# Patient Record
Sex: MALE | Race: WHITE | Employment: OTHER | URBAN - METROPOLITAN AREA
[De-identification: names, ages, dates, MRNs, and addresses within clinical notes are randomized per-mention and may not be internally consistent; named-entity substitution may affect disease eponyms.]

---

## 2017-08-31 ENCOUNTER — HOSPITAL ENCOUNTER (EMERGENCY)
Age: 62
Discharge: HOME | End: 2017-09-01
Attending: EMERGENCY MEDICINE
Payer: COMMERCIAL

## 2017-08-31 ENCOUNTER — APPOINTMENT (OUTPATIENT)
Dept: CT IMAGING | Age: 62
End: 2017-08-31
Payer: COMMERCIAL

## 2017-08-31 VITALS
DIASTOLIC BLOOD PRESSURE: 88 MMHG | SYSTOLIC BLOOD PRESSURE: 169 MMHG | OXYGEN SATURATION: 97 % | BODY MASS INDEX: 40.43 KG/M2 | HEIGHT: 74 IN | WEIGHT: 315 LBS | RESPIRATION RATE: 16 BRPM | TEMPERATURE: 97.9 F | HEART RATE: 80 BPM

## 2017-08-31 DIAGNOSIS — N20.1 URETERIC STONE: Primary | ICD-10-CM

## 2017-08-31 LAB
ABSOLUTE EOS #: 0.3 K/UL (ref 0–0.4)
ABSOLUTE LYMPH #: 2.5 K/UL (ref 1–4.8)
ABSOLUTE MONO #: 1.3 K/UL (ref 0.2–0.8)
ANION GAP SERPL CALCULATED.3IONS-SCNC: 17 MMOL/L (ref 9–17)
BASOPHILS # BLD: 1 %
BASOPHILS ABSOLUTE: 0.1 K/UL (ref 0–0.2)
BUN BLDV-MCNC: 15 MG/DL (ref 8–23)
BUN/CREAT BLD: 12 (ref 9–20)
CALCIUM SERPL-MCNC: 9.5 MG/DL (ref 8.6–10.4)
CHLORIDE BLD-SCNC: 102 MMOL/L (ref 98–107)
CO2: 20 MMOL/L (ref 20–31)
CREAT SERPL-MCNC: 1.22 MG/DL (ref 0.7–1.2)
DIFFERENTIAL TYPE: ABNORMAL
EOSINOPHILS RELATIVE PERCENT: 2 %
GFR AFRICAN AMERICAN: >60 ML/MIN
GFR NON-AFRICAN AMERICAN: >60 ML/MIN
GFR SERPL CREATININE-BSD FRML MDRD: ABNORMAL ML/MIN/{1.73_M2}
GFR SERPL CREATININE-BSD FRML MDRD: ABNORMAL ML/MIN/{1.73_M2}
GLUCOSE BLD-MCNC: 98 MG/DL (ref 70–99)
HCT VFR BLD CALC: 47 % (ref 41–53)
HEMOGLOBIN: 15.9 G/DL (ref 13.5–17.5)
LYMPHOCYTES # BLD: 13 %
MCH RBC QN AUTO: 29.1 PG (ref 26–34)
MCHC RBC AUTO-ENTMCNC: 33.8 G/DL (ref 31–37)
MCV RBC AUTO: 86.1 FL (ref 80–100)
MONOCYTES # BLD: 7 %
PDW BLD-RTO: 13.6 % (ref 11.5–14.5)
PLATELET # BLD: 286 K/UL (ref 130–400)
PLATELET ESTIMATE: ABNORMAL
PMV BLD AUTO: ABNORMAL FL (ref 6–12)
POTASSIUM SERPL-SCNC: 4.4 MMOL/L (ref 3.7–5.3)
RBC # BLD: 5.46 M/UL (ref 4.5–5.9)
RBC # BLD: ABNORMAL 10*6/UL
SEG NEUTROPHILS: 77 %
SEGMENTED NEUTROPHILS ABSOLUTE COUNT: 14.5 K/UL (ref 1.8–7.7)
SODIUM BLD-SCNC: 139 MMOL/L (ref 135–144)
WBC # BLD: 18.7 K/UL (ref 3.5–11)
WBC # BLD: ABNORMAL 10*3/UL

## 2017-08-31 PROCEDURE — 96375 TX/PRO/DX INJ NEW DRUG ADDON: CPT

## 2017-08-31 PROCEDURE — 99284 EMERGENCY DEPT VISIT MOD MDM: CPT

## 2017-08-31 PROCEDURE — 85025 COMPLETE CBC W/AUTO DIFF WBC: CPT

## 2017-08-31 PROCEDURE — 74176 CT ABD & PELVIS W/O CONTRAST: CPT

## 2017-08-31 PROCEDURE — 6360000002 HC RX W HCPCS: Performed by: EMERGENCY MEDICINE

## 2017-08-31 PROCEDURE — 81003 URINALYSIS AUTO W/O SCOPE: CPT

## 2017-08-31 PROCEDURE — 2580000003 HC RX 258: Performed by: EMERGENCY MEDICINE

## 2017-08-31 PROCEDURE — 80048 BASIC METABOLIC PNL TOTAL CA: CPT

## 2017-08-31 RX ORDER — ACETAMINOPHEN AND CODEINE PHOSPHATE 300; 30 MG/1; MG/1
1 TABLET ORAL EVERY 4 HOURS PRN
COMMUNITY
End: 2020-11-02

## 2017-08-31 RX ORDER — MELOXICAM 10 MG/1
CAPSULE ORAL
COMMUNITY
End: 2020-11-02

## 2017-08-31 RX ORDER — TAMSULOSIN HYDROCHLORIDE 0.4 MG/1
0.4 CAPSULE ORAL DAILY
Qty: 5 CAPSULE | Refills: 0 | Status: SHIPPED | OUTPATIENT
Start: 2017-08-31 | End: 2020-11-02

## 2017-08-31 RX ORDER — TOPIRAMATE 100 MG/1
TABLET, FILM COATED ORAL 2 TIMES DAILY
COMMUNITY
End: 2020-11-02

## 2017-08-31 RX ORDER — OXYCODONE HYDROCHLORIDE AND ACETAMINOPHEN 5; 325 MG/1; MG/1
1 TABLET ORAL EVERY 4 HOURS PRN
Qty: 30 TABLET | Refills: 0 | Status: SHIPPED | OUTPATIENT
Start: 2017-08-31 | End: 2020-11-02

## 2017-08-31 RX ORDER — SODIUM CHLORIDE 9 MG/ML
INJECTION, SOLUTION INTRAVENOUS CONTINUOUS
Status: DISCONTINUED | OUTPATIENT
Start: 2017-08-31 | End: 2017-09-01 | Stop reason: HOSPADM

## 2017-08-31 RX ORDER — CEPHALEXIN 500 MG/1
500 CAPSULE ORAL 3 TIMES DAILY
Qty: 21 CAPSULE | Refills: 0 | Status: SHIPPED | OUTPATIENT
Start: 2017-08-31 | End: 2020-11-02

## 2017-08-31 RX ORDER — PROMETHAZINE HYDROCHLORIDE 25 MG/ML
12.5 INJECTION, SOLUTION INTRAMUSCULAR; INTRAVENOUS ONCE
Status: COMPLETED | OUTPATIENT
Start: 2017-08-31 | End: 2017-08-31

## 2017-08-31 RX ORDER — ONDANSETRON 4 MG/1
4 TABLET, FILM COATED ORAL EVERY 8 HOURS PRN
Qty: 20 TABLET | Refills: 0 | Status: SHIPPED | OUTPATIENT
Start: 2017-08-31 | End: 2020-11-02

## 2017-08-31 RX ADMIN — SODIUM CHLORIDE: 9 INJECTION, SOLUTION INTRAVENOUS at 22:55

## 2017-08-31 RX ADMIN — HYDROMORPHONE HYDROCHLORIDE 1 MG: 1 INJECTION, SOLUTION INTRAMUSCULAR; INTRAVENOUS; SUBCUTANEOUS at 22:52

## 2017-08-31 RX ADMIN — PROMETHAZINE HYDROCHLORIDE 12.5 MG: 25 INJECTION INTRAMUSCULAR; INTRAVENOUS at 22:52

## 2017-08-31 ASSESSMENT — ENCOUNTER SYMPTOMS
EYES NEGATIVE: 1
NAUSEA: 1
BACK PAIN: 1
RESPIRATORY NEGATIVE: 1
ALLERGIC/IMMUNOLOGIC NEGATIVE: 1

## 2017-08-31 ASSESSMENT — PAIN DESCRIPTION - ORIENTATION: ORIENTATION: LEFT

## 2017-08-31 ASSESSMENT — PAIN DESCRIPTION - DESCRIPTORS: DESCRIPTORS: ACHING

## 2017-08-31 ASSESSMENT — PAIN SCALES - GENERAL
PAINLEVEL_OUTOF10: 7
PAINLEVEL_OUTOF10: 7

## 2017-08-31 ASSESSMENT — PAIN DESCRIPTION - LOCATION: LOCATION: FLANK

## 2017-08-31 ASSESSMENT — PAIN DESCRIPTION - PAIN TYPE: TYPE: ACUTE PAIN

## 2017-09-01 PROCEDURE — 2580000003 HC RX 258: Performed by: EMERGENCY MEDICINE

## 2017-09-01 PROCEDURE — 96376 TX/PRO/DX INJ SAME DRUG ADON: CPT

## 2017-09-01 PROCEDURE — 96365 THER/PROPH/DIAG IV INF INIT: CPT

## 2017-09-01 PROCEDURE — 6360000002 HC RX W HCPCS: Performed by: EMERGENCY MEDICINE

## 2017-09-01 RX ADMIN — HYDROMORPHONE HYDROCHLORIDE 1 MG: 1 INJECTION, SOLUTION INTRAMUSCULAR; INTRAVENOUS; SUBCUTANEOUS at 00:12

## 2017-09-01 RX ADMIN — CEFTRIAXONE 1 G: 1 INJECTION, POWDER, FOR SOLUTION INTRAMUSCULAR; INTRAVENOUS at 00:12

## 2018-02-13 ENCOUNTER — HOSPITAL ENCOUNTER (OUTPATIENT)
Dept: MRI IMAGING | Facility: CLINIC | Age: 63
Discharge: HOME | End: 2018-02-15
Payer: COMMERCIAL

## 2018-02-13 DIAGNOSIS — M48.062 LUMBAR STENOSIS WITH NEUROGENIC CLAUDICATION: ICD-10-CM

## 2018-02-13 PROCEDURE — 72148 MRI LUMBAR SPINE W/O DYE: CPT

## 2019-02-08 ENCOUNTER — HOSPITAL ENCOUNTER (OUTPATIENT)
Facility: CLINIC | Age: 64
Discharge: HOME | End: 2019-02-08
Payer: COMMERCIAL

## 2019-02-08 LAB
ABSOLUTE EOS #: 0.3 K/UL (ref 0–0.44)
ABSOLUTE IMMATURE GRANULOCYTE: 0.04 K/UL (ref 0–0.3)
ABSOLUTE LYMPH #: 2.25 K/UL (ref 1.1–3.7)
ABSOLUTE MONO #: 0.69 K/UL (ref 0.1–1.2)
ALBUMIN SERPL-MCNC: 4.3 G/DL (ref 3.5–5.2)
ALBUMIN/GLOBULIN RATIO: 1.3 (ref 1–2.5)
ALP BLD-CCNC: 60 U/L (ref 40–129)
ALT SERPL-CCNC: 14 U/L (ref 5–41)
ANION GAP SERPL CALCULATED.3IONS-SCNC: 12 MMOL/L (ref 9–17)
AST SERPL-CCNC: 13 U/L
BASOPHILS # BLD: 1 % (ref 0–2)
BASOPHILS ABSOLUTE: 0.08 K/UL (ref 0–0.2)
BILIRUB SERPL-MCNC: 0.47 MG/DL (ref 0.3–1.2)
BUN BLDV-MCNC: 12 MG/DL (ref 8–23)
BUN/CREAT BLD: ABNORMAL (ref 9–20)
CALCIUM SERPL-MCNC: 9.2 MG/DL (ref 8.6–10.4)
CHLORIDE BLD-SCNC: 106 MMOL/L (ref 98–107)
CHOLESTEROL, FASTING: 186 MG/DL
CHOLESTEROL/HDL RATIO: 3.9
CO2: 23 MMOL/L (ref 20–31)
CREAT SERPL-MCNC: 0.91 MG/DL (ref 0.7–1.2)
DIFFERENTIAL TYPE: ABNORMAL
EOSINOPHILS RELATIVE PERCENT: 3 % (ref 1–4)
GFR AFRICAN AMERICAN: >60 ML/MIN
GFR NON-AFRICAN AMERICAN: >60 ML/MIN
GFR SERPL CREATININE-BSD FRML MDRD: ABNORMAL ML/MIN/{1.73_M2}
GFR SERPL CREATININE-BSD FRML MDRD: ABNORMAL ML/MIN/{1.73_M2}
GLUCOSE FASTING: 104 MG/DL (ref 70–99)
HCT VFR BLD CALC: 47.5 % (ref 40.7–50.3)
HDLC SERPL-MCNC: 48 MG/DL
HEMOGLOBIN: 16.1 G/DL (ref 13–17)
IMMATURE GRANULOCYTES: 0 %
LDL CHOLESTEROL: 115 MG/DL (ref 0–130)
LYMPHOCYTES # BLD: 20 % (ref 24–43)
MCH RBC QN AUTO: 30 PG (ref 25.2–33.5)
MCHC RBC AUTO-ENTMCNC: 33.9 G/DL (ref 28.4–34.8)
MCV RBC AUTO: 88.5 FL (ref 82.6–102.9)
MONOCYTES # BLD: 6 % (ref 3–12)
NRBC AUTOMATED: 0 PER 100 WBC
PDW BLD-RTO: 13.1 % (ref 11.8–14.4)
PLATELET # BLD: 257 K/UL (ref 138–453)
PLATELET ESTIMATE: ABNORMAL
PMV BLD AUTO: 10.5 FL (ref 8.1–13.5)
POTASSIUM SERPL-SCNC: 4.3 MMOL/L (ref 3.7–5.3)
PROSTATE SPECIFIC ANTIGEN: 2.54 UG/L
RBC # BLD: 5.37 M/UL (ref 4.21–5.77)
RBC # BLD: ABNORMAL 10*6/UL
SEG NEUTROPHILS: 70 % (ref 36–65)
SEGMENTED NEUTROPHILS ABSOLUTE COUNT: 7.67 K/UL (ref 1.5–8.1)
SODIUM BLD-SCNC: 141 MMOL/L (ref 135–144)
TOTAL PROTEIN: 7.6 G/DL (ref 6.4–8.3)
TRIGLYCERIDE, FASTING: 114 MG/DL
VITAMIN D 25-HYDROXY: 39.1 NG/ML (ref 30–100)
VLDLC SERPL CALC-MCNC: NORMAL MG/DL (ref 1–30)
WBC # BLD: 11 K/UL (ref 3.5–11.3)
WBC # BLD: ABNORMAL 10*3/UL

## 2019-02-08 PROCEDURE — 85025 COMPLETE CBC W/AUTO DIFF WBC: CPT

## 2019-02-08 PROCEDURE — 80053 COMPREHEN METABOLIC PANEL: CPT

## 2019-02-08 PROCEDURE — G0103 PSA SCREENING: HCPCS

## 2019-02-08 PROCEDURE — 82306 VITAMIN D 25 HYDROXY: CPT

## 2019-02-08 PROCEDURE — 80061 LIPID PANEL: CPT

## 2019-02-08 PROCEDURE — 36415 COLL VENOUS BLD VENIPUNCTURE: CPT

## 2019-02-26 ENCOUNTER — APPOINTMENT (OUTPATIENT)
Dept: GENERAL RADIOLOGY | Age: 64
End: 2019-02-26
Payer: COMMERCIAL

## 2019-02-26 ENCOUNTER — HOSPITAL ENCOUNTER (EMERGENCY)
Age: 64
Discharge: HOME | End: 2019-02-26
Attending: EMERGENCY MEDICINE
Payer: COMMERCIAL

## 2019-02-26 ENCOUNTER — APPOINTMENT (OUTPATIENT)
Dept: CT IMAGING | Age: 64
End: 2019-02-26
Payer: COMMERCIAL

## 2019-02-26 VITALS
HEART RATE: 85 BPM | RESPIRATION RATE: 17 BRPM | WEIGHT: 304 LBS | BODY MASS INDEX: 39.01 KG/M2 | SYSTOLIC BLOOD PRESSURE: 138 MMHG | OXYGEN SATURATION: 98 % | DIASTOLIC BLOOD PRESSURE: 98 MMHG | TEMPERATURE: 98.6 F | HEIGHT: 74 IN

## 2019-02-26 DIAGNOSIS — R03.0 ELEVATED BLOOD PRESSURE READING: Primary | ICD-10-CM
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
## 2019-02-26 DIAGNOSIS — S16.1XXA ACUTE STRAIN OF NECK MUSCLE, INITIAL ENCOUNTER: ICD-10-CM

## 2019-02-26 DIAGNOSIS — S39.012A STRAIN OF LUMBAR REGION, INITIAL ENCOUNTER: ICD-10-CM

## 2019-02-26 PROCEDURE — 72100 X-RAY EXAM L-S SPINE 2/3 VWS: CPT

## 2019-02-26 PROCEDURE — 99284 EMERGENCY DEPT VISIT MOD MDM: CPT

## 2019-02-26 PROCEDURE — 72125 CT NECK SPINE W/O DYE: CPT

## 2019-02-26 PROCEDURE — 73562 X-RAY EXAM OF KNEE 3: CPT

## 2019-02-26 PROCEDURE — 6370000000 HC RX 637 (ALT 250 FOR IP): Performed by: PHYSICIAN ASSISTANT

## 2019-02-26 RX ORDER — METHOCARBAMOL 750 MG/1
750 TABLET, FILM COATED ORAL 4 TIMES DAILY
Qty: 40 TABLET | Refills: 0 | Status: SHIPPED | OUTPATIENT
Start: 2019-02-26 | End: 2019-02-26

## 2019-02-26 RX ORDER — METHOCARBAMOL 750 MG/1
750 TABLET, FILM COATED ORAL 4 TIMES DAILY
Qty: 40 TABLET | Refills: 0 | Status: SHIPPED | OUTPATIENT
Start: 2019-02-26 | End: 2019-03-08

## 2019-02-26 RX ORDER — METHOCARBAMOL 750 MG/1
750 TABLET, FILM COATED ORAL ONCE
Status: COMPLETED | OUTPATIENT
Start: 2019-02-26 | End: 2019-02-26

## 2019-02-26 RX ADMIN — METHOCARBAMOL 750 MG: 750 TABLET ORAL at 19:43

## 2019-02-26 ASSESSMENT — PAIN SCALES - GENERAL: PAINLEVEL_OUTOF10: 7

## 2019-02-26 ASSESSMENT — PAIN DESCRIPTION - PAIN TYPE: TYPE: ACUTE PAIN

## 2019-02-26 ASSESSMENT — PAIN DESCRIPTION - ORIENTATION: ORIENTATION: LOWER

## 2019-02-26 ASSESSMENT — PAIN DESCRIPTION - LOCATION: LOCATION: BACK;KNEE

## 2020-11-02 ENCOUNTER — HOSPITAL ENCOUNTER (OUTPATIENT)
Age: 65
Setting detail: SPECIMEN
Discharge: HOME | End: 2020-11-02
Payer: COMMERCIAL

## 2020-11-02 ENCOUNTER — HOSPITAL ENCOUNTER (OUTPATIENT)
Dept: GENERAL RADIOLOGY | Facility: CLINIC | Age: 65
Discharge: HOME | End: 2020-11-04
Payer: COMMERCIAL

## 2020-11-02 ENCOUNTER — HOSPITAL ENCOUNTER (OUTPATIENT)
Facility: CLINIC | Age: 65
Discharge: HOME | End: 2020-11-04
Payer: COMMERCIAL

## 2020-11-02 PROBLEM — N13.8 HYPERTROPHY OF PROSTATE WITH URINARY OBSTRUCTION: Status: ACTIVE | Noted: 2020-11-02

## 2020-11-02 PROBLEM — N40.1 HYPERTROPHY OF PROSTATE WITH URINARY OBSTRUCTION: Status: ACTIVE | Noted: 2020-11-02

## 2020-11-02 PROBLEM — R31.0 GROSS HEMATURIA: Status: ACTIVE | Noted: 2020-11-02

## 2020-11-02 PROBLEM — N20.0 KIDNEY STONES: Status: ACTIVE | Noted: 2020-11-02

## 2020-11-02 PROBLEM — R35.1 NOCTURIA: Status: ACTIVE | Noted: 2020-11-02

## 2020-11-02 PROCEDURE — 74018 RADEX ABDOMEN 1 VIEW: CPT

## 2020-11-06 LAB
STONE COMPOSITION: NORMAL
STONE DESCRIPTION: NORMAL
STONE MASS: 13 MG
STONE NUMBER: 5
STONE SIZE: NORMAL MM

## 2022-02-10 ENCOUNTER — TELEPHONE (OUTPATIENT)
Dept: ONCOLOGY | Age: 67
End: 2022-02-10

## 2022-02-10 ENCOUNTER — INITIAL CONSULT (OUTPATIENT)
Dept: ONCOLOGY | Age: 67
End: 2022-02-10
Payer: MEDICARE

## 2022-02-10 VITALS
SYSTOLIC BLOOD PRESSURE: 131 MMHG | HEART RATE: 103 BPM | WEIGHT: 315 LBS | BODY MASS INDEX: 40.43 KG/M2 | DIASTOLIC BLOOD PRESSURE: 92 MMHG | HEIGHT: 74 IN | TEMPERATURE: 97.3 F

## 2022-02-10 DIAGNOSIS — R33.9 URINARY RETENTION: ICD-10-CM

## 2022-02-10 DIAGNOSIS — C67.2 MALIGNANT NEOPLASM OF LATERAL WALL OF URINARY BLADDER (HCC): Primary | ICD-10-CM

## 2022-02-10 DIAGNOSIS — R31.0 GROSS HEMATURIA: ICD-10-CM

## 2022-02-10 PROBLEM — C67.9 MALIGNANT NEOPLASM OF URINARY BLADDER (HCC): Status: ACTIVE | Noted: 2022-02-10

## 2022-02-10 PROCEDURE — G8417 CALC BMI ABV UP PARAM F/U: HCPCS | Performed by: INTERNAL MEDICINE

## 2022-02-10 PROCEDURE — G8427 DOCREV CUR MEDS BY ELIG CLIN: HCPCS | Performed by: INTERNAL MEDICINE

## 2022-02-10 PROCEDURE — 99205 OFFICE O/P NEW HI 60 MIN: CPT | Performed by: INTERNAL MEDICINE

## 2022-02-10 PROCEDURE — G8484 FLU IMMUNIZE NO ADMIN: HCPCS | Performed by: INTERNAL MEDICINE

## 2022-02-10 RX ORDER — HEPARIN SODIUM (PORCINE) LOCK FLUSH IV SOLN 100 UNIT/ML 100 UNIT/ML
500 SOLUTION INTRAVENOUS PRN
Status: CANCELLED | OUTPATIENT
Start: 2022-03-10

## 2022-02-10 RX ORDER — OMEPRAZOLE 20 MG/1
20 CAPSULE, DELAYED RELEASE ORAL DAILY
Qty: 30 CAPSULE | Refills: 3 | Status: SHIPPED | OUTPATIENT
Start: 2022-02-10 | End: 2022-07-08 | Stop reason: SDUPTHER

## 2022-02-10 RX ORDER — HYDROCODONE BITARTRATE AND ACETAMINOPHEN 5; 325 MG/1; MG/1
1 TABLET ORAL EVERY 8 HOURS PRN
Qty: 60 TABLET | Refills: 0 | Status: SHIPPED | OUTPATIENT
Start: 2022-02-10 | End: 2022-03-04 | Stop reason: SDUPTHER

## 2022-02-10 RX ORDER — DIPHENHYDRAMINE HYDROCHLORIDE 50 MG/ML
50 INJECTION INTRAMUSCULAR; INTRAVENOUS
Status: CANCELLED | OUTPATIENT
Start: 2022-03-03

## 2022-02-10 RX ORDER — ONDANSETRON 2 MG/ML
8 INJECTION INTRAMUSCULAR; INTRAVENOUS
Status: CANCELLED | OUTPATIENT
Start: 2022-03-10

## 2022-02-10 RX ORDER — SODIUM CHLORIDE 9 MG/ML
5-40 INJECTION INTRAVENOUS PRN
Status: CANCELLED | OUTPATIENT
Start: 2022-03-03

## 2022-02-10 RX ORDER — EPINEPHRINE 1 MG/ML
0.3 INJECTION, SOLUTION, CONCENTRATE INTRAVENOUS PRN
Status: CANCELLED | OUTPATIENT
Start: 2022-03-03

## 2022-02-10 RX ORDER — SODIUM CHLORIDE 9 MG/ML
20 INJECTION, SOLUTION INTRAVENOUS ONCE
Status: CANCELLED | OUTPATIENT
Start: 2022-03-10 | End: 2022-03-01

## 2022-02-10 RX ORDER — SODIUM CHLORIDE 9 MG/ML
INJECTION, SOLUTION INTRAVENOUS CONTINUOUS
Status: CANCELLED | OUTPATIENT
Start: 2022-03-10

## 2022-02-10 RX ORDER — SODIUM CHLORIDE 9 MG/ML
25 INJECTION, SOLUTION INTRAVENOUS PRN
Status: CANCELLED | OUTPATIENT
Start: 2022-03-10

## 2022-02-10 RX ORDER — ONDANSETRON 8 MG/1
8 TABLET, ORALLY DISINTEGRATING ORAL EVERY 8 HOURS PRN
Qty: 30 TABLET | Refills: 3 | Status: SHIPPED | OUTPATIENT
Start: 2022-02-10 | End: 2022-06-10

## 2022-02-10 RX ORDER — ACETAMINOPHEN 325 MG/1
650 TABLET ORAL
Status: CANCELLED | OUTPATIENT
Start: 2022-03-03

## 2022-02-10 RX ORDER — ALBUTEROL SULFATE 90 UG/1
4 AEROSOL, METERED RESPIRATORY (INHALATION) PRN
Status: CANCELLED | OUTPATIENT
Start: 2022-03-03

## 2022-02-10 RX ORDER — EPINEPHRINE 1 MG/ML
0.3 INJECTION, SOLUTION, CONCENTRATE INTRAVENOUS PRN
Status: CANCELLED | OUTPATIENT
Start: 2022-03-10

## 2022-02-10 RX ORDER — SODIUM CHLORIDE 9 MG/ML
INJECTION, SOLUTION INTRAVENOUS CONTINUOUS
Status: CANCELLED | OUTPATIENT
Start: 2022-03-03

## 2022-02-10 RX ORDER — ONDANSETRON 2 MG/ML
8 INJECTION INTRAMUSCULAR; INTRAVENOUS
Status: CANCELLED | OUTPATIENT
Start: 2022-03-03

## 2022-02-10 RX ORDER — PALONOSETRON 0.05 MG/ML
0.25 INJECTION, SOLUTION INTRAVENOUS ONCE
Status: CANCELLED | OUTPATIENT
Start: 2022-03-03 | End: 2022-02-22

## 2022-02-10 RX ORDER — SODIUM CHLORIDE 0.9 % (FLUSH) 0.9 %
5-40 SYRINGE (ML) INJECTION PRN
Status: CANCELLED | OUTPATIENT
Start: 2022-03-10

## 2022-02-10 RX ORDER — ACETAMINOPHEN 325 MG/1
650 TABLET ORAL
Status: CANCELLED | OUTPATIENT
Start: 2022-03-10

## 2022-02-10 RX ORDER — SODIUM CHLORIDE 9 MG/ML
20 INJECTION, SOLUTION INTRAVENOUS ONCE
Status: CANCELLED | OUTPATIENT
Start: 2022-03-03 | End: 2022-02-22

## 2022-02-10 RX ORDER — HEPARIN SODIUM (PORCINE) LOCK FLUSH IV SOLN 100 UNIT/ML 100 UNIT/ML
500 SOLUTION INTRAVENOUS PRN
Status: CANCELLED | OUTPATIENT
Start: 2022-03-03

## 2022-02-10 RX ORDER — MEPERIDINE HYDROCHLORIDE 50 MG/ML
12.5 INJECTION INTRAMUSCULAR; INTRAVENOUS; SUBCUTANEOUS PRN
Status: CANCELLED | OUTPATIENT
Start: 2022-03-03

## 2022-02-10 RX ORDER — SODIUM CHLORIDE 9 MG/ML
5-40 INJECTION INTRAVENOUS PRN
Status: CANCELLED | OUTPATIENT
Start: 2022-03-10

## 2022-02-10 RX ORDER — MEPERIDINE HYDROCHLORIDE 50 MG/ML
12.5 INJECTION INTRAMUSCULAR; INTRAVENOUS; SUBCUTANEOUS PRN
Status: CANCELLED | OUTPATIENT
Start: 2022-03-10

## 2022-02-10 RX ORDER — DIPHENHYDRAMINE HYDROCHLORIDE 50 MG/ML
50 INJECTION INTRAMUSCULAR; INTRAVENOUS
Status: CANCELLED | OUTPATIENT
Start: 2022-03-10

## 2022-02-10 RX ORDER — LIDOCAINE AND PRILOCAINE 25; 25 MG/G; MG/G
CREAM TOPICAL
Qty: 30 G | Refills: 2 | Status: SHIPPED | OUTPATIENT
Start: 2022-02-10 | End: 2022-08-04

## 2022-02-10 RX ORDER — SODIUM CHLORIDE 9 MG/ML
25 INJECTION, SOLUTION INTRAVENOUS PRN
Status: CANCELLED | OUTPATIENT
Start: 2022-03-03

## 2022-02-10 RX ORDER — SODIUM CHLORIDE 0.9 % (FLUSH) 0.9 %
5-40 SYRINGE (ML) INJECTION PRN
Status: CANCELLED | OUTPATIENT
Start: 2022-03-03

## 2022-02-10 RX ORDER — ALBUTEROL SULFATE 90 UG/1
4 AEROSOL, METERED RESPIRATORY (INHALATION) PRN
Status: CANCELLED | OUTPATIENT
Start: 2022-03-10

## 2022-02-10 NOTE — TELEPHONE ENCOUNTER
Need mediport  Need PET scan  See orders for chemo  rv in 2 weeks or so to start chemo, prefer Tuesday    IR coordinator notified of referral, will contact patient to schedule    PET scheduled 2/11/22 @ 9:30am    AVS given to  to follow precert    RV to be scheduled with C1    PT was given AVS and an appt schedule    Electronically signed by Lester Hernandez on 2/10/2022 at 3:16 PM

## 2022-02-10 NOTE — LETTER
[facility] CANCER [address]  Phone: [phone]  Fax: [phone]           Mary Jo Patel MD      February 10, 2022     Patient: Praful Osborn   MR Number: [id number]   YOB: 1955   Date of Visit: 2/10/2022       Dear Dr. Abraham Palmer:    Thank you for referring Eric Lord to me for evaluation/treatment. Below are the relevant portions of my assessment and plan of care. If you have questions, please do not hesitate to call me. I look forward to following Kwame Nails along with you.     Sincerely,        Mary Jo Patel MD    CC providers:  Xiomy Duran MD  [place] [address], [address] [facility] [address]  [address] [place]

## 2022-02-10 NOTE — PROGRESS NOTES
DIAGNOSIS:   1. High grade urothelial carcinoma, 12/2021    CURRENT THERAPY:  Plan for neoadjuvant chemo followed by adjuvant cystectomy    BRIEF CASE HISTORY:   Ralph Jade is a very pleasant 74 y.o. male who is referred to us for bladder cancer. Patient presented with hematuria 02/2021 and was seen by urology, it was felt to be renal calculi and he was started on Flomax but continued to pass clots. He was later in [facility] and had urinary blockage, passed very large clot and then able to urinate and presented again to ER with continued hematuria, CT showed severe right hydronephrosis with tissue thickening/mass seen in right side of bladder, he underwent nephrostomy placement, stent was put in place and cystoscopy done showing fairly large papillary tumor occupying right side posterior wall and even the dome of the bladder. Pathology showed high grade urothelial carcinoma invading muscularis propria. Over the next month he needed 2 repeat cystoscopies and extensive fulguration of the tumor, again pathology showed high grade urothelial carcinoma. CT of the chest abdomen pelvis did not show metastatic disease. He has intermittent bladder pain and continues to have hematuria in Muñoz catheter. His appetite is poor. His father had bladder cancer, he did not have smoking history. The patient has smoking history but quit 30 years ago. He had COVID infection in 09/2021, he lost significant weight and continues to recover. He has arthralgias in the knees and uses cane. PAST MEDICAL HISTORY: has a past medical history of Arthritis, BPH with obstruction/lower urinary tract symptoms, Caffeine use, Depression, GERD (gastroesophageal reflux disease), High cholesterol, Kidney calculi, and Sleep apnea. PAST SURGICAL HISTORY: has a past surgical history that includes Knee arthroscopy and Cardiac catheterization.      CURRENT MEDICATIONS:  has a current medication list which includes the following prescription(s): gemtesa, meloxicam, clobetasol, finasteride, tamsulosin, simvastatin, bupropion, and hydrocodone-acetaminophen. ALLERGIES:  has No Known Allergies. FAMILY HISTORY: Father had bladder cancer     SOCIAL HISTORY:  reports that he quit smoking about 30 years ago. He has never used smokeless tobacco. He reports that he does not drink alcohol and does not use drugs. REVIEW OF SYSTEMS:   General: No fever or night sweats. Weight is stable. ENT: No double or blurred vision, no tinnitus or hearing problem, no dysphagia or sore throat   Respiratory: No chest pain, no shortness of breath, no cough or hemoptysis. Cardiovascular: Denies chest pain, PND or orthopnea. No L E swelling or palpitations. Gastrointestinal: No nausea or vomiting, abdominal pain, diarrhea or constipation. Genitourinary: Denies dysuria, frequency, urgency or incontinence. +bladder pain, Muñoz in place, hematuria   Neurological: Denies headaches, decreased LOC, no sensory or motor focal deficits. Musculoskeletal: No arthralgia no back pain or joint swelling. Skin: There are no rashes or bleeding. Psychiatric: No anxiety, no depression. Endocrine: No diabetes or thyroid disease. Hematologic: No bleeding, no adenopathy. PHYSICAL EXAM: Shows a well appearing 74y.o.-year-old male who is not in pain or distress. Vital Signs: Blood pressure (!) 131/92, pulse 103, temperature 97.3 °F (36.3 °C), temperature source Temporal, height 6' 2\" (1.88 m), weight (!) 320 lb (145.2 kg). HEENT: Normocephalic and atraumatic. Pupils are equal, round, reactive to light and accommodation. Extraocular muscles are intact. Neck: Showed no JVD, no carotid bruit . Lungs: Clear to auscultation bilaterally. Heart: Regular without any murmur. Abdomen: Soft, nontender. No hepatosplenomegaly. Extremities: Lower extremities show no edema, clubbing, or cyanosis. Breasts: Examination not done today.  Neuro exam: intact cranial nerves bilaterally no motor or sensory deficit, gait is normal. Lymphatic: no adenopathy appreciated in the supraclavicular, axillary, cervical or inguinal area  +Muñoz in place with hematuria     REVIEW OF LABORATORY DATA:   Lab Results   Component Value Date    WBC 11.0 02/08/2019    HGB 16.1 02/08/2019    HCT 47.5 02/08/2019    MCV 88.5 02/08/2019     02/08/2019       Chemistry        Component Value Date/Time     02/08/2019 1451    K 4.3 02/08/2019 1451     02/08/2019 1451    CO2 23 02/08/2019 1451    BUN 12 01/27/2022 0000    CREATININE 1.15 01/27/2022 0000        Component Value Date/Time    CALCIUM 9.2 02/08/2019 1451    ALKPHOS 60 02/08/2019 1451    AST 13 02/08/2019 1451    ALT 14 02/08/2019 1451    BILITOT 0.47 02/08/2019 1451        PATHOLOGY:   01/26/2022:  Final Diagnosis     Bladder tumor, transurethral resection:  Positive for malignancy, papillary urothelial carcinoma, high-grade, invasive into the lamina propria. Sampling of muscularis propria is identified, with areas of necrosis, inflammation, and calcification, but no definite viable tumor involvement. REVIEW OF RADIOLOGICAL RESULTS:     IMPRESSION:   1. High grade urothelial carcinoma   2. Plan for neoadjuvant chemo to be followed by cystectomy     PLAN:   1. We discussed in detail his presentation, history and diagnosis. 2. I explained his CT did not show metastatic disease but we will proceed with PET for final review and am putting in orders. 3. I discussed recommendation for chemo prior to cystectomy with curative intent based on the guidelines, goals, dosing schedule and potential side effects were reviewed. 4. We had detailed discussion regarding cystectomy and expectations, he can discuss further with urologist.   5. He had very good understanding of our discussion and is agreeable to treatment. 6. I am putting in orders for treatment and port insertion. 7. I confirmed to continue with current medications unchanged.    8. I am writing for Emla cream, Zofran, and Prilosec, I will refill his Norco.     9. Return to commence with treatment in about 2 weeks. Scribe Attestation   This note was created by Nuris Rose acting as scribe for the physician signing this note  Electronically Signed  Marci Yoder, 2/10/2022  Scribe, [employer]. Attending Attestation   Note was reviewed and edited.   I am in agreement with the note as entered    Aleena Grayson MD  Hematologist/Medical [id number] [facility] hematology oncology physicians

## 2022-02-10 NOTE — PATIENT INSTRUCTIONS
Need mediport  Need PET scan   See orders for chemo   rv in 2 weeks or so to start chemo , prefer Tuesday

## 2022-02-11 ENCOUNTER — HOSPITAL ENCOUNTER (OUTPATIENT)
Dept: NUCLEAR MEDICINE | Age: 67
Discharge: HOME | End: 2022-02-13
Payer: MEDICARE

## 2022-02-11 ENCOUNTER — TELEPHONE (OUTPATIENT)
Dept: INTERVENTIONAL RADIOLOGY/VASCULAR | Age: 67
End: 2022-02-11

## 2022-02-11 DIAGNOSIS — C67.2 MALIGNANT NEOPLASM OF LATERAL WALL OF URINARY BLADDER (HCC): ICD-10-CM

## 2022-02-11 LAB — GLUCOSE BLD-MCNC: 100 MG/DL (ref 75–110)

## 2022-02-11 PROCEDURE — A9552 F18 FDG: HCPCS | Performed by: INTERNAL MEDICINE

## 2022-02-11 PROCEDURE — 2580000003 HC RX 258: Performed by: INTERNAL MEDICINE

## 2022-02-11 PROCEDURE — 3430000000 HC RX DIAGNOSTIC RADIOPHARMACEUTICAL: Performed by: INTERNAL MEDICINE

## 2022-02-11 PROCEDURE — 78815 PET IMAGE W/CT SKULL-THIGH: CPT

## 2022-02-11 PROCEDURE — 82947 ASSAY GLUCOSE BLOOD QUANT: CPT

## 2022-02-11 RX ORDER — FLUDEOXYGLUCOSE F 18 200 MCI/ML
10 INJECTION, SOLUTION INTRAVENOUS
Status: COMPLETED | OUTPATIENT
Start: 2022-02-11 | End: 2022-02-11

## 2022-02-11 RX ORDER — SODIUM CHLORIDE 0.9 % (FLUSH) 0.9 %
10 SYRINGE (ML) INJECTION PRN
Status: DISCONTINUED | OUTPATIENT
Start: 2022-02-11 | End: 2022-02-14 | Stop reason: HOSPADM

## 2022-02-11 RX ADMIN — FLUDEOXYGLUCOSE F 18 12.53 MILLICURIE: 200 INJECTION, SOLUTION INTRAVENOUS at 09:28

## 2022-02-11 RX ADMIN — SODIUM CHLORIDE, PRESERVATIVE FREE 10 ML: 5 INJECTION INTRAVENOUS at 09:28

## 2022-02-14 ENCOUNTER — TELEPHONE (OUTPATIENT)
Dept: INFUSION THERAPY | Age: 67
End: 2022-02-14

## 2022-02-14 DIAGNOSIS — C67.2 MALIGNANT NEOPLASM OF LATERAL WALL OF URINARY BLADDER (HCC): Primary | ICD-10-CM

## 2022-02-14 NOTE — TELEPHONE ENCOUNTER
Chemo orders received, ht 74\", wt 320lbs, and bsa 2.75 verified.    Dose of chemotherapy verified;  Gemzar 1250mg/m2= 3437mg  Cisplatin 70mg/m2= 192mg

## 2022-02-14 NOTE — TELEPHONE ENCOUNTER
Chemotherapy orders received:    Ht=74 inches  Th=312 lbs  BSA=2.75  Chemotherapy doses verified:  Gemzar 1250 mg/m2 = 3437 mg  Cisplatin 70 mg/m2 = 192 mg  Jose Guadalupe Burns RN

## 2022-02-17 ENCOUNTER — TELEPHONE (OUTPATIENT)
Dept: INFUSION THERAPY | Age: 67
End: 2022-02-17

## 2022-02-22 ENCOUNTER — HOSPITAL ENCOUNTER (OUTPATIENT)
Dept: INFUSION THERAPY | Age: 67
Discharge: HOME | End: 2022-02-22
Payer: MEDICARE

## 2022-02-22 ENCOUNTER — TELEPHONE (OUTPATIENT)
Dept: ONCOLOGY | Age: 67
End: 2022-02-22

## 2022-02-22 ENCOUNTER — HOSPITAL ENCOUNTER (OUTPATIENT)
Dept: INFUSION THERAPY | Age: 67
End: 2022-02-22

## 2022-02-22 ENCOUNTER — OFFICE VISIT (OUTPATIENT)
Dept: ONCOLOGY | Age: 67
End: 2022-02-22
Payer: MEDICARE

## 2022-02-22 VITALS
DIASTOLIC BLOOD PRESSURE: 73 MMHG | HEART RATE: 123 BPM | WEIGHT: 272.6 LBS | SYSTOLIC BLOOD PRESSURE: 120 MMHG | RESPIRATION RATE: 24 BRPM | BODY MASS INDEX: 35 KG/M2 | TEMPERATURE: 98.1 F

## 2022-02-22 DIAGNOSIS — C67.2 MALIGNANT NEOPLASM OF LATERAL WALL OF URINARY BLADDER (HCC): ICD-10-CM

## 2022-02-22 DIAGNOSIS — C67.0 MALIGNANT NEOPLASM OF TRIGONE OF URINARY BLADDER (HCC): Primary | ICD-10-CM

## 2022-02-22 DIAGNOSIS — C67.0 MALIGNANT NEOPLASM OF TRIGONE OF URINARY BLADDER (HCC): ICD-10-CM

## 2022-02-22 PROCEDURE — 1036F TOBACCO NON-USER: CPT | Performed by: INTERNAL MEDICINE

## 2022-02-22 PROCEDURE — G8484 FLU IMMUNIZE NO ADMIN: HCPCS | Performed by: INTERNAL MEDICINE

## 2022-02-22 PROCEDURE — 99211 OFF/OP EST MAY X REQ PHY/QHP: CPT | Performed by: INTERNAL MEDICINE

## 2022-02-22 PROCEDURE — 4040F PNEUMOC VAC/ADMIN/RCVD: CPT | Performed by: INTERNAL MEDICINE

## 2022-02-22 PROCEDURE — 99214 OFFICE O/P EST MOD 30 MIN: CPT | Performed by: INTERNAL MEDICINE

## 2022-02-22 PROCEDURE — 1123F ACP DISCUSS/DSCN MKR DOCD: CPT | Performed by: INTERNAL MEDICINE

## 2022-02-22 PROCEDURE — 3017F COLORECTAL CA SCREEN DOC REV: CPT | Performed by: INTERNAL MEDICINE

## 2022-02-22 PROCEDURE — G8427 DOCREV CUR MEDS BY ELIG CLIN: HCPCS | Performed by: INTERNAL MEDICINE

## 2022-02-22 PROCEDURE — 99213 OFFICE O/P EST LOW 20 MIN: CPT

## 2022-02-22 PROCEDURE — G8417 CALC BMI ABV UP PARAM F/U: HCPCS | Performed by: INTERNAL MEDICINE

## 2022-02-22 NOTE — PROGRESS NOTES
DIAGNOSIS:   1. High grade urothelial carcinoma, 12/2021, T3 N1 M0     CURRENT THERAPY:  Plan for neoadjuvant chemo followed by adjuvant cystectomy    BRIEF CASE HISTORY:   Saman Newman is a very pleasant 74 y.o. male who is referred to us for bladder cancer. Patient presented with hematuria 02/2021 and was seen by urology, it was felt to be renal calculi and he was started on Flomax but continued to pass clots. He was later in [facility] and had urinary blockage, passed very large clot and then able to urinate and presented again to ER with continued hematuria, CT showed severe right hydronephrosis with tissue thickening/mass seen in right side of bladder, he underwent nephrostomy placement, stent was put in place and cystoscopy done showing fairly large papillary tumor occupying right side posterior wall and even the dome of the bladder. Pathology showed high grade urothelial carcinoma invading muscularis propria. Over the next month he needed 2 repeat cystoscopies and extensive fulguration of the tumor, again pathology showed high grade urothelial carcinoma. CT of the chest abdomen pelvis did not show metastatic disease. He has intermittent bladder pain and continues to have hematuria in Muñoz catheter. His appetite is poor. His father had bladder cancer, he did not have smoking history. The patient has smoking history but quit 30 years ago. He had COVID infection in 09/2021, he lost significant weight and continues to recover. He has arthralgias in the knees and uses cane. Staging PET scan was done and showed the bladder tumor and right pelvic sidewall lymph node metastases 4 of clinical staging of T3 N1 M0. We discussed neoadjuvant chemotherapy to be followed by surgery if he has a good response. INTERIM HISTORY: The patient presents for further discussion regarding treatment and to review PET. He had cancelled treatment and chemo education, we reviewed via phone goals of therapy.  He has pain at penis tip with nerve compression from tumor. PAST MEDICAL HISTORY: has a past medical history of Arthritis, BPH with obstruction/lower urinary tract symptoms, Caffeine use, Depression, GERD (gastroesophageal reflux disease), High cholesterol, Kidney calculi, and Sleep apnea. PAST SURGICAL HISTORY: has a past surgical history that includes Knee arthroscopy and Cardiac catheterization. CURRENT MEDICATIONS:  has a current medication list which includes the following prescription(s): gemtesa, lidocaine-prilocaine, ondansetron, omeprazole, hydrocodone-acetaminophen, meloxicam, clobetasol, finasteride, tamsulosin, simvastatin, and bupropion. ALLERGIES:  has No Known Allergies. FAMILY HISTORY: Father had bladder cancer     SOCIAL HISTORY:  reports that he quit smoking about 30 years ago. He has never used smokeless tobacco. He reports that he does not drink alcohol and does not use drugs. REVIEW OF SYSTEMS:   General: No fever or night sweats. Weight is stable. ENT: No double or blurred vision, no tinnitus or hearing problem, no dysphagia or sore throat   Respiratory: No chest pain, no shortness of breath, no cough or hemoptysis. Cardiovascular: Denies chest pain, PND or orthopnea. No L E swelling or palpitations. Gastrointestinal: No nausea or vomiting, abdominal pain, diarrhea or constipation. Genitourinary: Denies dysuria, frequency, urgency or incontinence. +penil pain   Neurological: Denies headaches, decreased LOC, no sensory or motor focal deficits. Musculoskeletal: No arthralgia no back pain or joint swelling. Skin: There are no rashes or bleeding. Psychiatric: No anxiety, no depression. Endocrine: No diabetes or thyroid disease. Hematologic: No bleeding, no adenopathy. PHYSICAL EXAM: Shows a well appearing 74y.o.-year-old male who is not in pain or distress. Vital Signs: Blood pressure 120/73, pulse 123, temperature 98.1 °F (36.7 °C), temperature source Oral, resp.  rate 24, weight 272 lb 9.6 oz (123.7 kg). HEENT: Normocephalic and atraumatic. Pupils are equal, round, reactive to light and accommodation. Extraocular muscles are intact. Neck: Showed no JVD, no carotid bruit . Lungs: Clear to auscultation bilaterally. Heart: Regular without any murmur. Abdomen: Soft, nontender. No hepatosplenomegaly. Extremities: Lower extremities show no edema, clubbing, or cyanosis. Breasts: Examination not done today. Neuro exam: intact cranial nerves bilaterally no motor or sensory deficit, gait is normal. Lymphatic: no adenopathy appreciated in the supraclavicular, axillary, cervical or inguinal area  +Muñoz in place with hematuria     REVIEW OF LABORATORY DATA:   Lab Results   Component Value Date    WBC 11.0 02/08/2019    HGB 16.1 02/08/2019    HCT 47.5 02/08/2019    MCV 88.5 02/08/2019     02/08/2019       Chemistry        Component Value Date/Time     02/08/2019 1451    K 4.3 02/08/2019 1451     02/08/2019 1451    CO2 23 02/08/2019 1451    BUN 12 01/27/2022 0000    CREATININE 1.15 01/27/2022 0000        Component Value Date/Time    CALCIUM 9.2 02/08/2019 1451    ALKPHOS 60 02/08/2019 1451    AST 13 02/08/2019 1451    ALT 14 02/08/2019 1451    BILITOT 0.47 02/08/2019 1451        PATHOLOGY:     REVIEW OF RADIOLOGICAL RESULTS:   02/11/2022 PET IMPRESSION:   Significantly FDG avid right pelvic sidewall lymph node consistent with   metastasis from known bladder malignancy.  Bladder assessment is limited by   physiologic uptake.  Severe right hydronephrosis with stent in place. Multiple incidental/chronic findings as detailed above including non FDG avid   focal ground-glass opacity medial left apex which may be infectious or   inflammatory in nature. IMPRESSION:   1. High grade urothelial carcinoma   2. Plan for neoadjuvant chemo to be followed by cystectomy     PLAN:   1.  We reviewed in detail his PET which shows uptake in right pelvic lymph node consistent with known metastases. Clinical stage T3 N1 M0  2. I confirmed the affected lymph nodes will be removed during cystectomy. 3. We discussed treatment plan and reviewed again goals, we will plan for CT prior to surgery to assess response. 4. He has agreed and will proceed with port insertion. 5. I explained his penile pain is due to nerve compression from tumor. 6. Return to commence with treatment. Scribe Attestation   This note was created by Domo Lopez acting as scribe for the physician signing this note  Electronically Signed  Marci Langley, 2/22/2022  Scribe, [employer]. Attending Attestation   Note was reviewed and edited.   I am in agreement with the note as entered    Sussy Grayson MD  Hematologist/Medical [id number] [facility] hematology oncology physicians

## 2022-02-22 NOTE — PROGRESS NOTES
Sivan Renner and his wife here for chemotherapy teaching. Spent 60 minutes with them. Teaching sheets from [facility] and verbal information given on chemotherapy agents, action, administration and side effects. Chemotherapy medications discussed: Cisplatin/Gemzar    Chemotherapy consent form signed by patient. Provided new patient, Chemotherapy and You and Eating Hints booklets. Discussed implanted port and reinforced instructions for port placement at [facility] this Friday. Pt has prescription for EMLA cream and was given instructions on use.     Reviewed anti-emetic medication, pt has prescription for Zofran and was given instructions on use.     Questions answered and support given.     [facility] rehab referral assessment form, distress tool form and PG-SGA form completed by patient.     Needs that were identified during teaching visit: Nutrition concerns and wt loss-Email sent to dietician. Several concerns on functional assessment screen-scanned in media-[facility] onc rehab referral placed per protocol.     Discussed community resources such as [facility], [facility], [employer], [employer], etc.     Pt will return on 3/3/22 for C.1D.1 Cisplatin/Gemzar.

## 2022-02-22 NOTE — TELEPHONE ENCOUNTER
please have the patient schedule Mediport and reschedule his chemotherapy  Plan to start chemotherapy at least 5 to 7 days after Mediport insertion.   Likely in 2 weeks    Port placement scheduled 2/25/22, instructions given to pt on avs    Chemo teach scheduled today after md visit    C1 scheduled 3/3/22 @ 8am    RV scheduled during tx 3/10/22    PT was given AVS and an appt schedule    Electronically signed by Vivi Barry on 2/22/2022 at 2:22 PM

## 2022-02-22 NOTE — PATIENT INSTRUCTIONS
please have the patient schedule Mediport and reschedule his chemotherapy  Plan to start chemotherapy at least 5 to 7 days after Mediport insertion.   Likely in 2 weeks

## 2022-02-23 ENCOUNTER — TELEPHONE (OUTPATIENT)
Dept: ONCOLOGY | Age: 67
End: 2022-02-23

## 2022-02-23 RX ORDER — SODIUM CHLORIDE 9 MG/ML
INJECTION, SOLUTION INTRAVENOUS CONTINUOUS
Status: CANCELLED | OUTPATIENT
Start: 2022-02-23

## 2022-02-23 NOTE — TELEPHONE ENCOUNTER
[facility] Nutrition Note    PG-SGA screening tool reviewed with score of 12. Pt reports unintentional weight loss, no appetite, nausea, stomach pain, dry mouth, early satiety, fatigue, consuming less than normal amounts of food, and not feeling up to most things but in bed/chair less than half the day. Full nutrition assessment for scores > 4. Will follow up for full nutrition assessment.     KENNY Frederick, RD, LD  Registered Dietitian   [facility]  [phone]

## 2022-02-25 ENCOUNTER — HOSPITAL ENCOUNTER (OUTPATIENT)
Dept: INTERVENTIONAL RADIOLOGY/VASCULAR | Age: 67
Discharge: HOME | End: 2022-02-27
Payer: MEDICARE

## 2022-02-25 VITALS
BODY MASS INDEX: 34.78 KG/M2 | SYSTOLIC BLOOD PRESSURE: 142 MMHG | WEIGHT: 271 LBS | TEMPERATURE: 98 F | HEART RATE: 79 BPM | RESPIRATION RATE: 15 BRPM | OXYGEN SATURATION: 95 % | HEIGHT: 74 IN | DIASTOLIC BLOOD PRESSURE: 83 MMHG

## 2022-02-25 DIAGNOSIS — C67.2 MALIGNANT NEOPLASM OF LATERAL WALL OF BLADDER (HCC): ICD-10-CM

## 2022-02-25 LAB
INR BLD: 1.1
PLATELET # BLD: 326 K/UL (ref 138–453)
PROTHROMBIN TIME: 13.6 SEC (ref 11.5–14.2)

## 2022-02-25 PROCEDURE — 2500000003 HC RX 250 WO HCPCS: Performed by: RADIOLOGY

## 2022-02-25 PROCEDURE — 2580000003 HC RX 258: Performed by: RADIOLOGY

## 2022-02-25 PROCEDURE — 7100000010 HC PHASE II RECOVERY - FIRST 15 MIN

## 2022-02-25 PROCEDURE — 85610 PROTHROMBIN TIME: CPT

## 2022-02-25 PROCEDURE — 7100000011 HC PHASE II RECOVERY - ADDTL 15 MIN

## 2022-02-25 PROCEDURE — C1788 PORT, INDWELLING, IMP: HCPCS

## 2022-02-25 PROCEDURE — 6360000002 HC RX W HCPCS: Performed by: RADIOLOGY

## 2022-02-25 PROCEDURE — 77001 FLUOROGUIDE FOR VEIN DEVICE: CPT

## 2022-02-25 PROCEDURE — 85049 AUTOMATED PLATELET COUNT: CPT

## 2022-02-25 PROCEDURE — 99153 MOD SED SAME PHYS/QHP EA: CPT

## 2022-02-25 PROCEDURE — 99152 MOD SED SAME PHYS/QHP 5/>YRS: CPT

## 2022-02-25 PROCEDURE — 76937 US GUIDE VASCULAR ACCESS: CPT

## 2022-02-25 PROCEDURE — 36561 INSERT TUNNELED CV CATH: CPT

## 2022-02-25 RX ORDER — LIDOCAINE HYDROCHLORIDE 10 MG/ML
INJECTION, SOLUTION INFILTRATION; PERINEURAL
Status: COMPLETED | OUTPATIENT
Start: 2022-02-25 | End: 2022-02-25

## 2022-02-25 RX ORDER — HEPARIN SODIUM (PORCINE) LOCK FLUSH IV SOLN 100 UNIT/ML 100 UNIT/ML
SOLUTION INTRAVENOUS
Status: COMPLETED | OUTPATIENT
Start: 2022-02-25 | End: 2022-02-25

## 2022-02-25 RX ORDER — SODIUM CHLORIDE 9 MG/ML
INJECTION, SOLUTION INTRAVENOUS CONTINUOUS
Status: DISCONTINUED | OUTPATIENT
Start: 2022-02-25 | End: 2022-02-28 | Stop reason: HOSPADM

## 2022-02-25 RX ORDER — ACETAMINOPHEN 325 MG/1
650 TABLET ORAL EVERY 4 HOURS PRN
Status: DISCONTINUED | OUTPATIENT
Start: 2022-02-25 | End: 2022-02-28 | Stop reason: HOSPADM

## 2022-02-25 RX ORDER — FENTANYL CITRATE 50 UG/ML
INJECTION, SOLUTION INTRAMUSCULAR; INTRAVENOUS
Status: COMPLETED | OUTPATIENT
Start: 2022-02-25 | End: 2022-02-25

## 2022-02-25 RX ORDER — MIDAZOLAM HYDROCHLORIDE 1 MG/ML
INJECTION INTRAMUSCULAR; INTRAVENOUS
Status: COMPLETED | OUTPATIENT
Start: 2022-02-25 | End: 2022-02-25

## 2022-02-25 RX ADMIN — SODIUM CHLORIDE: 9 INJECTION, SOLUTION INTRAVENOUS at 12:28

## 2022-02-25 RX ADMIN — MIDAZOLAM 1 MG: 1 INJECTION INTRAMUSCULAR; INTRAVENOUS at 13:56

## 2022-02-25 RX ADMIN — CEFAZOLIN SODIUM 1000 MG: 1 INJECTION, POWDER, FOR SOLUTION INTRAMUSCULAR; INTRAVENOUS at 13:39

## 2022-02-25 RX ADMIN — Medication 100 MCG: at 13:56

## 2022-02-25 RX ADMIN — LIDOCAINE HYDROCHLORIDE 5 ML: 10 INJECTION, SOLUTION INFILTRATION; PERINEURAL at 13:56

## 2022-02-25 RX ADMIN — Medication 500 UNITS: at 14:12

## 2022-02-25 ASSESSMENT — PAIN - FUNCTIONAL ASSESSMENT: PAIN_FUNCTIONAL_ASSESSMENT: 0-10

## 2022-02-25 ASSESSMENT — PAIN SCALES - GENERAL
PAINLEVEL_OUTOF10: 0

## 2022-02-25 ASSESSMENT — PAIN DESCRIPTION - DESCRIPTORS: DESCRIPTORS: STABBING

## 2022-02-25 NOTE — PRE SEDATION
Sedation Pre-Procedure Note    Patient Name: Amira Thorne   YOB: 1955  Room/Bed: Room/bed info not found  Medical Record Number: [id number]  Date: 2/25/2022   Time: 1:23 PM       Indication:  Bladder cancer    Consent: I have discussed with the patient and/or the patient representative the indication, alternatives, and the possible risks and/or complications of the planned procedure and the anesthesia methods. The patient and/or patient representative appear to understand and agree to proceed. Vital Signs:   Vitals:    02/25/22 1153   BP: (!) 138/95   Pulse: 99   Resp: 18   Temp: 97.1 °F (36.2 °C)   SpO2: 96%       Past Medical History:   has a past medical history of Arthritis, BPH with obstruction/lower urinary tract symptoms, Caffeine use, Cancer ([address] Depression, GERD (gastroesophageal reflux disease), High cholesterol, Kidney calculi, and Sleep apnea. Past Surgical History:   has a past surgical history that includes Knee arthroscopy; Cardiac catheterization; and Colonoscopy. Medications:   Scheduled Meds:    ceFAZolin  1,000 mg IntraVENous On Call to OR     Continuous Infusions:    sodium chloride 20 mL/hr at 02/25/22 1228     PRN Meds:   Home Meds:   Prior to Admission medications    Medication Sig Start Date End Date Taking? Authorizing Provider   HYDROcodone-acetaminophen (NORCO) 5-325 MG per tablet Take 1 tablet by mouth every 8 hours as needed for Pain for up to 30 days. 2/10/22 3/12/22 Yes Jacek Jiménez MD   Vibegron (GEMTESA) 75 MG TABS Take 75 mg by mouth daily 2/10/22   Moisés Beltran MD   lidocaine-prilocaine (EMLA) 2.5-2.5 % cream Apply topically Daily as needed.  2/10/22   Sarah Grayson MD   ondansetron (ZOFRAN ODT) 8 MG TBDP disintegrating tablet Take 1 tablet by mouth every 8 hours as needed for Nausea or Vomiting My use sublingually if nauseated 2/10/22   Jacek Jiménez MD   omeprazole (PRILOSEC) 20 MG delayed release capsule Take 1 capsule by mouth daily 2/10/22   Sarah Grayson MD   clobetasol (TEMOVATE) 0.05 % ointment Apply topically 2 times daily Apply topically 2 times daily. Historical Provider, MD   finasteride (PROSCAR) 5 MG tablet Take 5 mg by mouth daily    Historical Provider, MD   tamsulosin (FLOMAX) 0.4 MG capsule Take 0.4 mg by mouth daily    Historical Provider, MD   simvastatin (ZOCOR) 40 MG tablet Take 40 mg by mouth nightly    Historical Provider, MD   buPROPion (WELLBUTRIN XL) 300 MG extended release tablet Take 300 mg by mouth every morning    Historical Provider, MD     Coumadin Use Last 7 Days:  no  Antiplatelet drug therapy use last 7 days: no  Other anticoagulant use last 7 days: no  Additional Medication Information:  See [facility]      Pre-Sedation Documentation and Exam:   I have reviewed the patient's history and review of systems.     Mallampati Airway Assessment:  Mallampati Class II - (soft palate, fauces & uvula are visible)    Prior History of Anesthesia Complications:   none    ASA Classification:  Class 2 - A normal healthy patient with mild systemic disease    Sedation/ Anesthesia Plan:   intravenous sedation    Medications Planned:   midazolam (Versed) intravenously and fentanyl intravenously    Patient is an appropriate candidate for plan of sedation: yes    Electronically signed by Georges Wright MD on 2/25/2022 at 1:23 PM

## 2022-02-25 NOTE — BRIEF OP NOTE
Brief Postoperative Note    Marvin Garcia  YOB: 1955  [id number]    Pre-operative Diagnosis: Bladder cancer    Post-operative Diagnosis: Same    Procedure:  Port placement    Anesthesia: Moderate Sedation    Surgeons/Assistants: Donnie    Estimated Blood Loss: less than 50     Complications: None    Specimens: Was Not Obtained    Electronically signed by Madonna Guzman MD on 2/25/2022 at 2:27 PM

## 2022-02-25 NOTE — H&P
Interval H&P Note    Pt Name: Fernando Cooper  MRN: [id number]  YOB: 1955  Date of evaluation: 2/25/2022      [x] I have reviewed in [facility] the oncology progress note by Dr. Charles Hill dated 2/22/2022 attached below which meets the criteria for an Interval History and Physical note. [x] I have examined  Fernando Cooper  There are no changes to the patient who is scheduled for a IR port placement by Dr. Lavinia Jade for malignant neoplasm of lateral wall of bladder. Patient states he had denis catheter removed yesterday 2/24/2022. Patient states he was able to void this morning. The patient denies new health changes, fever, chills, wheezing, cough, increased SOB, chest pain, open sores or wounds. Denies hx diabetes. Denies taking any blood thinning medications in the last 10 days. Vital signs: BP (!) 138/95   Pulse 99   Temp 97.1 °F (36.2 °C) (Temporal)   Resp 18   Ht 6' 2\" (1.88 m)   Wt 271 lb (122.9 kg)   SpO2 96%   BMI 34.79 kg/m²     Allergies:  Meloxicam    Medications:    Prior to Admission medications    Medication Sig Start Date End Date Taking? Authorizing Provider   HYDROcodone-acetaminophen (NORCO) 5-325 MG per tablet Take 1 tablet by mouth every 8 hours as needed for Pain for up to 30 days. 2/10/22 3/12/22 Yes Roni Wolfe MD   Vibegron (GEMTESA) 75 MG TABS Take 75 mg by mouth daily 2/10/22   Norma Kelsey MD   lidocaine-prilocaine (EMLA) 2.5-2.5 % cream Apply topically Daily as needed. 2/10/22   Sarah Grayson MD   ondansetron (ZOFRAN ODT) 8 MG TBDP disintegrating tablet Take 1 tablet by mouth every 8 hours as needed for Nausea or Vomiting My use sublingually if nauseated 2/10/22   Roni Wolfe MD   omeprazole (PRILOSEC) 20 MG delayed release capsule Take 1 capsule by mouth daily 2/10/22   Sarah Grayson MD   clobetasol (TEMOVATE) 0.05 % ointment Apply topically 2 times daily Apply topically 2 times daily.     Historical Provider, MD   finasteride (PROSCAR) 5 MG tablet Take 5 mg by mouth daily    Historical Provider, MD   tamsulosin (FLOMAX) 0.4 MG capsule Take 0.4 mg by mouth daily    Historical Provider, MD   simvastatin (ZOCOR) 40 MG tablet Take 40 mg by mouth nightly    Historical Provider, MD   buPROPion (WELLBUTRIN XL) 300 MG extended release tablet Take 300 mg by mouth every morning    Historical Provider, MD         This is a 74 y.o. male who is pleasant, cooperative, alert and oriented x3, in no acute distress. Heart: Heart sounds are normal.  HR 99 regular rate and rhythm without murmur, gallop or rub. Lungs: Normal respiratory effort with equal expansion, good air exchange, unlabored and clear to auscultation without wheezes or rales bilaterally   Abdomen: soft, rounded, nontender, nondistended with bowel sounds active. Labs:  Recent Labs     01/27/22  0000   BUN 12   CREATININE 1.15       No results for input(s): COVID19 in the last 720 hours. LINWOOD Patton CNP  Electronically signed 2/25/2022 at 12:24 Jesenia Olvera MD   Physician   Specialty:  Hematology and Oncology   Progress Notes       Signed   Encounter Date:  2/22/2022         Related encounter: Office Visit from 2/22/2022 in [address]           Signed              Show:Clear all  [x]Manual[x]Template[x]Copied    Added by:  [x]Marci Lizama MD      []Teddy for details            DIAGNOSIS:   1. High grade urothelial carcinoma, 12/2021, T3 N1 M0      CURRENT THERAPY:  Plan for neoadjuvant chemo followed by adjuvant cystectomy     BRIEF CASE HISTORY:   Saman Newman is a very pleasant 74 y.o. male who is referred to us for bladder cancer. Patient presented with hematuria 02/2021 and was seen by urology, it was felt to be renal calculi and he was started on Flomax but continued to pass clots.  He was later in [facility] and had urinary blockage, passed very large clot and then able to urinate and presented again to ER with hydrocodone-acetaminophen, meloxicam, clobetasol, finasteride, tamsulosin, simvastatin, and bupropion. ALLERGIES:  has No Known Allergies. FAMILY HISTORY: Father had bladder cancer      SOCIAL HISTORY:  reports that he quit smoking about 30 years ago. He has never used smokeless tobacco. He reports that he does not drink alcohol and does not use drugs. REVIEW OF SYSTEMS:   General: No fever or night sweats. Weight is stable. ENT: No double or blurred vision, no tinnitus or hearing problem, no dysphagia or sore throat   Respiratory: No chest pain, no shortness of breath, no cough or hemoptysis. Cardiovascular: Denies chest pain, PND or orthopnea. No L E swelling or palpitations. Gastrointestinal: No nausea or vomiting, abdominal pain, diarrhea or constipation. Genitourinary: Denies dysuria, frequency, urgency or incontinence. +penil pain   Neurological: Denies headaches, decreased LOC, no sensory or motor focal deficits. Musculoskeletal: No arthralgia no back pain or joint swelling. Skin: There are no rashes or bleeding. Psychiatric: No anxiety, no depression. Endocrine: No diabetes or thyroid disease. Hematologic: No bleeding, no adenopathy. PHYSICAL EXAM: Shows a well appearing 74y.o.-year-old male who is not in pain or distress. Vital Signs: Blood pressure 120/73, pulse 123, temperature 98.1 °F (36.7 °C), temperature source Oral, resp. rate 24, weight 272 lb 9.6 oz (123.7 kg). HEENT: Normocephalic and atraumatic. Pupils are equal, round, reactive to light and accommodation. Extraocular muscles are intact. Neck: Showed no JVD, no carotid bruit . Lungs: Clear to auscultation bilaterally. Heart: Regular without any murmur. Abdomen: Soft, nontender. No hepatosplenomegaly. Extremities: Lower extremities show no edema, clubbing, or cyanosis. Breasts: Examination not done today.  Neuro exam: intact cranial nerves bilaterally no motor or sensory deficit, gait is normal. Lymphatic: no adenopathy appreciated in the supraclavicular, axillary, cervical or inguinal area  +Muñoz in place with hematuria      REVIEW OF LABORATORY DATA:         Lab Results   Component Value Date     WBC 11.0 02/08/2019     HGB 16.1 02/08/2019     HCT 47.5 02/08/2019     MCV 88.5 02/08/2019      02/08/2019        Chemistry               Component Value Date/Time      02/08/2019 1451     K 4.3 02/08/2019 1451      02/08/2019 1451     CO2 23 02/08/2019 1451     BUN 12 01/27/2022 0000     CREATININE 1.15 01/27/2022 0000               Component Value Date/Time     CALCIUM 9.2 02/08/2019 1451     ALKPHOS 60 02/08/2019 1451     AST 13 02/08/2019 1451     ALT 14 02/08/2019 1451     BILITOT 0.47 02/08/2019 1451          PATHOLOGY:      REVIEW OF RADIOLOGICAL RESULTS:   02/11/2022 PET IMPRESSION:   Significantly FDG avid right pelvic sidewall lymph node consistent with   metastasis from known bladder malignancy. Bladder assessment is limited by   physiologic uptake. Severe right hydronephrosis with stent in place. Multiple incidental/chronic findings as detailed above including non FDG avid   focal ground-glass opacity medial left apex which may be infectious or   inflammatory in nature. IMPRESSION:   1. High grade urothelial carcinoma   2. Plan for neoadjuvant chemo to be followed by cystectomy      PLAN:   1. We reviewed in detail his PET which shows uptake in right pelvic lymph node consistent with known metastases. Clinical stage T3 N1 M0  2. I confirmed the affected lymph nodes will be removed during cystectomy. 3. We discussed treatment plan and reviewed again goals, we will plan for CT prior to surgery to assess response. 4. He has agreed and will proceed with port insertion. 5. I explained his penile pain is due to nerve compression from tumor. 6. Return to commence with treatment.          Scribe Attestation   This note was created by Merlin Snipe acting as scribe for the physician signing this note  Electronically Signed  Nii Cisse, 2/22/2022  Scribe, [employer]. Attending Attestation   Note was reviewed and edited.   I am in agreement with the note as entered     Fabian Grayson MD  Hematologist/Medical [id number] [facility] [address] hematology oncology physicians                     Revision History

## 2022-03-03 ENCOUNTER — HOSPITAL ENCOUNTER (OUTPATIENT)
Dept: INFUSION THERAPY | Age: 67
Discharge: HOME | End: 2022-03-03
Payer: MEDICARE

## 2022-03-03 VITALS
TEMPERATURE: 99.5 F | SYSTOLIC BLOOD PRESSURE: 136 MMHG | HEART RATE: 106 BPM | RESPIRATION RATE: 16 BRPM | WEIGHT: 277.8 LBS | DIASTOLIC BLOOD PRESSURE: 69 MMHG | BODY MASS INDEX: 35.67 KG/M2

## 2022-03-03 DIAGNOSIS — R33.9 URINARY RETENTION: ICD-10-CM

## 2022-03-03 DIAGNOSIS — R31.0 GROSS HEMATURIA: ICD-10-CM

## 2022-03-03 DIAGNOSIS — C67.2 MALIGNANT NEOPLASM OF LATERAL WALL OF URINARY BLADDER (HCC): ICD-10-CM

## 2022-03-03 DIAGNOSIS — C67.2 MALIGNANT NEOPLASM OF LATERAL WALL OF URINARY BLADDER (HCC): Primary | ICD-10-CM

## 2022-03-03 LAB
ABSOLUTE EOS #: 0.3 K/UL (ref 0–0.4)
ABSOLUTE LYMPH #: 1.3 K/UL (ref 1–4.8)
ABSOLUTE MONO #: 1.1 K/UL (ref 0.1–1.2)
ALBUMIN SERPL-MCNC: 3.5 G/DL (ref 3.5–5.2)
ALBUMIN/GLOBULIN RATIO: 1 (ref 1–2.5)
ALP BLD-CCNC: 87 U/L (ref 40–129)
ALT SERPL-CCNC: 9 U/L (ref 5–41)
ANION GAP SERPL CALCULATED.3IONS-SCNC: 13 MMOL/L (ref 9–17)
AST SERPL-CCNC: 8 U/L
BASOPHILS # BLD: 1 % (ref 0–2)
BASOPHILS ABSOLUTE: 0.1 K/UL (ref 0–0.2)
BILIRUB SERPL-MCNC: 0.47 MG/DL (ref 0.3–1.2)
BUN BLDV-MCNC: 13 MG/DL (ref 8–23)
CALCIUM SERPL-MCNC: 9.2 MG/DL (ref 8.6–10.4)
CHLORIDE BLD-SCNC: 99 MMOL/L (ref 98–107)
CO2: 24 MMOL/L (ref 20–31)
CREAT SERPL-MCNC: 1.31 MG/DL (ref 0.7–1.2)
EOSINOPHILS RELATIVE PERCENT: 2 % (ref 1–4)
GFR AFRICAN AMERICAN: >60 ML/MIN
GFR NON-AFRICAN AMERICAN: 55 ML/MIN
GFR SERPL CREATININE-BSD FRML MDRD: ABNORMAL ML/MIN/{1.73_M2}
GLUCOSE BLD-MCNC: 136 MG/DL (ref 70–99)
HCT VFR BLD CALC: 33 % (ref 41–53)
HEMOGLOBIN: 10.8 G/DL (ref 13.5–17.5)
LYMPHOCYTES # BLD: 10 % (ref 24–44)
MAGNESIUM: 1.9 MG/DL (ref 1.6–2.6)
MCH RBC QN AUTO: 26 PG (ref 26–34)
MCHC RBC AUTO-ENTMCNC: 32.9 G/DL (ref 31–37)
MCV RBC AUTO: 79.2 FL (ref 80–100)
MONOCYTES # BLD: 8 % (ref 2–11)
PDW BLD-RTO: 16.8 % (ref 12.5–15.4)
PLATELET # BLD: 285 K/UL (ref 140–450)
PMV BLD AUTO: 7.9 FL (ref 6–12)
POTASSIUM SERPL-SCNC: 4 MMOL/L (ref 3.7–5.3)
RBC # BLD: 4.16 M/UL (ref 4.5–5.9)
SEG NEUTROPHILS: 79 % (ref 36–66)
SEGMENTED NEUTROPHILS ABSOLUTE COUNT: 10.4 K/UL (ref 1.8–7.7)
SODIUM BLD-SCNC: 136 MMOL/L (ref 135–144)
TOTAL PROTEIN: 7 G/DL (ref 6.4–8.3)
WBC # BLD: 13.3 K/UL (ref 3.5–11)

## 2022-03-03 PROCEDURE — 96366 THER/PROPH/DIAG IV INF ADDON: CPT

## 2022-03-03 PROCEDURE — 36591 DRAW BLOOD OFF VENOUS DEVICE: CPT

## 2022-03-03 PROCEDURE — 96361 HYDRATE IV INFUSION ADD-ON: CPT

## 2022-03-03 PROCEDURE — 96413 CHEMO IV INFUSION 1 HR: CPT

## 2022-03-03 PROCEDURE — 2580000003 HC RX 258: Performed by: INTERNAL MEDICINE

## 2022-03-03 PROCEDURE — 6360000002 HC RX W HCPCS: Performed by: INTERNAL MEDICINE

## 2022-03-03 PROCEDURE — 96367 TX/PROPH/DG ADDL SEQ IV INF: CPT

## 2022-03-03 PROCEDURE — 85025 COMPLETE CBC W/AUTO DIFF WBC: CPT

## 2022-03-03 PROCEDURE — 96368 THER/DIAG CONCURRENT INF: CPT

## 2022-03-03 PROCEDURE — 96365 THER/PROPH/DIAG IV INF INIT: CPT

## 2022-03-03 PROCEDURE — 80053 COMPREHEN METABOLIC PANEL: CPT

## 2022-03-03 PROCEDURE — 83735 ASSAY OF MAGNESIUM: CPT

## 2022-03-03 PROCEDURE — 96417 CHEMO IV INFUS EACH ADDL SEQ: CPT

## 2022-03-03 PROCEDURE — 96375 TX/PRO/DX INJ NEW DRUG ADDON: CPT

## 2022-03-03 RX ORDER — PALONOSETRON 0.05 MG/ML
0.25 INJECTION, SOLUTION INTRAVENOUS ONCE
Status: COMPLETED | OUTPATIENT
Start: 2022-03-03 | End: 2022-03-03

## 2022-03-03 RX ORDER — HEPARIN SODIUM (PORCINE) LOCK FLUSH IV SOLN 100 UNIT/ML 100 UNIT/ML
500 SOLUTION INTRAVENOUS PRN
Status: DISCONTINUED | OUTPATIENT
Start: 2022-03-03 | End: 2022-03-04 | Stop reason: HOSPADM

## 2022-03-03 RX ORDER — MAGNESIUM SULFATE 1 G/100ML
1000 INJECTION INTRAVENOUS ONCE
Status: COMPLETED | OUTPATIENT
Start: 2022-03-03 | End: 2022-03-03

## 2022-03-03 RX ORDER — SODIUM CHLORIDE AND POTASSIUM CHLORIDE .9; .15 G/100ML; G/100ML
SOLUTION INTRAVENOUS ONCE
Status: COMPLETED | OUTPATIENT
Start: 2022-03-03 | End: 2022-03-03

## 2022-03-03 RX ORDER — SODIUM CHLORIDE 0.9 % (FLUSH) 0.9 %
5-40 SYRINGE (ML) INJECTION PRN
Status: DISCONTINUED | OUTPATIENT
Start: 2022-03-03 | End: 2022-03-04 | Stop reason: HOSPADM

## 2022-03-03 RX ORDER — SODIUM CHLORIDE 9 MG/ML
20 INJECTION, SOLUTION INTRAVENOUS ONCE
Status: COMPLETED | OUTPATIENT
Start: 2022-03-03 | End: 2022-03-03

## 2022-03-03 RX ORDER — DEXAMETHASONE SODIUM PHOSPHATE 10 MG/ML
10 INJECTION INTRAMUSCULAR; INTRAVENOUS ONCE
Status: COMPLETED | OUTPATIENT
Start: 2022-03-03 | End: 2022-03-03

## 2022-03-03 RX ADMIN — SODIUM CHLORIDE, PRESERVATIVE FREE 10 ML: 5 INJECTION INTRAVENOUS at 08:11

## 2022-03-03 RX ADMIN — MAGNESIUM SULFATE HEPTAHYDRATE 1000 MG: 1 INJECTION, SOLUTION INTRAVENOUS at 08:34

## 2022-03-03 RX ADMIN — DEXAMETHASONE SODIUM PHOSPHATE 10 MG: 10 INJECTION INTRAMUSCULAR; INTRAVENOUS at 09:48

## 2022-03-03 RX ADMIN — SODIUM CHLORIDE 100 ML/HR: 9 INJECTION, SOLUTION INTRAVENOUS at 08:31

## 2022-03-03 RX ADMIN — PALONOSETRON 0.25 MG: 0.05 INJECTION, SOLUTION INTRAVENOUS at 09:48

## 2022-03-03 RX ADMIN — GEMCITABINE 3400 MG: 38 INJECTION, SOLUTION INTRAVENOUS at 10:39

## 2022-03-03 RX ADMIN — CISPLATIN 193 MG: 100 INJECTION, SOLUTION INTRAVENOUS at 11:26

## 2022-03-03 RX ADMIN — SODIUM CHLORIDE, PRESERVATIVE FREE 10 ML: 5 INJECTION INTRAVENOUS at 08:12

## 2022-03-03 RX ADMIN — FOSAPREPITANT 150 MG: 150 INJECTION, POWDER, LYOPHILIZED, FOR SOLUTION INTRAVENOUS at 09:55

## 2022-03-03 RX ADMIN — SODIUM CHLORIDE, PRESERVATIVE FREE 10 ML: 5 INJECTION INTRAVENOUS at 13:41

## 2022-03-03 RX ADMIN — Medication 500 UNITS: at 13:42

## 2022-03-03 RX ADMIN — POTASSIUM CHLORIDE AND SODIUM CHLORIDE: 900; 150 INJECTION, SOLUTION INTRAVENOUS at 08:30

## 2022-03-03 RX ADMIN — MAGNESIUM SULFATE HEPTAHYDRATE 1000 MG: 1 INJECTION, SOLUTION INTRAVENOUS at 12:38

## 2022-03-03 NOTE — PROGRESS NOTES
Patient here for C1D1 cisplatin, gemzar. Ok to treat per Dr. Hughes(creat 1.31). Chemo consent signed. Tolerated w/o incident and left in stable condition. Returns 3/10 C1D8.

## 2022-03-04 ENCOUNTER — TELEPHONE (OUTPATIENT)
Dept: ONCOLOGY | Age: 67
End: 2022-03-04

## 2022-03-04 RX ORDER — HYDROCODONE BITARTRATE AND ACETAMINOPHEN 5; 325 MG/1; MG/1
1 TABLET ORAL EVERY 8 HOURS PRN
Qty: 60 TABLET | Refills: 0 | Status: SHIPPED | OUTPATIENT
Start: 2022-03-04 | End: 2022-04-11 | Stop reason: SDUPTHER

## 2022-03-04 NOTE — TELEPHONE ENCOUNTER
[facility]  Initial Nutrition Assessment    Aleta De La Torre is a 74 y.o.  male     Reason for Evaluation: pgsga score 12    NUTRITION RECOMMENDATIONS / Robyn Miller / EVALUATION  1. Encouraged small, frequent meals. 2. Will monitor need for high calorie/high protein diet ed, oral nutrition supplements, wts, labs, po intakes, s/s management, plan of care    Subjective/Current Data:  Called pt on listed phone number. Pt reports he had Covid in Sept, and that is when his weight loss started. Pt states he has recently been maintaining and appetite has been improving. Pt states he is eating several times per day. Pt states he doesn't feel that he needs oral nutrition supplements at this time. Pt reports abdominal pain r/t bladder mass and is not correlated with pre- or post-prandial discomfort. Documented wt history reveals 43lb (13.5%) wt loss x 5 months, which is considered significant.     Past Medical History:  Past Medical History:   Diagnosis Date    Arthritis     BPH with obstruction/lower urinary tract symptoms     Caffeine use     3 cans soda/pop    Cancer (HCC)     bladder cancer    Depression     GERD (gastroesophageal reflux disease)     High cholesterol     Kidney calculi     Sleep apnea      Past Surgical History:   Procedure Laterality Date    CARDIAC CATHETERIZATION      COLONOSCOPY      IR PORT PLACEMENT EQUAL OR GREATER THAN 5 YEARS  2/25/2022    IR PORT PLACEMENT EQUAL OR GREATER THAN 5 YEARS 2/25/2022 [facility] PROCEDURES    KNEE ARTHROSCOPY      left        Anthropometric Measures:  Current Weight: 126kg  Ideal Body Weight: 86 kg  Ideal Body Weight %: 150%  BMI 35.6    Nutritional Requirements:  Estimated Energy Needs:  Weight Used: 126kg   Method Used: [address]  Estimated kcal Needs: 0853-5423 kcal per day  Protein Needs:  Weight used: 126 kg  1.2-1.4 g/kg = 144-175 g per day    Nutrition-focused Physical Findings   GI symptoms: negative  Skin:  Intact    Nutrition Diagnosis and Goal  Problem:  Unintentional wt loss  Etiology/related to: catabolic illness  Symptoms/Signs/as evidenced by: wt loss 43lb x 5 months, bladder ca undergoing concurrent tx, likely PO intakes meeting <75% estimated needs. Goal: Adequate intake to aide in wt maintenaince, signs and symptoms management  Progress towards goal: unchanged  Education Needs: none at present time     Malnutrition Assessment  Per AND/ASPEN guidelines, patient meets the criteria for moderate malnutrition in the context of chronic illness based on:   · 13 % weight loss in 5 months  · PO intakes predicted to meet <75% estimated needs >1 month  Per AND/ASPEN guidelines, will monitor for additional RD, RN and MD documentation re weight status, nutritional intake, fluid accumulation, possible loss of body fat or muscle mass, or possible reduced  strength.     KENNY Hughes, RD, LD  Registered Dietitian   [facility]  [phone]

## 2022-03-06 RX ORDER — CIPROFLOXACIN 500 MG/1
500 TABLET, FILM COATED ORAL 2 TIMES DAILY
Qty: 14 TABLET | Refills: 0 | Status: SHIPPED | OUTPATIENT
Start: 2022-03-06 | End: 2022-03-13

## 2022-03-10 ENCOUNTER — TELEPHONE (OUTPATIENT)
Dept: ONCOLOGY | Age: 67
End: 2022-03-10

## 2022-03-10 ENCOUNTER — OFFICE VISIT (OUTPATIENT)
Dept: ONCOLOGY | Age: 67
End: 2022-03-10
Payer: MEDICARE

## 2022-03-10 ENCOUNTER — HOSPITAL ENCOUNTER (OUTPATIENT)
Dept: INFUSION THERAPY | Age: 67
Discharge: HOME | End: 2022-03-10
Payer: MEDICARE

## 2022-03-10 VITALS
RESPIRATION RATE: 16 BRPM | WEIGHT: 268.8 LBS | BODY MASS INDEX: 34.51 KG/M2 | TEMPERATURE: 97.5 F | HEART RATE: 106 BPM | SYSTOLIC BLOOD PRESSURE: 129 MMHG | DIASTOLIC BLOOD PRESSURE: 88 MMHG

## 2022-03-10 DIAGNOSIS — C67.2 MALIGNANT NEOPLASM OF LATERAL WALL OF URINARY BLADDER (HCC): Primary | ICD-10-CM

## 2022-03-10 DIAGNOSIS — C67.0 MALIGNANT NEOPLASM OF TRIGONE OF URINARY BLADDER (HCC): Primary | ICD-10-CM

## 2022-03-10 LAB
ABSOLUTE EOS #: 0.1 K/UL (ref 0–0.4)
ABSOLUTE LYMPH #: 1.8 K/UL (ref 1–4.8)
ABSOLUTE MONO #: 0.1 K/UL (ref 0.1–1.2)
ALBUMIN SERPL-MCNC: 3.5 G/DL (ref 3.5–5.2)
ALBUMIN/GLOBULIN RATIO: 1 (ref 1–2.5)
ALP BLD-CCNC: 85 U/L (ref 40–129)
ALT SERPL-CCNC: 46 U/L (ref 5–41)
ANION GAP SERPL CALCULATED.3IONS-SCNC: 12 MMOL/L (ref 9–17)
AST SERPL-CCNC: 27 U/L
BASOPHILS # BLD: 1 % (ref 0–2)
BASOPHILS ABSOLUTE: 0 K/UL (ref 0–0.2)
BILIRUB SERPL-MCNC: 0.2 MG/DL (ref 0.3–1.2)
BUN BLDV-MCNC: 24 MG/DL (ref 8–23)
CALCIUM SERPL-MCNC: 9.3 MG/DL (ref 8.6–10.4)
CHLORIDE BLD-SCNC: 97 MMOL/L (ref 98–107)
CO2: 26 MMOL/L (ref 20–31)
CREAT SERPL-MCNC: 1.58 MG/DL (ref 0.7–1.2)
EOSINOPHILS RELATIVE PERCENT: 2 % (ref 1–4)
GFR AFRICAN AMERICAN: 54 ML/MIN
GFR NON-AFRICAN AMERICAN: 44 ML/MIN
GFR SERPL CREATININE-BSD FRML MDRD: ABNORMAL ML/MIN/{1.73_M2}
GLUCOSE BLD-MCNC: 127 MG/DL (ref 70–99)
HCT VFR BLD CALC: 35.2 % (ref 41–53)
HEMOGLOBIN: 11.8 G/DL (ref 13.5–17.5)
LYMPHOCYTES # BLD: 34 % (ref 24–44)
MCH RBC QN AUTO: 25.8 PG (ref 26–34)
MCHC RBC AUTO-ENTMCNC: 33.5 G/DL (ref 31–37)
MCV RBC AUTO: 77 FL (ref 80–100)
MONOCYTES # BLD: 3 % (ref 2–11)
PDW BLD-RTO: 16.6 % (ref 12.5–15.4)
PLATELET # BLD: 243 K/UL (ref 140–450)
PMV BLD AUTO: 7.1 FL (ref 6–12)
POTASSIUM SERPL-SCNC: 3.7 MMOL/L (ref 3.7–5.3)
RBC # BLD: 4.58 M/UL (ref 4.5–5.9)
SEG NEUTROPHILS: 60 % (ref 36–66)
SEGMENTED NEUTROPHILS ABSOLUTE COUNT: 3.2 K/UL (ref 1.8–7.7)
SODIUM BLD-SCNC: 135 MMOL/L (ref 135–144)
TOTAL PROTEIN: 7.1 G/DL (ref 6.4–8.3)
WBC # BLD: 5.3 K/UL (ref 3.5–11)

## 2022-03-10 PROCEDURE — 2580000003 HC RX 258: Performed by: INTERNAL MEDICINE

## 2022-03-10 PROCEDURE — 1036F TOBACCO NON-USER: CPT | Performed by: INTERNAL MEDICINE

## 2022-03-10 PROCEDURE — 1123F ACP DISCUSS/DSCN MKR DOCD: CPT | Performed by: INTERNAL MEDICINE

## 2022-03-10 PROCEDURE — 96413 CHEMO IV INFUSION 1 HR: CPT

## 2022-03-10 PROCEDURE — 99214 OFFICE O/P EST MOD 30 MIN: CPT | Performed by: INTERNAL MEDICINE

## 2022-03-10 PROCEDURE — 96361 HYDRATE IV INFUSION ADD-ON: CPT

## 2022-03-10 PROCEDURE — 6370000000 HC RX 637 (ALT 250 FOR IP): Performed by: INTERNAL MEDICINE

## 2022-03-10 PROCEDURE — 36591 DRAW BLOOD OFF VENOUS DEVICE: CPT

## 2022-03-10 PROCEDURE — 4040F PNEUMOC VAC/ADMIN/RCVD: CPT | Performed by: INTERNAL MEDICINE

## 2022-03-10 PROCEDURE — G8484 FLU IMMUNIZE NO ADMIN: HCPCS | Performed by: INTERNAL MEDICINE

## 2022-03-10 PROCEDURE — G8417 CALC BMI ABV UP PARAM F/U: HCPCS | Performed by: INTERNAL MEDICINE

## 2022-03-10 PROCEDURE — 6360000002 HC RX W HCPCS: Performed by: INTERNAL MEDICINE

## 2022-03-10 PROCEDURE — 3017F COLORECTAL CA SCREEN DOC REV: CPT | Performed by: INTERNAL MEDICINE

## 2022-03-10 PROCEDURE — 85025 COMPLETE CBC W/AUTO DIFF WBC: CPT

## 2022-03-10 PROCEDURE — 96375 TX/PRO/DX INJ NEW DRUG ADDON: CPT

## 2022-03-10 PROCEDURE — 80053 COMPREHEN METABOLIC PANEL: CPT

## 2022-03-10 PROCEDURE — G8427 DOCREV CUR MEDS BY ELIG CLIN: HCPCS | Performed by: INTERNAL MEDICINE

## 2022-03-10 RX ORDER — HEPARIN SODIUM (PORCINE) LOCK FLUSH IV SOLN 100 UNIT/ML 100 UNIT/ML
500 SOLUTION INTRAVENOUS PRN
Status: DISCONTINUED | OUTPATIENT
Start: 2022-03-10 | End: 2022-03-11 | Stop reason: HOSPADM

## 2022-03-10 RX ORDER — 0.9 % SODIUM CHLORIDE 0.9 %
1000 INTRAVENOUS SOLUTION INTRAVENOUS ONCE
Status: COMPLETED | OUTPATIENT
Start: 2022-03-10 | End: 2022-03-10

## 2022-03-10 RX ORDER — SODIUM CHLORIDE 0.9 % (FLUSH) 0.9 %
5-40 SYRINGE (ML) INJECTION PRN
Status: DISCONTINUED | OUTPATIENT
Start: 2022-03-10 | End: 2022-03-11 | Stop reason: HOSPADM

## 2022-03-10 RX ORDER — POTASSIUM CHLORIDE 20 MEQ/1
20 TABLET, EXTENDED RELEASE ORAL ONCE
Status: COMPLETED | OUTPATIENT
Start: 2022-03-10 | End: 2022-03-10

## 2022-03-10 RX ORDER — DEXAMETHASONE SODIUM PHOSPHATE 4 MG/ML
8 INJECTION, SOLUTION INTRA-ARTICULAR; INTRALESIONAL; INTRAMUSCULAR; INTRAVENOUS; SOFT TISSUE ONCE
Status: COMPLETED | OUTPATIENT
Start: 2022-03-10 | End: 2022-03-10

## 2022-03-10 RX ADMIN — SODIUM CHLORIDE, PRESERVATIVE FREE 10 ML: 5 INJECTION INTRAVENOUS at 12:00

## 2022-03-10 RX ADMIN — DEXAMETHASONE SODIUM PHOSPHATE 8 MG: 4 INJECTION, SOLUTION INTRAMUSCULAR; INTRAVENOUS at 09:28

## 2022-03-10 RX ADMIN — SODIUM CHLORIDE 1200 ML: 9 INJECTION, SOLUTION INTRAVENOUS at 09:28

## 2022-03-10 RX ADMIN — POTASSIUM CHLORIDE 20 MEQ: 1500 TABLET, EXTENDED RELEASE ORAL at 09:28

## 2022-03-10 RX ADMIN — SODIUM CHLORIDE, PRESERVATIVE FREE 10 ML: 5 INJECTION INTRAVENOUS at 08:04

## 2022-03-10 RX ADMIN — HEPARIN 500 UNITS: 100 SYRINGE at 12:00

## 2022-03-10 RX ADMIN — GEMCITABINE 3200 MG: 38 INJECTION, SOLUTION INTRAVENOUS at 10:26

## 2022-03-10 RX ADMIN — SODIUM CHLORIDE, PRESERVATIVE FREE 10 ML: 5 INJECTION INTRAVENOUS at 08:03

## 2022-03-10 NOTE — TELEPHONE ENCOUNTER
Proceed with chemo per orders, rv in 2 weeks with chemo and labs    Tx today as scheduled    RV scheduled 3/24/22 @ 8:15am w/ tx to follow    PT was given AVS and an appt schedule    Electronically signed by Gabriel Vaughan on 3/10/2022 at 10:23 AM

## 2022-03-10 NOTE — TELEPHONE ENCOUNTER
[facility] Nutrition Follow Up       Amy Sy is a 74 y.o.  male     NUTRITION RECOMMENDATIONS / Ara Vivar / EVALUATION  1. Encourage high calorie, high protein foods and beverages  2. Submitted to send 1 complementary cases of Casey Newsome 1.5 and and 2 cases of [facility] SABINA [employer] 1.0  3. Encouraged small, frequent meals. 4. Recommend consider daily multivitamin to aid in meeting estimated needs. 5. Will continue to monitor po intakes, s/s management, wt status, plan of care. Subjective/Current Data:  Spoke with pt and significant other in infusion during treatment. Pt originally spoke with humor regarding his care and when RD inquired re: nutrition status and previous conversations last week, pt stated \"i'm a liar. \" RD encouraged pt to be honest about symptoms management. Pt reports poor appetite, though tries to eat several times per day. Pt reports smells of food have bothered him. RD provided pt and SO tips to manage smell sensitivity. Pt states he has been using fruit smoothies to improve caloric intake. RD provided several nutrition recommendations to patient to encourage improved caloric intake given recent, continued wt loss. Pt previously not receptive to utilizing oral nutrition supplements, now agreeing to samples of Casey Newsome to be sent to his house.   Nutritional Requirements:  Estimated Energy Needs:  Weight Used: previous wt 126kg -  (current wt 121.9kg)  Method Used: [address] [place]  Estimated kcal Needs: 6041-3184 kcal per day  Protein Needs:  Weight used: 126kg  1.2-1.4 g/kg = 144-175 g per day    Nutrition-focused Physical Findings  GI Symptoms: poor appetite and nausea              Skin:= no issues reported  Intake vs. Estimated Needs: less than needs    Nutrition Diagnosis and Goal  Problem:  Unintentional wt loss  Etiology/related to: catabolic illness  Symptoms/Signs/as evidenced by: wt loss 50lb x 5 months, bladder ca undergoing concurrent tx, likely PO intakes meeting <75% estimated needs. Goal: Adequate intake to aide in wt maintenaince, signs and symptoms management, and overall wellbeing.     Progress towards goal: gradually worsening    Ta South, VICTOR M, LD  Registered Dietitian   [facility]  [phone]

## 2022-03-10 NOTE — ONCOLOGY
Patient arrived for C1D8 Gemzar. Pt had visit with Dr. Dereje Parks, labs reviewed and discussed with Dr. Dereje Parks, will proceed with treatment today. Weight change discussed with pharmacy and dosing change made. 1L fluid bolus given per Dr. Dereje Parks. Treatment completed without issue. Pt stable at discharge. Scheduled to return 3/24/22 for MD visit and C2D1.

## 2022-03-14 ENCOUNTER — HOSPITAL ENCOUNTER (OUTPATIENT)
Dept: PHYSICAL THERAPY | Facility: CLINIC | Age: 67
Setting detail: THERAPIES SERIES
Discharge: HOME | End: 2022-03-14

## 2022-03-14 NOTE — FLOWSHEET NOTE
[] [address]  [address]    [phone]  F: [phone]   [] [address]  [facility]   [address]  P: [phone]  F: [phone]  [x] [address] &  Therapy  [address]  P: [phone]  F: [phone] [] [address]  P: [phone]  F: [phone]  [] [address]  [facility]   [address]   [place]: [phone]  F: [phone]   [] [address]  [place]: [phone]   F: [phone]     Physical Therapy Cancel/No Show note    Date: 3/14/2022  Patient: Idania Nieto  : 1955  MRN: [id number]  Physician: Leana Kennedy MD                   Insurance: MEDICARE, [place] [employer]  Medical Diagnosis: Malignant neoplasm of trigone of urinary bladder       Rehab Codes: C67.0,  C67.2   Cancels/No Shows to date:     For today's appointment patient:    []  Cancelled    [] Rescheduled appointment    [x] No-show     Reason given by patient:    []  Patient ill    []  Conflicting appointment    [] No transportation      [] Conflict with work    [] No reason given    [] Weather related    [] [id number]    [] Other:      Comments:        [] Next appointment was confirmed    Electronically signed by: Lucas Winston PT

## 2022-03-16 ENCOUNTER — HOSPITAL ENCOUNTER (OUTPATIENT)
Facility: CLINIC | Age: 67
Discharge: HOME | End: 2022-03-18
Payer: MEDICARE

## 2022-03-16 ENCOUNTER — HOSPITAL ENCOUNTER (OUTPATIENT)
Facility: CLINIC | Age: 67
Discharge: HOME | End: 2022-03-16
Payer: MEDICARE

## 2022-03-16 ENCOUNTER — HOSPITAL ENCOUNTER (OUTPATIENT)
Dept: GENERAL RADIOLOGY | Facility: CLINIC | Age: 67
Discharge: HOME | End: 2022-03-18
Payer: MEDICARE

## 2022-03-16 DIAGNOSIS — R06.02 SOB (SHORTNESS OF BREATH): ICD-10-CM

## 2022-03-16 LAB
ABSOLUTE EOS #: 0 K/UL (ref 0–0.4)
ABSOLUTE IMMATURE GRANULOCYTE: 0 K/UL (ref 0–0.3)
ABSOLUTE LYMPH #: 1.62 K/UL (ref 1–4.8)
ABSOLUTE MONO #: 0.04 K/UL (ref 0.1–0.8)
ALBUMIN SERPL-MCNC: 3.8 G/DL (ref 3.5–5.2)
ALBUMIN/GLOBULIN RATIO: 1.1 (ref 1–2.5)
ALP BLD-CCNC: 99 U/L (ref 40–129)
ALT SERPL-CCNC: 17 U/L (ref 5–41)
ANION GAP SERPL CALCULATED.3IONS-SCNC: 15 MMOL/L (ref 9–17)
AST SERPL-CCNC: 12 U/L
BASOPHILS # BLD: 0 % (ref 0–2)
BASOPHILS ABSOLUTE: 0 K/UL (ref 0–0.2)
BILIRUB SERPL-MCNC: 0.24 MG/DL (ref 0.3–1.2)
BUN BLDV-MCNC: 14 MG/DL (ref 8–23)
CALCIUM SERPL-MCNC: 9.3 MG/DL (ref 8.6–10.4)
CHLORIDE BLD-SCNC: 102 MMOL/L (ref 98–107)
CO2: 22 MMOL/L (ref 20–31)
CREAT SERPL-MCNC: 1.34 MG/DL (ref 0.7–1.2)
D-DIMER QUANTITATIVE: 11.25 MG/L FEU
EOSINOPHILS RELATIVE PERCENT: 0 % (ref 1–4)
GFR AFRICAN AMERICAN: >60 ML/MIN
GFR NON-AFRICAN AMERICAN: 54 ML/MIN
GFR SERPL CREATININE-BSD FRML MDRD: ABNORMAL ML/MIN/{1.73_M2}
GLUCOSE BLD-MCNC: 96 MG/DL (ref 70–99)
HCT VFR BLD CALC: 34.2 % (ref 40.7–50.3)
HEMOGLOBIN: 11.1 G/DL (ref 13–17)
IMMATURE GRANULOCYTES: 0 %
LYMPHOCYTES # BLD: 81 % (ref 24–44)
MCH RBC QN AUTO: 25.5 PG (ref 25.2–33.5)
MCHC RBC AUTO-ENTMCNC: 32.5 G/DL (ref 28.4–34.8)
MCV RBC AUTO: 78.4 FL (ref 82.6–102.9)
MONOCYTES # BLD: 2 % (ref 1–7)
MORPHOLOGY: ABNORMAL
MORPHOLOGY: ABNORMAL
NRBC AUTOMATED: 0 PER 100 WBC
PDW BLD-RTO: 14.7 % (ref 11.8–14.4)
PLATELET # BLD: 105 K/UL (ref 138–453)
PMV BLD AUTO: 9.9 FL (ref 8.1–13.5)
POTASSIUM SERPL-SCNC: 4.5 MMOL/L (ref 3.7–5.3)
PRO-BNP: 453 PG/ML
RBC # BLD: 4.36 M/UL (ref 4.21–5.77)
SEG NEUTROPHILS: 17 % (ref 36–66)
SEGMENTED NEUTROPHILS ABSOLUTE COUNT: 0.34 K/UL (ref 1.8–7.7)
SODIUM BLD-SCNC: 139 MMOL/L (ref 135–144)
THYROXINE, FREE: 1.15 NG/DL (ref 0.93–1.7)
TOTAL PROTEIN: 7.2 G/DL (ref 6.4–8.3)
TSH SERPL DL<=0.05 MIU/L-ACNC: 3.69 MIU/L (ref 0.3–5)
VITAMIN B-12: 1316 PG/ML (ref 232–1245)
WBC # BLD: 2 K/UL (ref 3.5–11.3)

## 2022-03-16 PROCEDURE — 82607 VITAMIN B-12: CPT

## 2022-03-16 PROCEDURE — 85379 FIBRIN DEGRADATION QUANT: CPT

## 2022-03-16 PROCEDURE — 71046 X-RAY EXAM CHEST 2 VIEWS: CPT

## 2022-03-16 PROCEDURE — 83880 ASSAY OF NATRIURETIC PEPTIDE: CPT

## 2022-03-16 PROCEDURE — 36415 COLL VENOUS BLD VENIPUNCTURE: CPT

## 2022-03-16 PROCEDURE — 80053 COMPREHEN METABOLIC PANEL: CPT

## 2022-03-16 PROCEDURE — 85025 COMPLETE CBC W/AUTO DIFF WBC: CPT

## 2022-03-16 PROCEDURE — 84443 ASSAY THYROID STIM HORMONE: CPT

## 2022-03-16 PROCEDURE — 84439 ASSAY OF FREE THYROXINE: CPT

## 2022-03-21 ENCOUNTER — TELEPHONE (OUTPATIENT)
Dept: INFUSION THERAPY | Age: 67
End: 2022-03-21

## 2022-03-21 NOTE — TELEPHONE ENCOUNTER
Pt calls in stating recently d/c'd from hospital with PE now on blood thinners. Wants to know if he should keep his appt this Thursday. Instructed pt to keep appt for 3/23/22 at 815 with Dr Cecy Huerta with possible tx to follow.

## 2022-03-24 ENCOUNTER — TELEPHONE (OUTPATIENT)
Dept: INFUSION THERAPY | Age: 67
End: 2022-03-24

## 2022-03-24 ENCOUNTER — OFFICE VISIT (OUTPATIENT)
Dept: ONCOLOGY | Age: 67
End: 2022-03-24
Payer: MEDICARE

## 2022-03-24 ENCOUNTER — TELEPHONE (OUTPATIENT)
Dept: ONCOLOGY | Age: 67
End: 2022-03-24

## 2022-03-24 ENCOUNTER — HOSPITAL ENCOUNTER (OUTPATIENT)
Dept: INFUSION THERAPY | Age: 67
Discharge: HOME | End: 2022-03-24
Payer: MEDICARE

## 2022-03-24 VITALS
WEIGHT: 268.3 LBS | SYSTOLIC BLOOD PRESSURE: 126 MMHG | DIASTOLIC BLOOD PRESSURE: 71 MMHG | BODY MASS INDEX: 34.45 KG/M2 | TEMPERATURE: 96.9 F | HEART RATE: 98 BPM

## 2022-03-24 DIAGNOSIS — C67.2 MALIGNANT NEOPLASM OF LATERAL WALL OF URINARY BLADDER (HCC): Primary | ICD-10-CM

## 2022-03-24 DIAGNOSIS — C67.0 MALIGNANT NEOPLASM OF TRIGONE OF URINARY BLADDER (HCC): Primary | ICD-10-CM

## 2022-03-24 LAB
ABSOLUTE EOS #: 0.07 K/UL (ref 0–0.4)
ABSOLUTE LYMPH #: 2.14 K/UL (ref 1–4.8)
ABSOLUTE MONO #: 0.34 K/UL (ref 0.1–0.8)
ALBUMIN SERPL-MCNC: 3.6 G/DL (ref 3.5–5.2)
ALBUMIN/GLOBULIN RATIO: 1.1 (ref 1–2.5)
ALP BLD-CCNC: 98 U/L (ref 40–129)
ALT SERPL-CCNC: 15 U/L (ref 5–41)
ANION GAP SERPL CALCULATED.3IONS-SCNC: 12 MMOL/L (ref 9–17)
AST SERPL-CCNC: 11 U/L
BASOPHILS # BLD: 1 % (ref 0–2)
BASOPHILS ABSOLUTE: 0.07 K/UL (ref 0–0.2)
BILIRUB SERPL-MCNC: 0.16 MG/DL (ref 0.3–1.2)
BUN BLDV-MCNC: 15 MG/DL (ref 8–23)
CALCIUM SERPL-MCNC: 9 MG/DL (ref 8.6–10.4)
CHLORIDE BLD-SCNC: 98 MMOL/L (ref 98–107)
CO2: 24 MMOL/L (ref 20–31)
CREAT SERPL-MCNC: 1.62 MG/DL (ref 0.7–1.2)
EOSINOPHILS RELATIVE PERCENT: 1 % (ref 1–4)
GFR AFRICAN AMERICAN: 52 ML/MIN
GFR NON-AFRICAN AMERICAN: 43 ML/MIN
GFR SERPL CREATININE-BSD FRML MDRD: ABNORMAL ML/MIN/{1.73_M2}
GLUCOSE BLD-MCNC: 137 MG/DL (ref 70–99)
HCT VFR BLD CALC: 32.5 % (ref 41–53)
HEMOGLOBIN: 10.8 G/DL (ref 13.5–17.5)
LYMPHOCYTES # BLD: 32 % (ref 24–44)
MAGNESIUM: 1.9 MG/DL (ref 1.6–2.6)
MCH RBC QN AUTO: 26.1 PG (ref 26–34)
MCHC RBC AUTO-ENTMCNC: 33.4 G/DL (ref 31–37)
MCV RBC AUTO: 78.2 FL (ref 80–100)
METAMYELOCYTES ABSOLUTE COUNT: 0.13 K/UL
METAMYELOCYTES: 2 %
MONOCYTES # BLD: 5 % (ref 1–7)
MORPHOLOGY: NORMAL
PDW BLD-RTO: 17.3 % (ref 12.5–15.4)
PLATELET # BLD: 476 K/UL (ref 140–450)
PMV BLD AUTO: 6.7 FL (ref 6–12)
POTASSIUM SERPL-SCNC: 4.5 MMOL/L (ref 3.7–5.3)
RBC # BLD: 4.15 M/UL (ref 4.5–5.9)
SEG NEUTROPHILS: 59 % (ref 36–66)
SEGMENTED NEUTROPHILS ABSOLUTE COUNT: 3.95 K/UL (ref 1.8–7.7)
SODIUM BLD-SCNC: 134 MMOL/L (ref 135–144)
TOTAL PROTEIN: 7 G/DL (ref 6.4–8.3)
WBC # BLD: 6.7 K/UL (ref 3.5–11)

## 2022-03-24 PROCEDURE — 99215 OFFICE O/P EST HI 40 MIN: CPT | Performed by: INTERNAL MEDICINE

## 2022-03-24 PROCEDURE — 96417 CHEMO IV INFUS EACH ADDL SEQ: CPT

## 2022-03-24 PROCEDURE — 96368 THER/DIAG CONCURRENT INF: CPT

## 2022-03-24 PROCEDURE — 80053 COMPREHEN METABOLIC PANEL: CPT

## 2022-03-24 PROCEDURE — 6360000002 HC RX W HCPCS: Performed by: INTERNAL MEDICINE

## 2022-03-24 PROCEDURE — 36415 COLL VENOUS BLD VENIPUNCTURE: CPT

## 2022-03-24 PROCEDURE — 96361 HYDRATE IV INFUSION ADD-ON: CPT

## 2022-03-24 PROCEDURE — 2580000003 HC RX 258: Performed by: INTERNAL MEDICINE

## 2022-03-24 PROCEDURE — 96375 TX/PRO/DX INJ NEW DRUG ADDON: CPT

## 2022-03-24 PROCEDURE — 1036F TOBACCO NON-USER: CPT | Performed by: INTERNAL MEDICINE

## 2022-03-24 PROCEDURE — 3017F COLORECTAL CA SCREEN DOC REV: CPT | Performed by: INTERNAL MEDICINE

## 2022-03-24 PROCEDURE — 96413 CHEMO IV INFUSION 1 HR: CPT

## 2022-03-24 PROCEDURE — 36591 DRAW BLOOD OFF VENOUS DEVICE: CPT

## 2022-03-24 PROCEDURE — 85025 COMPLETE CBC W/AUTO DIFF WBC: CPT

## 2022-03-24 PROCEDURE — 4040F PNEUMOC VAC/ADMIN/RCVD: CPT | Performed by: INTERNAL MEDICINE

## 2022-03-24 PROCEDURE — G8417 CALC BMI ABV UP PARAM F/U: HCPCS | Performed by: INTERNAL MEDICINE

## 2022-03-24 PROCEDURE — G8484 FLU IMMUNIZE NO ADMIN: HCPCS | Performed by: INTERNAL MEDICINE

## 2022-03-24 PROCEDURE — 96366 THER/PROPH/DIAG IV INF ADDON: CPT

## 2022-03-24 PROCEDURE — 1111F DSCHRG MED/CURRENT MED MERGE: CPT | Performed by: INTERNAL MEDICINE

## 2022-03-24 PROCEDURE — 1123F ACP DISCUSS/DSCN MKR DOCD: CPT | Performed by: INTERNAL MEDICINE

## 2022-03-24 PROCEDURE — 96367 TX/PROPH/DG ADDL SEQ IV INF: CPT

## 2022-03-24 PROCEDURE — 96365 THER/PROPH/DIAG IV INF INIT: CPT

## 2022-03-24 PROCEDURE — 83735 ASSAY OF MAGNESIUM: CPT

## 2022-03-24 PROCEDURE — G8427 DOCREV CUR MEDS BY ELIG CLIN: HCPCS | Performed by: INTERNAL MEDICINE

## 2022-03-24 RX ORDER — ONDANSETRON 2 MG/ML
8 INJECTION INTRAMUSCULAR; INTRAVENOUS
Status: CANCELLED | OUTPATIENT
Start: 2022-03-24

## 2022-03-24 RX ORDER — ONDANSETRON 2 MG/ML
8 INJECTION INTRAMUSCULAR; INTRAVENOUS
Status: CANCELLED | OUTPATIENT
Start: 2022-03-31

## 2022-03-24 RX ORDER — DEXAMETHASONE SODIUM PHOSPHATE 10 MG/ML
10 INJECTION INTRAMUSCULAR; INTRAVENOUS ONCE
Status: COMPLETED | OUTPATIENT
Start: 2022-03-24 | End: 2022-03-24

## 2022-03-24 RX ORDER — SODIUM CHLORIDE 9 MG/ML
20 INJECTION, SOLUTION INTRAVENOUS ONCE
Status: CANCELLED | OUTPATIENT
Start: 2022-03-31 | End: 2022-03-31

## 2022-03-24 RX ORDER — EPINEPHRINE 1 MG/ML
0.3 INJECTION, SOLUTION, CONCENTRATE INTRAVENOUS PRN
Status: CANCELLED | OUTPATIENT
Start: 2022-03-31

## 2022-03-24 RX ORDER — SODIUM CHLORIDE 9 MG/ML
5-40 INJECTION INTRAVENOUS PRN
Status: CANCELLED | OUTPATIENT
Start: 2022-03-24

## 2022-03-24 RX ORDER — SODIUM CHLORIDE 9 MG/ML
25 INJECTION, SOLUTION INTRAVENOUS PRN
Status: CANCELLED | OUTPATIENT
Start: 2022-03-31

## 2022-03-24 RX ORDER — HEPARIN SODIUM (PORCINE) LOCK FLUSH IV SOLN 100 UNIT/ML 100 UNIT/ML
500 SOLUTION INTRAVENOUS PRN
Status: CANCELLED | OUTPATIENT
Start: 2022-03-31

## 2022-03-24 RX ORDER — MAGNESIUM SULFATE 1 G/100ML
1000 INJECTION INTRAVENOUS ONCE
Status: COMPLETED | OUTPATIENT
Start: 2022-03-24 | End: 2022-03-24

## 2022-03-24 RX ORDER — SODIUM CHLORIDE AND POTASSIUM CHLORIDE .9; .15 G/100ML; G/100ML
SOLUTION INTRAVENOUS CONTINUOUS
Status: DISCONTINUED | OUTPATIENT
Start: 2022-03-24 | End: 2022-03-25 | Stop reason: HOSPADM

## 2022-03-24 RX ORDER — SODIUM CHLORIDE 9 MG/ML
25 INJECTION, SOLUTION INTRAVENOUS PRN
Status: CANCELLED | OUTPATIENT
Start: 2022-03-24

## 2022-03-24 RX ORDER — DIPHENHYDRAMINE HYDROCHLORIDE 50 MG/ML
50 INJECTION INTRAMUSCULAR; INTRAVENOUS
Status: CANCELLED | OUTPATIENT
Start: 2022-03-31

## 2022-03-24 RX ORDER — SULFAMETHOXAZOLE AND TRIMETHOPRIM 800; 160 MG/1; MG/1
1 TABLET ORAL 2 TIMES DAILY
Qty: 14 TABLET | Refills: 0 | Status: ON HOLD | OUTPATIENT
Start: 2022-03-24 | End: 2022-03-31

## 2022-03-24 RX ORDER — EPINEPHRINE 1 MG/ML
0.3 INJECTION, SOLUTION, CONCENTRATE INTRAVENOUS PRN
Status: CANCELLED | OUTPATIENT
Start: 2022-03-24

## 2022-03-24 RX ORDER — SODIUM CHLORIDE 9 MG/ML
INJECTION, SOLUTION INTRAVENOUS CONTINUOUS
Status: CANCELLED | OUTPATIENT
Start: 2022-03-31

## 2022-03-24 RX ORDER — PALONOSETRON 0.05 MG/ML
0.25 INJECTION, SOLUTION INTRAVENOUS ONCE
Status: CANCELLED | OUTPATIENT
Start: 2022-03-24 | End: 2022-03-24

## 2022-03-24 RX ORDER — SODIUM CHLORIDE 0.9 % (FLUSH) 0.9 %
5-40 SYRINGE (ML) INJECTION PRN
Status: CANCELLED | OUTPATIENT
Start: 2022-03-24

## 2022-03-24 RX ORDER — ACETAMINOPHEN 325 MG/1
650 TABLET ORAL
Status: CANCELLED | OUTPATIENT
Start: 2022-03-24

## 2022-03-24 RX ORDER — ALBUTEROL SULFATE 90 UG/1
4 AEROSOL, METERED RESPIRATORY (INHALATION) PRN
Status: CANCELLED | OUTPATIENT
Start: 2022-03-24

## 2022-03-24 RX ORDER — MEPERIDINE HYDROCHLORIDE 50 MG/ML
12.5 INJECTION INTRAMUSCULAR; INTRAVENOUS; SUBCUTANEOUS PRN
Status: CANCELLED | OUTPATIENT
Start: 2022-03-31

## 2022-03-24 RX ORDER — SODIUM CHLORIDE 9 MG/ML
20 INJECTION, SOLUTION INTRAVENOUS ONCE
Status: COMPLETED | OUTPATIENT
Start: 2022-03-24 | End: 2022-03-24

## 2022-03-24 RX ORDER — SODIUM CHLORIDE AND POTASSIUM CHLORIDE .9; .15 G/100ML; G/100ML
SOLUTION INTRAVENOUS CONTINUOUS
Status: CANCELLED
Start: 2022-03-24

## 2022-03-24 RX ORDER — SODIUM CHLORIDE 9 MG/ML
20 INJECTION, SOLUTION INTRAVENOUS ONCE
Status: CANCELLED | OUTPATIENT
Start: 2022-03-24 | End: 2022-03-24

## 2022-03-24 RX ORDER — DIPHENHYDRAMINE HYDROCHLORIDE 50 MG/ML
50 INJECTION INTRAMUSCULAR; INTRAVENOUS
Status: CANCELLED | OUTPATIENT
Start: 2022-03-24

## 2022-03-24 RX ORDER — SODIUM CHLORIDE 9 MG/ML
5-40 INJECTION INTRAVENOUS PRN
Status: CANCELLED | OUTPATIENT
Start: 2022-03-31

## 2022-03-24 RX ORDER — HEPARIN SODIUM (PORCINE) LOCK FLUSH IV SOLN 100 UNIT/ML 100 UNIT/ML
500 SOLUTION INTRAVENOUS PRN
Status: DISCONTINUED | OUTPATIENT
Start: 2022-03-24 | End: 2022-03-25 | Stop reason: HOSPADM

## 2022-03-24 RX ORDER — PALONOSETRON 0.05 MG/ML
0.25 INJECTION, SOLUTION INTRAVENOUS ONCE
Status: COMPLETED | OUTPATIENT
Start: 2022-03-24 | End: 2022-03-24

## 2022-03-24 RX ORDER — ALBUTEROL SULFATE 90 UG/1
4 AEROSOL, METERED RESPIRATORY (INHALATION) PRN
Status: CANCELLED | OUTPATIENT
Start: 2022-03-31

## 2022-03-24 RX ORDER — SODIUM CHLORIDE 0.9 % (FLUSH) 0.9 %
5-40 SYRINGE (ML) INJECTION PRN
Status: CANCELLED | OUTPATIENT
Start: 2022-03-31

## 2022-03-24 RX ORDER — MEPERIDINE HYDROCHLORIDE 50 MG/ML
12.5 INJECTION INTRAMUSCULAR; INTRAVENOUS; SUBCUTANEOUS PRN
Status: CANCELLED | OUTPATIENT
Start: 2022-03-24

## 2022-03-24 RX ORDER — ACETAMINOPHEN 325 MG/1
650 TABLET ORAL
Status: CANCELLED | OUTPATIENT
Start: 2022-03-31

## 2022-03-24 RX ORDER — HEPARIN SODIUM (PORCINE) LOCK FLUSH IV SOLN 100 UNIT/ML 100 UNIT/ML
500 SOLUTION INTRAVENOUS PRN
Status: CANCELLED | OUTPATIENT
Start: 2022-03-24

## 2022-03-24 RX ORDER — SODIUM CHLORIDE 0.9 % (FLUSH) 0.9 %
5-40 SYRINGE (ML) INJECTION PRN
Status: DISCONTINUED | OUTPATIENT
Start: 2022-03-24 | End: 2022-03-25 | Stop reason: HOSPADM

## 2022-03-24 RX ORDER — SODIUM CHLORIDE 9 MG/ML
INJECTION, SOLUTION INTRAVENOUS CONTINUOUS
Status: CANCELLED | OUTPATIENT
Start: 2022-03-24

## 2022-03-24 RX ADMIN — POTASSIUM CHLORIDE AND SODIUM CHLORIDE: 900; 150 INJECTION, SOLUTION INTRAVENOUS at 09:17

## 2022-03-24 RX ADMIN — SODIUM CHLORIDE, PRESERVATIVE FREE 10 ML: 5 INJECTION INTRAVENOUS at 08:10

## 2022-03-24 RX ADMIN — FOSAPREPITANT DIMEGLUMINE 150 MG: 150 INJECTION, POWDER, LYOPHILIZED, FOR SOLUTION INTRAVENOUS at 10:37

## 2022-03-24 RX ADMIN — SODIUM CHLORIDE, PRESERVATIVE FREE 10 ML: 5 INJECTION INTRAVENOUS at 08:11

## 2022-03-24 RX ADMIN — SODIUM CHLORIDE 20 ML/HR: 9 INJECTION, SOLUTION INTRAVENOUS at 09:16

## 2022-03-24 RX ADMIN — MAGNESIUM SULFATE HEPTAHYDRATE 1000 MG: 1 INJECTION, SOLUTION INTRAVENOUS at 09:23

## 2022-03-24 RX ADMIN — GEMCITABINE 3200 MG: 38 INJECTION, SOLUTION INTRAVENOUS at 11:17

## 2022-03-24 RX ADMIN — MAGNESIUM SULFATE HEPTAHYDRATE 1000 MG: 1 INJECTION, SOLUTION INTRAVENOUS at 13:04

## 2022-03-24 RX ADMIN — PALONOSETRON HYDROCHLORIDE 0.25 MG: 0.25 INJECTION, SOLUTION INTRAVENOUS at 10:26

## 2022-03-24 RX ADMIN — SODIUM CHLORIDE, PRESERVATIVE FREE 10 ML: 5 INJECTION INTRAVENOUS at 14:10

## 2022-03-24 RX ADMIN — Medication 500 UNITS: at 14:10

## 2022-03-24 RX ADMIN — CISPLATIN 176 MG: 100 INJECTION, SOLUTION INTRAVENOUS at 11:58

## 2022-03-24 RX ADMIN — DEXAMETHASONE SODIUM PHOSPHATE 10 MG: 10 INJECTION INTRAMUSCULAR; INTRAVENOUS at 10:29

## 2022-03-24 NOTE — TELEPHONE ENCOUNTER
AVS from 3/24/22     Please proceed with chemo per orders.    rv next week with chemo and labs   Please help pt with application for eliquis     Proceed with tx today  rv scheduled for 3/31/22 @ 9:45am w/tx to follow  [facility] is taking care of the eliquis application    Pt was given AVS and appt schedule

## 2022-03-24 NOTE — PATIENT INSTRUCTIONS
Please proceed with chemo per orders. rv next week with chemo and labs   Please help pt with application for eliquis .

## 2022-03-24 NOTE — TELEPHONE ENCOUNTER
Dr. Felicia Núñez states pt has PE and is on eliquis, but the copay is over $500. He asked if pt could have assistance. Writer reached out to [employer] and left detailed VM notifying her of above. Also gave pt Alia's contact info and advised to call her if he did not hear from her.

## 2022-03-24 NOTE — PROGRESS NOTES
DIAGNOSIS:   1. High grade urothelial carcinoma, 12/2021, T3 N1 M0   2. Bilateral PE, 03/2022    CURRENT THERAPY:  Neoadjuvant chemo followed by adjuvant cystectomy  Eliquis     BRIEF CASE HISTORY:   Manny Ocasio is a very pleasant 74 y.o. male who is referred to us for bladder cancer. Patient presented with hematuria 02/2021 and was seen by urology, it was felt to be renal calculi and he was started on Flomax but continued to pass clots. He was later in [facility] and had urinary blockage, passed very large clot and then able to urinate and presented again to ER with continued hematuria, CT showed severe right hydronephrosis with tissue thickening/mass seen in right side of bladder, he underwent nephrostomy placement, stent was put in place and cystoscopy done showing fairly large papillary tumor occupying right side posterior wall and even the dome of the bladder. Pathology showed high grade urothelial carcinoma invading muscularis propria. Over the next month he needed 2 repeat cystoscopies and extensive fulguration of the tumor, again pathology showed high grade urothelial carcinoma. CT of the chest abdomen pelvis did not show metastatic disease. He has intermittent bladder pain and continues to have hematuria in Muñoz catheter. His appetite is poor. His father had bladder cancer, he did not have smoking history. The patient has smoking history but quit 30 years ago. He had COVID infection in 09/2021, he lost significant weight and continues to recover. He has arthralgias in the knees and uses cane. Staging PET scan was done and showed the bladder tumor and right pelvic sidewall lymph node metastases 4 of clinical staging of T3 N1 M0. We discussed neoadjuvant chemotherapy to be followed by surgery if he has a good response. INTERIM HISTORY: The patient presents for follow up with toxicity check for day #1, cycle #2.  He was in house last week with worsening shortness of breath, he was seen by PCP and imaging done showed PE. CT done upon admission confirmed bilateral PE and he was started on Eliquis. He has not noticed much improvement in breathing and notes his oxygen goes down to 88 with exertion and recovers quickly. He has been struggling with depression and anxiety he is on Wellbutrin currently. He feels he tolerated treatment well, he taste and smell have been affected, no negative affects otherwise. He completed Bactrim yesterday for UTI. PAST MEDICAL HISTORY: has a past medical history of Arthritis, BPH with obstruction/lower urinary tract symptoms, Caffeine use, Cancer ([facility] [address] Depression, GERD (gastroesophageal reflux disease), High cholesterol, Kidney calculi, and Sleep apnea. PAST SURGICAL HISTORY: has a past surgical history that includes Knee arthroscopy; Cardiac catheterization; Colonoscopy; and IR PORT PLACEMENT > 5 YEARS (2/25/2022). CURRENT MEDICATIONS:  has a current medication list which includes the following prescription(s): sulfamethoxazole-trimethoprim, phenazopyridine, hydrocodone-acetaminophen, gemtesa, lidocaine-prilocaine, ondansetron, omeprazole, clobetasol, finasteride, tamsulosin, simvastatin, and bupropion. ALLERGIES:  is allergic to meloxicam.    FAMILY HISTORY: Father had bladder cancer     SOCIAL HISTORY:  reports that he quit smoking about 30 years ago. He has never used smokeless tobacco. He reports that he does not drink alcohol and does not use drugs. REVIEW OF SYSTEMS:   General: No fever or night sweats. Weight is stable. +fatigue and generalized weakness   ENT: No double or blurred vision, no tinnitus or hearing problem, no dysphagia or sore throat +smell affected   Respiratory: No chest pain, no shortness of breath, no cough or hemoptysis. Cardiovascular: Denies chest pain, PND or orthopnea. No L E swelling or palpitations.   Gastrointestinal: No nausea or vomiting, diarrhea or constipation, abd pain   Genitourinary: Denies dysuria, frequency, urgency or incontinence, or hematuria   Neurological: Denies headaches, decreased LOC, no sensory or motor focal deficits. Musculoskeletal: No arthralgia no back pain or joint swelling. Skin: There are no rashes or bleeding. Psychiatric: + anxiety and depression. Endocrine: No diabetes or thyroid disease. Hematologic: No bleeding, no adenopathy. PHYSICAL EXAM: Shows a well appearing 74y.o.-year-old male who is not in pain or distress. Vital Signs: Blood pressure 126/71, pulse 98, temperature 96.9 °F (36.1 °C), temperature source Temporal, weight 268 lb 4.8 oz (121.7 kg). HEENT: Normocephalic and atraumatic. Pupils are equal, round, reactive to light and accommodation. Extraocular muscles are intact. Neck: Showed no JVD, no carotid bruit . Lungs: Clear to auscultation bilaterally. Heart: Regular without any murmur. Abdomen: Soft, nontender. No hepatosplenomegaly. Extremities: Lower extremities show no edema, clubbing, or cyanosis. Breasts: Examination not done today. Neuro exam: intact cranial nerves bilaterally no motor or sensory deficit, gait is normal. Lymphatic: no adenopathy appreciated in the supraclavicular, axillary, cervical or inguinal area      REVIEW OF LABORATORY DATA:   Lab Results   Component Value Date    WBC 6.7 03/24/2022    HGB 10.8 (L) 03/24/2022    HCT 32.5 (L) 03/24/2022    MCV 78.2 (L) 03/24/2022     (H) 03/24/2022       Chemistry        Component Value Date/Time     (L) 03/24/2022 0810    K 4.5 03/24/2022 0810    CL 98 03/24/2022 0810    CO2 24 03/24/2022 0810    BUN 15 03/24/2022 0810    CREATININE 1.62 (H) 03/24/2022 0810        Component Value Date/Time    CALCIUM 9.0 03/24/2022 0810    ALKPHOS 98 03/24/2022 0810    AST 11 03/24/2022 0810    ALT 15 03/24/2022 0810    BILITOT 0.16 (L) 03/24/2022 0810        PATHOLOGY:     REVIEW OF RADIOLOGICAL RESULTS:         IMPRESSION:   1. High grade urothelial carcinoma   2. Neoadjuvant chemo to be followed by cystectomy   3.  Bilateral PE, Eliquis     PLAN:   1. We discussed recent hospitalization with bilateral PE and recommendation for 6 months of Eliquis, I explained his thromboembolism is due to his disease, we will refer him to financial aid for help with filling his Eliquis going forward. 2. I explained expectations for recovery as clots dissolve with treatment, we will consider oxygen as needed. 3. I am writing for antibiotics to start if needed for recurrent UTI. 4. His lab work was reviewed, counts have recovered well and electrolytes are stable. 5. I completed toxicity check. 6. I am ordering anti-depressant to be taken with Wellbutrin for the duration of treatment. 7. Return in 1 week. Scribe Attestation   This note was created by Gary Vega acting as scribe for the physician signing this note  Electronically Signed  Marci Lion, 3/24/2022  Scribe, [employer]. Attending Attestation   Note was reviewed and edited.   I am in agreement with the note as entered    Lissy Grayson MD  Hematologist/Medical [address] [place] [address] hematology oncology physicians

## 2022-03-24 NOTE — TELEPHONE ENCOUNTER
NUTRITION NOTE    Called pt on listed phone number. Noted pt currently at [facility] for chemotherapy and had MD visit this am. No answer on pt's nor SO's phone. Left detailed message on pt's phone with return number. Chart reviewed, weight stable. Reports of anxiety/depression noted in chart with medical management involved.     KENNY Lucas, RD, LD  Registered Dietitian   [facility]  [phone]

## 2022-03-24 NOTE — PROGRESS NOTES
Pt here for C2D1. Labs drawn from port and reviewed per Dr Emma Hewitt. Had Dr anderson prior to treatment. Denies any problems. Will proceed with treatment.   Tolerated very well  Will return 3/31 for Dr smith and Todd Cardenas

## 2022-03-31 ENCOUNTER — HOSPITAL ENCOUNTER (INPATIENT)
Age: 67
LOS: 1 days | Discharge: HOME | DRG: 175 | End: 2022-04-01
Attending: EMERGENCY MEDICINE | Admitting: STUDENT IN AN ORGANIZED HEALTH CARE EDUCATION/TRAINING PROGRAM
Payer: MEDICARE

## 2022-03-31 ENCOUNTER — OFFICE VISIT (OUTPATIENT)
Dept: ONCOLOGY | Age: 67
DRG: 175 | End: 2022-03-31
Payer: MEDICARE

## 2022-03-31 ENCOUNTER — APPOINTMENT (OUTPATIENT)
Dept: GENERAL RADIOLOGY | Age: 67
DRG: 175 | End: 2022-03-31
Payer: MEDICARE

## 2022-03-31 ENCOUNTER — APPOINTMENT (OUTPATIENT)
Dept: CT IMAGING | Age: 67
DRG: 175 | End: 2022-03-31
Payer: MEDICARE

## 2022-03-31 ENCOUNTER — TELEPHONE (OUTPATIENT)
Dept: ONCOLOGY | Age: 67
End: 2022-03-31

## 2022-03-31 ENCOUNTER — HOSPITAL ENCOUNTER (OUTPATIENT)
Dept: INFUSION THERAPY | Age: 67
Discharge: HOME | DRG: 175 | End: 2022-03-31
Payer: MEDICARE

## 2022-03-31 VITALS
BODY MASS INDEX: 33.74 KG/M2 | HEART RATE: 112 BPM | SYSTOLIC BLOOD PRESSURE: 113 MMHG | OXYGEN SATURATION: 98 % | DIASTOLIC BLOOD PRESSURE: 80 MMHG | WEIGHT: 262.8 LBS | TEMPERATURE: 97.6 F

## 2022-03-31 DIAGNOSIS — N30.01 ACUTE CYSTITIS WITH HEMATURIA: ICD-10-CM

## 2022-03-31 DIAGNOSIS — R31.0 GROSS HEMATURIA: Primary | ICD-10-CM

## 2022-03-31 DIAGNOSIS — I26.99 PULMONARY EMBOLISM WITHOUT ACUTE COR PULMONALE, UNSPECIFIED CHRONICITY, UNSPECIFIED PULMONARY EMBOLISM TYPE (HCC): Primary | ICD-10-CM

## 2022-03-31 DIAGNOSIS — I26.94 MULTIPLE SUBSEGMENTAL PULMONARY EMBOLI WITHOUT ACUTE COR PULMONALE (HCC): ICD-10-CM

## 2022-03-31 DIAGNOSIS — C67.2 MALIGNANT NEOPLASM OF LATERAL WALL OF URINARY BLADDER (HCC): ICD-10-CM

## 2022-03-31 DIAGNOSIS — N20.0 KIDNEY STONES: ICD-10-CM

## 2022-03-31 DIAGNOSIS — R09.02 HYPOXIA: ICD-10-CM

## 2022-03-31 DIAGNOSIS — C67.2 MALIGNANT NEOPLASM OF LATERAL WALL OF URINARY BLADDER (HCC): Primary | ICD-10-CM

## 2022-03-31 DIAGNOSIS — F41.9 ANXIETY: ICD-10-CM

## 2022-03-31 PROBLEM — E78.49 OTHER HYPERLIPIDEMIA: Status: ACTIVE | Noted: 2022-03-31

## 2022-03-31 PROBLEM — J96.01 ACUTE RESPIRATORY FAILURE WITH HYPOXIA (HCC): Status: ACTIVE | Noted: 2022-03-31

## 2022-03-31 PROBLEM — E44.1 MILD PROTEIN-CALORIE MALNUTRITION (HCC): Status: ACTIVE | Noted: 2022-03-31

## 2022-03-31 PROBLEM — D50.9 MICROCYTIC ANEMIA: Status: ACTIVE | Noted: 2022-03-31

## 2022-03-31 LAB
-: ABNORMAL
ABSOLUTE EOS #: 0 K/UL (ref 0–0.4)
ABSOLUTE EOS #: 0 K/UL (ref 0–0.4)
ABSOLUTE LYMPH #: 1.5 K/UL (ref 1–4.8)
ABSOLUTE LYMPH #: 1.8 K/UL (ref 1–4.8)
ABSOLUTE MONO #: 0.3 K/UL (ref 0.1–1.2)
ABSOLUTE MONO #: 0.3 K/UL (ref 0.1–1.2)
ALBUMIN SERPL-MCNC: 3.1 G/DL (ref 3.5–5.2)
ALBUMIN/GLOBULIN RATIO: 0.9 (ref 1–2.5)
ALP BLD-CCNC: 81 U/L (ref 40–129)
ALT SERPL-CCNC: 23 U/L (ref 5–41)
ANION GAP SERPL CALCULATED.3IONS-SCNC: 11 MMOL/L (ref 9–17)
ANION GAP SERPL CALCULATED.3IONS-SCNC: 12 MMOL/L (ref 9–17)
AST SERPL-CCNC: 17 U/L
BACTERIA: ABNORMAL
BASOPHILS # BLD: 0 % (ref 0–2)
BASOPHILS # BLD: 0 % (ref 0–2)
BASOPHILS ABSOLUTE: 0 K/UL (ref 0–0.2)
BASOPHILS ABSOLUTE: 0 K/UL (ref 0–0.2)
BILIRUB SERPL-MCNC: 0.16 MG/DL (ref 0.3–1.2)
BILIRUBIN URINE: NEGATIVE
BUN BLDV-MCNC: 14 MG/DL (ref 8–23)
BUN BLDV-MCNC: 15 MG/DL (ref 8–23)
CALCIUM SERPL-MCNC: 8.1 MG/DL (ref 8.6–10.4)
CALCIUM SERPL-MCNC: 8.9 MG/DL (ref 8.6–10.4)
CHLORIDE BLD-SCNC: 101 MMOL/L (ref 98–107)
CHLORIDE BLD-SCNC: 97 MMOL/L (ref 98–107)
CO2: 22 MMOL/L (ref 20–31)
CO2: 23 MMOL/L (ref 20–31)
COLOR: YELLOW
CREAT SERPL-MCNC: 1.41 MG/DL (ref 0.7–1.2)
CREAT SERPL-MCNC: 1.49 MG/DL (ref 0.7–1.2)
EOSINOPHILS RELATIVE PERCENT: 0 % (ref 1–4)
EOSINOPHILS RELATIVE PERCENT: 1 % (ref 1–4)
EPITHELIAL CELLS UA: ABNORMAL /HPF (ref 0–5)
FERRITIN: 346 NG/ML (ref 30–400)
GFR AFRICAN AMERICAN: 57 ML/MIN
GFR AFRICAN AMERICAN: >60 ML/MIN
GFR NON-AFRICAN AMERICAN: 47 ML/MIN
GFR NON-AFRICAN AMERICAN: 50 ML/MIN
GFR SERPL CREATININE-BSD FRML MDRD: ABNORMAL ML/MIN/{1.73_M2}
GFR SERPL CREATININE-BSD FRML MDRD: ABNORMAL ML/MIN/{1.73_M2}
GLUCOSE BLD-MCNC: 127 MG/DL (ref 70–99)
GLUCOSE BLD-MCNC: 140 MG/DL (ref 70–99)
GLUCOSE URINE: NEGATIVE
HCT VFR BLD CALC: 31.5 % (ref 41–53)
HCT VFR BLD CALC: 32.2 % (ref 41–53)
HEMOGLOBIN: 10.4 G/DL (ref 13.5–17.5)
HEMOGLOBIN: 10.7 G/DL (ref 13.5–17.5)
IRON SATURATION: 21 % (ref 20–55)
IRON: 38 UG/DL (ref 59–158)
KETONES, URINE: NEGATIVE
LEUKOCYTE ESTERASE, URINE: ABNORMAL
LYMPHOCYTES # BLD: 26 % (ref 24–44)
LYMPHOCYTES # BLD: 31 % (ref 24–44)
MAGNESIUM: 1.6 MG/DL (ref 1.6–2.6)
MCH RBC QN AUTO: 25.7 PG (ref 26–34)
MCH RBC QN AUTO: 25.7 PG (ref 26–34)
MCHC RBC AUTO-ENTMCNC: 33 G/DL (ref 31–37)
MCHC RBC AUTO-ENTMCNC: 33.3 G/DL (ref 31–37)
MCV RBC AUTO: 77.3 FL (ref 80–100)
MCV RBC AUTO: 77.8 FL (ref 80–100)
MONOCYTES # BLD: 5 % (ref 2–11)
MONOCYTES # BLD: 6 % (ref 2–11)
NITRITE, URINE: NEGATIVE
PDW BLD-RTO: 17.7 % (ref 12.5–15.4)
PDW BLD-RTO: 18.3 % (ref 12.5–15.4)
PH UA: 6 (ref 5–8)
PLATELET # BLD: 348 K/UL (ref 140–450)
PLATELET # BLD: 357 K/UL (ref 140–450)
PMV BLD AUTO: 6.8 FL (ref 6–12)
PMV BLD AUTO: 6.8 FL (ref 6–12)
POTASSIUM SERPL-SCNC: 3.8 MMOL/L (ref 3.7–5.3)
POTASSIUM SERPL-SCNC: 4.2 MMOL/L (ref 3.7–5.3)
PRO-BNP: 213 PG/ML
PROTEIN UA: ABNORMAL
RBC # BLD: 4.05 M/UL (ref 4.5–5.9)
RBC # BLD: 4.16 M/UL (ref 4.5–5.9)
RBC UA: ABNORMAL /HPF (ref 0–2)
SARS-COV-2, RAPID: NOT DETECTED
SEG NEUTROPHILS: 63 % (ref 36–66)
SEG NEUTROPHILS: 68 % (ref 36–66)
SEGMENTED NEUTROPHILS ABSOLUTE COUNT: 3.6 K/UL (ref 1.8–7.7)
SEGMENTED NEUTROPHILS ABSOLUTE COUNT: 3.8 K/UL (ref 1.8–7.7)
SODIUM BLD-SCNC: 132 MMOL/L (ref 135–144)
SODIUM BLD-SCNC: 134 MMOL/L (ref 135–144)
SPECIFIC GRAVITY UA: 1.02 (ref 1–1.03)
SPECIMEN DESCRIPTION: NORMAL
TOTAL IRON BINDING CAPACITY: 180 UG/DL (ref 250–450)
TOTAL PROTEIN: 6.4 G/DL (ref 6.4–8.3)
TROPONIN, HIGH SENSITIVITY: 16 NG/L (ref 0–22)
TURBIDITY: ABNORMAL
UNSATURATED IRON BINDING CAPACITY: 142 UG/DL (ref 112–347)
URINE HGB: ABNORMAL
UROBILINOGEN, URINE: NORMAL
WBC # BLD: 5.6 K/UL (ref 3.5–11)
WBC # BLD: 5.7 K/UL (ref 3.5–11)
WBC UA: ABNORMAL /HPF (ref 0–5)
YEAST: ABNORMAL

## 2022-03-31 PROCEDURE — G8484 FLU IMMUNIZE NO ADMIN: HCPCS | Performed by: INTERNAL MEDICINE

## 2022-03-31 PROCEDURE — 6360000002 HC RX W HCPCS: Performed by: STUDENT IN AN ORGANIZED HEALTH CARE EDUCATION/TRAINING PROGRAM

## 2022-03-31 PROCEDURE — 6360000002 HC RX W HCPCS: Performed by: EMERGENCY MEDICINE

## 2022-03-31 PROCEDURE — 1123F ACP DISCUSS/DSCN MKR DOCD: CPT | Performed by: INTERNAL MEDICINE

## 2022-03-31 PROCEDURE — 99223 1ST HOSP IP/OBS HIGH 75: CPT | Performed by: STUDENT IN AN ORGANIZED HEALTH CARE EDUCATION/TRAINING PROGRAM

## 2022-03-31 PROCEDURE — 83735 ASSAY OF MAGNESIUM: CPT

## 2022-03-31 PROCEDURE — 83880 ASSAY OF NATRIURETIC PEPTIDE: CPT

## 2022-03-31 PROCEDURE — 3017F COLORECTAL CA SCREEN DOC REV: CPT | Performed by: INTERNAL MEDICINE

## 2022-03-31 PROCEDURE — 71045 X-RAY EXAM CHEST 1 VIEW: CPT

## 2022-03-31 PROCEDURE — 80048 BASIC METABOLIC PNL TOTAL CA: CPT

## 2022-03-31 PROCEDURE — 99211 OFF/OP EST MAY X REQ PHY/QHP: CPT | Performed by: INTERNAL MEDICINE

## 2022-03-31 PROCEDURE — 82728 ASSAY OF FERRITIN: CPT

## 2022-03-31 PROCEDURE — 6370000000 HC RX 637 (ALT 250 FOR IP): Performed by: STUDENT IN AN ORGANIZED HEALTH CARE EDUCATION/TRAINING PROGRAM

## 2022-03-31 PROCEDURE — 71260 CT THORAX DX C+: CPT

## 2022-03-31 PROCEDURE — 93005 ELECTROCARDIOGRAM TRACING: CPT | Performed by: EMERGENCY MEDICINE

## 2022-03-31 PROCEDURE — 2580000003 HC RX 258: Performed by: EMERGENCY MEDICINE

## 2022-03-31 PROCEDURE — 87635 SARS-COV-2 COVID-19 AMP PRB: CPT

## 2022-03-31 PROCEDURE — 36415 COLL VENOUS BLD VENIPUNCTURE: CPT

## 2022-03-31 PROCEDURE — 6360000004 HC RX CONTRAST MEDICATION: Performed by: EMERGENCY MEDICINE

## 2022-03-31 PROCEDURE — 2580000003 HC RX 258: Performed by: STUDENT IN AN ORGANIZED HEALTH CARE EDUCATION/TRAINING PROGRAM

## 2022-03-31 PROCEDURE — 94761 N-INVAS EAR/PLS OXIMETRY MLT: CPT

## 2022-03-31 PROCEDURE — 1036F TOBACCO NON-USER: CPT | Performed by: INTERNAL MEDICINE

## 2022-03-31 PROCEDURE — 81001 URINALYSIS AUTO W/SCOPE: CPT

## 2022-03-31 PROCEDURE — 1210000000 HC MED SURG R&B

## 2022-03-31 PROCEDURE — 80053 COMPREHEN METABOLIC PANEL: CPT

## 2022-03-31 PROCEDURE — 83540 ASSAY OF IRON: CPT

## 2022-03-31 PROCEDURE — 36591 DRAW BLOOD OFF VENOUS DEVICE: CPT

## 2022-03-31 PROCEDURE — 2700000000 HC OXYGEN THERAPY PER DAY

## 2022-03-31 PROCEDURE — G8417 CALC BMI ABV UP PARAM F/U: HCPCS | Performed by: INTERNAL MEDICINE

## 2022-03-31 PROCEDURE — 99214 OFFICE O/P EST MOD 30 MIN: CPT | Performed by: INTERNAL MEDICINE

## 2022-03-31 PROCEDURE — 83550 IRON BINDING TEST: CPT

## 2022-03-31 PROCEDURE — 87086 URINE CULTURE/COLONY COUNT: CPT

## 2022-03-31 PROCEDURE — 85025 COMPLETE CBC W/AUTO DIFF WBC: CPT

## 2022-03-31 PROCEDURE — 4040F PNEUMOC VAC/ADMIN/RCVD: CPT | Performed by: INTERNAL MEDICINE

## 2022-03-31 PROCEDURE — 99285 EMERGENCY DEPT VISIT HI MDM: CPT

## 2022-03-31 PROCEDURE — 2580000003 HC RX 258: Performed by: INTERNAL MEDICINE

## 2022-03-31 PROCEDURE — G8427 DOCREV CUR MEDS BY ELIG CLIN: HCPCS | Performed by: INTERNAL MEDICINE

## 2022-03-31 PROCEDURE — 84484 ASSAY OF TROPONIN QUANT: CPT

## 2022-03-31 RX ORDER — LORAZEPAM 1 MG/1
1 TABLET ORAL EVERY 6 HOURS PRN
Status: DISCONTINUED | OUTPATIENT
Start: 2022-03-31 | End: 2022-04-01 | Stop reason: HOSPADM

## 2022-03-31 RX ORDER — DEXAMETHASONE 0.5 MG/1
1 TABLET ORAL
Status: DISCONTINUED | OUTPATIENT
Start: 2022-04-01 | End: 2022-03-31

## 2022-03-31 RX ORDER — SODIUM CHLORIDE 0.9 % (FLUSH) 0.9 %
5-40 SYRINGE (ML) INJECTION PRN
Status: CANCELLED | OUTPATIENT
Start: 2022-04-14

## 2022-03-31 RX ORDER — 0.9 % SODIUM CHLORIDE 0.9 %
80 INTRAVENOUS SOLUTION INTRAVENOUS ONCE
Status: DISCONTINUED | OUTPATIENT
Start: 2022-03-31 | End: 2022-04-01 | Stop reason: HOSPADM

## 2022-03-31 RX ORDER — DEXAMETHASONE 1 MG
1 TABLET ORAL
Qty: 30 TABLET | Refills: 0 | Status: SHIPPED | OUTPATIENT
Start: 2022-03-31 | End: 2022-05-03

## 2022-03-31 RX ORDER — DIPHENHYDRAMINE HYDROCHLORIDE 50 MG/ML
50 INJECTION INTRAMUSCULAR; INTRAVENOUS
Status: CANCELLED | OUTPATIENT
Start: 2022-04-21

## 2022-03-31 RX ORDER — ATORVASTATIN CALCIUM 10 MG/1
20 TABLET, FILM COATED ORAL DAILY
Status: DISCONTINUED | OUTPATIENT
Start: 2022-04-01 | End: 2022-04-01 | Stop reason: HOSPADM

## 2022-03-31 RX ORDER — SODIUM CHLORIDE 0.9 % (FLUSH) 0.9 %
5-40 SYRINGE (ML) INJECTION PRN
Status: DISCONTINUED | OUTPATIENT
Start: 2022-03-31 | End: 2022-04-01 | Stop reason: HOSPADM

## 2022-03-31 RX ORDER — LORAZEPAM 1 MG/1
1 TABLET ORAL EVERY 6 HOURS PRN
Status: DISCONTINUED | OUTPATIENT
Start: 2022-03-31 | End: 2022-03-31

## 2022-03-31 RX ORDER — ALBUTEROL SULFATE 90 UG/1
4 AEROSOL, METERED RESPIRATORY (INHALATION) PRN
Status: CANCELLED | OUTPATIENT
Start: 2022-05-05

## 2022-03-31 RX ORDER — SODIUM CHLORIDE 9 MG/ML
20 INJECTION, SOLUTION INTRAVENOUS ONCE
Status: CANCELLED | OUTPATIENT
Start: 2022-05-12 | End: 2022-05-12

## 2022-03-31 RX ORDER — DIPHENHYDRAMINE HYDROCHLORIDE 50 MG/ML
50 INJECTION INTRAMUSCULAR; INTRAVENOUS
Status: CANCELLED | OUTPATIENT
Start: 2022-05-05

## 2022-03-31 RX ORDER — SODIUM CHLORIDE 9 MG/ML
INJECTION, SOLUTION INTRAVENOUS PRN
Status: DISCONTINUED | OUTPATIENT
Start: 2022-03-31 | End: 2022-04-01 | Stop reason: HOSPADM

## 2022-03-31 RX ORDER — OXYCODONE HYDROCHLORIDE AND ACETAMINOPHEN 5; 325 MG/1; MG/1
2 TABLET ORAL EVERY 4 HOURS PRN
Status: DISCONTINUED | OUTPATIENT
Start: 2022-03-31 | End: 2022-04-01 | Stop reason: HOSPADM

## 2022-03-31 RX ORDER — SODIUM CHLORIDE 9 MG/ML
20 INJECTION, SOLUTION INTRAVENOUS ONCE
Status: CANCELLED | OUTPATIENT
Start: 2022-05-05 | End: 2022-05-05

## 2022-03-31 RX ORDER — ACETAMINOPHEN 650 MG/1
650 SUPPOSITORY RECTAL EVERY 6 HOURS PRN
Status: DISCONTINUED | OUTPATIENT
Start: 2022-03-31 | End: 2022-04-01 | Stop reason: HOSPADM

## 2022-03-31 RX ORDER — SODIUM CHLORIDE 9 MG/ML
5-40 INJECTION INTRAVENOUS PRN
Status: CANCELLED | OUTPATIENT
Start: 2022-05-12

## 2022-03-31 RX ORDER — EPINEPHRINE 1 MG/ML
0.3 INJECTION, SOLUTION, CONCENTRATE INTRAVENOUS PRN
Status: CANCELLED | OUTPATIENT
Start: 2022-04-14

## 2022-03-31 RX ORDER — SODIUM CHLORIDE 9 MG/ML
5-40 INJECTION INTRAVENOUS PRN
Status: CANCELLED | OUTPATIENT
Start: 2022-04-21

## 2022-03-31 RX ORDER — SODIUM CHLORIDE 0.9 % (FLUSH) 0.9 %
10 SYRINGE (ML) INJECTION PRN
Status: DISCONTINUED | OUTPATIENT
Start: 2022-03-31 | End: 2022-04-01 | Stop reason: HOSPADM

## 2022-03-31 RX ORDER — DIPHENHYDRAMINE HYDROCHLORIDE 50 MG/ML
50 INJECTION INTRAMUSCULAR; INTRAVENOUS
Status: CANCELLED | OUTPATIENT
Start: 2022-05-12

## 2022-03-31 RX ORDER — SODIUM CHLORIDE 9 MG/ML
INJECTION, SOLUTION INTRAVENOUS CONTINUOUS
Status: CANCELLED | OUTPATIENT
Start: 2022-04-21

## 2022-03-31 RX ORDER — ACETAMINOPHEN 325 MG/1
650 TABLET ORAL
Status: CANCELLED | OUTPATIENT
Start: 2022-04-21

## 2022-03-31 RX ORDER — PANTOPRAZOLE SODIUM 40 MG/1
40 TABLET, DELAYED RELEASE ORAL
Status: DISCONTINUED | OUTPATIENT
Start: 2022-04-01 | End: 2022-04-01 | Stop reason: HOSPADM

## 2022-03-31 RX ORDER — SODIUM CHLORIDE 9 MG/ML
25 INJECTION, SOLUTION INTRAVENOUS PRN
Status: CANCELLED | OUTPATIENT
Start: 2022-04-14

## 2022-03-31 RX ORDER — SODIUM CHLORIDE 9 MG/ML
25 INJECTION, SOLUTION INTRAVENOUS PRN
Status: CANCELLED | OUTPATIENT
Start: 2022-05-12

## 2022-03-31 RX ORDER — DEXAMETHASONE SODIUM PHOSPHATE 4 MG/ML
8 INJECTION, SOLUTION INTRA-ARTICULAR; INTRALESIONAL; INTRAMUSCULAR; INTRAVENOUS; SOFT TISSUE ONCE
Status: DISCONTINUED | OUTPATIENT
Start: 2022-03-31 | End: 2022-04-01 | Stop reason: HOSPADM

## 2022-03-31 RX ORDER — POLYETHYLENE GLYCOL 3350 17 G/17G
17 POWDER, FOR SOLUTION ORAL DAILY PRN
Status: DISCONTINUED | OUTPATIENT
Start: 2022-03-31 | End: 2022-04-01 | Stop reason: HOSPADM

## 2022-03-31 RX ORDER — SODIUM CHLORIDE 9 MG/ML
20 INJECTION, SOLUTION INTRAVENOUS ONCE
Status: DISCONTINUED | OUTPATIENT
Start: 2022-03-31 | End: 2022-04-01 | Stop reason: HOSPADM

## 2022-03-31 RX ORDER — ONDANSETRON 2 MG/ML
8 INJECTION INTRAMUSCULAR; INTRAVENOUS
Status: CANCELLED | OUTPATIENT
Start: 2022-05-05

## 2022-03-31 RX ORDER — PALONOSETRON 0.05 MG/ML
0.25 INJECTION, SOLUTION INTRAVENOUS ONCE
Status: CANCELLED | OUTPATIENT
Start: 2022-05-05 | End: 2022-05-05

## 2022-03-31 RX ORDER — SODIUM CHLORIDE 0.9 % (FLUSH) 0.9 %
5-40 SYRINGE (ML) INJECTION PRN
Status: CANCELLED | OUTPATIENT
Start: 2022-04-21

## 2022-03-31 RX ORDER — SODIUM CHLORIDE AND POTASSIUM CHLORIDE .9; .15 G/100ML; G/100ML
SOLUTION INTRAVENOUS CONTINUOUS
Status: CANCELLED
Start: 2022-05-05

## 2022-03-31 RX ORDER — HEPARIN SODIUM (PORCINE) LOCK FLUSH IV SOLN 100 UNIT/ML 100 UNIT/ML
500 SOLUTION INTRAVENOUS PRN
Status: CANCELLED | OUTPATIENT
Start: 2022-05-12

## 2022-03-31 RX ORDER — SODIUM CHLORIDE AND POTASSIUM CHLORIDE .9; .15 G/100ML; G/100ML
SOLUTION INTRAVENOUS CONTINUOUS
Status: CANCELLED
Start: 2022-04-14

## 2022-03-31 RX ORDER — LORAZEPAM 1 MG/1
1 TABLET ORAL EVERY 6 HOURS PRN
Qty: 120 TABLET | Refills: 0 | Status: SHIPPED | OUTPATIENT
Start: 2022-03-31 | End: 2022-05-26 | Stop reason: SDUPTHER

## 2022-03-31 RX ORDER — HEPARIN SODIUM (PORCINE) LOCK FLUSH IV SOLN 100 UNIT/ML 100 UNIT/ML
500 SOLUTION INTRAVENOUS PRN
Status: CANCELLED | OUTPATIENT
Start: 2022-05-05

## 2022-03-31 RX ORDER — HEPARIN SODIUM (PORCINE) LOCK FLUSH IV SOLN 100 UNIT/ML 100 UNIT/ML
500 SOLUTION INTRAVENOUS PRN
Status: CANCELLED | OUTPATIENT
Start: 2022-04-14

## 2022-03-31 RX ORDER — BUPROPION HYDROCHLORIDE 150 MG/1
300 TABLET ORAL EVERY MORNING
Status: DISCONTINUED | OUTPATIENT
Start: 2022-04-01 | End: 2022-04-01 | Stop reason: HOSPADM

## 2022-03-31 RX ORDER — MEPERIDINE HYDROCHLORIDE 50 MG/ML
12.5 INJECTION INTRAMUSCULAR; INTRAVENOUS; SUBCUTANEOUS PRN
Status: CANCELLED | OUTPATIENT
Start: 2022-04-21

## 2022-03-31 RX ORDER — ONDANSETRON 4 MG/1
4 TABLET, ORALLY DISINTEGRATING ORAL EVERY 8 HOURS PRN
Status: DISCONTINUED | OUTPATIENT
Start: 2022-03-31 | End: 2022-04-01 | Stop reason: HOSPADM

## 2022-03-31 RX ORDER — SODIUM CHLORIDE 9 MG/ML
INJECTION, SOLUTION INTRAVENOUS CONTINUOUS
Status: CANCELLED | OUTPATIENT
Start: 2022-05-05

## 2022-03-31 RX ORDER — ONDANSETRON 2 MG/ML
8 INJECTION INTRAMUSCULAR; INTRAVENOUS
Status: CANCELLED | OUTPATIENT
Start: 2022-04-14

## 2022-03-31 RX ORDER — HEPARIN SODIUM (PORCINE) LOCK FLUSH IV SOLN 100 UNIT/ML 100 UNIT/ML
500 SOLUTION INTRAVENOUS PRN
Status: CANCELLED | OUTPATIENT
Start: 2022-04-21

## 2022-03-31 RX ORDER — ALBUTEROL SULFATE 90 UG/1
4 AEROSOL, METERED RESPIRATORY (INHALATION) PRN
Status: CANCELLED | OUTPATIENT
Start: 2022-04-21

## 2022-03-31 RX ORDER — SODIUM CHLORIDE 9 MG/ML
25 INJECTION, SOLUTION INTRAVENOUS PRN
Status: CANCELLED | OUTPATIENT
Start: 2022-04-21

## 2022-03-31 RX ORDER — SODIUM CHLORIDE 9 MG/ML
20 INJECTION, SOLUTION INTRAVENOUS ONCE
Status: CANCELLED | OUTPATIENT
Start: 2022-04-14 | End: 2022-04-14

## 2022-03-31 RX ORDER — MEPERIDINE HYDROCHLORIDE 50 MG/ML
12.5 INJECTION INTRAMUSCULAR; INTRAVENOUS; SUBCUTANEOUS PRN
Status: CANCELLED | OUTPATIENT
Start: 2022-04-14

## 2022-03-31 RX ORDER — ONDANSETRON 2 MG/ML
8 INJECTION INTRAMUSCULAR; INTRAVENOUS
Status: CANCELLED | OUTPATIENT
Start: 2022-04-21

## 2022-03-31 RX ORDER — ACETAMINOPHEN 325 MG/1
650 TABLET ORAL
Status: CANCELLED | OUTPATIENT
Start: 2022-04-14

## 2022-03-31 RX ORDER — TAMSULOSIN HYDROCHLORIDE 0.4 MG/1
0.4 CAPSULE ORAL DAILY
Status: DISCONTINUED | OUTPATIENT
Start: 2022-04-01 | End: 2022-04-01 | Stop reason: HOSPADM

## 2022-03-31 RX ORDER — MEPERIDINE HYDROCHLORIDE 50 MG/ML
12.5 INJECTION INTRAMUSCULAR; INTRAVENOUS; SUBCUTANEOUS PRN
Status: CANCELLED | OUTPATIENT
Start: 2022-05-12

## 2022-03-31 RX ORDER — PHENAZOPYRIDINE HYDROCHLORIDE 100 MG/1
200 TABLET, FILM COATED ORAL 3 TIMES DAILY PRN
Status: DISCONTINUED | OUTPATIENT
Start: 2022-03-31 | End: 2022-04-01 | Stop reason: HOSPADM

## 2022-03-31 RX ORDER — DEXAMETHASONE 0.5 MG/1
1 TABLET ORAL
Status: DISCONTINUED | OUTPATIENT
Start: 2022-04-01 | End: 2022-04-01

## 2022-03-31 RX ORDER — SODIUM CHLORIDE 0.9 % (FLUSH) 0.9 %
5-40 SYRINGE (ML) INJECTION PRN
Status: CANCELLED | OUTPATIENT
Start: 2022-05-12

## 2022-03-31 RX ORDER — MEPERIDINE HYDROCHLORIDE 50 MG/ML
12.5 INJECTION INTRAMUSCULAR; INTRAVENOUS; SUBCUTANEOUS PRN
Status: CANCELLED | OUTPATIENT
Start: 2022-05-05

## 2022-03-31 RX ORDER — SODIUM CHLORIDE 9 MG/ML
INJECTION, SOLUTION INTRAVENOUS CONTINUOUS
Status: CANCELLED | OUTPATIENT
Start: 2022-05-12

## 2022-03-31 RX ORDER — SODIUM CHLORIDE 9 MG/ML
INJECTION, SOLUTION INTRAVENOUS CONTINUOUS
Status: DISCONTINUED | OUTPATIENT
Start: 2022-03-31 | End: 2022-04-01

## 2022-03-31 RX ORDER — ONDANSETRON 2 MG/ML
4 INJECTION INTRAMUSCULAR; INTRAVENOUS EVERY 6 HOURS PRN
Status: DISCONTINUED | OUTPATIENT
Start: 2022-03-31 | End: 2022-04-01 | Stop reason: HOSPADM

## 2022-03-31 RX ORDER — ACETAMINOPHEN 325 MG/1
650 TABLET ORAL
Status: CANCELLED | OUTPATIENT
Start: 2022-05-12

## 2022-03-31 RX ORDER — SODIUM CHLORIDE 9 MG/ML
25 INJECTION, SOLUTION INTRAVENOUS PRN
Status: CANCELLED | OUTPATIENT
Start: 2022-05-05

## 2022-03-31 RX ORDER — SODIUM CHLORIDE 9 MG/ML
20 INJECTION, SOLUTION INTRAVENOUS ONCE
Status: CANCELLED | OUTPATIENT
Start: 2022-04-21 | End: 2022-04-21

## 2022-03-31 RX ORDER — SODIUM CHLORIDE 9 MG/ML
INJECTION, SOLUTION INTRAVENOUS CONTINUOUS
Status: CANCELLED | OUTPATIENT
Start: 2022-04-14

## 2022-03-31 RX ORDER — ALBUTEROL SULFATE 90 UG/1
4 AEROSOL, METERED RESPIRATORY (INHALATION) PRN
Status: CANCELLED | OUTPATIENT
Start: 2022-04-14

## 2022-03-31 RX ORDER — PALONOSETRON 0.05 MG/ML
0.25 INJECTION, SOLUTION INTRAVENOUS ONCE
Status: CANCELLED | OUTPATIENT
Start: 2022-04-14 | End: 2022-04-14

## 2022-03-31 RX ORDER — OXYCODONE HYDROCHLORIDE AND ACETAMINOPHEN 5; 325 MG/1; MG/1
1 TABLET ORAL EVERY 4 HOURS PRN
Status: DISCONTINUED | OUTPATIENT
Start: 2022-03-31 | End: 2022-04-01 | Stop reason: HOSPADM

## 2022-03-31 RX ORDER — SODIUM CHLORIDE 0.9 % (FLUSH) 0.9 %
10 SYRINGE (ML) INJECTION EVERY 12 HOURS SCHEDULED
Status: DISCONTINUED | OUTPATIENT
Start: 2022-03-31 | End: 2022-04-01 | Stop reason: HOSPADM

## 2022-03-31 RX ORDER — ALBUTEROL SULFATE 90 UG/1
4 AEROSOL, METERED RESPIRATORY (INHALATION) PRN
Status: CANCELLED | OUTPATIENT
Start: 2022-05-12

## 2022-03-31 RX ORDER — ACETAMINOPHEN 325 MG/1
650 TABLET ORAL EVERY 6 HOURS PRN
Status: DISCONTINUED | OUTPATIENT
Start: 2022-03-31 | End: 2022-04-01 | Stop reason: HOSPADM

## 2022-03-31 RX ORDER — ACETAMINOPHEN 325 MG/1
650 TABLET ORAL
Status: CANCELLED | OUTPATIENT
Start: 2022-05-05

## 2022-03-31 RX ORDER — ONDANSETRON 2 MG/ML
8 INJECTION INTRAMUSCULAR; INTRAVENOUS
Status: CANCELLED | OUTPATIENT
Start: 2022-05-12

## 2022-03-31 RX ORDER — HYDROCODONE BITARTRATE AND ACETAMINOPHEN 5; 325 MG/1; MG/1
1 TABLET ORAL EVERY 8 HOURS PRN
Status: DISCONTINUED | OUTPATIENT
Start: 2022-03-31 | End: 2022-03-31

## 2022-03-31 RX ORDER — DIPHENHYDRAMINE HYDROCHLORIDE 50 MG/ML
50 INJECTION INTRAMUSCULAR; INTRAVENOUS
Status: CANCELLED | OUTPATIENT
Start: 2022-04-14

## 2022-03-31 RX ORDER — SODIUM CHLORIDE 9 MG/ML
5-40 INJECTION INTRAVENOUS PRN
Status: CANCELLED | OUTPATIENT
Start: 2022-05-05

## 2022-03-31 RX ORDER — EPINEPHRINE 1 MG/ML
0.3 INJECTION, SOLUTION, CONCENTRATE INTRAVENOUS PRN
Status: CANCELLED | OUTPATIENT
Start: 2022-04-21

## 2022-03-31 RX ORDER — SODIUM CHLORIDE 9 MG/ML
5-40 INJECTION INTRAVENOUS PRN
Status: CANCELLED | OUTPATIENT
Start: 2022-04-14

## 2022-03-31 RX ORDER — FINASTERIDE 5 MG/1
5 TABLET, FILM COATED ORAL DAILY
Status: DISCONTINUED | OUTPATIENT
Start: 2022-04-01 | End: 2022-04-01 | Stop reason: HOSPADM

## 2022-03-31 RX ORDER — SODIUM CHLORIDE 0.9 % (FLUSH) 0.9 %
5-40 SYRINGE (ML) INJECTION PRN
Status: CANCELLED | OUTPATIENT
Start: 2022-05-05

## 2022-03-31 RX ORDER — EPINEPHRINE 1 MG/ML
0.3 INJECTION, SOLUTION, CONCENTRATE INTRAVENOUS PRN
Status: CANCELLED | OUTPATIENT
Start: 2022-05-12

## 2022-03-31 RX ORDER — HEPARIN SODIUM (PORCINE) LOCK FLUSH IV SOLN 100 UNIT/ML 100 UNIT/ML
500 SOLUTION INTRAVENOUS PRN
Status: DISCONTINUED | OUTPATIENT
Start: 2022-03-31 | End: 2022-04-01 | Stop reason: HOSPADM

## 2022-03-31 RX ORDER — EPINEPHRINE 1 MG/ML
0.3 INJECTION, SOLUTION, CONCENTRATE INTRAVENOUS PRN
Status: CANCELLED | OUTPATIENT
Start: 2022-05-05

## 2022-03-31 RX ADMIN — SODIUM CHLORIDE, PRESERVATIVE FREE 10 ML: 5 INJECTION INTRAVENOUS at 12:48

## 2022-03-31 RX ADMIN — SODIUM CHLORIDE: 9 INJECTION, SOLUTION INTRAVENOUS at 16:11

## 2022-03-31 RX ADMIN — SODIUM CHLORIDE, PRESERVATIVE FREE 10 ML: 5 INJECTION INTRAVENOUS at 20:51

## 2022-03-31 RX ADMIN — ONDANSETRON 4 MG: 2 INJECTION INTRAMUSCULAR; INTRAVENOUS at 16:36

## 2022-03-31 RX ADMIN — APIXABAN 5 MG: 5 TABLET, FILM COATED ORAL at 20:49

## 2022-03-31 RX ADMIN — CEFTRIAXONE SODIUM 1000 MG: 1 INJECTION, POWDER, FOR SOLUTION INTRAMUSCULAR; INTRAVENOUS at 15:24

## 2022-03-31 RX ADMIN — SODIUM CHLORIDE, PRESERVATIVE FREE 10 ML: 5 INJECTION INTRAVENOUS at 10:05

## 2022-03-31 RX ADMIN — SODIUM CHLORIDE: 9 INJECTION, SOLUTION INTRAVENOUS at 19:32

## 2022-03-31 RX ADMIN — LORAZEPAM 1 MG: 1 TABLET ORAL at 16:36

## 2022-03-31 RX ADMIN — OXYCODONE HYDROCHLORIDE AND ACETAMINOPHEN 2 TABLET: 5; 325 TABLET ORAL at 16:36

## 2022-03-31 RX ADMIN — IOPAMIDOL 75 ML: 755 INJECTION, SOLUTION INTRAVENOUS at 12:47

## 2022-03-31 RX ADMIN — Medication 80 ML: at 12:47

## 2022-03-31 ASSESSMENT — ENCOUNTER SYMPTOMS
CHEST TIGHTNESS: 0
BACK PAIN: 0
SHORTNESS OF BREATH: 1
EYE DISCHARGE: 0
CONSTIPATION: 0
SORE THROAT: 0
ABDOMINAL PAIN: 1
VOMITING: 0
PHOTOPHOBIA: 0
COLOR CHANGE: 0
NAUSEA: 1
ABDOMINAL PAIN: 0
EYE ITCHING: 0
COUGH: 1
DIARRHEA: 0

## 2022-03-31 ASSESSMENT — PAIN DESCRIPTION - PAIN TYPE
TYPE: CHRONIC PAIN
TYPE: CHRONIC PAIN

## 2022-03-31 ASSESSMENT — PAIN DESCRIPTION - PROGRESSION: CLINICAL_PROGRESSION: NOT CHANGED

## 2022-03-31 ASSESSMENT — PAIN SCALES - GENERAL
PAINLEVEL_OUTOF10: 0
PAINLEVEL_OUTOF10: 0
PAINLEVEL_OUTOF10: 4
PAINLEVEL_OUTOF10: 0
PAINLEVEL_OUTOF10: 7

## 2022-03-31 ASSESSMENT — PAIN DESCRIPTION - LOCATION: LOCATION: GENERALIZED

## 2022-03-31 ASSESSMENT — PAIN DESCRIPTION - ONSET: ONSET: ON-GOING

## 2022-03-31 ASSESSMENT — PAIN - FUNCTIONAL ASSESSMENT
PAIN_FUNCTIONAL_ASSESSMENT: PREVENTS OR INTERFERES SOME ACTIVE ACTIVITIES AND ADLS
PAIN_FUNCTIONAL_ASSESSMENT: ACTIVITIES ARE NOT PREVENTED

## 2022-03-31 ASSESSMENT — PAIN DESCRIPTION - DESCRIPTORS: DESCRIPTORS: ACHING

## 2022-03-31 ASSESSMENT — PAIN DESCRIPTION - FREQUENCY: FREQUENCY: CONTINUOUS

## 2022-03-31 NOTE — PROGRESS NOTES
DIAGNOSIS:   1. High grade urothelial carcinoma, 12/2021, T3 N1 M0   2. Bilateral PE, 03/2022    CURRENT THERAPY:  Neoadjuvant chemo followed by adjuvant cystectomy  Eliquis     BRIEF CASE HISTORY:   Maureen Buckley is a very pleasant 74 y.o. male who is referred to us for bladder cancer. Patient presented with hematuria 02/2021 and was seen by urology, it was felt to be renal calculi and he was started on Flomax but continued to pass clots. He was later in [facility] and had urinary blockage, passed very large clot and then able to urinate and presented again to ER with continued hematuria, CT showed severe right hydronephrosis with tissue thickening/mass seen in right side of bladder, he underwent nephrostomy placement, stent was put in place and cystoscopy done showing fairly large papillary tumor occupying right side posterior wall and even the dome of the bladder. Pathology showed high grade urothelial carcinoma invading muscularis propria. Over the next month he needed 2 repeat cystoscopies and extensive fulguration of the tumor, again pathology showed high grade urothelial carcinoma. CT of the chest abdomen pelvis did not show metastatic disease. He has intermittent bladder pain and continues to have hematuria in Muñoz catheter. His appetite is poor. His father had bladder cancer, he did not have smoking history. The patient has smoking history but quit 30 years ago. He had COVID infection in 09/2021, he lost significant weight and continues to recover. He has arthralgias in the knees and uses cane. Staging PET scan was done and showed the bladder tumor and right pelvic sidewall lymph node metastases 4 of clinical staging of T3 N1 M0. We discussed neoadjuvant chemotherapy to be followed by surgery if he has a good response. INTERIM HISTORY: The patient presents for follow up with toxicity check for day #8, cycle #2.  His breathing has worsened over the past 5 days making movement and activity very difficult including caring daily needs. He is monitoring his oxygen levels at home, they stay above 92. His appetite is poor with dysgeusia and nausea, not fully controlled with medication, he is not maintaining nutrition or hydration. He notes numbness and tingling in the lower abdomen and pelvic area. His anxiety feels poorly controlled. He denies cough, dysphagia, hemoptysis. He continues with anticoagulation as per orders. He has had dysuria and cloudy urine, started on antibiotics. PAST MEDICAL HISTORY: has a past medical history of Arthritis, BPH with obstruction/lower urinary tract symptoms, Caffeine use, Cancer ([address] Depression, GERD (gastroesophageal reflux disease), High cholesterol, Kidney calculi, and Sleep apnea. PAST SURGICAL HISTORY: has a past surgical history that includes Knee arthroscopy; Cardiac catheterization; Colonoscopy; and IR PORT PLACEMENT > 5 YEARS (2/25/2022). CURRENT MEDICATIONS:  has a current medication list which includes the following prescription(s): sulfamethoxazole-trimethoprim, phenazopyridine, hydrocodone-acetaminophen, lidocaine-prilocaine, ondansetron, omeprazole, finasteride, tamsulosin, simvastatin, bupropion, gemtesa, and clobetasol, and the following Facility-Administered Medications: sodium chloride flush and heparin flush. ALLERGIES:  is allergic to meloxicam.    FAMILY HISTORY: Father had bladder cancer     SOCIAL HISTORY:  reports that he quit smoking about 30 years ago. He has never used smokeless tobacco. He reports that he does not drink alcohol and does not use drugs. REVIEW OF SYSTEMS:   General: No fever or night sweats. Weight is stable. +fatigue and generalized weakness -worse +dysgeusia   ENT: No double or blurred vision, no tinnitus or hearing problem, no dysphagia or sore throat +smell affected   Respiratory: No chest pain, no cough or hemoptysis. +worsening shortness of breath  Cardiovascular: Denies chest pain, PND or orthopnea.  No L E swelling or palpitations. Gastrointestinal: No vomiting, diarrhea or constipation, abd pain  +nausea   Genitourinary: Denies frequency, urgency or incontinence, or hematuria +dysuria and cloudy urine   Neurological: Denies headaches, decreased LOC, no sensory or motor focal deficits. Musculoskeletal: No arthralgia no back pain or joint swelling. Skin: There are no rashes or bleeding. Psychiatric: + anxiety and depression. Endocrine: No diabetes or thyroid disease. Hematologic: No bleeding, no adenopathy. PHYSICAL EXAM: Shows a well appearing 74y.o.-year-old male who is not in pain or distress. Vital Signs: Blood pressure 113/80, pulse 112, temperature 97.6 °F (36.4 °C), temperature source Temporal, weight 262 lb 12.8 oz (119.2 kg), SpO2 98 %. HEENT: Normocephalic and atraumatic. Pupils are equal, round, reactive to light and accommodation. Extraocular muscles are intact. Neck: Showed no JVD, no carotid bruit . Lungs: Clear to auscultation bilaterally. Heart: Regular without any murmur. Abdomen: Soft, nontender. No hepatosplenomegaly. Extremities: Lower extremities show no edema, clubbing, or cyanosis. Breasts: Examination not done today.  Neuro exam: intact cranial nerves bilaterally no motor or sensory deficit, gait is normal. Lymphatic: no adenopathy appreciated in the supraclavicular, axillary, cervical or inguinal area      REVIEW OF LABORATORY DATA:   Lab Results   Component Value Date    WBC 5.7 03/31/2022    HGB 10.4 (L) 03/31/2022    HCT 31.5 (L) 03/31/2022    MCV 77.8 (L) 03/31/2022     03/31/2022       Chemistry        Component Value Date/Time     (L) 03/31/2022 1001    K 3.8 03/31/2022 1001     03/31/2022 1001    CO2 22 03/31/2022 1001    BUN 14 03/31/2022 1001    CREATININE 1.41 (H) 03/31/2022 1001        Component Value Date/Time    CALCIUM 8.1 (L) 03/31/2022 1001    ALKPHOS 81 03/31/2022 1001    AST 17 03/31/2022 1001    ALT 23 03/31/2022 1001    BILITOT 0.16 (L) 03/31/2022 1001        PATHOLOGY:     REVIEW OF RADIOLOGICAL RESULTS:         IMPRESSION:   1. High grade urothelial carcinoma   2. Neoadjuvant chemo to be followed by cystectomy   3. Bilateral PE, Eliquis     PLAN:   1. His lab work was reviewed, counts are stable, creatinine has improved, and electrolytes are adequate. 2. I completed toxicity check. 3. For his anxiety I am writing for Ativan. 4. We discussed at length his worsening shortness of breath and weakness, his oxygen levels have been fine, exam is negative for evidence of pleural effusion. 5. He has been taking the Eliquis as directed and I reviewed CT from recent PE, I am writing for CT for further review. Concerned that his pulmonary embolism might have worsened. 6. I am also writing for 1 mg Dexamethasone to be taken daily. 7. He is taking antibiotics for symptoms of UTI, continue monitoring. 8. He feels he can proceed with treatment today and will plan to treat as per orders, we reviewed his dosing schedule with plan for 4 cycles. 9. Return in 2 weeks. Scribe Attestation   This note was created by Romelia Johnson acting as scribe for the physician signing this note  Electronically Signed  Romelia Johnson, 3/31/2022  Scribe, [employer]. Attending Attestation   Note was reviewed and edited.   I am in agreement with the note as entered    Quinten Skiff Al-Nsour,MD  Hematologist/Medical [address] [place] [address] hematology oncology physicians

## 2022-03-31 NOTE — TELEPHONE ENCOUNTER
Proceed with chemotherapy per orders, return in 2 weeks with chemotherapy and labs. Please see if the CT scan can be done sometime in the next few days.   If it could be done today, let me know to adjust the order    Tx held, pt was sent to the ER     Per md, CT will be done while pt is in the ER    PT was given AVS and an appt schedule    Electronically signed by Miguelina Koenig on 3/31/2022 at 1:22 PM

## 2022-03-31 NOTE — H&P
[facility]  Office: [address], DO, Vanessa Carlos, DO, Cr Manifold, DO, Cristobal Kiser Blood, DO, Kimani Adkins MD, Car Will MD, Marianna Molina MD, Micheal Cleveland MD, Christina Diaz MD, Bianca Salmeron MD, Renetta Dukes MD, Edin Taylor, DO, Amanda Torres, DO, Jessica Eaton MD,  Rachel Greenwood, DO, Norris Holter, MD, Elizabeth Ponce MD, Yuly Long MD, Kizzy Allen DO, Alex Diego MD, Risa Dominguez MD, Reena Holt, CNP, [facility] Juanjo, CNP, Silke Epstein, CNP, Gerry Mota, CNS, Vinayak Arellano, CNP, Gilbert Owens, CNP, Jessica Go, CNP, Lexi Jean-Baptiste, CNP, Hollie Hahn, CNP, Samm Howell PA-C, Luis F Pedroza, DNP, Ellen Man, DNP, Mally Partida, CNP, Felicity Flores, CNP, Cathy Treadwell, CNP         [facility]    HISTORY AND PHYSICAL EXAMINATION            Date:   3/31/2022  Patient name:  Aida Casas  Date of admission:  3/31/2022 11:14 AM  MRN:   [id number]  Account:  [de-identified]  YOB: 1955  PCP:    Eduardo Gabriel MD  Room:   [address] [place] [address]  Code Status:    Full Code    Chief Complaint:     Chief Complaint   Patient presents with    Shortness of Breath       History Obtained From:     patient    History of Present Illness:     Aida Casas is a 74 y.o. Non- / non  male who presents with Shortness of Breath   and is admitted to the hospital for the management of Acute respiratory failure with hypoxia ([facility] [address] 61-year-old male with known medical history of high-grade urothelial bladder cancer diagnosed in December 2021, on neoadjuvant chemo, recently diagnosed with bilateral pulmonary embolism around 12 days ago presented to the hospital with shortness of breath. Patient was at his oncology appointment when he was noted to be short of breath. Patient had desaturation episode to low 80s on exertion.   Patient states that since his diagnosis of pulmonary embolism he has been feeling more short of breath on exertion. He has a pulse ox at home and he even dipped down to 70% on exertion, which gradually came up to 97% at rest.  Patient was not tested for home oxygen on exertion while he was discharged last time after diagnosis of pulmonary embolism. Patient also reports urinary symptoms of urgency, hesitancy, chills, low-grade fever, he was prescribed Bactrim by Dr. Kareen Colin 2 days ago. Patient also describes abdominal tenderness in the lower abdomen quadrant and suprapubic region. Patient had 2-3 episodes of cough but no phlegm. No chest pain. No diarrhea. No leg swelling. In the ER patient was noted to be afebrile with temperature 97.7, slightly tachycardic with heart rate of 92, blood pressure 145/88, saturating well on room air at rest.  Creatinine of 1.49, sodium 132, proBNP 213, hemoglobin of 10.7, MCV 77.3. Patient had urinalysis showing cloudy urine, too many bacteria,  RBC, large leukocyte esterase. Past Medical History:     Past Medical History:   Diagnosis Date    Arthritis     BPH with obstruction/lower urinary tract symptoms     Caffeine use     3 cans soda/pop    Cancer (HCC)     bladder cancer    Depression     GERD (gastroesophageal reflux disease)     High cholesterol     Kidney calculi     Sleep apnea         Past Surgical History:     Past Surgical History:   Procedure Laterality Date    CARDIAC CATHETERIZATION      COLONOSCOPY      IR PORT PLACEMENT EQUAL OR GREATER THAN 5 YEARS  2/25/2022    IR PORT PLACEMENT EQUAL OR GREATER THAN 5 YEARS 2/25/2022 [facility]    KNEE ARTHROSCOPY      left        Medications Prior to Admission:     Prior to Admission medications    Medication Sig Start Date End Date Taking?  Authorizing Provider   apixaban (ELIQUIS) 5 MG TABS tablet Take 1 tablet by mouth 2 times daily 3/31/22   Sarah Grayson MD   LORazepam (ATIVAN) 1 MG tablet Take 1 tablet by mouth every 6 hours as needed for Anxiety (Take 1 pill at betime as needed) for up to 120 doses. 3/31/22 4/30/22  Sarah Grayson MD   dexamethasone (DECADRON) 1 MG tablet Take 1 tablet by mouth daily (with breakfast) 3/31/22 4/30/22  Brian Grayson MD   sulfamethoxazole-trimethoprim (BACTRIM DS) 800-160 MG per tablet Take 1 tablet by mouth 2 times daily 3/24/22   Brian Grayson MD   phenazopyridine (PYRIDIUM) 200 MG tablet Take 1 tablet by mouth 3 times daily as needed for Pain 3/17/22   Fatmata Rachel MD   HYDROcodone-acetaminophen (NORCO) 5-325 MG per tablet Take 1 tablet by mouth every 8 hours as needed for Pain for up to 30 days. 3/4/22 4/3/22  Sarah Grayson MD   Vibegron (GEMTESA) 75 MG TABS Take 75 mg by mouth daily  Patient not taking: Reported on 3/10/2022 2/10/22   Fatmata Rachel MD   lidocaine-prilocaine (EMLA) 2.5-2.5 % cream Apply topically Daily as needed. 2/10/22   Sarah Grayson MD   ondansetron (ZOFRAN ODT) 8 MG TBDP disintegrating tablet Take 1 tablet by mouth every 8 hours as needed for Nausea or Vomiting My use sublingually if nauseated 2/10/22   Tangela Szymanski MD   omeprazole (PRILOSEC) 20 MG delayed release capsule Take 1 capsule by mouth daily 2/10/22   Sarah Grayson MD   clobetasol (TEMOVATE) 0.05 % ointment Apply topically 2 times daily Apply topically 2 times daily. Patient not taking: Reported on 3/10/2022    Historical Provider, MD   finasteride (PROSCAR) 5 MG tablet Take 5 mg by mouth daily    Historical Provider, MD   tamsulosin (FLOMAX) 0.4 MG capsule Take 0.4 mg by mouth daily    Historical Provider, MD   simvastatin (ZOCOR) 40 MG tablet Take 40 mg by mouth nightly    Historical Provider, MD   buPROPion (WELLBUTRIN XL) 300 MG extended release tablet Take 300 mg by mouth every morning    Historical Provider, MD        Allergies:     Patient has no known allergies. Social History:     Tobacco:    reports that he quit smoking about 30 years ago.  He has never used smokeless tobacco.  Alcohol:      reports no history of alcohol use. Drug Use:  reports no history of drug use. Family History:     Family History   Problem Relation Age of Onset    Cancer Father         bladder cancer    Urolithiasis Father        Review of Systems:     Positive and Negative as described in HPI. Review of Systems   Constitutional: Positive for activity change, appetite change, chills, fatigue and fever. Negative for diaphoresis. HENT: Negative for congestion and dental problem. Eyes: Negative for discharge and itching. Respiratory: Positive for cough and shortness of breath. Occassional cough, no phlegm   Cardiovascular: Negative for chest pain, palpitations and leg swelling. Gastrointestinal: Positive for abdominal pain. Negative for constipation, diarrhea and vomiting. Endocrine: Negative for heat intolerance. Genitourinary: Positive for dysuria, frequency, hematuria and urgency. Negative for decreased urine volume and difficulty urinating. Musculoskeletal: Negative for arthralgias and back pain. Skin: Positive for pallor. Negative for color change. Neurological: Negative for dizziness, facial asymmetry and headaches. Hematological: Negative for adenopathy. Psychiatric/Behavioral: Negative for agitation and behavioral problems. Physical Exam:   BP (!) 141/80   Pulse 92   Temp 98.1 °F (36.7 °C) (Oral)   Resp 19   Ht 6' 2\" (1.88 m)   Wt 262 lb (118.8 kg)   SpO2 100%   BMI 33.64 kg/m²   Temp (24hrs), Av.8 °F (36.6 °C), Min:97.6 °F (36.4 °C), Max:98.1 °F (36.7 °C)    No results for input(s): POCGLU in the last 72 hours. No intake or output data in the 24 hours ending 22 1605    Physical Exam  Constitutional:       General: He is not in acute distress. Appearance: He is obese. He is ill-appearing. He is not diaphoretic. HENT:      Head: Normocephalic and atraumatic.       Nose: Nose normal.      Mouth/Throat:      Mouth: Mucous membranes are moist.   Eyes:      Conjunctiva/sclera: Conjunctivae normal.      Pupils: Pupils are equal, round, and reactive to light. Cardiovascular:      Rate and Rhythm: Regular rhythm. Tachycardia present. Pulses: Normal pulses. Heart sounds: Normal heart sounds. Pulmonary:      Effort: Pulmonary effort is normal.      Breath sounds: Normal breath sounds. No wheezing, rhonchi or rales. Abdominal:      General: Abdomen is flat. Bowel sounds are normal.      Tenderness: There is abdominal tenderness. Comments: Tenderness suprapubic area   Musculoskeletal:         General: Normal range of motion. Right lower leg: No edema. Left lower leg: No edema. Skin:     General: Skin is warm. Coloration: Skin is pale. Neurological:      General: No focal deficit present. Mental Status: He is alert and oriented to person, place, and time.    Psychiatric:         Mood and Affect: Mood normal.     chemoport present    Investigations:      Laboratory Testing:  Recent Results (from the past 24 hour(s))   CBC Auto Differential    Collection Time: 03/31/22  9:54 AM   Result Value Ref Range    WBC 5.7 3.5 - 11.0 k/uL    RBC 4.05 (L) 4.5 - 5.9 m/uL    Hemoglobin 10.4 (L) 13.5 - 17.5 g/dL    Hematocrit 31.5 (L) 41 - 53 %    MCV 77.8 (L) 80 - 100 fL    MCH 25.7 (L) 26 - 34 pg    MCHC 33.0 31 - 37 g/dL    RDW 17.7 (H) 12.5 - 15.4 %    Platelets 243 085 - 215 k/uL    MPV 6.8 6.0 - 12.0 fL    Seg Neutrophils 63 36 - 66 %    Lymphocytes 31 24 - 44 %    Monocytes 5 2 - 11 %    Eosinophils % 1 1 - 4 %    Basophils 0 0 - 2 %    Segs Absolute 3.60 1.8 - 7.7 k/uL    Absolute Lymph # 1.80 1.0 - 4.8 k/uL    Absolute Mono # 0.30 0.1 - 1.2 k/uL    Absolute Eos # 0.00 0.0 - 0.4 k/uL    Basophils Absolute 0.00 0.0 - 0.2 k/uL   Magnesium    Collection Time: 03/31/22 10:01 AM   Result Value Ref Range    Magnesium 1.6 1.6 - 2.6 mg/dL   Comprehensive Metabolic Panel    Collection Time: 03/31/22 10:01 AM   Result Value Ref Range    Glucose 127 (H) 70 - 99 mg/dL    BUN 14 8 - 23 mg/dL    CREATININE 1.41 (H) 0.70 - 1.20 mg/dL    Calcium 8.1 (L) 8.6 - 10.4 mg/dL    Sodium 134 (L) 135 - 144 mmol/L    Potassium 3.8 3.7 - 5.3 mmol/L    Chloride 101 98 - 107 mmol/L    CO2 22 20 - 31 mmol/L    Anion Gap 11 9 - 17 mmol/L    Alkaline Phosphatase 81 40 - 129 U/L    ALT 23 5 - 41 U/L    AST 17 <40 U/L    Total Bilirubin 0.16 (L) 0.3 - 1.2 mg/dL    Total Protein 6.4 6.4 - 8.3 g/dL    Albumin 3.1 (L) 3.5 - 5.2 g/dL    Albumin/Globulin Ratio 0.9 (L) 1.0 - 2.5    GFR Non- 50 (L) >60 mL/min    GFR African American >60 >60 mL/min    GFR Comment         Basic Metabolic Panel    Collection Time: 03/31/22 12:04 PM   Result Value Ref Range    Glucose 140 (H) 70 - 99 mg/dL    BUN 15 8 - 23 mg/dL    CREATININE 1.49 (H) 0.70 - 1.20 mg/dL    Calcium 8.9 8.6 - 10.4 mg/dL    Sodium 132 (L) 135 - 144 mmol/L    Potassium 4.2 3.7 - 5.3 mmol/L    Chloride 97 (L) 98 - 107 mmol/L    CO2 23 20 - 31 mmol/L    Anion Gap 12 9 - 17 mmol/L    GFR Non-African American 47 (L) >60 mL/min    GFR  57 (L) >60 mL/min    GFR Comment         Brain Natriuretic Peptide    Collection Time: 03/31/22 12:04 PM   Result Value Ref Range    Pro- <300 pg/mL   CBC with Auto Differential    Collection Time: 03/31/22 12:04 PM   Result Value Ref Range    WBC 5.6 3.5 - 11.0 k/uL    RBC 4.16 (L) 4.5 - 5.9 m/uL    Hemoglobin 10.7 (L) 13.5 - 17.5 g/dL    Hematocrit 32.2 (L) 41 - 53 %    MCV 77.3 (L) 80 - 100 fL    MCH 25.7 (L) 26 - 34 pg    MCHC 33.3 31 - 37 g/dL    RDW 18.3 (H) 12.5 - 15.4 %    Platelets 663 870 - 780 k/uL    MPV 6.8 6.0 - 12.0 fL    Seg Neutrophils 68 (H) 36 - 66 %    Lymphocytes 26 24 - 44 %    Monocytes 6 2 - 11 %    Eosinophils % 0 (L) 1 - 4 %    Basophils 0 0 - 2 %    Segs Absolute 3.80 1.8 - 7.7 k/uL    Absolute Lymph # 1.50 1.0 - 4.8 k/uL    Absolute Mono # 0.30 0.1 - 1.2 k/uL    Absolute Eos # 0.00 0.0 - 0.4 k/uL    Basophils Absolute 0.00 0.0 - 0.2 k/uL   Troponin    Collection Time: 03/31/22 12:04 PM   Result Value Ref Range    Troponin, High Sensitivity 16 0 - 22 ng/L   Urinalysis with Microscopic    Collection Time: 03/31/22  1:01 PM   Result Value Ref Range    Color, UA Yellow Yellow    Turbidity UA Cloudy (A) Clear    Glucose, Ur NEGATIVE NEGATIVE    Bilirubin Urine NEGATIVE NEGATIVE    Ketones, Urine NEGATIVE NEGATIVE    Specific Gravity, UA 1.025 1.005 - 1.030    Urine Hgb LARGE (A) NEGATIVE    pH, UA 6.0 5.0 - 8.0    Protein, UA 2+ (A) NEGATIVE    Urobilinogen, Urine Normal Normal    Nitrite, Urine NEGATIVE NEGATIVE    Leukocyte Esterase, Urine LARGE (A) NEGATIVE    -          WBC, UA TOO NUMEROUS TO COUNT 0 - 5 /HPF    RBC, UA 50  0 - 2 /HPF    Epithelial Cells UA 0 TO 2 0 - 5 /HPF    Bacteria, UA MODERATE (A) None    Yeast, UA FEW (A) None   COVID-19, Rapid    Collection Time: 03/31/22  2:08 PM    Specimen: Nasopharyngeal Swab   Result Value Ref Range    Specimen Description . NASOPHARYNGEAL SWAB     SARS-CoV-2, Rapid Not Detected Not Detected       Imaging/Diagnostics:    XR CHEST PORTABLE    Result Date: 3/31/2022  No radiographic evidence of an acute cardiopulmonary process. CT CHEST PULMONARY EMBOLISM W CONTRAST    Result Date: 3/31/2022  Small embolus noted in the right main pulmonary artery and extending into the right upper lobe artery. Small clot also noted in the inter lobar artery. No evidence for right heart strain. Small ground-glass opacity in the left apex. I recommend follow-up in 3 months to determine stability or resolution.  Case discussed with Dr. Amy Slaughter at 1:24 p.m., 03/31/2022       Assessment :      Hospital Problems           Last Modified POA    * (Principal) Acute respiratory failure with hypoxia ([address] 3/31/2022 Yes    Gross hematuria 3/31/2022 Yes    Hypertrophy of prostate with urinary obstruction 3/31/2022 Yes    Malignant neoplasm of urinary bladder ([address] 3/31/2022 Yes    Urinary retention 3/31/2022 Yes    Acute cystitis with hematuria 3/31/2022 Yes    Other hyperlipidemia 3/31/2022 Yes    Microcytic anemia 3/31/2022 Yes    Mild protein-calorie malnutrition ([address] 3/31/2022 Yes          Plan:     Patient status inpatient in the [facility] surg    Acute respiratory failure with hypoxia with recent diagnosis of pulmonary embolism-patient has been on Eliquis only for last 2 weeks, CT PE scan positive for small pulmonary embolism, previous CT PE not present in the system for review but report indicated bilateral pulmonary embolism. Discussed with Dr. Theodore Watts, will continue Eliquis for now as it can take up to 4 weeks for clot dissolution. Patient will need home oxygen evaluation on exertion on discharge. UTI with hematuria-send urine for culture, start Rocephin IV, gentle IV hydration, change antibiotic depending on culture and sensitivity, monitor for signs and symptoms of urinary retention. Follows up with urology outpatient. Obtain ultrasound renal to check for hydronephrosis. High-grade urothelial carcinoma-follows with Dr.Al- Barros, on neoadjuvant chemo, to be followed by cystectomy, oncology consulted in hospital. Decadron continued. Microcytic anemia-check iron studies for iron deficiency anemia    Urinary retention- watch for retention, continue home flomax, finasteride    Hyperlipidemia- on statin    Protein calorie malnutrition- mild, encourage supplements    Consultations:   IP CONSULT TO CASE MANAGEMENT  IP CONSULT TO ONCOLOGY     Patient is admitted as inpatient status because of co-morbidities listed above, severity of signs and symptoms as outlined, requirement for current medical therapies and most importantly because of direct risk to patient if care not provided in a hospital setting. Expected length of stay > 48 hours.     Taj Zepeda MD  3/31/2022  4:05 PM    Copy sent to Dr. Stacey Bolivar MD

## 2022-03-31 NOTE — PATIENT INSTRUCTIONS
Proceed with chemotherapy per orders, return in 2 weeks with chemotherapy and labs. Please see if the CT scan can be done sometime in the next few days.   If it could be done today, let me know to adjust the order

## 2022-03-31 NOTE — ED PROVIDER NOTES
[facility] Surg ICU  [address]  Phone: [phone]        Pt Name: Ulysses Ziegler  MRN: [id number]  [place] 1955  Date of evaluation: 3/31/22      CHIEF COMPLAINT     Chief Complaint   Patient presents with    Shortness of Breath         HISTORY OF PRESENT ILLNESS  (Location/Symptom, Timing/Onset, Context/Setting, Quality, Duration, Modifying Factors, Severity.)    Ulysses Ziegler is a 74 y.o. male who presents with shortness of breath. The patient has a known history of bladder cancer, and is currently undergoing chemotherapy. He states he was recently evaluated at [facility], and found to have a pulmonary embolism. The patient was admitted for 3 days and was started on Eliquis. The patient states the shortness of breath improved for a couple of days and then got worse again. He states he got hypoxic with exertion at Oncology clinic today, with his saturation dropping to 88%. He is not normally on home oxygen. He has no known history of heart disease. He has a remote history of smoking, quit 25 years ago. He has had a mild, non-productive cough. He has not had a fever. He has had intermittent chest discomfort, which is retrosternal and stabbing. He reports it lasts for seconds and resolves. No association with exertion. No hemoptysis. He has no history of CHF in the past.        REVIEW OF SYSTEMS    (2-9 systems for level 4, 10 or more for level 5)     Review of Systems   Constitutional: Negative for chills and fever. HENT: Negative for congestion and sore throat. Eyes: Negative for photophobia and visual disturbance. Respiratory: Positive for shortness of breath. Negative for chest tightness. Cardiovascular: Positive for chest pain and palpitations. Negative for leg swelling. Gastrointestinal: Positive for nausea. Negative for abdominal pain, constipation, diarrhea and vomiting. Genitourinary: Positive for dysuria.  Negative for frequency and urgency. Musculoskeletal: Negative for back pain and neck pain. Skin: Negative for rash and wound. Neurological: Negative for dizziness, light-headedness and headaches. Hematological: Negative for adenopathy. Bruises/bleeds easily. PAST MEDICAL HISTORY    has a past medical history of Arthritis, BPH with obstruction/lower urinary tract symptoms, Caffeine use, Cancer ([facility] [address] Depression, GERD (gastroesophageal reflux disease), High cholesterol, Kidney calculi, and Sleep apnea. SURGICAL HISTORY      has a past surgical history that includes Knee arthroscopy; Cardiac catheterization; Colonoscopy; and IR PORT PLACEMENT > 5 YEARS (2/25/2022). [address]       Current Discharge Medication List      CONTINUE these medications which have NOT CHANGED    Details   LORazepam (ATIVAN) 1 MG tablet Take 1 tablet by mouth every 6 hours as needed for Anxiety (Take 1 pill at betime as needed) for up to 120 doses. Qty: 120 tablet, Refills: 0    Associated Diagnoses: Malignant neoplasm of lateral wall of urinary bladder ([facility] [address] Gross hematuria; Anxiety      dexamethasone (DECADRON) 1 MG tablet Take 1 tablet by mouth daily (with breakfast)  Qty: 30 tablet, Refills: 0      phenazopyridine (PYRIDIUM) 200 MG tablet Take 1 tablet by mouth 3 times daily as needed for Pain  Qty: 15 tablet, Refills: 0      HYDROcodone-acetaminophen (NORCO) 5-325 MG per tablet Take 1 tablet by mouth every 8 hours as needed for Pain for up to 30 days. Qty: 60 tablet, Refills: 0    Comments: Reduce doses taken as pain becomes manageable  Associated Diagnoses: Malignant neoplasm of lateral wall of urinary bladder ([facility] [address] Gross hematuria; Urinary retention      lidocaine-prilocaine (EMLA) 2.5-2.5 % cream Apply topically Daily as needed.   Qty: 30 g, Refills: 2      ondansetron (ZOFRAN ODT) 8 MG TBDP disintegrating tablet Take 1 tablet by mouth every 8 hours as needed for Nausea or Vomiting My use sublingually if nauseated  Qty: 30 tablet, Refills: 3      omeprazole (PRILOSEC) 20 MG delayed release capsule Take 1 capsule by mouth daily  Qty: 30 capsule, Refills: 3      finasteride (PROSCAR) 5 MG tablet Take 5 mg by mouth daily      tamsulosin (FLOMAX) 0.4 MG capsule Take 0.4 mg by mouth daily      simvastatin (ZOCOR) 40 MG tablet Take 40 mg by mouth nightly      buPROPion (WELLBUTRIN XL) 300 MG extended release tablet Take 300 mg by mouth every morning             ALLERGIES     has No Known Allergies. FAMILY HISTORY     He indicated that his mother is . He indicated that his father is . family history includes Cancer in his father; Urolithiasis in his father. SOCIAL HISTORY      reports that he quit smoking about 30 years ago. He has never used smokeless tobacco. He reports that he does not drink alcohol and does not use drugs. PHYSICAL EXAM    (up to 7 for level 4, 8 or more for level 5)   INITIAL VITALS:  height is 6' 2\" (1.88 m) and weight is 116.5 kg (256 lb 12.8 oz). His oral temperature is 97.7 °F (36.5 °C). His blood pressure is 119/66 and his pulse is 82. His respiration is 18 and oxygen saturation is 99%. Physical Exam  Vitals and nursing note reviewed. Constitutional:       Appearance: He is well-developed. He is ill-appearing. HENT:      Head: Normocephalic and atraumatic. Cardiovascular:      Rate and Rhythm: Normal rate and regular rhythm. Heart sounds: No murmur heard. No gallop. Pulmonary:      Effort: Pulmonary effort is normal. Tachypnea present. Breath sounds: No decreased breath sounds, wheezing, rhonchi or rales. Abdominal:      General: Bowel sounds are normal.      Palpations: Abdomen is soft. Tenderness: There is no abdominal tenderness. There is no guarding or rebound. Musculoskeletal:         General: Normal range of motion. Cervical back: Normal range of motion and neck supple. Right lower leg: No tenderness. Edema present. Left lower leg: No tenderness. Edema present. Skin:     General: Skin is warm and dry. Capillary Refill: Capillary refill takes less than 2 seconds. Neurological:      General: No focal deficit present. Mental Status: He is alert and oriented to person, place, and time. Psychiatric:         Mood and Affect: Mood normal.         Behavior: Behavior normal.         DIFFERENTIAL DIAGNOSIS/ MDM:     82-year-old male with a known history of bladder cancer, currently on chemotherapy, with recent diagnosis of pulmonary embolism presents with shortness of breath and hypoxia. Plan for admission. DIAGNOSTIC RESULTS     EKG: All EKG's are interpreted by the Emergency Department Physician who either signs or Co-signs this chart in the absence of a cardiologist.    EKG Interpretation    Interpreted by emergency department physician    Rhythm: normal sinus   Rate: normal  Axis: left  Ectopy: none  Conduction: nonspecific interventricular conduction block  ST Segments: nonspecific changes  T Waves: non specific changes  Q Waves: none    Clinical Impression: non-specific EKG    Misa Coley MD      RADIOLOGY:        Interpretation per the Radiologist below, if available at the time of this note:    XR CHEST (2 VW)    Result Date: 3/17/2022  EXAMINATION: TWO X-RAY VIEWS OF THE CHEST 3/16/2022 5:15 pm COMPARISON: 02/11/2022 HISTORY: ORDERING SYSTEM PROVIDED HISTORY:  SOB (shortness of breath) TECHNOLOGIST PROVIDED HISTORY: Reason for Exam:  Dyspnea x4 months. Hx of COVID 6 months ago, currently having chemo for bladder CA. No trauma. FINDINGS: Cardiac silhouette and mediastinal contours are normal.  Right chest MediPort is noted. The lungs are clear. No focal lung infiltrate. No pleural effusion or pneumothorax. Degenerative changes are noted along the spine. 1. No acute cardiopulmonary disease.      XR CHEST PORTABLE    Result Date: 3/31/2022  EXAMINATION: ONE XRAY VIEW OF THE CHEST 3/31/2022 12:04 pm COMPARISON: 03/16/2022 HISTORY: ORDERING SYSTEM PROVIDED HISTORY: shortness of breath TECHNOLOGIST PROVIDED HISTORY: shortness of breath Reason for Exam: shortness of breath, known PE Relevant Medical/Surgical History: sent by cancer center. FINDINGS: Similar-appearing overlying right IJ approach chest port, tip overlying the cavoatrial region. Cardiomediastinal silhouette appears within normal limits. No focal consolidation or overt pulmonary edema. Costophrenic angles are sharp. No evidence of pneumothorax. No acute osseous abnormalities. No radiographic evidence of an acute cardiopulmonary process. CT CHEST PULMONARY EMBOLISM W CONTRAST    Result Date: 3/31/2022  EXAMINATION: CTA OF THE CHEST 3/31/2022 11:41 am TECHNIQUE: CTA of the chest was performed after the administration of intravenous contrast.  Multiplanar reformatted images are provided for review. MIP images are provided for review. Dose modulation, iterative reconstruction, and/or weight based adjustment of the mA/kV was utilized to reduce the radiation dose to as low as reasonably achievable. COMPARISON: None. HISTORY: ORDERING SYSTEM PROVIDED HISTORY: known PE, shortness of breath, concern for progression of clot TECHNOLOGIST PROVIDED HISTORY: known PE, shortness of breath, concern for progression of clot Decision Support Exception - unselect if not a suspected or confirmed emergency medical condition->Emergency Medical Condition (MA) Reason for Exam: Shortness of breath, known PE FINDINGS: Pulmonary Arteries: Pulmonary arteries are adequately opacified for evaluation. There is a small embolus noted in the the right main pulmonary artery, extending into the right upper lobe artery. Small clot also noted in the the inter lobar artery. There is no evidence of right heart strain. Mediastinum: No evidence of mediastinal lymphadenopathy. The heart and pericardium demonstrate no acute abnormality.   There is no acute abnormality of the thoracic aorta. Lungs/pleura: There is a small ground-glass opacity noted in the the left apex. .  No focal consolidation or pulmonary edema. No evidence of pleural effusion or pneumothorax. Upper Abdomen: Limited images of the upper abdomen are unremarkable. Soft Tissues/Bones: No acute bone or soft tissue abnormality. Small embolus noted in the right main pulmonary artery and extending into the right upper lobe artery. Small clot also noted in the inter lobar artery. No evidence for right heart strain. Small ground-glass opacity in the left apex. I recommend follow-up in 3 months to determine stability or resolution. Case discussed with Dr. Hill Puga at 1:24 p.m., 03/31/2022       LABS:  Results for orders placed or performed during the hospital encounter of 03/31/22   COVID-19, Rapid    Specimen: Nasopharyngeal Swab   Result Value Ref Range    Specimen Description . NASOPHARYNGEAL SWAB     SARS-CoV-2, Rapid Not Detected Not Detected   Basic Metabolic Panel   Result Value Ref Range    Glucose 140 (H) 70 - 99 mg/dL    BUN 15 8 - 23 mg/dL    CREATININE 1.49 (H) 0.70 - 1.20 mg/dL    Calcium 8.9 8.6 - 10.4 mg/dL    Sodium 132 (L) 135 - 144 mmol/L    Potassium 4.2 3.7 - 5.3 mmol/L    Chloride 97 (L) 98 - 107 mmol/L    CO2 23 20 - 31 mmol/L    Anion Gap 12 9 - 17 mmol/L    GFR Non-African American 47 (L) >60 mL/min    GFR  57 (L) >60 mL/min    GFR Comment         Brain Natriuretic Peptide   Result Value Ref Range    Pro- <300 pg/mL   CBC with Auto Differential   Result Value Ref Range    WBC 5.6 3.5 - 11.0 k/uL    RBC 4.16 (L) 4.5 - 5.9 m/uL    Hemoglobin 10.7 (L) 13.5 - 17.5 g/dL    Hematocrit 32.2 (L) 41 - 53 %    MCV 77.3 (L) 80 - 100 fL    MCH 25.7 (L) 26 - 34 pg    MCHC 33.3 31 - 37 g/dL    RDW 18.3 (H) 12.5 - 15.4 %    Platelets 245 284 - 052 k/uL    MPV 6.8 6.0 - 12.0 fL    Seg Neutrophils 68 (H) 36 - 66 %    Lymphocytes 26 24 - 44 %    Monocytes 6 2 - 11 %    Eosinophils % 0 (L) 1 - 4 % Basophils 0 0 - 2 %    Segs Absolute 3.80 1.8 - 7.7 k/uL    Absolute Lymph # 1.50 1.0 - 4.8 k/uL    Absolute Mono # 0.30 0.1 - 1.2 k/uL    Absolute Eos # 0.00 0.0 - 0.4 k/uL    Basophils Absolute 0.00 0.0 - 0.2 k/uL   Urinalysis with Microscopic   Result Value Ref Range    Color, UA Yellow Yellow    Turbidity UA Cloudy (A) Clear    Glucose, Ur NEGATIVE NEGATIVE    Bilirubin Urine NEGATIVE NEGATIVE    Ketones, Urine NEGATIVE NEGATIVE    Specific Gravity, UA 1.025 1.005 - 1.030    Urine Hgb LARGE (A) NEGATIVE    pH, UA 6.0 5.0 - 8.0    Protein, UA 2+ (A) NEGATIVE    Urobilinogen, Urine Normal Normal    Nitrite, Urine NEGATIVE NEGATIVE    Leukocyte Esterase, Urine LARGE (A) NEGATIVE    -          WBC, UA TOO NUMEROUS TO COUNT 0 - 5 /HPF    RBC, UA 50  0 - 2 /HPF    Epithelial Cells UA 0 TO 2 0 - 5 /HPF    Bacteria, UA MODERATE (A) None    Yeast, UA FEW (A) None   Troponin   Result Value Ref Range    Troponin, High Sensitivity 16 0 - 22 ng/L   Iron and TIBC   Result Value Ref Range    Iron 38 (L) 59 - 158 ug/dL    TIBC 180 (L) 250 - 450 ug/dL    Iron Saturation 21 20 - 55 %    UIBC 142 112 - 347 ug/dL   Ferritin   Result Value Ref Range    Ferritin 346 30 - 400 ng/mL   CBC with Auto Differential   Result Value Ref Range    WBC 7.0 3.5 - 11.0 k/uL    RBC 3.83 (L) 4.5 - 5.9 m/uL    Hemoglobin 9.8 (L) 13.5 - 17.5 g/dL    Hematocrit 29.5 (L) 41 - 53 %    MCV 76.8 (L) 80 - 100 fL    MCH 25.7 (L) 26 - 34 pg    MCHC 33.4 31 - 37 g/dL    RDW 17.9 (H) 12.5 - 15.4 %    Platelets 072 905 - 065 k/uL    MPV 7.1 6.0 - 12.0 fL    Seg Neutrophils 59 36 - 66 %    Lymphocytes 32 24 - 44 %    Monocytes 8 2 - 11 %    Eosinophils % 0 (L) 1 - 4 %    Basophils 1 0 - 2 %    Segs Absolute 4.20 1.8 - 7.7 k/uL    Absolute Lymph # 2.20 1.0 - 4.8 k/uL    Absolute Mono # 0.50 0.1 - 1.2 k/uL    Absolute Eos # 0.00 0.0 - 0.4 k/uL    Basophils Absolute 0.00 0.0 - 0.2 k/uL   Basic Metabolic Panel w/ Reflex to MG   Result Value Ref Range Glucose 91 70 - 99 mg/dL    BUN 15 8 - 23 mg/dL    CREATININE 1.41 (H) 0.70 - 1.20 mg/dL    Calcium 8.7 8.6 - 10.4 mg/dL    Sodium 135 135 - 144 mmol/L    Potassium 4.1 3.7 - 5.3 mmol/L    Chloride 99 98 - 107 mmol/L    CO2 25 20 - 31 mmol/L    Anion Gap 11 9 - 17 mmol/L    GFR Non-African American 50 (L) >60 mL/min    GFR African American >60 >60 mL/min    GFR Comment         EKG 12 Lead   Result Value Ref Range    Ventricular Rate 95 BPM    Atrial Rate 95 BPM    P-R Interval 186 ms    QRS Duration 120 ms    Q-T Interval 348 ms    QTc Calculation (Bazett) 437 ms    P Axis 17 degrees    R Axis -34 degrees    T Axis 65 degrees       UA with UTI, Cr at baseline    EMERGENCY DEPARTMENT COURSE:   Vitals:    Vitals:    03/31/22 1600 03/31/22 1725 03/31/22 1945 04/01/22 0000   BP:   132/85 119/66   Pulse:   93 82   Resp:   18 18   Temp:   97.8 °F (36.6 °C) 97.7 °F (36.5 °C)   TempSrc:   Oral Oral   SpO2:  98%  99%   Weight: 116.5 kg (256 lb 12.8 oz)      Height: 6' 2\" (1.88 m)        -------------------------  BP: 119/66, Temp: 97.7 °F (36.5 °C), Pulse: 82, Resp: 18      RE-EVALUATION:  Patient updated on test results and plan of care. Agreeable to admission. CONSULTS:  I discussed the patient with Dr Keri Angelucci. She agrees to admit the patient. PROCEDURES:  None    FINAL IMPRESSION      1. Pulmonary embolism without acute cor pulmonale, unspecified chronicity, unspecified pulmonary embolism type ([address]    2. Hypoxia    3. Acute cystitis with hematuria          DISPOSITION/PLAN   DISPOSITION Admitted 03/31/2022 02:57:04 PM      CONDITION ON DISPOSITION:   fair    PATIENT REFERRED TO:  No follow-up provider specified.     DISCHARGE MEDICATIONS:  Current Discharge Medication List      START taking these medications    Details   apixaban (ELIQUIS) 5 MG TABS tablet Take 1 tablet by mouth 2 times daily  Qty: 60 tablet, Refills: 5             (Please note that portions of this note were completed with a voicerecognition

## 2022-04-01 ENCOUNTER — APPOINTMENT (OUTPATIENT)
Dept: ULTRASOUND IMAGING | Age: 67
DRG: 175 | End: 2022-04-01
Payer: MEDICARE

## 2022-04-01 VITALS
TEMPERATURE: 97.6 F | SYSTOLIC BLOOD PRESSURE: 120 MMHG | HEIGHT: 74 IN | WEIGHT: 256.8 LBS | RESPIRATION RATE: 18 BRPM | DIASTOLIC BLOOD PRESSURE: 74 MMHG | OXYGEN SATURATION: 98 % | HEART RATE: 90 BPM | BODY MASS INDEX: 32.96 KG/M2

## 2022-04-01 PROBLEM — I50.21 ACUTE SYSTOLIC HEART FAILURE (HCC): Status: ACTIVE | Noted: 2022-04-01

## 2022-04-01 LAB
ABSOLUTE EOS #: 0 K/UL (ref 0–0.4)
ABSOLUTE LYMPH #: 2.2 K/UL (ref 1–4.8)
ABSOLUTE MONO #: 0.5 K/UL (ref 0.1–1.2)
ANION GAP SERPL CALCULATED.3IONS-SCNC: 11 MMOL/L (ref 9–17)
BASOPHILS # BLD: 1 % (ref 0–2)
BASOPHILS ABSOLUTE: 0 K/UL (ref 0–0.2)
BUN BLDV-MCNC: 15 MG/DL (ref 8–23)
CALCIUM SERPL-MCNC: 8.7 MG/DL (ref 8.6–10.4)
CHLORIDE BLD-SCNC: 99 MMOL/L (ref 98–107)
CO2: 25 MMOL/L (ref 20–31)
CREAT SERPL-MCNC: 1.41 MG/DL (ref 0.7–1.2)
CULTURE: NORMAL
EOSINOPHILS RELATIVE PERCENT: 0 % (ref 1–4)
GFR AFRICAN AMERICAN: >60 ML/MIN
GFR NON-AFRICAN AMERICAN: 50 ML/MIN
GFR SERPL CREATININE-BSD FRML MDRD: ABNORMAL ML/MIN/{1.73_M2}
GLUCOSE BLD-MCNC: 91 MG/DL (ref 70–99)
HCT VFR BLD CALC: 29.5 % (ref 41–53)
HEMOGLOBIN: 9.8 G/DL (ref 13.5–17.5)
LV EF: 43 %
LVEF MODALITY: NORMAL
LYMPHOCYTES # BLD: 32 % (ref 24–44)
MCH RBC QN AUTO: 25.7 PG (ref 26–34)
MCHC RBC AUTO-ENTMCNC: 33.4 G/DL (ref 31–37)
MCV RBC AUTO: 76.8 FL (ref 80–100)
MONOCYTES # BLD: 8 % (ref 2–11)
PDW BLD-RTO: 17.9 % (ref 12.5–15.4)
PLATELET # BLD: 283 K/UL (ref 140–450)
PMV BLD AUTO: 7.1 FL (ref 6–12)
POTASSIUM SERPL-SCNC: 4.1 MMOL/L (ref 3.7–5.3)
RBC # BLD: 3.83 M/UL (ref 4.5–5.9)
SEG NEUTROPHILS: 59 % (ref 36–66)
SEGMENTED NEUTROPHILS ABSOLUTE COUNT: 4.2 K/UL (ref 1.8–7.7)
SODIUM BLD-SCNC: 135 MMOL/L (ref 135–144)
SPECIMEN DESCRIPTION: NORMAL
WBC # BLD: 7 K/UL (ref 3.5–11)

## 2022-04-01 PROCEDURE — 99223 1ST HOSP IP/OBS HIGH 75: CPT | Performed by: INTERNAL MEDICINE

## 2022-04-01 PROCEDURE — 97166 OT EVAL MOD COMPLEX 45 MIN: CPT

## 2022-04-01 PROCEDURE — 80048 BASIC METABOLIC PNL TOTAL CA: CPT

## 2022-04-01 PROCEDURE — 2580000003 HC RX 258: Performed by: STUDENT IN AN ORGANIZED HEALTH CARE EDUCATION/TRAINING PROGRAM

## 2022-04-01 PROCEDURE — 94664 DEMO&/EVAL PT USE INHALER: CPT

## 2022-04-01 PROCEDURE — 94761 N-INVAS EAR/PLS OXIMETRY MLT: CPT

## 2022-04-01 PROCEDURE — 97535 SELF CARE MNGMENT TRAINING: CPT

## 2022-04-01 PROCEDURE — 94640 AIRWAY INHALATION TREATMENT: CPT

## 2022-04-01 PROCEDURE — 93306 TTE W/DOPPLER COMPLETE: CPT

## 2022-04-01 PROCEDURE — 97162 PT EVAL MOD COMPLEX 30 MIN: CPT

## 2022-04-01 PROCEDURE — 76770 US EXAM ABDO BACK WALL COMP: CPT

## 2022-04-01 PROCEDURE — 99239 HOSP IP/OBS DSCHRG MGMT >30: CPT | Performed by: STUDENT IN AN ORGANIZED HEALTH CARE EDUCATION/TRAINING PROGRAM

## 2022-04-01 PROCEDURE — 6360000002 HC RX W HCPCS: Performed by: STUDENT IN AN ORGANIZED HEALTH CARE EDUCATION/TRAINING PROGRAM

## 2022-04-01 PROCEDURE — 97116 GAIT TRAINING THERAPY: CPT

## 2022-04-01 PROCEDURE — 85025 COMPLETE CBC W/AUTO DIFF WBC: CPT

## 2022-04-01 PROCEDURE — 6370000000 HC RX 637 (ALT 250 FOR IP): Performed by: STUDENT IN AN ORGANIZED HEALTH CARE EDUCATION/TRAINING PROGRAM

## 2022-04-01 PROCEDURE — 2700000000 HC OXYGEN THERAPY PER DAY

## 2022-04-01 RX ORDER — FUROSEMIDE 20 MG/1
20 TABLET ORAL DAILY
Qty: 60 TABLET | Refills: 3 | Status: SHIPPED | OUTPATIENT
Start: 2022-04-02 | End: 2022-05-26 | Stop reason: ALTCHOICE

## 2022-04-01 RX ORDER — FUROSEMIDE 20 MG/1
20 TABLET ORAL DAILY
Status: DISCONTINUED | OUTPATIENT
Start: 2022-04-01 | End: 2022-04-01 | Stop reason: HOSPADM

## 2022-04-01 RX ORDER — CEPHALEXIN 500 MG/1
500 CAPSULE ORAL 2 TIMES DAILY
Qty: 14 CAPSULE | Refills: 0 | Status: SHIPPED | OUTPATIENT
Start: 2022-04-01 | End: 2022-04-01 | Stop reason: SDUPTHER

## 2022-04-01 RX ORDER — ALBUTEROL SULFATE 90 UG/1
2 AEROSOL, METERED RESPIRATORY (INHALATION) EVERY 6 HOURS PRN
Status: DISCONTINUED | OUTPATIENT
Start: 2022-04-01 | End: 2022-04-01 | Stop reason: HOSPADM

## 2022-04-01 RX ORDER — FUROSEMIDE 20 MG/1
20 TABLET ORAL DAILY
Qty: 60 TABLET | Refills: 3 | Status: SHIPPED | OUTPATIENT
Start: 2022-04-02 | End: 2022-04-01

## 2022-04-01 RX ORDER — ALBUTEROL SULFATE 90 UG/1
2 AEROSOL, METERED RESPIRATORY (INHALATION) EVERY 6 HOURS PRN
Qty: 18 G | Refills: 3 | Status: SHIPPED | OUTPATIENT
Start: 2022-04-01 | End: 2022-04-01

## 2022-04-01 RX ORDER — CEPHALEXIN 500 MG/1
500 CAPSULE ORAL 2 TIMES DAILY
Qty: 14 CAPSULE | Refills: 0 | Status: SHIPPED | OUTPATIENT
Start: 2022-04-01 | End: 2022-04-08

## 2022-04-01 RX ORDER — DEXAMETHASONE 1 MG
1 TABLET ORAL
Status: DISCONTINUED | OUTPATIENT
Start: 2022-04-01 | End: 2022-04-01 | Stop reason: HOSPADM

## 2022-04-01 RX ORDER — HEPARIN SODIUM (PORCINE) LOCK FLUSH IV SOLN 100 UNIT/ML 100 UNIT/ML
500 SOLUTION INTRAVENOUS PRN
Status: DISCONTINUED | OUTPATIENT
Start: 2022-04-01 | End: 2022-04-01 | Stop reason: HOSPADM

## 2022-04-01 RX ORDER — ALBUTEROL SULFATE 90 UG/1
2 AEROSOL, METERED RESPIRATORY (INHALATION) EVERY 6 HOURS PRN
Qty: 18 G | Refills: 3 | Status: SHIPPED | OUTPATIENT
Start: 2022-04-01 | End: 2022-09-01

## 2022-04-01 RX ADMIN — HEPARIN 500 UNITS: 100 SYRINGE at 17:47

## 2022-04-01 RX ADMIN — APIXABAN 5 MG: 5 TABLET, FILM COATED ORAL at 08:21

## 2022-04-01 RX ADMIN — TAMSULOSIN HYDROCHLORIDE 0.4 MG: 0.4 CAPSULE ORAL at 08:22

## 2022-04-01 RX ADMIN — PANTOPRAZOLE SODIUM 40 MG: 40 TABLET, DELAYED RELEASE ORAL at 06:09

## 2022-04-01 RX ADMIN — DEXAMETHASONE 1 MG: 1 TABLET ORAL at 12:18

## 2022-04-01 RX ADMIN — ALBUTEROL SULFATE 2 PUFF: 90 AEROSOL, METERED RESPIRATORY (INHALATION) at 16:12

## 2022-04-01 RX ADMIN — CEFTRIAXONE SODIUM 1000 MG: 1 INJECTION, POWDER, FOR SOLUTION INTRAMUSCULAR; INTRAVENOUS at 15:45

## 2022-04-01 RX ADMIN — FUROSEMIDE 20 MG: 20 TABLET ORAL at 15:46

## 2022-04-01 RX ADMIN — BUPROPION HYDROCHLORIDE 300 MG: 150 TABLET, FILM COATED, EXTENDED RELEASE ORAL at 08:22

## 2022-04-01 RX ADMIN — SODIUM CHLORIDE, PRESERVATIVE FREE 10 ML: 5 INJECTION INTRAVENOUS at 08:22

## 2022-04-01 RX ADMIN — FINASTERIDE 5 MG: 5 TABLET, FILM COATED ORAL at 08:21

## 2022-04-01 RX ADMIN — ATORVASTATIN CALCIUM 20 MG: 10 TABLET, FILM COATED ORAL at 08:21

## 2022-04-01 RX ADMIN — LORAZEPAM 1 MG: 1 TABLET ORAL at 17:47

## 2022-04-01 ASSESSMENT — PAIN SCALES - GENERAL
PAINLEVEL_OUTOF10: 0

## 2022-04-01 NOTE — CARE COORDINATION
Case Management Initial Discharge Plan  Artemio Henao,         Met with:patient to discuss discharge plans. Information verified: address, contacts, phone number, , insurance Yes  Insurance Provider: Vivi Prieto    Emergency Contact/Next of Kin name & number: Derrell Castillo (Other) [phone]   Who are involved in patient's support system? friends    PCP: Eyad Griffin MD  Date of last visit: past year       Discharge Planning    Living Arrangements:  Spouse/Significant Other     Home has 3 stories  6 stairs to climb to get into front door, 1 flight stairs to climb to reach second floor  Location of bedroom/bathroom in home 1    Patient able to perform ADL's:Independent    Current Services (outpatient & in home) none  DME equipment: cane  DME provider:     Is patient receiving oral anticoagulation therapy? Eliquis    If indicated:   Physician managing anticoagulation treatment: oncology  Where does patient obtain lab work for ATC treatment? Does patient have any issues/concerns obtaining medications? No  If yes, what are patient's concerns? Is there a preferred Pharmacy after hours or on weekends? No  Potential Assistance Needed:  N/A    Patient agreeable to home care: No  [place] of choice provided:  no    Prior SNF/Rehab Placement and Facility: no  Agreeable to SNF/Rehab: No  [place] of choice provided: no     Evaluation: no    Expected Discharge date:  22    Patient expects to be discharged to: If home: is the family and/or caregiver wiling & able to provide support at home? yes  Who will be providing this support? s/o    Follow Up Appointment: Best Day/ Time: Monday PM    Transportation provider: self  Transportation arrangements needed for discharge: No    Readmission Risk              Risk of Unplanned Readmission:  16           Does patient have a readmission risk score greater than 14?: No  If yes, follow-up appointment must be made within 7 days of discharge. Goals of Care: better breathing      Educated patient on transitional options, provided freedom of choice and are agreeable with plan      Discharge Plan: home with s/o - follow for home O2          Electronically signed by Bassam Mcdonald RN on 4/1/22 at 10:54 AM EDT

## 2022-04-01 NOTE — PLAN OF CARE
Problem: Falls - Risk of:  Goal: Will remain free from falls  Description: Will remain free from falls  3/31/2022 2135 by Krysta Flaherty RN  Outcome: Ongoing     Problem: Falls - Risk of:  Goal: Absence of physical injury  Description: Absence of physical injury  3/31/2022 2135 by Krysta Flaherty RN  Outcome: Ongoing     Problem: Skin Integrity:  Goal: Will show no infection signs and symptoms  Description: Will show no infection signs and symptoms  3/31/2022 2135 by Krysta Flaherty RN  Outcome: Ongoing     Problem: Skin Integrity:  Goal: Absence of new skin breakdown  Description: Absence of new skin breakdown  3/31/2022 2135 by Krysta Flaherty RN  Outcome: Ongoing     Problem: Infection:  Goal: Will remain free from infection  Description: Will remain free from infection  3/31/2022 2135 by Krysta Flaherty RN  Outcome: Ongoing     Problem: Safety:  Goal: Free from intentional harm  Description: Free from intentional harm  3/31/2022 2135 by Krysta Flaherty RN  Outcome: Ongoing     Problem: Daily Care:  Goal: Daily care needs are met  Description: Daily care needs are met  3/31/2022 2135 by Krysta Flaherty RN  Outcome: Ongoing     Problem: Pain:  Goal: Patient's pain/discomfort is manageable  Description: Patient's pain/discomfort is manageable  3/31/2022 2135 by Krysta Flaherty RN  Outcome: Ongoing     Problem: Skin Integrity:  Goal: Skin integrity will stabilize  Description: Skin integrity will stabilize  3/31/2022 2135 by Krysta Flaherty RN  Outcome: Ongoing     Problem: Discharge Planning:  Goal: Patients continuum of care needs are met  Description: Patients continuum of care needs are met  3/31/2022 2135 by Krysta Flaherty RN  Outcome: Ongoing

## 2022-04-01 NOTE — CONSULTS
Today's Date: 4/1/2022  Patient Name: Keith King  Date of admission: 3/31/2022 11:14 AM  Patient's age: 74 y. o., 1955  Admission Dx: Acute respiratory failure with hypoxia ([address] [J96.01]  Pulmonary embolism without acute cor pulmonale, unspecified chronicity, unspecified pulmonary embolism type ([address] [I26.99]    Reason for Consult: management recommendations  Requesting Physician: Martha Leo MD    CHIEF COMPLAINT: Shortness of breath. Fatigue. History Obtained From:  patient, electronic medical record    HISTORY OF PRESENT ILLNESS:      The patient is a 74 y.o.  male who is admitted to the hospital for chief complaints of shortness of breath and hypoxia. Patient is well-known to our practice. Patient was diagnosed with urothelial bladder cancer back in December 2021. Patient is currently on neoadjuvant chemotherapy. Patient was seen in office for cycle 2-day 1 on 3/24. Patient was also recently hospitalized at [facility] with bilateral pulmonary embolism. Patient was discharged home on Eliquis. Patient went to see his primary oncologist yesterday was noted to be short of breath especially with exertion. Patient was also noted to be hypoxic. Therefore he was admitted to the hospital.  Patient had a CTA done which showed small embolism in the right main pulmonary artery extending into the right upper lobe artery. No images were available for comparison. However comparing the report from [facility] from 3/17 it seems the clot burden has decreased. CTA chest showed bilateral pulmonary emboli right worse than left on the CT from 3/17. Patient is currently cycle 2-day 8. Day 8 of treatment has been canceled. Patient's hemoglobin is 9.8. Platelet count is 932. WBC count of 7.0. Creatinine is 1.4.       Past Medical History:   has a past medical history of Arthritis, BPH with obstruction/lower urinary tract symptoms, Caffeine use, Cancer ([address] Depression, GERD (gastroesophageal reflux disease), High cholesterol, Kidney calculi, and Sleep apnea. Past Surgical History:   has a past surgical history that includes Knee arthroscopy; Cardiac catheterization; Colonoscopy; and IR PORT PLACEMENT > 5 YEARS (2/25/2022). Medications:    Prior to Admission medications    Medication Sig Start Date End Date Taking? Authorizing Provider   apixaban (ELIQUIS) 5 MG TABS tablet Take 1 tablet by mouth 2 times daily 3/31/22   Sarah Grayson MD   LORazepam (ATIVAN) 1 MG tablet Take 1 tablet by mouth every 6 hours as needed for Anxiety (Take 1 pill at betime as needed) for up to 120 doses. 3/31/22 4/30/22  Sarah Grayson MD   dexamethasone (DECADRON) 1 MG tablet Take 1 tablet by mouth daily (with breakfast) 3/31/22 4/30/22  Gregor Grayson MD   phenazopyridine (PYRIDIUM) 200 MG tablet Take 1 tablet by mouth 3 times daily as needed for Pain 3/17/22   Cesar Luke MD   HYDROcodone-acetaminophen (NORCO) 5-325 MG per tablet Take 1 tablet by mouth every 8 hours as needed for Pain for up to 30 days. 3/4/22 4/3/22  Sarah Grayson MD   lidocaine-prilocaine (EMLA) 2.5-2.5 % cream Apply topically Daily as needed.  2/10/22   Sarah Grayson MD   ondansetron (ZOFRAN ODT) 8 MG TBDP disintegrating tablet Take 1 tablet by mouth every 8 hours as needed for Nausea or Vomiting My use sublingually if nauseated 2/10/22   Rhonda William MD   omeprazole (PRILOSEC) 20 MG delayed release capsule Take 1 capsule by mouth daily 2/10/22   Rhonda William MD   finasteride (PROSCAR) 5 MG tablet Take 5 mg by mouth daily    Historical Provider, MD   tamsulosin (FLOMAX) 0.4 MG capsule Take 0.4 mg by mouth daily    Historical Provider, MD   simvastatin (ZOCOR) 40 MG tablet Take 40 mg by mouth nightly    Historical Provider, MD   buPROPion (WELLBUTRIN XL) 300 MG extended release tablet Take 300 mg by mouth every morning    Historical Provider, MD     Current Facility-Administered Medications   Medication Dose Route Frequency Provider Last Rate Last Admin    0.9 % sodium chloride bolus  80 mL IntraVENous Once Venkatesh Cho MD   Stopped at 03/31/22 1248    sodium chloride flush 0.9 % injection 10 mL  10 mL IntraVENous PRN Venkatesh Cho MD   10 mL at 03/31/22 1248    apixaban (ELIQUIS) tablet 5 mg  5 mg Oral BID John Plummer MD   5 mg at 04/01/22 3303    buPROPion (WELLBUTRIN XL) extended release tablet 300 mg  300 mg Oral QAM John Plummer MD   300 mg at 04/01/22 4014    finasteride (PROSCAR) tablet 5 mg  5 mg Oral Daily John Plummer MD   5 mg at 04/01/22 0821    pantoprazole (PROTONIX) tablet 40 mg  40 mg Oral QA AC John Plummer MD   40 mg at 04/01/22 9313    phenazopyridine (PYRIDIUM) tablet 200 mg  200 mg Oral TID PRN John Plummer MD        atorvastatin (LIPITOR) tablet 20 mg  20 mg Oral Daily John Plummer MD   20 mg at 04/01/22 0821    tamsulosin (FLOMAX) capsule 0.4 mg  0.4 mg Oral Daily John Plummer MD   0.4 mg at 04/01/22 0015    sodium chloride flush 0.9 % injection 10 mL  10 mL IntraVENous 2 times per day John Plummer MD   10 mL at 04/01/22 5011    sodium chloride flush 0.9 % injection 10 mL  10 mL IntraVENous PRN John Plummer MD        0.9 % sodium chloride infusion   IntraVENous PRN John Plummer MD        ondansetron (ZOFRAN-ODT) disintegrating tablet 4 mg  4 mg Oral Q8H PRN John Plummer MD        Or    ondansetron [facility]) injection 4 mg  4 mg IntraVENous Q6H PRN John Plummer MD   4 mg at 03/31/22 1636    polyethylene glycol (GLYCOLAX) packet 17 g  17 g Oral Daily PRN John Plummer MD        acetaminophen (TYLENOL) tablet 650 mg  650 mg Oral Q6H PRN John Plummer MD        Or   [employer] acetaminophen (TYLENOL) suppository 650 mg  650 mg Rectal Q6H PRN John Plummer MD        cefTRIAXone (ROCEPHIN) 1000 mg IVPB in 50 mL D5W minibag  1,000 mg IntraVENous Q24H John Plummer MD        LORazepam (ATIVAN) tablet 1 mg  1 mg Oral Q6H PRN Mckayla Duong MD   1 mg at 03/31/22 1636    oxyCODONE-acetaminophen (PERCOCET) 5-325 MG per tablet 1 tablet  1 tablet Oral Q4H PRN Mckayla Duong MD        Or    oxyCODONE-acetaminophen (PERCOCET) 5-325 MG per tablet 2 tablet  2 tablet Oral Q4H PRN Mckayla Duong MD   2 tablet at 03/31/22 1636    dexamethasone (DECADRON) tablet 1 mg  1 mg Oral Daily with breakfast Mckayla Duong MD           Allergies:  Patient has no known allergies. Social History:   reports that he quit smoking about 30 years ago. He has never used smokeless tobacco. He reports that he does not drink alcohol and does not use drugs. Family History: family history includes Cancer in his father; Urolithiasis in his father. REVIEW OF SYSTEMS:      Constitutional: No fever or chills. No night sweats, no weight loss   Eyes: No eye discharge, double vision, or eye pain   HEENT: negative for sore mouth, sore throat, hoarseness and voice change   Respiratory: negative for cough , sputum, dyspnea, wheezing, hemoptysis, chest pain. Positive shortness of breath with exertion  Cardiovascular: negative for chest pain, dyspnea, palpitations, orthopnea, PND   Gastrointestinal: negative for nausea, vomiting, diarrhea, constipation, abdominal pain, Dysphagia, hematemesis and hematochezia   Genitourinary: negative for frequency, dysuria, nocturia, urinary incontinence, and hematuria   Integument: negative for rash, skin lesions, bruises.    Hematologic/Lymphatic: negative for easy bruising, bleeding, lymphadenopathy, or petechiae   Endocrine: negative for heat or cold intolerance,weight changes, change in bowel habits and hair loss   Musculoskeletal: negative for myalgias, arthralgias, pain, joint swelling,and bone pain   Neurological: negative for headaches, dizziness, seizures, weakness, numbness    PHYSICAL EXAM:        BP (!) 144/73   Pulse 84   Temp 97.8 °F (36.6 °C) (Oral)   Resp 19   Ht 6' 2\" (1.88 m)   Wt 256 lb 12.8 oz (116.5 kg)   SpO2 98% BMI 32.97 kg/m²    Temp (24hrs), Av.8 °F (36.6 °C), Min:97.6 °F (36.4 °C), Max:98.1 °F (36.7 °C)      General appearance -not in acute distress. Beverly Sharper Positive fatigue. Mental status - alert and cooperative   Eyes - pupils equal and reactive, extraocular eye movements intact   Ears - bilateral TM's and external ear canals normal   Mouth - mucous membranes moist, pharynx normal without lesions   Neck - supple, no significant adenopathy   Lymphatics - no palpable lymphadenopathy, no hepatosplenomegaly   Chest -decreased breathing sounds bilaterally  Heart - normal rate, regular rhythm, normal S1, S2, no murmurs  Abdomen - soft, nontender, nondistended, no masses or organomegaly   Neurological - alert, oriented, normal speech, no focal findings or movement disorder noted   Musculoskeletal - no joint tenderness, deformity or swelling   Extremities - peripheral pulses normal, no pedal edema, no clubbing or cyanosis   Skin - normal coloration and turgor, no rashes, no suspicious skin lesions noted ,      DATA:      Labs:     Results for orders placed or performed during the hospital encounter of 22   COVID-19, Rapid    Specimen: Nasopharyngeal Swab   Result Value Ref Range    Specimen Description . NASOPHARYNGEAL SWAB     SARS-CoV-2, Rapid Not Detected Not Detected   Basic Metabolic Panel   Result Value Ref Range    Glucose 140 (H) 70 - 99 mg/dL    BUN 15 8 - 23 mg/dL    CREATININE 1.49 (H) 0.70 - 1.20 mg/dL    Calcium 8.9 8.6 - 10.4 mg/dL    Sodium 132 (L) 135 - 144 mmol/L    Potassium 4.2 3.7 - 5.3 mmol/L    Chloride 97 (L) 98 - 107 mmol/L    CO2 23 20 - 31 mmol/L    Anion Gap 12 9 - 17 mmol/L    GFR Non-African American 47 (L) >60 mL/min    GFR  57 (L) >60 mL/min    GFR Comment         Brain Natriuretic Peptide   Result Value Ref Range    Pro- <300 pg/mL   CBC with Auto Differential   Result Value Ref Range    WBC 5.6 3.5 - 11.0 k/uL    RBC 4.16 (L) 4.5 - 5.9 m/uL    Hemoglobin 10.7 (L) 13.5 - 17.5 g/dL    Hematocrit 32.2 (L) 41 - 53 %    MCV 77.3 (L) 80 - 100 fL    MCH 25.7 (L) 26 - 34 pg    MCHC 33.3 31 - 37 g/dL    RDW 18.3 (H) 12.5 - 15.4 %    Platelets 741 746 - 416 k/uL    MPV 6.8 6.0 - 12.0 fL    Seg Neutrophils 68 (H) 36 - 66 %    Lymphocytes 26 24 - 44 %    Monocytes 6 2 - 11 %    Eosinophils % 0 (L) 1 - 4 %    Basophils 0 0 - 2 %    Segs Absolute 3.80 1.8 - 7.7 k/uL    Absolute Lymph # 1.50 1.0 - 4.8 k/uL    Absolute Mono # 0.30 0.1 - 1.2 k/uL    Absolute Eos # 0.00 0.0 - 0.4 k/uL    Basophils Absolute 0.00 0.0 - 0.2 k/uL   Urinalysis with Microscopic   Result Value Ref Range    Color, UA Yellow Yellow    Turbidity UA Cloudy (A) Clear    Glucose, Ur NEGATIVE NEGATIVE    Bilirubin Urine NEGATIVE NEGATIVE    Ketones, Urine NEGATIVE NEGATIVE    Specific Gravity, UA 1.025 1.005 - 1.030    Urine Hgb LARGE (A) NEGATIVE    pH, UA 6.0 5.0 - 8.0    Protein, UA 2+ (A) NEGATIVE    Urobilinogen, Urine Normal Normal    Nitrite, Urine NEGATIVE NEGATIVE    Leukocyte Esterase, Urine LARGE (A) NEGATIVE    -          WBC, UA TOO NUMEROUS TO COUNT 0 - 5 /HPF    RBC, UA 50  0 - 2 /HPF    Epithelial Cells UA 0 TO 2 0 - 5 /HPF    Bacteria, UA MODERATE (A) None    Yeast, UA FEW (A) None   Troponin   Result Value Ref Range    Troponin, High Sensitivity 16 0 - 22 ng/L   Iron and TIBC   Result Value Ref Range    Iron 38 (L) 59 - 158 ug/dL    TIBC 180 (L) 250 - 450 ug/dL    Iron Saturation 21 20 - 55 %    UIBC 142 112 - 347 ug/dL   Ferritin   Result Value Ref Range    Ferritin 346 30 - 400 ng/mL   CBC with Auto Differential   Result Value Ref Range    WBC 7.0 3.5 - 11.0 k/uL    RBC 3.83 (L) 4.5 - 5.9 m/uL    Hemoglobin 9.8 (L) 13.5 - 17.5 g/dL    Hematocrit 29.5 (L) 41 - 53 %    MCV 76.8 (L) 80 - 100 fL    MCH 25.7 (L) 26 - 34 pg    MCHC 33.4 31 - 37 g/dL    RDW 17.9 (H) 12.5 - 15.4 %    Platelets 184 947 - 716 k/uL    MPV 7.1 6.0 - 12.0 fL    Seg Neutrophils 59 36 - 66 %    Lymphocytes 32 24 - 44 % Monocytes 8 2 - 11 %    Eosinophils % 0 (L) 1 - 4 %    Basophils 1 0 - 2 %    Segs Absolute 4.20 1.8 - 7.7 k/uL    Absolute Lymph # 2.20 1.0 - 4.8 k/uL    Absolute Mono # 0.50 0.1 - 1.2 k/uL    Absolute Eos # 0.00 0.0 - 0.4 k/uL    Basophils Absolute 0.00 0.0 - 0.2 k/uL   Basic Metabolic Panel w/ Reflex to MG   Result Value Ref Range    Glucose 91 70 - 99 mg/dL    BUN 15 8 - 23 mg/dL    CREATININE 1.41 (H) 0.70 - 1.20 mg/dL    Calcium 8.7 8.6 - 10.4 mg/dL    Sodium 135 135 - 144 mmol/L    Potassium 4.1 3.7 - 5.3 mmol/L    Chloride 99 98 - 107 mmol/L    CO2 25 20 - 31 mmol/L    Anion Gap 11 9 - 17 mmol/L    GFR Non-African American 50 (L) >60 mL/min    GFR African American >60 >60 mL/min    GFR Comment         EKG 12 Lead   Result Value Ref Range    Ventricular Rate 95 BPM    Atrial Rate 95 BPM    P-R Interval 186 ms    QRS Duration 120 ms    Q-T Interval 348 ms    QTc Calculation (Bazett) 437 ms    P Axis 17 degrees    R Axis -34 degrees    T Axis 65 degrees         IMAGING DATA:    XR CHEST (2 VW)    Result Date: 3/17/2022  EXAMINATION: TWO X-RAY VIEWS OF THE CHEST 3/16/2022 5:15 pm COMPARISON: 02/11/2022 HISTORY: ORDERING SYSTEM PROVIDED HISTORY:  SOB (shortness of breath) TECHNOLOGIST PROVIDED HISTORY: Reason for Exam:  Dyspnea x4 months. Hx of COVID 6 months ago, currently having chemo for bladder CA. No trauma. FINDINGS: Cardiac silhouette and mediastinal contours are normal.  Right chest MediPort is noted. The lungs are clear. No focal lung infiltrate. No pleural effusion or pneumothorax. Degenerative changes are noted along the spine. 1. No acute cardiopulmonary disease.      XR CHEST PORTABLE    Result Date: 3/31/2022  EXAMINATION: ONE XRAY VIEW OF THE CHEST 3/31/2022 12:04 pm COMPARISON: 03/16/2022 HISTORY: ORDERING SYSTEM PROVIDED HISTORY: shortness of breath TECHNOLOGIST PROVIDED HISTORY: shortness of breath Reason for Exam: shortness of breath, known PE Relevant Medical/Surgical History: sent by [facility]. FINDINGS: Similar-appearing overlying right IJ approach chest port, tip overlying the cavoatrial region. Cardiomediastinal silhouette appears within normal limits. No focal consolidation or overt pulmonary edema. Costophrenic angles are sharp. No evidence of pneumothorax. No acute osseous abnormalities. No radiographic evidence of an acute cardiopulmonary process. CT CHEST PULMONARY EMBOLISM W CONTRAST    Result Date: 3/31/2022  EXAMINATION: CTA OF THE CHEST 3/31/2022 11:41 am TECHNIQUE: CTA of the chest was performed after the administration of intravenous contrast.  Multiplanar reformatted images are provided for review. MIP images are provided for review. Dose modulation, iterative reconstruction, and/or weight based adjustment of the mA/kV was utilized to reduce the radiation dose to as low as reasonably achievable. COMPARISON: None. HISTORY: ORDERING SYSTEM PROVIDED HISTORY: known PE, shortness of breath, concern for progression of clot TECHNOLOGIST PROVIDED HISTORY: known PE, shortness of breath, concern for progression of clot Decision Support Exception - unselect if not a suspected or confirmed emergency medical condition->Emergency Medical Condition (MA) Reason for Exam: Shortness of breath, known PE FINDINGS: Pulmonary Arteries: Pulmonary arteries are adequately opacified for evaluation. There is a small embolus noted in the the right main pulmonary artery, extending into the right upper lobe artery. Small clot also noted in the the inter lobar artery. There is no evidence of right heart strain. Mediastinum: No evidence of mediastinal lymphadenopathy. The heart and pericardium demonstrate no acute abnormality. There is no acute abnormality of the thoracic aorta. Lungs/pleura: There is a small ground-glass opacity noted in the the left apex. .  No focal consolidation or pulmonary edema. No evidence of pleural effusion or pneumothorax.  Upper Abdomen: Limited images of the upper abdomen are unremarkable. Soft Tissues/Bones: No acute bone or soft tissue abnormality. Small embolus noted in the right main pulmonary artery and extending into the right upper lobe artery. Small clot also noted in the inter lobar artery. No evidence for right heart strain. Small ground-glass opacity in the left apex. I recommend follow-up in 3 months to determine stability or resolution. Case discussed with Dr. Saúl Beltre at 1:24 p.m., 03/31/2022         IMPRESSION:   Primary Problem  Acute respiratory failure with hypoxia [facility])    Active Hospital Problems    Diagnosis Date Noted    Acute respiratory failure with hypoxia ([facility] [address] [address] [J96.01] 03/31/2022    Acute cystitis with hematuria [N30.01] 03/31/2022    Other hyperlipidemia [E78.49] 03/31/2022    Microcytic anemia [D50.9] 03/31/2022    Mild protein-calorie malnutrition ([address] [E44.1] 03/31/2022    Malignant neoplasm of urinary bladder ([facility] [address] [C67.9] 02/10/2022    Urinary retention [R33.9] 02/10/2022    Gross hematuria [R31.0] 11/02/2020    Hypertrophy of prostate with urinary obstruction [N40.1, N13.8] 11/02/2020       Acute bilateral pulmonary embolism-3/17/2022  Dyspnea and hypoxic respiratory failure secondary above  Muscle invasive bladder cancer on neoadjuvant chemotherapy    RECOMMENDATIONS:  1. I personally reviewed results of lab work-up imaging studies and other relevant clinical data. 2. Discussed with Dr. Hollie Davis from internal medicine  3. Reviewed patient's current CT scan compared with the CT scan report from [facility]. It seems clot burden has decreased. It may take as long as 4 weeks for the clot to completely resolve. Continue Eliquis. I do not believe patient has failed Eliquis. 4. Patient continues to complain of shortness of breath with exertion complains of palpitations. Earlier he was not hypoxic with ambulation the patient tells me. I will order an echocardiogram.  I do not believe patient has had an echogram recently.   5. Per records day 8 was canceled for chemotherapy. 6. Patient to be seen by primary oncologist before initiating cycle 3.  7. Continue symptomatic supportive care        Discussed with patient and Nurse. Thank you for asking us to see this patient. Jade Dodge MD          This note is created with the assistance of a speech recognition program.  While intending to generate a document that actually reflects the content of the visit, the document can still have some errors including those of syntax and sound a like substitutions which may escape proof reading. It such instances, actual meaning can be extrapolated by contextual diversion.

## 2022-04-01 NOTE — PROGRESS NOTES
Comprehensive Nutrition Assessment    Type and Reason for Visit:  Initial,Positive Nutrition Screen    Nutrition Recommendations/Plan: Continue current diet. Provide Ensure Enlive milkshake TID to encourage PO intake. Nutrition Assessment:  Pt admitted with hypoxia and PE. Pt hx of bladder ca. Positive nutrition screen recieved for recent weight loss and poor appetite. Pt stated his appetite has been poor since he got COVID in September 2021, pt is also currently receiving tx for bladder ca. Pt stated that he usually eats 1-2 meals per day and some snacks (uses smoothies at home to encourage intake). Pt finds many foods he previously enjoyed make him nauseous now (red meat, bread). Pt stated he has lost 88# since September that that his UBW is 330#. Pt's chart indicates 5% weight loss in the last month. Encourage frequent small meals and nutritional supplementation. Malnutrition Assessment:  Malnutrition Status: Moderate malnutrition    Context:  Acute Illness     Findings of the 6 clinical characteristics of malnutrition:  Energy Intake:  7 - 50% or less of estimated energy requirements for 5 or more days  Weight Loss:  1 - 5% over 1 month     Body Fat Loss:  1 - Mild body fat loss     Muscle Mass Loss:  1 - Mild muscle mass loss    Fluid Accumulation:  No significant fluid accumulation     Strength:  Not Performed    Estimated Daily Nutrient Needs:  Energy (kcal):  5303-1691 kca/day; Weight Used for Energy Requirements:  Current     Protein (g):  139-163 g/day; Weight Used for Protein Requirements:  Current (1.2-1.4 g/kg)        Fluid (ml/day):  0161-5716 ml/day; Method Used for Fluid Requirements:  1 ml/kcal      Nutrition Related Findings:  No edema noted. All labs/meds reviewed. Wounds:  None       Current Nutrition Therapies:    ADULT DIET; Regular  ADULT ORAL NUTRITION SUPPLEMENT; Breakfast, Lunch, Dinner;  Low Calorie/High Protein Oral Supplement    Anthropometric Measures:  · Height: 6' 2\" (188 cm)  · Current Body Weight: 256 lb 13.4 oz (116.5 kg)   · Ideal Body Weight: 190 lbs; % Ideal Body Weight     · BMI: 33  · Adjusted Body Weight:  ; No Adjustment   · BMI Categories: Obese Class 1 (BMI 30.0-34. 9)       Nutrition Diagnosis:   · Inadequate energy intake related to catabolic illness as evidenced by poor intake prior to admission,nausea,weight loss greater than or equal to 5% in 1 month,intake 0-25%      Nutrition Interventions:   Food and/or Nutrient Delivery:  Continue Current Diet,Continue Oral Nutrition Supplement  Nutrition Education/Counseling:  No recommendation at this time   Coordination of Nutrition Care:  Continue to monitor while inpatient,Interdisciplinary Rounds    Goals:  Consume at leat 50% of meals, % of supplement       Nutrition Monitoring and Evaluation:   Behavioral-Environmental Outcomes:  None Identified   Food/Nutrient Intake Outcomes:  Diet Advancement/Tolerance,Food and Nutrient Intake,Supplement Intake  Physical Signs/Symptoms Outcomes:  Biochemical Data,Nutrition Focused Physical Findings,Weight,Nausea or Vomiting     Discharge Planning:     Too soon to determine     Electronically signed by Sofia Jean RD on 4/1/22 at 11:34 AM EDT    LUIS MANUEL OchoaD, RDN, LD  Registered [address] [place] [address] 146  [phone]

## 2022-04-01 NOTE — DISCHARGE SUMMARY
[facility]  Office: [address], DO, Jose Sammi, DO, Cheryl De La O, DO, Evan Issaond Blood, DO, Amanda Coles MD, Jax May MD, Shira Muse MD, Vishal Loredo MD, Hussein Duke MD, Radha Hoffman MD, Augusta Rahman MD, Jeni Lorenz, DO, Magan Alcantara, DO, Ramez Muse MD,  Mauri Garcia, DO, Jose F Chapman MD, Luna Son MD, Mendy Florence MD, Fernando Cortez DO, Mally Moody MD, Dhara Henriquez MD, Conor Wesley, [facility], [facility], [facility], Lugene Nose, CNP, Liz Arriaza, CNS, Detaran Jennifer, CNP, Alpha Grain, CNP, Chirag Fake, CNP, Tommy Ing, CNP, Tato Copeland, CNP, Yi Chilel PA-C, Grace Romo, DNP, Andra Vallejo, [facility], [place], Ti Keeley, CNP, Simone [place], CNP, [place], [facility]         [address] [facility]    Discharge Summary     Patient ID: Yamil Burgos  :  1955   MRN: [id number]     ACCOUNT:  [de-identified]   Patient's PCP: Dickson Roca MD  Admit Date: 3/31/2022   Discharge Date: 2022     Length of Stay: 1  Code Status:  Full Code  Admitting Physician: Luna Son MD  Discharge Physician: Luna Son MD     Active Discharge Diagnoses:     Hospital Problem Lists:  Principal Problem:    Acute respiratory failure with hypoxia ([address]  Active Problems:    Gross hematuria    Hypertrophy of prostate with urinary obstruction    Malignant neoplasm of urinary bladder ([address]    Urinary retention    Acute cystitis with hematuria    Other hyperlipidemia    Microcytic anemia    Mild protein-calorie malnutrition ([address]    Pulmonary embolism without acute cor pulmonale (HCC)    Hypoxia    Dyspnea    Acute systolic heart failure ([address]  Resolved Problems:    * No resolved hospital problems.  *      Admission Condition:  poor     Discharged Condition: fair    Hospital Stay:     Hospital Course:  Yamil Burgos is a 74 y.o. male who was admitted for the management of  Acute respiratory failure with hypoxia [facility]) , presented to ER with Shortness of Breath    60-year-old male with known medical history of high-grade urothelial bladder cancer diagnosed in December 2021, on neoadjuvant chemo, recently diagnosed with bilateral pulmonary embolism around 12 days ago presented to the hospital with shortness of breath.  Patient was at his oncology appointment when he was noted to be short of breath.  Patient had desaturation episode to low 80s on exertion.  Patient states that since his diagnosis of pulmonary embolism he has been feeling more short of breath on exertion.  He has a pulse ox at home and he even dipped down to 70% on exertion, which gradually came up to 97% at rest.  Patient was not tested for home oxygen on exertion while he was discharged last time after diagnosis of pulmonary embolism.  Patient also reports urinary symptoms of urgency, hesitancy, chills, low-grade fever, he was prescribed Bactrim by Dr. Alexi Mccray days ago.  Patient also describes abdominal tenderness in the lower abdomen quadrant and suprapubic region. Patient had 2-3 episodes of cough but no phlegm    Patient was admitted for UTI and acute respiratory failure. Management as follows-    UTI with hematuria-patient responded well with Rocephin IV, switch to Keflex 500 mg twice a day on discharge. Ultrasound renal showed mild right-sided hydronephrosis with known bladder mass. Urology follow-up was given. Plan is for cystectomy outpatient. Acute systolic heart failure-echo showed EF of 40 to 45%, discussed with oncology, plan for outpatient cardiology work-up, patient started on Lasix 20 mg daily. Patient has known urinary retention and now starting Lasix, will repeat BMP in 3 to 4 days to check on kidney function. Acute respiratory failure with pulmonary embolism-continue Eliquis, CT PE scan done shows slight improvement in thrombus load. Patient does not qualify for oxygen for home.   He is very anxious, will give albuterol inhaler for home. Chronic urinary retention-continue home Flomax and finasteride. Significant therapeutic interventions:     Echo  Summary  Technically difficult study  Normal LV size , mild to moderately increased wall thickness. Difficult to assess wall motion abnormalities  Moderately reduced LV systolic function with LVEF 40-45 %. Normal RV size and function. LA and RA appears normal in size. No obvious significant structural valvular abnormality noted. No significant valvular stenosis or regurgitation noted. Normal aortic root dimension. No significant pericardial effusion noted.   IVC not well visualized      Significant Diagnostic Studies:   Labs / Micro:  CBC:   Lab Results   Component Value Date    WBC 7.0 04/01/2022    RBC 3.83 04/01/2022    HGB 9.8 04/01/2022    HCT 29.5 04/01/2022    MCV 76.8 04/01/2022    MCH 25.7 04/01/2022    MCHC 33.4 04/01/2022    RDW 17.9 04/01/2022     04/01/2022     BMP:    Lab Results   Component Value Date    GLUCOSE 91 04/01/2022     04/01/2022    K 4.1 04/01/2022    CL 99 04/01/2022    CO2 25 04/01/2022    ANIONGAP 11 04/01/2022    BUN 15 04/01/2022    CREATININE 1.41 04/01/2022    BUNCRER NOT REPORTED 02/08/2019    CALCIUM 8.7 04/01/2022    LABGLOM 50 04/01/2022    GFRAA >60 04/01/2022    GFR      04/01/2022     HFP:    Lab Results   Component Value Date    PROT 6.4 03/31/2022     CMP:    Lab Results   Component Value Date    GLUCOSE 91 04/01/2022     04/01/2022    K 4.1 04/01/2022    CL 99 04/01/2022    CO2 25 04/01/2022    BUN 15 04/01/2022    CREATININE 1.41 04/01/2022    ANIONGAP 11 04/01/2022    ALKPHOS 81 03/31/2022    ALT 23 03/31/2022    AST 17 03/31/2022    BILITOT 0.16 03/31/2022    LABALBU 3.1 03/31/2022    ALBUMIN 0.9 03/31/2022    LABGLOM 50 04/01/2022    GFRAA >60 04/01/2022    GFR      04/01/2022    PROT 6.4 03/31/2022    CALCIUM 8.7 04/01/2022     PT/INR:    Lab Results   Component Value Date    PROTIME 13.6 02/25/2022    INR 1.1 02/25/2022        Radiology:  US RENAL COMPLETE    Result Date: 4/1/2022  1. Right-sided bladder mass. 50% postvoid residual urine. Bladder trabeculation. 2.  Mild right hydronephrosis. Papillary nephroliths right kidney. 3.  Left parapelvic cyst.     XR CHEST PORTABLE    Result Date: 3/31/2022  No radiographic evidence of an acute cardiopulmonary process. CT CHEST PULMONARY EMBOLISM W CONTRAST    Result Date: 3/31/2022  Small embolus noted in the right main pulmonary artery and extending into the right upper lobe artery. Small clot also noted in the inter lobar artery. No evidence for right heart strain. Small ground-glass opacity in the left apex. I recommend follow-up in 3 months to determine stability or resolution. Case discussed with Dr. Rachel Gonzalez at 1:24 p.m., 03/31/2022       Consultations:    Consults:     Final Specialist Recommendations/Findings:   IP CONSULT TO CASE MANAGEMENT  IP CONSULT TO ONCOLOGY      The patient was seen and examined on day of discharge and this discharge summary is in conjunction with any daily progress note from day of discharge.     Discharge plan:     Disposition: Home    Physician Follow Up:     Eyad Griffin MD  [address] [facility] [place]  [phone]    Schedule an appointment as soon as possible for a visit in 3 days      Stephenie Hashimoto, MD  [address] [facility] [address] [facility]  [phone]    Schedule an appointment as soon as possible for a visit in 3 days      Ryanne White MD  [address] [address]  [phone]    Schedule an appointment as soon as possible for a visit in 3 days  bladder cancer, mild rt hydronephrosis    Zaki Batres MD  [address] [facility] [address] [facility] [address] [phone]    Schedule an appointment as soon as possible for a visit in 3 days         Requiring Further Evaluation/Follow Up POST HOSPITALIZATION/Incidental Findings: bmp repeat  Cardio follow up    Diet: regular diet    Activity: As tolerated    Instructions to Patient:  Take Keflex 500 mg twice a day for 7 days  Follow-up with urine culture and sensitivity report  Ultrasound showed mild right hydronephrosis and known right-sided bladder mass with 50% post void residual urine. Patient will need follow-up with urology and oncology. Will recommend repeat ultrasound to follow-up on the right hydronephrosis outpatient. Repeat bmp in 3 days    Started on Lasix 20 mg daily  New systolic heart failure with EF of 40 to 45%, discussed with Dr. Daniel Whittington for cardiology evaluation outpatient. Referral for cardiology given. Rescue inhaler albuterol given. continue eliquis for PE    Discharge Medications:      Medication List      START taking these medications    albuterol sulfate  (90 Base) MCG/ACT inhaler  Inhale 2 puffs into the lungs every 6 hours as needed for Wheezing     apixaban 5 MG Tabs tablet  Commonly known as: Eliquis  Take 1 tablet by mouth 2 times daily     cephALEXin 500 MG capsule  Commonly known as: KEFLEX  Take 1 capsule by mouth 2 times daily for 7 days     furosemide 20 MG tablet  Commonly known as: LASIX  Take 1 tablet by mouth daily  Start taking on: April 2, 2022        CONTINUE taking these medications    buPROPion 300 MG extended release tablet  Commonly known as: WELLBUTRIN XL     dexamethasone 1 MG tablet  Commonly known as: DECADRON  Take 1 tablet by mouth daily (with breakfast)     finasteride 5 MG tablet  Commonly known as: PROSCAR     HYDROcodone-acetaminophen 5-325 MG per tablet  Commonly known as: Norco  Take 1 tablet by mouth every 8 hours as needed for Pain for up to 30 days. lidocaine-prilocaine 2.5-2.5 % cream  Commonly known as: EMLA  Apply topically Daily as needed. LORazepam 1 MG tablet  Commonly known as: Ativan  Take 1 tablet by mouth every 6 hours as needed for Anxiety (Take 1 pill at betime as needed) for up to 120 doses.      omeprazole 20 MG delayed release capsule  Commonly known as: PriLOSEC  Take 1 capsule by mouth daily     ondansetron 8 MG Tbdp disintegrating tablet  Commonly known as: Zofran ODT  Take 1 tablet by mouth every 8 hours as needed for Nausea or Vomiting My use sublingually if nauseated     phenazopyridine 200 MG tablet  Commonly known as: PYRIDIUM  Take 1 tablet by mouth 3 times daily as needed for Pain     simvastatin 40 MG tablet  Commonly known as: ZOCOR     tamsulosin 0.4 MG capsule  Commonly known as: FLOMAX           Where to Get Your Medications      These medications were sent to [address]    Phone: [phone]   albuterol sulfate  (90 Base) MCG/ACT inhaler  cephALEXin 500 MG capsule  furosemide 20 MG tablet     You can get these medications from any pharmacy    Bring a paper prescription for each of these medications  apixaban 5 MG Tabs tablet         Discharge Procedure Orders   Basic Metabolic Panel   Standing Status: Future Standing Exp. Date: 04/01/23   Order Comments: Follow up with pcp       Time Spent on discharge is  33 mins in patient examination, evaluation, counseling as well as medication reconciliation, prescriptions for required medications, discharge plan and follow up. Electronically signed by   Annel Garza MD  4/1/2022  4:05 PM      Thank you Dr. Misa Long MD for the opportunity to be involved in this patient's care.

## 2022-04-01 NOTE — PLAN OF CARE
2 puffs albuterol mdi given per order. Breath sounds diminished. Home Oxygen Evaluation completed. Resting SpO2 on room air = 97% SpO2 on room air with exercise = 92% Will continue to monitor respiratory status.

## 2022-04-01 NOTE — PROGRESS NOTES
Occupational Therapy   Occupational Therapy Initial Assessment  Date: 2022   Patient Name: Alvarado Rivas  MRN: [id number]     : 1955    Date of Service: 2022    Chief Complaint   Patient presents with    Shortness of Breath     Discharge Recommendations:  No further occupational therapy recommended at discharge. Assessment   Performance deficits / Impairments: Decreased functional mobility ; Decreased ADL status; Decreased safe awareness;Decreased high-level IADLs;Decreased endurance  Prognosis: Good  Decision Making: Medium Complexity  OT Education: OT Role;Plan of Care (Activity Promotion, Safety Awareness/Fall Prevention, Energy Conservation Techniques/Pursed Lip Breathing Techniques/Activity Pacing; fair return)  REQUIRES OT FOLLOW UP: Yes  Activity Tolerance  Activity Tolerance: Patient limited by fatigue (and labored breathing)  Safety Devices  Safety Devices in place: Yes  Type of devices: Call light within reach;Nurse notified; Left in bed  Restraints  Initially in place: No           Patient Diagnosis(es): The primary encounter diagnosis was Pulmonary embolism without acute cor pulmonale, unspecified chronicity, unspecified pulmonary embolism type ([address] Diagnoses of Hypoxia and Acute cystitis with hematuria were also pertinent to this visit. has a past medical history of Arthritis, BPH with obstruction/lower urinary tract symptoms, Caffeine use, Cancer ([address] Depression, GERD (gastroesophageal reflux disease), High cholesterol, Kidney calculi, and Sleep apnea. has a past surgical history that includes Knee arthroscopy; Cardiac catheterization; Colonoscopy; and IR PORT PLACEMENT > 5 YEARS (2022). Restrictions  Restrictions/Precautions  Restrictions/Precautions: Fall Risk,Up as Tolerated  Required Braces or Orthoses?: No  Position Activity Restriction  Other position/activity restrictions:  Up with assistance    Subjective   General  Patient assessed for rehabilitation Balance  Sitting Balance: Stand by assistance  Standing Balance: Contact guard assistance  Standing Balance  Time: ~2 minutes;~5 minutes  Activity: functional mobility to/from bathroom and toileting tasks; functional mobility within hospital room/hallway  Comments: Increased effort to perform with complaint of fatigue/dizziness/labored breathing following minimal exertion requiring 2x standing rest breaks and 1x prolonged seated rest break throughout. Cueing required throughout to incorporate rest breaks and pursed lip breathing techniques with fair return. SPO2 maintained above 92%, HR increased to 125bpms during standing/mobility tasks on room air    Functional Mobility  Functional - Mobility Device: Cane (quad cane)  Activity: To/from bathroom (and within hospital room/hallway)  Assist Level: Contact guard assistance  Functional Mobility Comments: SBA-CGA with use of quad cane throughout; pt steady with no LOB    ADL  Feeding: Independent  Grooming: Stand by assistance  UE Bathing: Stand by assistance  LE Bathing: Contact guard assistance  UE Dressing: Stand by assistance (seated EOB to do/don hospital gown)  LE Dressing: Contact guard assistance  Toileting: Contact guard assistance (for LB clothing mngt and standing balane while standing at toilet without UE support)     Tone RUE  RUE Tone: Normotonic  Tone LUE  LUE Tone: Normotonic  Coordination  Movements Are Fluid And Coordinated: Yes        Bed mobility  Supine to Sit: Minimal assistance (HOB raised ~15*, no use of bed rail however pt with use of writer's hand for assist with trunk progression)  Sit to Supine: Contact guard assistance  Scooting: Contact guard assistance  Comment: Increased effort to perform. Pt with report of dizziness upon reaching EOB and required cueing to incorporate breathing techniques and to keep head up/eyes open, dizziness resolved following ~45 seconds.      Transfers  Sit to stand: Stand by assistance  Stand to sit: Stand by assistance  Transfer Comments: Min VCs for safety awareness as pt with quick/impulsive movements        Cognition  Overall Cognitive Status: Exceptions  Safety Judgement: Decreased awareness of need for assistance  Problem Solving: Assistance required to generate solutions;Assistance required to identify errors made;Assistance required to correct errors made  Insights: Decreased awareness of deficits           Sensation  Overall Sensation Status: WFL (Pt denies any numbness/tingling)        LUE AROM (degrees)  LUE AROM : WFL  Left Hand AROM (degrees)  Left Hand AROM: WFL  RUE AROM (degrees)  RUE AROM : WFL  Right Hand AROM (degrees)  Right Hand AROM: WFL  LUE Strength  Gross LUE Strength: WFL (grossly 4+/5)  L Hand General: 4+/5  RUE Strength  Gross RUE Strength: WFL (grossly 4+/5)  R Hand General: 4+/5         Plan   Plan  Times per week: 5-6x/wk  Times per day: Daily  Current Treatment Recommendations: Balance Training,Functional Mobility Training,Endurance Training,Safety Education & Training,Equipment Evaluation, Education, & procurement,Patient/Caregiver Education & Training,Self-Care / ADL,Home Management Training    AM-PAC Score   -[facility] Inpatient Daily Activity Raw Score: 19 (04/01/22 1048)  AM-PAC Inpatient ADL T-Scale Score : 40.22 (04/01/22 1048)  ADL Inpatient CMS 0-100% Score: 42.8 (04/01/22 1048)  ADL Inpatient CMS G-Code Modifier : CK (04/01/22 1048)    Goals  Short term goals  Time Frame for Short term goals: 14 visits  Short term goal 1: Pt will perform ADL tasks independently  Short term goal 2: Pt will perform functional transfers/functional mobility with mod IND using least restrictive AD  Short term goal 3: Pt will independently demo good safety awareness during engagment in all ADLs and functional transfers/functional mobility  Short term goal 4: Pt will demo 10+ minutes standing tolerance with use of least restrictive AD for increased participation in ADL/IADL tasks       Therapy Time Individual Concurrent Group Co-treatment   Time In 0935         Time Out 1006         Minutes 31         Timed Code Treatment Minutes: [address], OTR/L

## 2022-04-01 NOTE — PROGRESS NOTES
Physical Therapy    Facility/Department: [facility] SURG ICU  Initial Assessment    NAME: Simone Collins  : 1955  MRN: [id number]    Date of Service: 2022  Chief Complaint   Patient presents with    Shortness of Breath       Discharge Recommendations:  Patient would benefit from continued therapy after discharge   PT Equipment Recommendations  Equipment Needed: Yes  Mobility Devices: Therisa Vargas: Rollator (4 Wheeled)  Other: Pt would likely benefit from a rollator secondary to endurance deficits    Assessment   Body structures, Functions, Activity limitations: Decreased functional mobility ; Decreased balance;Decreased endurance  Assessment: Pt with mild mobility impairments - most limited by his increased work of breathing and overall decreased endurance. Pt able to ambulate a household distance with his quad cane and pushing IV (at his request)- and would likely benefit from use of a RW vs rollator secondary to endurance deficits. Pt may benefit from OP PT for endurance and balance training pending his overall improvement in mobility at discharge. Prognosis: Good  Decision Making: Medium Complexity  PT Education: Goals;PT Role;Plan of Care;Transfer Training;Functional Mobility Training;Gait Training;Energy Conservation  REQUIRES PT FOLLOW UP: Yes  Activity Tolerance  Activity Tolerance: Patient limited by endurance; Patient limited by fatigue       Patient Diagnosis(es): The primary encounter diagnosis was Pulmonary embolism without acute cor pulmonale, unspecified chronicity, unspecified pulmonary embolism type ([address] Diagnoses of Hypoxia and Acute cystitis with hematuria were also pertinent to this visit. has a past medical history of Arthritis, BPH with obstruction/lower urinary tract symptoms, Caffeine use, Cancer ([address] Depression, GERD (gastroesophageal reflux disease), High cholesterol, Kidney calculi, and Sleep apnea.    has a past surgical history that includes Knee arthroscopy; Cardiac catheterization; Colonoscopy; and IR PORT PLACEMENT > 5 YEARS (2/25/2022). Restrictions  Restrictions/Precautions  Restrictions/Precautions: Fall Risk,Up as Tolerated  Required Braces or Orthoses?: No  Position Activity Restriction  Other position/activity restrictions: Up with assistance  Vision/Hearing  Vision: Impaired  Vision Exceptions: Wears glasses at all times  Hearing: Within functional limits     Subjective  General  Patient assessed for rehabilitation services?: Yes  Response To Previous Treatment: Not applicable  Family / Caregiver Present: No  Follows Commands: Within Functional Limits  Subjective  Subjective: Pt supine in bed and agreeable to therapy. Pt denies any pain at rest. Pt notes he is worried about his breathing secondary to he gets very SOB at home.   Pain Screening  Patient Currently in Pain: Denies  Vital Signs  Patient Currently in Pain: Denies       Orientation  Orientation  Overall Orientation Status: Within Functional Limits  Social/Functional History  Social/Functional History  Lives With: Significant other  Type of Home: House  Home Layout: Multi-level (tri-level; kitchen on main level, 7 steps up/down with unilateral hand rail to other levels, bedroom/bathroom upstairs)  Home Access: Stairs to enter with rails  Entrance Stairs - Number of Steps: 2 steps  Entrance Stairs - Rails: Right  Bathroom Shower/Tub: Tub/Shower unit  Bathroom Toilet: Standard (pt reports raised toilet being installed soon)  Bathroom Equipment: Grab bars in shower  Bathroom Accessibility: Accessible  Home Equipment: Quad cane,Cane (pulse ox)  Receives Help From: Family (Pt reports supportive significant other that is retired and able to assist PRN)  ADL Assistance: [address] [place] [address] Avenue: Independent  Homemaking Responsibilities: Yes (limited- significant other performs [de-identified])  Ambulation Assistance: Independent (with use of cane)  Transfer Assistance: Independent  Active : Yes  Mode of Transportation: Car,Truck,SUV  Occupation: Self employed  Type of occupation: Previously worked at [employer], currently owns two car lots  Leisure & Hobbies: Enjoys [employer]  Additional Comments: Pt reports recent decline in functional status  Cognition   Cognition  Overall Cognitive Status: Exceptions  Safety Judgement: Decreased awareness of need for assistance  Problem Solving: Assistance required to generate solutions;Assistance required to identify errors made;Assistance required to correct errors made  Insights: Decreased awareness of deficits    Objective          AROM RLE (degrees)  RLE AROM: WFL  AROM LLE (degrees)  LLE AROM : WFL  AROM RUE (degrees)  RUE AROM : WFL  AROM LUE (degrees)  LUE AROM : WFL  Strength RLE  Strength RLE: WFL  Comment: grossly 4+/5  Strength LLE  Strength LLE: WFL  Comment: grossly 4+/5  Strength RUE  Strength RUE: WFL  Comment: Co evaluation with OT-see OT evaluation for full UE assessment  Strength LUE  Strength LUE: WFL  Comment: Co evaluation with OT-see OT evaluation for full UE assessment     Sensation  Overall Sensation Status: WFL (Pt denies any numbness/tingling)  Bed mobility  Supine to Sit: Minimal assistance (HOB raised ~15*, no use of bed rail however pt with use of writer's hand for assist with trunk progression)  Sit to Supine: Contact guard assistance  Scooting: Contact guard assistance  Comment: Increased effort to perform  Transfers  Sit to Stand: Contact guard assistance  Stand to sit: Contact guard assistance  Ambulation  Ambulation?: Yes  More Ambulation?: No  Ambulation 1  Surface: level tile  Device: Agradis (also pushing IV pole at his request)  Assistance: Stand by assistance  Quality of Gait: decreased endurance with cues for breathing techniques with poor carryover  Gait Deviations: Slow Mignon;Decreased step length  Distance: 300ft with three standing rest breaks  Comments: Oxygen saturation remained 92% or above with this bout of gait. Heart rate up to 120s-130s with mobility.   Stairs/Curb  Stairs?: No     Balance  Posture: Fair  Sitting - Static: Good;-  Sitting - Dynamic: Good;-  Standing - Static: Fair;+  Standing - Dynamic: Fair  Comments: standing balance assessed with quad cane        Plan   Plan  Times per week: 5-6x  Times per day: Daily  Current Treatment Recommendations: Strengthening,Balance Training,Functional Mobility Training,Transfer Training,Endurance Training,Gait Training,Stair training,Patient/Caregiver Education & Training,Home Exercise Program,Safety Education & Training  Safety Devices  Type of devices: Call light within reach,Nurse notified,Left in bed  Restraints  Initially in place: No      AM-PAC Score  AM-PAC Inpatient Mobility Raw Score : 18 (04/01/22 1105)  AM-PAC Inpatient T-Scale Score : 43.63 (04/01/22 1105)  Mobility Inpatient CMS 0-100% Score: 46.58 (04/01/22 1105)  Mobility Inpatient CMS G-Code Modifier : CK (04/01/22 1105)          Goals  Short term goals  Time Frame for Short term goals: 14 visits  Short term goal 1: Pt to ambulate 300ft modified independently with rollator vs quad cane  Short term goal 2: Pt sit <> stand transfer independently  Short term goal 3: Pt to demonstrate independent bed mobility  Short term goal 4: Pt to tolerate 30 minutes of therapy for endurance  Short term goal 5: Pt to ascend/descend 7 steps with one rail modified independently       Therapy Time   Individual Concurrent Group Co-treatment   Time In 0935         Time Out 1006         Minutes 31         Timed Code Treatment Minutes: R Lorin 21, PT

## 2022-04-01 NOTE — PLAN OF CARE
Problem: Falls - Risk of:  Goal: Will remain free from falls  Description: Will remain free from falls  4/1/2022 0833 by Quynh Delatorre RN  Outcome: Ongoing  Goal: Absence of physical injury  Description: Absence of physical injury  4/1/2022 0833 by Quynh Delatorre RN  Outcome: Ongoing    Problem: Skin Integrity:  Goal: Will show no infection signs and symptoms  Description: Will show no infection signs and symptoms  4/1/2022 0833 by Quynh Delatorre RN  Outcome: Ongoing  Goal: Absence of new skin breakdown  Description: Absence of new skin breakdown  4/1/2022 0833 by Quynh Delatorre RN  Outcome: Ongoing    Problem: Safety:  Goal: Free from accidental physical injury  Description: Free from accidental physical injury  4/1/2022 0833 by Quynh Delatorre RN  Outcome: Ongoing  Goal: Free from intentional harm  Description: Free from intentional harm  4/1/2022 0833 by Quynh Delatorre RN  Outcome: Ongoing

## 2022-04-01 NOTE — PLAN OF CARE
Nutrition Problem #1: Inadequate energy intake  Intervention: Food and/or Nutrient Delivery: Continue Current Diet,Continue Oral Nutrition Supplement  Nutritional Goals: Consume at leat 50% of meals, % of supplement

## 2022-04-01 NOTE — PROGRESS NOTES
[facility]  Office: [address], DO, Mu Joseph, DO, Ashley Cruzdelfina, DO, Brooklyn Bustos Blood, DO, Gary Dawson MD, Thomas Jackson MD, Bora Gandhi MD, Siva De Anda MD, Nelly Burden MD, Trinity Davila MD, Paul Shankar MD, Jonny Saucedo, DO, Darwin Corbett, DO, Jadene Boast, MD,  Jack Solis, DO, Buck Malik MD, Liseth Hart MD, Mary Mauricio MD, Alec Martin DO, Jean Diane MD, Dyan Montoya MD, Linnea Rowe [facility], [facility], CNP, Shahla Gill CNP, Srini Rivero, CNS, Nafisa Starr, CNP, Nora Medina, CNP, Maggy Ayala, CNP, Aline Hernandez, CNP, Hilary Shelton, CNP, Zuleyma Zazueta PA-C, Rachna Vargas DNP, Lashay Hardy DNP, Latoya Oneil CNP, Carie Park, CNP, Flor Mendez [address]    Progress Note    4/1/2022    8:57 AM    Name:   Omaira Evangelista  MRN:     [id number]     Acct:      [de-identified]   Room:   [address] [place] [address] Day:  1  Admit Date:  3/31/2022 11:14 AM    PCP:   Leopoldo Mealing, MD  Code Status:  Full Code    Subjective:     C/C:   Chief Complaint   Patient presents with    Shortness of Breath     Interval History Status: improved. Patient feels better today, afebrile overnight  States that his appetite is improving and he is feeling more energetic  Shortness of breath has also improved  Vitals are stable  Urine  Is clearing up    Brief History:     60-year-old male with known medical history of high-grade urothelial bladder cancer diagnosed in December 2021, on neoadjuvant chemo, recently diagnosed with bilateral pulmonary embolism around 12 days ago presented to the hospital with shortness of breath. Patient was at his oncology appointment when he was noted to be short of breath. Patient had desaturation episode to low 80s on exertion. Patient states that since his diagnosis of pulmonary embolism he has been feeling more short of breath on exertion.   He has a pulse ox at home and he even dipped down to 70% on exertion, which gradually came up to 97% at rest.  Patient was not tested for home oxygen on exertion while he was discharged last time after diagnosis of pulmonary embolism. Patient also reports urinary symptoms of urgency, hesitancy, chills, low-grade fever, he was prescribed Bactrim by Dr. Krishan Ceron 2 days ago. Patient also describes abdominal tenderness in the lower abdomen quadrant and suprapubic region. Patient had 2-3 episodes of cough but no phlegm    Review of Systems:     Constitutional:  negative for chills, fevers, sweats  Respiratory:  negative for cough, dyspnea on exertion, shortness of breath, wheezing  Cardiovascular:  negative for chest pain, chest pressure/discomfort, lower extremity edema, palpitations  Gastrointestinal:  negative for abdominal pain, constipation, diarrhea, nausea, vomiting  Neurological:  negative for dizziness, headache    Medications:      Allergies:  No Known Allergies    Current Meds:   Scheduled Meds:    sodium chloride  80 mL IntraVENous Once    apixaban  5 mg Oral BID    buPROPion  300 mg Oral QAM    finasteride  5 mg Oral Daily    pantoprazole  40 mg Oral QAM AC    atorvastatin  20 mg Oral Daily    tamsulosin  0.4 mg Oral Daily    sodium chloride flush  10 mL IntraVENous 2 times per day    cefTRIAXone (ROCEPHIN) IV  1,000 mg IntraVENous Q24H    dexamethasone  1 mg Oral Daily with breakfast     Continuous Infusions:    sodium chloride      sodium chloride 75 mL/hr at 03/31/22 1932     PRN Meds: sodium chloride flush, phenazopyridine, sodium chloride flush, sodium chloride, ondansetron **OR** ondansetron, polyethylene glycol, acetaminophen **OR** acetaminophen, LORazepam, oxyCODONE-acetaminophen **OR** oxyCODONE-acetaminophen    Data:     Past Medical History:   has a past medical history of Arthritis, BPH with obstruction/lower urinary tract symptoms, Caffeine use, Cancer ([facility] Depression, GERD (gastroesophageal reflux disease), High cholesterol, Kidney calculi, and Sleep apnea. Social History:   reports that he quit smoking about 30 years ago. He has never used smokeless tobacco. He reports that he does not drink alcohol and does not use drugs. Family History:   Family History   Problem Relation Age of Onset    Cancer Father         bladder cancer    Urolithiasis Father        Vitals:  BP (!) 144/73   Pulse 84   Temp 97.8 °F (36.6 °C) (Oral)   Resp 19   Ht 6' 2\" (1.88 m)   Wt 256 lb 12.8 oz (116.5 kg)   SpO2 97%   BMI 32.97 kg/m²   Temp (24hrs), Av.8 °F (36.6 °C), Min:97.6 °F (36.4 °C), Max:98.1 °F (36.7 °C)    No results for input(s): POCGLU in the last 72 hours. I/O (24Hr):     Intake/Output Summary (Last 24 hours) at 2022 0857  Last data filed at 2022 0022  Gross per 24 hour   Intake 310 ml   Output 800 ml   Net -490 ml       Labs:  Hematology:  Recent Labs     22  0954 22  1204 22  0600   WBC 5.7 5.6 7.0   RBC 4.05* 4.16* 3.83*   HGB 10.4* 10.7* 9.8*   HCT 31.5* 32.2* 29.5*   MCV 77.8* 77.3* 76.8*   MCH 25.7* 25.7* 25.7*   MCHC 33.0 33.3 33.4   RDW 17.7* 18.3* 17.9*    357 283   MPV 6.8 6.8 7.1     Chemistry:  Recent Labs     22  1001 22  1204 22  0600   * 132* 135   K 3.8 4.2 4.1    97* 99   CO2 22 23 25   GLUCOSE 127* 140* 91   BUN 14 15 15   CREATININE 1.41* 1.49* 1.41*   MG 1.6  --   --    ANIONGAP 11 12 11   LABGLOM 50* 47* 50*   GFRAA >60 57* >60   CALCIUM 8.1* 8.9 8.7   PROBNP  --  213  --    TROPHS  --  16  --      Recent Labs     22  1001   PROT 6.4   LABALBU 3.1*   AST 17   ALT 23   ALKPHOS 81   BILITOT 0.16*     ABG:No results found for: POCPH, PHART, PH, POCPCO2, ZUL4VWC, PCO2, POCPO2, PO2ART, PO2, POCHCO3, DHQ4IUM, HCO3, NBEA, PBEA, BEART, BE, THGBART, THB, MHA5ELB, FJBK3XEF, O2VMDPRS, O2SAT, FIO2  No results found for: SPECIAL  No results found for: CULTURE    Radiology:  XR CHEST PORTABLE    Result Date: 3/31/2022  No radiographic evidence of an acute cardiopulmonary process. CT CHEST PULMONARY EMBOLISM W CONTRAST    Result Date: 3/31/2022  Small embolus noted in the right main pulmonary artery and extending into the right upper lobe artery. Small clot also noted in the inter lobar artery. No evidence for right heart strain. Small ground-glass opacity in the left apex. I recommend follow-up in 3 months to determine stability or resolution. Case discussed with Dr. Suzie Dominguez at 1:24 p.m., 03/31/2022       Physical Examination:     General appearance:  alert, cooperative and no distress, obese  Mental Status:  oriented to person, place and time and normal affect  Lungs:  clear to auscultation bilaterally, normal effort  Heart:  regular rate and rhythm, no murmur  Abdomen:  soft, nontender, nondistended, normal bowel sounds, no masses, hepatomegaly, splenomegaly  Extremities:  no edema, redness, tenderness in the calves  Skin:  no gross lesions, rashes, induration    Assessment:     Hospital Problems           Last Modified POA    * (Principal) Acute respiratory failure with hypoxia ([address] 3/31/2022 Yes    Gross hematuria 3/31/2022 Yes    Hypertrophy of prostate with urinary obstruction 3/31/2022 Yes    Malignant neoplasm of urinary bladder ([address] 3/31/2022 Yes    Urinary retention 3/31/2022 Yes    Acute cystitis with hematuria 3/31/2022 Yes    Other hyperlipidemia 3/31/2022 Yes    Microcytic anemia 3/31/2022 Yes    Mild protein-calorie malnutrition ([address] 3/31/2022 Yes          Plan:     Acute respiratory failure with hypoxia with recent diagnosis of pulmonary embolism- Discussed with Dr. Harvey An, will continue Eliquis for now as it can take up to 4 weeks for clot dissolution.   Patient will need home oxygen evaluation on exertion on discharge.     UTI with hematuria-pending urine for culture, continue Rocephin IV, stop iv fluids, encourage oral hydration, change antibiotic depending on culture and sensitivity, monitor for signs and symptoms of urinary retention. Follows up with urology outpatient.  Pending ultrasound renal to check for hydronephrosis.     High-grade urothelial carcinoma-follows with Dr.Al- Barros, on neoadjuvant chemo, to be followed by cystectomy, oncology consulted in hospital. Decadron continued.     Microcytic anemia 2/2 chronic disease, continue to monitor     Urinary retention- watch for retention, continue home flomax, finasteride     Hyperlipidemia- on statin     Protein calorie malnutrition- mild, encourage supplements    Clarissa Leonard MD  4/1/2022  8:57 AM

## 2022-04-03 LAB
EKG ATRIAL RATE: 95 BPM
EKG P AXIS: 17 DEGREES
EKG P-R INTERVAL: 186 MS
EKG Q-T INTERVAL: 348 MS
EKG QRS DURATION: 120 MS
EKG QTC CALCULATION (BAZETT): 437 MS
EKG R AXIS: -34 DEGREES
EKG T AXIS: 65 DEGREES
EKG VENTRICULAR RATE: 95 BPM

## 2022-04-04 ENCOUNTER — TELEPHONE (OUTPATIENT)
Dept: INFUSION THERAPY | Age: 67
End: 2022-04-04

## 2022-04-04 ENCOUNTER — CARE COORDINATION (OUTPATIENT)
Dept: CASE MANAGEMENT | Age: 67
End: 2022-04-04

## 2022-04-05 ENCOUNTER — CARE COORDINATION (OUTPATIENT)
Dept: CASE MANAGEMENT | Age: 67
End: 2022-04-05

## 2022-04-05 NOTE — CARE COORDINATION
Jose R 45 Transitions Initial Follow Up Call    Call within 2 business days of discharge: Yes    Patient: Keith King Patient : 1955   MRN: [id number]  Reason for Admission: sob  Discharge Date: 22 RARS: Readmission Risk Score: 17.2 ( )      Last Discharge [facility]       Complaint Diagnosis Description Type Department Provider    3/31/22 Shortness of Breath Pulmonary embolism without acute cor pulmonale, unspecified chronicity, unspecified pulmonary embolism type ([address] . .. ED to Hosp-Admission (Discharged) (ADMITTED) [facility] MS Martha Leo MD; Edwardo Langley MD           Spoke with: Vijay 23: Caleb SPEAR    Non-face-to-face services provided:  Scheduled appointment with Specialist-has f/u on 22 with oncology  Obtained and reviewed discharge summary and/or continuity of care documents  Communication with specialists who will assume or re-assume care of the patient's system-specific problems-writer contacted oncology, patient needs home O2  Assessment and support for treatment adherence and medication management-reviewed discharge instructions and medications, 1111F order completed, patient still having alot of sob, has pulse ox at home, 02 sats drop down to the 80's with exertion, doesnt have home 02, stated he needs it,explained role of CTN, provided contact information, will follow up with patient doctor in regards to home 02//Ju  Transitions of Care Initial Call    Was this an external facility discharge?  No Discharge Facility: [facility]    Challenges to be reviewed by the provider   Additional needs identified to be addressed with provider: Yes  DME-patient stated his 02 sats drop down into the 80's with exertion, went to ED for c/o sob, said he was told there was nothing wrong with his breathing and he didnt need 02 at home, please address, thank you//JU             Method of communication with provider : staff message, chart routing      Advance Care Planning:   Does patient have an Advance Directive: reviewed and current, reviewed and needs to be updated, not on file; education provided, not on file, patient declined education, decision maker updated and referral to internal ACP facilitator. Was this a readmission? No  Patient stated reason for admission:   Patients top risk factors for readmission: functional physical ability  lack of knowledge about disease  medication management  support system    Care Transition Nurse (CTN) contacted the patient by telephone to perform post hospital discharge assessment. Verified name and  with patient as identifiers. Provided introduction to self, and explanation of the CTN role. CTN reviewed discharge instructions, medical action plan and red flags with patient who verbalized understanding. Patient given an opportunity to ask questions and does not have any further questions or concerns at this time. Were discharge instructions available to patient? Yes. Reviewed appropriate site of care based on symptoms and resources available to patient including: PCP  Specialist  Urgent care clinics  When to call 911. The patient agrees to contact the PCP office for questions related to their healthcare. Medication reconciliation was performed with patient, who verbalizes understanding of administration of home medications. Advised obtaining a 90-day supply of all daily and as-needed medications. Covid Risk Education     Educated patient about risk for severe COVID-19 due to risk factors according to CDC guidelines. CTN reviewed discharge instructions, medical action plan and red flag symptoms with the patient who verbalized understanding. Discussed COVID vaccination status: Yes. Education provided on COVID-19 vaccination as appropriate. Discussed exposure protocols and quarantine with CDC Guidelines.  Patient was given an opportunity to verbalize any questions and concerns and agrees to contact CTN or health care provider for questions related to their healthcare. Reviewed and educated patient on any new and changed medications related to discharge diagnosis. Was patient discharged with a pulse oximeter? Patient has own  Discussed and confirmed pulse oximeter discharge instructions and when to notify provider or seek emergency care. CTN provided contact information. No further follow-up call indicated based on severity of symptoms and risk factors.   Plan for next call: [facility]      Care Transitions 24 Hour Call    Schedule Follow Up Appointment with PCP: Completed  Do you have any ongoing symptoms?: Yes  Patient-reported symptoms: Shortness of Breath  Interventions for patient-reported symptoms: Notified PCP/Physician  Do you have a copy of your discharge instructions?: Yes  Do you have all of your prescriptions and are they filled?: No  Have you been contacted by a [facility] Pharmacist?: No  Have you scheduled your follow up appointment?: Yes  How are you going to get to your appointment?: Car - family or friend to transport  Were you discharged with any Home Care or Post Acute Services: No  Do you feel like you have everything you need to keep you well at home?: No  Care Transitions Interventions         Follow Up  Future Appointments   Date Time Provider Caleb Rodriguez   4/14/2022  8:45 AM Lucas Willie Grayson MD [address] [place] [address]   4/14/2022  9:00 AM STV PB MED ONC CHAIR 08 STVZ PB Rashi Alfaro   4/21/2022 11:00 AM STV PB MED ONC CHAIR 09 STVZ PB Rashi Alfaro   5/5/2022  9:00 AM STV PB MED ONC CHAIR 04 STVZ RAINER Alfaro   5/12/2022 11:00 AM ST[facility] PB MED ONC CHAIR 06 STVZ RAINER Bill RN

## 2022-04-11 DIAGNOSIS — R31.0 GROSS HEMATURIA: ICD-10-CM

## 2022-04-11 DIAGNOSIS — R33.9 URINARY RETENTION: ICD-10-CM

## 2022-04-11 DIAGNOSIS — C67.2 MALIGNANT NEOPLASM OF LATERAL WALL OF URINARY BLADDER (HCC): ICD-10-CM

## 2022-04-11 RX ORDER — HYDROCODONE BITARTRATE AND ACETAMINOPHEN 5; 325 MG/1; MG/1
1 TABLET ORAL EVERY 8 HOURS PRN
Qty: 60 TABLET | Refills: 0 | Status: SHIPPED | OUTPATIENT
Start: 2022-04-11 | End: 2022-05-11 | Stop reason: SDUPTHER

## 2022-04-14 ENCOUNTER — TELEPHONE (OUTPATIENT)
Dept: ONCOLOGY | Age: 67
End: 2022-04-14

## 2022-04-14 ENCOUNTER — OFFICE VISIT (OUTPATIENT)
Dept: ONCOLOGY | Age: 67
End: 2022-04-14
Payer: MEDICARE

## 2022-04-14 ENCOUNTER — HOSPITAL ENCOUNTER (OUTPATIENT)
Dept: INFUSION THERAPY | Age: 67
Discharge: HOME | End: 2022-04-14
Payer: MEDICARE

## 2022-04-14 VITALS
DIASTOLIC BLOOD PRESSURE: 68 MMHG | HEART RATE: 115 BPM | OXYGEN SATURATION: 98 % | BODY MASS INDEX: 33.55 KG/M2 | WEIGHT: 261.3 LBS | SYSTOLIC BLOOD PRESSURE: 109 MMHG | TEMPERATURE: 97.6 F

## 2022-04-14 DIAGNOSIS — C67.2 MALIGNANT NEOPLASM OF LATERAL WALL OF URINARY BLADDER (HCC): Primary | ICD-10-CM

## 2022-04-14 DIAGNOSIS — R09.02 HYPOXIA: ICD-10-CM

## 2022-04-14 DIAGNOSIS — C67.2 MALIGNANT NEOPLASM OF LATERAL WALL OF URINARY BLADDER (HCC): ICD-10-CM

## 2022-04-14 DIAGNOSIS — I50.21 ACUTE SYSTOLIC HEART FAILURE (HCC): ICD-10-CM

## 2022-04-14 DIAGNOSIS — I26.94 MULTIPLE SUBSEGMENTAL PULMONARY EMBOLI WITHOUT ACUTE COR PULMONALE (HCC): ICD-10-CM

## 2022-04-14 DIAGNOSIS — I26.94 MULTIPLE SUBSEGMENTAL PULMONARY EMBOLI WITHOUT ACUTE COR PULMONALE (HCC): Primary | ICD-10-CM

## 2022-04-14 DIAGNOSIS — J96.01 ACUTE RESPIRATORY FAILURE WITH HYPOXIA (HCC): ICD-10-CM

## 2022-04-14 LAB
-: ABNORMAL
ABSOLUTE EOS #: 0.13 K/UL (ref 0–0.4)
ABSOLUTE LYMPH #: 5.05 K/UL (ref 1–4.8)
ABSOLUTE MONO #: 1.2 K/UL (ref 0.1–0.8)
ALBUMIN SERPL-MCNC: 3.3 G/DL (ref 3.5–5.2)
ALBUMIN/GLOBULIN RATIO: 0.8 (ref 1–2.5)
ALP BLD-CCNC: 99 U/L (ref 40–129)
ALT SERPL-CCNC: 55 U/L (ref 5–41)
ANION GAP SERPL CALCULATED.3IONS-SCNC: 13 MMOL/L (ref 9–17)
AST SERPL-CCNC: 19 U/L
BACTERIA: ABNORMAL
BASOPHILS # BLD: 0 % (ref 0–2)
BASOPHILS ABSOLUTE: 0 K/UL (ref 0–0.2)
BILIRUB SERPL-MCNC: 0.24 MG/DL (ref 0.3–1.2)
BILIRUBIN URINE: ABNORMAL
BUN BLDV-MCNC: 14 MG/DL (ref 8–23)
CALCIUM SERPL-MCNC: 9.6 MG/DL (ref 8.6–10.4)
CHLORIDE BLD-SCNC: 96 MMOL/L (ref 98–107)
CO2: 25 MMOL/L (ref 20–31)
COLOR: ABNORMAL
COMMENT UA: ABNORMAL
CREAT SERPL-MCNC: 1.4 MG/DL (ref 0.7–1.2)
EOSINOPHILS RELATIVE PERCENT: 1 % (ref 1–4)
EPITHELIAL CELLS UA: ABNORMAL /HPF (ref 0–5)
GFR AFRICAN AMERICAN: >60 ML/MIN
GFR NON-AFRICAN AMERICAN: 51 ML/MIN
GFR SERPL CREATININE-BSD FRML MDRD: ABNORMAL ML/MIN/{1.73_M2}
GLUCOSE BLD-MCNC: 170 MG/DL (ref 70–99)
GLUCOSE URINE: NEGATIVE
HCT VFR BLD CALC: 35.9 % (ref 41–53)
HEMOGLOBIN: 11.4 G/DL (ref 13.5–17.5)
KETONES, URINE: NEGATIVE
LEUKOCYTE ESTERASE, URINE: ABNORMAL
LYMPHOCYTES # BLD: 38 % (ref 24–44)
MAGNESIUM: 1.9 MG/DL (ref 1.6–2.6)
MCH RBC QN AUTO: 24.9 PG (ref 26–34)
MCHC RBC AUTO-ENTMCNC: 31.6 G/DL (ref 31–37)
MCV RBC AUTO: 78.9 FL (ref 80–100)
METAMYELOCYTES ABSOLUTE COUNT: 0.27 K/UL
METAMYELOCYTES: 2 %
MONOCYTES # BLD: 9 % (ref 1–7)
MORPHOLOGY: ABNORMAL
MUCUS: ABNORMAL
NITRITE, URINE: POSITIVE
OTHER OBSERVATIONS UA: ABNORMAL
PDW BLD-RTO: 21.4 % (ref 12.5–15.4)
PH UA: 6 (ref 5–8)
PLATELET # BLD: 413 K/UL (ref 140–450)
PMV BLD AUTO: 7.4 FL (ref 6–12)
POTASSIUM SERPL-SCNC: 3.9 MMOL/L (ref 3.7–5.3)
PROTEIN UA: ABNORMAL
RBC # BLD: 4.55 M/UL (ref 4.5–5.9)
RBC UA: ABNORMAL /HPF (ref 0–2)
SEG NEUTROPHILS: 50 % (ref 36–66)
SEGMENTED NEUTROPHILS ABSOLUTE COUNT: 6.65 K/UL (ref 1.8–7.7)
SODIUM BLD-SCNC: 134 MMOL/L (ref 135–144)
SPECIFIC GRAVITY UA: 1.02 (ref 1–1.03)
TOTAL PROTEIN: 7.5 G/DL (ref 6.4–8.3)
TURBIDITY: ABNORMAL
URINE HGB: ABNORMAL
UROBILINOGEN, URINE: NORMAL
WBC # BLD: 13.3 K/UL (ref 3.5–11)
WBC UA: ABNORMAL /HPF (ref 0–5)

## 2022-04-14 PROCEDURE — 99214 OFFICE O/P EST MOD 30 MIN: CPT | Performed by: INTERNAL MEDICINE

## 2022-04-14 PROCEDURE — 83735 ASSAY OF MAGNESIUM: CPT

## 2022-04-14 PROCEDURE — 87186 SC STD MICRODIL/AGAR DIL: CPT

## 2022-04-14 PROCEDURE — 99211 OFF/OP EST MAY X REQ PHY/QHP: CPT | Performed by: INTERNAL MEDICINE

## 2022-04-14 PROCEDURE — 4040F PNEUMOC VAC/ADMIN/RCVD: CPT | Performed by: INTERNAL MEDICINE

## 2022-04-14 PROCEDURE — 96360 HYDRATION IV INFUSION INIT: CPT

## 2022-04-14 PROCEDURE — 1111F DSCHRG MED/CURRENT MED MERGE: CPT | Performed by: INTERNAL MEDICINE

## 2022-04-14 PROCEDURE — 6360000002 HC RX W HCPCS: Performed by: INTERNAL MEDICINE

## 2022-04-14 PROCEDURE — 96413 CHEMO IV INFUSION 1 HR: CPT

## 2022-04-14 PROCEDURE — 96368 THER/DIAG CONCURRENT INF: CPT

## 2022-04-14 PROCEDURE — 96361 HYDRATE IV INFUSION ADD-ON: CPT

## 2022-04-14 PROCEDURE — 1123F ACP DISCUSS/DSCN MKR DOCD: CPT | Performed by: INTERNAL MEDICINE

## 2022-04-14 PROCEDURE — 87086 URINE CULTURE/COLONY COUNT: CPT

## 2022-04-14 PROCEDURE — 96367 TX/PROPH/DG ADDL SEQ IV INF: CPT

## 2022-04-14 PROCEDURE — 80053 COMPREHEN METABOLIC PANEL: CPT

## 2022-04-14 PROCEDURE — 1036F TOBACCO NON-USER: CPT | Performed by: INTERNAL MEDICINE

## 2022-04-14 PROCEDURE — G8427 DOCREV CUR MEDS BY ELIG CLIN: HCPCS | Performed by: INTERNAL MEDICINE

## 2022-04-14 PROCEDURE — 96375 TX/PRO/DX INJ NEW DRUG ADDON: CPT

## 2022-04-14 PROCEDURE — 85025 COMPLETE CBC W/AUTO DIFF WBC: CPT

## 2022-04-14 PROCEDURE — 81001 URINALYSIS AUTO W/SCOPE: CPT

## 2022-04-14 PROCEDURE — 87077 CULTURE AEROBIC IDENTIFY: CPT

## 2022-04-14 PROCEDURE — 3017F COLORECTAL CA SCREEN DOC REV: CPT | Performed by: INTERNAL MEDICINE

## 2022-04-14 PROCEDURE — 2580000003 HC RX 258: Performed by: INTERNAL MEDICINE

## 2022-04-14 PROCEDURE — 96417 CHEMO IV INFUS EACH ADDL SEQ: CPT

## 2022-04-14 PROCEDURE — G8417 CALC BMI ABV UP PARAM F/U: HCPCS | Performed by: INTERNAL MEDICINE

## 2022-04-14 PROCEDURE — 36591 DRAW BLOOD OFF VENOUS DEVICE: CPT

## 2022-04-14 PROCEDURE — 87088 URINE BACTERIA CULTURE: CPT

## 2022-04-14 RX ORDER — SODIUM CHLORIDE 9 MG/ML
20 INJECTION, SOLUTION INTRAVENOUS ONCE
Status: COMPLETED | OUTPATIENT
Start: 2022-04-14 | End: 2022-04-14

## 2022-04-14 RX ORDER — PALONOSETRON 0.05 MG/ML
0.25 INJECTION, SOLUTION INTRAVENOUS ONCE
Status: COMPLETED | OUTPATIENT
Start: 2022-04-14 | End: 2022-04-14

## 2022-04-14 RX ORDER — CIPROFLOXACIN 500 MG/1
500 TABLET, FILM COATED ORAL 2 TIMES DAILY
Qty: 10 TABLET | Refills: 0 | Status: SHIPPED | OUTPATIENT
Start: 2022-04-14 | End: 2022-04-19

## 2022-04-14 RX ORDER — MAGNESIUM SULFATE 1 G/100ML
1000 INJECTION INTRAVENOUS ONCE
Status: COMPLETED | OUTPATIENT
Start: 2022-04-14 | End: 2022-04-14

## 2022-04-14 RX ORDER — DEXAMETHASONE SODIUM PHOSPHATE 10 MG/ML
10 INJECTION INTRAMUSCULAR; INTRAVENOUS ONCE
Status: COMPLETED | OUTPATIENT
Start: 2022-04-14 | End: 2022-04-14

## 2022-04-14 RX ORDER — HEPARIN SODIUM (PORCINE) LOCK FLUSH IV SOLN 100 UNIT/ML 100 UNIT/ML
500 SOLUTION INTRAVENOUS PRN
Status: DISCONTINUED | OUTPATIENT
Start: 2022-04-14 | End: 2022-04-15 | Stop reason: HOSPADM

## 2022-04-14 RX ORDER — SODIUM CHLORIDE AND POTASSIUM CHLORIDE .9; .15 G/100ML; G/100ML
SOLUTION INTRAVENOUS CONTINUOUS
Status: DISCONTINUED | OUTPATIENT
Start: 2022-04-14 | End: 2022-04-15 | Stop reason: HOSPADM

## 2022-04-14 RX ORDER — SODIUM CHLORIDE 0.9 % (FLUSH) 0.9 %
5-40 SYRINGE (ML) INJECTION PRN
Status: DISCONTINUED | OUTPATIENT
Start: 2022-04-14 | End: 2022-04-15 | Stop reason: HOSPADM

## 2022-04-14 RX ADMIN — SODIUM CHLORIDE, PRESERVATIVE FREE 10 ML: 5 INJECTION INTRAVENOUS at 10:04

## 2022-04-14 RX ADMIN — POTASSIUM CHLORIDE AND SODIUM CHLORIDE: 900; 150 INJECTION, SOLUTION INTRAVENOUS at 10:08

## 2022-04-14 RX ADMIN — PALONOSETRON HYDROCHLORIDE 0.25 MG: 0.25 INJECTION, SOLUTION INTRAVENOUS at 11:15

## 2022-04-14 RX ADMIN — CISPLATIN 176 MG: 100 INJECTION, SOLUTION INTRAVENOUS at 13:23

## 2022-04-14 RX ADMIN — FOSAPREPITANT 150 MG: 150 INJECTION, POWDER, LYOPHILIZED, FOR SOLUTION INTRAVENOUS at 11:49

## 2022-04-14 RX ADMIN — SODIUM CHLORIDE 20 ML/HR: 9 INJECTION, SOLUTION INTRAVENOUS at 10:06

## 2022-04-14 RX ADMIN — GEMCITABINE 2520 MG: 38 INJECTION, SOLUTION INTRAVENOUS at 12:25

## 2022-04-14 RX ADMIN — DEXAMETHASONE SODIUM PHOSPHATE 10 MG: 10 INJECTION INTRAMUSCULAR; INTRAVENOUS at 11:15

## 2022-04-14 RX ADMIN — MAGNESIUM SULFATE HEPTAHYDRATE 1000 MG: 1 INJECTION, SOLUTION INTRAVENOUS at 10:07

## 2022-04-14 RX ADMIN — SODIUM CHLORIDE, PRESERVATIVE FREE 10 ML: 5 INJECTION INTRAVENOUS at 16:20

## 2022-04-14 RX ADMIN — Medication 500 UNITS: at 16:20

## 2022-04-14 RX ADMIN — MAGNESIUM SULFATE HEPTAHYDRATE 1000 MG: 1 INJECTION, SOLUTION INTRAVENOUS at 14:46

## 2022-04-14 NOTE — TELEPHONE ENCOUNTER
Sanford Mckeon from [employer] called informing office pt should receive oxygen today. They will call pt to set up delivery.

## 2022-04-14 NOTE — TELEPHONE ENCOUNTER
Orders for home O2, demographics, insurance info, last progress note, and vitals flow sheet with testing criteria faxed to [address] [place] [address] at [phone] with confirmation.

## 2022-04-14 NOTE — PATIENT INSTRUCTIONS
Proceed with chemo per orders.  rv in one week with chemo and labs  Please see orders for home oxygen   Urine analysis today

## 2022-04-14 NOTE — TELEPHONE ENCOUNTER
Proceed with chemo per orders, rv in one week with chemo and labs  Please see orders for home oxygen  Urine analysis today    Tx today as scheduled    PT will be seen for RV during tx 4/21/22    Oxygen order given to triage to process    Urine done during treatment, inf notified    PT was given AVS and an appt schedule    Electronically signed by Gladis Mooney on 4/14/2022 at 9:44 AM

## 2022-04-14 NOTE — PROGRESS NOTES
DIAGNOSIS:   1. High grade urothelial carcinoma, 12/2021, T3 N1 M0   2. Bilateral PE, 03/2022    CURRENT THERAPY:  Neoadjuvant chemo followed by radical cystectomy  Eliquis     BRIEF CASE HISTORY:   Babak Stevens is a very pleasant 74 y.o. male who is referred to us for bladder cancer. Patient presented with hematuria 02/2021 and was seen by urology, it was felt to be renal calculi and he was started on Flomax but continued to pass clots. He was later in [place] and had urinary blockage, passed very large clot and then able to urinate and presented again to ER with continued hematuria, CT showed severe right hydronephrosis with tissue thickening/mass seen in right side of bladder, he underwent nephrostomy placement, stent was put in place and cystoscopy done showing fairly large papillary tumor occupying right side posterior wall and even the dome of the bladder. Pathology showed high grade urothelial carcinoma invading muscularis propria. Over the next month he needed 2 repeat cystoscopies and extensive fulguration of the tumor, again pathology showed high grade urothelial carcinoma. CT of the chest abdomen pelvis did not show metastatic disease. He has intermittent bladder pain and continues to have hematuria in Muñoz catheter. His appetite is poor. His father had bladder cancer, he did not have smoking history. The patient has smoking history but quit 30 years ago. He had COVID infection in 09/2021, he lost significant weight and continues to recover. He has arthralgias in the knees and uses cane. Staging PET scan was done and showed the bladder tumor and right pelvic sidewall lymph node metastases 4 of clinical staging of T3 N1 M0. We discussed neoadjuvant chemotherapy to be followed by surgery if he has a good response. INTERIM HISTORY: The patient presents for follow up with toxicity check, and to start cycle 3 of therapy.  We held treatment last week with his worsened shortness of breath, he presented in ER, evaluation was done and he was sent home without oxygen. His shortness of breath has not changed and he had episode while out and was seen by EMT. His continues to have tachycardia, he has cardiology consult scheduled. CT done did show resolution of the PE. His urine remains cloudy with minima hematuria. PAST MEDICAL HISTORY: has a past medical history of Arthritis, BPH with obstruction/lower urinary tract symptoms, Caffeine use, Cancer ([facility] [address] Depression, GERD (gastroesophageal reflux disease), High cholesterol, Kidney calculi, and Sleep apnea. PAST SURGICAL HISTORY: has a past surgical history that includes Knee arthroscopy; Cardiac catheterization; Colonoscopy; and IR PORT PLACEMENT > 5 YEARS (2/25/2022). CURRENT MEDICATIONS:  has a current medication list which includes the following prescription(s): hydrocodone-acetaminophen, furosemide, apixaban, lorazepam, dexamethasone, phenazopyridine, lidocaine-prilocaine, ondansetron, omeprazole, finasteride, tamsulosin, simvastatin, bupropion, and albuterol sulfate hfa. ALLERGIES:  has No Known Allergies. FAMILY HISTORY: Father had bladder cancer     SOCIAL HISTORY:  reports that he quit smoking about 30 years ago. He has never used smokeless tobacco. He reports that he does not drink alcohol and does not use drugs. REVIEW OF SYSTEMS:   General: No fever or night sweats. Weight is stable. +fatigue and generalized weakness -worse +dysgeusia   ENT: No double or blurred vision, no tinnitus or hearing problem, no dysphagia or sore throat +smell affected   Respiratory: No chest pain, no cough or hemoptysis. +worsening shortness of breath   Cardiovascular: Denies chest pain, PND or orthopnea.  No L E swelling or palpitations +tachycardia   Gastrointestinal: No vomiting, diarrhea or constipation, abd pain  +nausea   Genitourinary: Denies frequency, urgency or incontinence +dysuria and cloudy urine, mild hematuria   Neurological: Denies headaches, decreased LOC, no sensory or motor focal deficits. Musculoskeletal: No arthralgia no back pain or joint swelling. Skin: There are no rashes or bleeding. Psychiatric: + anxiety and depression. Endocrine: No diabetes or thyroid disease. Hematologic: No bleeding, no adenopathy. PHYSICAL EXAM: Shows a well appearing 74y.o.-year-old male who is not in pain or distress. Vital Signs: Blood pressure 109/68, pulse 115, temperature 97.6 °F (36.4 °C), temperature source Temporal, weight 261 lb 4.8 oz (118.5 kg), SpO2 98 %. HEENT: Normocephalic and atraumatic. Pupils are equal, round, reactive to light and accommodation. Extraocular muscles are intact. Neck: Showed no JVD, no carotid bruit . Lungs: Clear to auscultation bilaterally. Heart: Regular without any murmur. Abdomen: Soft, nontender. No hepatosplenomegaly. Extremities: Lower extremities show no edema, clubbing, or cyanosis. Breasts: Examination not done today. Neuro exam: intact cranial nerves bilaterally no motor or sensory deficit, gait is normal. Lymphatic: no adenopathy appreciated in the supraclavicular, axillary, cervical or inguinal area      REVIEW OF LABORATORY DATA:   Lab Results   Component Value Date    WBC 13.3 (H) 04/14/2022    HGB 11.4 (L) 04/14/2022    HCT 35.9 (L) 04/14/2022    MCV 78.9 (L) 04/14/2022     04/14/2022       Chemistry        Component Value Date/Time     04/01/2022 0600    K 4.1 04/01/2022 0600    CL 99 04/01/2022 0600    CO2 25 04/01/2022 0600    BUN 15 04/01/2022 0600    CREATININE 1.41 (H) 04/01/2022 0600        Component Value Date/Time    CALCIUM 8.7 04/01/2022 0600    ALKPHOS 81 03/31/2022 1001    AST 17 03/31/2022 1001    ALT 23 03/31/2022 1001    BILITOT 0.16 (L) 03/31/2022 1001        PATHOLOGY:     REVIEW OF RADIOLOGICAL RESULTS:         IMPRESSION:   1. High grade urothelial carcinoma   2. Neoadjuvant chemo to be followed by cystectomy   3. Bilateral PE, Eliquis   4.  Hypoxemia with minimal activity with severe tachycardia    PLAN:   1. We evaluated his oxygen today with exertion, it continues to drop below 85% and I am writing for oxygen. 2. His lab work was reviewed, counts are adequate, electrolytes in range. 3. We reviewed his current medications, I think respiratory failure as a result of his pulmonary embolism and some cardiac dysfunction. He has extreme tachycardia with minimal activity as well as hypoxemia. We will write for home oxygen and will need a cardiology evaluation soon as possible. The patient already has a referral and trying to get in as soon as possible. 4. We discussed his persistent urinary symptoms, I explained goal of resolution with treatment, we will also plan for testing to rule out UTI. 5. We will resume therapy today as per orders and treatment plan was reviewed. 6. Return next week for day 8    Scribe Attestation   This note was created by Harshil Gonzáles acting as scribe for the physician signing this note  Electronically Signed  Harshil Gonzáles, 4/14/2022  Scribe, [employer]. Attending Attestation   Note was reviewed and edited.   I am in agreement with the note as entered    Kialash Grayson MD  Hematologist/Medical [id number] [facility] hematology oncology physicians

## 2022-04-14 NOTE — PROGRESS NOTES
Pt here for C.3D. 1. gemzar and cisplatin   Arrives ambulatory. Denies any new complaints. Labs drawn from port at MD visit, results reviewed. Gemzar dose reduced per Dr. Dorothea Dawson. Urine specimen collect and results reviewed with Dr. Dorothea Dawson. Oral cipro ordered per Dr. Dorothea Dawson. Pt was seen by Dr. Dorothea Dawson, order rec'd to proceed with tx. Tx complete without incident. Pt d/c'd in stable condition. Returns 4/21/2022 for C3D8 and office visit with Dr. Dorothea Dawson.

## 2022-04-16 LAB
CULTURE: ABNORMAL
CULTURE: ABNORMAL
SPECIMEN DESCRIPTION: ABNORMAL

## 2022-04-20 NOTE — TELEPHONE ENCOUNTER
[facility] states they do not have refills on pt's eliquis. RX on 3/31 had 5 refills, but [facility] did not receive it. New RX sent.

## 2022-04-21 ENCOUNTER — OFFICE VISIT (OUTPATIENT)
Dept: ONCOLOGY | Age: 67
End: 2022-04-21
Payer: MEDICARE

## 2022-04-21 ENCOUNTER — HOSPITAL ENCOUNTER (OUTPATIENT)
Dept: INFUSION THERAPY | Age: 67
Discharge: HOME | End: 2022-04-21
Payer: MEDICARE

## 2022-04-21 VITALS
DIASTOLIC BLOOD PRESSURE: 89 MMHG | HEART RATE: 103 BPM | WEIGHT: 257 LBS | BODY MASS INDEX: 32.98 KG/M2 | RESPIRATION RATE: 20 BRPM | SYSTOLIC BLOOD PRESSURE: 113 MMHG | HEIGHT: 74 IN | TEMPERATURE: 98 F

## 2022-04-21 VITALS — WEIGHT: 257.6 LBS | BODY MASS INDEX: 33.07 KG/M2

## 2022-04-21 DIAGNOSIS — C67.2 MALIGNANT NEOPLASM OF LATERAL WALL OF URINARY BLADDER (HCC): Primary | ICD-10-CM

## 2022-04-21 DIAGNOSIS — C67.9 MALIGNANT NEOPLASM OF URINARY BLADDER, UNSPECIFIED SITE (HCC): Primary | ICD-10-CM

## 2022-04-21 LAB
ABSOLUTE EOS #: 0.08 K/UL (ref 0–0.4)
ABSOLUTE LYMPH #: 2.44 K/UL (ref 1–4.8)
ABSOLUTE MONO #: 0.5 K/UL (ref 0.1–1.2)
ALBUMIN SERPL-MCNC: 3.6 G/DL (ref 3.5–5.2)
ALBUMIN/GLOBULIN RATIO: 1 (ref 1–2.5)
ALP BLD-CCNC: 96 U/L (ref 40–129)
ALT SERPL-CCNC: 23 U/L (ref 5–41)
ANION GAP SERPL CALCULATED.3IONS-SCNC: 12 MMOL/L (ref 9–17)
AST SERPL-CCNC: 12 U/L
BASOPHILS # BLD: 1 % (ref 0–2)
BASOPHILS ABSOLUTE: 0.08 K/UL (ref 0–0.2)
BILIRUB SERPL-MCNC: 0.24 MG/DL (ref 0.3–1.2)
BUN BLDV-MCNC: 23 MG/DL (ref 8–23)
CALCIUM SERPL-MCNC: 9.4 MG/DL (ref 8.6–10.4)
CHLORIDE BLD-SCNC: 96 MMOL/L (ref 98–107)
CO2: 27 MMOL/L (ref 20–31)
CREAT SERPL-MCNC: 1.32 MG/DL (ref 0.7–1.2)
EOSINOPHILS RELATIVE PERCENT: 1 % (ref 1–4)
GFR AFRICAN AMERICAN: >60 ML/MIN
GFR NON-AFRICAN AMERICAN: 54 ML/MIN
GFR SERPL CREATININE-BSD FRML MDRD: ABNORMAL ML/MIN/{1.73_M2}
GLUCOSE BLD-MCNC: 161 MG/DL (ref 70–99)
HCT VFR BLD CALC: 35.1 % (ref 41–53)
HEMOGLOBIN: 11.6 G/DL (ref 13.5–17.5)
LYMPHOCYTES # BLD: 29 % (ref 24–44)
MCH RBC QN AUTO: 25.5 PG (ref 26–34)
MCHC RBC AUTO-ENTMCNC: 33 G/DL (ref 31–37)
MCV RBC AUTO: 77.5 FL (ref 80–100)
MONOCYTES # BLD: 6 % (ref 2–11)
MORPHOLOGY: ABNORMAL
PDW BLD-RTO: 21.4 % (ref 12.5–15.4)
PLATELET # BLD: 222 K/UL (ref 140–450)
PMV BLD AUTO: 6.8 FL (ref 6–12)
POTASSIUM SERPL-SCNC: 3.8 MMOL/L (ref 3.7–5.3)
RBC # BLD: 4.52 M/UL (ref 4.5–5.9)
SEG NEUTROPHILS: 63 % (ref 36–66)
SEGMENTED NEUTROPHILS ABSOLUTE COUNT: 5.3 K/UL (ref 1.8–7.7)
SODIUM BLD-SCNC: 135 MMOL/L (ref 135–144)
TOTAL PROTEIN: 7.2 G/DL (ref 6.4–8.3)
WBC # BLD: 8.4 K/UL (ref 3.5–11)

## 2022-04-21 PROCEDURE — 4040F PNEUMOC VAC/ADMIN/RCVD: CPT | Performed by: INTERNAL MEDICINE

## 2022-04-21 PROCEDURE — 96413 CHEMO IV INFUSION 1 HR: CPT

## 2022-04-21 PROCEDURE — 1123F ACP DISCUSS/DSCN MKR DOCD: CPT | Performed by: INTERNAL MEDICINE

## 2022-04-21 PROCEDURE — 6360000002 HC RX W HCPCS: Performed by: INTERNAL MEDICINE

## 2022-04-21 PROCEDURE — 96375 TX/PRO/DX INJ NEW DRUG ADDON: CPT

## 2022-04-21 PROCEDURE — G8427 DOCREV CUR MEDS BY ELIG CLIN: HCPCS | Performed by: INTERNAL MEDICINE

## 2022-04-21 PROCEDURE — G8417 CALC BMI ABV UP PARAM F/U: HCPCS | Performed by: INTERNAL MEDICINE

## 2022-04-21 PROCEDURE — 36591 DRAW BLOOD OFF VENOUS DEVICE: CPT

## 2022-04-21 PROCEDURE — 85025 COMPLETE CBC W/AUTO DIFF WBC: CPT

## 2022-04-21 PROCEDURE — 2580000003 HC RX 258: Performed by: INTERNAL MEDICINE

## 2022-04-21 PROCEDURE — 1036F TOBACCO NON-USER: CPT | Performed by: INTERNAL MEDICINE

## 2022-04-21 PROCEDURE — 1111F DSCHRG MED/CURRENT MED MERGE: CPT | Performed by: INTERNAL MEDICINE

## 2022-04-21 PROCEDURE — 99214 OFFICE O/P EST MOD 30 MIN: CPT | Performed by: INTERNAL MEDICINE

## 2022-04-21 PROCEDURE — 3017F COLORECTAL CA SCREEN DOC REV: CPT | Performed by: INTERNAL MEDICINE

## 2022-04-21 PROCEDURE — 80053 COMPREHEN METABOLIC PANEL: CPT

## 2022-04-21 RX ORDER — SODIUM CHLORIDE 9 MG/ML
20 INJECTION, SOLUTION INTRAVENOUS ONCE
Status: COMPLETED | OUTPATIENT
Start: 2022-04-21 | End: 2022-04-21

## 2022-04-21 RX ORDER — DEXAMETHASONE SODIUM PHOSPHATE 4 MG/ML
8 INJECTION, SOLUTION INTRA-ARTICULAR; INTRALESIONAL; INTRAMUSCULAR; INTRAVENOUS; SOFT TISSUE ONCE
Status: COMPLETED | OUTPATIENT
Start: 2022-04-21 | End: 2022-04-21

## 2022-04-21 RX ORDER — HEPARIN SODIUM (PORCINE) LOCK FLUSH IV SOLN 100 UNIT/ML 100 UNIT/ML
500 SOLUTION INTRAVENOUS PRN
Status: DISCONTINUED | OUTPATIENT
Start: 2022-04-21 | End: 2022-04-22 | Stop reason: HOSPADM

## 2022-04-21 RX ORDER — SODIUM CHLORIDE 0.9 % (FLUSH) 0.9 %
5-40 SYRINGE (ML) INJECTION PRN
Status: DISCONTINUED | OUTPATIENT
Start: 2022-04-21 | End: 2022-04-22 | Stop reason: HOSPADM

## 2022-04-21 RX ADMIN — SODIUM CHLORIDE, PRESERVATIVE FREE 10 ML: 5 INJECTION INTRAVENOUS at 14:01

## 2022-04-21 RX ADMIN — DEXAMETHASONE SODIUM PHOSPHATE 8 MG: 4 INJECTION, SOLUTION INTRAMUSCULAR; INTRAVENOUS at 12:55

## 2022-04-21 RX ADMIN — GEMCITABINE 2520 MG: 38 INJECTION, SOLUTION INTRAVENOUS at 13:27

## 2022-04-21 RX ADMIN — SODIUM CHLORIDE 20 ML/HR: 9 INJECTION, SOLUTION INTRAVENOUS at 12:55

## 2022-04-21 RX ADMIN — HEPARIN 500 UNITS: 100 SYRINGE at 14:01

## 2022-04-21 NOTE — PROGRESS NOTES
DIAGNOSIS:   1. High grade urothelial carcinoma, 12/2021, T3 N1 M0   2. Bilateral PE, 03/2022    CURRENT THERAPY:  Neoadjuvant chemo followed by radical cystectomy  Eliquis     BRIEF CASE HISTORY:   Anthony Serrato is a very pleasant 74 y.o. male who is referred to us for bladder cancer. Patient presented with hematuria 02/2021 and was seen by urology, it was felt to be renal calculi and he was started on Flomax but continued to pass clots. [facility] and had [facility], passed very large clot and then able to urinate and presented again to ER with continued hematuria, CT showed severe right hydronephrosis with tissue thickening/mass seen in right side of bladder, he underwent nephrostomy placement, stent was put in place and cystoscopy done showing fairly large papillary tumor occupying right side posterior wall and even the dome of the bladder. Pathology showed high grade urothelial carcinoma invading muscularis propria. Over the next month he needed 2 repeat cystoscopies and extensive fulguration of the tumor, again pathology showed high grade urothelial carcinoma. CT of the chest abdomen pelvis did not show metastatic disease. He has intermittent bladder pain and continues to have hematuria in Muñoz catheter. His appetite is poor. His father had bladder cancer, he did not have smoking history. The patient has smoking history but quit 30 years ago. He had COVID infection in 09/2021, he lost significant weight and continues to recover. He has arthralgias in the knees and uses cane. Staging PET scan was done and showed the bladder tumor and right pelvic sidewall lymph node metastases 4 of clinical staging of T3 N1 M0. We discussed neoadjuvant chemotherapy to be followed by surgery if he has a good response. INTERIM HISTORY: The patient presents for follow up with toxicity check, and day 8, cycle 3 of therapy.  He continues to have fatigue that starts 48 hours after therapy - increased, some nausea, no vomiting. He is using oxygen at home. PAST MEDICAL HISTORY: has a past medical history of Arthritis, BPH with obstruction/lower urinary tract symptoms, Caffeine use, Cancer ([address] Depression, GERD (gastroesophageal reflux disease), High cholesterol, Kidney calculi, and Sleep apnea. PAST SURGICAL HISTORY: has a past surgical history that includes Knee arthroscopy; Cardiac catheterization; Colonoscopy; and IR PORT PLACEMENT > 5 YEARS (2/25/2022). CURRENT MEDICATIONS:  has a current medication list which includes the following prescription(s): apixaban, hydrocodone-acetaminophen, furosemide, lorazepam, dexamethasone, lidocaine-prilocaine, ondansetron, omeprazole, finasteride, tamsulosin, simvastatin, bupropion, albuterol sulfate hfa, and phenazopyridine, and the following Facility-Administered Medications: sodium chloride flush and heparin flush. ALLERGIES:  has No Known Allergies. FAMILY HISTORY: Father had bladder cancer     SOCIAL HISTORY:  reports that he quit smoking about 30 years ago. He has never used smokeless tobacco. He reports that he does not drink alcohol and does not use drugs. REVIEW OF SYSTEMS:   General: No fever or night sweats. Weight is stable. +fatigue and generalized weakness -worse +dysgeusia   ENT: No double or blurred vision, no tinnitus or hearing problem, no dysphagia or sore throat +smell affected   Respiratory: No chest pain, no cough or hemoptysis. +worsening shortness of breath   Cardiovascular: Denies chest pain, PND or orthopnea. No L E swelling or palpitations +tachycardia   Gastrointestinal: No vomiting, diarrhea or constipation, abd pain  +nausea   Genitourinary: Denies frequency, urgency or incontinence +dysuria and cloudy urine, mild hematuria   Neurological: Denies headaches, decreased LOC, no sensory or motor focal deficits. Musculoskeletal: No arthralgia no back pain or joint swelling. Skin: There are no rashes or bleeding.   Psychiatric: + anxiety and depression. Endocrine: No diabetes or thyroid disease. Hematologic: No bleeding, no adenopathy. PHYSICAL EXAM: Shows a well appearing 74y.o.-year-old male who is not in pain or distress. Vital Signs: Blood pressure 113/89, pulse 103, temperature 98 °F (36.7 °C), temperature source Oral, resp. rate 20, height 6' 2\" (1.88 m), weight 257 lb (116.6 kg). HEENT: Normocephalic and atraumatic. Pupils are equal, round, reactive to light and accommodation. Extraocular muscles are intact. Neck: Showed no JVD, no carotid bruit . Lungs: Clear to auscultation bilaterally. Heart: Regular without any murmur. Abdomen: Soft, nontender. No hepatosplenomegaly. Extremities: Lower extremities show no edema, clubbing, or cyanosis. Breasts: Examination not done today. Neuro exam: intact cranial nerves bilaterally no motor or sensory deficit, gait is normal. Lymphatic: no adenopathy appreciated in the supraclavicular, axillary, cervical or inguinal area      REVIEW OF LABORATORY DATA:   Lab Results   Component Value Date    WBC 8.4 04/21/2022    HGB 11.6 (L) 04/21/2022    HCT 35.1 (L) 04/21/2022    MCV 77.5 (L) 04/21/2022     04/21/2022       Chemistry        Component Value Date/Time     04/21/2022 1125    K 3.8 04/21/2022 1125    CL 96 (L) 04/21/2022 1125    CO2 27 04/21/2022 1125    BUN 23 04/21/2022 1125    CREATININE 1.32 (H) 04/21/2022 1125        Component Value Date/Time    CALCIUM 9.4 04/21/2022 1125    ALKPHOS 96 04/21/2022 1125    AST 12 04/21/2022 1125    ALT 23 04/21/2022 1125    BILITOT 0.24 (L) 04/21/2022 1125        PATHOLOGY:     REVIEW OF RADIOLOGICAL RESULTS:         IMPRESSION:   1. High grade urothelial carcinoma   2. Neoadjuvant chemo to be followed by cystectomy   3. Bilateral PE, Eliquis   4. Hypoxemia with minimal activity with severe tachycardia    PLAN:   1. His lab work was reviewed, stable and adequate. 2. I completed toxicity check.    3. We discussed his increased fatigue, he is using oxygen. 4. We will treat today as per orders and discussed plan for imaging prior to next visit. 5. eliquis was denied by his insurance, since KFT stabilized, will see if he can tolerate xarelto . 6. Return in 2 weeks. Scribe Attestation   This note was created by Luther Moreno acting as scribe for the physician signing this note  Electronically Signed  Marci Harkins, 4/21/2022  Scribe, [employer]. Attending Attestation   Note was reviewed and edited.   I am in agreement with the note as entered    Allegra Grayson MD  Hematologist/Medical [id number] [facility] hematology oncology physicians

## 2022-04-21 NOTE — PATIENT INSTRUCTIONS
Proceed with chemotherapy per order, needs CT scan before next chemo.   Return in 2 weeks with chemotherapy and labs

## 2022-04-21 NOTE — PROGRESS NOTES
Pt here for C.3D.8 Gemzar. Arrives via w/c. C/o SOB, nausea at time, loss of appetite. Labs drawn from port, results reviewed. Pt was seen by Dr. Haven Rdz, order rec'd to proceed with tx. Tx complete without incident. Pt d/c'd in stable condition. Returns 4/27/22 for port access for CT scan, then 5/5/22 for f/u with Dr Rafael Herrera and [id number] tx. Mable Mujica

## 2022-04-21 NOTE — PLAN OF CARE
Problem: Chronic Conditions and Co-morbidities  Goal: Patient's chronic conditions and co-morbidity symptoms are monitored and maintained or improved  Outcome: Completed

## 2022-04-22 ENCOUNTER — TELEPHONE (OUTPATIENT)
Dept: ONCOLOGY | Age: 67
End: 2022-04-22

## 2022-04-22 NOTE — TELEPHONE ENCOUNTER
NUTRITION NOTE    Called pt on listed phone number r/t nutrition follow up. Pt short-answered on the phone; states no concerns or questions. \"Doctor says I'm doing fine. \" Noted mild weight loss. Will continue to follow.     KENNY Solo, RD, LD  Registered Dietitian   [facility]  [phone]

## 2022-04-26 RX ORDER — AMOXICILLIN 500 MG/1
500 CAPSULE ORAL 3 TIMES DAILY
Qty: 21 CAPSULE | Refills: 0 | Status: SHIPPED | OUTPATIENT
Start: 2022-04-26 | End: 2022-05-03

## 2022-04-26 NOTE — TELEPHONE ENCOUNTER
Pt called in stating he feels he still has a UTI. He states his urine is cloudy, there is burning, and frequency. He denies any blood in urine. Discussed with Dr. Francoise Myles and he reviewed urine culture done on 4/14. He ordered amoxicillin 500mg TID for 7 days. RX sent and pt notified.

## 2022-04-27 ENCOUNTER — HOSPITAL ENCOUNTER (OUTPATIENT)
Dept: CT IMAGING | Age: 67
Discharge: HOME | End: 2022-04-29
Payer: MEDICARE

## 2022-04-27 DIAGNOSIS — C67.9 MALIGNANT NEOPLASM OF URINARY BLADDER, UNSPECIFIED SITE (HCC): ICD-10-CM

## 2022-04-27 PROCEDURE — 74176 CT ABD & PELVIS W/O CONTRAST: CPT

## 2022-04-27 PROCEDURE — 6360000004 HC RX CONTRAST MEDICATION: Performed by: INTERNAL MEDICINE

## 2022-04-27 RX ADMIN — IOHEXOL 50 ML: 240 INJECTION, SOLUTION INTRATHECAL; INTRAVASCULAR; INTRAVENOUS; ORAL at 10:08

## 2022-05-03 ENCOUNTER — TELEPHONE (OUTPATIENT)
Dept: INFUSION THERAPY | Age: 67
End: 2022-05-03

## 2022-05-03 RX ORDER — DEXAMETHASONE 1 MG
TABLET ORAL
Qty: 30 TABLET | Refills: 0 | Status: SHIPPED | OUTPATIENT
Start: 2022-05-03 | End: 2022-06-06

## 2022-05-03 NOTE — TELEPHONE ENCOUNTER
Pt called requesting results of CT scan. Reviewed with Dr. Richa José; he states ok to inform pt that mass is smaller. Attempted to call pt but had to leave VM.

## 2022-05-03 NOTE — TELEPHONE ENCOUNTER
Pt called back and results given. Pt verbalized understanding and states he will talk to Dr. Jennifer Gonzalez more about his results at his f/u on 5/5.

## 2022-05-04 DIAGNOSIS — C67.9 MALIGNANT NEOPLASM OF URINARY BLADDER, UNSPECIFIED SITE (HCC): Primary | ICD-10-CM

## 2022-05-05 ENCOUNTER — HOSPITAL ENCOUNTER (OUTPATIENT)
Dept: INFUSION THERAPY | Age: 67
Discharge: HOME | End: 2022-05-05
Payer: MEDICARE

## 2022-05-05 ENCOUNTER — OFFICE VISIT (OUTPATIENT)
Dept: ONCOLOGY | Age: 67
End: 2022-05-05
Payer: MEDICARE

## 2022-05-05 ENCOUNTER — TELEPHONE (OUTPATIENT)
Dept: ONCOLOGY | Age: 67
End: 2022-05-05

## 2022-05-05 VITALS
HEART RATE: 101 BPM | RESPIRATION RATE: 20 BRPM | DIASTOLIC BLOOD PRESSURE: 85 MMHG | SYSTOLIC BLOOD PRESSURE: 124 MMHG | TEMPERATURE: 96.2 F | BODY MASS INDEX: 32.87 KG/M2 | WEIGHT: 256 LBS

## 2022-05-05 DIAGNOSIS — C67.2 MALIGNANT NEOPLASM OF LATERAL WALL OF URINARY BLADDER (HCC): ICD-10-CM

## 2022-05-05 DIAGNOSIS — C67.9 MALIGNANT NEOPLASM OF URINARY BLADDER, UNSPECIFIED SITE (HCC): Primary | ICD-10-CM

## 2022-05-05 DIAGNOSIS — C67.9 MALIGNANT NEOPLASM OF URINARY BLADDER, UNSPECIFIED SITE (HCC): ICD-10-CM

## 2022-05-05 LAB
-: ABNORMAL
ABSOLUTE EOS #: 0 K/UL (ref 0–0.4)
ABSOLUTE LYMPH #: 1.95 K/UL (ref 1–4.8)
ABSOLUTE MONO #: 1.32 K/UL (ref 0.1–0.8)
ALBUMIN SERPL-MCNC: 3.3 G/DL (ref 3.5–5.2)
ALBUMIN/GLOBULIN RATIO: 0.9 (ref 1–2.5)
ALP BLD-CCNC: 94 U/L (ref 40–129)
ALT SERPL-CCNC: 30 U/L (ref 5–41)
ANION GAP SERPL CALCULATED.3IONS-SCNC: 11 MMOL/L (ref 9–17)
AST SERPL-CCNC: 19 U/L
BACTERIA: ABNORMAL
BASOPHILS # BLD: 0 % (ref 0–2)
BASOPHILS ABSOLUTE: 0 K/UL (ref 0–0.2)
BILIRUB SERPL-MCNC: 0.22 MG/DL (ref 0.3–1.2)
BILIRUBIN URINE: NEGATIVE
BUN BLDV-MCNC: 17 MG/DL (ref 8–23)
CALCIUM SERPL-MCNC: 9.2 MG/DL (ref 8.6–10.4)
CHLORIDE BLD-SCNC: 98 MMOL/L (ref 98–107)
CO2: 27 MMOL/L (ref 20–31)
COLOR: YELLOW
CREAT SERPL-MCNC: 1.24 MG/DL (ref 0.7–1.2)
EOSINOPHILS RELATIVE PERCENT: 0 % (ref 1–4)
EPITHELIAL CELLS UA: ABNORMAL /HPF (ref 0–5)
GFR AFRICAN AMERICAN: >60 ML/MIN
GFR NON-AFRICAN AMERICAN: 59 ML/MIN
GFR SERPL CREATININE-BSD FRML MDRD: ABNORMAL ML/MIN/{1.73_M2}
GLUCOSE BLD-MCNC: 111 MG/DL (ref 70–99)
GLUCOSE URINE: NEGATIVE
HCT VFR BLD CALC: 30.7 % (ref 41–53)
HEMOGLOBIN: 10.2 G/DL (ref 13.5–17.5)
KETONES, URINE: NEGATIVE
LEUKOCYTE ESTERASE, URINE: ABNORMAL
LYMPHOCYTES # BLD: 31 % (ref 24–44)
MAGNESIUM: 1.9 MG/DL (ref 1.6–2.6)
MCH RBC QN AUTO: 26.2 PG (ref 26–34)
MCHC RBC AUTO-ENTMCNC: 33.2 G/DL (ref 31–37)
MCV RBC AUTO: 78.7 FL (ref 80–100)
MONOCYTES # BLD: 21 % (ref 1–7)
MORPHOLOGY: ABNORMAL
MYELOCYTES ABSOLUTE COUNT: 0.13 K/UL
MYELOCYTES: 2 %
NITRITE, URINE: POSITIVE
OTHER OBSERVATIONS UA: ABNORMAL
PDW BLD-RTO: 25.2 % (ref 12.5–15.4)
PH UA: 6 (ref 5–8)
PLATELET # BLD: 345 K/UL (ref 140–450)
PMV BLD AUTO: 6.6 FL (ref 6–12)
POTASSIUM SERPL-SCNC: 3.6 MMOL/L (ref 3.7–5.3)
PROTEIN UA: ABNORMAL
RBC # BLD: 3.9 M/UL (ref 4.5–5.9)
RBC UA: ABNORMAL /HPF (ref 0–2)
SEG NEUTROPHILS: 46 % (ref 36–66)
SEGMENTED NEUTROPHILS ABSOLUTE COUNT: 2.9 K/UL (ref 1.8–7.7)
SODIUM BLD-SCNC: 136 MMOL/L (ref 135–144)
SPECIFIC GRAVITY UA: 1.02 (ref 1–1.03)
TOTAL PROTEIN: 7 G/DL (ref 6.4–8.3)
TURBIDITY: ABNORMAL
URINE HGB: ABNORMAL
UROBILINOGEN, URINE: NORMAL
WBC # BLD: 6.3 K/UL (ref 3.5–11)
WBC UA: ABNORMAL /HPF (ref 0–5)

## 2022-05-05 PROCEDURE — 96360 HYDRATION IV INFUSION INIT: CPT

## 2022-05-05 PROCEDURE — G8428 CUR MEDS NOT DOCUMENT: HCPCS | Performed by: INTERNAL MEDICINE

## 2022-05-05 PROCEDURE — 85025 COMPLETE CBC W/AUTO DIFF WBC: CPT

## 2022-05-05 PROCEDURE — 87086 URINE CULTURE/COLONY COUNT: CPT

## 2022-05-05 PROCEDURE — 2580000003 HC RX 258: Performed by: INTERNAL MEDICINE

## 2022-05-05 PROCEDURE — 83735 ASSAY OF MAGNESIUM: CPT

## 2022-05-05 PROCEDURE — G8417 CALC BMI ABV UP PARAM F/U: HCPCS | Performed by: INTERNAL MEDICINE

## 2022-05-05 PROCEDURE — 4040F PNEUMOC VAC/ADMIN/RCVD: CPT | Performed by: INTERNAL MEDICINE

## 2022-05-05 PROCEDURE — 96413 CHEMO IV INFUSION 1 HR: CPT

## 2022-05-05 PROCEDURE — 1123F ACP DISCUSS/DSCN MKR DOCD: CPT | Performed by: INTERNAL MEDICINE

## 2022-05-05 PROCEDURE — 87186 SC STD MICRODIL/AGAR DIL: CPT

## 2022-05-05 PROCEDURE — 96361 HYDRATE IV INFUSION ADD-ON: CPT

## 2022-05-05 PROCEDURE — 36591 DRAW BLOOD OFF VENOUS DEVICE: CPT

## 2022-05-05 PROCEDURE — 1036F TOBACCO NON-USER: CPT | Performed by: INTERNAL MEDICINE

## 2022-05-05 PROCEDURE — 96368 THER/DIAG CONCURRENT INF: CPT

## 2022-05-05 PROCEDURE — 99211 OFF/OP EST MAY X REQ PHY/QHP: CPT | Performed by: INTERNAL MEDICINE

## 2022-05-05 PROCEDURE — 87077 CULTURE AEROBIC IDENTIFY: CPT

## 2022-05-05 PROCEDURE — 96367 TX/PROPH/DG ADDL SEQ IV INF: CPT

## 2022-05-05 PROCEDURE — 96417 CHEMO IV INFUS EACH ADDL SEQ: CPT

## 2022-05-05 PROCEDURE — 96375 TX/PRO/DX INJ NEW DRUG ADDON: CPT

## 2022-05-05 PROCEDURE — 3017F COLORECTAL CA SCREEN DOC REV: CPT | Performed by: INTERNAL MEDICINE

## 2022-05-05 PROCEDURE — 81001 URINALYSIS AUTO W/SCOPE: CPT

## 2022-05-05 PROCEDURE — 99214 OFFICE O/P EST MOD 30 MIN: CPT | Performed by: INTERNAL MEDICINE

## 2022-05-05 PROCEDURE — 80053 COMPREHEN METABOLIC PANEL: CPT

## 2022-05-05 PROCEDURE — 6360000002 HC RX W HCPCS: Performed by: INTERNAL MEDICINE

## 2022-05-05 RX ORDER — POLYETHYLENE GLYCOL 3350 17 G/17G
17 POWDER, FOR SOLUTION ORAL DAILY PRN
COMMUNITY
End: 2022-08-04

## 2022-05-05 RX ORDER — MAGNESIUM SULFATE 1 G/100ML
1000 INJECTION INTRAVENOUS ONCE
Status: COMPLETED | OUTPATIENT
Start: 2022-05-05 | End: 2022-05-05

## 2022-05-05 RX ORDER — SODIUM CHLORIDE AND POTASSIUM CHLORIDE .9; .15 G/100ML; G/100ML
SOLUTION INTRAVENOUS CONTINUOUS
Status: DISCONTINUED | OUTPATIENT
Start: 2022-05-05 | End: 2022-05-06 | Stop reason: HOSPADM

## 2022-05-05 RX ORDER — HEPARIN SODIUM (PORCINE) LOCK FLUSH IV SOLN 100 UNIT/ML 100 UNIT/ML
500 SOLUTION INTRAVENOUS PRN
Status: DISCONTINUED | OUTPATIENT
Start: 2022-05-05 | End: 2022-05-06 | Stop reason: HOSPADM

## 2022-05-05 RX ORDER — SODIUM CHLORIDE 9 MG/ML
20 INJECTION, SOLUTION INTRAVENOUS ONCE
Status: COMPLETED | OUTPATIENT
Start: 2022-05-05 | End: 2022-05-05

## 2022-05-05 RX ORDER — DEXAMETHASONE SODIUM PHOSPHATE 10 MG/ML
10 INJECTION INTRAMUSCULAR; INTRAVENOUS ONCE
Status: COMPLETED | OUTPATIENT
Start: 2022-05-05 | End: 2022-05-05

## 2022-05-05 RX ORDER — SODIUM CHLORIDE 0.9 % (FLUSH) 0.9 %
5-40 SYRINGE (ML) INJECTION PRN
Status: DISCONTINUED | OUTPATIENT
Start: 2022-05-05 | End: 2022-05-06 | Stop reason: HOSPADM

## 2022-05-05 RX ORDER — PALONOSETRON 0.05 MG/ML
0.25 INJECTION, SOLUTION INTRAVENOUS ONCE
Status: COMPLETED | OUTPATIENT
Start: 2022-05-05 | End: 2022-05-05

## 2022-05-05 RX ADMIN — MAGNESIUM SULFATE HEPTAHYDRATE 1000 MG: 1 INJECTION, SOLUTION INTRAVENOUS at 14:32

## 2022-05-05 RX ADMIN — SODIUM CHLORIDE 20 ML/HR: 9 INJECTION, SOLUTION INTRAVENOUS at 10:39

## 2022-05-05 RX ADMIN — CISPLATIN 176 MG: 100 INJECTION, SOLUTION INTRAVENOUS at 13:19

## 2022-05-05 RX ADMIN — HEPARIN 500 UNITS: 100 SYRINGE at 15:49

## 2022-05-05 RX ADMIN — SODIUM CHLORIDE, PRESERVATIVE FREE 10 ML: 5 INJECTION INTRAVENOUS at 15:49

## 2022-05-05 RX ADMIN — SODIUM CHLORIDE, PRESERVATIVE FREE 10 ML: 5 INJECTION INTRAVENOUS at 10:01

## 2022-05-05 RX ADMIN — GEMCITABINE 2520 MG: 38 INJECTION, SOLUTION INTRAVENOUS at 12:37

## 2022-05-05 RX ADMIN — SODIUM CHLORIDE, PRESERVATIVE FREE 10 ML: 5 INJECTION INTRAVENOUS at 10:00

## 2022-05-05 RX ADMIN — FOSAPREPITANT 150 MG: 150 INJECTION, POWDER, LYOPHILIZED, FOR SOLUTION INTRAVENOUS at 11:59

## 2022-05-05 RX ADMIN — MAGNESIUM SULFATE HEPTAHYDRATE 1000 MG: 1 INJECTION, SOLUTION INTRAVENOUS at 10:41

## 2022-05-05 RX ADMIN — DEXAMETHASONE SODIUM PHOSPHATE 10 MG: 10 INJECTION INTRAMUSCULAR; INTRAVENOUS at 11:54

## 2022-05-05 RX ADMIN — PALONOSETRON 0.25 MG: 0.25 INJECTION, SOLUTION INTRAVENOUS at 11:54

## 2022-05-05 RX ADMIN — POTASSIUM CHLORIDE AND SODIUM CHLORIDE: 900; 150 INJECTION, SOLUTION INTRAVENOUS at 10:37

## 2022-05-05 NOTE — TELEPHONE ENCOUNTER
Proceed with cycle 4 of chemo per orders.  rv in 3 weeks with cbc, cmp    Tx today as scheduled    RV scheduled 5/26/22 @ 11:45am    PT was given AVS and an appt schedule    Electronically signed by Titus Garcia on 5/5/2022 at 2:19 PM

## 2022-05-05 NOTE — PROGRESS NOTES
Seen by Dr. Ulysses Morgan today. Ok to proceed w/ treatment . Patient arrived for C4D1 cisplatin/gemzar. Tolerated w/o incident and left in stable condition.  Returns 5/12 for C4D8

## 2022-05-05 NOTE — PROGRESS NOTES
DIAGNOSIS:   1. High grade urothelial carcinoma, 12/2021, T3 N1 M0   2. Bilateral PE, 03/2022    CURRENT THERAPY:  Neoadjuvant chemo followed by radical cystectomy  Eliquis     BRIEF CASE HISTORY:   Leila Dewitt is a very pleasant 74 y.o. male who is referred to us for bladder cancer. Patient presented with hematuria 02/2021 and was seen by urology, it was felt to be renal calculi and he was started on Flomax but continued to pass clots. He was later in [facility] and had urinary blockage, passed very large clot and then able to urinate and presented again to ER with continued hematuria, CT showed severe right hydronephrosis with tissue thickening/mass seen in right side of bladder, he underwent nephrostomy placement, stent was put in place and cystoscopy done showing fairly large papillary tumor occupying right side posterior wall and even the dome of the bladder. Pathology showed high grade urothelial carcinoma invading muscularis propria. Over the next month he needed 2 repeat cystoscopies and extensive fulguration of the tumor, again pathology showed high grade urothelial carcinoma. CT of the chest abdomen pelvis did not show metastatic disease. He has intermittent bladder pain and continues to have hematuria in Muñoz catheter. His appetite is poor. His father had bladder cancer, he did not have smoking history. The patient has smoking history but quit 30 years ago. He had COVID infection in 09/2021, he lost significant weight and continues to recover. He has arthralgias in the knees and uses cane. Staging PET scan was done and showed the bladder tumor and right pelvic sidewall lymph node metastases 4 of clinical staging of T3 N1 M0. We discussed neoadjuvant chemotherapy to be followed by surgery if he has a good response. INTERIM HISTORY: The patient presents for follow up with toxicity check, and day 1, cycle 4 of therapy and to review interim imaging.  He had cardiology consult yesterday, he has been referred for heart imaging and pulmonology, he was started on some medication. His exertional shortness of breath is unchanged. He had single episode of epistaxis. He has recurrent UTI with cloudy urine and dysuria. PAST MEDICAL HISTORY: has a past medical history of Arthritis, BPH with obstruction/lower urinary tract symptoms, Caffeine use, Cancer ([facility] [address] Depression, GERD (gastroesophageal reflux disease), High cholesterol, Kidney calculi, and Sleep apnea. PAST SURGICAL HISTORY: has a past surgical history that includes Knee arthroscopy; Cardiac catheterization; Colonoscopy; and IR PORT PLACEMENT > 5 YEARS (2/25/2022). CURRENT MEDICATIONS:  has a current medication list which includes the following prescription(s): polyethylene glycol, dexamethasone, hydrocodone-acetaminophen, furosemide, albuterol sulfate hfa, phenazopyridine, lidocaine-prilocaine, ondansetron, omeprazole, finasteride, tamsulosin, simvastatin, bupropion, rivaroxaban, and apixaban. ALLERGIES:  has No Known Allergies. FAMILY HISTORY: Father had bladder cancer     SOCIAL HISTORY:  reports that he quit smoking about 30 years ago. He has never used smokeless tobacco. He reports that he does not drink alcohol and does not use drugs. REVIEW OF SYSTEMS:   General: No fever or night sweats. Weight is stable. +fatigue and generalized weakness -worse +dysgeusia   ENT: No double or blurred vision, no tinnitus or hearing problem, no dysphagia or sore throat +smell affected +single episode of epistaxis   Respiratory: No chest pain, no cough or hemoptysis. +worsening shortness of breath   Cardiovascular: Denies chest pain, PND or orthopnea.  No L E swelling or palpitations +tachycardia   Gastrointestinal: No vomiting, diarrhea or constipation, abd pain  +nausea   Genitourinary: Denies frequency, hematuria, urgency or incontinence +dysuria and cloudy urine  Neurological: Denies headaches, decreased LOC, no sensory or motor focal deficits. Musculoskeletal: No arthralgia no back pain or joint swelling. Skin: There are no rashes or bleeding. Psychiatric: + anxiety and depression. Endocrine: No diabetes or thyroid disease. Hematologic: No bleeding, no adenopathy. PHYSICAL EXAM: Shows a well appearing 74y.o.-year-old male who is not in pain or distress. Vital Signs: Blood pressure 124/85, pulse 101, temperature 96.2 °F (35.7 °C), temperature source Temporal, resp. rate 20, weight 256 lb (116.1 kg). HEENT: Normocephalic and atraumatic. Pupils are equal, round, reactive to light and accommodation. Extraocular muscles are intact. Neck: Showed no JVD, no carotid bruit . Lungs: Clear to auscultation bilaterally. Heart: Regular without any murmur. Abdomen: Soft, nontender. No hepatosplenomegaly. Extremities: Lower extremities show no edema, clubbing, or cyanosis. Breasts: Examination not done today. Neuro exam: intact cranial nerves bilaterally no motor or sensory deficit, gait is normal. Lymphatic: no adenopathy appreciated in the supraclavicular, axillary, cervical or inguinal area      REVIEW OF LABORATORY DATA:   Lab Results   Component Value Date    WBC 6.3 05/05/2022    HGB 10.2 (L) 05/05/2022    HCT 30.7 (L) 05/05/2022    MCV 78.7 (L) 05/05/2022     05/05/2022       Chemistry        Component Value Date/Time     05/05/2022 0847    K 3.6 (L) 05/05/2022 0847    CL 98 05/05/2022 0847    CO2 27 05/05/2022 0847    BUN 17 05/05/2022 0847    CREATININE 1.24 (H) 05/05/2022 0847        Component Value Date/Time    CALCIUM 9.2 05/05/2022 0847    ALKPHOS 94 05/05/2022 0847    AST 19 05/05/2022 0847    ALT 30 05/05/2022 0847    BILITOT 0.22 (L) 05/05/2022 0847        PATHOLOGY:     REVIEW OF RADIOLOGICAL RESULTS:   CT ABDOMEN PELVIS WO CONTRAST: 4/27/2022  Decreased size of right sided pelvic lymph node which was hypermetabolic on prior PET. Right-sided hydronephrosis and hydroureter, similar in degree compared to recent PET. Right-sided ureteral stent is seen in place. Nonobstructing renal calculi are seen on the right and left Nodular densities and calcifications are seen in the bladder, of uncertain significance. Recommend correlation with direct visualization. IMPRESSION:   1. High grade urothelial carcinoma   2. Neoadjuvant chemo to be followed by cystectomy   3. Bilateral PE, Eliquis   4. Hypoxemia with minimal activity with severe tachycardia    PLAN:   1. We discussed cardiology consult and I reviewed physician notes and scripts, he has 2nd opinion scheduled. 2. We reviewed his interim imaging which shows very good response to therapy, clot burden is improving. 3. His lab work was reviewed, his renal function continues to improve, counts are stable. 4. I completed toxicity check. 5. We will treat today as per orders with plan to complete 4 doses followed by surgery. 6. I am writing for urine culture to be done today, plan to antibiotics as needed. 7. Return in 3 weeks. Scribe Attestation   This note was created by Calin Patino acting as scribe for the physician signing this note  Electronically Signed  Calin Patino, 5/5/2022  [employer], [employer]. Attending Attestation   Note was reviewed and edited.   I am in agreement with the note as entered    Nikki Grayson MD  Hematologist/Medical [id number] [address] [facility] hematology oncology physicians

## 2022-05-07 LAB
CULTURE: ABNORMAL
CULTURE: ABNORMAL
SPECIMEN DESCRIPTION: ABNORMAL

## 2022-05-08 RX ORDER — CIPROFLOXACIN 500 MG/1
500 TABLET, FILM COATED ORAL 2 TIMES DAILY
Qty: 20 TABLET | Refills: 0 | Status: SHIPPED | OUTPATIENT
Start: 2022-05-08 | End: 2022-05-18 | Stop reason: SDUPTHER

## 2022-05-11 DIAGNOSIS — R31.0 GROSS HEMATURIA: ICD-10-CM

## 2022-05-11 DIAGNOSIS — R33.9 URINARY RETENTION: ICD-10-CM

## 2022-05-11 DIAGNOSIS — C67.2 MALIGNANT NEOPLASM OF LATERAL WALL OF URINARY BLADDER (HCC): ICD-10-CM

## 2022-05-11 RX ORDER — HYDROCODONE BITARTRATE AND ACETAMINOPHEN 5; 325 MG/1; MG/1
1 TABLET ORAL EVERY 8 HOURS PRN
Qty: 60 TABLET | Refills: 0 | Status: SHIPPED | OUTPATIENT
Start: 2022-05-11 | End: 2022-06-06 | Stop reason: SDUPTHER

## 2022-05-12 ENCOUNTER — HOSPITAL ENCOUNTER (OUTPATIENT)
Dept: INFUSION THERAPY | Age: 67
Discharge: HOME | End: 2022-05-12
Payer: MEDICARE

## 2022-05-12 VITALS
SYSTOLIC BLOOD PRESSURE: 132 MMHG | WEIGHT: 248 LBS | TEMPERATURE: 97.5 F | HEART RATE: 79 BPM | OXYGEN SATURATION: 100 % | RESPIRATION RATE: 18 BRPM | BODY MASS INDEX: 31.84 KG/M2 | DIASTOLIC BLOOD PRESSURE: 82 MMHG

## 2022-05-12 DIAGNOSIS — C67.9 MALIGNANT NEOPLASM OF URINARY BLADDER, UNSPECIFIED SITE (HCC): Primary | ICD-10-CM

## 2022-05-12 DIAGNOSIS — C67.2 MALIGNANT NEOPLASM OF LATERAL WALL OF URINARY BLADDER (HCC): ICD-10-CM

## 2022-05-12 LAB
ABSOLUTE EOS #: 0 K/UL (ref 0–0.4)
ABSOLUTE LYMPH #: 1.87 K/UL (ref 1–4.8)
ABSOLUTE MONO #: 1.29 K/UL (ref 0.1–0.8)
ALBUMIN SERPL-MCNC: 3.4 G/DL (ref 3.5–5.2)
ALBUMIN/GLOBULIN RATIO: 0.9 (ref 1–2.5)
ALP BLD-CCNC: 100 U/L (ref 40–129)
ALT SERPL-CCNC: 28 U/L (ref 5–41)
ANION GAP SERPL CALCULATED.3IONS-SCNC: 14 MMOL/L (ref 9–17)
AST SERPL-CCNC: 20 U/L
BASOPHILS # BLD: 0 % (ref 0–2)
BASOPHILS ABSOLUTE: 0 K/UL (ref 0–0.2)
BILIRUB SERPL-MCNC: 0.29 MG/DL (ref 0.3–1.2)
BUN BLDV-MCNC: 38 MG/DL (ref 8–23)
CALCIUM SERPL-MCNC: 9.2 MG/DL (ref 8.6–10.4)
CHLORIDE BLD-SCNC: 94 MMOL/L (ref 98–107)
CO2: 27 MMOL/L (ref 20–31)
CREAT SERPL-MCNC: 2.29 MG/DL (ref 0.7–1.2)
EOSINOPHILS RELATIVE PERCENT: 0 % (ref 1–4)
GFR AFRICAN AMERICAN: 35 ML/MIN
GFR NON-AFRICAN AMERICAN: 29 ML/MIN
GFR SERPL CREATININE-BSD FRML MDRD: ABNORMAL ML/MIN/{1.73_M2}
GLUCOSE BLD-MCNC: 145 MG/DL (ref 70–99)
HCT VFR BLD CALC: 31.3 % (ref 41–53)
HEMOGLOBIN: 10.5 G/DL (ref 13.5–17.5)
LYMPHOCYTES # BLD: 16 % (ref 24–44)
MCH RBC QN AUTO: 25.9 PG (ref 26–34)
MCHC RBC AUTO-ENTMCNC: 33.4 G/DL (ref 31–37)
MCV RBC AUTO: 77.5 FL (ref 80–100)
METAMYELOCYTES ABSOLUTE COUNT: 0.35 K/UL
METAMYELOCYTES: 3 %
MONOCYTES # BLD: 11 % (ref 1–7)
MORPHOLOGY: ABNORMAL
MORPHOLOGY: ABNORMAL
PDW BLD-RTO: 24.9 % (ref 12.5–15.4)
PLATELET # BLD: 330 K/UL (ref 140–450)
PMV BLD AUTO: 6.3 FL (ref 6–12)
POTASSIUM SERPL-SCNC: 3.5 MMOL/L (ref 3.7–5.3)
RBC # BLD: 4.04 M/UL (ref 4.5–5.9)
SEG NEUTROPHILS: 70 % (ref 36–66)
SEGMENTED NEUTROPHILS ABSOLUTE COUNT: 8.19 K/UL (ref 1.8–7.7)
SODIUM BLD-SCNC: 135 MMOL/L (ref 135–144)
TOTAL PROTEIN: 7.2 G/DL (ref 6.4–8.3)
WBC # BLD: 11.7 K/UL (ref 3.5–11)

## 2022-05-12 PROCEDURE — 36591 DRAW BLOOD OFF VENOUS DEVICE: CPT

## 2022-05-12 PROCEDURE — 80053 COMPREHEN METABOLIC PANEL: CPT

## 2022-05-12 PROCEDURE — 96417 CHEMO IV INFUS EACH ADDL SEQ: CPT

## 2022-05-12 PROCEDURE — 96361 HYDRATE IV INFUSION ADD-ON: CPT

## 2022-05-12 PROCEDURE — 85025 COMPLETE CBC W/AUTO DIFF WBC: CPT

## 2022-05-12 PROCEDURE — 96413 CHEMO IV INFUSION 1 HR: CPT

## 2022-05-12 PROCEDURE — 6360000002 HC RX W HCPCS: Performed by: INTERNAL MEDICINE

## 2022-05-12 PROCEDURE — 2580000003 HC RX 258: Performed by: INTERNAL MEDICINE

## 2022-05-12 PROCEDURE — 96375 TX/PRO/DX INJ NEW DRUG ADDON: CPT

## 2022-05-12 RX ORDER — SODIUM CHLORIDE 0.9 % (FLUSH) 0.9 %
5-40 SYRINGE (ML) INJECTION PRN
Status: DISCONTINUED | OUTPATIENT
Start: 2022-05-12 | End: 2022-05-13 | Stop reason: HOSPADM

## 2022-05-12 RX ORDER — HEPARIN SODIUM (PORCINE) LOCK FLUSH IV SOLN 100 UNIT/ML 100 UNIT/ML
500 SOLUTION INTRAVENOUS PRN
Status: DISCONTINUED | OUTPATIENT
Start: 2022-05-12 | End: 2022-05-13 | Stop reason: HOSPADM

## 2022-05-12 RX ORDER — DEXAMETHASONE SODIUM PHOSPHATE 4 MG/ML
8 INJECTION, SOLUTION INTRA-ARTICULAR; INTRALESIONAL; INTRAMUSCULAR; INTRAVENOUS; SOFT TISSUE ONCE
Status: COMPLETED | OUTPATIENT
Start: 2022-05-12 | End: 2022-05-12

## 2022-05-12 RX ORDER — 0.9 % SODIUM CHLORIDE 0.9 %
1000 INTRAVENOUS SOLUTION INTRAVENOUS ONCE
Status: COMPLETED | OUTPATIENT
Start: 2022-05-12 | End: 2022-05-12

## 2022-05-12 RX ADMIN — DEXAMETHASONE SODIUM PHOSPHATE 8 MG: 4 INJECTION, SOLUTION INTRAMUSCULAR; INTRAVENOUS at 12:36

## 2022-05-12 RX ADMIN — SODIUM CHLORIDE, PRESERVATIVE FREE 10 ML: 5 INJECTION INTRAVENOUS at 14:50

## 2022-05-12 RX ADMIN — GEMCITABINE 2520 MG: 38 INJECTION, SOLUTION INTRAVENOUS at 13:13

## 2022-05-12 RX ADMIN — SODIUM CHLORIDE 1200 ML: 9 INJECTION, SOLUTION INTRAVENOUS at 12:09

## 2022-05-12 RX ADMIN — Medication 500 UNITS: at 14:50

## 2022-05-12 NOTE — ONCOLOGY
Patient arrived for C4D8 gemzar. Labs reviewed and discussed with Dr. Liu Dumont, will proceed with treatment today and give 1 L hydration. Dr. Liu Dumont would like pt to return in one week for labs and hydration. Treatment completed without issue. Pt stable at discharge. Scheduled to return 5/19/22 for labs and hydration.

## 2022-05-16 NOTE — TELEPHONE ENCOUNTER
Pt phoned in stating he has mouth sores and hurts when he swallows. Will send in [address] for pt to try. He is coming in on 5/19 for hydration.

## 2022-05-16 NOTE — TELEPHONE ENCOUNTER
Pt phoned in stating he has sores in his mouth and hard to swallow. Will send in [address]. Pt comes in 5/19 for hydration.

## 2022-05-18 RX ORDER — SODIUM CHLORIDE 9 MG/ML
5-250 INJECTION, SOLUTION INTRAVENOUS PRN
OUTPATIENT
Start: 2022-05-19

## 2022-05-18 RX ORDER — 0.9 % SODIUM CHLORIDE 0.9 %
1000 INTRAVENOUS SOLUTION INTRAVENOUS ONCE
OUTPATIENT
Start: 2022-05-19 | End: 2022-05-19

## 2022-05-18 RX ORDER — HEPARIN SODIUM (PORCINE) LOCK FLUSH IV SOLN 100 UNIT/ML 100 UNIT/ML
500 SOLUTION INTRAVENOUS PRN
OUTPATIENT
Start: 2022-05-19

## 2022-05-18 RX ORDER — SODIUM CHLORIDE 0.9 % (FLUSH) 0.9 %
5-40 SYRINGE (ML) INJECTION PRN
OUTPATIENT
Start: 2022-05-19

## 2022-05-18 RX ORDER — CIPROFLOXACIN 500 MG/1
500 TABLET, FILM COATED ORAL 2 TIMES DAILY
Qty: 20 TABLET | Refills: 0 | Status: SHIPPED | OUTPATIENT
Start: 2022-05-18 | End: 2022-05-28

## 2022-05-19 ENCOUNTER — HOSPITAL ENCOUNTER (OUTPATIENT)
Dept: INFUSION THERAPY | Age: 67
End: 2022-05-19

## 2022-05-25 ENCOUNTER — TELEPHONE (OUTPATIENT)
Dept: ONCOLOGY | Age: 67
End: 2022-05-25

## 2022-05-25 NOTE — TELEPHONE ENCOUNTER
[facility] Nutrition Follow Up       Zuly Barahona is a 74 y.o.  male     NUTRITION RECOMMENDATIONS / Martha Tomlinson / EVALUATION  1. Encourage high calorie, high protein foods and beverages  2. Encouraged small, frequent meals. 3. Recommend consider daily multivitamin to aid in meeting estimated needs. 4. Will continue to monitor po intakes, s/s management, wt status, plan of care. Subjective/Current Data:  Called pt on listed phone number r/t nutrition follow up. Pt reports is experiencing taste changes but is tolerating sweet flavors of foods. Pt states he his having a difficult time - states he is on O2 at home and developed a heart problem with chemotherapy so is seeing a cardiologist. Pt states he gets short of breath very easily, so ADLs have been taking longer to complete. Pt states mouth sores resolved, swallowing improved with magic mouthwash. Wt loss 8lb x 2 months. Encouraged pt to continue eating small, frequent, high melanie/high protein meals and snacks. Pt receptive. Nutritional Requirements:  Estimated Energy Needs:  Weight Used: 112.5 kg  Method Used: [employer]  Estimated kcal Needs: 6438-7625 kcal per day  Protein Needs:  Weight used: 112.5kg  1.2-1.4 g/kg = 135-158 g per day    Nutrition-focused Physical Findings  GI Symptoms: poor appetite and odynophagea              Skin: no issues reported  Intake vs. Estimated Needs: likely less than needs    Nutrition Diagnosis and Goal  Problem:  Unintentional wt loss  Etiology/related to: catabolic illness  Symptoms/Signs/as evidenced by: wt loss 60lb x 7 months, bladder ca undergoing concurrent tx, likely PO intakes meeting <75% estimated needs.      Goal: Adequate intake to aide in wt maintenaince, signs and symptoms management, and overall wellbeing.     Progress towards goal: gradually worsening     KENNY Maxwell, RD, LD  Registered Dietitian      [facility]  [phone]

## 2022-05-26 ENCOUNTER — HOSPITAL ENCOUNTER (OUTPATIENT)
Dept: INFUSION THERAPY | Age: 67
Discharge: HOME | End: 2022-05-26
Payer: MEDICARE

## 2022-05-26 ENCOUNTER — TELEPHONE (OUTPATIENT)
Dept: ONCOLOGY | Age: 67
End: 2022-05-26

## 2022-05-26 ENCOUNTER — OFFICE VISIT (OUTPATIENT)
Dept: ONCOLOGY | Age: 67
End: 2022-05-26
Payer: MEDICARE

## 2022-05-26 VITALS
SYSTOLIC BLOOD PRESSURE: 112 MMHG | HEART RATE: 109 BPM | DIASTOLIC BLOOD PRESSURE: 87 MMHG | BODY MASS INDEX: 31.57 KG/M2 | OXYGEN SATURATION: 100 % | WEIGHT: 245.9 LBS | TEMPERATURE: 97.9 F

## 2022-05-26 DIAGNOSIS — F41.9 ANXIETY: ICD-10-CM

## 2022-05-26 DIAGNOSIS — R31.0 GROSS HEMATURIA: ICD-10-CM

## 2022-05-26 DIAGNOSIS — C67.2 MALIGNANT NEOPLASM OF LATERAL WALL OF URINARY BLADDER (HCC): ICD-10-CM

## 2022-05-26 DIAGNOSIS — I26.94 MULTIPLE SUBSEGMENTAL PULMONARY EMBOLI WITHOUT ACUTE COR PULMONALE (HCC): Primary | ICD-10-CM

## 2022-05-26 DIAGNOSIS — C67.9 MALIGNANT NEOPLASM OF URINARY BLADDER, UNSPECIFIED SITE (HCC): Primary | ICD-10-CM

## 2022-05-26 PROBLEM — I42.0 DILATED CARDIOMYOPATHY (HCC): Status: ACTIVE | Noted: 2022-05-26

## 2022-05-26 PROBLEM — N17.0 ACUTE RENAL FAILURE WITH TUBULAR NECROSIS (HCC): Status: ACTIVE | Noted: 2022-05-26

## 2022-05-26 PROBLEM — N18.30 CKD (CHRONIC KIDNEY DISEASE), STAGE III (HCC): Status: ACTIVE | Noted: 2022-05-26

## 2022-05-26 LAB
ABSOLUTE EOS #: 0.11 K/UL (ref 0–0.4)
ABSOLUTE LYMPH #: 3.05 K/UL (ref 1–4.8)
ABSOLUTE MONO #: 1.36 K/UL (ref 0.1–1.2)
ALBUMIN SERPL-MCNC: 3.9 G/DL (ref 3.5–5.2)
ALBUMIN/GLOBULIN RATIO: 1.3 (ref 1–2.5)
ALP BLD-CCNC: 85 U/L (ref 40–129)
ALT SERPL-CCNC: 12 U/L (ref 5–41)
ANION GAP SERPL CALCULATED.3IONS-SCNC: 13 MMOL/L (ref 9–17)
AST SERPL-CCNC: 11 U/L
BASOPHILS # BLD: 1 % (ref 0–2)
BASOPHILS ABSOLUTE: 0.11 K/UL (ref 0–0.2)
BILIRUB SERPL-MCNC: 0.28 MG/DL (ref 0.3–1.2)
BUN BLDV-MCNC: 18 MG/DL (ref 8–23)
CALCIUM SERPL-MCNC: 8.9 MG/DL (ref 8.6–10.4)
CHLORIDE BLD-SCNC: 94 MMOL/L (ref 98–107)
CO2: 27 MMOL/L (ref 20–31)
CREAT SERPL-MCNC: 1.97 MG/DL (ref 0.7–1.2)
EOSINOPHILS RELATIVE PERCENT: 1 % (ref 1–4)
GFR AFRICAN AMERICAN: 42 ML/MIN
GFR NON-AFRICAN AMERICAN: 34 ML/MIN
GFR SERPL CREATININE-BSD FRML MDRD: ABNORMAL ML/MIN/{1.73_M2}
GLUCOSE BLD-MCNC: 137 MG/DL (ref 70–99)
HCT VFR BLD CALC: 31.1 % (ref 41–53)
HEMOGLOBIN: 10.4 G/DL (ref 13.5–17.5)
LYMPHOCYTES # BLD: 27 % (ref 24–44)
MCH RBC QN AUTO: 27.2 PG (ref 26–34)
MCHC RBC AUTO-ENTMCNC: 33.6 G/DL (ref 31–37)
MCV RBC AUTO: 80.8 FL (ref 80–100)
MONOCYTES # BLD: 12 % (ref 2–11)
MORPHOLOGY: ABNORMAL
PDW BLD-RTO: 28.5 % (ref 12.5–15.4)
PLATELET # BLD: 225 K/UL (ref 140–450)
PMV BLD AUTO: 6.9 FL (ref 6–12)
POTASSIUM SERPL-SCNC: 3.2 MMOL/L (ref 3.7–5.3)
RBC # BLD: 3.85 M/UL (ref 4.5–5.9)
SEG NEUTROPHILS: 59 % (ref 36–66)
SEGMENTED NEUTROPHILS ABSOLUTE COUNT: 6.67 K/UL (ref 1.8–7.7)
SODIUM BLD-SCNC: 134 MMOL/L (ref 135–144)
TOTAL PROTEIN: 6.8 G/DL (ref 6.4–8.3)
WBC # BLD: 11.3 K/UL (ref 3.5–11)

## 2022-05-26 PROCEDURE — 36591 DRAW BLOOD OFF VENOUS DEVICE: CPT

## 2022-05-26 PROCEDURE — 6360000002 HC RX W HCPCS: Performed by: INTERNAL MEDICINE

## 2022-05-26 PROCEDURE — 99211 OFF/OP EST MAY X REQ PHY/QHP: CPT | Performed by: INTERNAL MEDICINE

## 2022-05-26 PROCEDURE — 80053 COMPREHEN METABOLIC PANEL: CPT

## 2022-05-26 PROCEDURE — 99211 OFF/OP EST MAY X REQ PHY/QHP: CPT

## 2022-05-26 PROCEDURE — 99215 OFFICE O/P EST HI 40 MIN: CPT | Performed by: INTERNAL MEDICINE

## 2022-05-26 PROCEDURE — 1123F ACP DISCUSS/DSCN MKR DOCD: CPT | Performed by: INTERNAL MEDICINE

## 2022-05-26 PROCEDURE — 85025 COMPLETE CBC W/AUTO DIFF WBC: CPT

## 2022-05-26 PROCEDURE — 1036F TOBACCO NON-USER: CPT | Performed by: INTERNAL MEDICINE

## 2022-05-26 PROCEDURE — 2580000003 HC RX 258: Performed by: INTERNAL MEDICINE

## 2022-05-26 PROCEDURE — 3017F COLORECTAL CA SCREEN DOC REV: CPT | Performed by: INTERNAL MEDICINE

## 2022-05-26 PROCEDURE — G8427 DOCREV CUR MEDS BY ELIG CLIN: HCPCS | Performed by: INTERNAL MEDICINE

## 2022-05-26 PROCEDURE — G8417 CALC BMI ABV UP PARAM F/U: HCPCS | Performed by: INTERNAL MEDICINE

## 2022-05-26 RX ORDER — SODIUM CHLORIDE 9 MG/ML
25 INJECTION, SOLUTION INTRAVENOUS PRN
Status: CANCELLED | OUTPATIENT
Start: 2022-05-26

## 2022-05-26 RX ORDER — HEPARIN SODIUM (PORCINE) LOCK FLUSH IV SOLN 100 UNIT/ML 100 UNIT/ML
500 SOLUTION INTRAVENOUS PRN
Status: DISCONTINUED | OUTPATIENT
Start: 2022-05-26 | End: 2022-05-27 | Stop reason: HOSPADM

## 2022-05-26 RX ORDER — SODIUM CHLORIDE 0.9 % (FLUSH) 0.9 %
5-40 SYRINGE (ML) INJECTION PRN
Status: CANCELLED | OUTPATIENT
Start: 2022-05-26

## 2022-05-26 RX ORDER — HEPARIN SODIUM (PORCINE) LOCK FLUSH IV SOLN 100 UNIT/ML 100 UNIT/ML
500 SOLUTION INTRAVENOUS PRN
Status: CANCELLED | OUTPATIENT
Start: 2022-05-26

## 2022-05-26 RX ORDER — CARVEDILOL 3.12 MG/1
TABLET ORAL
COMMUNITY
Start: 2022-05-18

## 2022-05-26 RX ORDER — LORAZEPAM 1 MG/1
1 TABLET ORAL EVERY 6 HOURS PRN
Qty: 120 TABLET | Refills: 0 | Status: SHIPPED | OUTPATIENT
Start: 2022-05-26 | End: 2022-06-25

## 2022-05-26 RX ORDER — SODIUM CHLORIDE 0.9 % (FLUSH) 0.9 %
5-40 SYRINGE (ML) INJECTION PRN
Status: DISCONTINUED | OUTPATIENT
Start: 2022-05-26 | End: 2022-05-27 | Stop reason: HOSPADM

## 2022-05-26 RX ADMIN — Medication 500 UNITS: at 11:56

## 2022-05-26 RX ADMIN — SODIUM CHLORIDE, PRESERVATIVE FREE 20 ML: 5 INJECTION INTRAVENOUS at 11:55

## 2022-05-26 NOTE — TELEPHONE ENCOUNTER
Refer to pulm sercvice  rv in early July with Alma Wood  office to contact pt to schedule consult    RV scheduled 7/26/22 @ 3pm    Pt was given AVS and appointment schedule    Electronically signed by Sydney Berman on 5/26/2022 at 2:23 PM

## 2022-05-26 NOTE — PROGRESS NOTES
DIAGNOSIS:   1. High grade urothelial carcinoma, 12/2021, T3 N1 M0   2. Bilateral PE, 03/2022    CURRENT THERAPY:  Neoadjuvant chemo followed by radical cystectomy  Eliquis     BRIEF CASE HISTORY:   Melania Baker is a very pleasant 74 y.o. male who is referred to us for bladder cancer. Patient presented with hematuria 02/2021 and was seen by urology, it was felt to be renal calculi and he was started on Flomax but continued to pass clots. He was later in [place] and had urinary blockage, passed very large clot and then able to urinate and presented again to ER with continued hematuria, CT showed severe right hydronephrosis with tissue thickening/mass seen in right side of bladder, he underwent nephrostomy placement, stent was put in place and cystoscopy done showing fairly large papillary tumor occupying right side posterior wall and even the dome of the bladder. Pathology showed high grade urothelial carcinoma invading muscularis propria. Over the next month he needed 2 repeat cystoscopies and extensive fulguration of the tumor, again pathology showed high grade urothelial carcinoma. CT of the chest abdomen pelvis did not show metastatic disease. He has intermittent bladder pain and continues to have hematuria in Muñoz catheter. His appetite is poor. His father had bladder cancer, he did not have smoking history. The patient has smoking history but quit 30 years ago. He had COVID infection in 09/2021, he lost significant weight and continues to recover. He has arthralgias in the knees and uses cane. Staging PET scan was done and showed the bladder tumor and right pelvic sidewall lymph node metastases 4 of clinical staging of T3 N1 M0. We discussed neoadjuvant chemotherapy to be followed by surgery if he has a good response. INTERIM HISTORY: The patient presents for follow up with bladder cancer. He remains on oxygen and his shortness of breath is unchanged.  He had cardiology follow up and was started on medication and heart cath has been scheduled with plan to hold anti-coagulation 3 days prior. His weakness, nausea, poor stamina and dry heaves are unchanged. His appetite remains poor and he has smell sensitivity resulting in weight loss. He has persistent chill in lower extremities. His anxiety is poorly controlled with medication. He continues to have UTI symptoms after completion of antibiotics. PAST MEDICAL HISTORY: has a past medical history of Arthritis, BPH with obstruction/lower urinary tract symptoms, Caffeine use, Cancer ([facility] [address] Depression, GERD (gastroesophageal reflux disease), High cholesterol, Kidney calculi, and Sleep apnea. PAST SURGICAL HISTORY: has a past surgical history that includes Knee arthroscopy; Cardiac catheterization; Colonoscopy; and IR PORT PLACEMENT > 5 YEARS (2/25/2022). CURRENT MEDICATIONS:  has a current medication list which includes the following prescription(s): carvedilol, ciprofloxacin, magic mouthwash, hydrocodone-acetaminophen, polyethylene glycol, dexamethasone, rivaroxaban, furosemide, albuterol sulfate hfa, phenazopyridine, lidocaine-prilocaine, ondansetron, omeprazole, finasteride, tamsulosin, simvastatin, bupropion, and apixaban, and the following Facility-Administered Medications: sodium chloride flush and heparin flush. ALLERGIES:  has No Known Allergies. FAMILY HISTORY: Father had bladder cancer     SOCIAL HISTORY:  reports that he quit smoking about 30 years ago. He has never used smokeless tobacco. He reports that he does not drink alcohol and does not use drugs. REVIEW OF SYSTEMS:   General: No fever or night sweats. Weight is stable. +fatigue and generalized weakness -worse +dysgeusia   ENT: No double or blurred vision, no tinnitus or hearing problem, no dysphagia or sore throat +smell affected, dysgeusia   Respiratory: No chest pain, no cough or hemoptysis.  +worsening shortness of breath   Cardiovascular: Denies chest pain, PND or orthopnea. No L E swelling or palpitations +tachycardia +chill in leg with poor circulation   Gastrointestinal: No vomiting, diarrhea or constipation, abd pain  +nausea and dry heaves   Genitourinary: Denies frequency, hematuria, urgency or incontinence +dysuria and cloudy urine  Neurological: Denies headaches, decreased LOC, no sensory or motor focal deficits. Musculoskeletal: No arthralgia no back pain or joint swelling. Skin: There are no rashes or bleeding. Psychiatric: + anxiety and depression. Endocrine: No diabetes or thyroid disease. Hematologic: No bleeding, no adenopathy. PHYSICAL EXAM: Shows a well appearing 74y.o.-year-old male who is not in pain or distress. Vital Signs: Blood pressure 112/87, pulse (!) 109, temperature 97.9 °F (36.6 °C), temperature source Temporal, weight 245 lb 14.4 oz (111.5 kg), SpO2 100 %. HEENT: Normocephalic and atraumatic. Pupils are equal, round, reactive to light and accommodation. Extraocular muscles are intact. Neck: Showed no JVD, no carotid bruit . Lungs: Clear to auscultation bilaterally. Heart: Regular without any murmur. Abdomen: Soft, nontender. No hepatosplenomegaly. Extremities: Lower extremities show no edema, clubbing, or cyanosis. Breasts: Examination not done today.  Neuro exam: intact cranial nerves bilaterally no motor or sensory deficit, gait is normal. Lymphatic: no adenopathy appreciated in the supraclavicular, axillary, cervical or inguinal area      REVIEW OF LABORATORY DATA:   Lab Results   Component Value Date    WBC 11.3 (H) 05/26/2022    HGB 10.4 (L) 05/26/2022    HCT 31.1 (L) 05/26/2022    MCV 80.8 05/26/2022     05/26/2022       Chemistry        Component Value Date/Time     (L) 05/26/2022 1149    K 3.2 (L) 05/26/2022 1149    CL 94 (L) 05/26/2022 1149    CO2 27 05/26/2022 1149    BUN 18 05/26/2022 1149    CREATININE 1.97 (H) 05/26/2022 1149        Component Value Date/Time    CALCIUM 8.9 05/26/2022 1149    ALKPHOS 85 05/26/2022 1149    AST 11 05/26/2022 1149    ALT 12 05/26/2022 1149    BILITOT 0.28 (L) 05/26/2022 1149        PATHOLOGY:     REVIEW OF RADIOLOGICAL RESULTS:         IMPRESSION:   1. High grade urothelial carcinoma   2. Neoadjuvant chemo to be followed by cystectomy   3. Bilateral PE, Eliquis   4. Hypoxemia with minimal activity with severe tachycardia    PLAN:   1. We reviewed imaging which shows complete response. 2. His lab work was reviewed, creatinine is improving, counts are recovering. 3. We discussed his ongoing symptoms, I explained his smell sensitivity and dysgeusia will resolve off therapy, we will consider appetite stimulant if not improved, he will phone us in a few weeks with update. 4. We had discussion regarding surgery for cholecystectomy and upcoming cardiac cath. 5. I am referring him to pulmonology ahead of surgery. 6. For his anxiety I recommend he take every 6 hours and monitor, I am refilling. 7. Return in 6 weeks. Scribe Attestation   This note was created by Carlita Mao acting as scribe for the physician signing this note  Electronically Signed  Marci Guthrie, 5/26/2022  Scribe, [employer]". Attending Attestation   Note was reviewed and edited.   I am in agreement with the note as entered    Josephine Grayson MD  Hematologist/Medical [id number] [facility] hematology oncology physicians

## 2022-05-31 ENCOUNTER — OFFICE VISIT (OUTPATIENT)
Dept: PULMONOLOGY | Age: 67
End: 2022-05-31
Payer: MEDICARE

## 2022-05-31 ENCOUNTER — HOSPITAL ENCOUNTER (OUTPATIENT)
Dept: INFUSION THERAPY | Age: 67
Discharge: HOME | End: 2022-05-31
Payer: MEDICARE

## 2022-05-31 VITALS
WEIGHT: 247 LBS | TEMPERATURE: 97.5 F | OXYGEN SATURATION: 100 % | RESPIRATION RATE: 16 BRPM | HEART RATE: 90 BPM | SYSTOLIC BLOOD PRESSURE: 113 MMHG | HEIGHT: 74 IN | BODY MASS INDEX: 31.7 KG/M2 | DIASTOLIC BLOOD PRESSURE: 79 MMHG

## 2022-05-31 DIAGNOSIS — R06.09 DYSPNEA ON EXERTION: Primary | ICD-10-CM

## 2022-05-31 DIAGNOSIS — N18.32 STAGE 3B CHRONIC KIDNEY DISEASE (HCC): ICD-10-CM

## 2022-05-31 DIAGNOSIS — N17.0 ACUTE RENAL FAILURE WITH TUBULAR NECROSIS (HCC): Primary | ICD-10-CM

## 2022-05-31 DIAGNOSIS — J96.11 CHRONIC RESPIRATORY FAILURE WITH HYPOXIA (HCC): ICD-10-CM

## 2022-05-31 DIAGNOSIS — I50.21 ACUTE SYSTOLIC HEART FAILURE (HCC): ICD-10-CM

## 2022-05-31 DIAGNOSIS — I42.0 DILATED CARDIOMYOPATHY (HCC): ICD-10-CM

## 2022-05-31 DIAGNOSIS — Z86.711 HX OF PULMONARY EMBOLUS: ICD-10-CM

## 2022-05-31 DIAGNOSIS — J84.9 ILD (INTERSTITIAL LUNG DISEASE) (HCC): ICD-10-CM

## 2022-05-31 DIAGNOSIS — G47.33 OSA (OBSTRUCTIVE SLEEP APNEA): ICD-10-CM

## 2022-05-31 DIAGNOSIS — C67.9 MALIGNANT NEOPLASM OF URINARY BLADDER, UNSPECIFIED SITE (HCC): ICD-10-CM

## 2022-05-31 DIAGNOSIS — D63.8 ANEMIA DUE TO CHRONIC ILLNESS: ICD-10-CM

## 2022-05-31 LAB
ALBUMIN SERPL-MCNC: 3.4 G/DL (ref 3.5–5.2)
ANION GAP SERPL CALCULATED.3IONS-SCNC: 11 MMOL/L (ref 9–17)
BUN BLDV-MCNC: 20 MG/DL (ref 8–23)
CALCIUM SERPL-MCNC: 8.9 MG/DL (ref 8.6–10.4)
CHLORIDE BLD-SCNC: 95 MMOL/L (ref 98–107)
CO2: 27 MMOL/L (ref 20–31)
COMPLEMENT C3: 119 MG/DL (ref 90–180)
COMPLEMENT C4: 25 MG/DL (ref 10–40)
CREAT SERPL-MCNC: 1.62 MG/DL (ref 0.7–1.2)
CREATININE URINE: 148 MG/DL (ref 39–259)
GFR AFRICAN AMERICAN: 52 ML/MIN
GFR NON-AFRICAN AMERICAN: 43 ML/MIN
GFR SERPL CREATININE-BSD FRML MDRD: ABNORMAL ML/MIN/{1.73_M2}
GLUCOSE BLD-MCNC: 171 MG/DL (ref 70–99)
HCT VFR BLD CALC: 30.8 % (ref 41–53)
HEMOGLOBIN: 10.3 G/DL (ref 13.5–17.5)
MCH RBC QN AUTO: 28 PG (ref 26–34)
MCHC RBC AUTO-ENTMCNC: 33.6 G/DL (ref 31–37)
MCV RBC AUTO: 83.4 FL (ref 80–100)
PDW BLD-RTO: 29.7 % (ref 12.5–15.4)
PHOSPHORUS: 3.2 MG/DL (ref 2.5–4.5)
PLATELET # BLD: 320 K/UL (ref 140–450)
PMV BLD AUTO: 7.2 FL (ref 6–12)
POTASSIUM SERPL-SCNC: 3.7 MMOL/L (ref 3.7–5.3)
PTH INTACT: 106.4 PG/ML (ref 15–65)
RBC # BLD: 3.69 M/UL (ref 4.5–5.9)
SODIUM BLD-SCNC: 133 MMOL/L (ref 135–144)
TOTAL PROTEIN, URINE: 125 MG/DL
URINE TOTAL PROTEIN CREATININE RATIO: 0.84 (ref 0–0.2)
WBC # BLD: 10.9 K/UL (ref 3.5–11)

## 2022-05-31 PROCEDURE — 3017F COLORECTAL CA SCREEN DOC REV: CPT | Performed by: INTERNAL MEDICINE

## 2022-05-31 PROCEDURE — G8427 DOCREV CUR MEDS BY ELIG CLIN: HCPCS | Performed by: INTERNAL MEDICINE

## 2022-05-31 PROCEDURE — 86225 DNA ANTIBODY NATIVE: CPT

## 2022-05-31 PROCEDURE — 83970 ASSAY OF PARATHORMONE: CPT

## 2022-05-31 PROCEDURE — 1036F TOBACCO NON-USER: CPT | Performed by: INTERNAL MEDICINE

## 2022-05-31 PROCEDURE — 82570 ASSAY OF URINE CREATININE: CPT

## 2022-05-31 PROCEDURE — 1123F ACP DISCUSS/DSCN MKR DOCD: CPT | Performed by: INTERNAL MEDICINE

## 2022-05-31 PROCEDURE — 6360000002 HC RX W HCPCS: Performed by: INTERNAL MEDICINE

## 2022-05-31 PROCEDURE — 86038 ANTINUCLEAR ANTIBODIES: CPT

## 2022-05-31 PROCEDURE — 84100 ASSAY OF PHOSPHORUS: CPT

## 2022-05-31 PROCEDURE — 99204 OFFICE O/P NEW MOD 45 MIN: CPT | Performed by: INTERNAL MEDICINE

## 2022-05-31 PROCEDURE — 82040 ASSAY OF SERUM ALBUMIN: CPT

## 2022-05-31 PROCEDURE — 85027 COMPLETE CBC AUTOMATED: CPT

## 2022-05-31 PROCEDURE — 2580000003 HC RX 258: Performed by: INTERNAL MEDICINE

## 2022-05-31 PROCEDURE — G8417 CALC BMI ABV UP PARAM F/U: HCPCS | Performed by: INTERNAL MEDICINE

## 2022-05-31 PROCEDURE — 80048 BASIC METABOLIC PNL TOTAL CA: CPT

## 2022-05-31 PROCEDURE — 84156 ASSAY OF PROTEIN URINE: CPT

## 2022-05-31 PROCEDURE — 86160 COMPLEMENT ANTIGEN: CPT

## 2022-05-31 PROCEDURE — 36591 DRAW BLOOD OFF VENOUS DEVICE: CPT

## 2022-05-31 RX ORDER — HEPARIN SODIUM (PORCINE) LOCK FLUSH IV SOLN 100 UNIT/ML 100 UNIT/ML
500 SOLUTION INTRAVENOUS PRN
Status: DISCONTINUED | OUTPATIENT
Start: 2022-05-31 | End: 2022-06-01 | Stop reason: HOSPADM

## 2022-05-31 RX ORDER — HEPARIN SODIUM (PORCINE) LOCK FLUSH IV SOLN 100 UNIT/ML 100 UNIT/ML
500 SOLUTION INTRAVENOUS PRN
Status: CANCELLED | OUTPATIENT
Start: 2022-05-31

## 2022-05-31 RX ORDER — SODIUM CHLORIDE 9 MG/ML
25 INJECTION, SOLUTION INTRAVENOUS PRN
Status: CANCELLED | OUTPATIENT
Start: 2022-05-31

## 2022-05-31 RX ORDER — SODIUM CHLORIDE 0.9 % (FLUSH) 0.9 %
5-40 SYRINGE (ML) INJECTION PRN
Status: DISCONTINUED | OUTPATIENT
Start: 2022-05-31 | End: 2022-06-01 | Stop reason: HOSPADM

## 2022-05-31 RX ORDER — SODIUM CHLORIDE 0.9 % (FLUSH) 0.9 %
5-40 SYRINGE (ML) INJECTION PRN
Status: CANCELLED | OUTPATIENT
Start: 2022-05-31

## 2022-05-31 RX ADMIN — Medication 500 UNITS: at 10:39

## 2022-05-31 RX ADMIN — SODIUM CHLORIDE, PRESERVATIVE FREE 10 ML: 5 INJECTION INTRAVENOUS at 10:39

## 2022-05-31 NOTE — PROGRESS NOTES
REASON FOR THE CONSULTATION:   shortness of breath   HISTORY OF PRESENT ILLNESS:    Simone Collins is a 74y.o. year old male who has been on chemo and has been sob with few steps , his oxygen level does not fluctuate , he is on 2 l nc since 1 months . This was started by his oncologist . At rest hisoxygen level is 98 % but with exertion it is his heart rate which gies up and he has to sit to catch his breath . He has ocasional cough no hemoptysis . No wheeze   He quit smoking 25 years ago and smoked for 5 years - 1 ppd . He had blood clots in his lungs 3 months ago and he is on blood thinners . No hx of MI   But he is scheduled for heart cath on 6/16   Had psg 10 years ago and was given cpap but could not tolerate it . Had covid infection 9/21       Work - [employer] - no foundry   Has dog   No birds   No drugs   No e cig  No tb hx     DIAGNOSIS:   1. High grade urothelial carcinoma, 12/2021, T3 N1 M0   2. Bilateral PE, 03/2022     CURRENT THERAPY:  Neoadjuvant chemo followed by radical cystectomy  Eliquis      Immunization   Immunization History   Administered Date(s) Administered    COVID-19, Pfizer Purple top, DILUTE for use, 12+ yrs, 30mcg/0.3mL dose 11/10/2021          PAST MEDICAL HISTORY:       Diagnosis Date    Acute renal failure with tubular necrosis ([address] 5/26/2022    Likely ischemic ATN/prerenal acidemia from intractable nausea vomiting as result of chemotherapy, baseline 1.3-1.4 peaked up to 2.3 in May 2022    Arthritis     BPH with obstruction/lower urinary tract symptoms     Caffeine use     3 cans soda/pop    Cancer ([address]     bladder cancer    CKD (chronic kidney disease), stage III ([address] 5/26/2022    From 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   Baseline 1.3-1.4    Depression     Dilated cardiomyopathy ([facility] [address] 2022    Ejection fraction 40 to 45%, does have symptoms of dyspnea and decreased effort tolerance    GERD (gastroesophageal reflux disease)     High cholesterol     Kidney calculi     Sleep apnea          Family History:       Problem Relation Age of Onset    Cancer Father         bladder cancer    Urolithiasis Father        SURGICAL HISTORY:   Past Surgical History:   Procedure Laterality Date    CARDIAC CATHETERIZATION      COLONOSCOPY      IR PORT PLACEMENT EQUAL OR GREATER THAN 5 YEARS  2022    IR PORT PLACEMENT EQUAL OR GREATER THAN 5 YEARS 2022 [facility] PROCEDURES    KNEE ARTHROSCOPY      left           SOCIAL AND OCCUPATIONAL HEALTH:    Social History     Socioeconomic History    Marital status:      Spouse name: None    Number of children: None    Years of education: None    Highest education level: None   Occupational History    None   Tobacco Use    Smoking status: Former Smoker     Packs/day: 0.25     Years: 9.00     Pack years: 2.25     Start date: 1983     Quit date: 2/10/1992     Years since quittin.3    Smokeless tobacco: Never Used   Vaping Use    Vaping Use: Never used   Substance and Sexual Activity    Alcohol use: No    Drug use: No    Sexual activity: Yes     Partners: Female   Other Topics Concern    None   Social History Narrative    None     Social Determinants of Health     Financial Resource Strain:     Difficulty of Paying Living Expenses: Not on file   Food Insecurity:     Worried About Running Out of Food in the Last Year: Not on file    Darling of Food in the Last Year: Not on file   Transportation Needs:     Lack of Transportation (Medical): Not on file    Lack of Transportation (Non-Medical):  Not on file   Physical Activity:     Days of Exercise per Week: Not on file    Minutes of Exercise per Session: Not on file   Stress:     Feeling of Stress : Not on file   Social Connections:     Frequency of Communication with Friends and Family: Not on file    Frequency of Social Gatherings with Friends and Family: Not on file    Attends Jewish Services: Not on file    Active Member of Clubs or Organizations: Not on file    Attends Club or Organization Meetings: Not on file    Marital Status: Not on file   Intimate Partner Violence:     Fear of Current or Ex-Partner: Not on file    Emotionally Abused: Not on file    Physically Abused: Not on file    Sexually Abused: Not on file   Housing Stability:     Unable to Pay for Housing in the Last Year: Not on file    Number of [place] in the Last Year: Not on file    Unstable Housing in the Last Year: Not on file        TOBACCO:   reports that he quit smoking about 30 years ago. He started smoking about 39 years ago. He has a 2.25 pack-year smoking history. He has never used smokeless tobacco.  ETOH:   reports no history of alcohol use. ALLERGIES:      No Known Allergies      Home Meds:   Prior to Admission medications    Medication Sig Start Date End Date Taking? Authorizing Provider   carvedilol (COREG) 3.125 MG tablet TAKE 1 TABLET BY MOUTH TWICE DAILY 5/18/22   Historical Provider, MD   LORazepam (ATIVAN) 1 MG tablet Take 1 tablet by mouth every 6 hours as needed for Anxiety (Take 1 pill at betime as needed) for up to 120 doses. 5/26/22 6/25/22  Sarah Grayson MD   HYDROcodone-acetaminophen (NORCO) 5-325 MG per tablet Take 1 tablet by mouth every 8 hours as needed for Pain for up to 30 days.  5/11/22 6/10/22  Sarah Grayson MD   polyethylene glycol (GLYCOLAX) 17 g packet Take 17 g by mouth daily as needed for Constipation    Historical Provider, MD   dexamethasone (DECADRON) 1 MG tablet Take 1 tablet by mouth once daily with breakfast 5/3/22   Kurt Grayson MD   apixaban (ELIQUIS) 5 MG TABS tablet Take 1 tablet by mouth 2 times daily 4/20/22   Kurt Grayson MD   albuterol sulfate  (90 Base) MCG/ACT inhaler Inhale 2 seizures, memory problems, speech problems, visual disturbance and coordination problems  BEHAVIOR/PSYCH:  negative for poor appetite, increased appetite, decreased sleep, increased sleep, decreased energy level, increased energy level and poor concentration  Skin no rash no dermatitis  Vitals:  /79 (Site: Left Upper Arm)   Pulse 90   Temp 97.5 °F (36.4 °C)   Resp 16   Ht 6' 2\" (1.88 m)   Wt 247 lb (112 kg)   SpO2 100% Comment: oxygen set 2 lpm pulse  BMI 31.71 kg/m²     PHYSICAL EXAM:  General Appearance:    Alert, cooperative, no distress, appears stated age   Head:    Normocephalic, without obvious abnormality, atraumatic      Eyes:    PERRL, conjunctiva/corneas clear, EOM's intact, palllor  present   Ears:    Normal  external ear canals, both ears   Nose:   Nares normal, septum midline, mucosa normal, no drainage        or sinus tenderness   Throat:   Lips, mucosa, and tongue normal; teeth and gums normal   Neck:   Supple, symmetrical, trachea midline, no adenopathy;     thyroid:  no enlargement/tenderness/nodules; no carotid    bruit , noJVD   Back:     Symmetric, no curvature, ROM normal, no CVA tenderness   Lungs:     Ventilating all lobes without any rales, rhonchi, wheezes or dullness. No bronchial breath sounds. Chest expansion equal bilaterally    Chest Wall:    No tenderness or deformity      Heart:    Regular rate and rhythm, S1 and S2 normal, no murmur, rub        or gallop no rvh                           Abdomen:                                                 Pulses:                              Skin:                  Lymph nodes:                    Neurologic:                  Soft, non-tender, bowel sounds active all four quadrants,     no masses, no organomegaly         2+ and symmetric all extremities     Skin color, texture, turgor normal, no rashes or lesions       Cervical, supraclavicular not enlarged or matted or tender      CNII-XII intact, normal strength 5/5 .   Sensation grossly normal  and reflexes normal 2+  throughout     Clubbing No  Lower ext edema Yes  Upper ext edema No          Rhythm: Within normal limits HR: 93 bpm BP: 144/73 mmHg     CONCLUSIONS     Summary  Technically difficult study  Normal LV size , mild to moderately increased wall thickness. Difficult to assess wall motion abnormalities  Moderately reduced LV systolic function with LVEF 40-45 %. Normal RV size and function. LA and RA appears normal in size. No obvious significant structural valvular abnormality noted. No significant valvular stenosis or regurgitation noted. Normal aortic root dimension. No significant pericardial effusion noted. IVC not well visualized     Signature  ----------------------------------------------------------------------------   Electronically signed by Gregg Rondon(Interpreting physician) on   04/01/2022 12:50 PM      IMPRESSION:     Diagnosis Orders   1. Dyspnea on exertion  CT CHEST HIGH RESOLUTION    Full PFT Study With Bronchodilator   2. Anemia due to chronic illness     3. ILD (interstitial lung disease) ([facility] [address] ? CT CHEST HIGH RESOLUTION    Full PFT Study With Bronchodilator   4. Hx of pulmonary embolus     5. Acute systolic heart failure (HCC)     6. Stage 3b chronic kidney disease ([address]     7. Chronic respiratory failure with hypoxia (HCC)          :                PLAN:      On his CT of the chest there is bibasilar and subpleural reticular changes ,these were also present on CT abdomen and pelvis lung cuts From 2017. For better evaluation will obtain HRCT chest and also obtain pulmonary function test .    Continue supplemental oxygen at night and with exertion    Patient has history of GHAZALA but he does not want to use CPAP therapy. His dyspnea is multifactorial due to systolic dysfunction, anemia(hemoglobin in February before he started chemotherapy was 16 g and now it is10.3 g.),  Obesity, physical deconditioning.   Evaluate his PFT for degree of obstructive or restrictive lung disease. Agree with proceeding with cardiac cath. I will see him back in clinic after HRCT chest and PFT. Discussed with patient and his wife in detail and reviewed imaging with them . Requested Prescriptions      No prescriptions requested or ordered in this encounter       There are no discontinued medications. Farheen Stearns received counseling on the following healthy behaviors: nutrition, exercise and medication adherence    Patient given educational materials : see patient instruction       Discussed use, benefit, and side effects of prescribed medications. Barriers to medication compliance addressed. All patient questions answered. Pt voiced understanding. I hope this updates you on my evaluation and clinical thinking. Thank you for allowing me to participate in his care. Sincerely,      Electronically signed by Pam Kaur MD on 5/31/2022 at 5:21 PM       Please note that this chart was generated using voice recognition Dragon dictation software. Although every effort was made to ensure the accuracy of this automated transcription, some errors in transcription may have occurred.

## 2022-06-01 LAB
ANTI DNA DOUBLE STRANDED: 3.3 IU/ML
ANTI-NUCLEAR ANTIBODY (ANA): NEGATIVE
ENA ANTIBODIES SCREEN: 0.3 U/ML

## 2022-06-06 ENCOUNTER — HOSPITAL ENCOUNTER (OUTPATIENT)
Dept: CT IMAGING | Age: 67
Discharge: HOME | End: 2022-06-08
Payer: MEDICARE

## 2022-06-06 ENCOUNTER — OFFICE VISIT (OUTPATIENT)
Dept: PULMONOLOGY | Age: 67
End: 2022-06-06
Payer: MEDICARE

## 2022-06-06 VITALS — BODY MASS INDEX: 31.44 KG/M2 | HEART RATE: 119 BPM | OXYGEN SATURATION: 97 % | WEIGHT: 245 LBS | HEIGHT: 74 IN

## 2022-06-06 DIAGNOSIS — C67.2 MALIGNANT NEOPLASM OF LATERAL WALL OF URINARY BLADDER (HCC): ICD-10-CM

## 2022-06-06 DIAGNOSIS — R31.0 GROSS HEMATURIA: ICD-10-CM

## 2022-06-06 DIAGNOSIS — R33.9 URINARY RETENTION: ICD-10-CM

## 2022-06-06 DIAGNOSIS — R06.09 DYSPNEA ON EXERTION: ICD-10-CM

## 2022-06-06 DIAGNOSIS — J84.9 ILD (INTERSTITIAL LUNG DISEASE) (HCC): ICD-10-CM

## 2022-06-06 PROCEDURE — 94726 PLETHYSMOGRAPHY LUNG VOLUMES: CPT | Performed by: INTERNAL MEDICINE

## 2022-06-06 PROCEDURE — 71250 CT THORAX DX C-: CPT

## 2022-06-06 RX ORDER — DEXAMETHASONE 1 MG
TABLET ORAL
Qty: 30 TABLET | Refills: 0 | Status: SHIPPED | OUTPATIENT
Start: 2022-06-06 | End: 2022-07-07 | Stop reason: SDUPTHER

## 2022-06-06 RX ORDER — HYDROCODONE BITARTRATE AND ACETAMINOPHEN 5; 325 MG/1; MG/1
1 TABLET ORAL EVERY 8 HOURS PRN
Qty: 60 TABLET | Refills: 0 | Status: SHIPPED | OUTPATIENT
Start: 2022-06-06 | End: 2022-07-08 | Stop reason: SDUPTHER

## 2022-06-06 NOTE — TELEPHONE ENCOUNTER
Pt phoned in requesting a refill on his pain medication. He is due 6/10/22. Will send to Dr Booker Caal.

## 2022-06-08 PROCEDURE — 94729 DIFFUSING CAPACITY: CPT | Performed by: INTERNAL MEDICINE

## 2022-06-08 PROCEDURE — 94010 BREATHING CAPACITY TEST: CPT | Performed by: INTERNAL MEDICINE

## 2022-06-08 NOTE — PROGRESS NOTES
Pulmonary function test  6/6/2022    Spirometry shows normal flow volume loop  FEV1 86% predicted, FVC 88% predicted and FEV1 FVC ratio 78      Patient was unable to perform body box    Diffusion capacity uncorrected 61% predicted corrected for alveolar volume 65% predicted      Final impression  Normal spirometry  Moderate decrease in diffusion capacity which could be due to underlying parenchymal disease or pulmonary vascular disease or anemia.       Clinical correlation advised    Electronically signed by Kassi Ruiz MD on 6/8/2022 at 1:05 PM

## 2022-06-09 ENCOUNTER — TELEPHONE (OUTPATIENT)
Dept: ONCOLOGY | Age: 67
End: 2022-06-09

## 2022-06-10 RX ORDER — ONDANSETRON 8 MG/1
TABLET, ORALLY DISINTEGRATING ORAL
Qty: 30 TABLET | Refills: 0 | Status: SHIPPED | OUTPATIENT
Start: 2022-06-10 | End: 2022-08-04

## 2022-06-14 ENCOUNTER — OFFICE VISIT (OUTPATIENT)
Dept: PULMONOLOGY | Age: 67
End: 2022-06-14
Payer: MEDICARE

## 2022-06-14 VITALS
BODY MASS INDEX: 31.83 KG/M2 | RESPIRATION RATE: 16 BRPM | WEIGHT: 248 LBS | HEIGHT: 74 IN | DIASTOLIC BLOOD PRESSURE: 80 MMHG | HEART RATE: 92 BPM | TEMPERATURE: 97.2 F | OXYGEN SATURATION: 99 % | SYSTOLIC BLOOD PRESSURE: 116 MMHG

## 2022-06-14 DIAGNOSIS — R06.09 DYSPNEA ON EXERTION: Primary | ICD-10-CM

## 2022-06-14 DIAGNOSIS — G47.33 OSA (OBSTRUCTIVE SLEEP APNEA): ICD-10-CM

## 2022-06-14 DIAGNOSIS — Z86.711 HX OF PULMONARY EMBOLUS: ICD-10-CM

## 2022-06-14 DIAGNOSIS — R53.81 PHYSICAL DECONDITIONING: ICD-10-CM

## 2022-06-14 DIAGNOSIS — J96.11 CHRONIC RESPIRATORY FAILURE WITH HYPOXIA (HCC): ICD-10-CM

## 2022-06-14 DIAGNOSIS — C67.2 MALIGNANT NEOPLASM OF LATERAL WALL OF URINARY BLADDER (HCC): ICD-10-CM

## 2022-06-14 DIAGNOSIS — D63.8 ANEMIA DUE TO CHRONIC ILLNESS: ICD-10-CM

## 2022-06-14 DIAGNOSIS — I50.21 ACUTE SYSTOLIC HEART FAILURE (HCC): ICD-10-CM

## 2022-06-14 PROCEDURE — 1123F ACP DISCUSS/DSCN MKR DOCD: CPT | Performed by: INTERNAL MEDICINE

## 2022-06-14 PROCEDURE — 99214 OFFICE O/P EST MOD 30 MIN: CPT | Performed by: INTERNAL MEDICINE

## 2022-06-14 PROCEDURE — G8417 CALC BMI ABV UP PARAM F/U: HCPCS | Performed by: INTERNAL MEDICINE

## 2022-06-14 PROCEDURE — G8427 DOCREV CUR MEDS BY ELIG CLIN: HCPCS | Performed by: INTERNAL MEDICINE

## 2022-06-14 PROCEDURE — 3017F COLORECTAL CA SCREEN DOC REV: CPT | Performed by: INTERNAL MEDICINE

## 2022-06-14 PROCEDURE — 1036F TOBACCO NON-USER: CPT | Performed by: INTERNAL MEDICINE

## 2022-06-14 ASSESSMENT — ENCOUNTER SYMPTOMS
WHEEZING: 0
COUGH: 0
GASTROINTESTINAL NEGATIVE: 1
SHORTNESS OF BREATH: 1

## 2022-06-14 NOTE — PROGRESS NOTES
REASON FOR THE CONSULTATION:   shortness of breath   HISTORY OF PRESENT ILLNESS:    Keith King is a 74y.o. year old male   Pulmonary function test, HRCT chest reviewed with patient and his wife. Patient is mostly using wheelchair and motorized scooter to get around because he gets short of breath but his saturations never decreased below 95%. He denies cough or hemoptysis or wheezing. Feels weak. Last visit  who has been on chemo and has been sob with few steps , his oxygen level does not fluctuate , he is on 2 l nc since 1 months . This was started by his oncologist . At rest hisoxygen level is 98 % but with exertion it is his heart rate which gies up and he has to sit to catch his breath . He has ocasional cough no hemoptysis . No wheeze   He quit smoking 25 years ago and smoked for 5 years - 1 ppd . He had blood clots in his lungs 3 months ago and he is on blood thinners . No hx of MI   But he is scheduled for heart cath on 6/16   Had psg 10 years ago and was given cpap but could not tolerate it . Had covid infection 9/21 and was bedridden for 21 days      Work - [employer]   Has dog   No birds   No drugs   No e cig  No tb hx     DIAGNOSIS:   1. High grade urothelial carcinoma, 12/2021, T3 N1 M0   2.  Bilateral PE, 03/2022     CURRENT THERAPY:  Neoadjuvant chemo followed by radical cystectomy  Eliquis      Immunization   Immunization History   Administered Date(s) Administered    COVID-19, Pfizer Purple top, DILUTE for use, 12+ yrs, 30mcg/0.3mL dose 11/10/2021          PAST MEDICAL HISTORY:       Diagnosis Date    Acute renal failure with tubular necrosis ([address] 5/26/2022    Likely ischemic ATN/prerenal acidemia from intractable nausea vomiting as result of chemotherapy, baseline 1.3-1.4 peaked up to 2.3 in May 2022    Arthritis     BPH with obstruction/lower urinary tract symptoms     Caffeine use     3 cans soda/pop    Cancer ([address]     bladder cancer    CKD (chronic kidney disease), stage III ([facility] 2022    From 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   Baseline 1.3-1.4    Depression     Dilated cardiomyopathy ([facility] 2022    Ejection fraction 40 to 45%, does have symptoms of dyspnea and decreased effort tolerance    GERD (gastroesophageal reflux disease)     High cholesterol     Kidney calculi     Sleep apnea          Family History:       Problem Relation Age of Onset    Cancer Father         bladder cancer    Urolithiasis Father        SURGICAL HISTORY:   Past Surgical History:   Procedure Laterality Date    CARDIAC CATHETERIZATION      COLONOSCOPY      IR PORT PLACEMENT EQUAL OR GREATER THAN 5 YEARS  2022    IR PORT PLACEMENT EQUAL OR GREATER THAN 5 YEARS 2022 [facility]    KNEE ARTHROSCOPY      left           SOCIAL AND OCCUPATIONAL HEALTH:    Social History     Socioeconomic History    Marital status:      Spouse name: None    Number of children: None    Years of education: None    Highest education level: None   Occupational History    None   Tobacco Use    Smoking status: Former Smoker     Packs/day: 0.25     Years: 9.00     Pack years: 2.25     Start date: 1983     Quit date: 2/10/1992     Years since quittin.3    Smokeless tobacco: Never Used   Vaping Use    Vaping Use: Never used   Substance and Sexual Activity    Alcohol use: No    Drug use: No    Sexual activity: Yes     Partners: Female   Other Topics Concern    None   Social History Narrative    None     Social Determinants of Health     Financial Resource Strain:     Difficulty of Paying Living Expenses: Not on file   Food Insecurity:     Worried About Running Out of Food in the Last Year: Not on file    Darling of Food in the Last Year: Not on file Transportation Needs:     Lack of Transportation (Medical): Not on file    Lack of Transportation (Non-Medical): Not on file   Physical Activity:     Days of Exercise per Week: Not on file    Minutes of Exercise per Session: Not on file   Stress:     Feeling of Stress : Not on file   Social Connections:     Frequency of Communication with Friends and Family: Not on file    Frequency of Social Gatherings with Friends and Family: Not on file    Attends [employer] Services: Not on file    Active Member of [address] [place] [address] or Organizations: Not on file    Attends Club or Organization Meetings: Not on file    Marital Status: Not on file   Intimate Partner Violence:     Fear of Current or Ex-Partner: Not on file    Emotionally Abused: Not on file    Physically Abused: Not on file    Sexually Abused: Not on file   Housing Stability:     Unable to Pay for Housing in the Last Year: Not on file    Number of [place] in the Last Year: Not on file    Unstable Housing in the Last Year: Not on file        TOBACCO:   reports that he quit smoking about 30 years ago. He started smoking about 39 years ago. He has a 2.25 pack-year smoking history. He has never used smokeless tobacco.  ETOH:   reports no history of alcohol use. ALLERGIES:      No Known Allergies      Home Meds:   Prior to Admission medications    Medication Sig Start Date End Date Taking? Authorizing Provider   ondansetron (ZOFRAN-ODT) 8 MG TBDP disintegrating tablet DISSOLVE 1 TABLET IN MOUTH EVERY 8 HOURS AS NEEDED FOR NAUSEA FOR VOMITING 6/10/22  Yes Avni Hampton MD   dexamethasone (DECADRON) 1 MG tablet Take 1 tablet by mouth once daily with breakfast 6/6/22  Yes Avni Hampton MD   HYDROcodone-acetaminophen (NORCO) 5-325 MG per tablet Take 1 tablet by mouth every 8 hours as needed for Pain for up to 30 days.  6/6/22 7/6/22 Yes Sarah Grayson MD   carvedilol (COREG) 3.125 MG tablet TAKE 1 TABLET BY MOUTH TWICE DAILY 5/18/22  Yes Historical Provider, MD   LORazepam (ATIVAN) 1 MG tablet Take 1 tablet by mouth every 6 hours as needed for Anxiety (Take 1 pill at betime as needed) for up to 120 doses. 5/26/22 6/25/22 Yes Elroy Garcia MD   polyethylene glycol (GLYCOLAX) 17 g packet Take 17 g by mouth daily as needed for Constipation   Yes Historical Provider, MD   apixaban (ELIQUIS) 5 MG TABS tablet Take 1 tablet by mouth 2 times daily 4/20/22  Yes Elroy Garcia MD   albuterol sulfate  (90 Base) MCG/ACT inhaler Inhale 2 puffs into the lungs every 6 hours as needed for Wheezing 4/1/22  Yes Belgica Guaman MD   phenazopyridine (PYRIDIUM) 200 MG tablet Take 1 tablet by mouth 3 times daily as needed for Pain 3/17/22  Yes Angie Lopez MD   lidocaine-prilocaine (EMLA) 2.5-2.5 % cream Apply topically Daily as needed. 2/10/22  Yes Elroy Garcia MD   omeprazole (PRILOSEC) 20 MG delayed release capsule Take 1 capsule by mouth daily 2/10/22  Yes Elroy Garcia MD   finasteride (PROSCAR) 5 MG tablet Take 5 mg by mouth daily   Yes Historical Provider, MD   tamsulosin (FLOMAX) 0.4 MG capsule Take 0.4 mg by mouth daily   Yes Historical Provider, MD   simvastatin (ZOCOR) 40 MG tablet Take 40 mg by mouth nightly   Yes Historical Provider, MD   buPROPion (WELLBUTRIN XL) 300 MG extended release tablet Take 300 mg by mouth every morning   Yes Historical Provider, MD              Review of Systems   Constitutional: Positive for fatigue. Negative for fever. HENT: Negative. Respiratory: Positive for shortness of breath. Negative for cough and wheezing. Cardiovascular: Negative. Gastrointestinal: Negative. Neurological: Negative.          Vitals:  /80 (Site: Left Upper Arm, Position: Sitting)   Pulse 92   Temp 97.2 °F (36.2 °C) (Temporal)   Resp 16   Ht 6' 2\" (1.88 m)   Wt 248 lb (112.5 kg)   SpO2 99%   BMI 31.84 kg/m²     PHYSICAL EXAM:    Head and neck atraumatic, normocephalic  Conjunctiva pale  Lymph nodes-no cervical, supraclavicular lymphadenopathy    Neck-no JVP elevation    Lungs - Ventilating all lobes without any rales, rhonchi, wheezes or dullness. No bronchial breath sounds. Chest expansion equal bilaterally    CVS- S1, S2 regular. No S3 no S4, no murmurs    Abdomen-nontender, nondistended. Bowel sounds are present. No organomegaly    Lower extremity-no edema    Upper extremity-no edema    Neurological-grossly normal cranial nerves. No overt motor deficit    Rhythm: Within normal limits HR: 93 bpm BP: 144/73 mmHg     CONCLUSIONS     Summary  Technically difficult study  Normal LV size , mild to moderately increased wall thickness. Difficult to assess wall motion abnormalities  Moderately reduced LV systolic function with LVEF 40-45 %. Normal RV size and function. LA and RA appears normal in size. No obvious significant structural valvular abnormality noted. No significant valvular stenosis or regurgitation noted. Normal aortic root dimension. No significant pericardial effusion noted. IVC not well visualized     Signature  ----------------------------------------------------------------------------   Electronically signed by Gregg Rondon(Interpreting physician) on   04/01/2022 12:50 PM                  IMPRESSION:     Diagnosis Orders   1. Dyspnea on exertion     2. Hx of pulmonary embolus     3. Anemia due to chronic illness     4. Acute systolic heart failure ([address]     5. GHAZALA (obstructive sleep apnea)     6. Chronic respiratory failure with hypoxia (HCC)     7. Physical deconditioning     8. Malignant neoplasm of lateral wall of urinary bladder (HCC)          :                PLAN:      HRCT chest shows that bilateral basal / subpleural groundglass changes are improved and  bibasilar and subpleural reticular changes which were also present on CT abdomen and pelvis lung cuts From 2017 are stable . Left upper lobe groundglass lesion is stable. Will repeat CT chest in 6 months. Pulmonary function test does not show obstructive lung disease spirometry is normal .  Diffusion capacity is mild to moderately  decreased likely due to his anemia. There is no significant pulmonary disease to explain the degree of dyspnea. In my opinion this is due to his physical deconditioning. He has been bedridden for prolonged periods of time due to COVID-19 and then due to his chemotherapy for bladder cancer. Recommend aggressive physical therapy once cardiac disease is ruled out. Agree with proceeding with cardiac catheterization to evaluate for underlying coronary artery disease . For now continue supplemental oxygen at night and as needed with exertion. He is reluctant to use CPAP therapy for his GHAZALA. From pulmonary standpoint he will be low risk for surgery for his bladder cancer. Reviewed imaging and PFT with patient and his wife in detail. Follow-up in 3 months    Requested Prescriptions      No prescriptions requested or ordered in this encounter       There are no discontinued medications. Farheen Stearns received counseling on the following healthy behaviors: nutrition, exercise and medication adherence    Patient given educational materials : see patient instruction       Discussed use, benefit, and side effects of prescribed medications. Barriers to medication compliance addressed. All patient questions answered. Pt voiced understanding. I hope this updates you on my evaluation and clinical thinking. Thank you for allowing me to participate in his care. Sincerely,      Electronically signed by Pam aKur MD on 6/14/2022 at 10:35 AM       Please note that this chart was generated using voice recognition Dragon dictation software. Although every effort was made to ensure the accuracy of this automated transcription, some errors in transcription may have occurred.

## 2022-06-14 NOTE — PATIENT INSTRUCTIONS
Please call the office once surgery is scheduled in urology and Dr. Domitila Meneses will provide the surgical clearance letter.  Yanira Short at [phone]  Printed AVS for patient  margot

## 2022-06-16 ENCOUNTER — HOSPITAL ENCOUNTER (OUTPATIENT)
Dept: CARDIAC CATH/INVASIVE PROCEDURES | Age: 67
Discharge: HOME | End: 2022-06-16
Payer: MEDICARE

## 2022-06-16 VITALS
SYSTOLIC BLOOD PRESSURE: 107 MMHG | TEMPERATURE: 98.1 F | HEART RATE: 76 BPM | DIASTOLIC BLOOD PRESSURE: 61 MMHG | RESPIRATION RATE: 19 BRPM | OXYGEN SATURATION: 96 %

## 2022-06-16 LAB
ANION GAP SERPL CALCULATED.3IONS-SCNC: 11 MMOL/L (ref 9–17)
BUN BLDV-MCNC: 17 MG/DL (ref 8–23)
CALCIUM SERPL-MCNC: 9 MG/DL (ref 8.6–10.4)
CHLORIDE BLD-SCNC: 103 MMOL/L (ref 98–107)
CO2: 25 MMOL/L (ref 20–31)
CREAT SERPL-MCNC: 1.63 MG/DL (ref 0.7–1.2)
GFR AFRICAN AMERICAN: 52 ML/MIN
GFR NON-AFRICAN AMERICAN: 31 ML/MIN
GFR NON-AFRICAN AMERICAN: 43 ML/MIN
GFR SERPL CREATININE-BSD FRML MDRD: 38 ML/MIN
GFR SERPL CREATININE-BSD FRML MDRD: ABNORMAL ML/MIN/{1.73_M2}
GFR SERPL CREATININE-BSD FRML MDRD: ABNORMAL ML/MIN/{1.73_M2}
GLUCOSE BLD-MCNC: 69 MG/DL (ref 74–100)
GLUCOSE BLD-MCNC: 79 MG/DL (ref 70–99)
PLATELET # BLD: 218 K/UL (ref 138–453)
POC BUN: 17 MG/DL (ref 8–26)
POC CHLORIDE: 103 MMOL/L (ref 98–107)
POC CREATININE: 2.15 MG/DL (ref 0.51–1.19)
POC HEMATOCRIT: 30 % (ref 41–53)
POC HEMOGLOBIN: 10.1 G/DL (ref 13.5–17.5)
POC POTASSIUM: 3.7 MMOL/L (ref 3.5–4.5)
POC SODIUM: 143 MMOL/L (ref 138–146)
POTASSIUM SERPL-SCNC: 3.8 MMOL/L (ref 3.7–5.3)
SODIUM BLD-SCNC: 139 MMOL/L (ref 135–144)

## 2022-06-16 PROCEDURE — 85014 HEMATOCRIT: CPT

## 2022-06-16 PROCEDURE — 85049 AUTOMATED PLATELET COUNT: CPT

## 2022-06-16 PROCEDURE — 93458 L HRT ARTERY/VENTRICLE ANGIO: CPT

## 2022-06-16 PROCEDURE — 84520 ASSAY OF UREA NITROGEN: CPT

## 2022-06-16 PROCEDURE — 84295 ASSAY OF SERUM SODIUM: CPT

## 2022-06-16 PROCEDURE — 82947 ASSAY GLUCOSE BLOOD QUANT: CPT

## 2022-06-16 PROCEDURE — 7100000000 HC PACU RECOVERY - FIRST 15 MIN

## 2022-06-16 PROCEDURE — 82565 ASSAY OF CREATININE: CPT

## 2022-06-16 PROCEDURE — 6360000004 HC RX CONTRAST MEDICATION

## 2022-06-16 PROCEDURE — 82435 ASSAY OF BLOOD CHLORIDE: CPT

## 2022-06-16 PROCEDURE — C1894 INTRO/SHEATH, NON-LASER: HCPCS

## 2022-06-16 PROCEDURE — 6360000002 HC RX W HCPCS

## 2022-06-16 PROCEDURE — 84132 ASSAY OF SERUM POTASSIUM: CPT

## 2022-06-16 PROCEDURE — 76937 US GUIDE VASCULAR ACCESS: CPT

## 2022-06-16 PROCEDURE — 2709999900 HC NON-CHARGEABLE SUPPLY

## 2022-06-16 PROCEDURE — 99152 MOD SED SAME PHYS/QHP 5/>YRS: CPT

## 2022-06-16 PROCEDURE — 80048 BASIC METABOLIC PNL TOTAL CA: CPT

## 2022-06-16 PROCEDURE — 7100000001 HC PACU RECOVERY - ADDTL 15 MIN

## 2022-06-16 RX ORDER — SODIUM CHLORIDE 0.9 % (FLUSH) 0.9 %
5-40 SYRINGE (ML) INJECTION PRN
Status: DISCONTINUED | OUTPATIENT
Start: 2022-06-16 | End: 2022-06-17 | Stop reason: HOSPADM

## 2022-06-16 RX ORDER — SODIUM CHLORIDE 0.9 % (FLUSH) 0.9 %
5-40 SYRINGE (ML) INJECTION EVERY 12 HOURS SCHEDULED
Status: DISCONTINUED | OUTPATIENT
Start: 2022-06-16 | End: 2022-06-17 | Stop reason: HOSPADM

## 2022-06-16 RX ORDER — SODIUM CHLORIDE 9 MG/ML
INJECTION, SOLUTION INTRAVENOUS PRN
Status: DISCONTINUED | OUTPATIENT
Start: 2022-06-16 | End: 2022-06-17 | Stop reason: HOSPADM

## 2022-06-16 RX ORDER — RIVAROXABAN 10 MG/1
20 TABLET, FILM COATED ORAL
COMMUNITY
End: 2022-09-01

## 2022-06-16 RX ORDER — ACETAMINOPHEN 325 MG/1
650 TABLET ORAL EVERY 4 HOURS PRN
Status: DISCONTINUED | OUTPATIENT
Start: 2022-06-16 | End: 2022-06-17 | Stop reason: HOSPADM

## 2022-06-16 RX ADMIN — SODIUM CHLORIDE: 9 INJECTION, SOLUTION INTRAVENOUS at 12:15

## 2022-06-16 NOTE — OP NOTE
[facility]    CARDIAC CATHETERIZATION    Date:   6/16/2022  Patient name:  Shaq Bee  Date of admission:  6/16/2022  7:14 AM  MRN:   [id number]  YOB: 1955    Operators:  Primary:   Ruth Vivar MD (Attending Physician)      Procedure performed:    [x] Left Heart Catheterization. [] Graft Angiography. [x] Left Ventriculography. [] Right Heart Catheterization. [] Coronary Angiography. [] Aortic Valve Studies. [] PCI:      [] Other:       Pre Procedure Conscious Sedation Data:  ASA Class:    [] I [] II [x] III [] IV    Mallampati Class:  [x] I [] II [] III [] IV      Indication:  - New onset of cardiomyopathy   - Risk factors for CAD     Procedure:  Access:  [] Femoral  [x] Radial  artery       [x] Right  [] Left    Procedure: After informed consent was obtained with explanation of the risks and benefits, patient was brought to the cath lab. The access area was prepped and draped in sterile fashion. 1% lidocaine was used for local block. The artery was cannulated with 6  Fr sheath with brisk arterial blood return. The side port was frequently flushed and aspirated with normal saline. Cardiac Arteries and Lesion Findings       LMCA: Normal 0% stenosis. LAD: Diffuse irregularities 30-40%. LCx: Diffuse irregularities 30-40%. RCA: Diffuse irregularities 30-40%. Coronary Tree        Dominance: Right        Procedure Summary        Minimal non-obstructive CAD. LV was not done. Recommendations        Medical therapy as needed. Risk factor modification. Estimated Blood Loss: 5 mL    ____________________________________________________________________  Electronically signed on 6/16/2022 at 1:13 PM by:    Monique Wells MD  Fellow, [address]      Physician Statement  I have discussed the case of Shaq Bee including pertinent history and exam findings with the resident.  I have seen and examined the patient and the key elements of the encounter have been performed by me. I agree with the assessment, plan and orders as documented by the resident With changes made to the note. Procedure performed by me.     Electronically signed by Berenice Bustillo MD on 6/16/2022 at 4:09 PM.    [facility]      [phone]

## 2022-06-16 NOTE — H&P
[facility]  Procedure History and Physical Update          Patient Name: Walt Albert  MRN:    [id number]  YOB: 1955  Date of evaluation:  6/16/2022    Procedure:    Cardiac cath +/- PCI    Indication for procedure:  New onset of cardiomyopathy       Please refer to the office note completed by Dr. Nisha Colin  on [phone] in the medical record and note that:    [x] I have examined the patient and reviewed the H&P/Consult and there are no changes to be made to the assessment or plan. [] I have examined the patient and reviewed the H&P/Consult and have noted the following changes:    Past Medical History:   Diagnosis Date    Acute renal failure with tubular necrosis ([address] 5/26/2022    Likely ischemic ATN/prerenal acidemia from intractable nausea vomiting as result of chemotherapy, baseline 1.3-1.4 peaked up to 2.3 in May 2022    Arthritis     BPH with obstruction/lower urinary tract symptoms     Caffeine use     3 cans soda/pop    Cancer (HCC)     bladder cancer    CKD (chronic kidney disease), stage III ([address] 5/26/2022    From 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   Baseline 1.3-1.4    Depression     Dilated cardiomyopathy ([address] 5/26/2022    Ejection fraction 40 to 45%, does have symptoms of dyspnea and decreased effort tolerance    GERD (gastroesophageal reflux disease)     High cholesterol     Kidney calculi     Sleep apnea        Past Surgical History:   Procedure Laterality Date    CARDIAC CATHETERIZATION      CARDIAC CATHETERIZATION  06/16/2022    COLONOSCOPY      IR PORT PLACEMENT EQUAL OR GREATER THAN 5 YEARS  2/25/2022    IR PORT PLACEMENT EQUAL OR GREATER THAN 5 YEARS 2/25/2022 [facility]    KNEE ARTHROSCOPY      left       Family History   Problem Relation Age of Onset Cancer Father         bladder cancer    Urolithiasis Father        No Known Allergies    Prior to Admission medications    Medication Sig Start Date End Date Taking? Authorizing Provider   rivaroxaban (XARELTO) 10 MG TABS tablet Take 10 mg by mouth   Yes Historical Provider, MD   ondansetron (ZOFRAN-ODT) 8 MG TBDP disintegrating tablet DISSOLVE 1 TABLET IN MOUTH EVERY 8 HOURS AS NEEDED FOR NAUSEA FOR VOMITING 6/10/22   Sarah Grayson MD   dexamethasone (DECADRON) 1 MG tablet Take 1 tablet by mouth once daily with breakfast 6/6/22   Zi Milton MD   HYDROcodone-acetaminophen (NORCO) 5-325 MG per tablet Take 1 tablet by mouth every 8 hours as needed for Pain for up to 30 days. 6/6/22 7/6/22  Sarah Grayson MD   carvedilol (COREG) 3.125 MG tablet TAKE 1 TABLET BY MOUTH TWICE DAILY 5/18/22   Historical Provider, MD   LORazepam (ATIVAN) 1 MG tablet Take 1 tablet by mouth every 6 hours as needed for Anxiety (Take 1 pill at betime as needed) for up to 120 doses. 5/26/22 6/25/22  Sarah Grayson MD   polyethylene glycol (GLYCOLAX) 17 g packet Take 17 g by mouth daily as needed for Constipation    Historical Provider, MD   apixaban (ELIQUIS) 5 MG TABS tablet Take 1 tablet by mouth 2 times daily  Patient not taking: Reported on 6/16/2022 4/20/22   Zi Milton MD   albuterol sulfate  (90 Base) MCG/ACT inhaler Inhale 2 puffs into the lungs every 6 hours as needed for Wheezing 4/1/22   Irving Taylor MD   phenazopyridine (PYRIDIUM) 200 MG tablet Take 1 tablet by mouth 3 times daily as needed for Pain 3/17/22   Fili Damon MD   lidocaine-prilocaine (EMLA) 2.5-2.5 % cream Apply topically Daily as needed.  2/10/22   Zi Milton MD   omeprazole (PRILOSEC) 20 MG delayed release capsule Take 1 capsule by mouth daily 2/10/22   Zi Milton MD   finasteride (PROSCAR) 5 MG tablet Take 5 mg by mouth daily    Historical Provider, MD   tamsulosin (FLOMAX) 0.4 MG capsule Take 0.4 mg by mouth daily    Historical Provider, MD   simvastatin (ZOCOR) 40 MG tablet Take 40 mg by mouth nightly    Historical Provider, MD   buPROPion (WELLBUTRIN XL) 300 MG extended release tablet Take 300 mg by mouth every morning    Historical Provider, MD         Vitals:    06/16/22 0815   Temp: 98.1 °F (36.7 °C)       Constitutional and General Appearance:   alert, cooperative, no distress and appears stated age  HEENT:  PERRL, EOMI  Respiratory:  Normal excursion and expansion without use of accessory muscles  Resp Auscultation:  Good respiratory effort. No for increased work of breathing. On auscultation: clear to auscultation bilaterally  Cardiovascular:  Regular rate and rhythm. S1/S2  No murmurs. The apical impulse is not displaced  Abdomen:  Soft  Bowel sounds present  Non-tender to palpation  Extremities:  No cyanosis or clubbing  Lower extremity edema: No.  Skin:  Warm and dry  Neurological:  Alert and oriented. Moves all extremities well      Plan:  Proceed with planned procedure. Further orders to follow. Acute on chronic systolic heart failure with ejection fraction of 40-45% currently on Lasix at low-dose will add low-dose beta-blocker with Coreg 3.125 b.i.d.. His creatinine is too high to start Ace or Arb. We need to plan ischemia workup with cardiac catheterization but in view of elevated creatinine and recent PE will obtain Nephrology clearance and wait for a full 3 months at least of anticoagulation before considering cardiac catheterization. 2. Hyperlipidemia continue with simvastatin  3. Follow-up in 2 months    Pre Procedure Conscious Sedation Data:     ASA Class:                  [] I [] II [x] III [] IV     Mallampati Class:       [] I [] II [x] III [] IV    Risks, benefits, and alternatives of cardiac catheterization were discussed, in detail, with patient.  Risks include, but not limited to, bleeding, requiring blood transfusion, vascular complication requiring surgery, renal failure with need of dialysis, CVA, MI, death and anesthesia complications including intubation were discussed. Patient verbalized understanding and agreed to proceed with the procedure understanding the above risks and alternatives to the procedure. Electronically signed on 06/16/22 at 8:54 AM by:    Tavon Bee MD, MD   Fellow, [address]      Attending Physician Statement  I have discussed the case of Ryan Pablo including pertinent history and exam findings with the resident. I have seen and examined the patient and the key elements of the encounter have been performed by me. I agree with the assessment, plan and orders as documented by the resident With changes made to the note.      Electronically signed by Ike Galindo MD on 6/16/2022 at 4:09 PM.    [facility]      [phone]

## 2022-06-24 RX ORDER — RIVAROXABAN 20 MG/1
TABLET, FILM COATED ORAL
Qty: 30 TABLET | Refills: 0 | Status: SHIPPED | OUTPATIENT
Start: 2022-06-24 | End: 2022-07-25 | Stop reason: SDUPTHER

## 2022-07-07 DIAGNOSIS — R31.0 GROSS HEMATURIA: ICD-10-CM

## 2022-07-07 DIAGNOSIS — R33.9 URINARY RETENTION: ICD-10-CM

## 2022-07-07 DIAGNOSIS — C67.2 MALIGNANT NEOPLASM OF LATERAL WALL OF URINARY BLADDER (HCC): ICD-10-CM

## 2022-07-08 ENCOUNTER — TELEPHONE (OUTPATIENT)
Dept: ONCOLOGY | Age: 67
End: 2022-07-08

## 2022-07-08 RX ORDER — OMEPRAZOLE 20 MG/1
20 CAPSULE, DELAYED RELEASE ORAL DAILY
Qty: 30 CAPSULE | Refills: 3 | Status: SHIPPED | OUTPATIENT
Start: 2022-07-08

## 2022-07-08 RX ORDER — HYDROCODONE BITARTRATE AND ACETAMINOPHEN 5; 325 MG/1; MG/1
1 TABLET ORAL EVERY 8 HOURS PRN
Qty: 60 TABLET | Refills: 0 | Status: SHIPPED | OUTPATIENT
Start: 2022-07-08 | End: 2022-08-08 | Stop reason: SDUPTHER

## 2022-07-08 RX ORDER — DEXAMETHASONE 1 MG
1 TABLET ORAL
Qty: 30 TABLET | Refills: 0 | Status: SHIPPED | OUTPATIENT
Start: 2022-07-08 | End: 2022-08-04

## 2022-07-14 ENCOUNTER — TELEPHONE (OUTPATIENT)
Dept: ONCOLOGY | Age: 67
End: 2022-07-14

## 2022-07-14 ENCOUNTER — OFFICE VISIT (OUTPATIENT)
Dept: ONCOLOGY | Age: 67
End: 2022-07-14
Payer: MEDICARE

## 2022-07-14 ENCOUNTER — HOSPITAL ENCOUNTER (OUTPATIENT)
Dept: INFUSION THERAPY | Age: 67
Discharge: HOME | End: 2022-07-14
Payer: MEDICARE

## 2022-07-14 VITALS
DIASTOLIC BLOOD PRESSURE: 66 MMHG | BODY MASS INDEX: 32.71 KG/M2 | HEART RATE: 96 BPM | TEMPERATURE: 96.9 F | SYSTOLIC BLOOD PRESSURE: 154 MMHG | WEIGHT: 254.8 LBS

## 2022-07-14 DIAGNOSIS — N18.32 STAGE 3B CHRONIC KIDNEY DISEASE (HCC): ICD-10-CM

## 2022-07-14 DIAGNOSIS — C67.9 MALIGNANT NEOPLASM OF URINARY BLADDER, UNSPECIFIED SITE (HCC): Primary | ICD-10-CM

## 2022-07-14 DIAGNOSIS — N17.0 ACUTE RENAL FAILURE WITH TUBULAR NECROSIS (HCC): ICD-10-CM

## 2022-07-14 DIAGNOSIS — C67.2 MALIGNANT NEOPLASM OF LATERAL WALL OF URINARY BLADDER (HCC): Primary | ICD-10-CM

## 2022-07-14 LAB
ABSOLUTE EOS #: 0.1 K/UL (ref 0–0.4)
ABSOLUTE LYMPH #: 3.1 K/UL (ref 1–4.8)
ABSOLUTE MONO #: 0.7 K/UL (ref 0.1–1.2)
ALBUMIN SERPL-MCNC: 3.7 G/DL (ref 3.5–5.2)
ALBUMIN SERPL-MCNC: 3.7 G/DL (ref 3.5–5.2)
ALBUMIN/GLOBULIN RATIO: 1.3 (ref 1–2.5)
ALP BLD-CCNC: 83 U/L (ref 40–129)
ALT SERPL-CCNC: 22 U/L (ref 5–41)
ANION GAP SERPL CALCULATED.3IONS-SCNC: 12 MMOL/L (ref 9–17)
ANION GAP SERPL CALCULATED.3IONS-SCNC: 12 MMOL/L (ref 9–17)
AST SERPL-CCNC: 20 U/L
BASOPHILS # BLD: 1 % (ref 0–2)
BASOPHILS ABSOLUTE: 0.1 K/UL (ref 0–0.2)
BILIRUB SERPL-MCNC: 0.19 MG/DL (ref 0.3–1.2)
BUN BLDV-MCNC: 16 MG/DL (ref 8–23)
BUN BLDV-MCNC: 16 MG/DL (ref 8–23)
CALCIUM SERPL-MCNC: 8.9 MG/DL (ref 8.6–10.4)
CALCIUM SERPL-MCNC: 8.9 MG/DL (ref 8.6–10.4)
CHLORIDE BLD-SCNC: 106 MMOL/L (ref 98–107)
CHLORIDE BLD-SCNC: 106 MMOL/L (ref 98–107)
CO2: 26 MMOL/L (ref 20–31)
CO2: 26 MMOL/L (ref 20–31)
CREAT SERPL-MCNC: 1.3 MG/DL (ref 0.7–1.2)
CREAT SERPL-MCNC: 1.3 MG/DL (ref 0.7–1.2)
CREATININE URINE: 124.7 MG/DL (ref 39–259)
EOSINOPHILS RELATIVE PERCENT: 1 % (ref 1–4)
GFR AFRICAN AMERICAN: >60 ML/MIN
GFR AFRICAN AMERICAN: >60 ML/MIN
GFR NON-AFRICAN AMERICAN: 55 ML/MIN
GFR NON-AFRICAN AMERICAN: 55 ML/MIN
GFR SERPL CREATININE-BSD FRML MDRD: ABNORMAL ML/MIN/{1.73_M2}
GFR SERPL CREATININE-BSD FRML MDRD: ABNORMAL ML/MIN/{1.73_M2}
GLUCOSE BLD-MCNC: 119 MG/DL (ref 70–99)
GLUCOSE BLD-MCNC: 119 MG/DL (ref 70–99)
HCT VFR BLD CALC: 33 % (ref 41–53)
HCT VFR BLD CALC: 33 % (ref 41–53)
HEMOGLOBIN: 11.1 G/DL (ref 13.5–17.5)
HEMOGLOBIN: 11.1 G/DL (ref 13.5–17.5)
LYMPHOCYTES # BLD: 29 % (ref 24–44)
MCH RBC QN AUTO: 30.7 PG (ref 26–34)
MCH RBC QN AUTO: 30.7 PG (ref 26–34)
MCHC RBC AUTO-ENTMCNC: 33.5 G/DL (ref 31–37)
MCHC RBC AUTO-ENTMCNC: 33.5 G/DL (ref 31–37)
MCV RBC AUTO: 91.5 FL (ref 80–100)
MCV RBC AUTO: 91.5 FL (ref 80–100)
MONOCYTES # BLD: 7 % (ref 2–11)
PDW BLD-RTO: 16.6 % (ref 12.5–15.4)
PDW BLD-RTO: 16.6 % (ref 12.5–15.4)
PHOSPHORUS: 2.8 MG/DL (ref 2.5–4.5)
PLATELET # BLD: 194 K/UL (ref 140–450)
PLATELET # BLD: 194 K/UL (ref 140–450)
PMV BLD AUTO: 6.6 FL (ref 6–12)
PMV BLD AUTO: 6.6 FL (ref 6–12)
POTASSIUM SERPL-SCNC: 3.8 MMOL/L (ref 3.7–5.3)
POTASSIUM SERPL-SCNC: 3.8 MMOL/L (ref 3.7–5.3)
PTH INTACT: 72.91 PG/ML (ref 15–65)
RBC # BLD: 3.6 M/UL (ref 4.5–5.9)
RBC # BLD: 3.6 M/UL (ref 4.5–5.9)
SEG NEUTROPHILS: 62 % (ref 36–66)
SEGMENTED NEUTROPHILS ABSOLUTE COUNT: 6.7 K/UL (ref 1.8–7.7)
SODIUM BLD-SCNC: 144 MMOL/L (ref 135–144)
SODIUM BLD-SCNC: 144 MMOL/L (ref 135–144)
TOTAL PROTEIN, URINE: 80 MG/DL
TOTAL PROTEIN: 6.5 G/DL (ref 6.4–8.3)
URINE TOTAL PROTEIN CREATININE RATIO: 0.64 (ref 0–0.2)
WBC # BLD: 10.7 K/UL (ref 3.5–11)
WBC # BLD: 10.7 K/UL (ref 3.5–11)

## 2022-07-14 PROCEDURE — 1036F TOBACCO NON-USER: CPT | Performed by: INTERNAL MEDICINE

## 2022-07-14 PROCEDURE — G8428 CUR MEDS NOT DOCUMENT: HCPCS | Performed by: INTERNAL MEDICINE

## 2022-07-14 PROCEDURE — 2580000003 HC RX 258: Performed by: INTERNAL MEDICINE

## 2022-07-14 PROCEDURE — 85027 COMPLETE CBC AUTOMATED: CPT

## 2022-07-14 PROCEDURE — G8417 CALC BMI ABV UP PARAM F/U: HCPCS | Performed by: INTERNAL MEDICINE

## 2022-07-14 PROCEDURE — 80048 BASIC METABOLIC PNL TOTAL CA: CPT

## 2022-07-14 PROCEDURE — 36591 DRAW BLOOD OFF VENOUS DEVICE: CPT

## 2022-07-14 PROCEDURE — 85025 COMPLETE CBC W/AUTO DIFF WBC: CPT

## 2022-07-14 PROCEDURE — 99214 OFFICE O/P EST MOD 30 MIN: CPT | Performed by: INTERNAL MEDICINE

## 2022-07-14 PROCEDURE — 6360000002 HC RX W HCPCS: Performed by: INTERNAL MEDICINE

## 2022-07-14 PROCEDURE — 3017F COLORECTAL CA SCREEN DOC REV: CPT | Performed by: INTERNAL MEDICINE

## 2022-07-14 PROCEDURE — 82040 ASSAY OF SERUM ALBUMIN: CPT

## 2022-07-14 PROCEDURE — 83970 ASSAY OF PARATHORMONE: CPT

## 2022-07-14 PROCEDURE — 80053 COMPREHEN METABOLIC PANEL: CPT

## 2022-07-14 PROCEDURE — 84100 ASSAY OF PHOSPHORUS: CPT

## 2022-07-14 PROCEDURE — 1123F ACP DISCUSS/DSCN MKR DOCD: CPT | Performed by: INTERNAL MEDICINE

## 2022-07-14 PROCEDURE — 82570 ASSAY OF URINE CREATININE: CPT

## 2022-07-14 PROCEDURE — 84156 ASSAY OF PROTEIN URINE: CPT

## 2022-07-14 RX ORDER — SODIUM CHLORIDE 0.9 % (FLUSH) 0.9 %
5-40 SYRINGE (ML) INJECTION PRN
Status: CANCELLED | OUTPATIENT
Start: 2022-07-14

## 2022-07-14 RX ORDER — SODIUM CHLORIDE 0.9 % (FLUSH) 0.9 %
5-40 SYRINGE (ML) INJECTION PRN
Status: DISCONTINUED | OUTPATIENT
Start: 2022-07-14 | End: 2022-07-15 | Stop reason: HOSPADM

## 2022-07-14 RX ORDER — SODIUM CHLORIDE 9 MG/ML
25 INJECTION, SOLUTION INTRAVENOUS PRN
Status: CANCELLED | OUTPATIENT
Start: 2022-07-14

## 2022-07-14 RX ORDER — HEPARIN SODIUM (PORCINE) LOCK FLUSH IV SOLN 100 UNIT/ML 100 UNIT/ML
500 SOLUTION INTRAVENOUS PRN
Status: DISCONTINUED | OUTPATIENT
Start: 2022-07-14 | End: 2022-07-15 | Stop reason: HOSPADM

## 2022-07-14 RX ORDER — HEPARIN SODIUM (PORCINE) LOCK FLUSH IV SOLN 100 UNIT/ML 100 UNIT/ML
500 SOLUTION INTRAVENOUS PRN
Status: CANCELLED | OUTPATIENT
Start: 2022-07-14

## 2022-07-14 RX ADMIN — SODIUM CHLORIDE, PRESERVATIVE FREE 20 ML: 5 INJECTION INTRAVENOUS at 08:17

## 2022-07-14 RX ADMIN — HEPARIN 500 UNITS: 100 SYRINGE at 08:17

## 2022-07-14 NOTE — TELEPHONE ENCOUNTER
AVS from 07/14/22     Please schedule PET scan in the next few weeks. rv in 2 months. With cbc, cmp     PET scheduled for 07/19/22 at 8:30. Draw (cbc,cmp)  RV scheduled for 09/08/22 at 1:45.     PT was given AVS and appt schedule    Electronically signed by [facility] on 7/14/2022 at 9:04 AM

## 2022-07-19 ENCOUNTER — HOSPITAL ENCOUNTER (OUTPATIENT)
Dept: NUCLEAR MEDICINE | Age: 67
Discharge: HOME | End: 2022-07-21
Payer: MEDICARE

## 2022-07-19 DIAGNOSIS — C67.2 MALIGNANT NEOPLASM OF LATERAL WALL OF URINARY BLADDER (HCC): ICD-10-CM

## 2022-07-19 LAB — GLUCOSE BLD-MCNC: 94 MG/DL (ref 75–110)

## 2022-07-19 PROCEDURE — 2580000003 HC RX 258: Performed by: INTERNAL MEDICINE

## 2022-07-19 PROCEDURE — 82947 ASSAY GLUCOSE BLOOD QUANT: CPT

## 2022-07-19 PROCEDURE — 78815 PET IMAGE W/CT SKULL-THIGH: CPT

## 2022-07-19 PROCEDURE — 3430000000 HC RX DIAGNOSTIC RADIOPHARMACEUTICAL: Performed by: INTERNAL MEDICINE

## 2022-07-19 PROCEDURE — A9552 F18 FDG: HCPCS | Performed by: INTERNAL MEDICINE

## 2022-07-19 RX ORDER — SODIUM CHLORIDE 0.9 % (FLUSH) 0.9 %
10 SYRINGE (ML) INJECTION ONCE
Status: COMPLETED | OUTPATIENT
Start: 2022-07-19 | End: 2022-07-19

## 2022-07-19 RX ORDER — FLUDEOXYGLUCOSE F 18 200 MCI/ML
10 INJECTION, SOLUTION INTRAVENOUS
Status: COMPLETED | OUTPATIENT
Start: 2022-07-19 | End: 2022-07-19

## 2022-07-19 RX ADMIN — FLUDEOXYGLUCOSE F 18 11 MILLICURIE: 200 INJECTION, SOLUTION INTRAVENOUS at 08:17

## 2022-07-19 RX ADMIN — SODIUM CHLORIDE, PRESERVATIVE FREE 10 ML: 5 INJECTION INTRAVENOUS at 08:17

## 2022-07-26 ENCOUNTER — TELEPHONE (OUTPATIENT)
Dept: INFUSION THERAPY | Age: 67
End: 2022-07-26

## 2022-07-26 NOTE — TELEPHONE ENCOUNTER
RN note with ok from Dr. Sridevi Pierce for dental work faxed to [facility].   Confirmation received

## 2022-07-26 NOTE — TELEPHONE ENCOUNTER
Marlen Avitia from [facility] called to get clearance for patient to get some work done. RN spoke to Dr. Merlin Meyer and he stated that it is ok to get the work done. Brooke rudd.

## 2022-07-28 ENCOUNTER — TELEPHONE (OUTPATIENT)
Dept: ONCOLOGY | Age: 67
End: 2022-07-28

## 2022-07-28 NOTE — TELEPHONE ENCOUNTER
[facility] Nutrition Follow Up       Laina Rossi is a 74 y.o.  male     NUTRITION RECOMMENDATIONS / MONITORING / EVALUATION  Encourage high calorie, high protein foods and beverages. 2. Encourage small, frequent meals. 3. Will continue to monitor po intakes, wt status, plan of care. Subjective/Current Data:  Called pt on listed phone number r/t nutrition follow up. Pt reports still experiencing taste changes, but has been able to maintain his weight of 254 pounds. Pt reports only certain things taste good at certain times, but it changes often. Currently tolerating things like ground bologna and potato salad. Pt states that he just finished chemo and is waiting to see if he still needs surgery or not. He has tried nutrition supplements in the past and can not tolerate them at all due to taste. Reviewed trying different temperatures of foods to help with acceptance as well as using a lid and straw to help with minimizing tastes/smells. Nutritional Requirements:  Estimated Energy Needs:  Weight Used: 112.5   Method Used: [employer]  Estimated kcal Needs: 2450 - 2600 kcal per day  Protein Needs:  Weight used: 112.5  1.2 - 1.4 g/kg = 135 - 158 g per day    Nutrition-focused Physical Findings  GI Symptoms: poor appetite and food intolerance              Skin: no issues reported  Intake vs. Estimated Needs: likely meeting needs    Nutrition Diagnosis and Goal  Problem:  Inadequate energy intake  Etiology/related to: catabolic illness  Symptoms/Signs/as evidenced by: previous significant weight loss and continued taste changes impacting ability to consistently meet estimated needs. Goal: Adequate intake to aide in wt maintenaince, signs and symptoms management, and overall wellbeing.     Progress towards goal: gradually improving    Charu Rivers, MS, RD, LD  Registered Dietitian  [place]  [phone]

## 2022-08-02 ENCOUNTER — TELEPHONE (OUTPATIENT)
Dept: INFUSION THERAPY | Age: 67
End: 2022-08-02

## 2022-08-02 RX ORDER — CIPROFLOXACIN 500 MG/1
500 TABLET, FILM COATED ORAL 2 TIMES DAILY
Qty: 10 TABLET | Refills: 0 | Status: SHIPPED | OUTPATIENT
Start: 2022-08-02 | End: 2022-08-07

## 2022-08-02 NOTE — PROGRESS NOTES
The patient called complaining of burning sensation when he urinates   He has an appointment with urology in a couple of days. He wanted a refill on his antibiotics. Previously we have done urine cultures that persistently grew Pseudomonas. I will start him on ciprofloxacin and hopefully when he sees a urologist he will get an opinion about future care.   The patient obviously had a great response to chemotherapy and PET CT scan and he is a candidate for cystectomy which will resolve the issue of recurrent UTI

## 2022-08-02 NOTE — TELEPHONE ENCOUNTER
Pt's wife called triage requesting an antibiotic for possible UTI. States it burns when he urinates and urine is cloudy. Will notify Dr Luda Ponce and update with recommendations.

## 2022-08-04 PROBLEM — R31.0 GROSS HEMATURIA: Status: RESOLVED | Noted: 2020-11-02 | Resolved: 2022-08-04

## 2022-08-04 PROBLEM — N30.01 ACUTE CYSTITIS WITH HEMATURIA: Status: RESOLVED | Noted: 2022-03-31 | Resolved: 2022-08-04

## 2022-08-08 ENCOUNTER — TELEPHONE (OUTPATIENT)
Dept: PULMONOLOGY | Age: 67
End: 2022-08-08

## 2022-08-08 DIAGNOSIS — R33.9 URINARY RETENTION: ICD-10-CM

## 2022-08-08 DIAGNOSIS — C67.2 MALIGNANT NEOPLASM OF LATERAL WALL OF URINARY BLADDER (HCC): ICD-10-CM

## 2022-08-08 DIAGNOSIS — R31.0 GROSS HEMATURIA: ICD-10-CM

## 2022-08-08 RX ORDER — HYDROCODONE BITARTRATE AND ACETAMINOPHEN 5; 325 MG/1; MG/1
1 TABLET ORAL EVERY 8 HOURS PRN
Qty: 60 TABLET | Refills: 0 | Status: SHIPPED | OUTPATIENT
Start: 2022-08-08 | End: 2022-09-07 | Stop reason: SDUPTHER

## 2022-08-08 NOTE — TELEPHONE ENCOUNTER
Dr Luis Holden progress note from 6/14/22 faxed to Dr Evens Castillo at [phone] with portion related to surgery highlighted and starred.

## 2022-08-23 ENCOUNTER — TELEPHONE (OUTPATIENT)
Dept: ONCOLOGY | Age: 67
End: 2022-08-23

## 2022-08-29 RX ORDER — SODIUM CHLORIDE, SODIUM LACTATE, POTASSIUM CHLORIDE, CALCIUM CHLORIDE 600; 310; 30; 20 MG/100ML; MG/100ML; MG/100ML; MG/100ML
1000 INJECTION, SOLUTION INTRAVENOUS CONTINUOUS
Status: CANCELLED | OUTPATIENT
Start: 2022-08-29

## 2022-08-29 NOTE — DISCHARGE INSTRUCTIONS
Preoperative Instructions:    Stop eating solid foods at midnight the night prior to surgery. Stop drinking clear liquids at midnight the night prior to surgery. (Follow bowel prep instructions if instructed by your surgeon.)    Laurie Nikko at the surgery center (Entrance B) by 6:00 on 9/16/2022  (or as directed by your surgeon's office). Please stop any blood thinning medications as directed by your surgeon or prescribing physician. Failure to stop certain medications may interfere with your scheduled surgery. These may include:  Aspirin, Warfarin (Coumadin), Clopidogrel (Plavix), Ibuprofen (Motrin, Advil), Naproxen (Aleve), Meloxicam (Mobic), Celecoxib (Celebrex), Eliquis, Pradaxa, Xarelto, Effient, Fish Oil, Herbal supplements. **follow lovenox bridge instructions    You may continue the rest of your medications through the night before surgery unless instructed otherwise. Please take only the following medication(s) the day of surgery with a small sip of water:  Coreg/carvedilol, prilosec/omeprazole    Please use and bring inhalers the day of surgery. Please bring CPAP the day of surgery. ____________________________  ____________________________  Signature (Patient)           Signature/date(Provider)      REMINDERS:  ** If you are going home the day of your procedure, you will need a friend or family member to drive you home after your procedure. Your  must be 25years of age or older and able to sign off on your discharge instructions. Taxi cabs or any form of public transportation is not acceptable. ** It is preferable that the friend or family member stay at the hospital throughout your procedure. ** If you are going home the same day as your procedure, someone must remain with you for the first 24 hours after your surgery if you receive anesthesia or sedation. If you do not have someone to stay with you, your procedure may be cancelled.   **  Please do not wear any jewelry or body piercings the day of surgery. PREPARING FOR YOUR SURGERY:     Before surgery, you can play an important role in your own health. Because skin is not sterile, we need to be sure that your skin is as free of germs as possible before surgery by carefully washing before surgery. Preparing or prepping skin before surgery can reduce the risk of a surgical site infection.   Do not shave the area of your body where your surgery will be performed unless you received specific permission from your physician. You will need to shower at home the night before surgery and the morning of surgery with a special soap called chlorhexidine gluconate (CHG*). *Not to be used by people allergic to Chlorhexidine Gluconate (CHG). Following these instructions will help you be sure that your skin is clean before surgery. Instructions on cleaning your skin before surgery: The night before your surgery: You will need to shower with warm water (not hot) and the CHG soap. Use a clean wash cloth and a clean towel. Have clean clothes available to put on after the shower. First wash your hair with regular shampoo. Rinse your hair and body thoroughly to remove the shampoo. Wash your face with your regular soap or water only. Thoroughly rinse your body with warm water from the neck down. Turn water off to prevent rinsing the soap off too soon. With a clean wet washcloth and half of the CHG soap in the bottle, lather your entire body from the neck down. Do not use CHG soap near your eyes or ears to avoid injury to those areas. Wash thoroughly, paying special attention to the area where your surgery will be performed. Wash your body gently for five (5) minutes. Avoid scrubbing your skin too hard. Turn the water back on and rinse your body thoroughly. Pat yourself dry with a clean, soft towel. Do not apply lotion, cream or powder. Dress with clean freshly washed clothes.     The morning of surgery:    Repeat shower following steps above  - using remaining half of CHG soap in bottle. If you have any questions, call the Pre-Admission Testing Unit at [phone]. Day of Surgery/Procedure    As a patient at [address]  you can expect quality medical and nursing care that is centered on your individual needs. Our goal is to make your surgical experience as comfortable as possible  . Directions to the [address] [place] [address] is located at [address] [facility], [facility]. Please pull into the Emergency [facility] parking lot (Entrance B) and park in that lot. We also have additional parking across the street. You will enter the facility following the [address] sign. Please stop at the reception desk where you will be checked in by the staff. If you have any questions please call [phone]. Transportation after your procedure. You will need a friend or family member to drive you home after your procedure. Your  must be 25years of age or older and able to sign off on your discharge instructions. Taxi cabs or any form of public transportation is not acceptable. It is preferable that the friend or family member stay at the hospital throughout your procedure. Someone must remain at home with you for the first 24 hours after your surgery if you receive anesthesia or sedation. If you do not have someone to stay with you, your procedure may be cancelled. Patient Instructions    If you are having any type of anesthesia you are to have nothing to eat or drink after midnight the night before your surgery. This includes gum, mints, water or smoking or chewing tobacco.  The only exception to this is a small sip of water to take with any morning dose of heart, blood pressure, or seizure medications.     Bring a list of all medications you take, along with the dose of the medications and how often you take it.  If more convenient bring the pharmacy bottles in a zip lock bag. Please shower the night before and the morning of surgery with an antibacterial soap. Please use the wipes given to you the night before your surgery after your shower. Unless otherwise told by your physician, please do not shave legs or any part of your body below your neck the night before or day of your surgery. You may shave your face or neck. Brush your teeth but do not swallow water. Bring your inhaler if you are currently using one. Bring your eyeglasses and case with you. No contacts are to be worn the day of surgery. You also may bring your hearing aids. Bring your blood band if one has been given to you. Please do not close the clasp. If you are on C-PAP or Bi-PAP at home and plan on staying in the hospital overnight for your surgery please bring the machine with you. Do not wear any jewelry or body piercings day of surgery. Also, NO lotion, perfume or deodorant to be used the day of surgery. Do not bring any valuables, such as jewelry, cash or credit cards. If you are staying overnight with us, please bring a SMALL bag of personal items. We cannot accommodate large items, like suitcases. Please wear loose, comfortable clothing. If you are potentially going to have a cast or brace bring clothing that will fit over them. In case of illness - If you have cold or flu like symptoms (high fever, runny nose, sore throat, cough, etc.) rash, nausea, vomiting, loose stools, and/or recent contact with someone who has a contagious disease (chicken pox, measles, etc.) Please call your doctor before coming to the hospital.      If your child is having surgery please make arrangements for any other children to be cared for at home on the day of surgery.   Other children are not permitted in recovery room and we want you to be able to spend time with the patient. If other arrangements are not available then we suggest that you have a second adult to stay in the waiting room.       If you have any other questions regarding your procedure or the day of surgery, please call [phone], or [phone]

## 2022-08-31 NOTE — H&P (VIEW-ONLY)
Nasir Weiner MD [facility]    Urology Office Progress Note    Patient:  Bertha Candelaria  YOB: 1955  Date: 8/31/2022    HISTORY OF PRESENT ILLNESS:   The patient is a 74 y.o. male  Patient here for his preop discussion before his upcoming Robotic assisted laparoscopic radical cystoprostatectomy, ileal conduit and bilateral pelvic lymph node dissection. He will need to have bridging Lovenox after stopping Xarelto 1 week prior. He is scheduled for his preop evaluation and will see stoma therapy and anesthesia (request he get preop paravertebral block or thoracic epidural for postop pain control). Last AUA Symptom Score (QOL): 6 (0)    Summary of old records:   Gross hematuria wo smoking history  History of KS, recently passed  BPH: tamsulosin 0.4 mg po qd, finasteride 5 mg po qd; 11/2/20 PVR = 56.79 mL; AUR Jan 2022, has denis catheter  Nocturia x 3  Bladder cancer, high grade, muscle invasive (initial management in [place], 1/2022)  12/29/21 TURBT, lithalopaxy T2G3  12/20/21 Right percutaneous nephrostomy tube, removed 1/17/22; R ureteral stent in place  1/16/22 TURBT, clot evacuation T2G3  1/26/22 TURBT, T2G3     Additional History: Estrada Sever Cystoprostectomy Discussion:  I discussed the various risks and benefits of robotic assisted laparoscopic cystoprostatectomy including: infection, bleeding, the risks of anesthesia, DVT, PE , MI, stroke, death, erectile dysfunction, acid-base and electrolyte abnormalities, stomal problems, bowel or ureteral obstruction and need for an open conversion. Procedures Today: N/A    Urinalysis today:  No results found for this visit on 08/31/22.     Last several PSA's:  Lab Results   Component Value Date    PSA 2.54 02/08/2019       Last total testosterone:  No results found for: TESTOSTERONE    Last BUN and creatinine:  Lab Results   Component Value Date    BUN 16 07/14/2022     Lab Results   Component Value Date    CREATININE 1.30 (H) 07/14/2022       Last CBC:  Lab Results   Component Value Date    WBC 10.7 2022    HGB 11.1 (L) 2022    HCT 33.0 (L) 2022    MCV 91.5 2022     2022       Additional Lab/Culture results: none    Imaging Reviewed during this Office Visit: none  (results were independently reviewed by physician and radiology report verified)    Physical Exam:    There were no vitals filed for this visit. Body mass index is 32.61 kg/m². Patient is a 74 y.o. male in no acute distress and alert and oriented to person, place and time. Assessment and Plan      1. Cancer of lateral wall of urinary bladder ([facility] [address]    2. Hypertrophy of prostate with urinary obstruction    3. Urinary retention    4. Nocturia           Plan:      Patient here for his preop discussion before his upcoming Robotic assisted laparoscopic radical cystoprostatectomy, ileal conduit and bilateral pelvic lymph node dissection. He will need to have bridging Lovenox after stopping Xarelto 1 week prior. He is scheduled for his preop evaluation and will see stoma therapy and anesthesia (request he get preop paravertebral block or thoracic epidural for postop pain control per ERAS protocol). Itzel Christensen MD FACS  2022 4:41 PM     Quality Measure Documentation: N/A  Social History     Tobacco Use   Smoking Status Former    Packs/day: 0.25    Years: 9.00    Pack years: 2.25    Types: Cigarettes    Start date: 1983    Quit date: 2/10/1992    Years since quittin.5   Smokeless Tobacco Never     (If patient a smoker, smoking cessation counseling offered)

## 2022-09-01 ENCOUNTER — TELEPHONE (OUTPATIENT)
Dept: INFUSION THERAPY | Age: 67
End: 2022-09-01

## 2022-09-01 ENCOUNTER — HOSPITAL ENCOUNTER (OUTPATIENT)
Dept: PREADMISSION TESTING | Age: 67
Discharge: HOME | End: 2022-09-05
Payer: MEDICARE

## 2022-09-01 VITALS
RESPIRATION RATE: 18 BRPM | BODY MASS INDEX: 33.24 KG/M2 | SYSTOLIC BLOOD PRESSURE: 130 MMHG | HEART RATE: 87 BPM | HEIGHT: 74 IN | WEIGHT: 259 LBS | OXYGEN SATURATION: 99 % | DIASTOLIC BLOOD PRESSURE: 85 MMHG | TEMPERATURE: 97 F

## 2022-09-01 LAB
ANION GAP SERPL CALCULATED.3IONS-SCNC: 9 MMOL/L (ref 9–17)
BUN BLDV-MCNC: 10 MG/DL (ref 8–23)
CHLORIDE BLD-SCNC: 105 MMOL/L (ref 98–107)
CO2: 24 MMOL/L (ref 20–31)
CREAT SERPL-MCNC: 1.36 MG/DL (ref 0.7–1.2)
GFR AFRICAN AMERICAN: >60 ML/MIN
GFR NON-AFRICAN AMERICAN: 53 ML/MIN
GFR SERPL CREATININE-BSD FRML MDRD: ABNORMAL ML/MIN/{1.73_M2}
GLUCOSE BLD-MCNC: 87 MG/DL (ref 70–99)
HCT VFR BLD CALC: 39.3 % (ref 40.7–50.3)
HEMOGLOBIN: 12.5 G/DL (ref 13–17)
POTASSIUM SERPL-SCNC: 5 MMOL/L (ref 3.7–5.3)
SODIUM BLD-SCNC: 138 MMOL/L (ref 135–144)

## 2022-09-01 PROCEDURE — 82947 ASSAY GLUCOSE BLOOD QUANT: CPT

## 2022-09-01 PROCEDURE — 85014 HEMATOCRIT: CPT

## 2022-09-01 PROCEDURE — 82565 ASSAY OF CREATININE: CPT

## 2022-09-01 PROCEDURE — 99212 OFFICE O/P EST SF 10 MIN: CPT

## 2022-09-01 PROCEDURE — 80051 ELECTROLYTE PANEL: CPT

## 2022-09-01 PROCEDURE — 93005 ELECTROCARDIOGRAM TRACING: CPT

## 2022-09-01 PROCEDURE — 85018 HEMOGLOBIN: CPT

## 2022-09-01 PROCEDURE — 84520 ASSAY OF UREA NITROGEN: CPT

## 2022-09-01 ASSESSMENT — PAIN DESCRIPTION - PAIN TYPE: TYPE: CHRONIC PAIN

## 2022-09-01 ASSESSMENT — PAIN DESCRIPTION - FREQUENCY: FREQUENCY: CONTINUOUS

## 2022-09-01 ASSESSMENT — PAIN DESCRIPTION - LOCATION: LOCATION: ABDOMEN;BACK

## 2022-09-01 ASSESSMENT — PAIN SCALES - GENERAL: PAINLEVEL_OUTOF10: 5

## 2022-09-01 NOTE — PROGRESS NOTES
Met with patient and spouse in [facility] Pre-op Urostomy teaching kit provided with written material and sample pouches. Patient reports recent significant weight loss. Abdominal habitus is soft; Abdominal creases form when seated. Stoma marking completed on patient's right side of abdomen. Potential stoma site within the rectus abdominus muscles, away from bony prominences and the umbilicus, and within the patient's sight. Marking completed was above the umbilicus avoiding scars, wrinkles, and patient's pant/belt line where visible to patient. Potential stoma marking placed on most level plane on abdomen in sitting, standing, bending, twisting, and lying positions. Patient agreeable to potential site with the understanding that ultimate site of stoma will be based upon what is found in surgery and surgeon's preference. Provided marking pen and tegaderm to keep marked until surgery. Written materials, sample supplies, and contact information reviewed. Will continue to follow patient postoperatively.

## 2022-09-01 NOTE — PROGRESS NOTES
Anesthesia Focused Assessment    Has patient ever tested positive for COVID? Yes, 9/2021    STOP-BANG Sleep Apnea Questionnaire    SNORE loudly (heard through closed doors)? Yes  TIRED, fatigued, sleepy during daytime? Yes  OBSERVED stopping breathing during sleep? Yes  High blood PRESSURE being treated? Yes    BMI over 35? No  AGE over 48? Yes  NECK circumference over 16\"? No  GENDER (male)? Yes             Total 6  High risk 5-8  Intermediate risk 3-4  Low risk 0-2    Obstructive Sleep Apnea: yes  If YES, machine used: does not use his cpap     Type 1 DM:   no  T2DM:  no    Coronary Artery Disease:  nonobstructive on cath 6/2022. Hypertension:  yes    Active smoker:  smoker for 9 years, quit 1992  Drinks Alcohol:  no    Dentition:     Defib / AICD / Pacemaker: no      Renal Failure/dialysis:  no    Patient was evaluated in PAT & anesthesia guidelines were applied. NPO guidelines, medication instructions and scheduled arrival time were reviewed with patient. I advised patient to please contact the surgeon's office, ahead of time if possible, if any new signs or symptoms of illness, infection, rash, etc    Hx of anesthesia complications:  no  Family hx of anesthesia complications:  no                                                                                                                     Anesthesia contacted:   patient was sent for post PAT anesthesia interview due to surgeon request.  Medical or cardiac clearance ordered: pulmonary clearance from Dr. Selene Cervantes in paper chart, along with office notes. Will request copy of cardiac clearance from Dr. Caro Houston, request was already sent to cardiology per chart. PCP clearance copy will be requested as well for chart.     Paul Rodriguez PA-C  9/1/22  3:08 PM

## 2022-09-01 NOTE — TELEPHONE ENCOUNTER
Surgical clearance signed by Dr Tatiana Vega and faxed to [facility] at [phone]. Confirmation rec'd. Form left to be scanned in chart.

## 2022-09-02 ENCOUNTER — TELEPHONE (OUTPATIENT)
Dept: INFUSION THERAPY | Age: 67
End: 2022-09-02

## 2022-09-02 NOTE — PROGRESS NOTES
Pt. Informed to reach out to Dr. Ave Humphrey office for Lovenox bridge instructions for OR 9-16-22.

## 2022-09-02 NOTE — TELEPHONE ENCOUNTER
Received a call from Pre-op to make sure Dimple Shaheendenver knows what to do with his blood thinner prior to surgery. Called and spoke to Dr Sydelle Paget and he would like him to stop the Xarelto 3 days  prior to surgery and to start back the day after surgery. Clearance form faxed with the instructions for Xarelto to Dr Caballero's office. Confirmation received. Left a message for Dimple Pedro to call us back x 2. Spoke with Dimple Vasquez and his wife and informed them of the plan for his Xarelto. They verbalize understanding.

## 2022-09-02 NOTE — PROGRESS NOTES
I spoke with Dr. Sanford Currie office. They will call Pt. With Lovenox bridge instructions for OR 9-16-22.

## 2022-09-06 LAB
EKG ATRIAL RATE: 72 BPM
EKG P AXIS: -7 DEGREES
EKG P-R INTERVAL: 200 MS
EKG Q-T INTERVAL: 374 MS
EKG QRS DURATION: 120 MS
EKG QTC CALCULATION (BAZETT): 409 MS
EKG R AXIS: -18 DEGREES
EKG T AXIS: 30 DEGREES
EKG VENTRICULAR RATE: 72 BPM

## 2022-09-07 DIAGNOSIS — C67.2 MALIGNANT NEOPLASM OF LATERAL WALL OF URINARY BLADDER (HCC): ICD-10-CM

## 2022-09-07 DIAGNOSIS — R31.0 GROSS HEMATURIA: ICD-10-CM

## 2022-09-07 DIAGNOSIS — R33.9 URINARY RETENTION: ICD-10-CM

## 2022-09-07 RX ORDER — HYDROCODONE BITARTRATE AND ACETAMINOPHEN 5; 325 MG/1; MG/1
1 TABLET ORAL EVERY 8 HOURS PRN
Qty: 60 TABLET | Refills: 0 | Status: SHIPPED | OUTPATIENT
Start: 2022-09-07 | End: 2022-10-07

## 2022-09-07 NOTE — PROGRESS NOTES
Surgical clearance received from Dr Cindy Judd for procedure scheduled 9/16/2022. Ok to hold xarelto x3 days and restart day after surgery with no need to bridge with lovenox also received.

## 2022-09-08 ENCOUNTER — OFFICE VISIT (OUTPATIENT)
Dept: ONCOLOGY | Age: 67
End: 2022-09-08
Payer: MEDICARE

## 2022-09-08 ENCOUNTER — TELEPHONE (OUTPATIENT)
Dept: ONCOLOGY | Age: 67
End: 2022-09-08

## 2022-09-08 ENCOUNTER — HOSPITAL ENCOUNTER (OUTPATIENT)
Dept: INFUSION THERAPY | Age: 67
Discharge: HOME | End: 2022-09-08
Payer: MEDICARE

## 2022-09-08 VITALS
WEIGHT: 259.6 LBS | TEMPERATURE: 97.1 F | HEART RATE: 102 BPM | BODY MASS INDEX: 33.33 KG/M2 | DIASTOLIC BLOOD PRESSURE: 74 MMHG | SYSTOLIC BLOOD PRESSURE: 118 MMHG

## 2022-09-08 DIAGNOSIS — C67.9 MALIGNANT NEOPLASM OF URINARY BLADDER, UNSPECIFIED SITE (HCC): Primary | ICD-10-CM

## 2022-09-08 LAB
ABSOLUTE EOS #: 0.2 K/UL (ref 0–0.4)
ABSOLUTE LYMPH #: 2.2 K/UL (ref 1–4.8)
ABSOLUTE MONO #: 0.6 K/UL (ref 0.1–1.2)
ALBUMIN SERPL-MCNC: 3.3 G/DL (ref 3.5–5.2)
ALBUMIN/GLOBULIN RATIO: 1 (ref 1–2.5)
ALP BLD-CCNC: 77 U/L (ref 40–129)
ALT SERPL-CCNC: 8 U/L (ref 5–41)
ANION GAP SERPL CALCULATED.3IONS-SCNC: 9 MMOL/L (ref 9–17)
AST SERPL-CCNC: 12 U/L
BASOPHILS # BLD: 1 % (ref 0–2)
BASOPHILS ABSOLUTE: 0.1 K/UL (ref 0–0.2)
BILIRUB SERPL-MCNC: 0.3 MG/DL (ref 0.3–1.2)
BUN BLDV-MCNC: 11 MG/DL (ref 8–23)
CALCIUM SERPL-MCNC: 9.3 MG/DL (ref 8.6–10.4)
CHLORIDE BLD-SCNC: 102 MMOL/L (ref 98–107)
CO2: 28 MMOL/L (ref 20–31)
CREAT SERPL-MCNC: 1.27 MG/DL (ref 0.7–1.2)
EOSINOPHILS RELATIVE PERCENT: 2 % (ref 1–4)
GFR AFRICAN AMERICAN: >60 ML/MIN
GFR NON-AFRICAN AMERICAN: 57 ML/MIN
GFR SERPL CREATININE-BSD FRML MDRD: ABNORMAL ML/MIN/{1.73_M2}
GLUCOSE BLD-MCNC: 158 MG/DL (ref 70–99)
HCT VFR BLD CALC: 35.9 % (ref 41–53)
HEMOGLOBIN: 12.1 G/DL (ref 13.5–17.5)
LYMPHOCYTES # BLD: 25 % (ref 24–44)
MCH RBC QN AUTO: 29.5 PG (ref 26–34)
MCHC RBC AUTO-ENTMCNC: 33.6 G/DL (ref 31–37)
MCV RBC AUTO: 87.7 FL (ref 80–100)
MONOCYTES # BLD: 7 % (ref 2–11)
PDW BLD-RTO: 15.7 % (ref 12.5–15.4)
PLATELET # BLD: 226 K/UL (ref 140–450)
PMV BLD AUTO: 7.6 FL (ref 6–12)
POTASSIUM SERPL-SCNC: 4.1 MMOL/L (ref 3.7–5.3)
RBC # BLD: 4.1 M/UL (ref 4.5–5.9)
SEG NEUTROPHILS: 65 % (ref 36–66)
SEGMENTED NEUTROPHILS ABSOLUTE COUNT: 5.7 K/UL (ref 1.8–7.7)
SODIUM BLD-SCNC: 139 MMOL/L (ref 135–144)
TOTAL PROTEIN: 6.6 G/DL (ref 6.4–8.3)
WBC # BLD: 8.7 K/UL (ref 3.5–11)

## 2022-09-08 PROCEDURE — 99211 OFF/OP EST MAY X REQ PHY/QHP: CPT | Performed by: INTERNAL MEDICINE

## 2022-09-08 PROCEDURE — 2580000003 HC RX 258: Performed by: INTERNAL MEDICINE

## 2022-09-08 PROCEDURE — 1036F TOBACCO NON-USER: CPT | Performed by: INTERNAL MEDICINE

## 2022-09-08 PROCEDURE — 99214 OFFICE O/P EST MOD 30 MIN: CPT | Performed by: INTERNAL MEDICINE

## 2022-09-08 PROCEDURE — 85025 COMPLETE CBC W/AUTO DIFF WBC: CPT

## 2022-09-08 PROCEDURE — 1123F ACP DISCUSS/DSCN MKR DOCD: CPT | Performed by: INTERNAL MEDICINE

## 2022-09-08 PROCEDURE — 6360000002 HC RX W HCPCS: Performed by: INTERNAL MEDICINE

## 2022-09-08 PROCEDURE — 36591 DRAW BLOOD OFF VENOUS DEVICE: CPT

## 2022-09-08 PROCEDURE — G8417 CALC BMI ABV UP PARAM F/U: HCPCS | Performed by: INTERNAL MEDICINE

## 2022-09-08 PROCEDURE — 3017F COLORECTAL CA SCREEN DOC REV: CPT | Performed by: INTERNAL MEDICINE

## 2022-09-08 PROCEDURE — 80053 COMPREHEN METABOLIC PANEL: CPT

## 2022-09-08 PROCEDURE — G8427 DOCREV CUR MEDS BY ELIG CLIN: HCPCS | Performed by: INTERNAL MEDICINE

## 2022-09-08 RX ORDER — SODIUM CHLORIDE 9 MG/ML
25 INJECTION, SOLUTION INTRAVENOUS PRN
OUTPATIENT
Start: 2022-09-08

## 2022-09-08 RX ORDER — SODIUM CHLORIDE 0.9 % (FLUSH) 0.9 %
5-40 SYRINGE (ML) INJECTION PRN
Status: DISCONTINUED | OUTPATIENT
Start: 2022-09-08 | End: 2022-09-09 | Stop reason: HOSPADM

## 2022-09-08 RX ORDER — HEPARIN SODIUM (PORCINE) LOCK FLUSH IV SOLN 100 UNIT/ML 100 UNIT/ML
500 SOLUTION INTRAVENOUS PRN
OUTPATIENT
Start: 2022-09-08

## 2022-09-08 RX ORDER — HEPARIN SODIUM (PORCINE) LOCK FLUSH IV SOLN 100 UNIT/ML 100 UNIT/ML
500 SOLUTION INTRAVENOUS PRN
Status: DISCONTINUED | OUTPATIENT
Start: 2022-09-08 | End: 2022-09-09 | Stop reason: HOSPADM

## 2022-09-08 RX ORDER — SODIUM CHLORIDE 0.9 % (FLUSH) 0.9 %
5-40 SYRINGE (ML) INJECTION PRN
OUTPATIENT
Start: 2022-09-08

## 2022-09-08 RX ADMIN — HEPARIN SODIUM (PORCINE) LOCK FLUSH IV SOLN 100 UNIT/ML 500 UNITS: 100 SOLUTION at 13:46

## 2022-09-08 RX ADMIN — SODIUM CHLORIDE, PRESERVATIVE FREE 10 ML: 5 INJECTION INTRAVENOUS at 13:46

## 2022-09-08 NOTE — PROGRESS NOTES
DIAGNOSIS:   High grade urothelial carcinoma, 12/2021, T3 N1 M0   Bilateral PE, 03/2022    CURRENT THERAPY:  Neoadjuvant chemo followed by radical cystectomy  Eliquis     BRIEF CASE HISTORY:   Wild Mercado is a very pleasant 74 y.o. male who is referred to us for bladder cancer. Patient presented with hematuria 02/2021 and was seen by urology, it was felt to be renal calculi and he was started on Flomax but continued to pass clots. He was later in [place] and had urinary blockage, passed very large clot and then able to urinate and presented again to ER with continued hematuria, CT showed severe right hydronephrosis with tissue thickening/mass seen in right side of bladder, he underwent nephrostomy placement, stent was put in place and cystoscopy done showing fairly large papillary tumor occupying right side posterior wall and even the dome of the bladder. Pathology showed high grade urothelial carcinoma invading muscularis propria. Over the next month he needed 2 repeat cystoscopies and extensive fulguration of the tumor, again pathology showed high grade urothelial carcinoma. CT of the chest abdomen pelvis did not show metastatic disease. He has intermittent bladder pain and continues to have hematuria in Muñoz catheter. His appetite is poor. His father had bladder cancer, he did not have smoking history. The patient has smoking history but quit 30 years ago. He had COVID infection in 09/2021, he lost significant weight and continues to recover. He has arthralgias in the knees and uses cane. Staging PET scan was done and showed the bladder tumor and right pelvic sidewall lymph node metastases 4 of clinical staging of T3 N1 M0. We discussed neoadjuvant chemotherapy to be followed by surgery if he has a good response. Shortly after starting chemotherapy, the patient presented with bilateral pulmonary embolism and he was started on Xarelto. He had significant respiratory difficulty after that.   We had to delay his chemotherapy and finally we were able to resume his treatment and finished 4 cycles. Chemotherapy was generally well-tolerated but most of his problems where respiratory. After completion of chemotherapy, he was sent to cardiology and pulmonary for evaluation. Cardiac catheterization was done and showed minimal coronary artery disease. Pulmonary evaluation showed no evidence of significant obstructive component and his diffusion capacity was moderately reduced. CT scan showed groundglass opacity in the lung that was not FDG avid. It was determined that most of his respiratory issues could be related to his pulmonary embolism and also anemia from chemotherapy. With time, condition improved, he was weaned off oxygen, hemoglobin continues to improve and he felt much better. The plan is to proceed with surgery. INTERIM HISTORY: The patient presents for follow up with bladder cancer management. He is scheduled for surgery in 1 week. He continues to recover form chemo and his breathing continues to improve. His pain is well controlled with medication. He complains of persistent feeling of cold feet. PAST MEDICAL HISTORY: has a past medical history of Acute renal failure with tubular necrosis ([address] Arthritis, BPH with obstruction/lower urinary tract symptoms, CAD (coronary artery disease), Caffeine use, Cancer ([address] CKD (chronic kidney disease), stage III ([address] COVID-19, Depression, Dilated cardiomyopathy ([address] GERD (gastroesophageal reflux disease), High cholesterol, Kidney calculi, Sleep apnea, Under care of team, Under care of team, Under care of team, Under care of team, and Under care of team.    PAST SURGICAL HISTORY: has a past surgical history that includes Knee arthroscopy; Cardiac catheterization; Colonoscopy; IR PORT PLACEMENT > 5 YEARS (02/25/2022); and Cardiac catheterization (06/16/2022).      CURRENT MEDICATIONS:  has a current medication list which includes the following prescription(s): hydrocodone-acetaminophen, rivaroxaban, omeprazole, carvedilol, finasteride, tamsulosin, simvastatin, and bupropion, and the following Facility-Administered Medications: sodium chloride flush and heparin flush. ALLERGIES:  is allergic to other. FAMILY HISTORY: Father had bladder cancer     SOCIAL HISTORY:  reports that he quit smoking about 30 years ago. His smoking use included cigarettes. He started smoking about 39 years ago. He has a 2.25 pack-year smoking history. He has never used smokeless tobacco. He reports that he does not currently use alcohol. He reports that he does not use drugs. REVIEW OF SYSTEMS:   General: No fever or night sweats. Weight regain. +fatigue and generalized weakness -significantly improved +dysgeusia - improved   ENT: No double or blurred vision, no hearing problem, no dysphagia or sore throat +smell affected - improved + tinnitus   Respiratory: No chest pain, no cough or hemoptysis. +shortness of breath - improved   Cardiovascular: Denies chest pain, PND or orthopnea. No L E swelling or palpitations   Gastrointestinal: No vomiting, diarrhea or constipation, abd pain, nausea    Genitourinary: Denies frequency, hematuria, urgency, dysuria, or incontinence   Neurological: Denies headaches, decreased LOC, no sensory or motor focal deficits. +cold feet   Musculoskeletal: No arthralgia no back pain or joint swelling. +knee pain  Skin: There are no rashes or bleeding. Psychiatric: + anxiety and depression. Endocrine: No diabetes or thyroid disease. Hematologic: No bleeding, no adenopathy. PHYSICAL EXAM: Shows a well appearing 74y.o.-year-old male who is not in pain or distress. Vital Signs: Blood pressure 118/74, pulse (!) 102, temperature 97.1 °F (36.2 °C), temperature source Temporal, weight 259 lb 9.6 oz (117.8 kg). HEENT: Normocephalic and atraumatic. Pupils are equal, round, reactive to light and accommodation. Extraocular muscles are intact.  Neck: Showed no JVD, no carotid bruit. Lungs: Clear to auscultation bilaterally. Heart: Regular without any murmur. Abdomen: Soft, nontender. No hepatosplenomegaly. Extremities: Lower extremities show no edema, clubbing, or cyanosis. Breasts: Examination not done today. Neuro exam: intact cranial nerves bilaterally no motor or sensory deficit, gait is normal. Lymphatic: no adenopathy appreciated in the supraclavicular, axillary, cervical or inguinal area      REVIEW OF LABORATORY DATA:   Lab Results   Component Value Date    WBC 8.7 09/08/2022    HGB 12.1 (L) 09/08/2022    HCT 35.9 (L) 09/08/2022    MCV 87.7 09/08/2022     09/08/2022       Chemistry        Component Value Date/Time     09/01/2022 1345    K 5.0 09/01/2022 1345     09/01/2022 1345    CO2 24 09/01/2022 1345    BUN 10 09/01/2022 1345    CREATININE 1.36 (H) 09/01/2022 1345        Component Value Date/Time    CALCIUM 8.9 07/14/2022 0810    ALKPHOS 83 07/14/2022 0758    AST 20 07/14/2022 0758    ALT 22 07/14/2022 0758    BILITOT 0.19 (L) 07/14/2022 0758        PATHOLOGY:     REVIEW OF RADIOLOGICAL RESULTS:   No results found. IMPRESSION:   High grade urothelial carcinoma   Neoadjuvant chemo to be followed by cystectomy   Bilateral PE, Eliquis   Hypoxemia with minimal activity with severe tachycardia    PLAN:   His lab work shows recovering HGB and stable creatinine, counts and electrolytes in range otherwise. He continues to recover from chemo. We reviewed plan for surgery next week. Return in 6 weeks to review pathology and further recommendations. Scribe Attestation   This note was created by Angela Domingo acting as scribe for the physician signing this note  Electronically Signed  Angela Domingo, 9/8/2022  Christy, [employer]. Attending Attestation   Note was reviewed and edited.   I am in agreement with the note as entered    Ammon Thrasher MD  Hematologist/Medical [id number] [facility]

## 2022-09-08 NOTE — TELEPHONE ENCOUNTER
Rv in 6 weeks.  With cbc, cmp    RV scheduled 10/20/22 @ 2:15pm    PT was given AVS and appt schedule    Electronically signed by Loni Kulkarni on 9/8/2022 at 2:16 PM

## 2022-09-14 RX ORDER — ONDANSETRON 8 MG/1
TABLET, ORALLY DISINTEGRATING ORAL
Qty: 30 TABLET | Refills: 0 | Status: SHIPPED | OUTPATIENT
Start: 2022-09-14

## 2022-09-15 ENCOUNTER — ANESTHESIA EVENT (OUTPATIENT)
Dept: OPERATING ROOM | Age: 67
DRG: 653 | End: 2022-09-15
Payer: MEDICARE

## 2022-09-16 ENCOUNTER — HOSPITAL ENCOUNTER (INPATIENT)
Age: 67
LOS: 25 days | Discharge: NURSING, REHAB OR LONG-TERM CARE | DRG: 653 | End: 2022-10-11
Attending: SPECIALIST | Admitting: SPECIALIST
Payer: MEDICARE

## 2022-09-16 ENCOUNTER — ANESTHESIA (OUTPATIENT)
Dept: OPERATING ROOM | Age: 67
DRG: 653 | End: 2022-09-16
Payer: MEDICARE

## 2022-09-16 DIAGNOSIS — C67.2 MALIGNANT NEOPLASM OF LATERAL WALL OF URINARY BLADDER (HCC): ICD-10-CM

## 2022-09-16 DIAGNOSIS — D72.825 BANDEMIA: ICD-10-CM

## 2022-09-16 DIAGNOSIS — G89.18 ACUTE POST-OPERATIVE PAIN: ICD-10-CM

## 2022-09-16 DIAGNOSIS — C67.8 MALIGNANT NEOPLASM OF OVERLAPPING SITES OF BLADDER (HCC): Primary | ICD-10-CM

## 2022-09-16 DIAGNOSIS — N39.0 COMPLICATED UTI (URINARY TRACT INFECTION): ICD-10-CM

## 2022-09-16 PROBLEM — C67.9 BLADDER CANCER (HCC): Status: ACTIVE | Noted: 2022-09-16

## 2022-09-16 LAB
ANION GAP SERPL CALCULATED.3IONS-SCNC: 11 MMOL/L (ref 9–17)
ANION GAP SERPL CALCULATED.3IONS-SCNC: 13 MMOL/L (ref 9–17)
BUN BLDV-MCNC: 13 MG/DL (ref 8–23)
BUN BLDV-MCNC: 14 MG/DL (ref 8–23)
CALCIUM SERPL-MCNC: 8.4 MG/DL (ref 8.6–10.4)
CALCIUM SERPL-MCNC: 8.5 MG/DL (ref 8.6–10.4)
CHLORIDE BLD-SCNC: 102 MMOL/L (ref 98–107)
CHLORIDE BLD-SCNC: 105 MMOL/L (ref 98–107)
CO2: 20 MMOL/L (ref 20–31)
CO2: 22 MMOL/L (ref 20–31)
CREAT SERPL-MCNC: 1.79 MG/DL (ref 0.7–1.2)
CREAT SERPL-MCNC: 1.9 MG/DL (ref 0.7–1.2)
GFR AFRICAN AMERICAN: 43 ML/MIN
GFR AFRICAN AMERICAN: 46 ML/MIN
GFR NON-AFRICAN AMERICAN: 36 ML/MIN
GFR NON-AFRICAN AMERICAN: 38 ML/MIN
GFR SERPL CREATININE-BSD FRML MDRD: ABNORMAL ML/MIN/{1.73_M2}
GFR SERPL CREATININE-BSD FRML MDRD: ABNORMAL ML/MIN/{1.73_M2}
GLUCOSE BLD-MCNC: 167 MG/DL (ref 70–99)
GLUCOSE BLD-MCNC: 174 MG/DL (ref 70–99)
HCT VFR BLD CALC: 33.6 % (ref 40.7–50.3)
HCT VFR BLD CALC: 34 % (ref 40.7–50.3)
HEMOGLOBIN: 11.3 G/DL (ref 13–17)
HEMOGLOBIN: 11.4 G/DL (ref 13–17)
MCH RBC QN AUTO: 29.7 PG (ref 25.2–33.5)
MCH RBC QN AUTO: 30 PG (ref 25.2–33.5)
MCHC RBC AUTO-ENTMCNC: 33.2 G/DL (ref 28.4–34.8)
MCHC RBC AUTO-ENTMCNC: 33.9 G/DL (ref 28.4–34.8)
MCV RBC AUTO: 88.4 FL (ref 82.6–102.9)
MCV RBC AUTO: 89.5 FL (ref 82.6–102.9)
NRBC AUTOMATED: 0 PER 100 WBC
NRBC AUTOMATED: 0 PER 100 WBC
PDW BLD-RTO: 14.1 % (ref 11.8–14.4)
PDW BLD-RTO: 14.1 % (ref 11.8–14.4)
PLATELET # BLD: 201 K/UL (ref 138–453)
PLATELET # BLD: 215 K/UL (ref 138–453)
PMV BLD AUTO: 9.3 FL (ref 8.1–13.5)
PMV BLD AUTO: 9.3 FL (ref 8.1–13.5)
POTASSIUM SERPL-SCNC: 4.6 MMOL/L (ref 3.7–5.3)
POTASSIUM SERPL-SCNC: 4.6 MMOL/L (ref 3.7–5.3)
RBC # BLD: 3.8 M/UL (ref 4.21–5.77)
RBC # BLD: 3.8 M/UL (ref 4.21–5.77)
SODIUM BLD-SCNC: 135 MMOL/L (ref 135–144)
SODIUM BLD-SCNC: 138 MMOL/L (ref 135–144)
WBC # BLD: 13.1 K/UL (ref 3.5–11.3)
WBC # BLD: 14.3 K/UL (ref 3.5–11.3)

## 2022-09-16 PROCEDURE — 6370000000 HC RX 637 (ALT 250 FOR IP): Performed by: STUDENT IN AN ORGANIZED HEALTH CARE EDUCATION/TRAINING PROGRAM

## 2022-09-16 PROCEDURE — 0T184ZC BYPASS BILATERAL URETERS TO ILEOCUTANEOUS, PERCUTANEOUS ENDOSCOPIC APPROACH: ICD-10-PCS | Performed by: SPECIALIST

## 2022-09-16 PROCEDURE — 88331 PATH CONSLTJ SURG 1 BLK 1SPC: CPT

## 2022-09-16 PROCEDURE — 0TP98DZ REMOVAL OF INTRALUMINAL DEVICE FROM URETER, VIA NATURAL OR ARTIFICIAL OPENING ENDOSCOPIC: ICD-10-PCS | Performed by: SPECIALIST

## 2022-09-16 PROCEDURE — 1200000000 HC SEMI PRIVATE

## 2022-09-16 PROCEDURE — 8E0W4CZ ROBOTIC ASSISTED PROCEDURE OF TRUNK REGION, PERCUTANEOUS ENDOSCOPIC APPROACH: ICD-10-PCS | Performed by: SPECIALIST

## 2022-09-16 PROCEDURE — 88309 TISSUE EXAM BY PATHOLOGIST: CPT

## 2022-09-16 PROCEDURE — 88305 TISSUE EXAM BY PATHOLOGIST: CPT

## 2022-09-16 PROCEDURE — 2709999900 HC NON-CHARGEABLE SUPPLY: Performed by: SPECIALIST

## 2022-09-16 PROCEDURE — 3700000001 HC ADD 15 MINUTES (ANESTHESIA): Performed by: SPECIALIST

## 2022-09-16 PROCEDURE — 36415 COLL VENOUS BLD VENIPUNCTURE: CPT

## 2022-09-16 PROCEDURE — 3600000009 HC SURGERY ROBOT BASE: Performed by: SPECIALIST

## 2022-09-16 PROCEDURE — 0TTB4ZZ RESECTION OF BLADDER, PERCUTANEOUS ENDOSCOPIC APPROACH: ICD-10-PCS | Performed by: SPECIALIST

## 2022-09-16 PROCEDURE — 2500000003 HC RX 250 WO HCPCS: Performed by: NURSE ANESTHETIST, CERTIFIED REGISTERED

## 2022-09-16 PROCEDURE — 62325 NJX INTERLAMINAR CRV/THRC: CPT | Performed by: ANESTHESIOLOGY

## 2022-09-16 PROCEDURE — 88342 IMHCHEM/IMCYTCHM 1ST ANTB: CPT

## 2022-09-16 PROCEDURE — 3600000019 HC SURGERY ROBOT ADDTL 15MIN: Performed by: SPECIALIST

## 2022-09-16 PROCEDURE — 07TC4ZZ RESECTION OF PELVIS LYMPHATIC, PERCUTANEOUS ENDOSCOPIC APPROACH: ICD-10-PCS | Performed by: SPECIALIST

## 2022-09-16 PROCEDURE — 2720000010 HC SURG SUPPLY STERILE: Performed by: SPECIALIST

## 2022-09-16 PROCEDURE — S2900 ROBOTIC SURGICAL SYSTEM: HCPCS | Performed by: SPECIALIST

## 2022-09-16 PROCEDURE — 6360000002 HC RX W HCPCS: Performed by: STUDENT IN AN ORGANIZED HEALTH CARE EDUCATION/TRAINING PROGRAM

## 2022-09-16 PROCEDURE — C2617 STENT, NON-COR, TEM W/O DEL: HCPCS | Performed by: SPECIALIST

## 2022-09-16 PROCEDURE — 2500000003 HC RX 250 WO HCPCS: Performed by: ANESTHESIOLOGY

## 2022-09-16 PROCEDURE — 6370000000 HC RX 637 (ALT 250 FOR IP): Performed by: SPECIALIST

## 2022-09-16 PROCEDURE — 87186 SC STD MICRODIL/AGAR DIL: CPT

## 2022-09-16 PROCEDURE — 3700000000 HC ANESTHESIA ATTENDED CARE: Performed by: SPECIALIST

## 2022-09-16 PROCEDURE — 80048 BASIC METABOLIC PNL TOTAL CA: CPT

## 2022-09-16 PROCEDURE — 2580000003 HC RX 258: Performed by: STUDENT IN AN ORGANIZED HEALTH CARE EDUCATION/TRAINING PROGRAM

## 2022-09-16 PROCEDURE — 85027 COMPLETE CBC AUTOMATED: CPT

## 2022-09-16 PROCEDURE — 6360000002 HC RX W HCPCS: Performed by: ANESTHESIOLOGY

## 2022-09-16 PROCEDURE — 0VT04ZZ RESECTION OF PROSTATE, PERCUTANEOUS ENDOSCOPIC APPROACH: ICD-10-PCS | Performed by: SPECIALIST

## 2022-09-16 PROCEDURE — 87086 URINE CULTURE/COLONY COUNT: CPT

## 2022-09-16 PROCEDURE — 2500000003 HC RX 250 WO HCPCS: Performed by: SPECIALIST

## 2022-09-16 PROCEDURE — 2580000003 HC RX 258: Performed by: ANESTHESIOLOGY

## 2022-09-16 PROCEDURE — 6360000002 HC RX W HCPCS: Performed by: SPECIALIST

## 2022-09-16 PROCEDURE — 88344 IMHCHEM/IMCYTCHM EA MLT ANTB: CPT

## 2022-09-16 PROCEDURE — 88341 IMHCHEM/IMCYTCHM EA ADD ANTB: CPT

## 2022-09-16 PROCEDURE — 2580000003 HC RX 258: Performed by: SPECIALIST

## 2022-09-16 PROCEDURE — 7100000001 HC PACU RECOVERY - ADDTL 15 MIN: Performed by: SPECIALIST

## 2022-09-16 PROCEDURE — 88307 TISSUE EXAM BY PATHOLOGIST: CPT

## 2022-09-16 PROCEDURE — 87077 CULTURE AEROBIC IDENTIFY: CPT

## 2022-09-16 PROCEDURE — 7100000000 HC PACU RECOVERY - FIRST 15 MIN: Performed by: SPECIALIST

## 2022-09-16 PROCEDURE — 6360000002 HC RX W HCPCS: Performed by: NURSE ANESTHETIST, CERTIFIED REGISTERED

## 2022-09-16 DEVICE — STENT URET 7FR L90CM SIL PTFE J SGL URIN DIV: Type: IMPLANTABLE DEVICE | Site: URETER | Status: FUNCTIONAL

## 2022-09-16 RX ORDER — DIPHENHYDRAMINE HYDROCHLORIDE 50 MG/ML
12.5 INJECTION INTRAMUSCULAR; INTRAVENOUS
Status: DISCONTINUED | OUTPATIENT
Start: 2022-09-16 | End: 2022-09-16 | Stop reason: HOSPADM

## 2022-09-16 RX ORDER — ONDANSETRON 2 MG/ML
4 INJECTION INTRAMUSCULAR; INTRAVENOUS
Status: ACTIVE | OUTPATIENT
Start: 2022-09-16 | End: 2022-09-16

## 2022-09-16 RX ORDER — MIDAZOLAM HYDROCHLORIDE 1 MG/ML
INJECTION INTRAMUSCULAR; INTRAVENOUS PRN
Status: DISCONTINUED | OUTPATIENT
Start: 2022-09-16 | End: 2022-09-16 | Stop reason: SDUPTHER

## 2022-09-16 RX ORDER — SODIUM CHLORIDE 9 MG/ML
25 INJECTION, SOLUTION INTRAVENOUS PRN
Status: DISCONTINUED | OUTPATIENT
Start: 2022-09-16 | End: 2022-09-16 | Stop reason: HOSPADM

## 2022-09-16 RX ORDER — PROPOFOL 10 MG/ML
INJECTION, EMULSION INTRAVENOUS PRN
Status: DISCONTINUED | OUTPATIENT
Start: 2022-09-16 | End: 2022-09-16 | Stop reason: SDUPTHER

## 2022-09-16 RX ORDER — ONDANSETRON 4 MG/1
8 TABLET, ORALLY DISINTEGRATING ORAL EVERY 8 HOURS PRN
Status: DISCONTINUED | OUTPATIENT
Start: 2022-09-16 | End: 2022-10-11 | Stop reason: HOSPADM

## 2022-09-16 RX ORDER — DROPERIDOL 2.5 MG/ML
0.62 INJECTION, SOLUTION INTRAMUSCULAR; INTRAVENOUS
Status: DISCONTINUED | OUTPATIENT
Start: 2022-09-16 | End: 2022-09-16 | Stop reason: HOSPADM

## 2022-09-16 RX ORDER — SODIUM CHLORIDE 9 MG/ML
INJECTION, SOLUTION INTRAVENOUS CONTINUOUS
Status: DISCONTINUED | OUTPATIENT
Start: 2022-09-16 | End: 2022-09-19

## 2022-09-16 RX ORDER — SODIUM CHLORIDE 0.9 % (FLUSH) 0.9 %
5-40 SYRINGE (ML) INJECTION EVERY 12 HOURS SCHEDULED
Status: DISCONTINUED | OUTPATIENT
Start: 2022-09-16 | End: 2022-10-11 | Stop reason: HOSPADM

## 2022-09-16 RX ORDER — ULTRASOUND COUPLING MEDIUM
GEL (GRAM) TOPICAL PRN
Status: DISCONTINUED | OUTPATIENT
Start: 2022-09-16 | End: 2022-09-16 | Stop reason: ALTCHOICE

## 2022-09-16 RX ORDER — SIMVASTATIN 40 MG
40 TABLET ORAL NIGHTLY
Status: DISCONTINUED | OUTPATIENT
Start: 2022-09-16 | End: 2022-10-11 | Stop reason: HOSPADM

## 2022-09-16 RX ORDER — BUPROPION HYDROCHLORIDE 150 MG/1
300 TABLET ORAL EVERY MORNING
Status: DISCONTINUED | OUTPATIENT
Start: 2022-09-17 | End: 2022-10-11 | Stop reason: HOSPADM

## 2022-09-16 RX ORDER — ROCURONIUM BROMIDE 10 MG/ML
INJECTION, SOLUTION INTRAVENOUS PRN
Status: DISCONTINUED | OUTPATIENT
Start: 2022-09-16 | End: 2022-09-16 | Stop reason: SDUPTHER

## 2022-09-16 RX ORDER — DEXAMETHASONE SODIUM PHOSPHATE 10 MG/ML
10 INJECTION INTRAMUSCULAR; INTRAVENOUS
Status: ACTIVE | OUTPATIENT
Start: 2022-09-16 | End: 2022-09-16

## 2022-09-16 RX ORDER — SODIUM CHLORIDE 0.9 % (FLUSH) 0.9 %
5-40 SYRINGE (ML) INJECTION PRN
Status: DISCONTINUED | OUTPATIENT
Start: 2022-09-16 | End: 2022-09-16 | Stop reason: HOSPADM

## 2022-09-16 RX ORDER — METOCLOPRAMIDE HYDROCHLORIDE 5 MG/ML
10 INJECTION INTRAMUSCULAR; INTRAVENOUS
Status: DISCONTINUED | OUTPATIENT
Start: 2022-09-16 | End: 2022-09-16 | Stop reason: HOSPADM

## 2022-09-16 RX ORDER — ONDANSETRON 4 MG/1
4 TABLET, ORALLY DISINTEGRATING ORAL EVERY 8 HOURS PRN
Status: DISCONTINUED | OUTPATIENT
Start: 2022-09-16 | End: 2022-09-23 | Stop reason: SDUPTHER

## 2022-09-16 RX ORDER — MAGNESIUM HYDROXIDE 1200 MG/15ML
LIQUID ORAL PRN
Status: DISCONTINUED | OUTPATIENT
Start: 2022-09-16 | End: 2022-09-16 | Stop reason: ALTCHOICE

## 2022-09-16 RX ORDER — ONDANSETRON 2 MG/ML
INJECTION INTRAMUSCULAR; INTRAVENOUS PRN
Status: DISCONTINUED | OUTPATIENT
Start: 2022-09-16 | End: 2022-09-16 | Stop reason: SDUPTHER

## 2022-09-16 RX ORDER — OXYCODONE HYDROCHLORIDE 5 MG/1
10 TABLET ORAL EVERY 4 HOURS PRN
Status: DISCONTINUED | OUTPATIENT
Start: 2022-09-16 | End: 2022-10-11 | Stop reason: HOSPADM

## 2022-09-16 RX ORDER — LABETALOL HYDROCHLORIDE 5 MG/ML
INJECTION, SOLUTION INTRAVENOUS PRN
Status: DISCONTINUED | OUTPATIENT
Start: 2022-09-16 | End: 2022-09-16 | Stop reason: SDUPTHER

## 2022-09-16 RX ORDER — MAGNESIUM CARB/ALUMINUM HYDROX 105-160MG
TABLET,CHEWABLE ORAL PRN
Status: DISCONTINUED | OUTPATIENT
Start: 2022-09-16 | End: 2022-09-16 | Stop reason: ALTCHOICE

## 2022-09-16 RX ORDER — ACETAMINOPHEN 500 MG
1000 TABLET ORAL
Status: ACTIVE | OUTPATIENT
Start: 2022-09-16 | End: 2022-09-16

## 2022-09-16 RX ORDER — SODIUM CHLORIDE 0.9 % (FLUSH) 0.9 %
5-40 SYRINGE (ML) INJECTION EVERY 12 HOURS SCHEDULED
Status: DISCONTINUED | OUTPATIENT
Start: 2022-09-16 | End: 2022-09-16 | Stop reason: HOSPADM

## 2022-09-16 RX ORDER — PANTOPRAZOLE SODIUM 40 MG/1
40 TABLET, DELAYED RELEASE ORAL
Status: DISCONTINUED | OUTPATIENT
Start: 2022-09-17 | End: 2022-09-19

## 2022-09-16 RX ORDER — HYDRALAZINE HYDROCHLORIDE 20 MG/ML
10 INJECTION INTRAMUSCULAR; INTRAVENOUS
Status: DISCONTINUED | OUTPATIENT
Start: 2022-09-16 | End: 2022-09-16 | Stop reason: HOSPADM

## 2022-09-16 RX ORDER — MEPERIDINE HYDROCHLORIDE 50 MG/ML
12.5 INJECTION INTRAMUSCULAR; INTRAVENOUS; SUBCUTANEOUS EVERY 5 MIN PRN
Status: DISCONTINUED | OUTPATIENT
Start: 2022-09-16 | End: 2022-09-16 | Stop reason: HOSPADM

## 2022-09-16 RX ORDER — GABAPENTIN 600 MG/1
600 TABLET ORAL
Status: ACTIVE | OUTPATIENT
Start: 2022-09-16 | End: 2022-09-16

## 2022-09-16 RX ORDER — LIDOCAINE HYDROCHLORIDE ANHYDROUS AND DEXTROSE MONOHYDRATE .4; 5 G/100ML; G/100ML
INJECTION, SOLUTION INTRAVENOUS CONTINUOUS PRN
Status: DISCONTINUED | OUTPATIENT
Start: 2022-09-16 | End: 2022-09-16 | Stop reason: SDUPTHER

## 2022-09-16 RX ORDER — OXYCODONE HYDROCHLORIDE 5 MG/1
5 TABLET ORAL EVERY 4 HOURS PRN
Status: DISCONTINUED | OUTPATIENT
Start: 2022-09-16 | End: 2022-10-11 | Stop reason: HOSPADM

## 2022-09-16 RX ORDER — SODIUM CHLORIDE, SODIUM LACTATE, POTASSIUM CHLORIDE, CALCIUM CHLORIDE 600; 310; 30; 20 MG/100ML; MG/100ML; MG/100ML; MG/100ML
1000 INJECTION, SOLUTION INTRAVENOUS CONTINUOUS
Status: DISCONTINUED | OUTPATIENT
Start: 2022-09-16 | End: 2022-09-16 | Stop reason: HOSPADM

## 2022-09-16 RX ORDER — LIDOCAINE HYDROCHLORIDE 10 MG/ML
INJECTION, SOLUTION EPIDURAL; INFILTRATION; INTRACAUDAL; PERINEURAL PRN
Status: DISCONTINUED | OUTPATIENT
Start: 2022-09-16 | End: 2022-09-16 | Stop reason: SDUPTHER

## 2022-09-16 RX ORDER — MIDAZOLAM HYDROCHLORIDE 2 MG/2ML
1 INJECTION, SOLUTION INTRAMUSCULAR; INTRAVENOUS EVERY 5 MIN PRN
Status: DISCONTINUED | OUTPATIENT
Start: 2022-09-16 | End: 2022-09-16 | Stop reason: HOSPADM

## 2022-09-16 RX ORDER — SODIUM CHLORIDE 9 MG/ML
INJECTION, SOLUTION INTRAVENOUS PRN
Status: DISCONTINUED | OUTPATIENT
Start: 2022-09-16 | End: 2022-10-11 | Stop reason: HOSPADM

## 2022-09-16 RX ORDER — SODIUM CHLORIDE, SODIUM LACTATE, POTASSIUM CHLORIDE, CALCIUM CHLORIDE 600; 310; 30; 20 MG/100ML; MG/100ML; MG/100ML; MG/100ML
INJECTION, SOLUTION INTRAVENOUS CONTINUOUS PRN
Status: DISCONTINUED | OUTPATIENT
Start: 2022-09-16 | End: 2022-09-16 | Stop reason: SDUPTHER

## 2022-09-16 RX ORDER — MAGNESIUM SULFATE HEPTAHYDRATE 40 MG/ML
INJECTION, SOLUTION INTRAVENOUS PRN
Status: DISCONTINUED | OUTPATIENT
Start: 2022-09-16 | End: 2022-09-16 | Stop reason: SDUPTHER

## 2022-09-16 RX ORDER — HEPARIN SODIUM 5000 [USP'U]/ML
5000 INJECTION, SOLUTION INTRAVENOUS; SUBCUTANEOUS
Status: DISCONTINUED | OUTPATIENT
Start: 2022-09-16 | End: 2022-09-16

## 2022-09-16 RX ORDER — CARVEDILOL 3.12 MG/1
3.12 TABLET ORAL 2 TIMES DAILY
Status: DISCONTINUED | OUTPATIENT
Start: 2022-09-16 | End: 2022-09-27

## 2022-09-16 RX ORDER — ONDANSETRON 2 MG/ML
4 INJECTION INTRAMUSCULAR; INTRAVENOUS EVERY 6 HOURS PRN
Status: DISCONTINUED | OUTPATIENT
Start: 2022-09-16 | End: 2022-09-23 | Stop reason: SDUPTHER

## 2022-09-16 RX ORDER — SODIUM CHLORIDE 0.9 % (FLUSH) 0.9 %
5-40 SYRINGE (ML) INJECTION PRN
Status: DISCONTINUED | OUTPATIENT
Start: 2022-09-16 | End: 2022-10-11 | Stop reason: HOSPADM

## 2022-09-16 RX ORDER — MAGNESIUM HYDROXIDE 1200 MG/15ML
LIQUID ORAL CONTINUOUS PRN
Status: COMPLETED | OUTPATIENT
Start: 2022-09-16 | End: 2022-09-16

## 2022-09-16 RX ORDER — ACETAMINOPHEN 325 MG/1
650 TABLET ORAL EVERY 6 HOURS
Status: DISCONTINUED | OUTPATIENT
Start: 2022-09-16 | End: 2022-10-11 | Stop reason: HOSPADM

## 2022-09-16 RX ORDER — DEXAMETHASONE SODIUM PHOSPHATE 10 MG/ML
INJECTION INTRAMUSCULAR; INTRAVENOUS PRN
Status: DISCONTINUED | OUTPATIENT
Start: 2022-09-16 | End: 2022-09-16 | Stop reason: SDUPTHER

## 2022-09-16 RX ADMIN — SODIUM CHLORIDE, POTASSIUM CHLORIDE, SODIUM LACTATE AND CALCIUM CHLORIDE: 600; 310; 30; 20 INJECTION, SOLUTION INTRAVENOUS at 16:44

## 2022-09-16 RX ADMIN — SODIUM CHLORIDE, POTASSIUM CHLORIDE, SODIUM LACTATE AND CALCIUM CHLORIDE: 600; 310; 30; 20 INJECTION, SOLUTION INTRAVENOUS at 12:01

## 2022-09-16 RX ADMIN — ROCURONIUM BROMIDE 20 MG: 10 INJECTION, SOLUTION INTRAVENOUS at 09:09

## 2022-09-16 RX ADMIN — KETAMINE HYDROCHLORIDE 5 MCG/KG/MIN: 100 INJECTION, SOLUTION, CONCENTRATE INTRAMUSCULAR; INTRAVENOUS at 08:25

## 2022-09-16 RX ADMIN — Medication 5 MG: at 16:41

## 2022-09-16 RX ADMIN — MIDAZOLAM 2 MG: 1 INJECTION INTRAMUSCULAR; INTRAVENOUS at 07:29

## 2022-09-16 RX ADMIN — LIDOCAINE HYDROCHLORIDE 30 ML/HR: 20 INJECTION, SOLUTION INFILTRATION; PERINEURAL at 08:25

## 2022-09-16 RX ADMIN — ONDANSETRON 4 MG: 2 INJECTION INTRAMUSCULAR; INTRAVENOUS at 15:41

## 2022-09-16 RX ADMIN — HYDROMORPHONE HYDROCHLORIDE 0.25 MG: 1 INJECTION, SOLUTION INTRAMUSCULAR; INTRAVENOUS; SUBCUTANEOUS at 19:50

## 2022-09-16 RX ADMIN — Medication 2000 MG: at 23:41

## 2022-09-16 RX ADMIN — MIDAZOLAM 1 MG: 1 INJECTION INTRAMUSCULAR; INTRAVENOUS at 18:32

## 2022-09-16 RX ADMIN — HYDROMORPHONE HYDROCHLORIDE 0.5 MG: 1 INJECTION, SOLUTION INTRAMUSCULAR; INTRAVENOUS; SUBCUTANEOUS at 18:10

## 2022-09-16 RX ADMIN — CARVEDILOL 3.12 MG: 3.12 TABLET, FILM COATED ORAL at 21:52

## 2022-09-16 RX ADMIN — PHENYLEPHRINE HYDROCHLORIDE 200 MCG: 10 INJECTION INTRAVENOUS at 13:15

## 2022-09-16 RX ADMIN — ROCURONIUM BROMIDE 10 MG: 10 INJECTION, SOLUTION INTRAVENOUS at 13:52

## 2022-09-16 RX ADMIN — HYDROMORPHONE HYDROCHLORIDE 0.5 MG: 1 INJECTION, SOLUTION INTRAMUSCULAR; INTRAVENOUS; SUBCUTANEOUS at 18:19

## 2022-09-16 RX ADMIN — Medication 5 MG: at 16:30

## 2022-09-16 RX ADMIN — ACETAMINOPHEN 650 MG: 325 TABLET ORAL at 21:00

## 2022-09-16 RX ADMIN — SODIUM CHLORIDE, POTASSIUM CHLORIDE, SODIUM LACTATE AND CALCIUM CHLORIDE: 600; 310; 30; 20 INJECTION, SOLUTION INTRAVENOUS at 08:05

## 2022-09-16 RX ADMIN — ROCURONIUM BROMIDE 10 MG: 10 INJECTION, SOLUTION INTRAVENOUS at 10:38

## 2022-09-16 RX ADMIN — HYDROMORPHONE HYDROCHLORIDE 0.5 MG: 1 INJECTION, SOLUTION INTRAMUSCULAR; INTRAVENOUS; SUBCUTANEOUS at 17:35

## 2022-09-16 RX ADMIN — HYDROMORPHONE HYDROCHLORIDE 0.25 MG: 1 INJECTION, SOLUTION INTRAMUSCULAR; INTRAVENOUS; SUBCUTANEOUS at 19:45

## 2022-09-16 RX ADMIN — DEXAMETHASONE SODIUM PHOSPHATE 10 MG: 10 INJECTION INTRAMUSCULAR; INTRAVENOUS at 08:07

## 2022-09-16 RX ADMIN — PHENYLEPHRINE HYDROCHLORIDE 100 MCG: 10 INJECTION INTRAVENOUS at 13:12

## 2022-09-16 RX ADMIN — SUGAMMADEX 500 MG: 100 INJECTION, SOLUTION INTRAVENOUS at 16:38

## 2022-09-16 RX ADMIN — LIDOCAINE HYDROCHLORIDE ANHYDROUS AND DEXTROSE MONOHYDRATE 2 MG/MIN: .4; 5 INJECTION, SOLUTION INTRAVENOUS at 11:35

## 2022-09-16 RX ADMIN — Medication 3000 MG: at 15:40

## 2022-09-16 RX ADMIN — SODIUM CHLORIDE, POTASSIUM CHLORIDE, SODIUM LACTATE AND CALCIUM CHLORIDE 1000 ML: 600; 310; 30; 20 INJECTION, SOLUTION INTRAVENOUS at 06:47

## 2022-09-16 RX ADMIN — Medication 3000 MG: at 11:44

## 2022-09-16 RX ADMIN — BUPIVACAINE HYDROCHLORIDE 8 ML/HR: 5 INJECTION, SOLUTION EPIDURAL; INTRACAUDAL; PERINEURAL at 08:25

## 2022-09-16 RX ADMIN — Medication 5 MG: at 16:55

## 2022-09-16 RX ADMIN — LIDOCAINE HYDROCHLORIDE 50 MG: 10 INJECTION, SOLUTION EPIDURAL; INFILTRATION; INTRACAUDAL; PERINEURAL at 07:37

## 2022-09-16 RX ADMIN — SODIUM CHLORIDE: 9 INJECTION, SOLUTION INTRAVENOUS at 20:59

## 2022-09-16 RX ADMIN — HYDROMORPHONE HYDROCHLORIDE 0.25 MG: 1 INJECTION, SOLUTION INTRAMUSCULAR; INTRAVENOUS; SUBCUTANEOUS at 20:00

## 2022-09-16 RX ADMIN — ROCURONIUM BROMIDE 50 MG: 10 INJECTION, SOLUTION INTRAVENOUS at 07:37

## 2022-09-16 RX ADMIN — Medication 3000 MG: at 07:44

## 2022-09-16 RX ADMIN — ONDANSETRON 4 MG: 2 INJECTION INTRAMUSCULAR; INTRAVENOUS at 21:59

## 2022-09-16 RX ADMIN — HYDROMORPHONE HYDROCHLORIDE 0.5 MG: 1 INJECTION, SOLUTION INTRAMUSCULAR; INTRAVENOUS; SUBCUTANEOUS at 17:23

## 2022-09-16 RX ADMIN — Medication 5 MG: at 16:32

## 2022-09-16 RX ADMIN — MAGNESIUM SULFATE 2000 MG: 2 INJECTION INTRAVENOUS at 13:23

## 2022-09-16 RX ADMIN — SODIUM CHLORIDE, POTASSIUM CHLORIDE, SODIUM LACTATE AND CALCIUM CHLORIDE: 600; 310; 30; 20 INJECTION, SOLUTION INTRAVENOUS at 11:06

## 2022-09-16 RX ADMIN — ROCURONIUM BROMIDE 10 MG: 10 INJECTION, SOLUTION INTRAVENOUS at 12:37

## 2022-09-16 RX ADMIN — ROCURONIUM BROMIDE 10 MG: 10 INJECTION, SOLUTION INTRAVENOUS at 10:07

## 2022-09-16 RX ADMIN — ROCURONIUM BROMIDE 10 MG: 10 INJECTION, SOLUTION INTRAVENOUS at 11:37

## 2022-09-16 RX ADMIN — ROCURONIUM BROMIDE 10 MG: 10 INJECTION, SOLUTION INTRAVENOUS at 14:53

## 2022-09-16 RX ADMIN — SIMVASTATIN 40 MG: 40 TABLET, FILM COATED ORAL at 21:52

## 2022-09-16 RX ADMIN — ROCURONIUM BROMIDE 10 MG: 10 INJECTION, SOLUTION INTRAVENOUS at 08:23

## 2022-09-16 RX ADMIN — PROPOFOL 200 MG: 10 INJECTION, EMULSION INTRAVENOUS at 07:37

## 2022-09-16 ASSESSMENT — PAIN DESCRIPTION - LOCATION
LOCATION: ABDOMEN

## 2022-09-16 ASSESSMENT — ENCOUNTER SYMPTOMS: SHORTNESS OF BREATH: 1

## 2022-09-16 ASSESSMENT — PAIN DESCRIPTION - FREQUENCY: FREQUENCY: CONTINUOUS

## 2022-09-16 ASSESSMENT — PAIN SCALES - GENERAL
PAINLEVEL_OUTOF10: 10
PAINLEVEL_OUTOF10: 8
PAINLEVEL_OUTOF10: 7
PAINLEVEL_OUTOF10: 10
PAINLEVEL_OUTOF10: 6
PAINLEVEL_OUTOF10: 8
PAINLEVEL_OUTOF10: 6
PAINLEVEL_OUTOF10: 6

## 2022-09-16 ASSESSMENT — PAIN SCALES - WONG BAKER
WONGBAKER_NUMERICALRESPONSE: 0
WONGBAKER_NUMERICALRESPONSE: 4

## 2022-09-16 ASSESSMENT — PAIN DESCRIPTION - ONSET: ONSET: ON-GOING

## 2022-09-16 ASSESSMENT — PAIN DESCRIPTION - DESCRIPTORS: DESCRIPTORS: SHARP;SHOOTING

## 2022-09-16 ASSESSMENT — PAIN - FUNCTIONAL ASSESSMENT
PAIN_FUNCTIONAL_ASSESSMENT: ACTIVITIES ARE NOT PREVENTED
PAIN_FUNCTIONAL_ASSESSMENT: 0-10

## 2022-09-16 ASSESSMENT — PAIN DESCRIPTION - ORIENTATION: ORIENTATION: RIGHT

## 2022-09-16 ASSESSMENT — PAIN DESCRIPTION - PAIN TYPE: TYPE: SURGICAL PAIN

## 2022-09-16 NOTE — ANESTHESIA POSTPROCEDURE EVALUATION
Department of Anesthesiology  Postprocedure Note    Patient: Kristi Farley  MRN: [id number]  YOB: 1955  Date of evaluation: 9/16/2022      Procedure Summary     Date: 09/16/22 Room / Location: [address]    Anesthesia Start: 4872 Anesthesia Stop: 1700    Procedures:       XI ROBOTIC LAPARASCOPIC ASSISTED RADICAL CYSTOPROSTATECTOMY, BILATERAL PELVIC LYMPHADENECTOMY AND ILEAL CONDUIT FORMATION      CYSTOSCOPY STENT REMOVAL (Right) Diagnosis:       Malignant neoplasm of lateral wall of urinary bladder (HCC)      (BLADDER CANCER)    Surgeons: Cuco Gill MD Responsible Provider: Yulia Finch MD    Anesthesia Type: general, epidural ASA Status: 3          Anesthesia Type: No value filed.     Madhu Phase I: Madhu Score: 8    Madhu Phase II:        Anesthesia Post Evaluation    Patient location during evaluation: PACU  Patient participation: complete - patient participated  Level of consciousness: awake and alert  Pain score: 0  Airway patency: patent  Nausea & Vomiting: no nausea and no vomiting  Complications: no  Cardiovascular status: hemodynamically stable  Respiratory status: acceptable  Hydration status: stable

## 2022-09-16 NOTE — ANESTHESIA PRE PROCEDURE
Department of Anesthesiology  Preprocedure Note       Name:  Mary Inman   Age:  74 y.o.  :  1955                                          MRN:  [id number]         Date:  2022      Surgeon: Gypsy Ang):  Khushi Turner MD    Procedure: Procedure(s):  XI ROBOTIC LAPAROSCOPIC RADICAL CYSTOPROSTATECTOMY, PELVIC LYMPHADENECTOMY, ILEAL CONDUIT  CYSTOSCOPY STENT REMOVAL    Medications prior to admission:   Prior to Admission medications    Medication Sig Start Date End Date Taking? Authorizing Provider   ondansetron (ZOFRAN-ODT) 8 MG TBDP disintegrating tablet DISSOLVE 1 TABLET IN MOUTH EVERY 8 HOURS AS NEEDED FOR NAUSEA FOR VOMITING 22   Sarah Grayson MD   HYDROcodone-acetaminophen (NORCO) 5-325 MG per tablet Take 1 tablet by mouth every 8 hours as needed for Pain for up to 30 days.  9/7/22 10/7/22  Vika Grayson MD   rivaroxaban (XARELTO) 20 MG TABS tablet Take 1 tablet by mouth once daily with breakfast  Patient taking differently: Take 1 tablet by mouth once daily with breakfast/ per cardiology, pt. to stop 3 days pre-op for OR 22   Marilyn Lujan MD   omeprazole (PRILOSEC) 20 MG delayed release capsule Take 1 capsule by mouth daily 22   Jelani Islas MD   carvedilol (COREG) 3.125 MG tablet TAKE 1 TABLET BY MOUTH TWICE DAILY 22   Historical Provider, MD   finasteride (PROSCAR) 5 MG tablet Take 5 mg by mouth daily    Historical Provider, MD   tamsulosin (FLOMAX) 0.4 MG capsule Take 0.4 mg by mouth daily    Historical Provider, MD   simvastatin (ZOCOR) 40 MG tablet Take 40 mg by mouth nightly    Historical Provider, MD   buPROPion (WELLBUTRIN XL) 300 MG extended release tablet Take 300 mg by mouth every morning    Historical Provider, MD       Current medications:    Current Facility-Administered Medications   Medication Dose Route Frequency Provider Last Rate Last Admin    lactated ringers infusion 1,000 mL  1,000 mL IntraVENous Continuous Maykel Godinez have symptoms of dyspnea and decreased effort tolerance    GERD (gastroesophageal reflux disease)     High cholesterol     Kidney calculi     Sleep apnea     does not use cpap    Under care of team     Dr. Thelma Foy Nephrology    Under care of team     Dr. Estephanie Black. last visit approx 2021    Under care of team     Dr. Wei Gill Cardiology [facility] last visit 2022    Under care of team     Dr. Marley Arriaza Under care of team     Dr. Roberta Landon       Past Surgical History:        Procedure Laterality Date   R Dayana 11  2022    nonobstructive CAD    COLONOSCOPY      IR PORT PLACEMENT EQUAL OR GREATER THAN 5 YEARS  2022    IR PORT PLACEMENT EQUAL OR GREATER THAN 5 YEARS 2022 [facility]    KNEE ARTHROSCOPY      left       Social History:    Social History     Tobacco Use    Smoking status: Former     Packs/day: 0.25     Years: 9.00     Pack years: 2.25     Types: Cigarettes     Start date: 1983     Quit date: 2/10/1992     Years since quittin.6    Smokeless tobacco: Never   Substance Use Topics    Alcohol use: Not Currently                                Counseling given: Not Answered      Vital Signs (Current): There were no vitals filed for this visit.                                            BP Readings from Last 3 Encounters:   22 118/74   22 130/85   22 (!) 154/66       NPO Status:                                                                                 BMI:   Wt Readings from Last 3 Encounters:   22 259 lb 9.6 oz (117.8 kg)   22 259 lb (117.5 kg)   22 254 lb (115.2 kg)     There is no height or weight on file to calculate BMI.    CBC:   Lab Results   Component Value Date/Time    WBC 8.7 2022 01:46 PM    RBC 4.10 2022 01:46 PM    HGB 12.1 2022 01:46 PM    HCT 35.9 2022 01:46 PM    MCV 87.7 09/08/2022 01:46 PM    RDW 15.7 09/08/2022 01:46 PM     09/08/2022 01:46 PM       CMP:   Lab Results   Component Value Date/Time     09/08/2022 01:46 PM    K 4.1 09/08/2022 01:46 PM     09/08/2022 01:46 PM    CO2 28 09/08/2022 01:46 PM    BUN 11 09/08/2022 01:46 PM    CREATININE 1.27 09/08/2022 01:46 PM    GFRAA >60 09/08/2022 01:46 PM    LABGLOM 57 09/08/2022 01:46 PM    GLUCOSE 158 09/08/2022 01:46 PM    PROT 6.6 09/08/2022 01:46 PM    CALCIUM 9.3 09/08/2022 01:46 PM    BILITOT 0.3 09/08/2022 01:46 PM    ALKPHOS 77 09/08/2022 01:46 PM    AST 12 09/08/2022 01:46 PM    ALT 8 09/08/2022 01:46 PM       POC Tests: No results for input(s): POCGLU, POCNA, POCK, POCCL, POCBUN, POCHEMO, POCHCT in the last 72 hours. Coags:   Lab Results   Component Value Date/Time    PROTIME 13.6 02/25/2022 12:26 PM    INR 1.1 02/25/2022 12:26 PM       HCG (If Applicable): No results found for: PREGTESTUR, PREGSERUM, HCG, HCGQUANT     ABGs: No results found for: PHART, PO2ART, OJY1OZW, WFB2YLW, BEART, R9IDEFYD     Type & Screen (If Applicable):  No results found for: LABABO, LABRH    Drug/Infectious Status (If Applicable):  No results found for: HIV, HEPCAB    COVID-19 Screening (If Applicable):   Lab Results   Component Value Date/Time    COVID19 Not Detected 03/31/2022 02:08 PM           Anesthesia Evaluation  Patient summary reviewed and Nursing notes reviewed no history of anesthetic complications:   Airway: Mallampati: II  TM distance: >3 FB   Neck ROM: full  Mouth opening: > = 3 FB   Dental: normal exam         Pulmonary:normal exam    (+) shortness of breath:  sleep apnea:                             Cardiovascular:    (+) CAD:, CHF: systolic,                ROS comment: Dilated cardiomyopathy     Neuro/Psych:   (+) depression/anxiety             GI/Hepatic/Renal:   (+) renal disease: CRI,           Endo/Other:    (+) malignancy/cancer (bladder cancer).                  Abdominal:             Vascular: Other Findings:           Anesthesia Plan      general and epidural     ASA 3       Induction: intravenous. arterial line  MIPS: Postoperative opioids intended and Prophylactic antiemetics administered. Anesthetic plan and risks discussed with patient. Use of blood products discussed with patient whom consented to blood products. Plan discussed with CRNA.                     Iron Acevedo MD   9/16/2022

## 2022-09-16 NOTE — LETTER
Joel Caballero MD [address] [address]  [place], [facility]  P: [phone] / F: [phone]    Brief Postoperative Note  Surgical Facility: [address] [place] [address]   9/16/22    Zuleika Bishop  [id number]  1955    Dear Kaylan Lacey MD,    I've enclosed a Brief Op note on a procedure performed on your patient, Zuleika Bishop today. Pre-operative Diagnosis: Bladder cancer  Post-operative Diagnosis: Same    Procedure: Cystoscopy and right ureteral stent removal, robotic assisted laparoscopic radical cystoprostatectomy and bilateral Pelvic Lymphadenectomy, ileal conduit formation     Anesthesia: General    Surgeon: Tiffanie Vasquez; Resident: Preston Epps MD     Plan: Admit and follow ERAS protocol. Thank you for allowing me to participate in the care of this patient. I will keep you updated on this patient's follow up and I look forward to serving you and your patients again in the future.         Electronically signed by Jeremy Jiang MD, FACS

## 2022-09-16 NOTE — ANESTHESIA PROCEDURE NOTES
Epidural Block    Patient location during procedure: pre-op  Start time: 9/16/2022 7:05 AM  End time: 9/16/2022 7:16 AM  Reason for block: at surgeon's request  Staffing  Performed: anesthesiologist   Anesthesiologist: Luna Paredes MD  Epidural  Patient position: sitting  Prep: Betadine  Patient monitoring: cardiac monitor and continuous pulse ox  Approach: right paramedian  Location: T9-10  Injection technique: YULIA air  Provider prep: mask and sterile gloves  Needle  Needle gauge: 18 G  Catheter type: multi-orifice  Catheter size: 20 G  Test dose: negativeCatheter Secured: tegaderm and tape  Assessment  Outcomes: uncomplicated and patient tolerated procedure well  Preanesthetic Checklist  Completed: patient identified, IV checked, site marked, risks and benefits discussed, surgical/procedural consents, equipment checked, pre-op evaluation, timeout performed, anesthesia consent given, oxygen available, monitors applied/VS acknowledged, fire risk safety assessment completed and verbalized and blood product R/B/A discussed and consented

## 2022-09-16 NOTE — INTERVAL H&P NOTE
Update History & Physical    The patient's History and Physical of August 31, 2022 was reviewed with the patient and I examined the patient. There was no change. The surgical site was confirmed by the patient and me. Plan: The risks, benefits, expected outcome, and alternative to the recommended procedure have been discussed with the patient. Patient understands and wants to proceed with the procedure.      Electronically signed by Wendy Jeffery MD on 9/16/2022 at 6:26 AM

## 2022-09-17 LAB
ANION GAP SERPL CALCULATED.3IONS-SCNC: 12 MMOL/L (ref 9–17)
BUN BLDV-MCNC: 17 MG/DL (ref 8–23)
CALCIUM SERPL-MCNC: 8.3 MG/DL (ref 8.6–10.4)
CHLORIDE BLD-SCNC: 104 MMOL/L (ref 98–107)
CO2: 22 MMOL/L (ref 20–31)
CREAT SERPL-MCNC: 2.24 MG/DL (ref 0.7–1.2)
GFR AFRICAN AMERICAN: 36 ML/MIN
GFR NON-AFRICAN AMERICAN: 30 ML/MIN
GFR SERPL CREATININE-BSD FRML MDRD: ABNORMAL ML/MIN/{1.73_M2}
GLUCOSE BLD-MCNC: 148 MG/DL (ref 70–99)
HCT VFR BLD CALC: 31.9 % (ref 40.7–50.3)
HEMOGLOBIN: 10.8 G/DL (ref 13–17)
MAGNESIUM: 1.8 MG/DL (ref 1.6–2.6)
MCH RBC QN AUTO: 29.4 PG (ref 25.2–33.5)
MCHC RBC AUTO-ENTMCNC: 33.9 G/DL (ref 28.4–34.8)
MCV RBC AUTO: 86.9 FL (ref 82.6–102.9)
NRBC AUTOMATED: 0 PER 100 WBC
PDW BLD-RTO: 13.9 % (ref 11.8–14.4)
PHOSPHORUS: 3.9 MG/DL (ref 2.5–4.5)
PLATELET # BLD: 218 K/UL (ref 138–453)
PMV BLD AUTO: 9.6 FL (ref 8.1–13.5)
POTASSIUM SERPL-SCNC: 4.2 MMOL/L (ref 3.7–5.3)
RBC # BLD: 3.67 M/UL (ref 4.21–5.77)
SODIUM BLD-SCNC: 138 MMOL/L (ref 135–144)
WBC # BLD: 12.8 K/UL (ref 3.5–11.3)

## 2022-09-17 PROCEDURE — 36415 COLL VENOUS BLD VENIPUNCTURE: CPT

## 2022-09-17 PROCEDURE — 6370000000 HC RX 637 (ALT 250 FOR IP): Performed by: STUDENT IN AN ORGANIZED HEALTH CARE EDUCATION/TRAINING PROGRAM

## 2022-09-17 PROCEDURE — 2580000003 HC RX 258: Performed by: ANESTHESIOLOGY

## 2022-09-17 PROCEDURE — 2580000003 HC RX 258: Performed by: STUDENT IN AN ORGANIZED HEALTH CARE EDUCATION/TRAINING PROGRAM

## 2022-09-17 PROCEDURE — 2500000003 HC RX 250 WO HCPCS: Performed by: ANESTHESIOLOGY

## 2022-09-17 PROCEDURE — 83735 ASSAY OF MAGNESIUM: CPT

## 2022-09-17 PROCEDURE — 6360000002 HC RX W HCPCS: Performed by: STUDENT IN AN ORGANIZED HEALTH CARE EDUCATION/TRAINING PROGRAM

## 2022-09-17 PROCEDURE — 6360000002 HC RX W HCPCS

## 2022-09-17 PROCEDURE — 84100 ASSAY OF PHOSPHORUS: CPT

## 2022-09-17 PROCEDURE — 85027 COMPLETE CBC AUTOMATED: CPT

## 2022-09-17 PROCEDURE — 1200000000 HC SEMI PRIVATE

## 2022-09-17 PROCEDURE — 80048 BASIC METABOLIC PNL TOTAL CA: CPT

## 2022-09-17 RX ORDER — ONDANSETRON 2 MG/ML
4 INJECTION INTRAMUSCULAR; INTRAVENOUS EVERY 6 HOURS PRN
Status: DISCONTINUED | OUTPATIENT
Start: 2022-09-17 | End: 2022-09-24

## 2022-09-17 RX ORDER — HEPARIN SODIUM 5000 [USP'U]/ML
5000 INJECTION, SOLUTION INTRAVENOUS; SUBCUTANEOUS 2 TIMES DAILY
Status: DISCONTINUED | OUTPATIENT
Start: 2022-09-17 | End: 2022-09-17

## 2022-09-17 RX ORDER — PROMETHAZINE HYDROCHLORIDE 25 MG/ML
6.25 INJECTION, SOLUTION INTRAMUSCULAR; INTRAVENOUS EVERY 6 HOURS PRN
Status: DISCONTINUED | OUTPATIENT
Start: 2022-09-17 | End: 2022-10-11 | Stop reason: HOSPADM

## 2022-09-17 RX ORDER — NALOXONE HYDROCHLORIDE 0.4 MG/ML
INJECTION, SOLUTION INTRAMUSCULAR; INTRAVENOUS; SUBCUTANEOUS PRN
Status: DISCONTINUED | OUTPATIENT
Start: 2022-09-17 | End: 2022-09-24

## 2022-09-17 RX ADMIN — ACETAMINOPHEN 650 MG: 325 TABLET ORAL at 14:45

## 2022-09-17 RX ADMIN — OXYCODONE 10 MG: 5 TABLET ORAL at 20:43

## 2022-09-17 RX ADMIN — PROMETHAZINE HYDROCHLORIDE 6.25 MG: 25 INJECTION INTRAMUSCULAR; INTRAVENOUS at 04:12

## 2022-09-17 RX ADMIN — BUPIVACAINE HYDROCHLORIDE: 5 INJECTION, SOLUTION EPIDURAL; INTRACAUDAL; PERINEURAL at 21:35

## 2022-09-17 RX ADMIN — ACETAMINOPHEN 650 MG: 325 TABLET ORAL at 20:38

## 2022-09-17 RX ADMIN — Medication 2000 MG: at 08:30

## 2022-09-17 RX ADMIN — BUPIVACAINE HYDROCHLORIDE: 5 INJECTION, SOLUTION EPIDURAL; INTRACAUDAL; PERINEURAL at 10:44

## 2022-09-17 RX ADMIN — OXYCODONE 10 MG: 5 TABLET ORAL at 14:45

## 2022-09-17 RX ADMIN — OXYCODONE 10 MG: 5 TABLET ORAL at 09:04

## 2022-09-17 RX ADMIN — HYDROMORPHONE HYDROCHLORIDE 1 MG: 1 INJECTION, SOLUTION INTRAMUSCULAR; INTRAVENOUS; SUBCUTANEOUS at 15:43

## 2022-09-17 RX ADMIN — HYDROMORPHONE HYDROCHLORIDE 1 MG: 1 INJECTION, SOLUTION INTRAMUSCULAR; INTRAVENOUS; SUBCUTANEOUS at 09:28

## 2022-09-17 RX ADMIN — HYDROMORPHONE HYDROCHLORIDE 1 MG: 1 INJECTION, SOLUTION INTRAMUSCULAR; INTRAVENOUS; SUBCUTANEOUS at 00:06

## 2022-09-17 RX ADMIN — OXYCODONE 10 MG: 5 TABLET ORAL at 03:25

## 2022-09-17 RX ADMIN — HYDROMORPHONE HYDROCHLORIDE 1 MG: 1 INJECTION, SOLUTION INTRAMUSCULAR; INTRAVENOUS; SUBCUTANEOUS at 21:53

## 2022-09-17 RX ADMIN — HYDROMORPHONE HYDROCHLORIDE 1 MG: 1 INJECTION, SOLUTION INTRAMUSCULAR; INTRAVENOUS; SUBCUTANEOUS at 12:27

## 2022-09-17 RX ADMIN — CARVEDILOL 3.12 MG: 3.12 TABLET, FILM COATED ORAL at 08:30

## 2022-09-17 RX ADMIN — HYDROMORPHONE HYDROCHLORIDE 1 MG: 1 INJECTION, SOLUTION INTRAMUSCULAR; INTRAVENOUS; SUBCUTANEOUS at 18:53

## 2022-09-17 RX ADMIN — SODIUM CHLORIDE, PRESERVATIVE FREE 10 ML: 5 INJECTION INTRAVENOUS at 20:38

## 2022-09-17 RX ADMIN — NALOXEGOL OXALATE 25 MG: 12.5 TABLET, FILM COATED ORAL at 08:29

## 2022-09-17 RX ADMIN — ONDANSETRON 4 MG: 2 INJECTION INTRAMUSCULAR; INTRAVENOUS at 05:38

## 2022-09-17 RX ADMIN — ACETAMINOPHEN 650 MG: 325 TABLET ORAL at 03:25

## 2022-09-17 RX ADMIN — CARVEDILOL 3.12 MG: 3.12 TABLET, FILM COATED ORAL at 20:38

## 2022-09-17 RX ADMIN — Medication 2000 MG: at 16:50

## 2022-09-17 RX ADMIN — SIMVASTATIN 40 MG: 40 TABLET, FILM COATED ORAL at 20:38

## 2022-09-17 RX ADMIN — ACETAMINOPHEN 650 MG: 325 TABLET ORAL at 08:30

## 2022-09-17 RX ADMIN — BUPROPION HYDROCHLORIDE 300 MG: 150 TABLET, EXTENDED RELEASE ORAL at 08:30

## 2022-09-17 RX ADMIN — HYDROMORPHONE HYDROCHLORIDE 1 MG: 1 INJECTION, SOLUTION INTRAMUSCULAR; INTRAVENOUS; SUBCUTANEOUS at 05:33

## 2022-09-17 ASSESSMENT — PAIN DESCRIPTION - FREQUENCY
FREQUENCY: CONTINUOUS
FREQUENCY: CONTINUOUS

## 2022-09-17 ASSESSMENT — PAIN - FUNCTIONAL ASSESSMENT
PAIN_FUNCTIONAL_ASSESSMENT: ACTIVITIES ARE NOT PREVENTED
PAIN_FUNCTIONAL_ASSESSMENT: PREVENTS OR INTERFERES SOME ACTIVE ACTIVITIES AND ADLS

## 2022-09-17 ASSESSMENT — PAIN SCALES - WONG BAKER
WONGBAKER_NUMERICALRESPONSE: 0
WONGBAKER_NUMERICALRESPONSE: 4
WONGBAKER_NUMERICALRESPONSE: 0
WONGBAKER_NUMERICALRESPONSE: 4
WONGBAKER_NUMERICALRESPONSE: 0
WONGBAKER_NUMERICALRESPONSE: 4
WONGBAKER_NUMERICALRESPONSE: 0
WONGBAKER_NUMERICALRESPONSE: 0;2

## 2022-09-17 ASSESSMENT — PAIN SCALES - GENERAL
PAINLEVEL_OUTOF10: 7
PAINLEVEL_OUTOF10: 6
PAINLEVEL_OUTOF10: 10
PAINLEVEL_OUTOF10: 10
PAINLEVEL_OUTOF10: 4
PAINLEVEL_OUTOF10: 10
PAINLEVEL_OUTOF10: 1
PAINLEVEL_OUTOF10: 8
PAINLEVEL_OUTOF10: 3
PAINLEVEL_OUTOF10: 1
PAINLEVEL_OUTOF10: 10
PAINLEVEL_OUTOF10: 4
PAINLEVEL_OUTOF10: 5
PAINLEVEL_OUTOF10: 10
PAINLEVEL_OUTOF10: 8
PAINLEVEL_OUTOF10: 5
PAINLEVEL_OUTOF10: 0
PAINLEVEL_OUTOF10: 10
PAINLEVEL_OUTOF10: 0
PAINLEVEL_OUTOF10: 3

## 2022-09-17 ASSESSMENT — PAIN DESCRIPTION - DESCRIPTORS
DESCRIPTORS: SHARP
DESCRIPTORS: ACHING
DESCRIPTORS: SQUEEZING;STABBING
DESCRIPTORS: ACHING
DESCRIPTORS: ACHING

## 2022-09-17 ASSESSMENT — PAIN DESCRIPTION - ORIENTATION
ORIENTATION: RIGHT
ORIENTATION: UPPER
ORIENTATION: RIGHT
ORIENTATION: UPPER
ORIENTATION: UPPER
ORIENTATION: RIGHT

## 2022-09-17 ASSESSMENT — PAIN DESCRIPTION - PAIN TYPE
TYPE: SURGICAL PAIN
TYPE: SURGICAL PAIN

## 2022-09-17 ASSESSMENT — PAIN DESCRIPTION - ONSET
ONSET: ON-GOING
ONSET: ON-GOING

## 2022-09-17 ASSESSMENT — PAIN DESCRIPTION - LOCATION
LOCATION: ABDOMEN
LOCATION: ABDOMEN
LOCATION: BACK
LOCATION: ABDOMEN
LOCATION: ABDOMEN;BACK
LOCATION: BACK

## 2022-09-17 NOTE — ACP (ADVANCE CARE PLANNING)
Advance Care Planning     Advance Care Planning Activator (Inpatient)  Conversation Note      Date of ACP Conversation: 9/17/2022     Conversation Conducted with: Patient with Decision Making Capacity    ACP Activator: Jose Alberto Mahmood RN        Health Care Decision Maker:     Current Designated Health Care Decision Maker:     Click here to complete Healthcare Decision Makers including section of the Healthcare Decision Maker Relationship (ie \"Primary\")  Today we documented desired Decision Maker(s), who is (are) NOT Legal Next of Anne Angeles. ACP documents are required for decision maker authority. Care Preferences    Ventilation: \"If you were in your present state of health and suddenly became very ill and were unable to breathe on your own, what would your preference be about the use of a ventilator (breathing machine) if it were available to you? \"      Would the patient desire the use of ventilator (breathing machine)?: yes    \"If your health worsens and it becomes clear that your chance of recovery is unlikely, what would your preference be about the use of a ventilator (breathing machine) if it were available to you? \"     Would the patient desire the use of ventilator (breathing machine)?: No      Resuscitation  \"CPR works best to restart the heart when there is a sudden event, like a heart attack, in someone who is otherwise healthy. Unfortunately, CPR does not typically restart the heart for people who have serious health conditions or who are very sick. \"    \"In the event your heart stopped as a result of an underlying serious health condition, would you want attempts to be made to restart your heart (answer \"yes\" for attempt to resuscitate) or would you prefer a natural death (answer \"no\" for do not attempt to resuscitate)? \" yes       [] Yes   [] No   Educated Armand / Earle English regarding differences between Advance Directives and portable DNR orders.     Length of ACP Conversation in minutes: Conversation Outcomes:  [] ACP discussion completed  [] Existing advance directive reviewed with patient; no changes to patient's previously recorded wishes  [] New Advance Directive completed  [] Portable Do Not Rescitate prepared for Provider review and signature  [] POLST/POST/MOLST/MOST prepared for Provider review and signature      Follow-up plan:    [] Schedule follow-up conversation to continue planning  [] Referred individual to Provider for additional questions/concerns   [] Advised patient/agent/surrogate to review completed ACP document and update if needed with changes in condition, patient preferences or care setting    [] This note routed to one or more involved healthcare providers

## 2022-09-17 NOTE — PROGRESS NOTES
4357- Patient educated on need to get up out of bed and ambulate or at least sit up in chair. Patient states \"I am in too much pain and I told the urology dr and he said I didn't need to walk today. \" [address]a Means educates patient on importance of walking post op and also updates resident on call that patient states he was told he didn't need to walk. Will continue to monitor. 8660- Writer at bedside attempting to get patient to ambulate. Patient continues to state he was told he didn't need to walk and that he wasn't walking today because he \"hurts too bad\". Will continue to attempt to ambulate patient.

## 2022-09-17 NOTE — PROGRESS NOTES
Comprehensive Nutrition Assessment    Type and Reason for Visit:  Initial, Positive Nutrition Screen (weight loss)    Nutrition Recommendations/Plan:   Monitor for start of nutrition. Add Magic Cup (frozen nutrition supplement) once diet advanced. Malnutrition Assessment:  Malnutrition Status:  Severe malnutrition (09/17/22 1345)    Context:  Chronic Illness     Findings of the 6 clinical characteristics of malnutrition:  Energy Intake:  75% or less estimated energy requirements for 1 month or longer  Weight Loss:  Greater than 10% over 6 months     Body Fat Loss:  Unable to assess     Muscle Mass Loss:  Unable to assess    Fluid Accumulation:  No significant fluid accumulation     Strength:  Not Performed    Nutrition Assessment:    Admitted d/t bladder CA. s/p cystoprostatectomy, ileal conduit creation. Currently NPO. Follows routinely with outpatient cancer center RD. Pt and visitor report 100lb wt loss over past year-started with Covid diagnosis last September then continued with CA diagnosis. States taste/smell changes but recently has started to eat more foods and appetite is slowly returning. Per notes and pt, doesn't like nutriton supplements. Did discuss trying Magic Cup inpatient once diet advanced    Nutrition Related Findings:    labs/meds reviewed Wound Type: None       Current Nutrition Intake & Therapies:    Average Meal Intake: NPO     Diet NPO Exceptions are: Sips of Water with Meds, Ice Chips    Anthropometric Measures:  Height: 6' 2.02\" (188 cm)  Ideal Body Weight (IBW): 190 lbs (86 kg)       Current Body Weight: 253 lb 15.5 oz (115.2 kg), 133.7 % IBW. Weight Source: Bed Scale  Current BMI (kg/m2): 32.6                          BMI Categories: Obese Class 1 (BMI 30.0-34. 9)    Estimated Daily Nutrient Needs:  Energy Requirements Based On: Kcal/kg  Weight Used for Energy Requirements: Current  Energy (kcal/day): 1121-1077 kcal/d  Weight Used for Protein Requirements: Ideal  Protein (g/day): 100-120 gm pro/d  Method Used for Fluid Requirements: ml/Kg  Fluid (ml/day): 2800ml/d    Nutrition Diagnosis:   Inadequate oral intake related to catabolic illness as evidenced by poor intake prior to admission, weight loss    Nutrition Interventions:   Food and/or Nutrient Delivery: Continue NPO  Nutrition Education/Counseling: Education not indicated  Coordination of Nutrition Care: Continue to monitor while inpatient       Goals:  Previous Goal Met:  (goal set)  Goals: Meet at least 75% of estimated needs, prior to discharge       Nutrition Monitoring and Evaluation:   Behavioral-Environmental Outcomes: None Identified  Food/Nutrient Intake Outcomes: Diet Advancement/Tolerance  Physical Signs/Symptoms Outcomes: Biochemical Data, Nutrition Focused Physical Findings, Weight    Discharge Planning:     Too soon to determine     Oleksandr Merrill RD, LD  Contact: [phone]

## 2022-09-17 NOTE — CARE COORDINATION
09/17/22 1701   Service Assessment   Patient Orientation Alert and Oriented   Cognition Alert   History Provided By Patient   Primary Caregiver Self   Support Systems Spouse/Significant Other;Friends/Neighbors   PCP Verified by CM Yes   Last Visit to PCP Within last 3 months   Prior Functional Level Assistance with the following:;Cooking;Housework; Shopping   Current Functional Level Bathing;Dressing; Toileting;Feeding;Cooking;Housework; Shopping;Mobility   Can patient return to prior living arrangement Yes   Ability to make needs known: Good   Family able to assist with home care needs: Yes   Financial Resources Medicare   Social/Functional History   Lives With Significant other   Type of [address] Multi-level;Bed/Bath upstairs   Home Access Stairs to enter with rails   Entrance Stairs - Number of Steps 3   Entrance Stairs - Rails Right   Bathroom Shower/Tub Tub/Shower unit   [employer] Grab bars in shower   Bathroom Accessibility Accessible   Home Equipment Oxygen; Wheelchair-manual;Cane;Walker, rolling   Receives Help From Family   ADL Assistance Independent   Homemaking Assistance Needs assistance   Meal Prep Moderate   Laundry Moderate   Vacuuming Moderate   Cleaning Moderate   Gardening Moderate   Yard Work Moderate   Shopping Moderate   Homemaking Responsibilities Yes   Ambulation Assistance Independent   Transfer Assistance Independent   Active  Yes   Mode of Transportation Car   Occupation Part time employment   Discharge Planning   Type of [address] Prior To Admission [employer]; Oxygen Therapy   Potential Assistance Needed Home Care   DME Wheelchair;Walker;Cane   DME Ordered? No   Type of Home Care Services None   Patient expects to be discharged to: House   One/Two Story Residence Two story   # of Interior Steps 6   Interior Rails Left   History of falls?  [address] Discharge Transition of Care Consult (CM Consult) [address] Provided? No   Confirm Follow Up Transport Family   Patient would like to go home with [facility].. [place] of choice is provided via [facility]. list.  Patient chose [facility]. Referral is made.

## 2022-09-17 NOTE — PROGRESS NOTES
Gilbert Lemon, [address] [facility], [address], [place], Brijesh Rizzo  Urology Progress Note     Subjective:   Diet: N.p.o. except for sips with ice chips and medications  No acute events overnight  Complains of poorly controlled pain. Reports that paravertebral and Dilaudid wears off soon and he is a lot of pain  Reports 1 episode of vomiting this a.m.   Ambulation : Not ambulated  Flatus/ BM : No flatus/bowel movement  Urine output-725, clear  Drain output-305 cc, serosanguineous  Patient Vitals for the past 24 hrs:   BP Temp Temp src Pulse Resp SpO2   09/17/22 0904 -- -- -- -- 16 --   09/17/22 0730 134/80 98.2 °F (36.8 °C) Oral 96 18 94 %   09/17/22 0603 -- -- -- -- 18 --   09/17/22 0400 139/89 98.8 °F (37.1 °C) Oral 98 20 97 %   09/17/22 0355 -- -- -- -- 20 --   09/17/22 0036 -- -- -- -- 16 --   09/16/22 2351 (!) 140/80 98.2 °F (36.8 °C) Oral 90 18 97 %   09/16/22 2039 (!) 157/102 98.7 °F (37.1 °C) Oral 95 16 95 %   09/16/22 2015 (!) 161/99 -- -- 91 15 95 %   09/16/22 2000 (!) 156/95 98.6 °F (37 °C) -- 89 15 96 %   09/16/22 1945 (!) 163/93 -- -- 88 18 96 %   09/16/22 1930 (!) 148/84 -- -- 87 13 97 %   09/16/22 1915 (!) 157/95 -- -- 88 13 95 %   09/16/22 1900 (!) 144/90 -- -- 88 13 95 %   09/16/22 1845 (!) 149/93 -- -- 87 11 95 %   09/16/22 1830 (!) 165/91 -- -- 86 20 96 %   09/16/22 1815 -- -- -- 88 15 96 %   09/16/22 1800 -- -- -- 87 19 95 %   09/16/22 1745 -- -- -- 85 12 94 %   09/16/22 1730 -- -- -- 83 11 96 %   09/16/22 1715 -- -- -- 86 20 96 %   09/16/22 1710 (!) 157/117 -- -- 86 27 95 %   09/16/22 1700 (!) 146/97 -- -- 85 (!) 6 94 %   09/16/22 1650 (!) 152/121 97 °F (36.1 °C) -- 86 29 97 %       Intake/Output Summary (Last 24 hours) at 9/17/2022 0912  Last data filed at 9/17/2022 0553  Gross per 24 hour   Intake 2544.5 ml   Output 1030 ml   Net 1514.5 ml       Recent Labs     09/16/22  1730 09/16/22 2058 09/17/22  0605   WBC 14.3* 13.1* 12.8*   HGB 11.3* 11.4* 10.8*   HCT 34.0* 33.6* 31.9*   MCV 89.5 88.4 86.9  201 218     Recent Labs     09/16/22  1730 09/16/22  2058 09/17/22  0605    138 138   K 4.6 4.6 4.2    105 104   CO2 22 20 22   PHOS  --   --  3.9   BUN 13 14 17   CREATININE 1.79* 1.90* 2.24*       No results for input(s): COLORU, PHUR, LABCAST, WBCUA, RBCUA, MUCUS, TRICHOMONAS, YEAST, BACTERIA, CLARITYU, SPECGRAV, LEUKOCYTESUR, UROBILINOGEN, BILIRUBINUR, BLOODU in the last 72 hours. Invalid input(s): NITRATE, GLUCOSEUKETONESUAMORPHOUS    Additional Lab/culture results:    Physical Exam:   AO X 3  Neck: Supple   Chest: bilateral symmetrical chest rise/ Non labored breathing   Circulatory: Peripheries warm , well perfused   P/A: soft, nondistended, incision sites appropriately tender, clean, dry and intact with skin glue, left lower quadrant drain with serosanguineous output  Urostomy right upper quadrant-with clear yellow urine, stoma pink and healthy, stents seen in the ostomy bag        Interval Imaging Findings:    Impression:    Patient Active Problem List   Diagnosis    Kidney stones    Nocturia    Hypertrophy of prostate with urinary obstruction    Malignant neoplasm of urinary bladder (HCC)    Urinary retention    Cancer of lateral wall of urinary bladder (HCC)    Acute respiratory failure with hypoxia (HCC)    Other hyperlipidemia    Microcytic anemia    Mild protein-calorie malnutrition ([address]    Pulmonary embolism without acute cor pulmonale (HCC)    Hypoxia    Dyspnea    Acute systolic heart failure (HCC)    Acute renal failure with tubular necrosis (HCC)    CKD (chronic kidney disease), stage III ([address]    Dilated cardiomyopathy ([address]    Bladder cancer ([address]     Emili Sullivan is a 80-year-old male with bladder cancer, s/p robotic assisted laparoscopic cystoprostatectomy with ileal conduit creation on 9/16/2022  Hemoglobin 10.8 from 11.4  Creatinine 2.2 from 1.9    Plan:   Diet: Continue on n.p.o. except for sips with meds and ice chips.    IV fluids: Continue IV fluids 100 cc/h  Antibiotics: IV Ancef for 24 hours  Pain control: Paravertebrals, Tylenol scheduled, Roxicodone as needed, Dilaudid as needed for breakthrough  Drain output : 305 cc serosanguineous, continue to monitor  Ambulation / IS 10 times per hour   AM labs: Hemoglobin stable, creatinine uptrending, will continue IV fluids and continue to monitor  Not appropriate for discharge        Rai Zapata MD  9:12 AM 9/17/2022

## 2022-09-17 NOTE — PROGRESS NOTES
Department of Anesthesiology   Acute Pain Progress Note    Patient's Name: Abhijit Priest  Surgeon: No name on file. Date: 2022    Pt Awake, Alert  Vital Signs (Current)   Vitals:    22 1227   BP:    Pulse:    Resp: 16   Temp:    SpO2:      Vital Signs Statistics (for past 48 hrs)     Temp  Av.1 °F (36.7 °C)  Min: 97 °F (36.1 °C)   Min taken time: 22 1650  Max: 98.8 °F (37.1 °C)   Max taken time: 22 0400  Pulse  Av  Min: 76   Min taken time: 22 0635  Max: 98   Max taken time: 22 0400  Resp  Av.7  Min: 6   Min taken time: 22 1700  Max: 29   Max taken time: 22 1650  BP  Min: 134/80   Min taken time: 22 0730  Max: 165/91   Max taken time: 22 1830  ABP (Arterial line BP)  Min: 158/73   Min taken time: 22 1815  Max: 186/86   Max taken time: 22 1945  MAP (mmHg)  Av.4  Min: 104   Min taken time: 22 1930  Max: 80   Max taken time: 22 1710  SpO2  Av.7 %  Min: 94 %   Min taken time: 22 0730  Max: 98 %   Max taken time: 22 0635    BP Readings from Last 3 Encounters:   22 (!) 150/89   22 118/74   22 130/85       Allergies   Allergen Reactions    Other      Patient Active Problem List   Diagnosis    Kidney stones    Nocturia    Hypertrophy of prostate with urinary obstruction    Malignant neoplasm of urinary bladder (HCC)    Urinary retention    Cancer of lateral wall of urinary bladder (HCC)    Acute respiratory failure with hypoxia (HCC)    Other hyperlipidemia    Microcytic anemia    Mild protein-calorie malnutrition (HCC)    Pulmonary embolism without acute cor pulmonale (HCC)    Hypoxia    Dyspnea    Acute systolic heart failure (HCC)    Acute renal failure with tubular necrosis (HCC)    CKD (chronic kidney disease), stage III ([address]    Dilated cardiomyopathy ([address]    Bladder cancer ([address]       Medications  No current facility-administered medications on file prior to encounter. Current Outpatient Medications on File Prior to Encounter   Medication Sig Dispense Refill    rivaroxaban (XARELTO) 20 MG TABS tablet Take 1 tablet by mouth once daily with breakfast (Patient taking differently: Take 1 tablet by mouth once daily with breakfast/ per cardiology, pt. to stop 3 days pre-op for OR 9-16-22) 30 tablet 1    omeprazole (PRILOSEC) 20 MG delayed release capsule Take 1 capsule by mouth daily 30 capsule 3    carvedilol (COREG) 3.125 MG tablet TAKE 1 TABLET BY MOUTH TWICE DAILY      finasteride (PROSCAR) 5 MG tablet Take 5 mg by mouth daily      tamsulosin (FLOMAX) 0.4 MG capsule Take 0.4 mg by mouth daily      simvastatin (ZOCOR) 40 MG tablet Take 40 mg by mouth nightly      buPROPion (WELLBUTRIN XL) 300 MG extended release tablet Take 300 mg by mouth every morning       Current Facility-Administered Medications   Medication Dose Route Frequency Provider Last Rate Last Admin    promethazine (PHENERGAN) injection 6.25 mg  6.25 mg IntraMUSCular Q6H PRN [facility] Snellen, MD   6.25 mg at 09/17/22 0412    HYDROmorphone (DILAUDID) injection 1 mg  1 mg IntraVENous Q3H PRN Colette Acuña MD   1 mg at 09/17/22 1227    naloxone 0.4 mg in 10 mL sodium chloride syringe   IntraVENous PRN Cleo Heck MD        ondansetron (ZOFRAN) injection 4 mg  4 mg IntraVENous Q6H PRN Sae Bran MD        bupivacaine (PF) (MARCAINE) 0.125 % in sodium chloride 0.9 % 240 mL epidural   Epidural Continuous Sae Bran MD        naloxegol (MOVANTIK) tablet 25 mg  25 mg Oral Daily Colette Acuña MD   25 mg at 09/17/22 0829    buPROPion (WELLBUTRIN XL) extended release tablet 300 mg  300 mg Oral QAM Colette Acuña MD   300 mg at 09/17/22 0830    carvedilol (COREG) tablet 3.125 mg  3.125 mg Oral BID Colette Acuña MD   3.125 mg at 09/17/22 0830    pantoprazole (PROTONIX) tablet 40 mg  40 mg Oral QAM AC Shashank Meza MD        ondansetron (ZOFRAN-ODT) disintegrating tablet 8 mg  8 mg SubLINGual Q8H PRN Layla Raymond MD        simvastatin (ZOCOR) tablet 40 mg  40 mg Oral Nightly Layla Raymond MD   40 mg at 09/16/22 2152    sodium chloride flush 0.9 % injection 5-40 mL  5-40 mL IntraVENous 2 times per day Layla Raymond MD        sodium chloride flush 0.9 % injection 5-40 mL  5-40 mL IntraVENous PRN Layla Raymond MD        0.9 % sodium chloride infusion   IntraVENous PRN Layla Raymond MD        ondansetron (ZOFRAN-ODT) disintegrating tablet 4 mg  4 mg Oral Q8H PRN Layla Raymond MD        Or    ondansetron (ZOFRAN) injection 4 mg  4 mg IntraVENous Q6H PRN Layla Raymond MD   4 mg at 09/17/22 0538    0.9 % sodium chloride infusion   IntraVENous Continuous Layla Raymond  mL/hr at 09/16/22 2059 New Bag at 09/16/22 2059    acetaminophen (TYLENOL) tablet 650 mg  650 mg Oral Q6H Shashank Meza MD   650 mg at 09/17/22 0830    oxyCODONE (ROXICODONE) immediate release tablet 5 mg  5 mg Oral Q4H PRN Layla Raymond MD        Or    oxyCODONE (ROXICODONE) immediate release tablet 10 mg  10 mg Oral Q4H PRN Layla Raymond MD   10 mg at 09/17/22 0904    ceFAZolin (ANCEF) 2000 mg in sterile water 20 mL IV syringe  2,000 mg IntraVENous Q8H Layla Raymond MD   2,000 mg at 09/17/22 0830       Last Pain Score: (Charted in Doc Flowsheet)  Pain Level: 10    PCA:     BMP:    Lab Results   Component Value Date/Time     09/17/2022 06:05 AM    K 4.2 09/17/2022 06:05 AM     09/17/2022 06:05 AM    CO2 22 09/17/2022 06:05 AM    BUN 17 09/17/2022 06:05 AM    CREATININE 2.24 09/17/2022 06:05 AM    CALCIUM 8.3 09/17/2022 06:05 AM    GFRAA 36 09/17/2022 06:05 AM    LABGLOM 30 09/17/2022 06:05 AM    GLUCOSE 148 09/17/2022 06:05 AM     COAGS:    Lab Results   Component Value Date/Time    PROTIME 13.6 02/25/2022 12:26 PM    INR 1.1 02/25/2022 12:26 PM       Adjuvant pain medication:    A/P: Jenn Angulo is a 74y.o. year-old s/p XI ROBOTIC LAPARASCOPIC ASSISTED RADICAL CYSTOPROSTATECTOMY, BILATERAL PELVIC LYMPHADENECTOMY AND ILEAL CONDUIT FORMATION now POD#1 with satisfactory pain control . He has an epidural PCA running at 10 ml/hr with PCA at 5 ml every 15 mins prn. Also receiving parenteral analgesics. Rates his pain at 10/10 but does state that he feels better after the PCA pump changes were made. Will continue with present regimen.      -Thank you for opportunity to participate in this patient's care.     Electronically signed by Kyle Rojas MD on 9/17/2022 at 12:40 PM

## 2022-09-18 ENCOUNTER — APPOINTMENT (OUTPATIENT)
Dept: ULTRASOUND IMAGING | Age: 67
DRG: 653 | End: 2022-09-18
Attending: SPECIALIST
Payer: MEDICARE

## 2022-09-18 LAB
ANION GAP SERPL CALCULATED.3IONS-SCNC: 10 MMOL/L (ref 9–17)
BACTERIA: ABNORMAL
BILIRUBIN URINE: NEGATIVE
BUN BLDV-MCNC: 24 MG/DL (ref 8–23)
CALCIUM SERPL-MCNC: 8.5 MG/DL (ref 8.6–10.4)
CASTS UA: ABNORMAL /LPF (ref 0–8)
CHLORIDE BLD-SCNC: 109 MMOL/L (ref 98–107)
CO2: 20 MMOL/L (ref 20–31)
COLOR: YELLOW
CREAT SERPL-MCNC: 2.47 MG/DL (ref 0.7–1.2)
CREATININE FLUID: 2.4 MG/DL
CREATININE URINE: 121.3 MG/DL (ref 39–259)
EOSINOPHIL,URINE: NORMAL
EPITHELIAL CELLS UA: ABNORMAL /HPF (ref 0–5)
GFR AFRICAN AMERICAN: 32 ML/MIN
GFR NON-AFRICAN AMERICAN: 26 ML/MIN
GFR SERPL CREATININE-BSD FRML MDRD: ABNORMAL ML/MIN/{1.73_M2}
GLUCOSE BLD-MCNC: 104 MG/DL (ref 70–99)
GLUCOSE URINE: NEGATIVE
HCT VFR BLD CALC: 36.2 % (ref 40.7–50.3)
HEMOGLOBIN: 11.4 G/DL (ref 13–17)
KETONES, URINE: ABNORMAL
LEUKOCYTE ESTERASE, URINE: ABNORMAL
MAGNESIUM: 2 MG/DL (ref 1.6–2.6)
MCH RBC QN AUTO: 28.5 PG (ref 25.2–33.5)
MCHC RBC AUTO-ENTMCNC: 31.5 G/DL (ref 28.4–34.8)
MCV RBC AUTO: 90.5 FL (ref 82.6–102.9)
NITRITE, URINE: POSITIVE
NRBC AUTOMATED: 0 PER 100 WBC
PDW BLD-RTO: 14.6 % (ref 11.8–14.4)
PH UA: 6 (ref 5–8)
PHOSPHORUS: 3.8 MG/DL (ref 2.5–4.5)
PLATELET # BLD: 209 K/UL (ref 138–453)
PMV BLD AUTO: 9.6 FL (ref 8.1–13.5)
POTASSIUM SERPL-SCNC: 4.4 MMOL/L (ref 3.7–5.3)
PROTEIN UA: ABNORMAL
RBC # BLD: 4 M/UL (ref 4.21–5.77)
RBC UA: ABNORMAL /HPF (ref 0–4)
SODIUM BLD-SCNC: 139 MMOL/L (ref 135–144)
SPECIFIC GRAVITY UA: 1.02 (ref 1–1.03)
SPECIMEN TYPE: NORMAL
TOTAL PROTEIN, URINE: 60 MG/DL
TURBIDITY: ABNORMAL
URINE HGB: ABNORMAL
UROBILINOGEN, URINE: NORMAL
WBC # BLD: 15 K/UL (ref 3.5–11.3)
WBC UA: ABNORMAL /HPF (ref 0–5)

## 2022-09-18 PROCEDURE — 85027 COMPLETE CBC AUTOMATED: CPT

## 2022-09-18 PROCEDURE — 81001 URINALYSIS AUTO W/SCOPE: CPT

## 2022-09-18 PROCEDURE — 6360000002 HC RX W HCPCS: Performed by: STUDENT IN AN ORGANIZED HEALTH CARE EDUCATION/TRAINING PROGRAM

## 2022-09-18 PROCEDURE — 2580000003 HC RX 258: Performed by: ANESTHESIOLOGY

## 2022-09-18 PROCEDURE — 6370000000 HC RX 637 (ALT 250 FOR IP): Performed by: STUDENT IN AN ORGANIZED HEALTH CARE EDUCATION/TRAINING PROGRAM

## 2022-09-18 PROCEDURE — 84100 ASSAY OF PHOSPHORUS: CPT

## 2022-09-18 PROCEDURE — 87205 SMEAR GRAM STAIN: CPT

## 2022-09-18 PROCEDURE — 6360000002 HC RX W HCPCS

## 2022-09-18 PROCEDURE — 84156 ASSAY OF PROTEIN URINE: CPT

## 2022-09-18 PROCEDURE — 2580000003 HC RX 258: Performed by: STUDENT IN AN ORGANIZED HEALTH CARE EDUCATION/TRAINING PROGRAM

## 2022-09-18 PROCEDURE — 99222 1ST HOSP IP/OBS MODERATE 55: CPT | Performed by: INTERNAL MEDICINE

## 2022-09-18 PROCEDURE — 82570 ASSAY OF URINE CREATININE: CPT

## 2022-09-18 PROCEDURE — 80048 BASIC METABOLIC PNL TOTAL CA: CPT

## 2022-09-18 PROCEDURE — 2500000003 HC RX 250 WO HCPCS: Performed by: ANESTHESIOLOGY

## 2022-09-18 PROCEDURE — 83735 ASSAY OF MAGNESIUM: CPT

## 2022-09-18 PROCEDURE — 36415 COLL VENOUS BLD VENIPUNCTURE: CPT

## 2022-09-18 PROCEDURE — 1200000000 HC SEMI PRIVATE

## 2022-09-18 PROCEDURE — 76770 US EXAM ABDO BACK WALL COMP: CPT

## 2022-09-18 RX ORDER — HEPARIN SODIUM 5000 [USP'U]/ML
5000 INJECTION, SOLUTION INTRAVENOUS; SUBCUTANEOUS 2 TIMES DAILY
Status: DISCONTINUED | OUTPATIENT
Start: 2022-09-18 | End: 2022-09-24

## 2022-09-18 RX ADMIN — ACETAMINOPHEN 650 MG: 325 TABLET ORAL at 21:51

## 2022-09-18 RX ADMIN — ONDANSETRON 8 MG: 4 TABLET, ORALLY DISINTEGRATING ORAL at 22:01

## 2022-09-18 RX ADMIN — ONDANSETRON 8 MG: 4 TABLET, ORALLY DISINTEGRATING ORAL at 05:13

## 2022-09-18 RX ADMIN — SIMVASTATIN 40 MG: 40 TABLET, FILM COATED ORAL at 21:51

## 2022-09-18 RX ADMIN — HYDROMORPHONE HYDROCHLORIDE 1 MG: 1 INJECTION, SOLUTION INTRAMUSCULAR; INTRAVENOUS; SUBCUTANEOUS at 02:03

## 2022-09-18 RX ADMIN — OXYCODONE 10 MG: 5 TABLET ORAL at 10:31

## 2022-09-18 RX ADMIN — SODIUM CHLORIDE: 9 INJECTION, SOLUTION INTRAVENOUS at 14:03

## 2022-09-18 RX ADMIN — SODIUM CHLORIDE, PRESERVATIVE FREE 10 ML: 5 INJECTION INTRAVENOUS at 09:10

## 2022-09-18 RX ADMIN — HYDROMORPHONE HYDROCHLORIDE 0.5 MG: 1 INJECTION, SOLUTION INTRAMUSCULAR; INTRAVENOUS; SUBCUTANEOUS at 23:42

## 2022-09-18 RX ADMIN — OXYCODONE 10 MG: 5 TABLET ORAL at 23:16

## 2022-09-18 RX ADMIN — BUPROPION HYDROCHLORIDE 300 MG: 150 TABLET, EXTENDED RELEASE ORAL at 09:09

## 2022-09-18 RX ADMIN — HYDROMORPHONE HYDROCHLORIDE 1 MG: 1 INJECTION, SOLUTION INTRAMUSCULAR; INTRAVENOUS; SUBCUTANEOUS at 14:01

## 2022-09-18 RX ADMIN — HYDROMORPHONE HYDROCHLORIDE 1 MG: 1 INJECTION, SOLUTION INTRAMUSCULAR; INTRAVENOUS; SUBCUTANEOUS at 10:40

## 2022-09-18 RX ADMIN — ACETAMINOPHEN 650 MG: 325 TABLET ORAL at 09:08

## 2022-09-18 RX ADMIN — SODIUM CHLORIDE: 9 INJECTION, SOLUTION INTRAVENOUS at 23:15

## 2022-09-18 RX ADMIN — HYDROMORPHONE HYDROCHLORIDE 1 MG: 1 INJECTION, SOLUTION INTRAMUSCULAR; INTRAVENOUS; SUBCUTANEOUS at 17:20

## 2022-09-18 RX ADMIN — OXYCODONE 10 MG: 5 TABLET ORAL at 00:52

## 2022-09-18 RX ADMIN — CARVEDILOL 3.12 MG: 3.12 TABLET, FILM COATED ORAL at 21:57

## 2022-09-18 RX ADMIN — CARVEDILOL 3.12 MG: 3.12 TABLET, FILM COATED ORAL at 09:09

## 2022-09-18 RX ADMIN — OXYCODONE 10 MG: 5 TABLET ORAL at 05:03

## 2022-09-18 RX ADMIN — NALOXEGOL OXALATE 25 MG: 12.5 TABLET, FILM COATED ORAL at 09:08

## 2022-09-18 RX ADMIN — HYDROMORPHONE HYDROCHLORIDE 1 MG: 1 INJECTION, SOLUTION INTRAMUSCULAR; INTRAVENOUS; SUBCUTANEOUS at 06:22

## 2022-09-18 RX ADMIN — BUPIVACAINE HYDROCHLORIDE: 5 INJECTION, SOLUTION EPIDURAL; INTRACAUDAL; PERINEURAL at 07:19

## 2022-09-18 RX ADMIN — HYDROMORPHONE HYDROCHLORIDE 1 MG: 1 INJECTION, SOLUTION INTRAMUSCULAR; INTRAVENOUS; SUBCUTANEOUS at 21:51

## 2022-09-18 ASSESSMENT — PAIN DESCRIPTION - LOCATION
LOCATION: ABDOMEN

## 2022-09-18 ASSESSMENT — PAIN SCALES - WONG BAKER
WONGBAKER_NUMERICALRESPONSE: 4

## 2022-09-18 ASSESSMENT — PAIN SCALES - GENERAL
PAINLEVEL_OUTOF10: 7
PAINLEVEL_OUTOF10: 10
PAINLEVEL_OUTOF10: 10
PAINLEVEL_OUTOF10: 4
PAINLEVEL_OUTOF10: 8
PAINLEVEL_OUTOF10: 6
PAINLEVEL_OUTOF10: 5
PAINLEVEL_OUTOF10: 5
PAINLEVEL_OUTOF10: 6
PAINLEVEL_OUTOF10: 7
PAINLEVEL_OUTOF10: 7

## 2022-09-18 ASSESSMENT — PAIN DESCRIPTION - FREQUENCY: FREQUENCY: CONTINUOUS

## 2022-09-18 ASSESSMENT — PAIN DESCRIPTION - DESCRIPTORS
DESCRIPTORS: DISCOMFORT
DESCRIPTORS: ACHING
DESCRIPTORS: SHARP

## 2022-09-18 ASSESSMENT — PAIN DESCRIPTION - ORIENTATION: ORIENTATION: MID

## 2022-09-18 ASSESSMENT — PAIN DESCRIPTION - ONSET: ONSET: ON-GOING

## 2022-09-18 NOTE — PLAN OF CARE
Problem: Pain  Goal: Verbalizes/displays adequate comfort level or baseline comfort level  9/18/2022 1514 by Ale Jeffery RN  Outcome: Progressing  9/18/2022 0221 by Lul Howard RN  Outcome: Progressing     Problem: Safety - Adult  Goal: Free from fall injury  9/18/2022 1514 by Ale Jeffery RN  Outcome: Progressing  9/18/2022 0221 by Lul Howard RN  Outcome: Progressing     Problem: ABCDS Injury Assessment  Goal: Absence of physical injury  9/18/2022 1514 by Ale Jeffery RN  Outcome: Progressing  9/18/2022 0221 by Lul Howard RN  Outcome: Progressing     Problem: Nutrition Deficit:  Goal: Optimize nutritional status  9/18/2022 1514 by Ale Jeffery RN  Outcome: Progressing  9/18/2022 0221 by Lul Howard RN  Outcome: Progressing

## 2022-09-18 NOTE — CONSULTS
Renal Consult Note    Patient :  Rey Simons; 74 y.o. MRN# [id number]  Location:  6927/5955-96  Attending:  Augie Reno MD  Admit Date:  9/16/2022   Hospital Day: 2    Reason for Consult:     Asked by Dr Augie Reno MD to see for PAXTON/Elevated Creatinine. History Obtained From:     patient, electronic medical record    History of Present Illness:     Rey Simons; 74 y.o. male with past medical history of PE, CKD managed by Dr Boo Blank, right side hydronephrosis s/p ureter stent, recurrent UTI's and bladder cancer failed chemotherapy, who was admitted for planned robotic laparoscopic radical cystoprostatectomy with bilateral pelvic lymphadenectomy and ileal conduit formation on 9/16/22. Nephrology is consulted today due to rise in creatinine. On admission creatinine was 1.79-1.90-2.24, and today up to 2.47. Per Dr Kisha Cruz office notes, his baseline in May runs 1.7-1.8. Since his procedure he has been NPO with NS infusion at 100/hr. He has been nauseated and has several emesis today. He has not had any contrast exposure, nor low blood pressures. He is not on diuretics or nephrotoxic medications. Urine output last 24 hours 950 and 230 via MERLIN drain. No history of recent contrast exposure  No h/o prolonged NSAIDs use in the past,   No h/o nephrolithiasis, No recent skin rashes or arthralgias   No hematuria or pyuria noticed in the recent past.   Doesn't report any reduction in the urine output recently. Non report of any obstructive urinary symptoms (urgency, frequency, weak stream, straining while urination). No h/o recurrent UTIs in the past.    Past History/Allergies? Social History:     Past Medical History:   Diagnosis Date    Acute renal failure with tubular necrosis ([facility] 05/26/2022    Likely ischemic ATN/prerenal acidemia from intractable nausea vomiting as result of chemotherapy, baseline 1.3-1.4 peaked up to 2.3 in May 2022    Arthritis     BPH with obstruction/lower urinary tract symptoms     CAD (coronary artery disease) 06/2022    nonobstructive on cath    Caffeine use     3 cans soda/pop    Cancer (HCC)     bladder cancer. Dr. 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   Baseline 1.3-1.4    COVID-19 08/16/2021    SOB, fatigue, fever, chills  x 3 weeks    Depression     Dilated cardiomyopathy ([facility] 05/26/2022    Ejection fraction 40 to 45%, does have symptoms of dyspnea and decreased effort tolerance    GERD (gastroesophageal reflux disease)     High cholesterol     Kidney calculi     Sleep apnea     does not use cpap    Under care of team     Dr. Willian [facility]    Under care of team     Dr. Maddie Pathak. last visit approx 9/2021    Under care of team     Dr. Leo Zayas [facility] last visit 8/03/2022    Under care of team     Dr. Trixie Jensen    Under care of team     Dr. Christina Hampton       Past Surgical History:   Procedure Laterality Date    BLADDER REMOVAL      [address]  06/16/2022    nonobstructive CAD    COLONOSCOPY      IR PORT PLACEMENT EQUAL OR GREATER THAN 5 YEARS  02/25/2022    IR PORT PLACEMENT EQUAL OR GREATER THAN 5 YEARS 2/25/2022 [facility] PROC[facility]    KNEE ARTHROSCOPY      left    PROSTATECTOMY  09/16/2022    Cystoprostatectomy, Bilateral Pelvic Lymphadenectomy, ileo conduit formation, cystectomy stent removal (right)       Allergies   Allergen Reactions    Other        Social History     Socioeconomic History    Marital status:      Spouse name: Not on file    Number of children: Not on file    Years of education: Not on file    Highest education level: Not on file   Occupational History    Not on file   Tobacco Use    Smoking status: Former     Packs/day: 0.25     Years: 9.00     Pack years: 2.25     Types: Cigarettes     Start date: 1983     Quit date: 2/10/1992     Years since quittin.6    Smokeless tobacco: Never   Vaping Use    Vaping Use: Never used   Substance and Sexual Activity    Alcohol use: Not Currently    Drug use: No    Sexual activity: Yes     Partners: Female   Other Topics Concern    Not on file   Social History Narrative    Not on file     Social Determinants of Health     Financial Resource Strain: Not on file   Food Insecurity: Not on file   Transportation Needs: Not on file   Physical Activity: Not on file   Stress: Not on file   Social Connections: Not on file   Intimate Partner Violence: Not on file   Housing Stability: Not on file       Family History:        Family History   Problem Relation Age of Onset    Cancer Father         bladder cancer    Urolithiasis Father        Outpatient Medications:     Medications Prior to Admission: ondansetron (ZOFRAN-ODT) 8 MG TBDP disintegrating tablet, DISSOLVE 1 TABLET IN MOUTH EVERY 8 HOURS AS NEEDED FOR NAUSEA FOR VOMITING  HYDROcodone-acetaminophen (NORCO) 5-325 MG per tablet, Take 1 tablet by mouth every 8 hours as needed for Pain for up to 30 days.   rivaroxaban (XARELTO) 20 MG TABS tablet, Take 1 tablet by mouth once daily with breakfast (Patient taking differently: Take 1 tablet by mouth once daily with breakfast/ per cardiology, pt. to stop 3 days pre-op for OR 22)  omeprazole (PRILOSEC) 20 MG delayed release capsule, Take 1 capsule by mouth daily  carvedilol (COREG) 3.125 MG tablet, TAKE 1 TABLET BY MOUTH TWICE DAILY  finasteride (PROSCAR) 5 MG tablet, Take 5 mg by mouth daily  tamsulosin (FLOMAX) 0.4 MG capsule, Take 0.4 mg by mouth daily  simvastatin (ZOCOR) 40 MG tablet, Take 40 mg by mouth nightly  buPROPion (WELLBUTRIN XL) 300 MG extended release tablet, Take 300 mg by mouth every morning    Current Medications:     Scheduled Meds:    heparin (porcine)  5,000 Units SubCUTAneous BID    naloxegol  25 mg Oral Daily    buPROPion  300 mg Oral QAM    carvedilol  3.125 mg Oral BID    pantoprazole  40 mg Oral QAM AC    simvastatin  40 mg Oral Nightly    sodium chloride flush  5-40 mL IntraVENous 2 times per day    acetaminophen  650 mg Oral Q6H     Continuous Infusions:    fentaNYL (SUBLIMAZE) bupivacaine (MARCAINE) in sodium chloride 0.9 % epidural 10 mL/hr at 22 07    sodium chloride      sodium chloride 100 mL/hr at 22     PRN Meds:  promethazine, HYDROmorphone, naloxone, ondansetron, ondansetron, sodium chloride flush, sodium chloride, ondansetron **OR** ondansetron, oxyCODONE **OR** oxyCODONE    Review of Systems:     Constitutional: No fever, no chills, no lethargy, no weakness. HEENT:  No headache, otalgia, itchy eyes, nasal discharge or sore throat. Cardiac:  No chest pain, dyspnea, orthopnea or PND. Chest:             No cough, phlegm or wheezing. Abdomen:  ++ abdominal pain, nausea or vomiting. Neuro:             No focal weakness, abnormal movements or seizure like activity. Skin:   No rashes, no itching. :   No hematuria, no pyuria, no dysuria, no flank pain. Extremities:  No swelling or joint pains. ROS was otherwise negative except as mentioned in the [address] Input/Output:       I/O last 3 completed shifts: In: 300 [P.O.:100; I.V.:200]  Out:  [Urine:1550; Drains:510]    Vital Signs:   Temperature:  Temp: 97.8 °F (36.6 °C)  TMax:   Temp (24hrs), Av.4 °F (36.9 °C), Min:97.8 °F (36.6 °C), Max:98.7 °F (37.1 °C)    Respirations:  Resp: 18  Pulse:   Heart Rate: 96  BP:    BP: (!) 148/87  BP Range: Systolic (34LQL), XJH:403 , Min:148 , DBI:869       Diastolic (86EKQ), IZN:85, Min:87, Max:98      Physical Examination:     General:  AAO x 3, speaking in full sentences, no accessory muscle use. HEENT: Atraumatic, normocephalic, no throat congestion, moist mucosa.   Eyes: Pupils equal, round and reactive to light, EOMI. Neck:   No JVD, no thyromegaly, no lymphadenopathy. Chest:   Bilateral vesicular breath sounds, no rales or wheezes. Cardiac:  S1 S2 RR, no murmurs, gallops or rubs, JVP not raised. Abdomen: Tender staples intact. MERLIN drain  :   No suprapubic or flank tenderness. Neuro:              AAO x 3, No FND. SKIN:  No rashes, good skin turgor.   Extremities:  No edema, No clubbing, No cyanosis    Labs:       Recent Labs     09/16/22 2058 09/17/22 0605 09/18/22  0659   WBC 13.1* 12.8* 15.0*   RBC 3.80* 3.67* 4.00*   HGB 11.4* 10.8* 11.4*   HCT 33.6* 31.9* 36.2*   MCV 88.4 86.9 90.5   MCH 30.0 29.4 28.5   MCHC 33.9 33.9 31.5   RDW 14.1 13.9 14.6*    218 209   MPV 9.3 9.6 9.6      BMP:   Recent Labs     09/16/22 2058 09/17/22  0605 09/18/22  0659    138 139   K 4.6 4.2 4.4    104 109*   CO2 20 22 20   BUN 14 17 24*   CREATININE 1.90* 2.24* 2.47*   GLUCOSE 167* 148* 104*   CALCIUM 8.4* 8.3* 8.5*      Phosphorus:     Recent Labs     09/17/22  0605 09/18/22  0659   PHOS 3.9 3.8     Magnesium:    Recent Labs     09/17/22  0605 09/18/22  0659   MG 1.8 2.0     LISS:      Lab Results   Component Value Date/Time    LISS NEGATIVE 05/31/2022 10:45 AM     SPEP:  Lab Results   Component Value Date/Time    PROT 6.6 09/08/2022 01:46 PM     C3:     Lab Results   Component Value Date/Time    C3 119 05/31/2022 10:45 AM     C4:     Lab Results   Component Value Date/Time    C4 25 05/31/2022 10:45 AM     Urinalysis/Chemistries:      Lab Results   Component Value Date/Time    NITRU POSITIVE 05/05/2022 10:26 AM    COLORU jared 08/04/2022 09:05 AM    COLORU Yellow 05/05/2022 10:26 AM    PHUR 6.0 05/05/2022 10:26 AM    WBCUA TOO NUMEROUS TO COUNT 05/05/2022 10:26 AM    RBCUA 20 TO 50 05/05/2022 10:26 AM    MUCUS 3+ 04/14/2022 09:43 AM    YEAST FEW 03/31/2022 01:01 PM    BACTERIA MANY 05/05/2022 10:26 AM    CLARITYU cloudy 08/04/2022 09:05 AM    SPECGRAV 1.025 05/05/2022 10:26 AM    LEUKOCYTESUR large 08/04/2022 09:05 AM    LEUKOCYTESUR MODERATE 05/05/2022 10:26 AM    UROBILINOGEN Normal 05/05/2022 10:26 AM    BILIRUBINUR NEGATIVE 05/05/2022 10:26 AM    BLOODU large 08/04/2022 09:05 AM    GLUCOSEU neg 08/04/2022 09:05 AM    GLUCOSEU NEGATIVE 05/05/2022 10:26 AM    KETUA NEGATIVE 05/05/2022 10:26 AM     Urine Creatinine:     Lab Results   Component Value Date/Time    LABCREA 124.7 07/14/2022 08:17 AM       Radiology:     CXR:     Assessment:     1. Acute Kidney Injury: ATN from fluid shifts related to surgery  2. Chronic Kidney Disease : Stage III secondary to chronic  obstructive uropathy from bladder tumor, chemotherapy side effect from platinum based analogs. Baseline creatinine 1.7-1.8 as of May 2022. Managed by Dr Wild Calhoun  Work-up for immune complex  disease negative  3. Bladder cancer s/p cystectomy   4. CMP EF 45%    Plan:   1. Continue IVF NS at 100/hr  2. Daily Labs  3. Will Check Renal Ultrasound to r/o element of obstruction and to assess the kidney size/echotexture. 4. Comprehensive urine testing including Urinalysis, Urine protein and creatinine to quantify the proteinuria if any at all. Will check urinary eosinophils as well. Nutrition   Please ensure that patient is on a renal diet/TF. Avoid nephrotoxic drugs/contrast exposure. Thank you for the consultation. Please do not hesitate to contact us for any further questions/concerns. We will continue to follow along with you. Jacquelyn Platt [facility]  [facility]   Attending Physician Statement  I have discussed the care of Janeth Cons, including pertinent history and exam findings,  with the CNP. I have reviewed the key elements of all parts of the encounter with the CNP. I agree with the assessment, plan and orders as documented .     Florencia Hernandez MD MD, [facility] Center[facility] Peter Bermudez, FACP   9/18/2022 2:13 PM    Nephrology [address] [place] [address]

## 2022-09-18 NOTE — CARE COORDINATION
María Nicolas from [address] OF [facility]. has accepted this patient.

## 2022-09-18 NOTE — PROGRESS NOTES
Department of Anesthesiology   Acute Pain Progress Note    Patient's Name: Mt García  Surgeon: No name on file.    Date: 2022    Pt Awake, Alert  Vital Signs (Current)   Vitals:    22 1215   BP: (!) 141/82   Pulse: (!) 102   Resp: 18   Temp: 98.2 °F (36.8 °C)   SpO2: 97%     Vital Signs Statistics (for past 48 hrs)     Temp  Av.3 °F (36.8 °C)  Min: 97 °F (36.1 °C)   Min taken time: 22 1650  Max: 98.8 °F (37.1 °C)   Max taken time: 22 0400  Pulse  Av.7  Min: 80   Min taken time: 22 1730  Max: 102   Max taken time: 22 1215  Resp  Av.7  Min: 6   Min taken time: 22 1700  Max: 29   Max taken time: 22 1650  BP  Min: 134/80   Min taken time: 22 0730  Max: 165/98   Max taken time: 22 1652  ABP (Arterial line BP)  Min: 158/73   Min taken time: 22 1815  Max: 186/86   Max taken time: 22 1945  MAP (mmHg)  Av.4  Min: 104   Min taken time: 22 1930  Max: 80   Max taken time: 22 1710  SpO2  Av.9 %  Min: 94 %   Min taken time: 22 0730  Max: 98 %   Max taken time: 22 0700    BP Readings from Last 3 Encounters:   22 (!) 141/82   22 118/74   22 130/85       Allergies   Allergen Reactions    Other      Patient Active Problem List   Diagnosis    Kidney stones    Nocturia    Hypertrophy of prostate with urinary obstruction    Malignant neoplasm of urinary bladder (HCC)    Urinary retention    Cancer of lateral wall of urinary bladder (HCC)    Acute respiratory failure with hypoxia (HCC)    Other hyperlipidemia    Microcytic anemia    Mild protein-calorie malnutrition (HCC)    Pulmonary embolism without acute cor pulmonale (HCC)    Hypoxia    Dyspnea    Acute systolic heart failure (HCC)    Acute renal failure with tubular necrosis (HCC)    CKD (chronic kidney disease), stage III ([address]    Dilated cardiomyopathy ([facility] [address]    Bladder cancer (HCC)       Medications  No current facility-administered medications on file prior to encounter.      Current Outpatient Medications on File Prior to Encounter   Medication Sig Dispense Refill    rivaroxaban (XARELTO) 20 MG TABS tablet Take 1 tablet by mouth once daily with breakfast (Patient taking differently: Take 1 tablet by mouth once daily with breakfast/ per cardiology, pt. to stop 3 days pre-op for OR 9-16-22) 30 tablet 1    omeprazole (PRILOSEC) 20 MG delayed release capsule Take 1 capsule by mouth daily 30 capsule 3    carvedilol (COREG) 3.125 MG tablet TAKE 1 TABLET BY MOUTH TWICE DAILY      finasteride (PROSCAR) 5 MG tablet Take 5 mg by mouth daily      tamsulosin (FLOMAX) 0.4 MG capsule Take 0.4 mg by mouth daily      simvastatin (ZOCOR) 40 MG tablet Take 40 mg by mouth nightly      buPROPion (WELLBUTRIN XL) 300 MG extended release tablet Take 300 mg by mouth every morning       Current Facility-Administered Medications   Medication Dose Route Frequency Provider Last Rate Last Admin    heparin (porcine) injection 5,000 Units  5,000 Units SubCUTAneous BID Kellie Bentley MD        promethazine (PHENERGAN) injection 6.25 mg  6.25 mg IntraMUSCular Q6H PRN Montana Canales MD   6.25 mg at 09/17/22 0412    HYDROmorphone (DILAUDID) injection 1 mg  1 mg IntraVENous Q3H PRN Kellie Bentley MD   1 mg at 09/18/22 1040    naloxone 0.4 mg in 10 mL sodium chloride syringe   IntraVENous PRN Elmer Dey MD        ondansetron (ZOFRAN) injection 4 mg  4 mg IntraVENous Q6H PRN Sae Bran MD        bupivacaine (PF) (MARCAINE) 0.125 % in sodium chloride 0.9 % 240 mL epidural   Epidural Continuous Elmer Dey MD 10 mL/hr at 09/18/22 0719 New Bag at 09/18/22 0719    naloxegol (MOVANTIK) tablet 25 mg  25 mg Oral Daily Kellie Bentley MD   25 mg at 09/18/22 0908    buPROPion (WELLBUTRIN XL) extended release tablet 300 mg  300 mg Oral QAM Kellie Bentley MD   300 mg at 09/18/22 0909    carvedilol (COREG) tablet 3.125 mg  3.125 mg Oral BID Kellie Bentley MD   3.125 mg at 09/18/22 0909    pantoprazole (PROTONIX) tablet 40 mg  40 mg Oral QAM AC Shashank Meza MD        ondansetron (ZOFRAN-ODT) disintegrating tablet 8 mg  8 mg SubLINGual Q8H PRN Shikha Reed MD   8 mg at 09/18/22 0513    simvastatin (ZOCOR) tablet 40 mg  40 mg Oral Nightly Shikha Reed MD   40 mg at 09/17/22 2038    sodium chloride flush 0.9 % injection 5-40 mL  5-40 mL IntraVENous 2 times per day Shikha Reed MD   10 mL at 09/17/22 2038    sodium chloride flush 0.9 % injection 5-40 mL  5-40 mL IntraVENous PRN Shikha Reed MD   10 mL at 09/18/22 0910    0.9 % sodium chloride infusion   IntraVENous PRN Shikha Reed MD        ondansetron (ZOFRAN-ODT) disintegrating tablet 4 mg  4 mg Oral Q8H PRN Shikha Reed MD        Or    ondansetron (ZOFRAN) injection 4 mg  4 mg IntraVENous Q6H PRN Shikha Reed MD   4 mg at 09/17/22 0538    0.9 % sodium chloride infusion   IntraVENous Continuous Shikha Reed  mL/hr at 09/16/22 2059 New Bag at 09/16/22 2059    acetaminophen (TYLENOL) tablet 650 mg  650 mg Oral Q6H Shikha Reed MD   650 mg at 09/18/22 0908    oxyCODONE (ROXICODONE) immediate release tablet 5 mg  5 mg Oral Q4H PRN Shikha Reed MD        Or    oxyCODONE (ROXICODONE) immediate release tablet 10 mg  10 mg Oral Q4H PRN Shikha Reed MD   10 mg at 09/18/22 1031       Last Pain Score: (Charted in Doc Flowsheet)  Pain Level: 5    PCA:     BMP:    Lab Results   Component Value Date/Time     09/18/2022 06:59 AM    K 4.4 09/18/2022 06:59 AM     09/18/2022 06:59 AM    CO2 20 09/18/2022 06:59 AM    BUN 24 09/18/2022 06:59 AM    CREATININE 2.47 09/18/2022 06:59 AM    CALCIUM 8.5 09/18/2022 06:59 AM    GFRAA 32 09/18/2022 06:59 AM    LABGLOM 26 09/18/2022 06:59 AM    GLUCOSE 104 09/18/2022 06:59 AM     COAGS:    Lab Results   Component Value Date/Time    PROTIME 13.6 02/25/2022 12:26 PM    INR 1.1 02/25/2022 12:26 PM       Adjuvant pain medication:    A/P: Kendall Frey Warden Granger is a 74y.o. year-old s/p XI ROBOTIC LAPARASCOPIC ASSISTED RADICAL CYSTOPROSTATECTOMY, BILATERAL PELVIC LYMPHADENECTOMY AND ILEAL CONDUIT FORMATION now POD#2 with satisfactory pain control .    -Epidural PCA running at 10 ml/hr with PCA at 5 ml every 15 mins prn. Also receiving parenteral analgesics.    -Will continue with present regimen.  -Okay with subQ heparin, however it will need to be held for 6-8 hours prior to epidural being pulled      -Thank you for opportunity to participate in this patient's care.     Electronically signed by Ivan Long MD on 9/18/2022 at 1:32 PM

## 2022-09-18 NOTE — PROGRESS NOTES
Lian Juarez, [address] [facility], [address], [place], [place]  Urology Progress Note     Subjective:   Diet: N.p.o. except for sips and chips   no acute events overnight  Pain better controlled after paravertebral stage was increased and Dilaudid frequency increased to every 3  Passing minimal flatus  Continues to have vomiting  Ambulation : No ambulation  Urine output-950 cc  Drain output-230 cc, serosanguineous    Patient Vitals for the past 24 hrs:   BP Temp Temp src Pulse Resp SpO2 Height   09/18/22 0652 -- -- -- -- 18 -- --   09/18/22 0533 -- -- -- -- 18 -- --   09/18/22 0233 -- -- -- -- 16 -- --   09/18/22 0122 -- -- -- -- 14 -- --   09/17/22 2223 -- -- -- -- 16 -- --   09/17/22 2113 -- -- -- -- 16 -- --   09/17/22 2030 (!) 161/87 98.7 °F (37.1 °C) Oral 92 18 97 % --   09/17/22 1923 -- -- -- -- 18 -- --   09/17/22 1853 -- -- -- -- 16 -- --   09/17/22 1652 (!) 165/98 98.5 °F (36.9 °C) -- 91 18 98 % --   09/17/22 1613 -- -- -- -- 16 -- --   09/17/22 1543 -- -- -- -- 18 -- --   09/17/22 1515 -- -- -- -- 16 -- --   09/17/22 1346 -- -- -- -- -- -- 6' 2.02\" (1.88 m)   09/17/22 1257 -- -- -- -- 16 -- --   09/17/22 1227 -- -- -- -- 16 -- --   09/17/22 1215 (!) 150/89 98.4 °F (36.9 °C) Oral 92 18 96 % --   09/17/22 0958 -- -- -- -- 16 -- --   09/17/22 0934 -- -- -- -- 16 -- --   09/17/22 0928 -- -- -- -- 16 -- --   09/17/22 0904 -- -- -- -- 16 -- --       Intake/Output Summary (Last 24 hours) at 9/18/2022 0825  Last data filed at 9/18/2022 0515  Gross per 24 hour   Intake --   Output 1180 ml   Net -1180 ml       Recent Labs     09/16/22 2058 09/17/22 0605 09/18/22  0659   WBC 13.1* 12.8* 15.0*   HGB 11.4* 10.8* 11.4*   HCT 33.6* 31.9* 36.2*   MCV 88.4 86.9 90.5    218 209     Recent Labs     09/16/22 2058 09/17/22 0605 09/18/22  0659    138 139   K 4.6 4.2 4.4    104 109*   CO2 20 22 20   PHOS  --  3.9 3.8   BUN 14 17 24*   CREATININE 1.90* 2.24* 2.47*       No results for input(s): Daniela Yoo, LABCAST, WBCUA, RBCUA, MUCUS, TRICHOMONAS, YEAST, BACTERIA, CLARITYU, SPECGRAV, LEUKOCYTESUR, UROBILINOGEN, [place] in the last 72 hours. Invalid input(s): NITRATE, GLUCOSEUKETONESUAMORPHOUS    Additional Lab/culture results:    Physical Exam:   AO X 3  Neck: Supple   Chest: bilateral symmetrical chest rise/ Non labored breathing   Circulatory: Peripheries warm , well perfused   P/A: soft, nondistended, incision sites appropriately tender, clean, dry and intact with skin glue, ostomy pink with bilateral stents  Extremities: No edema/calf tenderness        Interval Imaging Findings:    Impression:    Patient Active Problem List   Diagnosis    Kidney stones    Nocturia    Hypertrophy of prostate with urinary obstruction    Malignant neoplasm of urinary bladder (HCC)    Urinary retention    Cancer of lateral wall of urinary bladder (HCC)    Acute respiratory failure with hypoxia (HCC)    Other hyperlipidemia    Microcytic anemia    Mild protein-calorie malnutrition (HCC)    Pulmonary embolism without acute cor pulmonale (HCC)    Hypoxia    Dyspnea    Acute systolic heart failure (HCC)    Acute renal failure with tubular necrosis (HCC)    CKD (chronic kidney disease), stage III (HCC)    Dilated cardiomyopathy ([facility] [address]    Bladder cancer ([facility] [address]     Fountain Cons is a 73-year-old male with bladder cancer, s/p robotic assisted laparoscopic cystoprostatectomy with ileal conduit creation on 9/16/2022  Hemoglobin 11.4 from 10.8  Creatinine 2.4 from 2.2      Plan:   Diet: Continue on n.p.o. except for sips with meds and ice chips due to vomiting.   Will advance diet later today  IV fluids: Continue IV fluids 100 cc/h  Antibiotics: IV Ancef for 24 hours  Pain control: Paravertebrals, Tylenol scheduled, Roxicodone as needed, Dilaudid as needed for breakthrough  Drain output : 230 cc serosanguineous, continue to monitor  Ambulation / IS 10 times per hour   AM labs: Hemoglobin stable, creatinine uptrending,will discuss with attending about nephrology consult  Not appropriate for discharge         Dian Berger MD  8:25 AM 9/18/2022

## 2022-09-19 ENCOUNTER — APPOINTMENT (OUTPATIENT)
Dept: GENERAL RADIOLOGY | Age: 67
DRG: 653 | End: 2022-09-19
Attending: SPECIALIST
Payer: MEDICARE

## 2022-09-19 PROBLEM — N17.9 ACUTE KIDNEY INJURY (HCC): Status: ACTIVE | Noted: 2022-09-19

## 2022-09-19 LAB
ANION GAP SERPL CALCULATED.3IONS-SCNC: 8 MMOL/L (ref 9–17)
BUN BLDV-MCNC: 27 MG/DL (ref 8–23)
CALCIUM SERPL-MCNC: 8.6 MG/DL (ref 8.6–10.4)
CHLORIDE BLD-SCNC: 107 MMOL/L (ref 98–107)
CO2: 28 MMOL/L (ref 20–31)
CREAT SERPL-MCNC: 1.75 MG/DL (ref 0.7–1.2)
CULTURE: ABNORMAL
GFR AFRICAN AMERICAN: 48 ML/MIN
GFR NON-AFRICAN AMERICAN: 39 ML/MIN
GFR SERPL CREATININE-BSD FRML MDRD: ABNORMAL ML/MIN/{1.73_M2}
GLUCOSE BLD-MCNC: 125 MG/DL (ref 70–99)
HCT VFR BLD CALC: 32.7 % (ref 40.7–50.3)
HEMOGLOBIN: 10.7 G/DL (ref 13–17)
MAGNESIUM: 1.9 MG/DL (ref 1.6–2.6)
MCH RBC QN AUTO: 29.2 PG (ref 25.2–33.5)
MCHC RBC AUTO-ENTMCNC: 32.7 G/DL (ref 28.4–34.8)
MCV RBC AUTO: 89.1 FL (ref 82.6–102.9)
NRBC AUTOMATED: 0 PER 100 WBC
PDW BLD-RTO: 14.6 % (ref 11.8–14.4)
PHOSPHORUS: 1.9 MG/DL (ref 2.5–4.5)
PLATELET # BLD: 187 K/UL (ref 138–453)
PMV BLD AUTO: 9.6 FL (ref 8.1–13.5)
POTASSIUM SERPL-SCNC: 3.3 MMOL/L (ref 3.7–5.3)
POTASSIUM SERPL-SCNC: 4 MMOL/L (ref 3.7–5.3)
RBC # BLD: 3.67 M/UL (ref 4.21–5.77)
SODIUM BLD-SCNC: 143 MMOL/L (ref 135–144)
SPECIMEN DESCRIPTION: ABNORMAL
WBC # BLD: 15 K/UL (ref 3.5–11.3)

## 2022-09-19 PROCEDURE — 80048 BASIC METABOLIC PNL TOTAL CA: CPT

## 2022-09-19 PROCEDURE — 6370000000 HC RX 637 (ALT 250 FOR IP): Performed by: STUDENT IN AN ORGANIZED HEALTH CARE EDUCATION/TRAINING PROGRAM

## 2022-09-19 PROCEDURE — 2500000003 HC RX 250 WO HCPCS: Performed by: STUDENT IN AN ORGANIZED HEALTH CARE EDUCATION/TRAINING PROGRAM

## 2022-09-19 PROCEDURE — 2580000003 HC RX 258: Performed by: STUDENT IN AN ORGANIZED HEALTH CARE EDUCATION/TRAINING PROGRAM

## 2022-09-19 PROCEDURE — 84100 ASSAY OF PHOSPHORUS: CPT

## 2022-09-19 PROCEDURE — C9113 INJ PANTOPRAZOLE SODIUM, VIA: HCPCS | Performed by: STUDENT IN AN ORGANIZED HEALTH CARE EDUCATION/TRAINING PROGRAM

## 2022-09-19 PROCEDURE — 6360000002 HC RX W HCPCS: Performed by: STUDENT IN AN ORGANIZED HEALTH CARE EDUCATION/TRAINING PROGRAM

## 2022-09-19 PROCEDURE — 6360000002 HC RX W HCPCS: Performed by: ANESTHESIOLOGY

## 2022-09-19 PROCEDURE — 83735 ASSAY OF MAGNESIUM: CPT

## 2022-09-19 PROCEDURE — 2580000003 HC RX 258: Performed by: ANESTHESIOLOGY

## 2022-09-19 PROCEDURE — 84132 ASSAY OF SERUM POTASSIUM: CPT

## 2022-09-19 PROCEDURE — 36415 COLL VENOUS BLD VENIPUNCTURE: CPT

## 2022-09-19 PROCEDURE — 85027 COMPLETE CBC AUTOMATED: CPT

## 2022-09-19 PROCEDURE — 1200000000 HC SEMI PRIVATE

## 2022-09-19 PROCEDURE — 99232 SBSQ HOSP IP/OBS MODERATE 35: CPT | Performed by: INTERNAL MEDICINE

## 2022-09-19 PROCEDURE — 74018 RADEX ABDOMEN 1 VIEW: CPT

## 2022-09-19 PROCEDURE — 2500000003 HC RX 250 WO HCPCS: Performed by: ANESTHESIOLOGY

## 2022-09-19 PROCEDURE — 2500000003 HC RX 250 WO HCPCS: Performed by: INTERNAL MEDICINE

## 2022-09-19 RX ORDER — POTASSIUM CHLORIDE 7.45 MG/ML
40 INJECTION INTRAVENOUS ONCE
Status: COMPLETED | OUTPATIENT
Start: 2022-09-19 | End: 2022-09-19

## 2022-09-19 RX ORDER — MAGNESIUM SULFATE IN WATER 40 MG/ML
2000 INJECTION, SOLUTION INTRAVENOUS ONCE
Status: COMPLETED | OUTPATIENT
Start: 2022-09-19 | End: 2022-09-19

## 2022-09-19 RX ORDER — POLYETHYLENE GLYCOL 3350 17 G/17G
17 POWDER, FOR SOLUTION ORAL DAILY
Status: DISCONTINUED | OUTPATIENT
Start: 2022-09-19 | End: 2022-10-11 | Stop reason: HOSPADM

## 2022-09-19 RX ORDER — METOPROLOL TARTRATE 5 MG/5ML
5 INJECTION INTRAVENOUS EVERY 6 HOURS PRN
Status: DISCONTINUED | OUTPATIENT
Start: 2022-09-19 | End: 2022-10-11 | Stop reason: HOSPADM

## 2022-09-19 RX ORDER — POTASSIUM CHLORIDE 29.8 MG/ML
20 INJECTION INTRAVENOUS ONCE
Status: DISCONTINUED | OUTPATIENT
Start: 2022-09-19 | End: 2022-09-19

## 2022-09-19 RX ORDER — DEXTROSE, SODIUM CHLORIDE, AND POTASSIUM CHLORIDE 5; .45; .15 G/100ML; G/100ML; G/100ML
INJECTION INTRAVENOUS CONTINUOUS
Status: DISCONTINUED | OUTPATIENT
Start: 2022-09-19 | End: 2022-09-21

## 2022-09-19 RX ADMIN — SIMVASTATIN 40 MG: 40 TABLET, FILM COATED ORAL at 21:20

## 2022-09-19 RX ADMIN — HYDROMORPHONE HYDROCHLORIDE 1 MG: 1 INJECTION, SOLUTION INTRAMUSCULAR; INTRAVENOUS; SUBCUTANEOUS at 09:28

## 2022-09-19 RX ADMIN — HYDROMORPHONE HYDROCHLORIDE 1 MG: 1 INJECTION, SOLUTION INTRAMUSCULAR; INTRAVENOUS; SUBCUTANEOUS at 05:39

## 2022-09-19 RX ADMIN — HEPARIN SODIUM 5000 UNITS: 5000 INJECTION INTRAVENOUS; SUBCUTANEOUS at 09:28

## 2022-09-19 RX ADMIN — BUPROPION HYDROCHLORIDE 300 MG: 150 TABLET, EXTENDED RELEASE ORAL at 11:51

## 2022-09-19 RX ADMIN — HYDROMORPHONE HYDROCHLORIDE 1 MG: 1 INJECTION, SOLUTION INTRAMUSCULAR; INTRAVENOUS; SUBCUTANEOUS at 22:31

## 2022-09-19 RX ADMIN — WATER 2000 MG: 1 INJECTION INTRAMUSCULAR; INTRAVENOUS; SUBCUTANEOUS at 21:20

## 2022-09-19 RX ADMIN — HYDROMORPHONE HYDROCHLORIDE 1 MG: 1 INJECTION, SOLUTION INTRAMUSCULAR; INTRAVENOUS; SUBCUTANEOUS at 18:43

## 2022-09-19 RX ADMIN — HYDROMORPHONE HYDROCHLORIDE 1 MG: 1 INJECTION, SOLUTION INTRAMUSCULAR; INTRAVENOUS; SUBCUTANEOUS at 00:59

## 2022-09-19 RX ADMIN — ACETAMINOPHEN 650 MG: 325 TABLET ORAL at 10:37

## 2022-09-19 RX ADMIN — ONDANSETRON 4 MG: 2 INJECTION INTRAMUSCULAR; INTRAVENOUS at 10:27

## 2022-09-19 RX ADMIN — SODIUM CHLORIDE, PRESERVATIVE FREE 40 MG: 5 INJECTION INTRAVENOUS at 10:18

## 2022-09-19 RX ADMIN — BUPIVACAINE HYDROCHLORIDE: 5 INJECTION, SOLUTION EPIDURAL; INTRACAUDAL; PERINEURAL at 03:20

## 2022-09-19 RX ADMIN — SODIUM PHOSPHATE, MONOBASIC, MONOHYDRATE AND SODIUM PHOSPHATE, DIBASIC, ANHYDROUS 30 MMOL: 276; 142 INJECTION, SOLUTION INTRAVENOUS at 12:36

## 2022-09-19 RX ADMIN — NALOXEGOL OXALATE 25 MG: 12.5 TABLET, FILM COATED ORAL at 12:28

## 2022-09-19 RX ADMIN — POTASSIUM CHLORIDE, DEXTROSE MONOHYDRATE AND SODIUM CHLORIDE: 150; 5; 450 INJECTION, SOLUTION INTRAVENOUS at 09:38

## 2022-09-19 RX ADMIN — HYDROMORPHONE HYDROCHLORIDE 1 MG: 1 INJECTION, SOLUTION INTRAMUSCULAR; INTRAVENOUS; SUBCUTANEOUS at 12:26

## 2022-09-19 RX ADMIN — HYDROMORPHONE HYDROCHLORIDE 1 MG: 1 INJECTION, SOLUTION INTRAMUSCULAR; INTRAVENOUS; SUBCUTANEOUS at 15:29

## 2022-09-19 RX ADMIN — CARVEDILOL 3.12 MG: 3.12 TABLET, FILM COATED ORAL at 21:19

## 2022-09-19 RX ADMIN — MAGNESIUM SULFATE HEPTAHYDRATE 2000 MG: 40 INJECTION, SOLUTION INTRAVENOUS at 09:46

## 2022-09-19 RX ADMIN — WATER 2000 MG: 1 INJECTION INTRAMUSCULAR; INTRAVENOUS; SUBCUTANEOUS at 09:28

## 2022-09-19 RX ADMIN — HEPARIN SODIUM 5000 UNITS: 5000 INJECTION INTRAVENOUS; SUBCUTANEOUS at 21:20

## 2022-09-19 RX ADMIN — POLYETHYLENE GLYCOL 3350 17 G: 17 POWDER, FOR SOLUTION ORAL at 10:38

## 2022-09-19 RX ADMIN — CARVEDILOL 3.12 MG: 3.12 TABLET, FILM COATED ORAL at 12:28

## 2022-09-19 RX ADMIN — POTASSIUM CHLORIDE 40 MEQ: 7.46 INJECTION, SOLUTION INTRAVENOUS at 06:51

## 2022-09-19 RX ADMIN — SODIUM CHLORIDE, PRESERVATIVE FREE 10 ML: 5 INJECTION INTRAVENOUS at 21:21

## 2022-09-19 RX ADMIN — ONDANSETRON 4 MG: 2 INJECTION INTRAMUSCULAR; INTRAVENOUS at 18:48

## 2022-09-19 RX ADMIN — METOPROLOL TARTRATE 5 MG: 5 INJECTION, SOLUTION INTRAVENOUS at 21:17

## 2022-09-19 RX ADMIN — POTASSIUM CHLORIDE, DEXTROSE MONOHYDRATE AND SODIUM CHLORIDE: 150; 5; 450 INJECTION, SOLUTION INTRAVENOUS at 18:51

## 2022-09-19 RX ADMIN — SODIUM CHLORIDE, PRESERVATIVE FREE 40 MG: 5 INJECTION INTRAVENOUS at 21:20

## 2022-09-19 RX ADMIN — ACETAMINOPHEN 650 MG: 325 TABLET ORAL at 21:20

## 2022-09-19 ASSESSMENT — PAIN SCALES - GENERAL
PAINLEVEL_OUTOF10: 5
PAINLEVEL_OUTOF10: 6
PAINLEVEL_OUTOF10: 9
PAINLEVEL_OUTOF10: 6
PAINLEVEL_OUTOF10: 7
PAINLEVEL_OUTOF10: 6
PAINLEVEL_OUTOF10: 7
PAINLEVEL_OUTOF10: 9

## 2022-09-19 ASSESSMENT — PAIN DESCRIPTION - DESCRIPTORS
DESCRIPTORS: SHARP
DESCRIPTORS: DISCOMFORT

## 2022-09-19 ASSESSMENT — PAIN DESCRIPTION - LOCATION
LOCATION: ABDOMEN
LOCATION: ABDOMEN

## 2022-09-19 ASSESSMENT — PAIN DESCRIPTION - ORIENTATION: ORIENTATION: MID

## 2022-09-19 ASSESSMENT — PAIN DESCRIPTION - PAIN TYPE: TYPE: SURGICAL PAIN

## 2022-09-19 NOTE — PROGRESS NOTES
Dr Zaida Nogueira called, instructed writer to advanced pt's NG 10 cm, pt informed that NG needed to be advanced, pt agreeable, writer advanced NG 10 cm without difficulty, secured tube with securement device, pt tolerated well, continued to monitor output.  Electronically signed by Breana Valenzuela RN on 9/19/2022 at 12:06 PM

## 2022-09-19 NOTE — PROGRESS NOTES
Physical Therapy        Physical Therapy Cancel Note      DATE: 2022    NAME: Fabrizio Gutierrez  MRN: [id number]   : 1955      Patient not seen this date for Physical Therapy due to:    Patient Declined: Pt refused intervention today states due to pain from recently placed NG tube and other surgical pain. Pt request return tomorrow. PT to continue to follow and further address as indicated . Nurse notified.       Electronically signed by David Fritz PT on 2022 at 10:36 AM

## 2022-09-19 NOTE — PLAN OF CARE
Problem: Discharge Planning  Goal: Discharge to home or other facility with appropriate resources  9/19/2022 1736 by Robert Kellogg RN  Outcome: Progressing  9/19/2022 0509 by Marquis Salazar  Outcome: Progressing     Problem: Pain  Goal: Verbalizes/displays adequate comfort level or baseline comfort level  9/19/2022 1736 by Robert Kellogg RN  Outcome: Progressing  9/19/2022 0509 by Marquis Salazar  Outcome: Progressing     Problem: Safety - Adult  Goal: Free from fall injury  9/19/2022 1736 by Robert Kellogg RN  Outcome: Progressing  9/19/2022 0509 by Marquis Salazar  Outcome: Progressing     Problem: ABCDS Injury Assessment  Goal: Absence of physical injury  9/19/2022 1736 by Robert Kellogg RN  Outcome: Progressing  9/19/2022 0509 by Marquis Salazar  Outcome: Progressing     Problem: Nutrition Deficit:  Goal: Optimize nutritional status  9/19/2022 1736 by Robert Kellogg RN  Outcome: Progressing  9/19/2022 0509 by Marquis Salazar  Outcome: Progressing

## 2022-09-19 NOTE — PROGRESS NOTES
Writer advised patient about the importance of ambulating post-op. Refused. Educated patient, suha trujillo he stated maybe he will try later when he's pain is well controlled. Will keep advising patient about the importance of ambulating.

## 2022-09-19 NOTE — PROGRESS NOTES
Yaima Zavaleta MD FACS   Urology Progress Note     Subjective:   Diet: N.p.o. except for sips and chips   Had tachycardia overnight with large volume emesis 2L dark colored vomitus - general surgery consulted  Pain decently well controlled on PCA  Passing flatus, no BM  Ambulation : minimal ambulation  Urine output-1.6L cc  Drain output-820 cc, serosanguineous    Patient Vitals for the past 24 hrs:   BP Temp Temp src Pulse Resp SpO2   09/19/22 0609 -- -- -- -- 18 --   09/19/22 0129 -- -- -- -- 20 --   09/19/22 0109 (!) 177/92 97.7 °F (36.5 °C) Oral 95 18 --   09/19/22 0012 -- -- -- -- 18 --   09/18/22 2346 -- -- -- -- 22 --   09/18/22 2221 -- -- -- -- 18 --   09/18/22 2151 (!) 171/97 98.7 °F (37.1 °C) Oral 92 18 98 %   09/18/22 1638 (!) 155/89 99.1 °F (37.3 °C) Oral 91 18 98 %   09/18/22 1215 (!) 141/82 98.2 °F (36.8 °C) Oral (!) 102 18 97 %   09/18/22 0700 (!) 148/87 97.8 °F (36.6 °C) Oral 96 18 98 %   09/18/22 0652 -- -- -- -- 18 --       Intake/Output Summary (Last 24 hours) at 9/19/2022 6812  Last data filed at 9/19/2022 0606  Gross per 24 hour   Intake --   Output 4420 ml   Net -4420 ml       Recent Labs     09/17/22  0605 09/18/22 0659 09/19/22  0521   WBC 12.8* 15.0* 15.0*   HGB 10.8* 11.4* 10.7*   HCT 31.9* 36.2* 32.7*   MCV 86.9 90.5 89.1    209 187     Recent Labs     09/17/22  0605 09/18/22  0659 09/19/22  0521    139 143   K 4.2 4.4 3.3*    109* 107   CO2 22 20 28   PHOS 3.9 3.8 1.9*   BUN 17 24* 27*   CREATININE 2.24* 2.47* 1.75*       Recent Labs     09/18/22  1717   COLORU Yellow   PHUR 6.0   WBCUA 50    RBCUA TOO NUMEROUS TO COUNT   BACTERIA MODERATE*   SPECGRAV 1.016   LEUKOCYTESUR LARGE*   UROBILINOGEN Normal   BILIRUBINUR NEGATIVE       Additional Lab/culture results:    Physical Exam:   AO X 3  Neck: Supple   Chest: bilateral symmetrical chest rise/ Non labored breathing   Circulatory: Peripheries warm , well perfused   P/A: soft, nondistended, incision sites appropriately tender, clean, dry and intact with skin glue, ostomy pink with bilateral stents  Extremities: No edema/calf tenderness    Interval Imaging Findings: none    Impression:  none    Esha Roche is a 44-year-old male with bladder cancer, s/p robotic assisted laparoscopic cystoprostatectomy with ileal conduit creation on 9/16/2022  Hemoglobin 10.7 from 11.4  Creatinine 1.75 from 2.47     Plan:   Diet: Continue on n.p.o. except for sips with meds and ice chips due to vomiting. Will advance diet later today  IV fluids: Continue IV fluids 100 cc/h  Antibiotics: IV Ancef for 24 hours  Pain control: Paravertebrals, Tylenol scheduled, Roxicodone as needed, Dilaudid as needed for breakthrough  Drain output : 820 cc serosanguineous, continue to monitor  Ambulation / IS 10 times per hour   AM labs: Hemoglobin stable, creatinine downtrending 1.75 from 2.47, will discuss with attending about nephrology consult  Not appropriate for discharge    [facility], DO, PGY-3  6:32 AM 9/19/2022    I have reviewed the history above and agree. I have repeated the key portions of the physical exam and concur with the resident's findings. I have reviewed all laboratory findings and imaging reports/films. I agree with the plan as noted above.     Electronically signed by Leslie Ortez MD on 9/20/2022 at 12:13 PM

## 2022-09-19 NOTE — PROGRESS NOTES
Renal Progress Note    Patient :  Bertha Candelaria; 74 y.o. MRN# [id number]  Location:  2767/6361-23  Attending:  Geneva De Leon MD  Admit Date:  9/16/2022   Hospital Day: 3     History of Present Illness:     Bertha Candelaria; 74 y.o. male with past medical history of PE, chronic kidney disease stage 3B managed by Dr Marely Major, right side hydronephrosis s/p ureter stent, recurrent UTI's and bladder cancer failed chemotherapy, who was admitted for planned robotic laparoscopic radical cystoprostatectomy with bilateral pelvic lymphadenectomy and ileal conduit formation on 9/16/22. Nephrology this consult is secondary to a significant rise in serum creatinine up to  On admission creatinine was 1.79 and dane up to 2.47 yesterday. Creatinine is back down to 1.75 today, at baseline. The patient has an ileus versus small bowel obstruction so NG tube placed earlier today. Patient's serum sodium was up to 143 and potassium is low at 3.3. Patient did receive some IV potassium supplementation. I have adjusted   the patient's IV fluids as a result. Per Dr Glo Kohler office notes, his baseline in May runs 1.7-1.8. He had been n.p.o. with normal saline running at 100 mL an hour up until this morning. He has been nauseated and has numerous emesis prior to the NG tube placement today. He has not had any contrast exposure, nor low blood pressures. He is not on diuretics or nephrotoxic medications. Urine output last 24 hours 950 and 230 via MERLIN drain. No history of recent contrast exposure  No h/o prolonged NSAIDs use in the past,   No h/o nephrolithiasis, No recent skin rashes or arthralgias   No hematuria or pyuria noticed in the recent past.   Doesn't report any reduction in the urine output recently. Non report of any obstructive urinary symptoms (urgency, frequency, weak stream, straining while urination). No h/o recurrent UTIs in the past.    Past History/Allergies? Social History:     Past Medical History: Diagnosis Date    Acute renal failure with tubular necrosis ([facility] 05/26/2022    Likely ischemic ATN/prerenal acidemia from intractable nausea vomiting as result of chemotherapy, baseline 1.3-1.4 peaked up to 2.3 in May 2022    Arthritis     BPH with obstruction/lower urinary tract symptoms     CAD (coronary artery disease) 06/2022    nonobstructive on cath    Caffeine use     3 cans soda/pop    Cancer ([facility]     bladder cancer. Dr. Jose F Zavaleta [facility]    CKD (chronic kidney disease), stage III ([facility] 05/26/2022    From 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   Baseline 1.3-1.4    COVID-19 08/16/2021    SOB, fatigue, fever, chills  x 3 weeks    Depression     Dilated cardiomyopathy ([facility] 05/26/2022    Ejection fraction 40 to 45%, does have symptoms of dyspnea and decreased effort tolerance    GERD (gastroesophageal reflux disease)     High cholesterol     Kidney calculi     Sleep apnea     does not use cpap    Under care of team     Dr. Fany Montaño Nephrology    Under care of team     Dr. Shayne Roldan. last visit approx 9/2021    Under care of team     Dr. Camron Viera [facility] last visit 8/03/2022    Under care of team     Dr. Abhijeet Conde    Under care of team     Dr. Renetta Shelton       Past Surgical History:   Procedure Laterality Date    BLADDER REMOVAL      BLADDER REMOVAL N/A 9/16/2022    XI ROBOTIC LAPARASCOPIC ASSISTED RADICAL CYSTOPROSTATECTOMY, BILATERAL PELVIC LYMPHADENECTOMY AND ILEAL CONDUIT FORMATION performed by Graciella Severe, MD at [facility] 9/16/2022    CYSTOSCOPY STENT REMOVAL performed by Graciella Severe, MD at [facility]  06/16/2022    nonobstructive CAD    COLONOSCOPY breakfast (Patient taking differently: Take 1 tablet by mouth once daily with breakfast/ per cardiology, pt. to stop 3 days pre-op for OR 9-16-22)  omeprazole (PRILOSEC) 20 MG delayed release capsule, Take 1 capsule by mouth daily  carvedilol (COREG) 3.125 MG tablet, TAKE 1 TABLET BY MOUTH TWICE DAILY  finasteride (PROSCAR) 5 MG tablet, Take 5 mg by mouth daily  tamsulosin (FLOMAX) 0.4 MG capsule, Take 0.4 mg by mouth daily  simvastatin (ZOCOR) 40 MG tablet, Take 40 mg by mouth nightly  buPROPion (WELLBUTRIN XL) 300 MG extended release tablet, Take 300 mg by mouth every morning    Current Medications:     Scheduled Meds:    pantoprazole (PROTONIX) 40 mg injection  40 mg IntraVENous Q12H    polyethylene glycol  17 g Oral Daily    cefepime  2,000 mg IntraVENous Q12H    sodium phosphate IVPB  30 mmol IntraVENous Once    heparin (porcine)  5,000 Units SubCUTAneous BID    naloxegol  25 mg Oral Daily    buPROPion  300 mg Oral QAM    carvedilol  3.125 mg Oral BID    simvastatin  40 mg Oral Nightly    sodium chloride flush  5-40 mL IntraVENous 2 times per day    acetaminophen  650 mg Oral Q6H     Continuous Infusions:    dextrose 5% and 0.45% NaCl with KCl 20 mEq 100 mL/hr at 09/19/22 0938    sodium chloride       PRN Meds:  promethazine, HYDROmorphone, naloxone, ondansetron, ondansetron, sodium chloride flush, sodium chloride, ondansetron **OR** ondansetron, oxyCODONE **OR** oxyCODONE    Review of Systems:     Constitutional: No fever, no chills, no lethargy, no weakness. HEENT:  No headache, otalgia, itchy eyes, nasal discharge or sore throat. NG tube in place. Cardiac:  No chest pain, shortness of breath, orthopnea or PND. Chest:             No cough, phlegm or wheezing. Abdomen:  ++ abdominal pain, NG tube in place less nausea and less distention and subsequently less pain. Neuro:             No focal weakness, abnormal movements or seizure like activity. Skin:   No rashes, no itching.   :   No hematuria, no pyuria, no dysuria, no flank pain. Extremities:  No swelling or joint pains. ROS was otherwise negative except as mentioned in the [address] Input/Output:       I/O last 3 completed shifts:  In: -   Out: 4800 [Urine:1950; Emesis/NG output:2000; Drains:850]    Vital Signs:   Temperature:  Temp: 99.3 °F (37.4 °C)  TMax:   Temp (24hrs), Av.6 °F (37 °C), Min:97.7 °F (36.5 °C), Max:99.3 °F (37.4 °C)    Respirations:  Resp: 16  Pulse:   Heart Rate: (!) 112  BP:    BP: (!) 158/102  BP Range: Systolic (34XQF), MYQ:851 , Min:155 , TAS:584       Diastolic (74AGO), AMADOU:36, Min:89, Max:102      Physical Examination:     General:  AAO x 3, speaking in full sentences, no accessory muscle use. HEENT: Atraumatic, normocephalic, no throat congestion, moist mucosa. Eyes:   Pupils equal, round and reactive to light, EOMI. Neck:   No JVD, midline trachea and no accessory muscle use  Chest:   Good bilateral air entry and clear to auscultation bilaterally without wheezes or rales or rhonchi  Cardiac:  Regular rate and rhythm with positive S1 and S2 and no rubs  Abdomen: Tender, staples intact. MERLIN drain  :   No suprapubic or flank tenderness. Neuro:              AAO x 3, No FND. SKIN:  No rashes, good skin turgor.   Extremities:  No edema, No clubbing, No cyanosis    Labs:       Recent Labs     22  0605 22  0659 22  0521   WBC 12.8* 15.0* 15.0*   RBC 3.67* 4.00* 3.67*   HGB 10.8* 11.4* 10.7*   HCT 31.9* 36.2* 32.7*   MCV 86.9 90.5 89.1   MCH 29.4 28.5 29.2   MCHC 33.9 31.5 32.7   RDW 13.9 14.6* 14.6*    209 187   MPV 9.6 9.6 9.6      BMP:   Recent Labs     22  0622  0659 22  0522  09    139 143  --    K 4.2 4.4 3.3* 4.0    109* 107  --    CO2 22 20 28  --    BUN 17 24* 27*  --    CREATININE 2.24* 2.47* 1.75*  --    GLUCOSE 148* 104* 125*  --    CALCIUM 8.3* 8.5* 8.6  --       Phosphorus:     Recent Labs     22  0622  0659 22  05   PHOS 3.9 3.8 1.9*     Magnesium:    Recent Labs     09/17/22  0605 09/18/22  0659 09/19/22  0521   MG 1.8 2.0 1.9     LISS:      Lab Results   Component Value Date/Time    LISS NEGATIVE 05/31/2022 10:45 AM     SPEP:  Lab Results   Component Value Date/Time    PROT 6.6 09/08/2022 01:46 PM     C3:     Lab Results   Component Value Date/Time    C3 119 05/31/2022 10:45 AM     C4:     Lab Results   Component Value Date/Time    C4 25 05/31/2022 10:45 AM     Urinalysis/Chemistries:      Lab Results   Component Value Date/Time    NITRU POSITIVE 09/18/2022 05:17 PM    COLORU Yellow 09/18/2022 05:17 PM    PHUR 6.0 09/18/2022 05:17 PM    WBCUA 50  09/18/2022 05:17 PM    RBCUA TOO NUMEROUS TO COUNT 09/18/2022 05:17 PM    MUCUS 3+ 04/14/2022 09:43 AM    YEAST FEW 03/31/2022 01:01 PM    BACTERIA MODERATE 09/18/2022 05:17 PM    CLARITYU cloudy 08/04/2022 09:05 AM    SPECGRAV 1.016 09/18/2022 05:17 PM    LEUKOCYTESUR LARGE 09/18/2022 05:17 PM    UROBILINOGEN Normal 09/18/2022 05:17 PM    BILIRUBINUR NEGATIVE 09/18/2022 05:17 PM    BLOODU large 08/04/2022 09:05 AM    GLUCOSEU NEGATIVE 09/18/2022 05:17 PM    KETUA TRACE 09/18/2022 05:17 PM     Urine Creatinine:     Lab Results   Component Value Date/Time    LABCREA 121.3 09/18/2022 05:17 PM       Radiology:     CXR:     Assessment:     1. Acute Kidney Injury: ATN from fluid shifts related to surgery  2. Chronic Kidney Disease : Stage III secondary to chronic  obstructive uropathy from bladder tumor, chemotherapy side effect from platinum based analogs. Baseline creatinine 1.7-1.8 as of May 2022. Managed by Dr Jeffrey Peralta  Work-up for immune complex  disease negative  3. Bladder cancer s/p cystectomy   4. CMP EF 45%    Plan:   1. Change IV fluids to D5 0.45% normal saline with 20 mEq of potassium chloride in a liter at 100 mL an hour for maintenance fluids  2. Daily Labs as ordered         Nutrition   Please ensure that patient is on a renal diet/TF.  Avoid nephrotoxic drugs/contrast exposure. Thank you for the consultation. Please do not hesitate to contact us for any further questions/concerns. We will continue to follow along with you.         Jenae Feliciano MD     9/19/2022 2:56 PM    Nephrology [address] [place] [address] Drive

## 2022-09-19 NOTE — PLAN OF CARE
Problem: Discharge Planning  Goal: Discharge to home or other facility with appropriate resources  9/19/2022 0509 by Gloria Mail  Outcome: Progressing  9/18/2022 1514 by Marina Vivar RN  Outcome: Progressing     Problem: Pain  Goal: Verbalizes/displays adequate comfort level or baseline comfort level  9/19/2022 0509 by Gloria Mail  Outcome: Progressing  9/18/2022 1514 by Marina Vivar RN  Outcome: Progressing     Problem: Safety - Adult  Goal: Free from fall injury  9/19/2022 0509 by Gloria Mail  Outcome: Progressing  9/18/2022 1514 by Marina Vivar RN  Outcome: Progressing     Problem: ABCDS Injury Assessment  Goal: Absence of physical injury  9/19/2022 0509 by Gloria Mail  Outcome: Progressing  9/18/2022 1514 by Marina Vivar RN  Outcome: Progressing     Problem: Nutrition Deficit:  Goal: Optimize nutritional status  9/19/2022 0509 by Gloria Mail  Outcome: Progressing  9/18/2022 1514 by Marina Vivar RN  Outcome: Progressing

## 2022-09-20 ENCOUNTER — APPOINTMENT (OUTPATIENT)
Dept: GENERAL RADIOLOGY | Age: 67
DRG: 653 | End: 2022-09-20
Attending: SPECIALIST
Payer: MEDICARE

## 2022-09-20 LAB
ANION GAP SERPL CALCULATED.3IONS-SCNC: 14 MMOL/L (ref 9–17)
BUN BLDV-MCNC: 36 MG/DL (ref 8–23)
CALCIUM SERPL-MCNC: 9.1 MG/DL (ref 8.6–10.4)
CHLORIDE BLD-SCNC: 110 MMOL/L (ref 98–107)
CO2: 22 MMOL/L (ref 20–31)
CREAT SERPL-MCNC: 1.54 MG/DL (ref 0.7–1.2)
GFR AFRICAN AMERICAN: 55 ML/MIN
GFR NON-AFRICAN AMERICAN: 46 ML/MIN
GFR SERPL CREATININE-BSD FRML MDRD: ABNORMAL ML/MIN/{1.73_M2}
GLUCOSE BLD-MCNC: 131 MG/DL (ref 70–99)
HCT VFR BLD CALC: 31.9 % (ref 40.7–50.3)
HEMOGLOBIN: 10.4 G/DL (ref 13–17)
LACTIC ACID, WHOLE BLOOD: 1.9 MMOL/L (ref 0.7–2.1)
LACTIC ACID, WHOLE BLOOD: 3.6 MMOL/L (ref 0.7–2.1)
MAGNESIUM: 2.4 MG/DL (ref 1.6–2.6)
MCH RBC QN AUTO: 29.5 PG (ref 25.2–33.5)
MCHC RBC AUTO-ENTMCNC: 32.6 G/DL (ref 28.4–34.8)
MCV RBC AUTO: 90.6 FL (ref 82.6–102.9)
NRBC AUTOMATED: 0 PER 100 WBC
PDW BLD-RTO: 14.7 % (ref 11.8–14.4)
PHOSPHORUS: 1.9 MG/DL (ref 2.5–4.5)
PLATELET # BLD: 237 K/UL (ref 138–453)
PMV BLD AUTO: 9.6 FL (ref 8.1–13.5)
POTASSIUM SERPL-SCNC: 3.7 MMOL/L (ref 3.7–5.3)
RBC # BLD: 3.52 M/UL (ref 4.21–5.77)
SODIUM BLD-SCNC: 146 MMOL/L (ref 135–144)
WBC # BLD: 18 K/UL (ref 3.5–11.3)

## 2022-09-20 PROCEDURE — 99212 OFFICE O/P EST SF 10 MIN: CPT

## 2022-09-20 PROCEDURE — 6360000002 HC RX W HCPCS: Performed by: STUDENT IN AN ORGANIZED HEALTH CARE EDUCATION/TRAINING PROGRAM

## 2022-09-20 PROCEDURE — 2580000003 HC RX 258: Performed by: STUDENT IN AN ORGANIZED HEALTH CARE EDUCATION/TRAINING PROGRAM

## 2022-09-20 PROCEDURE — 84100 ASSAY OF PHOSPHORUS: CPT

## 2022-09-20 PROCEDURE — 6370000000 HC RX 637 (ALT 250 FOR IP): Performed by: STUDENT IN AN ORGANIZED HEALTH CARE EDUCATION/TRAINING PROGRAM

## 2022-09-20 PROCEDURE — 2500000003 HC RX 250 WO HCPCS

## 2022-09-20 PROCEDURE — 97116 GAIT TRAINING THERAPY: CPT

## 2022-09-20 PROCEDURE — 1200000000 HC SEMI PRIVATE

## 2022-09-20 PROCEDURE — 36415 COLL VENOUS BLD VENIPUNCTURE: CPT

## 2022-09-20 PROCEDURE — 80048 BASIC METABOLIC PNL TOTAL CA: CPT

## 2022-09-20 PROCEDURE — 97162 PT EVAL MOD COMPLEX 30 MIN: CPT

## 2022-09-20 PROCEDURE — 74018 RADEX ABDOMEN 1 VIEW: CPT

## 2022-09-20 PROCEDURE — 2500000003 HC RX 250 WO HCPCS: Performed by: STUDENT IN AN ORGANIZED HEALTH CARE EDUCATION/TRAINING PROGRAM

## 2022-09-20 PROCEDURE — 2580000003 HC RX 258

## 2022-09-20 PROCEDURE — 83735 ASSAY OF MAGNESIUM: CPT

## 2022-09-20 PROCEDURE — 83605 ASSAY OF LACTIC ACID: CPT

## 2022-09-20 PROCEDURE — 99232 SBSQ HOSP IP/OBS MODERATE 35: CPT | Performed by: INTERNAL MEDICINE

## 2022-09-20 PROCEDURE — 85027 COMPLETE CBC AUTOMATED: CPT

## 2022-09-20 PROCEDURE — 2500000003 HC RX 250 WO HCPCS: Performed by: INTERNAL MEDICINE

## 2022-09-20 PROCEDURE — 97530 THERAPEUTIC ACTIVITIES: CPT

## 2022-09-20 PROCEDURE — C9113 INJ PANTOPRAZOLE SODIUM, VIA: HCPCS | Performed by: STUDENT IN AN ORGANIZED HEALTH CARE EDUCATION/TRAINING PROGRAM

## 2022-09-20 PROCEDURE — 6360000002 HC RX W HCPCS: Performed by: ANESTHESIOLOGY

## 2022-09-20 RX ORDER — BISACODYL 10 MG
10 SUPPOSITORY, RECTAL RECTAL DAILY
Status: DISCONTINUED | OUTPATIENT
Start: 2022-09-20 | End: 2022-10-11 | Stop reason: HOSPADM

## 2022-09-20 RX ORDER — POTASSIUM CHLORIDE 20MEQ/15ML
30 LIQUID (ML) ORAL ONCE
Status: DISCONTINUED | OUTPATIENT
Start: 2022-09-20 | End: 2022-09-20

## 2022-09-20 RX ORDER — METOCLOPRAMIDE HYDROCHLORIDE 5 MG/ML
10 INJECTION INTRAMUSCULAR; INTRAVENOUS EVERY 6 HOURS
Status: DISCONTINUED | OUTPATIENT
Start: 2022-09-20 | End: 2022-09-23

## 2022-09-20 RX ADMIN — HYDROMORPHONE HYDROCHLORIDE 1 MG: 1 INJECTION, SOLUTION INTRAMUSCULAR; INTRAVENOUS; SUBCUTANEOUS at 20:33

## 2022-09-20 RX ADMIN — CARVEDILOL 3.12 MG: 3.12 TABLET, FILM COATED ORAL at 20:28

## 2022-09-20 RX ADMIN — ONDANSETRON 4 MG: 2 INJECTION INTRAMUSCULAR; INTRAVENOUS at 06:05

## 2022-09-20 RX ADMIN — HYDROMORPHONE HYDROCHLORIDE 1 MG: 1 INJECTION, SOLUTION INTRAMUSCULAR; INTRAVENOUS; SUBCUTANEOUS at 14:19

## 2022-09-20 RX ADMIN — POTASSIUM CHLORIDE, DEXTROSE MONOHYDRATE AND SODIUM CHLORIDE: 150; 5; 450 INJECTION, SOLUTION INTRAVENOUS at 05:07

## 2022-09-20 RX ADMIN — SODIUM CHLORIDE, PRESERVATIVE FREE 40 MG: 5 INJECTION INTRAVENOUS at 20:27

## 2022-09-20 RX ADMIN — WATER 2000 MG: 1 INJECTION INTRAMUSCULAR; INTRAVENOUS; SUBCUTANEOUS at 20:27

## 2022-09-20 RX ADMIN — BISACODYL 10 MG: 10 SUPPOSITORY RECTAL at 17:35

## 2022-09-20 RX ADMIN — HYDROMORPHONE HYDROCHLORIDE 1 MG: 1 INJECTION, SOLUTION INTRAMUSCULAR; INTRAVENOUS; SUBCUTANEOUS at 01:56

## 2022-09-20 RX ADMIN — POTASSIUM PHOSPHATE, MONOBASIC AND POTASSIUM PHOSPHATE, DIBASIC 30 MMOL: 224; 236 INJECTION, SOLUTION, CONCENTRATE INTRAVENOUS at 11:28

## 2022-09-20 RX ADMIN — HYDROMORPHONE HYDROCHLORIDE 1 MG: 1 INJECTION, SOLUTION INTRAMUSCULAR; INTRAVENOUS; SUBCUTANEOUS at 11:25

## 2022-09-20 RX ADMIN — HEPARIN SODIUM 5000 UNITS: 5000 INJECTION INTRAVENOUS; SUBCUTANEOUS at 20:33

## 2022-09-20 RX ADMIN — SODIUM CHLORIDE, PRESERVATIVE FREE 40 MG: 5 INJECTION INTRAVENOUS at 09:49

## 2022-09-20 RX ADMIN — WATER 2000 MG: 1 INJECTION INTRAMUSCULAR; INTRAVENOUS; SUBCUTANEOUS at 10:12

## 2022-09-20 RX ADMIN — HEPARIN SODIUM 5000 UNITS: 5000 INJECTION INTRAVENOUS; SUBCUTANEOUS at 09:46

## 2022-09-20 RX ADMIN — METOCLOPRAMIDE 10 MG: 5 INJECTION, SOLUTION INTRAMUSCULAR; INTRAVENOUS at 20:28

## 2022-09-20 RX ADMIN — ACETAMINOPHEN 650 MG: 325 TABLET ORAL at 20:28

## 2022-09-20 RX ADMIN — METOCLOPRAMIDE 10 MG: 5 INJECTION, SOLUTION INTRAMUSCULAR; INTRAVENOUS at 14:32

## 2022-09-20 RX ADMIN — METOPROLOL TARTRATE 5 MG: 5 INJECTION, SOLUTION INTRAVENOUS at 05:10

## 2022-09-20 RX ADMIN — HYDROMORPHONE HYDROCHLORIDE 1 MG: 1 INJECTION, SOLUTION INTRAMUSCULAR; INTRAVENOUS; SUBCUTANEOUS at 17:35

## 2022-09-20 RX ADMIN — SODIUM CHLORIDE, PRESERVATIVE FREE 10 ML: 5 INJECTION INTRAVENOUS at 10:10

## 2022-09-20 RX ADMIN — HYDROMORPHONE HYDROCHLORIDE 1 MG: 1 INJECTION, SOLUTION INTRAMUSCULAR; INTRAVENOUS; SUBCUTANEOUS at 23:32

## 2022-09-20 RX ADMIN — METOCLOPRAMIDE 10 MG: 5 INJECTION, SOLUTION INTRAMUSCULAR; INTRAVENOUS at 09:46

## 2022-09-20 RX ADMIN — HYDROMORPHONE HYDROCHLORIDE 1 MG: 1 INJECTION, SOLUTION INTRAMUSCULAR; INTRAVENOUS; SUBCUTANEOUS at 05:34

## 2022-09-20 RX ADMIN — SIMVASTATIN 40 MG: 40 TABLET, FILM COATED ORAL at 20:28

## 2022-09-20 RX ADMIN — POTASSIUM CHLORIDE, DEXTROSE MONOHYDRATE AND SODIUM CHLORIDE: 150; 5; 450 INJECTION, SOLUTION INTRAVENOUS at 15:34

## 2022-09-20 ASSESSMENT — PAIN SCALES - GENERAL
PAINLEVEL_OUTOF10: 6
PAINLEVEL_OUTOF10: 9
PAINLEVEL_OUTOF10: 9
PAINLEVEL_OUTOF10: 6
PAINLEVEL_OUTOF10: 6
PAINLEVEL_OUTOF10: 5
PAINLEVEL_OUTOF10: 6
PAINLEVEL_OUTOF10: 9
PAINLEVEL_OUTOF10: 6

## 2022-09-20 ASSESSMENT — PAIN DESCRIPTION - LOCATION
LOCATION: ABDOMEN

## 2022-09-20 ASSESSMENT — PAIN DESCRIPTION - DESCRIPTORS
DESCRIPTORS: PRESSURE;ACHING;SHARP
DESCRIPTORS: SHARP
DESCRIPTORS: SHARP

## 2022-09-20 ASSESSMENT — PAIN DESCRIPTION - ORIENTATION
ORIENTATION: RIGHT;LEFT

## 2022-09-20 ASSESSMENT — PAIN DESCRIPTION - ONSET: ONSET: ON-GOING

## 2022-09-20 ASSESSMENT — PAIN DESCRIPTION - FREQUENCY: FREQUENCY: CONTINUOUS

## 2022-09-20 ASSESSMENT — PAIN DESCRIPTION - PAIN TYPE: TYPE: SURGICAL PAIN;ACUTE PAIN

## 2022-09-20 NOTE — PROGRESS NOTES
Comprehensive Nutrition Assessment    Type and Reason for Visit:  Reassess    Nutrition Recommendations/Plan:   Continue NPO, monitor for diet advancement/tolerance  Monitor labs, wt, plan of care     Malnutrition Assessment:  Malnutrition Status:  Severe malnutrition (09/17/22 1345)    Context:  Chronic Illness     Findings of the 6 clinical characteristics of malnutrition:  Energy Intake:  75% or less estimated energy requirements for 1 month or longer  Weight Loss:  Greater than 10% over 6 months     Body Fat Loss:  Unable to assess     Muscle Mass Loss:  Unable to assess    Fluid Accumulation:  No significant fluid accumulation     Strength:  Not Performed    Nutrition Assessment:    Chart reviewed. Pt c/o nausea, abdominal pain. Pt remains NPO d/t probable ileus. NGT to LIWS with output. D/w RN, no plans to start TPN. Labs reviewed: Phos 1.9 mg/dL. Meds reviewed: dulcolax, glyxolax, reglan. Nutrition Related Findings:    labs/meds reviewed. last BM 9/15 (dulcolax and glyxolax ordered). Wound Type: Surgical Incision (abdomen)       Current Nutrition Intake & Therapies:    Average Meal Intake: NPO     Diet NPO Exceptions are: Sips of Water with Meds, Ice Chips  Additional Calorie Sources:  dextrose 5 % and 0.45 % NaCl with KCl 20 mEq infusion at 100 mL/hr = 408 kcal    Anthropometric Measures:  Height: 6' 2.02\" (188 cm)  Ideal Body Weight (IBW): 190 lbs (86 kg)       Current Body Weight: 254 lb (115.2 kg), 133.7 % IBW. Weight Source: Other (Comment) (actual)  Current BMI (kg/m2): 32.6  Usual Body Weight: 254 lb 13 oz (115.6 kg) (7/14/22 per wt hx review)  % Weight Change (Calculated): -0.3                    BMI Categories: Obese Class 1 (BMI 30.0-34. 9)    Estimated Daily Nutrient Needs:  Energy Requirements Based On: Kcal/kg  Weight Used for Energy Requirements: Current  Energy (kcal/day): 1527-6823 kcal/d  Weight Used for Protein Requirements: Ideal  Protein (g/day): 100-120 gm pro/d  Method Used for Fluid Requirements: ml/Kg  Fluid (ml/day): 2800ml/d    Nutrition Diagnosis:   Inadequate oral intake related to catabolic illness as evidenced by poor intake prior to admission, weight loss    Nutrition Interventions:   Food and/or Nutrient Delivery: Continue NPO  Nutrition Education/Counseling: Education not indicated  Coordination of Nutrition Care: Continue to monitor while inpatient       Goals:  Previous Goal Met:  (goal set)  Goals: Meet at least 75% of estimated needs, prior to discharge       Nutrition Monitoring and Evaluation:   Behavioral-Environmental Outcomes: None Identified  Food/Nutrient Intake Outcomes: Diet Advancement/Tolerance, IVF Intake  Physical Signs/Symptoms Outcomes: Biochemical Data, Nutrition Focused Physical Findings, Skin, Weight, GI Status    Discharge Planning:     Too soon to determine     Frank Ramirez, [address]  Contact: [phone]

## 2022-09-20 NOTE — PLAN OF CARE
Problem: Discharge Planning  Goal: Discharge to home or other facility with appropriate resources  9/20/2022 0458 by Tamika Arriaga  Outcome: Progressing  9/19/2022 1736 by Abdiel Meyer RN  Outcome: Progressing     Problem: Pain  Goal: Verbalizes/displays adequate comfort level or baseline comfort level  9/20/2022 0458 by Tamika Arriaga  Outcome: Progressing  9/19/2022 1736 by Abdiel Meyer RN  Outcome: Progressing     Problem: Safety - Adult  Goal: Free from fall injury  9/20/2022 0458 by Tamika Arriaga  Outcome: Progressing  9/19/2022 1736 by Abdiel Meyer RN  Outcome: Progressing     Problem: ABCDS Injury Assessment  Goal: Absence of physical injury  9/20/2022 0458 by Tamika Arriaga  Outcome: Progressing  9/19/2022 1736 by Abdiel Meyer RN  Outcome: Progressing     Problem: Nutrition Deficit:  Goal: Optimize nutritional status  9/20/2022 0458 by Tamika Arriaga  Outcome: Progressing  9/19/2022 1736 by Abdiel Meyer RN  Outcome: Progressing

## 2022-09-20 NOTE — PROGRESS NOTES
Consulted for Urostomy Care and Teaching          History:   9/16/2022:   XI ROBOTIC LAPARASCOPIC ASSISTED RADICAL CYSTOPROSTATECTOMY  BILATERAL PELVIC LYMPHADENECTOMY AND ILEAL CONDUIT FORMATION  CYSTOSCOPY RT STENT REMOVAL with Dr. Tiffanie Vasquez    Met with patient and wife for initial ostomy education. Demonstrated appliance change including how to measure the stoma and cut skin barrier to fit. Demonstrated use of a ring barrier. Discussed use of overnight drainage bag. OSTOMY ASSESSMENT:     09/20/22 1300   Urostomy Ileal conduit RLQ   Placement Date/Time: 09/16/22 1413   Present on Admission/Arrival: No  Inserted by: Dr. Ivan Snyder Type: Ileal conduit  Location: RLQ   Stomal Appliance 2 piece  (Coloplast BROOK click; flat CTF)   Stoma  Assessment Flush;Pink;Moist  (25 mm sl oval; 2 ureteral stents)   Peristomal Assessment Clean, dry & intact   Collection Container Standard  (Coloplast BROOK click)   Treatment Bag change;Site care; Liquid skin barrier  (Brava protective seal)   Urine Color Balbina   Urine Appearance Mucous     Equipment used    2 piece Coloplast BROOK click with flat CTF skin barrier; Brava seal  to cut edge of pouch      Plan of care: Will follow along during hospital stay for continued practice with appliance changes.   Patient and wife are considering having Kayley Stern assistance at discharge

## 2022-09-20 NOTE — PROGRESS NOTES
PROGRESS NOTE          PATIENT NAME: Kameron Bailon RECORD NO. [id number]  DATE: 2022  SURGEON: Roel Menchaca  PRIMARY CARE PHYSICIAN: Swapna Nguyễn MD    HD: # 4    ASSESSMENT    Patient Active Problem List   Diagnosis    Kidney stones    Nocturia    Hypertrophy of prostate with urinary obstruction    Malignant neoplasm of urinary bladder (HCC)    Urinary retention    Cancer of lateral wall of urinary bladder (HCC)    Acute respiratory failure with hypoxia (HCC)    Other hyperlipidemia    Microcytic anemia    Mild protein-calorie malnutrition (HCC)    Pulmonary embolism without acute cor pulmonale (HCC)    Hypoxia    Dyspnea    Acute systolic heart failure (HCC)    Acute renal failure with tubular necrosis (HCC)    CKD (chronic kidney disease), stage III (HCC)    Dilated cardiomyopathy (HCC)    Bladder cancer ([facility]    Acute kidney injury [facility])       MEDICAL DECISION MAKING AND PLAN    Bowel obstruction  - Continue with low intermittent suction, n.p.o. Electrolyte abnormality  - Potassium repleted, trend daily      Chief Complaint: \"abdominal pain\"    SUBJECTIVE  Patient seen in bed today. Reports nausea and abdominal discomfort. No fevers or chills      OBJECTIVE  VITALS: Temp: Temp: 97.8 °F (36.6 °C)Temp  Av.6 °F (37 °C)  Min: 97.8 °F (36.6 °C)  Max: 99.3 °F (68.7 °C) BP Systolic (66AWS), MBC:463 , Min:156 , ARS:233   Diastolic (50MXE), MTR:84, Min:93, Max:105   Pulse Pulse  Av.4  Min: 78  Max: 112 Resp Resp  Av.4  Min: 16  Max: 18 Pulse ox SpO2  Av.5 %  Min: 95 %  Max: 98 %  GENERAL: alert, no distress  LUNGS: clear to ausculation, without wheezes, rales or rhonci  HEART: normal rate and regular rhythm  ABDOMEN: Soft, nondistended. Surgical scars noted. MERLIN drain present. Colostomy bag present. There is no output in the colostomy bag. Hypoactive bowel sounds. Mild tenderness to palpation.   EXTREMITY: no cyanosis, clubbing or edema    I/O last 3 completed shifts:  In: - Out: 5505 [Urine:2050; Emesis/NG output:2795; Drains:660]    Drain/tube output: In: -   Out: 2015 [Urine:1200; Drains:20]    LAB:  CBC:   Recent Labs     09/18/22  0659 09/19/22  0521 09/20/22  0723   WBC 15.0* 15.0* 18.0*   HGB 11.4* 10.7* 10.4*   HCT 36.2* 32.7* 31.9*   MCV 90.5 89.1 90.6    187 237     BMP:   Recent Labs     09/18/22  0659 09/19/22  0521 09/19/22  0925    143  --    K 4.4 3.3* 4.0   * 107  --    CO2 20 28  --    BUN 24* 27*  --    CREATININE 2.47* 1.75*  --    GLUCOSE 104* 125*  --      COAGS: No results for input(s): APTT, PROT, INR in the last 72 hours. RADIOLOGY:  CXR: XR ABDOMEN FOR NG/OG/NE TUBE PLACEMENT    Result Date: 9/19/2022  EXAMINATION: ONE SUPINE XRAY VIEW(S) OF THE ABDOMEN 9/19/2022 9:07 am COMPARISON: Earlier the same day at 131 hours. HISTORY: ORDERING SYSTEM PROVIDED HISTORY: Confirmation of course of NG/OG/NE tube and location of tip of tube TECHNOLOGIST PROVIDED HISTORY: Confirmation of course of NG/OG/NE tube and location of tip of tube Portable? ->Yes FINDINGS: Portable view of the abdomen demonstrates an intestinal tube terminating in the fundus of the stomach. Preferentially dilated small bowel loops are present in the mid abdomen, incompletely included in the field of view but none the less suspicious for partial small bowel obstruction. No free air is noted. Low lung volumes are demonstrated. Intestinal tube terminates in the proximal stomach with side port just below the diaphragm. Findings compatible with at least a partial small bowel obstruction. Miranda Burns MD  9/20/22, 7:34 AM     Attestation signed by Ruperto Rea MD    I personally evaluated the patient and directed the medical decision making with Resident/TERRY after the physical/radiologic exam and laboratory values were reviewed and confirmed. Abdomen distended. Upset PCA stopped. NG with diminished output. Possible SBFT if no improvement.      Amy Feldman Michael Lentz MD

## 2022-09-20 NOTE — PLAN OF CARE
Problem: Discharge Planning  Goal: Discharge to home or other facility with appropriate resources  9/20/2022 1717 by April Cho RN  Outcome: Progressing  9/20/2022 0458 by Susan Rea  Outcome: Progressing     Problem: Pain  Goal: Verbalizes/displays adequate comfort level or baseline comfort level  9/20/2022 1717 by April Cho RN  Outcome: Progressing  9/20/2022 0458 by Susan Rea  Outcome: Progressing     Problem: Safety - Adult  Goal: Free from fall injury  9/20/2022 1717 by April Cho RN  Outcome: Progressing  9/20/2022 0458 by Susan Rea  Outcome: Progressing     Problem: ABCDS Injury Assessment  Goal: Absence of physical injury  9/20/2022 1717 by April Cho RN  Outcome: Progressing  9/20/2022 0458 by Susan Rea  Outcome: Progressing     Problem: Nutrition Deficit:  Goal: Optimize nutritional status  9/20/2022 1717 by April Cho RN  Outcome: Progressing  Flowsheets (Taken 9/20/2022 1049 by Tawanda Calderon RD)  Nutrient intake appropriate for improving, restoring, or maintaining nutritional needs: Assess nutritional status and recommend course of action  9/20/2022 0458 by Susan Rea  Outcome: Progressing

## 2022-09-20 NOTE — CARE COORDINATION
Transitional Planning  Spoke with patient and spouse at bedside. Discussed transitional planning. Patient was planning on going home with [facility], although feels as if a SNF maybe needed. List provided for freedom of choice need choices. 430 pm Choices received as 1. [facility], 2. [facility], 3. [facility]. Referrals faxed.

## 2022-09-20 NOTE — PROGRESS NOTES
Harmony Palacios MD FACS   Urology Progress Note     Subjective:   Diet: N.p.o. except for sips and chips   Had some hypertension overnight systolic to 662Z  NG tube placed yesterday, general surgery consulted   Pain decently well controlled on PCA  Passing small amount of flatus, no BM  Ambulation : minimal ambulation  Urine output-1.2L cc  MERLIN drain output-20cc, serosanguineous  NG tube 795cc since placement green fluid    Patient Vitals for the past 24 hrs:   BP Temp Temp src Pulse Resp SpO2   09/20/22 0600 (!) 158/93 -- -- 78 -- --   09/20/22 0500 (!) 184/99 -- -- 83 -- --   09/20/22 0226 -- -- -- -- 18 --   09/20/22 0200 (!) 156/100 -- -- 84 -- --   09/19/22 2301 -- -- -- -- 16 --   09/19/22 2119 (!) 185/100 -- -- 95 -- --   09/19/22 2045 (!) 185/105 97.8 °F (36.6 °C) Oral 93 18 97 %   09/19/22 1843 -- -- -- -- 16 --   09/19/22 1640 (!) 162/98 98.8 °F (37.1 °C) Oral 98 16 95 %   09/19/22 1559 -- -- -- -- 16 --   09/19/22 1529 -- -- -- -- 16 --   09/19/22 1256 -- -- -- -- 16 --   09/19/22 1205 (!) 158/102 99.3 °F (37.4 °C) Oral (!) 112 -- 96 %   09/19/22 0958 -- -- -- -- 16 --   09/19/22 0928 -- -- -- -- 16 --   09/19/22 0800 (!) 186/97 98.4 °F (36.9 °C) Oral 96 16 98 %       Intake/Output Summary (Last 24 hours) at 9/20/2022 0639  Last data filed at 9/20/2022 0512  Gross per 24 hour   Intake --   Output 2015 ml   Net -2015 ml       Recent Labs     09/18/22 0659 09/19/22  0521 09/20/22  0723   WBC 15.0* 15.0* 18.0*   HGB 11.4* 10.7* 10.4*   HCT 36.2* 32.7* 31.9*   MCV 90.5 89.1 90.6    187 237     Recent Labs     09/18/22  0659 09/19/22 0521 09/19/22  0925 09/20/22  0723    143  --   --    K 4.4 3.3* 4.0  --    * 107  --   --    CO2 20 28  --   --    PHOS 3.8 1.9*  --  1.9*   BUN 24* 27*  --   --    CREATININE 2.47* 1.75*  --   --        Recent Labs     09/18/22  1717   COLORU Yellow   PHUR 6.0   WBCUA 50    RBCUA TOO NUMEROUS TO COUNT   BACTERIA MODERATE*   SPECGRAV 1.016 LEUKOCYTESUR LARGE*   UROBILINOGEN Normal   BILIRUBINUR NEGATIVE       Additional Lab/culture results:    Physical Exam:   AO X 3  HEENT: NG tube in place, small amt green bilious fluid  Neck: Supple   Chest: bilateral symmetrical chest rise/ Non labored breathing   Circulatory: Peripheries warm , well perfused   P/A: soft, nondistended, incision sites appropriately tender, clean, dry and intact with skin glue, ostomy pink with bilateral stents  Extremities: No edema/calf tenderness    Interval Imaging Findings: none    Impression:  none    Abhijit Priest is a 73-year-old male with bladder cancer, s/p robotic assisted laparoscopic cystoprostatectomy with ileal conduit creation on 9/16/2022    Pending am labs     Plan:   Diet: Continue on n.p.o. -NG tube in place, appreciate general surgery management  IV fluids: Continue IV fluids 100 cc/h  Antibiotics: IV Ancef for 24 hours - completed  Pain control: Paravertebrals, Tylenol scheduled, Roxicodone as needed, Dilaudid as needed for breakthrough  Drain output : 20 cc serosanguineous, continue to monitor  Needs to ambulate more/ IS 10 times per hour   AM labs: pending  Not appropriate for discharge    Oral DO Mandeep, PGY-3  6:39 AM 9/20/2022    I have reviewed the history above and agree. I have repeated the key portions of the physical exam and concur with the resident's findings. I have reviewed all laboratory findings and imaging reports/films. I agree with the plan as noted above.     Electronically signed by Heather Jung MD on 9/20/2022 at 12:12 PM

## 2022-09-20 NOTE — PROGRESS NOTES
and abitly to ambulate independently. Pt completed bed mobitly and transfers from bed with min A and use or rolling walker. Pt ambulated 5 ft with RW with distance limited by decreased tolerance and NG tube with suction. Specific Instructions for Next Treatment: progress gait with AD  Therapy Prognosis: Good  Decision Making: Medium Complexity  Clinical Presentation: evolving  Requires PT Follow-Up: Yes  Activity Tolerance  Activity Tolerance: Patient tolerated evaluation without incident;Patient limited by endurance; Other (comment)  Activity Tolerance Comments: Pt demonstreated decreased activity tolerance with fatigue     Plan   Plan  Specific Instructions for Next Treatment: progress gait with AD  Current Treatment Recommendations: Therapeutic activities, Strengthening, Balance training, Functional mobility training, Transfer training, Endurance training, Gait training, Stair training  Safety Devices  Type of Devices: All fall risk precautions in place, Bed alarm in place, Call light within reach, Left in chair, Nurse notified, Gait belt (Pt left with nursing care in room , up to chair, Nurse educated on PT recommended transfer technique with walker adjusted and left in room .)  Restraints  Restraints Initially in Place: No   5-6x/wk  Restrictions  Restrictions/Precautions  Restrictions/Precautions: NPO, Surgical Protocols, Fall Risk, General Precautions, Up as Tolerated (NPO 2/2 NG tube tube with closed suction drain)  Required Braces or Orthoses?: No  Position Activity Restriction  Other position/activity restrictions: Order: ambulate ptm O2 Via NC 3L at time of evaluation pt on room air     Subjective   General  Chart Reviewed:  Yes  Additional Pertinent Hx: A/P: Mt García is a 74y.o. year-old s/p XI ROBOTIC LAPARASCOPIC ASSISTED RADICAL CYSTOPROSTATECTOMY, BILATERAL PELVIC LYMPHADENECTOMY AND ILEAL CONDUIT FORMATION 9/17/22  Response To Previous Treatment: Not applicable  Family / Caregiver Present: No  Follows Commands: Within Functional Limits  Other (Comment): Pt awake and alert in agreement with PT intervention, RN in agreement with PT evaluation  General Comment  Comments: Pt require extra time and effort with encouragement needed to complate task  Subjective  Subjective: Pt report 3/10 surgical pain initially and 6/10 upon compltetion of evaluation . Social/Functional History  Social/Functional History  Lives With: Significant other  Type of Home: House  Home Layout: Multi-level, Bed/Bath upstairs  Home Access: Stairs to enter with rails  Entrance Stairs - Number of Steps: 3  Entrance Stairs - Rails: Right  Bathroom Shower/Tub: Tub/Shower unit  Bathroom Equipment: Grab bars in shower  Bathroom Accessibility: Accessible  Home Equipment: Oxygen, Wheelchair-manual, Walker, rolling, Cane, quad  Has the patient had two or more falls in the past year or any fall with injury in the past year?: No  Receives Help From: Family  ADL Assistance: Independent  Homemaking Assistance: Needs assistance  Meal Prep: Moderate  Laundry: Moderate  Vacuuming: Moderate  Cleaning: Moderate  Gardening: Moderate  Yard Work: Moderate  Shopping:  Moderate  Homemaking Responsibilities: Yes  Ambulation Assistance: Independent  Transfer Assistance: Independent  Active : Yes  Mode of Transportation: Car  Occupation: Part time employment  Type of Occupation: business owner, 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 [address] [place] [address]: outdoor activity  Additional Comments: Pt report prior to hospitalization he was independent and used WC for long distance Mobility  Vision/Hearing  Vision  Vision: Within Functional Limits  Hearing  Hearing: Within functional limits    Cognition   Orientation  Overall Orientation Status: Within Normal Limits  Cognition  Overall Cognitive Status: WNL  Cognition Comment: Pt easily agitated, self limiting at times, require encouragement, extra time and effort     Objective   Heart Rate: 86  Heart Rate Source: Monitor  BP: (!) 176/90  BP Location: Right upper arm  MAP (Calculated): 118.67  Resp: 16  SpO2: 99 %  O2 Device: Nasal cannula     Observation/Palpation  Posture: Good  Observation: good sitting balance  Gross Assessment  AROM: Generally decreased, functional  PROM: Generally decreased, functional  Strength: Generally decreased, functional  Coordination: Generally decreased, functional  Tone: Normal  Sensation: Intact     AROM RLE (degrees)  RLE AROM: WFL  AROM LLE (degrees)  LLE AROM : WFL  AROM RUE (degrees)  RUE AROM : WFL  AROM LUE (degrees)  LUE AROM : WFL  Strength RLE  Strength RLE: WNL  Strength LLE  Strength LLE: WNL  Strength RUE  Strength RUE: WNL  Strength LUE  Strength LUE: WNL  Strength Other  Other: general weakness and deconditioning           Bed mobility  Rolling to Left: Minimal assistance  Rolling to Right: Minimal assistance  Supine to Sit: Minimal assistance  Sit to Supine: Minimal assistance  Scooting: Minimal assistance  Bed Mobility Comments: Pt able to initatite mobilty  Transfers  Sit to Stand: Minimal Assistance  Stand to sit: Minimal Assistance  Bed to Chair: Minimal assistance  Ambulation  Surface: level tile  Device: Rolling Walker  Assistance: Minimal assistance  Quality of Gait: slow guarded gait with rolling walker  Gait Deviations: Slow Mignon  Distance: 5ft RW Close CGA 2 assist 1 for line management  More Ambulation?: No     Balance  Posture: Good  Sitting - Static: Good  Sitting - Dynamic: Good  Standing - Static: Fair  Standing - Dynamic: Fair;-  Comments: Pt leans heavily onto walker  Functional Reach Test  Fall Risk (Score of <10 inches is fall risk): Yes  Exercise Treatment: pt sat EOB x 10 minutes, STS x 2 Hanane  A/AROM Exercises: Pt instructed in bilateral LE AROM including ankle pumps heels slides, knee extension in sitting with good tolerance and pt understanding                                                        AM-PAC Score  AM-PAC Inpatient Mobility Raw Score : 18 (09/20/22 1122)  AM-PAC Inpatient T-Scale Score : 43.63 (09/20/22 1122)  Mobility Inpatient CMS 0-100% Score: 46.58 (09/20/22 1122)  Mobility Inpatient CMS G-Code Modifier : CK (09/20/22 1122)               Goals  Short Term Goals  Time Frame for Short term goals: 5 visits  Short term goal 1: Pt to complete supine <> sit mod I  Short term goal 2: Sit <> stand Mod I with AD  Short term goal 3: pt to ambulate 50 ft with RW CGA  Long Term Goals  Time Frame for Long term goals : 14 visits  Long term goal 1: Pt to ambulate 100 ft Mod I RW  Long term goal 2: Ascend /descend 4 steps mod I  Patient Goals   Patient goals : to get stronger       Education  Patient Education  Education Given To: Patient  Education Provided: Role of Therapy;Plan of Care; Fall Prevention Strategies;Transfer Training  Education Provided Comments: Pt educated on use of AD with gait, benefits of activity and adverse effects of prolonged bed rest  Education Method: Demonstration;Verbal  Barriers to Learning: Other (Comment) (Pt is self limiting with activity 2/2 medical status and pain)  Education Outcome: Verbalized understanding      Therapy Time   Individual Concurrent Group Co-treatment   Time In 0945         Time Out 1019         Minutes 34         Timed Code Treatment Minutes: [address] Sanchez, PT, DPT

## 2022-09-20 NOTE — PROGRESS NOTES
Renal Progress Note    Patient :  Li Gonzalez; 74 y.o. MRN# [id number]  Location:  330/0879-37  Attending:  Quoc Prado MD  Admit Date:  9/16/2022   Hospital Day: 4     History of Present Illness:     Li Gonzalez; 74 y.o. male with past medical history of PE, chronic kidney disease stage 3B managed by Dr Ezekiel Morgan, right side hydronephrosis s/p ureter stent, recurrent UTI's and bladder cancer failed chemotherapy, who was admitted for planned robotic laparoscopic radical cystoprostatectomy with bilateral pelvic lymphadenectomy and ileal conduit formation on 9/16/22. Nephrology this consult is secondary to a significant rise in serum creatinine up to  On admission creatinine was 1.79 and dane up to 2.47 yesterday. Creatinine is back down to 1.5 today, at baseline. The patient has an ileus versus small bowel obstruction so NG tube placed yesterday. Patient's serum sodium fluctuates between 1 43-1 46, potassium factors as well. However electrolytes are better since IV fluids with potassium started yesterday, currently receiving D5 0.45 100 mill an hour, phosphorus running low which is being replaced. Per office notes, his baseline in May runs 1.7-1.8. He continues to be n.p.o. at present. Has significant pain and discomfort which at times is refractory to Dilaudid. Wishes he could have more although understands that that may make his bowel obstruction more refractory. He has not had any contrast exposure, nor low blood pressures. He is not on diuretics or nephrotoxic medications. Total output in 24 hours is 2015 mL which includes urine output of 1200 mL and NG output of 795 mL  No history of recent contrast exposure  No h/o prolonged NSAIDs use in the past,   No h/o nephrolithiasis, No recent skin rashes or arthralgias   No hematuria or pyuria noticed in the recent past.   Doesn't report any reduction in the urine output recently.    Non report of any obstructive urinary symptoms (urgency, frequency, weak stream, straining while urination). No h/o recurrent UTIs in the past.    Past History/Allergies? Social History:     Past Medical History:   Diagnosis Date    Acute renal failure with tubular necrosis ([address] 05/26/2022    Likely ischemic ATN/prerenal acidemia from intractable nausea vomiting as result of chemotherapy, baseline 1.3-1.4 peaked up to 2.3 in May 2022    Arthritis     BPH with obstruction/lower urinary tract symptoms     CAD (coronary artery disease) 06/2022    nonobstructive on cath    Caffeine use     3 cans soda/pop    Cancer ([address]     bladder cancer. Dr. 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   Baseline 1.3-1.4    COVID-19 08/16/2021    SOB, fatigue, fever, chills  x 3 weeks    Depression     Dilated cardiomyopathy ([address] 05/26/2022    Ejection fraction 40 to 45%, does have symptoms of dyspnea and decreased effort tolerance    GERD (gastroesophageal reflux disease)     High cholesterol     Kidney calculi     Sleep apnea     does not use cpap    Under care of team     Dr. Dilcia Tilley Nephrology    Under care of team     Dr. Nj Jones. last visit approx 9/2021    Under care of team     Dr. Hillary Mueller [facility] last visit 8/03/2022    Under care of team     Dr. Fransico Lyn    Under care of team     Dr. Gifty Andrade       Past Surgical History:   Procedure Laterality Date    BLADDER REMOVAL      BLADDER REMOVAL N/A 9/16/2022    XI ROBOTIC LAPARASCOPIC ASSISTED RADICAL CYSTOPROSTATECTOMY, BILATERAL PELVIC LYMPHADENECTOMY AND ILEAL CONDUIT FORMATION performed by Tracy Matta MD at [address] Right 9/16/2022    CYSTOSCOPY STENT REMOVAL performed by Heather Jung MD at [address]  2022    nonobstructive CAD    COLONOSCOPY      IR PORT PLACEMENT EQUAL OR GREATER THAN 5 YEARS  2022    IR PORT PLACEMENT EQUAL OR GREATER THAN 5 YEARS 2022 [facility]    KNEE ARTHROSCOPY      left    PROSTATECTOMY  2022    Cystoprostatectomy, Bilateral Pelvic Lymphadenectomy, ileo conduit formation, cystectomy stent removal (right)       Allergies   Allergen Reactions    Other        Social History     Socioeconomic History    Marital status:      Spouse name: Not on file    Number of children: Not on file    Years of education: Not on file    Highest education level: Not on file   Occupational History    Not on file   Tobacco Use    Smoking status: Former     Packs/day: 0.25     Years: 9.00     Pack years: 2.25     Types: Cigarettes     Start date: 1983     Quit date: 2/10/1992     Years since quittin.6    Smokeless tobacco: Never   Vaping Use    Vaping Use: Never used   Substance and Sexual Activity    Alcohol use: Not Currently    Drug use: No    Sexual activity: Yes     Partners: Female   Other Topics Concern    Not on file   Social History Narrative    Not on file     Social Determinants of Health     Financial Resource Strain: Not on file   Food Insecurity: Not on file   Transportation Needs: Not on file   Physical Activity: Not on file   Stress: Not on file   Social Connections: Not on file   Intimate Partner Violence: Not on file   Housing Stability: Not on file       Family History:        Family History   Problem Relation Age of Onset    Cancer Father         bladder cancer    Urolithiasis Father        Outpatient Medications:     Medications Prior to Admission: ondansetron (ZOFRAN-ODT) 8 MG TBDP disintegrating tablet, DISSOLVE 1 TABLET IN MOUTH EVERY 8 HOURS AS NEEDED FOR NAUSEA FOR VOMITING  HYDROcodone-acetaminophen ([address]) 5-325 MG per tablet, Take 1 tablet by mouth every 8 hours as needed for Pain for up to 30 days. rivaroxaban (XARELTO) 20 MG TABS tablet, Take 1 tablet by mouth once daily with breakfast (Patient taking differently: Take 1 tablet by mouth once daily with breakfast/ per cardiology, pt. to stop 3 days pre-op for OR 9-16-22)  omeprazole (PRILOSEC) 20 MG delayed release capsule, Take 1 capsule by mouth daily  carvedilol (COREG) 3.125 MG tablet, TAKE 1 TABLET BY MOUTH TWICE DAILY  finasteride (PROSCAR) 5 MG tablet, Take 5 mg by mouth daily  tamsulosin (FLOMAX) 0.4 MG capsule, Take 0.4 mg by mouth daily  simvastatin (ZOCOR) 40 MG tablet, Take 40 mg by mouth nightly  buPROPion (WELLBUTRIN XL) 300 MG extended release tablet, Take 300 mg by mouth every morning    Current Medications:     Scheduled Meds:    metoclopramide  10 mg IntraVENous Q6H    bisacodyl  10 mg Rectal Daily    pantoprazole (PROTONIX) 40 mg injection  40 mg IntraVENous Q12H    polyethylene glycol  17 g Oral Daily    cefepime  2,000 mg IntraVENous Q12H    heparin (porcine)  5,000 Units SubCUTAneous BID    naloxegol  25 mg Oral Daily    buPROPion  300 mg Oral QAM    carvedilol  3.125 mg Oral BID    simvastatin  40 mg Oral Nightly    sodium chloride flush  5-40 mL IntraVENous 2 times per day    acetaminophen  650 mg Oral Q6H     Continuous Infusions:    dextrose 5% and 0.45% NaCl with KCl 20 mEq 100 mL/hr at 09/20/22 1534    sodium chloride       PRN Meds:  metoprolol, promethazine, HYDROmorphone, naloxone, ondansetron, ondansetron, sodium chloride flush, sodium chloride, ondansetron **OR** ondansetron, oxyCODONE **OR** oxyCODONE    Review of Systems:     Constitutional: No fever, no chills, no lethargy, no weakness. NG tube in place  HEENT:  No headache, otalgia, itchy eyes, nasal discharge or sore throat. NG tube in place. Cardiac:  No chest pain, shortness of breath, orthopnea or PND. Chest:             No cough, phlegm or wheezing.   Abdomen:  ++ abdominal pain, NG tube in place less nausea and less distention and subsequently less pain. Neuro:             No focal weakness, abnormal movements or seizure like activity. Skin:   No rashes, no itching. :   No hematuria, no pyuria, no dysuria, no flank pain. Extremities:  No swelling or joint pains. ROS was otherwise negative except as mentioned in the [address] Input/Output:       I/O last 3 completed shifts:  In: -   Out: 5505 [Urine:2050; Emesis/NG output:2795; Drains:660]    Vital Signs:   Temperature:  Temp: 97.8 °F (36.6 °C)  TMax:   Temp (24hrs), Av °F (36.7 °C), Min:97.5 °F (36.4 °C), Max:98.8 °F (37.1 °C)    Respirations:  Resp: 16  Pulse:   Heart Rate: 92  BP:    BP: (!) 150/93  BP Range: Systolic (77WRT), CQP:275 , Min:150 , IZ       Diastolic (38TKS), XKQ:30, Min:87, Max:105      Physical Examination:     General:  AAO x 3, speaking in full sentences, no accessory muscle use. HEENT: Atraumatic, normocephalic, no throat congestion, moist mucosa. Eyes:   Pupils equal, round and reactive to light, EOMI. Neck:   No JVD, midline trachea and no accessory muscle use  Chest:   Good bilateral air entry and clear to auscultation bilaterally without wheezes or rales or rhonchi  Cardiac:  Regular rate and rhythm with positive S1 and S2 and no rubs  Abdomen: Tender, staples intact. MERLIN drain  :   No suprapubic or flank tenderness. Neuro:              AAO x 3, No FND. SKIN:  No rashes, good skin turgor.   Extremities:  No edema, No clubbing, No cyanosis    Labs:       Recent Labs     22  0659 22  0521 22  0723   WBC 15.0* 15.0* 18.0*   RBC 4.00* 3.67* 3.52*   HGB 11.4* 10.7* 10.4*   HCT 36.2* 32.7* 31.9*   MCV 90.5 89.1 90.6   MCH 28.5 29.2 29.5   MCHC 31.5 32.7 32.6   RDW 14.6* 14.6* 14.7*    187 237   MPV 9.6 9.6 9.6        BMP:   Recent Labs     22  0659 22  0521 22  0925 22  0723    143  --  146*   K 4.4 3.3* 4.0 3.7   * 107  --  110*   CO2 20  BUN 24* 27*  --  36*   CREATININE 2.47* 1.75*  --  1.54*   GLUCOSE 104* 125*  --  131*   CALCIUM 8.5* 8.6  --  9.1        Phosphorus:     Recent Labs     09/18/22  0659 09/19/22  0521 09/20/22  0723   PHOS 3.8 1.9* 1.9*       Magnesium:    Recent Labs     09/18/22  0659 09/19/22  0521 09/20/22  0723   MG 2.0 1.9 2.4       LISS:      Lab Results   Component Value Date/Time    LISS NEGATIVE 05/31/2022 10:45 AM     SPEP:  Lab Results   Component Value Date/Time    PROT 6.6 09/08/2022 01:46 PM     C3:     Lab Results   Component Value Date/Time    C3 119 05/31/2022 10:45 AM     C4:     Lab Results   Component Value Date/Time    C4 25 05/31/2022 10:45 AM     Urinalysis/Chemistries:      Lab Results   Component Value Date/Time    NITRU POSITIVE 09/18/2022 05:17 PM    COLORU Yellow 09/18/2022 05:17 PM    PHUR 6.0 09/18/2022 05:17 PM    WBCUA 50  09/18/2022 05:17 PM    RBCUA TOO NUMEROUS TO COUNT 09/18/2022 05:17 PM    MUCUS 3+ 04/14/2022 09:43 AM    YEAST FEW 03/31/2022 01:01 PM    BACTERIA MODERATE 09/18/2022 05:17 PM    CLARITYU cloudy 08/04/2022 09:05 AM    SPECGRAV 1.016 09/18/2022 05:17 PM    LEUKOCYTESUR LARGE 09/18/2022 05:17 PM    UROBILINOGEN Normal 09/18/2022 05:17 PM    BILIRUBINUR NEGATIVE 09/18/2022 05:17 PM    BLOODU large 08/04/2022 09:05 AM    GLUCOSEU NEGATIVE 09/18/2022 05:17 PM    KETUA TRACE 09/18/2022 05:17 PM     Urine Creatinine:     Lab Results   Component Value Date/Time    LABCREA 121.3 09/18/2022 05:17 PM       Radiology:     CXR:     Assessment:     1. Acute Kidney Injury: From ischemic ATN from fluid shifts related to surgery, baseline creatinine 1.7-1.8 peaked up to 2.7 now resolving  2. Chronic Kidney Disease : Stage III secondary to chronic  obstructive uropathy from bladder tumor, chemotherapy side effect from platinum based analogs. Baseline creatinine 1.7-1.8 as of May 2022. Managed through nephrology office,  Work-up for immune complex  disease negative  3.  Bladder cancer s/p cystoprostatectomy and ileal conduit 20  4. CMP EF 45%    Plan:   1. IV fluids D5 0.45 with 20 of K8 100 mill an hour   2. Daily Labs as ordered  3. NG tube per general surgery  4. May need parenteral nutritional support soon         Nutrition   Please ensure that patient is on a renal diet/TF. Avoid nephrotoxic drugs/contrast exposure. Thank you for the consultation. Please do not hesitate to contact us for any further questions/concerns. We will continue to follow along with you.         Ronda Beauchamp MD     9/20/2022 4:29 PM    Nephrology [facility]

## 2022-09-21 ENCOUNTER — APPOINTMENT (OUTPATIENT)
Dept: GENERAL RADIOLOGY | Age: 67
DRG: 653 | End: 2022-09-21
Attending: SPECIALIST
Payer: MEDICARE

## 2022-09-21 LAB
ANION GAP SERPL CALCULATED.3IONS-SCNC: 11 MMOL/L (ref 9–17)
ANION GAP SERPL CALCULATED.3IONS-SCNC: 9 MMOL/L (ref 9–17)
BUN BLDV-MCNC: 37 MG/DL (ref 8–23)
BUN BLDV-MCNC: 40 MG/DL (ref 8–23)
CALCIUM SERPL-MCNC: 8.4 MG/DL (ref 8.6–10.4)
CALCIUM SERPL-MCNC: 8.5 MG/DL (ref 8.6–10.4)
CHLORIDE BLD-SCNC: 112 MMOL/L (ref 98–107)
CHLORIDE BLD-SCNC: 116 MMOL/L (ref 98–107)
CO2: 19 MMOL/L (ref 20–31)
CO2: 26 MMOL/L (ref 20–31)
CREAT SERPL-MCNC: 2.22 MG/DL (ref 0.7–1.2)
CREAT SERPL-MCNC: 2.31 MG/DL (ref 0.7–1.2)
GFR AFRICAN AMERICAN: 35 ML/MIN
GFR AFRICAN AMERICAN: 36 ML/MIN
GFR NON-AFRICAN AMERICAN: 29 ML/MIN
GFR NON-AFRICAN AMERICAN: 30 ML/MIN
GFR SERPL CREATININE-BSD FRML MDRD: ABNORMAL ML/MIN/{1.73_M2}
GFR SERPL CREATININE-BSD FRML MDRD: ABNORMAL ML/MIN/{1.73_M2}
GLUCOSE BLD-MCNC: 102 MG/DL (ref 70–99)
GLUCOSE BLD-MCNC: 107 MG/DL (ref 70–99)
GLUCOSE BLD-MCNC: 110 MG/DL (ref 75–110)
GLUCOSE BLD-MCNC: 116 MG/DL (ref 75–110)
HCT VFR BLD CALC: 31.8 % (ref 40.7–50.3)
HEMOGLOBIN: 9.9 G/DL (ref 13–17)
MAGNESIUM: 2 MG/DL (ref 1.6–2.6)
MCH RBC QN AUTO: 28.9 PG (ref 25.2–33.5)
MCHC RBC AUTO-ENTMCNC: 31.1 G/DL (ref 28.4–34.8)
MCV RBC AUTO: 92.7 FL (ref 82.6–102.9)
NRBC AUTOMATED: 0.1 PER 100 WBC
PDW BLD-RTO: 14.9 % (ref 11.8–14.4)
PHOSPHORUS: 2.6 MG/DL (ref 2.5–4.5)
PLATELET # BLD: 202 K/UL (ref 138–453)
PMV BLD AUTO: 9.9 FL (ref 8.1–13.5)
POTASSIUM SERPL-SCNC: 4.3 MMOL/L (ref 3.7–5.3)
POTASSIUM SERPL-SCNC: 4.3 MMOL/L (ref 3.7–5.3)
RBC # BLD: 3.43 M/UL (ref 4.21–5.77)
SODIUM BLD-SCNC: 146 MMOL/L (ref 135–144)
SODIUM BLD-SCNC: 147 MMOL/L (ref 135–144)
SURGICAL PATHOLOGY REPORT: NORMAL
WBC # BLD: 15.9 K/UL (ref 3.5–11.3)

## 2022-09-21 PROCEDURE — 6360000004 HC RX CONTRAST MEDICATION: Performed by: STUDENT IN AN ORGANIZED HEALTH CARE EDUCATION/TRAINING PROGRAM

## 2022-09-21 PROCEDURE — 6360000002 HC RX W HCPCS: Performed by: ANESTHESIOLOGY

## 2022-09-21 PROCEDURE — 82570 ASSAY OF URINE CREATININE: CPT

## 2022-09-21 PROCEDURE — 1200000000 HC SEMI PRIVATE

## 2022-09-21 PROCEDURE — 83735 ASSAY OF MAGNESIUM: CPT

## 2022-09-21 PROCEDURE — 74018 RADEX ABDOMEN 1 VIEW: CPT

## 2022-09-21 PROCEDURE — 85027 COMPLETE CBC AUTOMATED: CPT

## 2022-09-21 PROCEDURE — 82947 ASSAY GLUCOSE BLOOD QUANT: CPT

## 2022-09-21 PROCEDURE — 2580000003 HC RX 258: Performed by: PHYSICIAN ASSISTANT

## 2022-09-21 PROCEDURE — 6360000002 HC RX W HCPCS: Performed by: STUDENT IN AN ORGANIZED HEALTH CARE EDUCATION/TRAINING PROGRAM

## 2022-09-21 PROCEDURE — 84100 ASSAY OF PHOSPHORUS: CPT

## 2022-09-21 PROCEDURE — 80048 BASIC METABOLIC PNL TOTAL CA: CPT

## 2022-09-21 PROCEDURE — C9113 INJ PANTOPRAZOLE SODIUM, VIA: HCPCS | Performed by: STUDENT IN AN ORGANIZED HEALTH CARE EDUCATION/TRAINING PROGRAM

## 2022-09-21 PROCEDURE — 6370000000 HC RX 637 (ALT 250 FOR IP): Performed by: STUDENT IN AN ORGANIZED HEALTH CARE EDUCATION/TRAINING PROGRAM

## 2022-09-21 PROCEDURE — 6360000002 HC RX W HCPCS: Performed by: SPECIALIST

## 2022-09-21 PROCEDURE — 99233 SBSQ HOSP IP/OBS HIGH 50: CPT | Performed by: INTERNAL MEDICINE

## 2022-09-21 PROCEDURE — 2580000003 HC RX 258: Performed by: SPECIALIST

## 2022-09-21 PROCEDURE — 2500000003 HC RX 250 WO HCPCS: Performed by: STUDENT IN AN ORGANIZED HEALTH CARE EDUCATION/TRAINING PROGRAM

## 2022-09-21 PROCEDURE — 6360000002 HC RX W HCPCS

## 2022-09-21 PROCEDURE — 2580000003 HC RX 258: Performed by: STUDENT IN AN ORGANIZED HEALTH CARE EDUCATION/TRAINING PROGRAM

## 2022-09-21 PROCEDURE — 36415 COLL VENOUS BLD VENIPUNCTURE: CPT

## 2022-09-21 PROCEDURE — 2500000003 HC RX 250 WO HCPCS: Performed by: INTERNAL MEDICINE

## 2022-09-21 PROCEDURE — 74250 X-RAY XM SM INT 1CNTRST STD: CPT

## 2022-09-21 RX ORDER — 0.9 % SODIUM CHLORIDE 0.9 %
1000 INTRAVENOUS SOLUTION INTRAVENOUS ONCE
Status: COMPLETED | OUTPATIENT
Start: 2022-09-21 | End: 2022-09-21

## 2022-09-21 RX ADMIN — BUPROPION HYDROCHLORIDE 300 MG: 150 TABLET, EXTENDED RELEASE ORAL at 09:42

## 2022-09-21 RX ADMIN — POTASSIUM CHLORIDE: 2 INJECTION, SOLUTION, CONCENTRATE INTRAVENOUS at 21:14

## 2022-09-21 RX ADMIN — SIMVASTATIN 40 MG: 40 TABLET, FILM COATED ORAL at 21:33

## 2022-09-21 RX ADMIN — OXYCODONE 10 MG: 5 TABLET ORAL at 14:26

## 2022-09-21 RX ADMIN — POTASSIUM CHLORIDE, DEXTROSE MONOHYDRATE AND SODIUM CHLORIDE: 150; 5; 450 INJECTION, SOLUTION INTRAVENOUS at 01:26

## 2022-09-21 RX ADMIN — METOCLOPRAMIDE 10 MG: 5 INJECTION, SOLUTION INTRAMUSCULAR; INTRAVENOUS at 08:39

## 2022-09-21 RX ADMIN — OXYCODONE 10 MG: 5 TABLET ORAL at 05:16

## 2022-09-21 RX ADMIN — HEPARIN SODIUM 5000 UNITS: 5000 INJECTION INTRAVENOUS; SUBCUTANEOUS at 09:42

## 2022-09-21 RX ADMIN — METOCLOPRAMIDE 10 MG: 5 INJECTION, SOLUTION INTRAMUSCULAR; INTRAVENOUS at 02:40

## 2022-09-21 RX ADMIN — SODIUM CHLORIDE, PRESERVATIVE FREE 40 MG: 5 INJECTION INTRAVENOUS at 21:22

## 2022-09-21 RX ADMIN — HYDROMORPHONE HYDROCHLORIDE 1 MG: 1 INJECTION, SOLUTION INTRAMUSCULAR; INTRAVENOUS; SUBCUTANEOUS at 18:50

## 2022-09-21 RX ADMIN — POTASSIUM CHLORIDE: 2 INJECTION, SOLUTION, CONCENTRATE INTRAVENOUS at 21:13

## 2022-09-21 RX ADMIN — NALOXEGOL OXALATE 25 MG: 12.5 TABLET, FILM COATED ORAL at 09:42

## 2022-09-21 RX ADMIN — CARVEDILOL 3.12 MG: 3.12 TABLET, FILM COATED ORAL at 21:21

## 2022-09-21 RX ADMIN — CARVEDILOL 3.12 MG: 3.12 TABLET, FILM COATED ORAL at 09:42

## 2022-09-21 RX ADMIN — METOCLOPRAMIDE 10 MG: 5 INJECTION, SOLUTION INTRAMUSCULAR; INTRAVENOUS at 14:26

## 2022-09-21 RX ADMIN — SODIUM CHLORIDE, PRESERVATIVE FREE 40 MG: 5 INJECTION INTRAVENOUS at 09:42

## 2022-09-21 RX ADMIN — POLYETHYLENE GLYCOL 3350 17 G: 17 POWDER, FOR SOLUTION ORAL at 09:42

## 2022-09-21 RX ADMIN — HEPARIN SODIUM 5000 UNITS: 5000 INJECTION INTRAVENOUS; SUBCUTANEOUS at 21:22

## 2022-09-21 RX ADMIN — OXYCODONE 10 MG: 5 TABLET ORAL at 09:42

## 2022-09-21 RX ADMIN — WATER 2000 MG: 1 INJECTION INTRAMUSCULAR; INTRAVENOUS; SUBCUTANEOUS at 08:40

## 2022-09-21 RX ADMIN — HYDROMORPHONE HYDROCHLORIDE 1 MG: 1 INJECTION, SOLUTION INTRAMUSCULAR; INTRAVENOUS; SUBCUTANEOUS at 02:39

## 2022-09-21 RX ADMIN — HYDROMORPHONE HYDROCHLORIDE 1 MG: 1 INJECTION, SOLUTION INTRAMUSCULAR; INTRAVENOUS; SUBCUTANEOUS at 12:43

## 2022-09-21 RX ADMIN — SODIUM CHLORIDE 1000 ML: 9 INJECTION, SOLUTION INTRAVENOUS at 09:59

## 2022-09-21 RX ADMIN — ACETAMINOPHEN 650 MG: 325 TABLET ORAL at 14:26

## 2022-09-21 RX ADMIN — OXYCODONE 10 MG: 5 TABLET ORAL at 20:04

## 2022-09-21 RX ADMIN — ACETAMINOPHEN 650 MG: 325 TABLET ORAL at 02:40

## 2022-09-21 RX ADMIN — BISACODYL 10 MG: 10 SUPPOSITORY RECTAL at 14:32

## 2022-09-21 RX ADMIN — DIATRIZOATE MEGLUMINE AND DIATRIZOATE SODIUM 360 ML: 660; 100 LIQUID ORAL; RECTAL at 11:04

## 2022-09-21 RX ADMIN — OXYCODONE 10 MG: 5 TABLET ORAL at 01:14

## 2022-09-21 RX ADMIN — WATER 2000 MG: 1 INJECTION INTRAMUSCULAR; INTRAVENOUS; SUBCUTANEOUS at 19:45

## 2022-09-21 RX ADMIN — METOCLOPRAMIDE 10 MG: 5 INJECTION, SOLUTION INTRAMUSCULAR; INTRAVENOUS at 19:45

## 2022-09-21 RX ADMIN — ONDANSETRON 4 MG: 2 INJECTION INTRAMUSCULAR; INTRAVENOUS at 13:19

## 2022-09-21 RX ADMIN — HYDROMORPHONE HYDROCHLORIDE 1 MG: 1 INJECTION, SOLUTION INTRAMUSCULAR; INTRAVENOUS; SUBCUTANEOUS at 07:54

## 2022-09-21 RX ADMIN — PROMETHAZINE HYDROCHLORIDE 6.25 MG: 25 INJECTION INTRAMUSCULAR; INTRAVENOUS at 09:55

## 2022-09-21 RX ADMIN — HYDROMORPHONE HYDROCHLORIDE 1 MG: 1 INJECTION, SOLUTION INTRAMUSCULAR; INTRAVENOUS; SUBCUTANEOUS at 15:54

## 2022-09-21 RX ADMIN — POTASSIUM CHLORIDE: 2 INJECTION, SOLUTION, CONCENTRATE INTRAVENOUS at 13:21

## 2022-09-21 ASSESSMENT — PAIN DESCRIPTION - LOCATION
LOCATION: ABDOMEN

## 2022-09-21 ASSESSMENT — PAIN DESCRIPTION - ONSET
ONSET: ON-GOING
ONSET: ON-GOING

## 2022-09-21 ASSESSMENT — PAIN SCALES - GENERAL
PAINLEVEL_OUTOF10: 6
PAINLEVEL_OUTOF10: 7
PAINLEVEL_OUTOF10: 3
PAINLEVEL_OUTOF10: 9
PAINLEVEL_OUTOF10: 7
PAINLEVEL_OUTOF10: 7
PAINLEVEL_OUTOF10: 0
PAINLEVEL_OUTOF10: 9
PAINLEVEL_OUTOF10: 7
PAINLEVEL_OUTOF10: 7
PAINLEVEL_OUTOF10: 5
PAINLEVEL_OUTOF10: 7
PAINLEVEL_OUTOF10: 7

## 2022-09-21 ASSESSMENT — PAIN DESCRIPTION - ORIENTATION
ORIENTATION: RIGHT;LEFT
ORIENTATION: RIGHT;LEFT;LOWER
ORIENTATION: RIGHT;LEFT;LOWER
ORIENTATION: RIGHT;LEFT
ORIENTATION: RIGHT;LEFT;LOWER

## 2022-09-21 ASSESSMENT — PAIN DESCRIPTION - DESCRIPTORS
DESCRIPTORS: ACHING;SQUEEZING
DESCRIPTORS: ACHING;SHARP
DESCRIPTORS: SHARP;SQUEEZING
DESCRIPTORS: ACHING;SQUEEZING;TENDER

## 2022-09-21 ASSESSMENT — PAIN DESCRIPTION - FREQUENCY
FREQUENCY: CONTINUOUS
FREQUENCY: CONTINUOUS

## 2022-09-21 ASSESSMENT — PAIN DESCRIPTION - PAIN TYPE
TYPE: ACUTE PAIN;SURGICAL PAIN
TYPE: SURGICAL PAIN;ACUTE PAIN
TYPE: SURGICAL PAIN

## 2022-09-21 ASSESSMENT — PAIN - FUNCTIONAL ASSESSMENT
PAIN_FUNCTIONAL_ASSESSMENT: ACTIVITIES ARE NOT PREVENTED
PAIN_FUNCTIONAL_ASSESSMENT: ACTIVITIES ARE NOT PREVENTED

## 2022-09-21 NOTE — PROGRESS NOTES
Occupational [employer]  Occupational Therapy Not Seen Note    DATE: 2022    NAME: Myrna Hdez  MRN: [id number]   : 1955      Patient not seen this date for Occupational Therapy due to:    Patient Declined: 2nd attempt this date, pt politely declined d/t Abd pain and nausea. Next Scheduled Treatment: Attempt on  as appropriate.     Electronically signed by Gerson Alexander OT on 2022 at 3:49 PM

## 2022-09-21 NOTE — PROGRESS NOTES
Shwetha Lewis MD FACS   Urology Progress Note     Subjective:   Diet: N.p.o. except for sips and chips   Plan for SBFT vs CT scan today per Gen Surg  Pain decently well controlled off the epidural infusion now   Passing small amount of flatus, no BM  Ambulation : improved ambulation, down hallway overnight   Urine output-790 cc  MERLIN drain output-30cc, serosanguineous  NG tube 1.1L bilious    Patient Vitals for the past 24 hrs:   BP Temp Temp src Pulse Resp SpO2 Weight   09/21/22 0546 -- -- -- -- 16 -- --   09/21/22 0518 -- -- -- -- -- -- 260 lb 2.3 oz (118 kg)   09/21/22 0516 -- -- -- -- 18 -- --   09/21/22 0309 -- -- -- -- 18 -- --   09/21/22 0239 139/77 -- -- (!) 102 20 -- --   09/21/22 0144 -- -- -- -- 18 -- --   09/21/22 0114 -- -- -- -- 20 -- --   09/21/22 0002 -- -- -- -- 18 -- --   09/20/22 2332 -- -- -- -- 18 -- --   09/20/22 2103 -- -- -- -- 18 -- --   09/20/22 2033 -- -- -- -- 20 -- --   09/20/22 2028 (!) 165/87 -- -- 90 -- -- --   09/20/22 1930 (!) 165/87 97.7 °F (36.5 °C) Oral 92 18 96 % --   09/20/22 1735 -- -- -- -- 16 -- --   09/20/22 1624 -- -- -- -- -- -- 257 lb 11.5 oz (116.9 kg)   09/20/22 1552 (!) 150/93 97.8 °F (36.6 °C) Oral 92 16 97 % --   09/20/22 1449 -- -- -- -- 16 -- --   09/20/22 1419 -- -- -- -- 16 -- --   09/20/22 1332 (!) 168/87 97.5 °F (36.4 °C) Oral 89 -- (!) 87 % --   09/20/22 1155 -- -- -- -- 16 -- --   09/20/22 1125 -- -- -- -- 16 -- --   09/20/22 0838 (!) 176/90 98.1 °F (36.7 °C) Oral 86 16 99 % --       Intake/Output Summary (Last 24 hours) at 9/21/2022 0806  Last data filed at 9/21/2022 0600  Gross per 24 hour   Intake 1004.62 ml   Output 2595 ml   Net -1590.38 ml       Recent Labs     09/19/22  0521 09/20/22  0723 09/21/22  0609   WBC 15.0* 18.0* 15.9*   HGB 10.7* 10.4* 9.9*   HCT 32.7* 31.9* 31.8*   MCV 89.1 90.6 92.7    237 202     Recent Labs     09/19/22  0521 09/19/22  0925 09/20/22  0723 09/21/22  0609     --  146* 147*   K 3.3* 4.0 3.7 4.3   CL 107  --  110* 112*   CO2 28  --  22 26   PHOS 1.9*  --  1.9* 2.6   BUN 27*  --  36* 37*   CREATININE 1.75*  --  1.54* 2.22*       Recent Labs     09/18/22  1717   COLORU Yellow   PHUR 6.0   WBCUA 50    RBCUA TOO NUMEROUS TO COUNT   BACTERIA MODERATE*   SPECGRAV 1.016   LEUKOCYTESUR LARGE*   UROBILINOGEN Normal   BILIRUBINUR NEGATIVE       Additional Lab/culture results:    Physical Exam:   AO X 3  HEENT: NG tube in place, small amt green bilious fluid  Neck: Supple   Chest: bilateral symmetrical chest rise/ Non labored breathing   Circulatory: Peripheries warm , well perfused   P/A: soft, nondistended, incision sites appropriately tender, clean, dry and intact with skin glue, ostomy pink with bilateral stents  Extremities: No edema/calf tenderness    Interval Imaging Findings: none    Impression:  none    Srinivasa Huertas is a 44-year-old male with bladder cancer, s/p robotic assisted laparoscopic cystoprostatectomy with ileal conduit creation on 9/16/2022      Plan:   Diet: Continue on n.p.o. -NG tube in place, appreciate general surgery management  Poss SBFT sv CT today   IV fluids: Continue IV fluids. Bolus with IVF since creat up and patient having a lot of NG output. SQH DVT PPX  Antibiotics: IV Ancef for 24 hours - completed  Pain control: Tylenol scheduled, Roxicodone as needed, Dilaudid as needed for breakthrough  Drain output : continue to monitor  Needs to ambulate more/ IS 10 times per hour   Not appropriate for discharge    Miah Ruth MD, PGY-3  8:06 AM 9/21/2022  I have reviewed the history above and agree. I have reviewed all laboratory findings and imaging reports/films. I agree with the plan as noted above.     Electronically signed by Nydia Kee MD on 9/21/2022 at 8:16 AM

## 2022-09-21 NOTE — PROGRESS NOTES
Physical Therapy        Physical Therapy Cancel Note      DATE: 2022    NAME: Emili Sullivan  MRN: [id number]   : 1955      Patient not seen this date for Physical Therapy due to:    Testing: pt leaving for CT. PT will check back this PM as time allows or 22.        Electronically signed by Shayne Duran PT on 2022 at 11:55 AM

## 2022-09-21 NOTE — PLAN OF CARE
Problem: Discharge Planning  Goal: Discharge to home or other facility with appropriate resources  9/21/2022 0156 by Jonny Millard RN  Outcome: Ella Love (Taken 9/20/2022 2000)  Discharge to home or other facility with appropriate resources: Identify barriers to discharge with patient and caregiver     Problem: Pain  Goal: Verbalizes/displays adequate comfort level or baseline comfort level  9/21/2022 0156 by Jonny Millard RN  Outcome: Progressing     Problem: Safety - Adult  Goal: Free from fall injury  9/21/2022 0156 by Jonny Millard RN  Outcome: Progressing     Problem: ABCDS Injury Assessment  Goal: Absence of physical injury  9/21/2022 0156 by Jonny Millard RN  Outcome: Progressing     Problem: Nutrition Deficit:  Goal: Optimize nutritional status  9/21/2022 0156 by Jonny Millard RN  Outcome: Progressing

## 2022-09-21 NOTE — CARE COORDINATION
Transitional Planning  Called Sivakumar at [phone], message left for [facility] regarding status of referral.    Ajay Salazar at [phone] message left for [facility] Leisa Mercer at [phone] message left for Candis Watkins. 1150 am Call back from Pricilla Hickey, she is following patient, acceptance depends on if patient is going to be on oral chemo medication ands status of NG tube.

## 2022-09-21 NOTE — PLAN OF CARE
Small bowel follow through concerning for complete or partial small bowel obstruction due to failure of contrast passage after 5 hours. At this time the patient is still stable and we would recommend KUB in AM to evaluate for passage of the contrast into the colon. If there is continued failure of contrast passage then patient will potentially need operative intervention.     Electronically signed by Royal Adams DO on 9/21/2022 at 5:16 PM

## 2022-09-21 NOTE — PROGRESS NOTES
[facility])  Occupational Therapy Not Seen Note    DATE: 2022    NAME: Ines Cabrera  MRN: [id number]   : 1955      Patient not seen this date for Occupational Therapy due to:    Testing: CT     Next Scheduled Treatment: Ck in pm or     Electronically signed by Delfina Crigler, OT on 2022 at 10:32 AM

## 2022-09-21 NOTE — PROGRESS NOTES
LC in room for f/u. Mother states that she does not want to breastfeed or pump at this time.   Reviewed paced bottle feeding, formula volume, spit up, and formula preparation.     Parents verbalized understanding of feeding plan. Encouraged mother to pump if desired to increase milk supply.     Lactation warmline on white board for f/u as needed.   Renal Progress Note    Patient :  Beatris Joe; 74 y.o. MRN# [id number]  Location:  1597/7880-54  Attending:  Mary Ovalle MD  Admit Date:  9/16/2022   Hospital Day: 5     History of Present Illness:     Beatris Joe; 74 y.o. male with past medical history of PE, chronic kidney disease stage 3B managed by Dr Hollie Colvin, right side hydronephrosis s/p ureter stent, recurrent UTI's and bladder cancer failed chemotherapy, who was admitted for planned robotic laparoscopic radical cystoprostatectomy with bilateral pelvic lymphadenectomy and ileal conduit formation on 9/16/22. Nephrology this consult is secondary to a significant rise in serum creatinine. Patient's creatinine is elevated today at 2.22. Urine output has decreased. 790 out overnight. Received a 1 L bolus today. Of note patient has been having increased output from his NG. 1.77 L out overnight. Patient seen at bedside. Complains of nausea. A&Ox4. Past History/Allergies? Social History:     Past Medical History:   Diagnosis Date    Acute renal failure with tubular necrosis ([address] 05/26/2022    Likely ischemic ATN/prerenal acidemia from intractable nausea vomiting as result of chemotherapy, baseline 1.3-1.4 peaked up to 2.3 in May 2022    Arthritis     BPH with obstruction/lower urinary tract symptoms     CAD (coronary artery disease) 06/2022    nonobstructive on cath    Caffeine use     3 cans soda/pop    Cancer ([address]     bladder cancer. Dr. Andre Jean [facility]    CKD (chronic kidney disease), stage III ([address] 05/26/2022    From 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   Baseline 1.3-1.4    COVID-19 08/16/2021    SOB, fatigue, fever, chills  x 3 weeks    Depression     Dilated cardiomyopathy ([address] 05/26/2022    Ejection fraction 40 to 45%, does have symptoms of dyspnea and decreased effort tolerance    GERD (gastroesophageal reflux disease)     High cholesterol     Kidney calculi     Sleep apnea     does not use cpap    Under care of team     Dr. Alba Camarillo Nephrology    Under care of team     Dr. Cuco Gupta. last visit approx 2021    Under care of team     Dr. James Lomas [facility] last visit 2022    Under care of team     Dr. Juarez Santos    Under care of team     Dr. Baltazar Chavez       Past Surgical History:   Procedure Laterality Date    BLADDER REMOVAL      BLADDER REMOVAL N/A 2022    XI ROBOTIC LAPARASCOPIC ASSISTED RADICAL CYSTOPROSTATECTOMY, BILATERAL PELVIC LYMPHADENECTOMY AND ILEAL CONDUIT FORMATION performed by Erin Hathaway MD at [facility] 2022    CYSTOSCOPY STENT REMOVAL performed by Erin Hathaway MD at [facility]  2022    nonobstructive CAD    COLONOSCOPY      IR PORT PLACEMENT EQUAL OR GREATER THAN 5 YEARS  2022    IR PORT PLACEMENT EQUAL OR GREATER THAN 5 YEARS 2022 [facility]    KNEE ARTHROSCOPY      left    PROSTATECTOMY  2022    Cystoprostatectomy, Bilateral Pelvic Lymphadenectomy, ileo conduit formation, cystectomy stent removal (right)       Allergies   Allergen Reactions    Other        Social History     Socioeconomic History    Marital status:      Spouse name: Not on file    Number of children: Not on file    Years of education: Not on file    Highest education level: Not on file   Occupational History    Not on file   Tobacco Use    Smoking status: Former     Packs/day: 0.25     Years: 9.00     Pack years: 2.25     Types: Cigarettes     Start date: 1983     Quit date: 2/10/1992     Years since quittin.6    Smokeless tobacco: Never   Vaping Use    Vaping Use: Never used   Substance and Sexual Activity    Alcohol use: Not Currently    Drug use: No    Sexual activity: Yes     Partners: Female   Other Topics Concern    Not on file   Social History Narrative    Not on file     Social Determinants of Health     Financial Resource Strain: Not on file   Food Insecurity: Not on file   Transportation Needs: Not on file   Physical Activity: Not on file   Stress: Not on file   Social Connections: Not on file   Intimate Partner Violence: Not on file   Housing Stability: Not on file       Family History:        Family History   Problem Relation Age of Onset    Cancer Father         bladder cancer    Urolithiasis Father        Outpatient Medications:     Medications Prior to Admission: ondansetron (ZOFRAN-ODT) 8 MG TBDP disintegrating tablet, DISSOLVE 1 TABLET IN MOUTH EVERY 8 HOURS AS NEEDED FOR NAUSEA FOR VOMITING  HYDROcodone-acetaminophen (NORCO) 5-325 MG per tablet, Take 1 tablet by mouth every 8 hours as needed for Pain for up to 30 days.   rivaroxaban (XARELTO) 20 MG TABS tablet, Take 1 tablet by mouth once daily with breakfast (Patient taking differently: Take 1 tablet by mouth once daily with breakfast/ per cardiology, pt. to stop 3 days pre-op for OR 9-16-22)  omeprazole (PRILOSEC) 20 MG delayed release capsule, Take 1 capsule by mouth daily  carvedilol (COREG) 3.125 MG tablet, TAKE 1 TABLET BY MOUTH TWICE DAILY  finasteride (PROSCAR) 5 MG tablet, Take 5 mg by mouth daily  tamsulosin (FLOMAX) 0.4 MG capsule, Take 0.4 mg by mouth daily  simvastatin (ZOCOR) 40 MG tablet, Take 40 mg by mouth nightly  buPROPion (WELLBUTRIN XL) 300 MG extended release tablet, Take 300 mg by mouth every morning    Current Medications:     Scheduled Meds:    metoclopramide  10 mg IntraVENous Q6H    bisacodyl  10 mg Rectal Daily    pantoprazole (PROTONIX) 40 mg injection  40 mg IntraVENous Q12H    polyethylene glycol  17 g Oral Daily    cefepime  2,000 mg IntraVENous Q12H    heparin (porcine)  5,000 Units SubCUTAneous BID    naloxegol  25 mg Oral Daily buPROPion  300 mg Oral QAM    carvedilol  3.125 mg Oral BID    simvastatin  40 mg Oral Nightly    sodium chloride flush  5-40 mL IntraVENous 2 times per day    acetaminophen  650 mg Oral Q6H     Continuous Infusions:    IV infusion builder      sodium chloride       PRN Meds:  diatrizoate meglumine-sodium, metoprolol, promethazine, HYDROmorphone, naloxone, ondansetron, ondansetron, sodium chloride flush, sodium chloride, ondansetron **OR** ondansetron, oxyCODONE **OR** oxyCODONE    Review of Systems:     Constitutional: No fever, no chills, no lethargy, no weakness. NG tube in place  HEENT:  No headache, otalgia, itchy eyes, nasal discharge or sore throat. NG tube in place. Cardiac:  No chest pain, shortness of breath, orthopnea or PND. Chest:             No cough, phlegm or wheezing. Abdomen:  ++ abdominal pain, NG tube in place less nausea and less distention and subsequently less pain. Neuro:             No focal weakness, abnormal movements or seizure like activity. Skin:   No rashes, no itching. :   No hematuria, no pyuria, no dysuria, no flank pain. Extremities:  No swelling or joint pains. ROS was otherwise negative except as mentioned in the [address] Input/Output:       I/O last 3 completed shifts: In: 1004.6 [I.V.:1004.6]  Out: 5135 [Urine:1340; Emesis/NG output:; Drains:50]    Vital Signs:   Temperature:  Temp: 97.5 °F (36.4 °C)  TMax:   Temp (24hrs), Av.6 °F (36.4 °C), Min:97.5 °F (36.4 °C), Max:97.8 °F (36.6 °C)    Respirations:  Resp: 16  Pulse:   Heart Rate: 98  BP:    BP: 139/88  BP Range: Systolic (90ZHT), KDE:264 , Min:139 , PJH:528       Diastolic (35RME), KYT:32, Min:77, Max:93      Physical Examination:     General:  AAO x 3, speaking in full sentences, no accessory muscle use. HEENT: Atraumatic, normocephalic, no throat congestion, moist mucosa. Eyes:   Pupils equal, round and reactive to light, EOMI.   Neck:   No JVD, midline trachea and no accessory muscle use  Chest: Good bilateral air entry and clear to auscultation bilaterally without wheezes or rales or rhonchi  Cardiac:  Regular rate and rhythm with positive S1 and S2 and no rubs  Abdomen: Tender, staples intact. MERLIN drain  :   No suprapubic or flank tenderness. Neuro:              AAO x 3, No FND. SKIN:  No rashes, good skin turgor.   Extremities:  No edema, No clubbing, No cyanosis    Labs:       Recent Labs     09/19/22 0521 09/20/22 0723 09/21/22  0609   WBC 15.0* 18.0* 15.9*   RBC 3.67* 3.52* 3.43*   HGB 10.7* 10.4* 9.9*   HCT 32.7* 31.9* 31.8*   MCV 89.1 90.6 92.7   MCH 29.2 29.5 28.9   MCHC 32.7 32.6 31.1   RDW 14.6* 14.7* 14.9*    237 202   MPV 9.6 9.6 9.9      BMP:   Recent Labs     09/19/22  0521 09/19/22  0925 09/20/22  0723 09/21/22  0609     --  146* 147*   K 3.3* 4.0 3.7 4.3     --  110* 112*   CO2 28  --  22 26   BUN 27*  --  36* 37*   CREATININE 1.75*  --  1.54* 2.22*   GLUCOSE 125*  --  131* 102*   CALCIUM 8.6  --  9.1 8.4*      Phosphorus:     Recent Labs     09/19/22 0521 09/20/22  0723 09/21/22  0609   PHOS 1.9* 1.9* 2.6     Magnesium:    Recent Labs     09/19/22  0521 09/20/22  0723 09/21/22  0609   MG 1.9 2.4 2.0     LISS:      Lab Results   Component Value Date/Time    LISS NEGATIVE 05/31/2022 10:45 AM     SPEP:  Lab Results   Component Value Date/Time    PROT 6.6 09/08/2022 01:46 PM     C3:     Lab Results   Component Value Date/Time    C3 119 05/31/2022 10:45 AM     C4:     Lab Results   Component Value Date/Time    C4 25 05/31/2022 10:45 AM     Urinalysis/Chemistries:      Lab Results   Component Value Date/Time    NITRU POSITIVE 09/18/2022 05:17 PM    COLORU Yellow 09/18/2022 05:17 PM    PHUR 6.0 09/18/2022 05:17 PM    WBCUA 50  09/18/2022 05:17 PM    RBCUA TOO NUMEROUS TO COUNT 09/18/2022 05:17 PM    MUCUS 3+ 04/14/2022 09:43 AM    YEAST FEW 03/31/2022 01:01 PM    BACTERIA MODERATE 09/18/2022 05:17 PM    CLARITYU cloudy 08/04/2022 09:05 AM    SPECGRAV 1.016 09/18/2022 05:17 PM    LEUKOCYTESUR LARGE 09/18/2022 05:17 PM    UROBILINOGEN Normal 09/18/2022 05:17 PM    BILIRUBINUR NEGATIVE 09/18/2022 05:17 PM    BLOODU large 08/04/2022 09:05 AM    GLUCOSEU NEGATIVE 09/18/2022 05:17 PM    KETUA TRACE 09/18/2022 05:17 PM     Urine Creatinine:     Lab Results   Component Value Date/Time    LABCREA 121.3 09/18/2022 05:17 PM       Radiology:     CXR:     Assessment:     1. Acute Kidney Injury: From ischemic ATN from fluid shifts related to surgery, baseline creatinine 1.7-1.8 peaked up to 2.7 now resolving  2. Chronic Kidney Disease : Stage III secondary to chronic  obstructive uropathy from bladder tumor, chemotherapy side effect from platinum based analogs. Baseline creatinine 1.7-1.8 as of May 2022. Managed through nephrology office,  Work-up for immune complex  disease negative  3. Bladder cancer s/p cystoprostatectomy and ileal conduit 20  4. CMP EF 45%    Plan:   1. IV fluids D5 0.45 with 20 meq of KCL/liter at a rate of 150 m/hr  2. Daily Labs as ordered  3. NG tube per general surgery  4. PAXTON worsened today. Received 1 L of fluids. Creatinine is at 2.22. Follow-up repeat labs. Further plans will be discussed with attending Dr. Bambi Aden   Please ensure that patient is on a renal diet/TF. Avoid nephrotoxic drugs/contrast exposure. Thank you for the consultation. Please do not hesitate to contact us for any further questions/concerns. We will continue to follow along with you. Smitha Roberts MD  Internal medicine, PGY-2  [address]   [unfilled] 12:46 PM    Attending Physician Statement  I have discussed the care of Janeth Cons, including pertinent history and exam findings with the resident/fellow. I have reviewed the key elements of all parts of the encounter with the resident/fellow. I have seen and examined the patient with the resident/fellow.   I agree with the assessment and plan and status of the problem list as documented. Addiitionally I agree with normal saline 1 liter fluid bolus as ordered by General Surgery. Fortunately, hemodynamics are stable at present. The patient is having increased MERLIN output today and so may require another normal saline fluid bolus depending on urine output and repeat BMP this afternoon. The patient is at high risk for worsening renal function and need for dialysis on this admission.     Geovani Lopez MD   Nephrology Attending Physician  [facility]  9/21/2022

## 2022-09-21 NOTE — PROGRESS NOTES
PROGRESS NOTE          PATIENT NAME: Kameron Bailon RECORD NO. [id number]  DATE: 2022  SURGEON: Luciano Ellis  PRIMARY CARE PHYSICIAN: Tyler Perla MD    HD: # 5    ASSESSMENT    Patient Active Problem List   Diagnosis    Kidney stones    Nocturia    Hypertrophy of prostate with urinary obstruction    Malignant neoplasm of urinary bladder (HCC)    Urinary retention    Cancer of lateral wall of urinary bladder (HCC)    Acute respiratory failure with hypoxia (HCC)    Other hyperlipidemia    Microcytic anemia    Mild protein-calorie malnutrition (HCC)    Pulmonary embolism without acute cor pulmonale (HCC)    Hypoxia    Dyspnea    Acute systolic heart failure (HCC)    Acute renal failure with tubular necrosis (HCC)    CKD (chronic kidney disease), stage III (HCC)    Dilated cardiomyopathy (HCC)    Bladder cancer (HCC)    Acute kidney injury [facility])       MEDICAL DECISION MAKING AND PLAN    Bowel obstruction  - Obtain SBFT today to assess for mechanical obstruction  -Keep NGT clamped during contrast administration down NGT until done with small bowel follow through    Electrolyte abnormality  - Trending daily labs      Chief Complaint: \"abdominal pain\"    SUBJECTIVE  Patient seen in bed today. Nausea, large output from NGT, still w/ abdominal pain, plan sbft today      OBJECTIVE  VITALS: Temp: Temp: 97.5 °F (36.4 °C)Temp  Av.6 °F (36.4 °C)  Min: 97.5 °F (36.4 °C)  Max: 97.8 °F (50.9 °C) BP Systolic (24NPA), TVY:439 , Min:139 , RYV:372   Diastolic (17RTX), UNJ:62, Min:77, Max:93   Pulse Pulse  Av.8  Min: 89  Max: 102 Resp Resp  Av.4  Min: 16  Max: 20 Pulse ox SpO2  Av.3 %  Min: 87 %  Max: 97 %  GENERAL: alert, no distress  LUNGS: clear to ausculation, without wheezes, rales or rhonci  HEART: normal rate and regular rhythm  ABDOMEN: Soft, nondistended. Surgical scars noted. MERLIN drain present. Colostomy bag present. There is no output in the colostomy bag. Hypoactive bowel sounds.   Mild tenderness to palpation. EXTREMITY: no cyanosis, clubbing or edema    I/O last 3 completed shifts: In: 1004.6 [I.V.:1004.6]  Out: 8304 [Urine:1340; Emesis/NG output:1995; Drains:50]    Drain/tube output: In: 1004.6 [I.V.:1004.6]  Out: 3402 [Urine:790; Drains:30]    LAB:  CBC:   Recent Labs     09/19/22  0521 09/20/22  0723 09/21/22  0609   WBC 15.0* 18.0* 15.9*   HGB 10.7* 10.4* 9.9*   HCT 32.7* 31.9* 31.8*   MCV 89.1 90.6 92.7    237 202       BMP:   Recent Labs     09/19/22 0521 09/19/22 0925 09/20/22  0723 09/21/22  0609     --  146* 147*   K 3.3* 4.0 3.7 4.3     --  110* 112*   CO2 28  --  22 26   BUN 27*  --  36* 37*   CREATININE 1.75*  --  1.54* 2.22*   GLUCOSE 125*  --  131* 102*       COAGS: No results for input(s): APTT, PROT, INR in the last 72 hours. RADIOLOGY:  CXR: XR ABDOMEN FOR NG/OG/NE TUBE PLACEMENT    Result Date: 9/21/2022  EXAMINATION: ONE SUPINE XRAY VIEW(S) OF THE ABDOMEN 9/21/2022 4:27 am COMPARISON: 09/20/2022 HISTORY: ORDERING SYSTEM PROVIDED HISTORY: Confirmation of course of NG/OG/NE tube and location of tip of tube TECHNOLOGIST PROVIDED HISTORY: Confirmation of course of NG/OG/NE tube and location of tip of tube Portable? ->Yes Reason for Exam: upr,ng placement FINDINGS: The enteric catheter terminates in the lateral aspect of the proximal gastric body. Multiple dilated loops of small bowel are seen suggestive of a small bowel obstruction. Basilar atelectatic changes are identified. No definite free air. Soft tissue emphysema is again identified. Again noted is soft tissue emphysema, small-bowel obstruction, and enteric catheter within the proximal gastric body directed laterally.      XR ABDOMEN FOR NG/OG/NE TUBE PLACEMENT    Result Date: 9/20/2022  EXAMINATION: ONE SUPINE XRAY VIEW(S) OF THE ABDOMEN 9/20/2022 9:48 pm COMPARISON: Abdominal x-ray 09/19/2022 HISTORY: ORDERING SYSTEM PROVIDED HISTORY: Confirmation of course of NG/OG/NE tube and location of tip of tube TECHNOLOGIST PROVIDED HISTORY: Confirmation of course of NG/OG/NE tube and location of tip of tube Portable? ->Yes Reason for Exam: ng placement  supine port FINDINGS: Enteric tube tip projects over the gastroesophageal junction, side port projects over the distal esophagus. Partially imaged dilated small bowel loops containing air. No consolidation. Bony structures are unremarkable. Soft tissue gas right chest/abdomen soft tissues. Partially imaged Port-A-Cath tip near the cavoatrial junction. Enteric tube tip projects over the gastroesophageal junction, side port projects over the distal esophagus. Consider advancing 10 cm to get the tip and side-port within the stomach. Electronically signed by Nayana Lujan DO on 9/21/2022 at 10:06 AM        Attending Note    Obtaining UGI with SBFT  I have reviewed the above TECSS note(s) and I either performed the key elements of the medical history and physical exam or was present with the resident when the key elements of the medical history and physical exam were performed. I have discussed the findings, established the care plan and recommendations with Family, Patient, and Resident, TECSS RN, bedside nurse.     Sam Miles MD  9/21/2022  1:08 PM

## 2022-09-22 ENCOUNTER — APPOINTMENT (OUTPATIENT)
Dept: CT IMAGING | Age: 67
DRG: 653 | End: 2022-09-22
Attending: SPECIALIST
Payer: MEDICARE

## 2022-09-22 ENCOUNTER — APPOINTMENT (OUTPATIENT)
Dept: GENERAL RADIOLOGY | Age: 67
DRG: 653 | End: 2022-09-22
Attending: SPECIALIST
Payer: MEDICARE

## 2022-09-22 ENCOUNTER — ANESTHESIA EVENT (OUTPATIENT)
Dept: OPERATING ROOM | Age: 67
DRG: 653 | End: 2022-09-22
Payer: MEDICARE

## 2022-09-22 ENCOUNTER — ANESTHESIA (OUTPATIENT)
Dept: OPERATING ROOM | Age: 67
DRG: 653 | End: 2022-09-22
Payer: MEDICARE

## 2022-09-22 LAB
ABSOLUTE EOS #: 0.25 K/UL (ref 0–0.44)
ABSOLUTE IMMATURE GRANULOCYTE: 0.38 K/UL (ref 0–0.3)
ABSOLUTE LYMPH #: 1.26 K/UL (ref 1.1–3.7)
ABSOLUTE MONO #: 0.81 K/UL (ref 0.1–1.2)
ALLEN TEST: ABNORMAL
ANION GAP SERPL CALCULATED.3IONS-SCNC: 10 MMOL/L (ref 9–17)
ANION GAP SERPL CALCULATED.3IONS-SCNC: 10 MMOL/L (ref 9–17)
ANION GAP SERPL CALCULATED.3IONS-SCNC: 11 MMOL/L (ref 9–17)
ANION GAP: 11 MMOL/L (ref 7–16)
BASOPHILS # BLD: 0 % (ref 0–2)
BASOPHILS ABSOLUTE: 0.04 K/UL (ref 0–0.2)
BUN BLDV-MCNC: 38 MG/DL (ref 8–23)
BUN BLDV-MCNC: 39 MG/DL (ref 8–23)
BUN BLDV-MCNC: 39 MG/DL (ref 8–23)
CALCIUM IONIZED: 1.2 MMOL/L (ref 1.13–1.33)
CALCIUM SERPL-MCNC: 8.2 MG/DL (ref 8.6–10.4)
CALCIUM SERPL-MCNC: 8.5 MG/DL (ref 8.6–10.4)
CALCIUM SERPL-MCNC: 8.9 MG/DL (ref 8.6–10.4)
CHLORIDE BLD-SCNC: 112 MMOL/L (ref 98–107)
CHLORIDE BLD-SCNC: 112 MMOL/L (ref 98–107)
CHLORIDE BLD-SCNC: 113 MMOL/L (ref 98–107)
CO2: 20 MMOL/L (ref 20–31)
CO2: 20 MMOL/L (ref 20–31)
CO2: 25 MMOL/L (ref 20–31)
CREAT SERPL-MCNC: 2.19 MG/DL (ref 0.7–1.2)
CREAT SERPL-MCNC: 2.45 MG/DL (ref 0.7–1.2)
CREAT SERPL-MCNC: 2.48 MG/DL (ref 0.7–1.2)
CREATININE FLUID: 17.4 MG/DL
EOSINOPHILS RELATIVE PERCENT: 2 % (ref 1–4)
FIO2: 60
GFR AFRICAN AMERICAN: 32 ML/MIN
GFR AFRICAN AMERICAN: 32 ML/MIN
GFR AFRICAN AMERICAN: 37 ML/MIN
GFR NON-AFRICAN AMERICAN: 26 ML/MIN
GFR NON-AFRICAN AMERICAN: 27 ML/MIN
GFR NON-AFRICAN AMERICAN: 30 ML/MIN
GFR NON-AFRICAN AMERICAN: 30 ML/MIN
GFR SERPL CREATININE-BSD FRML MDRD: 36 ML/MIN
GFR SERPL CREATININE-BSD FRML MDRD: ABNORMAL ML/MIN/{1.73_M2}
GLUCOSE BLD-MCNC: 102 MG/DL (ref 70–99)
GLUCOSE BLD-MCNC: 108 MG/DL (ref 70–99)
GLUCOSE BLD-MCNC: 113 MG/DL (ref 74–100)
GLUCOSE BLD-MCNC: 142 MG/DL (ref 70–99)
HCT VFR BLD CALC: 28.5 % (ref 40.7–50.3)
HCT VFR BLD CALC: 28.8 % (ref 40.7–50.3)
HCT VFR BLD CALC: 33.8 % (ref 40.7–50.3)
HEMOGLOBIN: 10.2 G/DL (ref 13–17)
HEMOGLOBIN: 9.2 G/DL (ref 13–17)
HEMOGLOBIN: 9.3 G/DL (ref 13–17)
IMMATURE GRANULOCYTES: 3 %
LYMPHOCYTES # BLD: 9 % (ref 24–43)
MAGNESIUM: 2.1 MG/DL (ref 1.6–2.6)
MAGNESIUM: 2.2 MG/DL (ref 1.6–2.6)
MCH RBC QN AUTO: 27.9 PG (ref 25.2–33.5)
MCH RBC QN AUTO: 29.2 PG (ref 25.2–33.5)
MCH RBC QN AUTO: 29.9 PG (ref 25.2–33.5)
MCHC RBC AUTO-ENTMCNC: 30.2 G/DL (ref 28.4–34.8)
MCHC RBC AUTO-ENTMCNC: 32.3 G/DL (ref 28.4–34.8)
MCHC RBC AUTO-ENTMCNC: 32.3 G/DL (ref 28.4–34.8)
MCV RBC AUTO: 90.3 FL (ref 82.6–102.9)
MCV RBC AUTO: 92.5 FL (ref 82.6–102.9)
MCV RBC AUTO: 92.6 FL (ref 82.6–102.9)
MODE: ABNORMAL
MONOCYTES # BLD: 6 % (ref 3–12)
NRBC AUTOMATED: 0 PER 100 WBC
O2 DEVICE/FLOW/%: ABNORMAL
PDW BLD-RTO: 15.1 % (ref 11.8–14.4)
PDW BLD-RTO: 15.1 % (ref 11.8–14.4)
PDW BLD-RTO: 15.2 % (ref 11.8–14.4)
PHOSPHORUS: 2.6 MG/DL (ref 2.5–4.5)
PHOSPHORUS: 2.6 MG/DL (ref 2.5–4.5)
PLATELET # BLD: 141 K/UL (ref 138–453)
PLATELET # BLD: 193 K/UL (ref 138–453)
PLATELET # BLD: 197 K/UL (ref 138–453)
PMV BLD AUTO: 10.5 FL (ref 8.1–13.5)
PMV BLD AUTO: 9.7 FL (ref 8.1–13.5)
PMV BLD AUTO: 9.8 FL (ref 8.1–13.5)
POC BUN: 35 MG/DL (ref 8–26)
POC CHLORIDE: 116 MMOL/L (ref 98–107)
POC CREATININE: 2.23 MG/DL (ref 0.51–1.19)
POC HCO3: 22.7 MMOL/L (ref 21–28)
POC HEMATOCRIT: 30 % (ref 41–53)
POC HEMOGLOBIN: 10.2 G/DL (ref 13.5–17.5)
POC IONIZED CALCIUM: 1.14 MMOL/L (ref 1.15–1.33)
POC LACTIC ACID: 0.88 MMOL/L (ref 0.56–1.39)
POC O2 SATURATION: 100 % (ref 94–98)
POC PCO2: 31.1 MM HG (ref 35–48)
POC PH: 7.47 (ref 7.35–7.45)
POC PO2: 208.6 MM HG (ref 83–108)
POC POTASSIUM: 3.7 MMOL/L (ref 3.5–4.5)
POC SODIUM: 149 MMOL/L (ref 138–146)
POC TCO2: 23 MMOL/L (ref 22–30)
POSITIVE BASE EXCESS, ART: 0 (ref 0–3)
POTASSIUM SERPL-SCNC: 3.7 MMOL/L (ref 3.7–5.3)
POTASSIUM SERPL-SCNC: 4 MMOL/L (ref 3.7–5.3)
POTASSIUM SERPL-SCNC: 4.2 MMOL/L (ref 3.7–5.3)
RBC # BLD: 3.08 M/UL (ref 4.21–5.77)
RBC # BLD: 3.19 M/UL (ref 4.21–5.77)
RBC # BLD: 3.65 M/UL (ref 4.21–5.77)
RBC # BLD: ABNORMAL 10*6/UL
SAMPLE SITE: ABNORMAL
SEG NEUTROPHILS: 80 % (ref 36–65)
SEGMENTED NEUTROPHILS ABSOLUTE COUNT: 10.82 K/UL (ref 1.5–8.1)
SODIUM BLD-SCNC: 142 MMOL/L (ref 135–144)
SODIUM BLD-SCNC: 142 MMOL/L (ref 135–144)
SODIUM BLD-SCNC: 149 MMOL/L (ref 135–144)
SPECIMEN TYPE: NORMAL
WBC # BLD: 13.6 K/UL (ref 3.5–11.3)
WBC # BLD: 14.2 K/UL (ref 3.5–11.3)
WBC # BLD: 14.8 K/UL (ref 3.5–11.3)

## 2022-09-22 PROCEDURE — 3600000014 HC SURGERY LEVEL 4 ADDTL 15MIN: Performed by: SURGERY

## 2022-09-22 PROCEDURE — 6360000002 HC RX W HCPCS: Performed by: STUDENT IN AN ORGANIZED HEALTH CARE EDUCATION/TRAINING PROGRAM

## 2022-09-22 PROCEDURE — 3700000000 HC ANESTHESIA ATTENDED CARE: Performed by: SURGERY

## 2022-09-22 PROCEDURE — 0TQ70ZZ REPAIR LEFT URETER, OPEN APPROACH: ICD-10-PCS | Performed by: SPECIALIST

## 2022-09-22 PROCEDURE — 2580000003 HC RX 258: Performed by: SPECIALIST

## 2022-09-22 PROCEDURE — 3E1M38Z IRRIGATION OF PERITONEAL CAVITY USING IRRIGATING SUBSTANCE, PERCUTANEOUS APPROACH: ICD-10-PCS | Performed by: SURGERY

## 2022-09-22 PROCEDURE — 83735 ASSAY OF MAGNESIUM: CPT

## 2022-09-22 PROCEDURE — 2500000003 HC RX 250 WO HCPCS

## 2022-09-22 PROCEDURE — 84100 ASSAY OF PHOSPHORUS: CPT

## 2022-09-22 PROCEDURE — 82330 ASSAY OF CALCIUM: CPT

## 2022-09-22 PROCEDURE — 74176 CT ABD & PELVIS W/O CONTRAST: CPT

## 2022-09-22 PROCEDURE — 2580000003 HC RX 258: Performed by: NURSE ANESTHETIST, CERTIFIED REGISTERED

## 2022-09-22 PROCEDURE — 2580000003 HC RX 258: Performed by: STUDENT IN AN ORGANIZED HEALTH CARE EDUCATION/TRAINING PROGRAM

## 2022-09-22 PROCEDURE — 2709999900 HC NON-CHARGEABLE SUPPLY: Performed by: SURGERY

## 2022-09-22 PROCEDURE — 85014 HEMATOCRIT: CPT

## 2022-09-22 PROCEDURE — 71045 X-RAY EXAM CHEST 1 VIEW: CPT

## 2022-09-22 PROCEDURE — 3700000001 HC ADD 15 MINUTES (ANESTHESIA): Performed by: SURGERY

## 2022-09-22 PROCEDURE — 2500000003 HC RX 250 WO HCPCS: Performed by: STUDENT IN AN ORGANIZED HEALTH CARE EDUCATION/TRAINING PROGRAM

## 2022-09-22 PROCEDURE — 3600000004 HC SURGERY LEVEL 4 BASE: Performed by: SURGERY

## 2022-09-22 PROCEDURE — 80051 ELECTROLYTE PANEL: CPT

## 2022-09-22 PROCEDURE — C9113 INJ PANTOPRAZOLE SODIUM, VIA: HCPCS | Performed by: STUDENT IN AN ORGANIZED HEALTH CARE EDUCATION/TRAINING PROGRAM

## 2022-09-22 PROCEDURE — 6360000002 HC RX W HCPCS: Performed by: NURSE ANESTHETIST, CERTIFIED REGISTERED

## 2022-09-22 PROCEDURE — 82565 ASSAY OF CREATININE: CPT

## 2022-09-22 PROCEDURE — 82803 BLOOD GASES ANY COMBINATION: CPT

## 2022-09-22 PROCEDURE — 2700000000 HC OXYGEN THERAPY PER DAY

## 2022-09-22 PROCEDURE — 74018 RADEX ABDOMEN 1 VIEW: CPT

## 2022-09-22 PROCEDURE — 84520 ASSAY OF UREA NITROGEN: CPT

## 2022-09-22 PROCEDURE — 85027 COMPLETE CBC AUTOMATED: CPT

## 2022-09-22 PROCEDURE — 85025 COMPLETE CBC W/AUTO DIFF WBC: CPT

## 2022-09-22 PROCEDURE — 94761 N-INVAS EAR/PLS OXIMETRY MLT: CPT

## 2022-09-22 PROCEDURE — 2500000003 HC RX 250 WO HCPCS: Performed by: NURSE ANESTHETIST, CERTIFIED REGISTERED

## 2022-09-22 PROCEDURE — 0TQ60ZZ REPAIR RIGHT URETER, OPEN APPROACH: ICD-10-PCS | Performed by: SPECIALIST

## 2022-09-22 PROCEDURE — 83605 ASSAY OF LACTIC ACID: CPT

## 2022-09-22 PROCEDURE — 6360000002 HC RX W HCPCS: Performed by: SPECIALIST

## 2022-09-22 PROCEDURE — 94002 VENT MGMT INPAT INIT DAY: CPT

## 2022-09-22 PROCEDURE — 82947 ASSAY GLUCOSE BLOOD QUANT: CPT

## 2022-09-22 PROCEDURE — 6360000002 HC RX W HCPCS

## 2022-09-22 PROCEDURE — 36600 WITHDRAWAL OF ARTERIAL BLOOD: CPT

## 2022-09-22 PROCEDURE — 80048 BASIC METABOLIC PNL TOTAL CA: CPT

## 2022-09-22 PROCEDURE — 2580000003 HC RX 258

## 2022-09-22 PROCEDURE — 2000000000 HC ICU R&B

## 2022-09-22 PROCEDURE — 36415 COLL VENOUS BLD VENIPUNCTURE: CPT

## 2022-09-22 PROCEDURE — 0DS80ZZ REPOSITION SMALL INTESTINE, OPEN APPROACH: ICD-10-PCS | Performed by: SURGERY

## 2022-09-22 RX ORDER — FENTANYL CITRATE 50 UG/ML
50 INJECTION, SOLUTION INTRAMUSCULAR; INTRAVENOUS EVERY 5 MIN PRN
Status: DISCONTINUED | OUTPATIENT
Start: 2022-09-22 | End: 2022-09-23

## 2022-09-22 RX ORDER — PROPOFOL 10 MG/ML
5-50 INJECTION, EMULSION INTRAVENOUS CONTINUOUS
Status: DISCONTINUED | OUTPATIENT
Start: 2022-09-22 | End: 2022-09-27

## 2022-09-22 RX ORDER — DEXMEDETOMIDINE HYDROCHLORIDE 4 UG/ML
INJECTION, SOLUTION INTRAVENOUS
Status: COMPLETED
Start: 2022-09-22 | End: 2022-09-22

## 2022-09-22 RX ORDER — SODIUM CHLORIDE, SODIUM LACTATE, POTASSIUM CHLORIDE, CALCIUM CHLORIDE 600; 310; 30; 20 MG/100ML; MG/100ML; MG/100ML; MG/100ML
INJECTION, SOLUTION INTRAVENOUS CONTINUOUS
Status: DISCONTINUED | OUTPATIENT
Start: 2022-09-22 | End: 2022-09-26

## 2022-09-22 RX ORDER — DEXMEDETOMIDINE HYDROCHLORIDE 4 UG/ML
.1-1.5 INJECTION, SOLUTION INTRAVENOUS CONTINUOUS
Status: DISCONTINUED | OUTPATIENT
Start: 2022-09-22 | End: 2022-09-27

## 2022-09-22 RX ORDER — SODIUM CHLORIDE 9 MG/ML
INJECTION, SOLUTION INTRAVENOUS CONTINUOUS PRN
Status: DISCONTINUED | OUTPATIENT
Start: 2022-09-22 | End: 2022-09-22 | Stop reason: SDUPTHER

## 2022-09-22 RX ORDER — SODIUM CHLORIDE 0.9 % (FLUSH) 0.9 %
5-40 SYRINGE (ML) INJECTION EVERY 12 HOURS SCHEDULED
Status: DISCONTINUED | OUTPATIENT
Start: 2022-09-22 | End: 2022-09-23

## 2022-09-22 RX ORDER — MIDAZOLAM HYDROCHLORIDE 1 MG/ML
INJECTION INTRAMUSCULAR; INTRAVENOUS PRN
Status: DISCONTINUED | OUTPATIENT
Start: 2022-09-22 | End: 2022-09-22 | Stop reason: SDUPTHER

## 2022-09-22 RX ORDER — PROPOFOL 10 MG/ML
INJECTION, EMULSION INTRAVENOUS CONTINUOUS PRN
Status: DISCONTINUED | OUTPATIENT
Start: 2022-09-22 | End: 2022-09-22 | Stop reason: SDUPTHER

## 2022-09-22 RX ORDER — LIDOCAINE HYDROCHLORIDE 10 MG/ML
INJECTION, SOLUTION EPIDURAL; INFILTRATION; INTRACAUDAL; PERINEURAL PRN
Status: DISCONTINUED | OUTPATIENT
Start: 2022-09-22 | End: 2022-09-22 | Stop reason: SDUPTHER

## 2022-09-22 RX ORDER — FENTANYL CITRATE 50 UG/ML
INJECTION, SOLUTION INTRAMUSCULAR; INTRAVENOUS PRN
Status: DISCONTINUED | OUTPATIENT
Start: 2022-09-22 | End: 2022-09-22 | Stop reason: SDUPTHER

## 2022-09-22 RX ORDER — SODIUM CHLORIDE, SODIUM LACTATE, POTASSIUM CHLORIDE, CALCIUM CHLORIDE 600; 310; 30; 20 MG/100ML; MG/100ML; MG/100ML; MG/100ML
INJECTION, SOLUTION INTRAVENOUS CONTINUOUS PRN
Status: DISCONTINUED | OUTPATIENT
Start: 2022-09-22 | End: 2022-09-22 | Stop reason: SDUPTHER

## 2022-09-22 RX ORDER — SUCCINYLCHOLINE/SOD CL,ISO/PF 100 MG/5ML
SYRINGE (ML) INTRAVENOUS PRN
Status: DISCONTINUED | OUTPATIENT
Start: 2022-09-22 | End: 2022-09-22 | Stop reason: SDUPTHER

## 2022-09-22 RX ORDER — OXYCODONE HYDROCHLORIDE 5 MG/1
5 TABLET ORAL EVERY 6 HOURS PRN
Status: DISCONTINUED | OUTPATIENT
Start: 2022-09-22 | End: 2022-09-23 | Stop reason: SDUPTHER

## 2022-09-22 RX ORDER — DEXMEDETOMIDINE HYDROCHLORIDE 4 UG/ML
.1-1.5 INJECTION, SOLUTION INTRAVENOUS CONTINUOUS
Status: DISCONTINUED | OUTPATIENT
Start: 2022-09-22 | End: 2022-09-22

## 2022-09-22 RX ORDER — DIPHENHYDRAMINE HYDROCHLORIDE 50 MG/ML
12.5 INJECTION INTRAMUSCULAR; INTRAVENOUS
Status: DISCONTINUED | OUTPATIENT
Start: 2022-09-22 | End: 2022-09-23

## 2022-09-22 RX ORDER — FENTANYL CITRATE 50 UG/ML
25 INJECTION, SOLUTION INTRAMUSCULAR; INTRAVENOUS EVERY 5 MIN PRN
Status: DISCONTINUED | OUTPATIENT
Start: 2022-09-22 | End: 2022-09-23

## 2022-09-22 RX ORDER — FENTANYL CITRATE-0.9 % NACL/PF 10 MCG/ML
25-200 PLASTIC BAG, INJECTION (ML) INTRAVENOUS CONTINUOUS
Status: DISCONTINUED | OUTPATIENT
Start: 2022-09-22 | End: 2022-09-22

## 2022-09-22 RX ORDER — KETAMINE HCL IN NACL, ISO-OSM 100MG/10ML
SYRINGE (ML) INJECTION PRN
Status: DISCONTINUED | OUTPATIENT
Start: 2022-09-22 | End: 2022-09-22 | Stop reason: SDUPTHER

## 2022-09-22 RX ORDER — MAGNESIUM HYDROXIDE 1200 MG/15ML
LIQUID ORAL CONTINUOUS PRN
Status: COMPLETED | OUTPATIENT
Start: 2022-09-22 | End: 2022-09-22

## 2022-09-22 RX ORDER — ROCURONIUM BROMIDE 10 MG/ML
INJECTION, SOLUTION INTRAVENOUS PRN
Status: DISCONTINUED | OUTPATIENT
Start: 2022-09-22 | End: 2022-09-22 | Stop reason: SDUPTHER

## 2022-09-22 RX ORDER — SODIUM CHLORIDE 9 MG/ML
INJECTION, SOLUTION INTRAVENOUS PRN
Status: DISCONTINUED | OUTPATIENT
Start: 2022-09-22 | End: 2022-09-23

## 2022-09-22 RX ORDER — ONDANSETRON 2 MG/ML
4 INJECTION INTRAMUSCULAR; INTRAVENOUS
Status: DISCONTINUED | OUTPATIENT
Start: 2022-09-22 | End: 2022-09-23

## 2022-09-22 RX ORDER — PROPOFOL 10 MG/ML
INJECTION, EMULSION INTRAVENOUS PRN
Status: DISCONTINUED | OUTPATIENT
Start: 2022-09-22 | End: 2022-09-22 | Stop reason: SDUPTHER

## 2022-09-22 RX ORDER — CHLORHEXIDINE GLUCONATE 0.12 MG/ML
15 RINSE ORAL 2 TIMES DAILY
Status: DISCONTINUED | OUTPATIENT
Start: 2022-09-22 | End: 2022-10-11 | Stop reason: HOSPADM

## 2022-09-22 RX ORDER — SODIUM CHLORIDE 0.9 % (FLUSH) 0.9 %
5-40 SYRINGE (ML) INJECTION PRN
Status: DISCONTINUED | OUTPATIENT
Start: 2022-09-22 | End: 2022-09-23

## 2022-09-22 RX ADMIN — POTASSIUM CHLORIDE: 2 INJECTION, SOLUTION, CONCENTRATE INTRAVENOUS at 20:19

## 2022-09-22 RX ADMIN — Medication 30 MG: at 13:29

## 2022-09-22 RX ADMIN — FENTANYL CITRATE 50 MCG: 0.05 INJECTION, SOLUTION INTRAMUSCULAR; INTRAVENOUS at 12:13

## 2022-09-22 RX ADMIN — FENTANYL CITRATE 50 MCG: 0.05 INJECTION, SOLUTION INTRAMUSCULAR; INTRAVENOUS at 13:11

## 2022-09-22 RX ADMIN — MIDAZOLAM 2 MG: 1 INJECTION INTRAMUSCULAR; INTRAVENOUS at 12:17

## 2022-09-22 RX ADMIN — DEXMEDETOMIDINE HYDROCHLORIDE 0.2 MCG/KG/HR: 4 INJECTION, SOLUTION INTRAVENOUS at 16:41

## 2022-09-22 RX ADMIN — FENTANYL CITRATE 50 MCG: 0.05 INJECTION, SOLUTION INTRAMUSCULAR; INTRAVENOUS at 12:54

## 2022-09-22 RX ADMIN — SODIUM CHLORIDE, POTASSIUM CHLORIDE, SODIUM LACTATE AND CALCIUM CHLORIDE: 600; 310; 30; 20 INJECTION, SOLUTION INTRAVENOUS at 12:33

## 2022-09-22 RX ADMIN — Medication 100 MG: at 12:13

## 2022-09-22 RX ADMIN — HYDROMORPHONE HYDROCHLORIDE 1 MG: 1 INJECTION, SOLUTION INTRAMUSCULAR; INTRAVENOUS; SUBCUTANEOUS at 07:57

## 2022-09-22 RX ADMIN — PROPOFOL 50 MCG/KG/MIN: 10 INJECTION, EMULSION INTRAVENOUS at 15:03

## 2022-09-22 RX ADMIN — SODIUM CHLORIDE, POTASSIUM CHLORIDE, SODIUM LACTATE AND CALCIUM CHLORIDE: 600; 310; 30; 20 INJECTION, SOLUTION INTRAVENOUS at 23:01

## 2022-09-22 RX ADMIN — PROPOFOL 50 MCG/KG/MIN: 10 INJECTION, EMULSION INTRAVENOUS at 13:47

## 2022-09-22 RX ADMIN — WATER 2000 MG: 1 INJECTION INTRAMUSCULAR; INTRAVENOUS; SUBCUTANEOUS at 10:27

## 2022-09-22 RX ADMIN — LIDOCAINE HYDROCHLORIDE 50 MG: 10 INJECTION, SOLUTION EPIDURAL; INFILTRATION; INTRACAUDAL; PERINEURAL at 12:13

## 2022-09-22 RX ADMIN — HYDROMORPHONE HYDROCHLORIDE 1 MG: 1 INJECTION, SOLUTION INTRAMUSCULAR; INTRAVENOUS; SUBCUTANEOUS at 00:20

## 2022-09-22 RX ADMIN — WATER 2000 MG: 1 INJECTION INTRAMUSCULAR; INTRAVENOUS; SUBCUTANEOUS at 20:14

## 2022-09-22 RX ADMIN — POTASSIUM CHLORIDE: 2 INJECTION, SOLUTION, CONCENTRATE INTRAVENOUS at 06:51

## 2022-09-22 RX ADMIN — PROPOFOL 40 MCG/KG/MIN: 10 INJECTION, EMULSION INTRAVENOUS at 17:26

## 2022-09-22 RX ADMIN — PROPOFOL 200 MG: 10 INJECTION, EMULSION INTRAVENOUS at 12:13

## 2022-09-22 RX ADMIN — POTASSIUM CHLORIDE: 2 INJECTION, SOLUTION, CONCENTRATE INTRAVENOUS at 16:48

## 2022-09-22 RX ADMIN — ROCURONIUM BROMIDE 50 MG: 10 INJECTION, SOLUTION INTRAVENOUS at 13:39

## 2022-09-22 RX ADMIN — SODIUM CHLORIDE, PRESERVATIVE FREE 40 MG: 5 INJECTION INTRAVENOUS at 21:53

## 2022-09-22 RX ADMIN — Medication 100 MCG/HR: at 15:01

## 2022-09-22 RX ADMIN — HEPARIN SODIUM 5000 UNITS: 5000 INJECTION INTRAVENOUS; SUBCUTANEOUS at 21:56

## 2022-09-22 RX ADMIN — Medication 20 MG: at 13:34

## 2022-09-22 RX ADMIN — ROCURONIUM BROMIDE 50 MG: 10 INJECTION, SOLUTION INTRAVENOUS at 12:18

## 2022-09-22 RX ADMIN — SODIUM CHLORIDE, PRESERVATIVE FREE 40 MG: 5 INJECTION INTRAVENOUS at 10:26

## 2022-09-22 RX ADMIN — METOCLOPRAMIDE 10 MG: 5 INJECTION, SOLUTION INTRAMUSCULAR; INTRAVENOUS at 21:54

## 2022-09-22 RX ADMIN — POTASSIUM CHLORIDE: 2 INJECTION, SOLUTION, CONCENTRATE INTRAVENOUS at 03:34

## 2022-09-22 RX ADMIN — METOCLOPRAMIDE 10 MG: 5 INJECTION, SOLUTION INTRAMUSCULAR; INTRAVENOUS at 10:27

## 2022-09-22 RX ADMIN — SODIUM CHLORIDE: 9 INJECTION, SOLUTION INTRAVENOUS at 12:13

## 2022-09-22 RX ADMIN — FENTANYL CITRATE 100 MCG: 0.05 INJECTION, SOLUTION INTRAMUSCULAR; INTRAVENOUS at 12:51

## 2022-09-22 ASSESSMENT — PULMONARY FUNCTION TESTS
PIF_VALUE: 18
PIF_VALUE: 22
PIF_VALUE: 18
PIF_VALUE: 19
PIF_VALUE: 20
PIF_VALUE: 20
PIF_VALUE: 19
PIF_VALUE: 17
PIF_VALUE: 20

## 2022-09-22 ASSESSMENT — PAIN SCALES - GENERAL
PAINLEVEL_OUTOF10: 7
PAINLEVEL_OUTOF10: 7

## 2022-09-22 ASSESSMENT — PAIN DESCRIPTION - LOCATION
LOCATION: ABDOMEN
LOCATION: ABDOMEN

## 2022-09-22 ASSESSMENT — ENCOUNTER SYMPTOMS: SHORTNESS OF BREATH: 1

## 2022-09-22 ASSESSMENT — PAIN DESCRIPTION - DESCRIPTORS: DESCRIPTORS: PENETRATING

## 2022-09-22 ASSESSMENT — PAIN DESCRIPTION - ORIENTATION: ORIENTATION: RIGHT;LOWER

## 2022-09-22 ASSESSMENT — PAIN - FUNCTIONAL ASSESSMENT: PAIN_FUNCTIONAL_ASSESSMENT: ACTIVITIES ARE NOT PREVENTED

## 2022-09-22 ASSESSMENT — PAIN DESCRIPTION - PAIN TYPE: TYPE: SURGICAL PAIN

## 2022-09-22 NOTE — PROGRESS NOTES
Physical Therapy        Physical Therapy Cancel Note      DATE: 2022    NAME: Myrna Hdez  MRN: [id number]   : 1955      Patient not seen this date for Physical Therapy due to:    Surgery: Laparotomy exploratory; Plan to checkback tomorrow 2022      Electronically signed by Susan Yi PTA on 2022 at 1:42 PM

## 2022-09-22 NOTE — OP NOTE
Operative Note      Patient: Juan Carlos Carbone  YOB: 1955  MRN: [id number]    Date of Procedure: 9/22/2022    Pre-Op Diagnosis: BOWEL OBSTRUCTION, URINE LEAK    Post-Op Diagnosis: Same       Procedure(s):  LAPAROTOMY EXPLORATORY, LYSIS OF ADHESIONS, REPAIR OF BILATERAL URETERAL ANASTOMOSES, ABDOMINAL WASHOUT, PLACEMENT OF ABTHERA WOUND VAC    Surgeon(s):  MD Chaparro Meyer DO    Assistant:   Greer Diehl DO, PGY-3  Austin Tobin MD, PGY-5    Anesthesia: General    Estimated Blood Loss (mL): Minimal    Complications: None    Specimens:   None    Implants:  None      Drains:   NG tube   MERLIN drain removed    Findings: Left distal ureter with small defect, approximately 3 to 4 mm, closed with 4-0 Vicryl in figure-of-eight fashion. Right distal ureter extremely dilated, with small defect approximately 2 to 3 mm, also closed with 4-0 Vicryl in figure-of-eight fashion. Conduit irrigated with saline to confirm watertight closure. Bilateral ureteral stents seen to be dripping urine at the end of the procedure. Indications: This is a 71-year-old male with muscle invasive bladder cancer status post cystectomy with ileal conduit creation 9/16/2022 with subsequent complication of small bowel obstruction and suspected urine leak. He was taken urgently to the OR for exploratory laparotomy. Risk, benefits, and alternatives to the procedure were discussed with the patient in detail. He elected to proceed. Informed consent was obtained. Detailed Description of Procedure:   Patient was brought back to the operating room and placed on table in supine position. General anesthesia was induced. Patient had been receiving IV cefepime on the floor. EPC cuffs were on working. Timeout was performed confirming 2 patient identifiers and all were in agreement. General surgery was also involved in the case.   A longitudinal midline abdominal incision was made with scalpel and dissection was carried down to the abdomen. Immediately upon opening the abdomen there was a rush of fluid and approximately 1.5 L of clear serosanguineous fluid, assumed to be urine, were drained immediately. The abdomen was then explored and an area of bowel was found to be significantly narrowed just underneath the left ureter. Left ureter was examined and found to have a defect approximately 3 to 4 mm distally with urine extruding from it. This was closed in a figure-of-eight fashion with 4-0 Vicryl. The right ureter was extremely dilated and also found to have a defect approximately 2 to 3 mm which was also closed with a figure-of-eight 4-0 Vicryl. General surgery then proceeded with their portion of the procedure, the bowel was run and there was 1 area of narrowing with a soft adhesion and kinking just underneath the left ureter which was bluntly released with finger. The entire bowel was then run. There was an approximately 70 centimeter length of bowel that was concerning for possible eventual necrosis. Thus, the decision was made to leave the patient open at the end of the procedure, in order to allow this portion of the bowel to declare itself by tomorrow. Please see general surgery note for additional details of this portion of the procedure. At the end of the procedure, the abdomen was washed out several times with saline. We then took a look at the ureteral anastomoses again. 120 cc of normal saline was injected through the ileal conduit and there was seen some leaking from the repaired areas. Again, 4-0 Vicryl was used to close these areas in figure-of-eight fashion in both the right and the left distal ureter. Saline was again injected into the ostomy and there was no leak seen confirming watertight closure. Urine was seen dripping from bilateral ureteral stents at the end of the procedure. ABThera wound VAC was then placed and patient was sent to ICU.     Dr. Norberto Waite was scrubbed for the entire duration of the procedure. Plan: Patient will be sent to ICU intubated and sedated with ABThera wound VAC in place overnight. He will be taken back to the OR tomorrow for second look exploratory laparotomy per general surgery. Be maintained on IV antibiotics. Eventually, patient will also need bilateral nephrostomy tubes. Electronically signed by Ricky Riojas DO on 9/22/2022 at 1:36 PM  I was present and scrubbed for the critical portions or entire case.   Electronically signed by Yuan Epps MD on 9/22/2022 at 2:19 PM

## 2022-09-22 NOTE — ANESTHESIA PRE PROCEDURE
Department of Anesthesiology  Preprocedure Note       Name:  Drea Ocampo   Age:  74 y.o.  :  1955                                          MRN:  [id number]         Date:  2022      Surgeon: Jaret Query):  MD Kar Stevens DO    Procedure: Procedure(s):  **ADD ON** LAPAROTOMY EXPLORATORY    Medications prior to admission:   Prior to Admission medications    Medication Sig Start Date End Date Taking? Authorizing Provider   ondansetron (ZOFRAN-ODT) 8 MG TBDP disintegrating tablet DISSOLVE 1 TABLET IN MOUTH EVERY 8 HOURS AS NEEDED FOR NAUSEA FOR VOMITING 22   Sarah Grayson MD   HYDROcodone-acetaminophen (NORCO) 5-325 MG per tablet Take 1 tablet by mouth every 8 hours as needed for Pain for up to 30 days. 9/7/22 10/7/22  Sonu Grayson MD   rivaroxaban (XARELTO) 20 MG TABS tablet Take 1 tablet by mouth once daily with breakfast  Patient taking differently: Take 1 tablet by mouth once daily with breakfast/ per cardiology, pt. to stop 3 days pre-op for OR 22   Chantell Woods MD   omeprazole (PRILOSEC) 20 MG delayed release capsule Take 1 capsule by mouth daily 22   Nelida Pedroza MD   carvedilol (COREG) 3.125 MG tablet TAKE 1 TABLET BY MOUTH TWICE DAILY 22   Historical Provider, MD   finasteride (PROSCAR) 5 MG tablet Take 5 mg by mouth daily    Historical Provider, MD   tamsulosin (FLOMAX) 0.4 MG capsule Take 0.4 mg by mouth daily    Historical Provider, MD   simvastatin (ZOCOR) 40 MG tablet Take 40 mg by mouth nightly    Historical Provider, MD   buPROPion (WELLBUTRIN XL) 300 MG extended release tablet Take 300 mg by mouth every morning    Historical Provider, MD       Current medications:    No current facility-administered medications for this visit. No current outpatient medications on file.      Facility-Administered Medications Ordered in Other Visits   Medication Dose Route Frequency Provider Last Rate Last Admin    potassium chloride 10 mEq in dextrose 5 % and 0.3 % NaCl 500 mL infusion   IntraVENous Continuous Conor Goldman  mL/hr at 09/22/22 0651 New Bag at 09/22/22 0651    diatrizoate meglumine-sodium (GASTROGRAFIN) 66-10 % solution 360 mL  360 mL Per NG tube ONCE PRN Carolina Ngo, DO   360 mL at 09/21/22 1104    metoclopramide (REGLAN) injection 10 mg  10 mg IntraVENous Q6H Miguelito White MD   10 mg at 09/22/22 1027    bisacodyl (DULCOLAX) suppository 10 mg  10 mg Rectal Daily Miguelito White MD   10 mg at 09/21/22 1432    pantoprazole (PROTONIX) 40 mg in sodium chloride (PF) 10 mL injection  40 mg IntraVENous Q12H Ana Mohan, DO   40 mg at 09/22/22 1026    polyethylene glycol (GLYCOLAX) packet 17 g  17 g Oral Daily [facility], DO   17 g at 09/21/22 0942    cefepime (MAXIPIME) 2,000 mg in sterile water 20 mL IV syringe  2,000 mg IntraVENous Q12H Miguelito White MD   2,000 mg at 09/22/22 1027    metoprolol (LOPRESSOR) injection 5 mg  5 mg IntraVENous Q6H PRN Miguelito White MD   5 mg at 09/20/22 0510    heparin (porcine) injection 5,000 Units  5,000 Units SubCUTAneous BID Digna Garvey MD   5,000 Units at 09/21/22 2122    promethazine (PHENERGAN) injection 6.25 mg  6.25 mg IntraMUSCular Q6H PRN Ho Retana MD   6.25 mg at 09/21/22 0955    HYDROmorphone (DILAUDID) injection 1 mg  1 mg IntraVENous Q3H PRN Digna Garvey MD   1 mg at 09/22/22 0757    naloxone 0.4 mg in 10 mL sodium chloride syringe   IntraVENous PRN Luan Baker MD        ondansetron (ZOFRAN) injection 4 mg  4 mg IntraVENous Q6H PRN Luan Baker MD   4 mg at 09/21/22 1319    naloxegol (MOVANTIK) tablet 25 mg  25 mg Oral Daily Digna Garvey MD   25 mg at 09/21/22 0942    buPROPion (WELLBUTRIN XL) extended release tablet 300 mg  300 mg Oral QAM Digna Garvey MD   300 mg at 09/21/22 0942    carvedilol (COREG) tablet 3.125 mg  3.125 mg Oral BID Digna Garvey MD   3.125 mg at 09/21/22 2121    ondansetron (ZOFRAN-ODT) disintegrating tablet 8 mg  8 mg SubLINGual Q8H PRN Bruno Rinne, MD   8 mg at 09/18/22 2201    simvastatin (ZOCOR) tablet 40 mg  40 mg Oral Nightly Bruno Rinne, MD   40 mg at 09/21/22 2133    sodium chloride flush 0.9 % injection 5-40 mL  5-40 mL IntraVENous 2 times per day Bruno Rinne, MD   10 mL at 09/20/22 1010    sodium chloride flush 0.9 % injection 5-40 mL  5-40 mL IntraVENous PRN Bruno Rinne, MD   10 mL at 09/18/22 0910    0.9 % sodium chloride infusion   IntraVENous PRN Bruno Rinne, MD        ondansetron (ZOFRAN-ODT) disintegrating tablet 4 mg  4 mg Oral Q8H PRN Bruno Rinne, MD        Or    ondansetron (ZOFRAN) injection 4 mg  4 mg IntraVENous Q6H PRN Bruno Rinne, MD   4 mg at 09/19/22 1027    acetaminophen (TYLENOL) tablet 650 mg  650 mg Oral Q6H Shashank Meza MD   650 mg at 09/21/22 1426    oxyCODONE (ROXICODONE) immediate release tablet 5 mg  5 mg Oral Q4H PRN Bruno Rinne, MD        Or    oxyCODONE (ROXICODONE) immediate release tablet 10 mg  10 mg Oral Q4H PRN Bruno Rinne, MD   10 mg at 09/21/22 2004       Allergies:     Allergies   Allergen Reactions    Other        Problem List:    Patient Active Problem List   Diagnosis Code    Kidney stones N20.0    Nocturia R35.1    Hypertrophy of prostate with urinary obstruction N40.1, N13.8    Malignant neoplasm of urinary bladder (HCC) C67.9    Urinary retention R33.9    Cancer of lateral wall of urinary bladder (HCC) C67.2    Acute respiratory failure with hypoxia (HCC) J96.01    Other hyperlipidemia E78.49    Microcytic anemia D50.9    Mild protein-calorie malnutrition (HCC) E44.1    Pulmonary embolism without acute cor pulmonale (HCC) I26.99    Hypoxia R09.02    Dyspnea Q36.66    Acute systolic heart failure (HCC) I50.21    Acute renal failure with tubular necrosis (HCC) N17.0    CKD (chronic kidney disease), stage III (HCC) N18.30    Dilated cardiomyopathy (HCC) I42.0    Bladder cancer ([facility] C67.9    Acute kidney injury ([facility] [address] N17.9       Past Medical History:        Diagnosis Date    Acute renal failure with tubular necrosis ([facility] [address] 05/26/2022    Likely ischemic ATN/prerenal acidemia from intractable nausea vomiting as result of chemotherapy, baseline 1.3-1.4 peaked up to 2.3 in May 2022    Arthritis     BPH with obstruction/lower urinary tract symptoms     CAD (coronary artery disease) 06/2022    nonobstructive on cath    Caffeine use     3 cans soda/pop    Cancer ([facility] [address]     bladder cancer. Dr. Pete Bower Urology    CKD (chronic kidney disease), stage III ([facility] [address] 05/26/2022    From 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   Baseline 1.3-1.4    COVID-19 08/16/2021    SOB, fatigue, fever, chills  x 3 weeks    Depression     Dilated cardiomyopathy (HCC) 05/26/2022    Ejection fraction 40 to 45%, does have symptoms of dyspnea and decreased effort tolerance    GERD (gastroesophageal reflux disease)     High cholesterol     Kidney calculi     Sleep apnea     does not use cpap    Under care of team     Dr. Meme Mcgee Nephrology    Under care of team     Dr. Azra Hope. last visit approx 9/2021    Under care of team     Dr. Ian Harmon [facility] last visit 8/03/2022    Under care of team     Dr. Pablo May Under care of team     Dr. Evelia Puente       Past Surgical History:        Procedure Laterality Date    BLADDER REMOVAL      BLADDER REMOVAL N/A 9/16/2022    XI ROBOTIC LAPARASCOPIC ASSISTED RADICAL CYSTOPROSTATECTOMY, BILATERAL PELVIC LYMPHADENECTOMY AND ILEAL CONDUIT FORMATION performed by Yuniel Nguyen MD at [facility] 9/16/2022    CYSTOSCOPY STENT REMOVAL performed by Yuniel Nguyen MD at [facility]    CARDIAC CATHETERIZATION      CARDIAC CATHETERIZATION  2022    nonobstructive CAD    COLONOSCOPY      IR PORT PLACEMENT EQUAL OR GREATER THAN 5 YEARS  2022    IR PORT PLACEMENT EQUAL OR GREATER THAN 5 YEARS 2022 [facility]    KNEE ARTHROSCOPY      left    PROSTATECTOMY  2022    Cystoprostatectomy, Bilateral Pelvic Lymphadenectomy, ileo conduit formation, cystectomy stent removal (right)       Social History:    Social History     Tobacco Use    Smoking status: Former     Packs/day: 0.25     Years: 9.00     Pack years: 2.25     Types: Cigarettes     Start date: 1983     Quit date: 2/10/1992     Years since quittin.6    Smokeless tobacco: Never   Substance Use Topics    Alcohol use: Not Currently                                Counseling given: Not Answered      Vital Signs (Current): There were no vitals filed for this visit.                                            BP Readings from Last 3 Encounters:   22 137/78   22 118/74   22 130/85       NPO Status:                                                                                 BMI:   Wt Readings from Last 3 Encounters:   22 260 lb (117.9 kg)   22 259 lb 9.6 oz (117.8 kg)   22 259 lb (117.5 kg)     There is no height or weight on file to calculate BMI.    CBC:   Lab Results   Component Value Date/Time    WBC 14.2 2022 05:25 AM    RBC 3.08 2022 05:25 AM    HGB 9.2 2022 05:25 AM    HCT 28.5 2022 05:25 AM    MCV 92.5 2022 05:25 AM    RDW 15.1 2022 05:25 AM     2022 05:25 AM       CMP:   Lab Results   Component Value Date/Time     2022 05:25 AM    K 3.7 2022 05:25 AM     2022 05:25 AM    CO2 25 2022 05:25 AM    BUN 39 2022 05:25 AM    CREATININE 2.45 2022 05:25 AM    GFRAA 32 2022 05:25 AM    LABGLOM 27 2022 05:25 AM    GLUCOSE 102 2022 05:25 AM    PROT 6.6 09/08/2022 01:46 PM    CALCIUM 8.9 09/22/2022 05:25 AM    BILITOT 0.3 09/08/2022 01:46 PM    ALKPHOS 77 09/08/2022 01:46 PM    AST 12 09/08/2022 01:46 PM    ALT 8 09/08/2022 01:46 PM       POC Tests:   Recent Labs     09/21/22 2059   POCGLU 116*       Coags:   Lab Results   Component Value Date/Time    PROTIME 13.6 02/25/2022 12:26 PM    INR 1.1 02/25/2022 12:26 PM       HCG (If Applicable): No results found for: PREGTESTUR, PREGSERUM, HCG, HCGQUANT     ABGs: No results found for: PHART, PO2ART, GKB5GXF, VUQ7VZN, BEART, U6MXLAWV     Type & Screen (If Applicable):  No results found for: LABABO, LABRH    Drug/Infectious Status (If Applicable):  No results found for: HIV, HEPCAB    COVID-19 Screening (If Applicable):   Lab Results   Component Value Date/Time    COVID19 Not Detected 03/31/2022 02:08 PM           Anesthesia Evaluation  Patient summary reviewed and Nursing notes reviewed no history of anesthetic complications:   Airway: Mallampati: II  TM distance: >3 FB   Neck ROM: full  Mouth opening: > = 3 FB   Dental: normal exam         Pulmonary:normal exam    (+) shortness of breath:  sleep apnea:                             Cardiovascular:    (+) CAD:, CHF: systolic,                ROS comment: Dilated cardiomyopathy     Neuro/Psych:   (+) psychiatric history:depression/anxiety             GI/Hepatic/Renal:   (+) GERD:, renal disease: CRI,           Endo/Other:    (+) malignancy/cancer (bladder cancer). Abdominal:             Vascular: Other Findings:             Anesthesia Plan      general     ASA 3       Induction: intravenous. MIPS: Postoperative opioids intended, Prophylactic antiemetics administered and Postoperative trial extubation. Anesthetic plan and risks discussed with patient. Use of blood products discussed with patient whom consented to blood products. Plan discussed with CRNA.               Narrative & Impression    Normal sinus rhythm  Non-specific intra-ventricular conduction delay  Borderline ECG  When compared with ECG of 31-MAR-2022 11:57,  ST no longer elevated in Inferior leads      Specimen Collected: 09/01/22 15:07 EDT        CONCLUSIONS     Summary  Technically difficult study  Normal LV size , mild to moderately increased wall thickness. Difficult to assess wall motion abnormalities  Moderately reduced LV systolic function with LVEF 40-45 %. Normal RV size and function. LA and RA appears normal in size. No obvious significant structural valvular abnormality noted. No significant valvular stenosis or regurgitation noted. Normal aortic root dimension. No significant pericardial effusion noted. IVC not well visualized    Cath 6/2022  Indications:    - Unstable angina       - Cardiomyopathy      Conclusions      Procedure Summary      Minimal non-obstructive CAD. LV was not done. Recommendations      Medical therapy as needed. Risk factor modification.       Erum Garcia MD   9/22/2022

## 2022-09-22 NOTE — BRIEF OP NOTE
Brief Postoperative Note      Patient: Keke Yusuf  YOB: 1955  MRN: [id number]    Date of Procedure: 9/22/2022    Pre-Op Diagnosis: BOWEL OBSTRUCTION    Post-Op Diagnosis: Same       Procedure(s):  LAPAROTOMY EXPLORATORY, LYSIS OF ADHESIONS, REPAIR OF BILATERAL URETERAL ANASTOMOSES, ABDOMINAL WASHOUT, PLACEMENT OF ABTHERA WOUND VAC    Surgeon(s):  MD Yunier Correia DO    Assistant:  Billie Najjar, DO PGY4    Anesthesia: General    Estimated Blood Loss (mL): less than 50     Complications: None    Specimens:   * No specimens in log *    Implants:  * No implants in log *      Drains:   Closed/Suction Drain Left LLQ;Abdomen Bulb (Active)   Site Description Leaking at site 09/22/22 0750   Dressing Status New drainage noted 09/22/22 0750   Drainage Appearance Pink tinged 09/22/22 0750   Drain Status To bulb suction 09/22/22 0750   Output (ml) 100 ml 09/22/22 0750       Negative Pressure Wound Therapy Abdomen Lower;Medial;Right (Active)       NG/OG/NJ/NE Tube Nasogastric 14 fr Left nostril (Active)   Surrounding Skin Clean, dry & intact 09/22/22 0750   Securement device Adhesive based spencer 09/22/22 0750   Status Suction-low intermittent 09/22/22 0750   Placement Verified X-Ray (Initial) 09/22/22 0750   NG/OG/NJ/NE External Measurement (cm) 60 cm 09/21/22 0400   Drainage Appearance Green 09/22/22 0750   Output (mL) 1000 ml 09/22/22 0650   Action Taken Repositioned 09/21/22 0400       Urostomy Ileal conduit RLQ (Active)   Stomal Appliance 2 piece;Clean, dry & intact 09/22/22 0750   Stoma  Assessment [place]om[place] 09/22/22 0750   Peristomal Assessment Clean, dry & intact 09/22/22 0750   Collection Container Standard 09/22/22 0750   Treatment Bag change;Site care; Liquid skin barrier 09/20/22 1300   Urine Color [place] 09/21/22 0800   Urine Appearance Cloudy 09/21/22 0800   Output (ml) 300 ml 09/21/22 0518       [REMOVED] Urinary Catheter 09/16/22 2 Way (Removed)       Findings: dusky and hyperemic proximal small bowel, anastomotic leak of ureters, small bowel folded on itself stuck under ureter     Electronically signed by Gates Canavan, DO on 9/22/2022 at 2:02 PM

## 2022-09-22 NOTE — PROGRESS NOTES
[facility])  Occupational Therapy Not Seen Note    DATE: 2022    NAME: Eulogio Smith  MRN: [id number]   : 1955      Patient not seen this date for Occupational Therapy due to:    Surgery/Procedure: Laparotomy exploratory     Next Scheduled Treatment: Ck post op      Electronically signed by Denisse Winn OT on 2022 at 11:44 AM

## 2022-09-22 NOTE — PROGRESS NOTES
PROGRESS NOTE          PATIENT NAME: Kameron Bailon RECORD NO. [id number]  DATE: 2022  SURGEON: Foreign Mohan  PRIMARY CARE PHYSICIAN: Gustavo Coleman MD    HD: # 6    ASSESSMENT    Patient Active Problem List   Diagnosis    Kidney stones    Nocturia    Hypertrophy of prostate with urinary obstruction    Malignant neoplasm of urinary bladder (HCC)    Urinary retention    Cancer of lateral wall of urinary bladder (HCC)    Acute respiratory failure with hypoxia (HCC)    Other hyperlipidemia    Microcytic anemia    Mild protein-calorie malnutrition (HCC)    Pulmonary embolism without acute cor pulmonale (HCC)    Hypoxia    Dyspnea    Acute systolic heart failure (HCC)    Acute renal failure with tubular necrosis (HCC)    CKD (chronic kidney disease), stage III (HCC)    Dilated cardiomyopathy (HCC)    Bladder cancer (HCC)    Acute kidney injury [facility])       MEDICAL DECISION MAKING AND PLAN    Bowel obstruction  - Failure of significant contrast progression however some contrast in colon on CT  - Large amount MERLIN output and decrease ileal conduit, concern urine leak, urology potentially plan takeback, will be available to assist if needed    Electrolyte abnormality  - Trending daily labs      Chief Complaint: \"abdominal pain\"    SUBJECTIVE  Patient seen in bed today, large NGT out put, large MERLIN output, minimal conduit out, denies CP or SOB, continues w/ abdominal pain, small flatus      OBJECTIVE  VITALS: Temp: Temp: 97.4 °F (36.3 °C)Temp  Av.8 °F (36.6 °C)  Min: 97.4 °F (36.3 °C)  Max: 98.1 °F (17.4 °C) BP Systolic (01ORP), RYJ:007 , Min:137 , PFI:313   Diastolic (02WTC), BXH:32, Min:71, Max:94   Pulse Pulse  Av.2  Min: 82  Max: 103 Resp Resp  Av.3  Min: 16  Max: 18 Pulse ox SpO2  Av.5 %  Min: 94 %  Max: 99 %  GENERAL: alert, no distress  LUNGS: clear to ausculation, without wheezes, rales or rhonci  HEART: normal rate and regular rhythm  ABDOMEN: Soft, nondistended. Surgical scars noted. MERLIN drain present. Colostomy bag present. There is no output in the colostomy bag. Hypoactive bowel sounds. Mild tenderness to palpation. EXTREMITY: no cyanosis, clubbing or edema    I/O last 3 completed shifts: In: 1004.6 [I.V.:1004.6]  Out: 5980 [Urine:390; Emesis/NG output:4100; Drains:1490]    Drain/tube output: In: -   Out: 4770 [Drains:1570]    LAB:  CBC:   Recent Labs     09/20/22  0723 09/21/22  0609 09/22/22  0525   WBC 18.0* 15.9* 14.2*   HGB 10.4* 9.9* 9.2*   HCT 31.9* 31.8* 28.5*   MCV 90.6 92.7 92.5    202 197       BMP:   Recent Labs     09/21/22  0609 09/21/22  1543 09/22/22  0525   * 146* 149*   K 4.3 4.3 3.7   * 116* 113*   CO2 26 19* 25   BUN 37* 40* 39*   CREATININE 2.22* 2.31* 2.45*   GLUCOSE 102* 107* 102*       COAGS: No results for input(s): APTT, PROT, INR in the last 72 hours. RADIOLOGY:  CXR: CT ABDOMEN PELVIS WO CONTRAST Additional Contrast? None    Result Date: 9/22/2022  EXAMINATION: CT OF THE ABDOMEN AND PELVIS WITHOUT CONTRAST 9/22/2022 8:12 am TECHNIQUE: CT of the abdomen and pelvis was performed without the administration of intravenous contrast. Multiplanar reformatted images are provided for review. Automated exposure control, iterative reconstruction, and/or weight based adjustment of the mA/kV was utilized to reduce the radiation dose to as low as reasonably achievable. COMPARISON: CT scan of the pelvis 04/27/2022 HISTORY: ORDERING SYSTEM PROVIDED HISTORY: s/p cystectomy with urine leak, no output from stoma. Please check for stent position, hydronephrosis and also bowel obstruction TECHNOLOGIST PROVIDED HISTORY: s/p cystectomy with urine leak, no output from stoma. Please check for stent position, hydronephrosis and also bowel obstruction Reason for Exam: s/p cystectomy with urine leak, no output from stoma.  Please check for stent position, hydronephrosis and also bowel obstruction FINDINGS: Lower Chest: Segmental/subsegmental atelectasis at the lung bases, greater on the right. Catheter tip in the right atrium. Enteric tube with tip in the stomach. Coronary artery calcifications and mild LV enlargement, stable. Organs: No acute abnormality of the liver, spleen, pancreas, or adrenal glands. Gallbladder nondistended without stones. Right-sided double pigtail ureteral stent tip in the proximal dilated right ureter; at least moderate_____ right hydroureteronephrosis. Mild-moderate left hydroureteronephrosis. 3.6 cm posterior left renal cyst re-identified. Several punctate nonobstructing stones also redemonstrated. GI/Bowel: Multiple distended small bowel loops in the mid anterior abdomen with contrast levels, measuring up to 44 mm. Transition identified mid lower anterior abdomen, just medial to the right-sided stent containing ileostomy site. Multiple colonic diverticula re-identified with some stool throughout nondilated large bowel, and oral contrast in the cecum and ascending colon. No CT evidence of diverticulitis. No large bowel obstruction. Enteric tube within nondistended stomach. Pelvis: Large anterior pelvic fluid collection with HU 20 and scattered air bubbles measuring approximately 15.1 x 13.4 x 6.0 cm extending from the bladder bed status post recent cystectomy anterosuperiorly into the right gutter and upper pelvis on the left. Some higher central density noted, up to 47 HU suggesting heme. Small fat-containing inguinal hernias bilaterally. Previously identified lymph nodes not well demonstrated on the current study. Peritoneum/Retroperitoneum: Left-sided anterior lower abdominal surgical drain. Scattered pneumoperitoneum and considerable subcutaneous/fascial air anterior abdominal wall from the lower chest into both groins/flanks surrounding musculature and in the subcutaneous fat. Perihepatic fluid, measuring 13 HU. Smaller amounts of scattered fluid in the peritoneal cavity, centrally and in the left gutter.   Unenhanced aorta and iliac arteries show no acute abnormality, with scattered plaque and elongation. No aneurysm. Bilateral ureteral stents via the conduit and ileostomy site, coiled in the dilated right ureter (see above) and extending into the mildly dilated left ureter. Bones/Soft Tissues: No acute abnormality of the bony structures. Extensive multilevel DDD/DJD visualized spine with probable DISH and milder degenerative changes both hips again seen. Small bowel obstruction, with a transition point near the jejunoileal junction adjacent/medial to the ileostomy site. Large pelvic fluid collection with varying heme densities, some of which is recent, extending from the cystectomy bed, as above. Ureteral stents, extending out the conduit and ileostomy site with similar moderate-severe_____ right hydroureteronephrosis, and new mild-moderate left hydroureteronephrosis. Postsurgical changes with scattered pneumoperitoneum/ascites, extensive subcutaneous air/soft tissue changes. Enteric tube in satisfactory position with its tip in the stomach. Segmental/subsegmental lung base changes posteriorly. Multiple additional findings, either unchanged or incidental, as detailed in the body of the report above. Findings were discussed with [facility] at 9:02 am on 9/22/2022. [place] SMALL BOWEL FOLLOW THROUGH ONLY    Result Date: 9/21/2022  EXAMINATION: SMALL BOWEL FOLLOW THROUGH SERIES 9/21/2022 TECHNIQUE: Small bowel follow through series was performed with overhead images and spot images. FLUOROSCOPY DOSE AND TYPE OR TIME AND EXPOSURES: DAP 0Gycm2. No fluoroscopy. COMPARISON: None HISTORY: ORDERING SYSTEM PROVIDED HISTORY: post op ileus vs. sbo TECHNOLOGIST PROVIDED HISTORY: post op ileus vs. sbo FINDINGS: Study was started in the radiology department end continued in the patient's room.  view of the abdomen shows NG tube in place. Multiple diffusely markedly the dilated gas-filled small bowel loops are noted.   Some normal caliber gas-filled small bowel loops are noted in the lower abdomen extending towards the right lower quadrant from the mid abdomen. Some gas in the colon. Extensive subcutaneous emphysema. Patient had extensive urology surgical procedure on 09/16/2022. Upon administration of contrast there is slow progression of contrast through the small bowel revealing multiple contrast filled small bowel loops. The last image was obtained at 5 hours post contrast administration at which time contrast is still in the mid abdominal loops and has not clearly filled all the distended loops. Study is terminated at this point. Appearance would favor a distal small-bowel obstruction noting some normal caliber gas-filled small bowel loops on the  view. Consider CT scan correlation. Findings indicative of a distal small bowel obstruction. Consider CT scan correlation if deemed clinically necessary. This may be better demonstrates a transition zone of obstruction. Electronically signed by Moisés Lakhani DO on 9/22/2022 at 9:50 AM   Attestation signed by Jaime Tavarez MD    I personally evaluated the patient and directed the medical decision making with Resident/TERRY after the physical/radiologic exam and laboratory values were reviewed and confirmed. Significant urine leak. Rec urology re-exploration.   Available for assistance if needed     Jaime Tavarez MD

## 2022-09-22 NOTE — ANESTHESIA POSTPROCEDURE EVALUATION
Department of Anesthesiology  Postprocedure Note    Patient: Bg Subramanian  MRN: [id number]  YOB: 1955  Date of evaluation: 9/22/2022      Procedure Summary     Date: 09/22/22 Room / Location: [address]    Anesthesia Start: 1209 Anesthesia Stop: 2667    Procedure: LAPAROTOMY EXPLORATORY, LYSIS OF ADHESIONS, REPAIR OF BILATERAL URETERAL ANASTOMOSES, ABDOMINAL WASHOUT, PLACEMENT OF ABTHERA WOUND VAC Diagnosis:       Intestinal obstruction, unspecified cause, unspecified whether partial or complete ([address]      (BOWEL OBSTRUCTION)    Surgeons: Ladonna Peralta DO Responsible Provider: Nelson Rosenberg MD    Anesthesia Type: general ASA Status: 3          Anesthesia Type: No value filed.     Madhu Phase I: Madhu Score: 8    Madhu Phase II:      POST-OP ANESTHESIA NOTE       BP (!) 145/77   Pulse 99   Temp 98.6 °F (37 °C) (Oral)   Resp 16   Ht 6' 2.02\" (1.88 m)   Wt 260 lb (117.9 kg)   SpO2 100%   BMI 33.37 kg/m²    Pain Assessment: Critical Care Pain Observation Tool (CPOT)  Pain Level: 7           Anesthesia Post Evaluation    Patient location during evaluation: ICU  Patient participation: waiting for patient participation  Level of consciousness: sedated and ventilated  Pain score: 7  Airway patency: patent  Nausea & Vomiting: no nausea and no vomiting  Complications: no  Cardiovascular status: hemodynamically stable  Respiratory status: acceptable, intubated and ventilator  Hydration status: stable

## 2022-09-22 NOTE — PROGRESS NOTES
Antonia Bundy MD FACS   Urology Progress Note     Subjective:   Diet: N.p.o. except for sips and chips   AFVSS  SBFT yesterday showed concern for distal obstruction  NG tube clogged, patient vomited throughout the night  Did not ambulate yesterday  Passing some flatus, no BM  Red rubber flushed - patent, some mucus removed  NG tube flushed - patent and suctioning appropriately     No UOP recorded overnight - red rubber placed yesterday  MERLIN drain with significantly increased output since yesterday 1.37L serous   NG tube 2.2L/24h green bilious    Wbc 14.2 (15.9)  Hb 9.2 (9.9)  Cr worsening 2.45 (2.31)    Patient Vitals for the past 24 hrs:   BP Temp Temp src Pulse Resp SpO2 Weight   09/22/22 0428 (!) 157/94 98.1 °F (36.7 °C) Oral 99 16 99 % --   09/22/22 0404 -- -- -- -- -- -- 260 lb (117.9 kg)   09/22/22 0002 (!) 164/71 -- -- 82 18 98 % --   09/21/22 2341 (!) 164/82 -- -- 99 18 97 % --   09/21/22 2056 (!) 147/81 97.6 °F (36.4 °C) Oral 97 16 95 % --   09/21/22 2034 -- -- -- -- 18 -- --   09/21/22 1920 -- -- -- -- 18 -- --   09/21/22 1628 137/74 97.9 °F (36.6 °C) Oral (!) 103 16 96 % --   09/21/22 0815 139/88 97.5 °F (36.4 °C) Oral 98 16 -- --       Intake/Output Summary (Last 24 hours) at 9/22/2022 0625  Last data filed at 9/22/2022 0002  Gross per 24 hour   Intake --   Output 3570 ml   Net -3570 ml       Recent Labs     09/20/22  0723 09/21/22  0609 09/22/22  0525   WBC 18.0* 15.9* 14.2*   HGB 10.4* 9.9* 9.2*   HCT 31.9* 31.8* 28.5*   MCV 90.6 92.7 92.5    202 197     Recent Labs     09/20/22  0723 09/21/22  0609 09/21/22  1543   * 147* 146*   K 3.7 4.3 4.3   * 112* 116*   CO2 22 26 19*   PHOS 1.9* 2.6  --    BUN 36* 37* 40*   CREATININE 1.54* 2.22* 2.31*       No results for input(s): COLORU, PHUR, LABCAST, WBCUA, RBCUA, MUCUS, TRICHOMONAS, YEAST, BACTERIA, CLARITYU, SPECGRAV, LEUKOCYTESUR, UROBILINOGEN, BILIRUBINUR, BLOODU in the last 72 hours.     Invalid input(s): NITRATE, GLUCOSEUKETONESUAMORPHOUS      Additional Lab/culture results:    Physical Exam:   AO X 3  HEENT: NG tube in place, small amt green bilious fluid  Neck: Supple   Chest: bilateral symmetrical chest rise/ Non labored breathing   Circulatory: Peripheries warm , well perfused   P/A: soft, nondistended, incision sites appropriately tender, clean, dry and intact with skin glue, ostomy pink with bilateral stents, CLIFFORD serous fluid  Extremities: No edema/calf tenderness    Interval Imaging Findings: none    Impression:  none    Duncandrew Sharon is a 71-year-old male with bladder cancer, s/p robotic assisted laparoscopic cystoprostatectomy with ileal conduit creation on 9/16/2022      Plan:   Diet: Continue on n.p.o. -NG tube in place, appreciate general surgery management  SBFT yesterday suggested distal obstruction, gen surg plans for KUB this am to assess whether contrast is reaching rectum  Trend Cr worsening 2.45 from 2.31  Clifford Cr checked yesterday 17.4 concern for urine leak, maintain red rubber in stoma, maintain ureteral stents  Maintain CLIFFORD drain  IV fluids: Continue IV fluids. SQH DVT PPX  Antibiotics: IV Ancef for 24 hours - completed  Pain control: Tylenol scheduled, Roxicodone as needed, Dilaudid as needed for breakthrough  Drain output : continue to monitor  Needs to ambulate more/ IS 10 times per hour   Not appropriate for discharge    [facility], DO, PGY-3  6:25 AM 9/22/2022    CT shows mechanical SBO and large pelvic fluid collection consistent with urine leak. Will proceed to OR for exploratory laparotomy with help of General Surgery later today. I have reviewed the history above and agree. I have reviewed all laboratory findings and imaging reports/films. I agree with the plan as noted above.     Electronically signed by Jj Lowry MD on 9/22/2022 at 11:05 AM

## 2022-09-22 NOTE — OP NOTE
Operative Note      Patient: Ruby Espinoza  YOB: 1955  MRN: [id number]    Date of Procedure: 9/22/2022    Pre-Op Diagnosis: BOWEL OBSTRUCTION    Post-Op Diagnosis: Small bowel obstruction, anastomotic breakdown and leak from ureteral ileal conduit anastomosis       Procedure(s):  LAPAROTOMY EXPLORATORY, LYSIS OF ADHESIONS, REPAIR OF BILATERAL URETERAL ANASTOMOSES, ABDOMINAL WASHOUT, PLACEMENT OF ABTHERA WOUND VAC    Surgeon(s):  MD Lillie Hartman Certain, DO    Assistant:   * No surgical staff found *    Anesthesia: General    Estimated Blood Loss (mL): less than 50     Complications: None    Specimens:   * No specimens in log *    Implants:  * No implants in log *      Drains:   Closed/Suction Drain Left LLQ;Abdomen Bulb (Active)   Site Description Leaking at site 09/22/22 0750   Dressing Status New drainage noted 09/22/22 0750   Drainage Appearance Pink tinged 09/22/22 0750   Drain Status To bulb suction 09/22/22 0750   Output (ml) 100 ml 09/22/22 0750       Negative Pressure Wound Therapy Abdomen Lower;Medial;Right (Active)       NG/OG/NJ/NE Tube Nasogastric 14 fr Left nostril (Active)   Surrounding Skin Clean, dry & intact 09/22/22 0750   Securement device Adhesive based spencer 09/22/22 0750   Status Suction-low intermittent 09/22/22 0750   Placement Verified X-Ray (Initial) 09/22/22 0750   NG/OG/NJ/NE External Measurement (cm) 60 cm 09/21/22 0400   Drainage Appearance Green 09/22/22 0750   Output (mL) 1000 ml 09/22/22 0650   Action Taken Repositioned 09/21/22 0400       Urostomy Ileal conduit RLQ (Active)   Stomal Appliance 2 piece;Clean, dry & intact 09/22/22 0750   Stoma  Assessment [place] 09/22/22 0750   Peristomal Assessment Clean, dry & intact 09/22/22 0750   Collection Container Standard 09/22/22 0750   Treatment Bag change;Site care; Liquid skin barrier 09/20/22 1300   Urine Color [place] 09/21/22 0800   Urine Appearance Cloudy 09/21/22 0800   Output (ml) 300 ml 09/21/22 0518 [REMOVED] Urinary Catheter 09/16/22 2 Way (Removed)       Findings: dusky and hyperemic proximal small bowel, anastomotic leak of ureters, small bowel folded on itself stuck under ureter     Detailed Description of Procedure: Indication:  32-year-old male status post cystectomy with ileal conduit creation on 9/16/2022. Patient with postoperative complication of apparent small bowel obstruction and suspected urine leak. Plan for exploratory surgery. Risks, benefits, and alternatives were explained to the patient, he wished to proceed, informed consent was obtained. Procedure:  Patient was brought to the operating room placed on the operating table in the supine position. Anesthesia team induced general anesthesia. Patient was already receiving antibiotics for. Patient was then prepped and draped in typical standard fashion. Timeout was called to ensure proper patient, position, and procedure being performed. Prior lower midline incision was reopened with a 10 blade scalpel, this incision was extended superiorly around the umbilicus. On entering the abdomen significant amount of serosanguineous fluid was encountered and was suctioned out. This fluid volume was approximately 1.5 L. Upon inspection of the abdomen some loose interloop adhesions were found these were broken up with blunt dissection. The ureteral anastomoses showed some breakdown with holes in both anastomoses. Urology repaired the ureteral anastomoses. The small bowel was eviscerated. A portion of the small bowel was found to be kinked and stuck underneath the left ureter this was freed. This appeared to be the transition point. The small bowel was run from ligament of Treitz to the terminal ileum. The anastomosis from the ileal conduit appeared intact roughly 20 cm from the terminal ileum. The proximal small bowel measuring roughly 70 cm in length from the transition point to the ligament of Treitz was severely dilated.   This bowel appeared hyperemic and dusky. At this time the decision was made to leave the abdomen open so that this bowel could be reevaluated tomorrow instead of moving forward with resection at this time. Abdomen was thoroughly irrigated with normal saline. ABThera temporary abdominal closure device was placed. New ostomy appliance was placed over ileal conduit. This concluded the procedure. All sponge, needle, and instrument counts were correct at the end of the case. The patient remained intubated was taken to the surgical ICU in stable condition. Dr. Ashtyn Jean was present and scrubbed for the duration of the procedure    Electronically signed by Sridevi Chang DO on 9/22/2022 at 3:55 PM          Trauma Attending Attestation      I have reviewed the above [facility] note(s) and confirmed the key elements of the medical history and physical exam. I have seen and examined the pt. I have discussed the findings, established the care plan and recommendations with Resident.       Eder Rosa DO  9/24/2022  8:25 AM

## 2022-09-22 NOTE — CONSULTS
Comprehensive Nutrition Assessment    Type and Reason for Visit:  Consult (TF ordering and management for mechanical vent pts)    Nutrition Recommendations/Plan:   Continue NPO  Pt without nutrition x 6 days, severe malnutrition noted. Recommend initiating TPN when able at 42 mL/hr without lipids while pt is on propofol. Will monitor for start of nutrition, plan of care     Malnutrition Assessment:  Malnutrition Status:  Severe malnutrition (09/17/22 1345)    Context:  Chronic Illness     Findings of the 6 clinical characteristics of malnutrition:  Energy Intake:  75% or less estimated energy requirements for 1 month or longer  Weight Loss:  Greater than 10% over 6 months     Body Fat Loss:  Unable to assess     Muscle Mass Loss:  Unable to assess    Fluid Accumulation:  No significant fluid accumulation     Strength:  Not Performed    Nutrition Assessment:    Chart reviewed. SBFT 9/21, concerning for complete or partial small bowel obstruction. NG tube with 2.2 L over 24 hrs of green/bilious output. S/p ex lap, placement of abthera wound vac. Pt intubated and sedated. Plan for OR tomorrow for second look ex lap per general surgery. Noted, pt NPO x 6 days with severe malnutrition. Nutrition Related Findings:    labs/meds reviewed. + urostomy. last recorded BM 9/15. Wound Type: Surgical Incision, Wound Vac       Current Nutrition Intake & Therapies:    Average Meal Intake: NPO     Diet NPO Exceptions are: Sips of Water with Meds, Ice Chips  Additional Calorie Sources:  propofol at 35.4 mL/hr = 934 kcal  potassium chloride 10 mEq in dextrose 5 % and 0.3 % NaCl at 150 mL/hr = 612 kcal    Anthropometric Measures:  Height: 6' 2.02\" (188 cm)  Ideal Body Weight (IBW): 190 lbs (86 kg)       Current Body Weight: 260 lb 2.3 oz (118 kg), 133.7 % IBW.  Weight Source: Bed Scale  Current BMI (kg/m2): 33.4  Usual Body Weight: 254 lb 13 oz (115.6 kg) (7/14/22 per wt hx review)  % Weight Change (Calculated): -0.3 BMI Categories: Obese Class 1 (BMI 30.0-34. 9)    Estimated Daily Nutrient Needs:  Energy Requirements Based On: Kcal/kg  Weight Used for Energy Requirements: Current  Energy (kcal/day): 1821-2646 kcal/d  Weight Used for Protein Requirements: Ideal  Protein (g/day): 100-120 gm pro/d  Method Used for Fluid Requirements: ml/Kg  Fluid (ml/day): 2800ml/d    Nutrition Diagnosis:   Inadequate oral intake related to altered GI function as evidenced by NPO or clear liquid status due to medical condition    Nutrition Interventions:   Food and/or Nutrient Delivery: Continue NPO  Nutrition Education/Counseling: No recommendation at this time  Coordination of Nutrition Care: Continue to monitor while inpatient       Goals:  Previous Goal Met: No Progress toward Goal(s)  Goals: Meet at least 75% of estimated needs, prior to discharge       Nutrition Monitoring and Evaluation:   Behavioral-Environmental Outcomes: None Identified  Food/Nutrient Intake Outcomes: Diet Advancement/Tolerance, IVF Intake  Physical Signs/Symptoms Outcomes: Biochemical Data, Nutrition Focused Physical Findings, Skin, Weight, GI Status, Nausea or Vomiting    Discharge Planning:     Too soon to determine     Roula Barber, [address]  Contact: [phone]

## 2022-09-23 ENCOUNTER — ANESTHESIA (OUTPATIENT)
Dept: OPERATING ROOM | Age: 67
DRG: 653 | End: 2022-09-23
Payer: MEDICARE

## 2022-09-23 ENCOUNTER — APPOINTMENT (OUTPATIENT)
Dept: GENERAL RADIOLOGY | Age: 67
DRG: 653 | End: 2022-09-23
Attending: SPECIALIST
Payer: MEDICARE

## 2022-09-23 ENCOUNTER — APPOINTMENT (OUTPATIENT)
Dept: INTERVENTIONAL RADIOLOGY/VASCULAR | Age: 67
DRG: 653 | End: 2022-09-23
Attending: SPECIALIST
Payer: MEDICARE

## 2022-09-23 ENCOUNTER — ANESTHESIA EVENT (OUTPATIENT)
Dept: OPERATING ROOM | Age: 67
DRG: 653 | End: 2022-09-23
Payer: MEDICARE

## 2022-09-23 LAB
ABO/RH: NORMAL
ABSOLUTE EOS #: 0.19 K/UL (ref 0–0.4)
ABSOLUTE IMMATURE GRANULOCYTE: 0.74 K/UL (ref 0–0.3)
ABSOLUTE LYMPH #: 1.85 K/UL (ref 1–4.8)
ABSOLUTE MONO #: 2.04 K/UL (ref 0.1–0.8)
ALLEN TEST: ABNORMAL
ALLEN TEST: ABNORMAL
ALLEN TEST: POSITIVE
ANION GAP SERPL CALCULATED.3IONS-SCNC: 8 MMOL/L (ref 9–17)
ANION GAP SERPL CALCULATED.3IONS-SCNC: 8 MMOL/L (ref 9–17)
ANTIBODY SCREEN: NEGATIVE
ARM BAND NUMBER: NORMAL
BASOPHILS # BLD: 0 % (ref 0–2)
BASOPHILS ABSOLUTE: 0 K/UL (ref 0–0.2)
BUN BLDV-MCNC: 40 MG/DL (ref 8–23)
BUN BLDV-MCNC: 46 MG/DL (ref 8–23)
CALCIUM IONIZED: 1.15 MMOL/L (ref 1.13–1.33)
CALCIUM SERPL-MCNC: 7.9 MG/DL (ref 8.6–10.4)
CALCIUM SERPL-MCNC: 8 MG/DL (ref 8.6–10.4)
CARBOXYHEMOGLOBIN: 1.2 % (ref 0–5)
CHLORIDE BLD-SCNC: 112 MMOL/L (ref 98–107)
CHLORIDE BLD-SCNC: 114 MMOL/L (ref 98–107)
CHLORIDE, WHOLE BLOOD: 118 MMOL/L (ref 98–110)
CO2: 21 MMOL/L (ref 20–31)
CO2: 21 MMOL/L (ref 20–31)
CREAT SERPL-MCNC: 2.31 MG/DL (ref 0.7–1.2)
CREAT SERPL-MCNC: 2.36 MG/DL (ref 0.7–1.2)
EOSINOPHILS RELATIVE PERCENT: 1 % (ref 1–4)
EXPIRATION DATE: NORMAL
FIO2: 60
FIO2: 60
FIO2: ABNORMAL
GFR AFRICAN AMERICAN: 34 ML/MIN
GFR AFRICAN AMERICAN: 35 ML/MIN
GFR NON-AFRICAN AMERICAN: 28 ML/MIN
GFR NON-AFRICAN AMERICAN: 29 ML/MIN
GFR SERPL CREATININE-BSD FRML MDRD: ABNORMAL ML/MIN/{1.73_M2}
GFR SERPL CREATININE-BSD FRML MDRD: ABNORMAL ML/MIN/{1.73_M2}
GLUCOSE BLD-MCNC: 100 MG/DL (ref 75–110)
GLUCOSE BLD-MCNC: 104 MG/DL (ref 75–110)
GLUCOSE BLD-MCNC: 107 MG/DL (ref 70–99)
GLUCOSE BLD-MCNC: 94 MG/DL (ref 70–99)
GLUCOSE BLD-MCNC: 94 MG/DL (ref 74–100)
GLUCOSE BLD-MCNC: 94 MG/DL (ref 74–100)
HCO3 ARTERIAL: 20.3 MMOL/L (ref 22–27)
HCT VFR BLD CALC: 29.6 % (ref 40.7–50.3)
HCT VFR BLD CALC: 31.6 % (ref 40.7–50.3)
HCT VFR BLD CALC: 35.8 % (ref 40.7–50.3)
HEMOGLOBIN: 11.6 GM/DL (ref 13–17)
HEMOGLOBIN: 9.4 G/DL (ref 13–17)
HEMOGLOBIN: 9.6 G/DL (ref 13–17)
IMMATURE GRANULOCYTES: 4 %
LACTIC ACID, WHOLE BLOOD: 1 MMOL/L (ref 0.7–2.1)
LYMPHOCYTES # BLD: 10 % (ref 24–44)
MAGNESIUM: 2 MG/DL (ref 1.6–2.6)
MAGNESIUM: 2 MG/DL (ref 1.6–2.6)
MCH RBC QN AUTO: 28.5 PG (ref 25.2–33.5)
MCH RBC QN AUTO: 29.1 PG (ref 25.2–33.5)
MCHC RBC AUTO-ENTMCNC: 30.4 G/DL (ref 28.4–34.8)
MCHC RBC AUTO-ENTMCNC: 31.8 G/DL (ref 28.4–34.8)
MCV RBC AUTO: 91.6 FL (ref 82.6–102.9)
MCV RBC AUTO: 93.8 FL (ref 82.6–102.9)
MODE: ABNORMAL
MONOCYTES # BLD: 11 % (ref 1–7)
MORPHOLOGY: ABNORMAL
NEGATIVE BASE EXCESS, ART: 1 (ref 0–2)
NEGATIVE BASE EXCESS, ART: 4.5 MMOL/L (ref 0–2)
NEGATIVE BASE EXCESS, ART: 5 (ref 0–2)
NRBC AUTOMATED: 0 PER 100 WBC
NRBC AUTOMATED: 0 PER 100 WBC
O2 DEVICE/FLOW/%: ABNORMAL
O2 DEVICE/FLOW/%: ABNORMAL
O2 SAT, ARTERIAL: 98.6 % (ref 94–100)
PATIENT TEMP: 37
PCO2 ARTERIAL: 38.9 MMHG (ref 32–45)
PDW BLD-RTO: 15.1 % (ref 11.8–14.4)
PDW BLD-RTO: 15.4 % (ref 11.8–14.4)
PH ARTERIAL: 7.34 (ref 7.35–7.45)
PHOSPHORUS: 3.1 MG/DL (ref 2.5–4.5)
PHOSPHORUS: 4.6 MG/DL (ref 2.5–4.5)
PLATELET # BLD: 196 K/UL (ref 138–453)
PLATELET # BLD: 235 K/UL (ref 138–453)
PMV BLD AUTO: 10 FL (ref 8.1–13.5)
PMV BLD AUTO: 9.9 FL (ref 8.1–13.5)
PO2 ARTERIAL: 142 MMHG (ref 75–95)
POC HCO3: 19.6 MMOL/L (ref 21–28)
POC HCO3: 22.1 MMOL/L (ref 21–28)
POC LACTIC ACID: 0.53 MMOL/L (ref 0.56–1.39)
POC LACTIC ACID: 0.74 MMOL/L (ref 0.56–1.39)
POC O2 SATURATION: 100 % (ref 94–98)
POC O2 SATURATION: 100 % (ref 94–98)
POC PCO2: 31.2 MM HG (ref 35–48)
POC PCO2: 35 MM HG (ref 35–48)
POC PH: 7.36 (ref 7.35–7.45)
POC PH: 7.46 (ref 7.35–7.45)
POC PO2: 191.4 MM HG (ref 83–108)
POC PO2: 203.2 MM HG (ref 83–108)
POTASSIUM SERPL-SCNC: 4.5 MMOL/L (ref 3.7–5.3)
POTASSIUM SERPL-SCNC: 4.6 MMOL/L (ref 3.7–5.3)
POTASSIUM, WHOLE BLOOD: 4.3 MMOL/L (ref 3.6–5)
RBC # BLD: 3.23 M/UL (ref 4.21–5.77)
RBC # BLD: 3.37 M/UL (ref 4.21–5.77)
SAMPLE SITE: ABNORMAL
SAMPLE SITE: ABNORMAL
SEG NEUTROPHILS: 74 % (ref 36–66)
SEGMENTED NEUTROPHILS ABSOLUTE COUNT: 13.68 K/UL (ref 1.8–7.7)
SODIUM BLD-SCNC: 141 MMOL/L (ref 135–144)
SODIUM BLD-SCNC: 143 MMOL/L (ref 135–144)
SODIUM, WHOLE BLOOD: 146 MMOL/L (ref 136–145)
WBC # BLD: 16.8 K/UL (ref 3.5–11.3)
WBC # BLD: 18.5 K/UL (ref 3.5–11.3)

## 2022-09-23 PROCEDURE — 87086 URINE CULTURE/COLONY COUNT: CPT

## 2022-09-23 PROCEDURE — 2580000003 HC RX 258: Performed by: STUDENT IN AN ORGANIZED HEALTH CARE EDUCATION/TRAINING PROGRAM

## 2022-09-23 PROCEDURE — 85018 HEMOGLOBIN: CPT

## 2022-09-23 PROCEDURE — 2580000003 HC RX 258: Performed by: ANESTHESIOLOGY

## 2022-09-23 PROCEDURE — 6360000002 HC RX W HCPCS

## 2022-09-23 PROCEDURE — 82805 BLOOD GASES W/O2 SATURATION: CPT

## 2022-09-23 PROCEDURE — 71045 X-RAY EXAM CHEST 1 VIEW: CPT

## 2022-09-23 PROCEDURE — 3600000014 HC SURGERY LEVEL 4 ADDTL 15MIN: Performed by: SURGERY

## 2022-09-23 PROCEDURE — 3E1M38Z IRRIGATION OF PERITONEAL CAVITY USING IRRIGATING SUBSTANCE, PERCUTANEOUS APPROACH: ICD-10-PCS | Performed by: SURGERY

## 2022-09-23 PROCEDURE — 2709999900 HC NON-CHARGEABLE SUPPLY: Performed by: SURGERY

## 2022-09-23 PROCEDURE — 94003 VENT MGMT INPAT SUBQ DAY: CPT

## 2022-09-23 PROCEDURE — C1894 INTRO/SHEATH, NON-LASER: HCPCS

## 2022-09-23 PROCEDURE — 6370000000 HC RX 637 (ALT 250 FOR IP): Performed by: STUDENT IN AN ORGANIZED HEALTH CARE EDUCATION/TRAINING PROGRAM

## 2022-09-23 PROCEDURE — C9113 INJ PANTOPRAZOLE SODIUM, VIA: HCPCS | Performed by: STUDENT IN AN ORGANIZED HEALTH CARE EDUCATION/TRAINING PROGRAM

## 2022-09-23 PROCEDURE — 2580000003 HC RX 258

## 2022-09-23 PROCEDURE — C1769 GUIDE WIRE: HCPCS

## 2022-09-23 PROCEDURE — 6360000004 HC RX CONTRAST MEDICATION: Performed by: SPECIALIST

## 2022-09-23 PROCEDURE — 2000000000 HC ICU R&B

## 2022-09-23 PROCEDURE — 6360000002 HC RX W HCPCS: Performed by: STUDENT IN AN ORGANIZED HEALTH CARE EDUCATION/TRAINING PROGRAM

## 2022-09-23 PROCEDURE — 2500000003 HC RX 250 WO HCPCS: Performed by: STUDENT IN AN ORGANIZED HEALTH CARE EDUCATION/TRAINING PROGRAM

## 2022-09-23 PROCEDURE — 50432 PLMT NEPHROSTOMY CATHETER: CPT

## 2022-09-23 PROCEDURE — 36415 COLL VENOUS BLD VENIPUNCTURE: CPT

## 2022-09-23 PROCEDURE — 85027 COMPLETE CBC AUTOMATED: CPT

## 2022-09-23 PROCEDURE — 0T9130Z DRAINAGE OF LEFT KIDNEY WITH DRAINAGE DEVICE, PERCUTANEOUS APPROACH: ICD-10-PCS | Performed by: RADIOLOGY

## 2022-09-23 PROCEDURE — 82803 BLOOD GASES ANY COMBINATION: CPT

## 2022-09-23 PROCEDURE — 85025 COMPLETE CBC W/AUTO DIFF WBC: CPT

## 2022-09-23 PROCEDURE — 3700000001 HC ADD 15 MINUTES (ANESTHESIA): Performed by: SURGERY

## 2022-09-23 PROCEDURE — C1729 CATH, DRAINAGE: HCPCS

## 2022-09-23 PROCEDURE — 74018 RADEX ABDOMEN 1 VIEW: CPT

## 2022-09-23 PROCEDURE — 85014 HEMATOCRIT: CPT

## 2022-09-23 PROCEDURE — 3600000004 HC SURGERY LEVEL 4 BASE: Performed by: SURGERY

## 2022-09-23 PROCEDURE — 86900 BLOOD TYPING SEROLOGIC ABO: CPT

## 2022-09-23 PROCEDURE — 0W9G00Z DRAINAGE OF PERITONEAL CAVITY WITH DRAINAGE DEVICE, OPEN APPROACH: ICD-10-PCS | Performed by: SURGERY

## 2022-09-23 PROCEDURE — 0T9030Z DRAINAGE OF RIGHT KIDNEY WITH DRAINAGE DEVICE, PERCUTANEOUS APPROACH: ICD-10-PCS | Performed by: RADIOLOGY

## 2022-09-23 PROCEDURE — 2709999900 HC NON-CHARGEABLE SUPPLY

## 2022-09-23 PROCEDURE — 83735 ASSAY OF MAGNESIUM: CPT

## 2022-09-23 PROCEDURE — 84132 ASSAY OF SERUM POTASSIUM: CPT

## 2022-09-23 PROCEDURE — 36600 WITHDRAWAL OF ARTERIAL BLOOD: CPT

## 2022-09-23 PROCEDURE — 3700000000 HC ANESTHESIA ATTENDED CARE: Performed by: SURGERY

## 2022-09-23 PROCEDURE — 2500000003 HC RX 250 WO HCPCS

## 2022-09-23 PROCEDURE — 84100 ASSAY OF PHOSPHORUS: CPT

## 2022-09-23 PROCEDURE — 82947 ASSAY GLUCOSE BLOOD QUANT: CPT

## 2022-09-23 PROCEDURE — 6370000000 HC RX 637 (ALT 250 FOR IP)

## 2022-09-23 PROCEDURE — 80048 BASIC METABOLIC PNL TOTAL CA: CPT

## 2022-09-23 PROCEDURE — 82330 ASSAY OF CALCIUM: CPT

## 2022-09-23 PROCEDURE — 86850 RBC ANTIBODY SCREEN: CPT

## 2022-09-23 PROCEDURE — 37799 UNLISTED PX VASCULAR SURGERY: CPT

## 2022-09-23 PROCEDURE — 83605 ASSAY OF LACTIC ACID: CPT

## 2022-09-23 PROCEDURE — 99232 SBSQ HOSP IP/OBS MODERATE 35: CPT | Performed by: INTERNAL MEDICINE

## 2022-09-23 PROCEDURE — 86901 BLOOD TYPING SEROLOGIC RH(D): CPT

## 2022-09-23 RX ORDER — SODIUM CHLORIDE, SODIUM LACTATE, POTASSIUM CHLORIDE, CALCIUM CHLORIDE 600; 310; 30; 20 MG/100ML; MG/100ML; MG/100ML; MG/100ML
INJECTION, SOLUTION INTRAVENOUS CONTINUOUS PRN
Status: DISCONTINUED | OUTPATIENT
Start: 2022-09-23 | End: 2022-09-23 | Stop reason: SDUPTHER

## 2022-09-23 RX ORDER — FENTANYL CITRATE 50 UG/ML
INJECTION, SOLUTION INTRAMUSCULAR; INTRAVENOUS PRN
Status: DISCONTINUED | OUTPATIENT
Start: 2022-09-23 | End: 2022-09-23 | Stop reason: SDUPTHER

## 2022-09-23 RX ORDER — ROCURONIUM BROMIDE 10 MG/ML
INJECTION, SOLUTION INTRAVENOUS PRN
Status: DISCONTINUED | OUTPATIENT
Start: 2022-09-23 | End: 2022-09-23 | Stop reason: SDUPTHER

## 2022-09-23 RX ORDER — SODIUM CHLORIDE, SODIUM LACTATE, POTASSIUM CHLORIDE, AND CALCIUM CHLORIDE .6; .31; .03; .02 G/100ML; G/100ML; G/100ML; G/100ML
1000 INJECTION, SOLUTION INTRAVENOUS ONCE
Status: COMPLETED | OUTPATIENT
Start: 2022-09-23 | End: 2022-09-24

## 2022-09-23 RX ORDER — 0.9 % SODIUM CHLORIDE 0.9 %
1000 INTRAVENOUS SOLUTION INTRAVENOUS ONCE
Status: COMPLETED | OUTPATIENT
Start: 2022-09-23 | End: 2022-09-23

## 2022-09-23 RX ORDER — SODIUM CHLORIDE 9 MG/ML
INJECTION, SOLUTION INTRAVENOUS CONTINUOUS PRN
Status: DISCONTINUED | OUTPATIENT
Start: 2022-09-23 | End: 2022-09-23 | Stop reason: SDUPTHER

## 2022-09-23 RX ORDER — METOCLOPRAMIDE HYDROCHLORIDE 5 MG/ML
5 INJECTION INTRAMUSCULAR; INTRAVENOUS EVERY 6 HOURS
Status: DISCONTINUED | OUTPATIENT
Start: 2022-09-23 | End: 2022-10-07

## 2022-09-23 RX ORDER — MIDAZOLAM HYDROCHLORIDE 1 MG/ML
INJECTION INTRAMUSCULAR; INTRAVENOUS PRN
Status: DISCONTINUED | OUTPATIENT
Start: 2022-09-23 | End: 2022-09-23 | Stop reason: SDUPTHER

## 2022-09-23 RX ADMIN — POLYETHYLENE GLYCOL 3350 17 G: 17 POWDER, FOR SOLUTION ORAL at 08:28

## 2022-09-23 RX ADMIN — ROCURONIUM BROMIDE 30 MG: 10 INJECTION, SOLUTION INTRAVENOUS at 12:46

## 2022-09-23 RX ADMIN — PHENYLEPHRINE HYDROCHLORIDE 200 MCG: 10 INJECTION INTRAVENOUS at 13:33

## 2022-09-23 RX ADMIN — METOCLOPRAMIDE 5 MG: 5 INJECTION, SOLUTION INTRAMUSCULAR; INTRAVENOUS at 15:42

## 2022-09-23 RX ADMIN — FLUORESCEIN SODIUM 100 MG: 100 INJECTION INTRAVENOUS at 12:36

## 2022-09-23 RX ADMIN — ROCURONIUM BROMIDE 10 MG: 10 INJECTION, SOLUTION INTRAVENOUS at 13:59

## 2022-09-23 RX ADMIN — HEPARIN SODIUM 5000 UNITS: 5000 INJECTION INTRAVENOUS; SUBCUTANEOUS at 20:36

## 2022-09-23 RX ADMIN — PHENYLEPHRINE HYDROCHLORIDE 100 MCG: 10 INJECTION INTRAVENOUS at 13:28

## 2022-09-23 RX ADMIN — SODIUM CHLORIDE: 9 INJECTION, SOLUTION INTRAVENOUS at 11:40

## 2022-09-23 RX ADMIN — SODIUM CHLORIDE, PRESERVATIVE FREE 10 ML: 5 INJECTION INTRAVENOUS at 08:28

## 2022-09-23 RX ADMIN — SODIUM CHLORIDE, POTASSIUM CHLORIDE, SODIUM LACTATE AND CALCIUM CHLORIDE: 600; 310; 30; 20 INJECTION, SOLUTION INTRAVENOUS at 06:17

## 2022-09-23 RX ADMIN — DEXMEDETOMIDINE HYDROCHLORIDE 0.4 MCG/KG/HR: 4 INJECTION, SOLUTION INTRAVENOUS at 03:42

## 2022-09-23 RX ADMIN — SODIUM CHLORIDE, PRESERVATIVE FREE 40 MG: 5 INJECTION INTRAVENOUS at 08:28

## 2022-09-23 RX ADMIN — ACETAMINOPHEN 650 MG: 325 TABLET ORAL at 15:42

## 2022-09-23 RX ADMIN — MIDAZOLAM 2 MG: 1 INJECTION INTRAMUSCULAR; INTRAVENOUS at 11:42

## 2022-09-23 RX ADMIN — SODIUM CHLORIDE, PRESERVATIVE FREE 40 MG: 5 INJECTION INTRAVENOUS at 20:35

## 2022-09-23 RX ADMIN — BUPROPION HYDROCHLORIDE 300 MG: 150 TABLET, EXTENDED RELEASE ORAL at 08:28

## 2022-09-23 RX ADMIN — Medication 150 MCG/HR: at 00:51

## 2022-09-23 RX ADMIN — ROCURONIUM BROMIDE 20 MG: 10 INJECTION, SOLUTION INTRAVENOUS at 12:19

## 2022-09-23 RX ADMIN — PHENYLEPHRINE HYDROCHLORIDE 100 MCG: 10 INJECTION INTRAVENOUS at 13:42

## 2022-09-23 RX ADMIN — IOPAMIDOL 15 ML: 755 INJECTION, SOLUTION INTRAVENOUS at 18:30

## 2022-09-23 RX ADMIN — ROCURONIUM BROMIDE 50 MG: 10 INJECTION, SOLUTION INTRAVENOUS at 11:48

## 2022-09-23 RX ADMIN — METOCLOPRAMIDE 10 MG: 5 INJECTION, SOLUTION INTRAMUSCULAR; INTRAVENOUS at 03:00

## 2022-09-23 RX ADMIN — DEXMEDETOMIDINE HYDROCHLORIDE 0.3 MCG/KG/HR: 4 INJECTION, SOLUTION INTRAVENOUS at 22:08

## 2022-09-23 RX ADMIN — SODIUM CHLORIDE 1000 ML: 9 INJECTION, SOLUTION INTRAVENOUS at 09:33

## 2022-09-23 RX ADMIN — PHENYLEPHRINE HYDROCHLORIDE 100 MCG: 10 INJECTION INTRAVENOUS at 12:52

## 2022-09-23 RX ADMIN — ACETAMINOPHEN 650 MG: 325 TABLET ORAL at 08:27

## 2022-09-23 RX ADMIN — CHLORHEXIDINE GLUCONATE 0.12% ORAL RINSE 15 ML: 1.2 LIQUID ORAL at 08:28

## 2022-09-23 RX ADMIN — PHENYLEPHRINE HYDROCHLORIDE 200 MCG: 10 INJECTION INTRAVENOUS at 11:58

## 2022-09-23 RX ADMIN — Medication 200 MCG/HR: at 15:58

## 2022-09-23 RX ADMIN — PHENYLEPHRINE HYDROCHLORIDE 100 MCG: 10 INJECTION INTRAVENOUS at 12:13

## 2022-09-23 RX ADMIN — IOPAMIDOL 5 ML: 755 INJECTION, SOLUTION INTRAVENOUS at 18:09

## 2022-09-23 RX ADMIN — PHENYLEPHRINE HYDROCHLORIDE 100 MCG: 10 INJECTION INTRAVENOUS at 11:55

## 2022-09-23 RX ADMIN — PHENYLEPHRINE HYDROCHLORIDE 100 MCG: 10 INJECTION INTRAVENOUS at 13:25

## 2022-09-23 RX ADMIN — PHENYLEPHRINE HYDROCHLORIDE 200 MCG: 10 INJECTION INTRAVENOUS at 13:29

## 2022-09-23 RX ADMIN — WATER 2000 MG: 1 INJECTION INTRAMUSCULAR; INTRAVENOUS; SUBCUTANEOUS at 20:36

## 2022-09-23 RX ADMIN — WATER 2000 MG: 1 INJECTION INTRAMUSCULAR; INTRAVENOUS; SUBCUTANEOUS at 08:38

## 2022-09-23 RX ADMIN — ROCURONIUM BROMIDE 20 MG: 10 INJECTION, SOLUTION INTRAVENOUS at 13:33

## 2022-09-23 RX ADMIN — PHENYLEPHRINE HYDROCHLORIDE 200 MCG: 10 INJECTION INTRAVENOUS at 13:36

## 2022-09-23 RX ADMIN — PHENYLEPHRINE HYDROCHLORIDE 100 MCG: 10 INJECTION INTRAVENOUS at 13:46

## 2022-09-23 RX ADMIN — SODIUM CHLORIDE, POTASSIUM CHLORIDE, SODIUM LACTATE AND CALCIUM CHLORIDE: 600; 310; 30; 20 INJECTION, SOLUTION INTRAVENOUS at 13:42

## 2022-09-23 RX ADMIN — NALOXEGOL OXALATE 25 MG: 12.5 TABLET, FILM COATED ORAL at 08:28

## 2022-09-23 RX ADMIN — PHENYLEPHRINE HYDROCHLORIDE 200 MCG: 10 INJECTION INTRAVENOUS at 12:57

## 2022-09-23 RX ADMIN — PROPOFOL 15 MCG/KG/MIN: 10 INJECTION, EMULSION INTRAVENOUS at 00:08

## 2022-09-23 RX ADMIN — METOCLOPRAMIDE 5 MG: 5 INJECTION, SOLUTION INTRAMUSCULAR; INTRAVENOUS at 20:35

## 2022-09-23 RX ADMIN — SODIUM CHLORIDE, POTASSIUM CHLORIDE, SODIUM LACTATE AND CALCIUM CHLORIDE: 600; 310; 30; 20 INJECTION, SOLUTION INTRAVENOUS at 19:52

## 2022-09-23 RX ADMIN — PHENYLEPHRINE HYDROCHLORIDE 100 MCG: 10 INJECTION INTRAVENOUS at 12:59

## 2022-09-23 RX ADMIN — METOCLOPRAMIDE 10 MG: 5 INJECTION, SOLUTION INTRAMUSCULAR; INTRAVENOUS at 08:28

## 2022-09-23 RX ADMIN — FENTANYL CITRATE 50 MCG: 50 INJECTION, SOLUTION INTRAMUSCULAR; INTRAVENOUS at 12:20

## 2022-09-23 RX ADMIN — FENTANYL CITRATE 50 MCG: 50 INJECTION, SOLUTION INTRAMUSCULAR; INTRAVENOUS at 12:15

## 2022-09-23 RX ADMIN — BISACODYL 10 MG: 10 SUPPOSITORY RECTAL at 08:29

## 2022-09-23 RX ADMIN — PROPOFOL 15 MCG/KG/MIN: 10 INJECTION, EMULSION INTRAVENOUS at 19:51

## 2022-09-23 RX ADMIN — PHENYLEPHRINE HYDROCHLORIDE 100 MCG: 10 INJECTION INTRAVENOUS at 13:07

## 2022-09-23 RX ADMIN — ROCURONIUM BROMIDE 20 MG: 10 INJECTION, SOLUTION INTRAVENOUS at 12:33

## 2022-09-23 ASSESSMENT — PULMONARY FUNCTION TESTS
PIF_VALUE: 17
PIF_VALUE: 19
PIF_VALUE: 20
PIF_VALUE: 17

## 2022-09-23 ASSESSMENT — ENCOUNTER SYMPTOMS
TACHYPNEA: 1
SHORTNESS OF BREATH: 1

## 2022-09-23 NOTE — PLAN OF CARE
Problem: Discharge Planning  Goal: Discharge to home or other facility with appropriate resources  Outcome: Progressing  Flowsheets (Taken 9/23/2022 0800)  Discharge to home or other facility with appropriate resources:   Identify barriers to discharge with patient and caregiver   Arrange for needed discharge resources and transportation as appropriate     Problem: Pain  Goal: Verbalizes/displays adequate comfort level or baseline comfort level  Outcome: Progressing     Problem: Safety - Adult  Goal: Free from fall injury  Outcome: Progressing     Problem: ABCDS Injury Assessment  Goal: Absence of physical injury  Outcome: Progressing     Problem: Nutrition Deficit:  Goal: Optimize nutritional status  Outcome: Progressing     Problem: Safety - Medical Restraint  Goal: Remains free of injury from restraints (Restraint for Interference with Medical Device)  Description: INTERVENTIONS:  1. Determine that other, less restrictive measures have been tried or would not be effective before applying the restraint  2. Evaluate the patient's condition at the time of restraint application  3. Inform patient/family regarding the reason for restraint  4. Q2H: Monitor safety, psychosocial status, comfort, nutrition and hydration  Outcome: Progressing  Flowsheets  Taken 9/23/2022 1600  Remains free of injury from restraints (restraint for interference with medical device): Determine that other, less restrictive measures have been tried or would not be effective before applying the restraint  Taken 9/23/2022 1500  Remains free of injury from restraints (restraint for interference with medical device): Determine that other, less restrictive measures have been tried or would not be effective before applying the restraint     Problem: Confusion  Goal: Confusion, delirium, dementia, or psychosis is improved or at baseline  Description: INTERVENTIONS:  1.  Assess for possible contributors to thought disturbance, including medications, impaired vision or hearing, underlying metabolic abnormalities, dehydration, psychiatric diagnoses, and notify attending LIP  2. [place] high risk fall precautions, as indicated  3. Provide frequent short contacts to provide reality reorientation, refocusing and direction  4. Decrease environmental stimuli, including noise as appropriate  5. Monitor and intervene to maintain adequate nutrition, hydration, elimination, sleep and activity  6. If unable to ensure safety without constant attention obtain sitter and review sitter guidelines with assigned personnel  7.  Initiate Psychosocial CNS and Spiritual Care consult, as indicated  9/23/2022 1700 by Rupal Gaona RN  Outcome: Progressing  Flowsheets  Taken 9/23/2022 1600  Effect of thought disturbance (confusion, delirium, dementia, or psychosis) are managed with adequate functional status:   Assess for contributors to thought disturbance, including medications, impaired vision or hearing, underlying metabolic abnormalities, dehydration, psychiatric diagnoses, notify Carlos Duran high risk fall precautions, as indicated  Taken 9/23/2022 0800  Effect of thought disturbance (confusion, delirium, dementia, or psychosis) are managed with adequate functional status:   [place] high risk fall precautions, as indicated   Decrease environmental stimuli, including noise as appropriate

## 2022-09-23 NOTE — OR NURSING
10ml dark cloudy yellow urine obtained from right nephrostomy tube. Specimen to be sent to lab by Alvina Virk.

## 2022-09-23 NOTE — CARE COORDINATION
Transitional planning note: patient is intubated/ sedated and back to OR today. [facility] is following. Can tentatively accept if patient is on oral chemo and no NG tube.

## 2022-09-23 NOTE — ANESTHESIA PRE PROCEDURE
Department of Anesthesiology  Preprocedure Note       Name:  Essie Julian   Age:  74 y.o.  :  1955                                          MRN:  [id number]         Date:  2022      Surgeon: Adriano Carlin):  Mikael Godinez DO    Procedure: Procedure(s):  RE-EXPLORATION OF ABDOMINAL WOUND, POSSIBLE BOWEL RESECTION, ABDOMINAL CLOSURE    Medications prior to admission:   Prior to Admission medications    Medication Sig Start Date End Date Taking? Authorizing Provider   ondansetron (ZOFRAN-ODT) 8 MG TBDP disintegrating tablet DISSOLVE 1 TABLET IN MOUTH EVERY 8 HOURS AS NEEDED FOR NAUSEA FOR VOMITING 22   Sarah Grayson MD   HYDROcodone-acetaminophen (NORCO) 5-325 MG per tablet Take 1 tablet by mouth every 8 hours as needed for Pain for up to 30 days.  9/7/22 10/7/22  Nii Grayson MD   rivaroxaban (XARELTO) 20 MG TABS tablet Take 1 tablet by mouth once daily with breakfast  Patient taking differently: Take 1 tablet by mouth once daily with breakfast/ per cardiology, pt. to stop 3 days pre-op for OR 22   Artur Azar MD   omeprazole (PRILOSEC) 20 MG delayed release capsule Take 1 capsule by mouth daily 22   Scar Acevedo MD   carvedilol (COREG) 3.125 MG tablet TAKE 1 TABLET BY MOUTH TWICE DAILY 22   Historical Provider, MD   finasteride (PROSCAR) 5 MG tablet Take 5 mg by mouth daily    Historical Provider, MD   tamsulosin (FLOMAX) 0.4 MG capsule Take 0.4 mg by mouth daily    Historical Provider, MD   simvastatin (ZOCOR) 40 MG tablet Take 40 mg by mouth nightly    Historical Provider, MD   buPROPion (WELLBUTRIN XL) 300 MG extended release tablet Take 300 mg by mouth every morning    Historical Provider, MD       Current medications:    Current Facility-Administered Medications   Medication Dose Route Frequency Provider Last Rate Last Admin    sodium chloride flush 0.9 % injection 5-40 mL  5-40 mL IntraVENous 2 times per day Dino Heaton MD   10 mL at 09/23/22 0828    sodium chloride flush 0.9 % injection 5-40 mL  5-40 mL IntraVENous PRN Judyth Romberg, MD        0.9 % sodium chloride infusion   IntraVENous PRN Judyth Romberg, MD        fentaNYL (SUBLIMAZE) injection 25 mcg  25 mcg IntraVENous Q5 Min PRN Judyth Romberg, MD        fentaNYL (SUBLIMAZE) injection 50 mcg  50 mcg IntraVENous Q5 Min PRN Judyth Romberg, MD        ondansetron [facility]) injection 4 mg  4 mg IntraVENous Once PRN Judyth Romberg, MD        diphenhydrAMINE (BENADRYL) injection 12.5 mg  12.5 mg IntraVENous Once PRN Judyth Romberg, MD        chlorhexidine (PERIDEX) 0.12 % solution 15 mL  15 mL Mouth/Throat BID Darlene Dougherty, DO   15 mL at 09/23/22 9725    propofol injection  5-50 mcg/kg/min IntraVENous Continuous Darlene Ladonna, DO   Stopped at 09/23/22 0735    fentaNYL 50 mcg/mL 50 mL infusion   mcg/hr IntraVENous Continuous Rogerio Kinge, DO 4 mL/hr at 09/23/22 0807 200 mcg/hr at 09/23/22 5517    oxyCODONE (ROXICODONE) immediate release tablet 5 mg  5 mg Oral Q6H PRN Darlene Dougherty, DO        dexmedetomidine [facility]) 400 mcg in sodium chloride 0.9 % 100 mL infusion  0.1-1.5 mcg/kg/hr IntraVENous Continuous Darlene Ladonna, DO 8.8 mL/hr at 09/23/22 0817 0.3 mcg/kg/hr at 09/23/22 8889    lactated ringers infusion   IntraVENous Continuous Darlene Ladonna,  mL/hr at 09/23/22 0739 Rate Change at 09/23/22 0739    diatrizoate meglumine-sodium (GASTROGRAFIN) 66-10 % solution 360 mL  360 mL Per NG tube ONCE PRN [facility], DO   360 mL at 09/21/22 1104    metoclopramide (REGLAN) injection 10 mg  10 mg IntraVENous Q6H Ana Mohan, DO   10 mg at 09/23/22 2324    bisacodyl (DULCOLAX) suppository 10 mg  10 mg Rectal Daily [facility], DO   10 mg at 09/23/22 0829    pantoprazole (PROTONIX) 40 mg in sodium chloride (PF) 10 mL injection  40 mg IntraVENous Q12H [facility], DO   40 mg at 09/23/22 0828    polyethylene glycol (GLYCOLAX) packet 17 g  17 g Oral Daily Levi Liu [facility], DO   17 g at 09/23/22 0828    cefepime (MAXIPIME) 2,000 mg in sterile water 20 mL IV syringe  2,000 mg IntraVENous Q12H [facility], DO   2,000 mg at 09/23/22 7924    metoprolol (LOPRESSOR) injection 5 mg  5 mg IntraVENous Q6H PRN [facility], DO   5 mg at 09/20/22 0510    heparin (porcine) injection 5,000 Units  5,000 Units SubCUTAneous BID [facility], DO   5,000 Units at 09/22/22 2156    promethazine (PHENERGAN) injection 6.25 mg  6.25 mg IntraMUSCular Q6H PRN [facility], DO   6.25 mg at 09/21/22 0955    HYDROmorphone (DILAUDID) injection 1 mg  1 mg IntraVENous Q3H PRN [facility], DO   1 mg at 09/22/22 0757    naloxone 0.4 mg in 10 mL sodium chloride syringe   IntraVENous PRN [facility], DO        ondansetron [facility]) injection 4 mg  4 mg IntraVENous Q6H PRN [facility], DO   4 mg at 09/21/22 1319    naloxegol (MOVANTIK) tablet 25 mg  25 mg Oral Daily [facility], DO   25 mg at 09/23/22 3568    buPROPion (WELLBUTRIN XL) extended release tablet 300 mg  300 mg Oral QAM [facility], DO   300 mg at 09/23/22 1650    carvedilol (COREG) tablet 3.125 mg  3.125 mg Oral BID [facility], DO   3.125 mg at 09/21/22 2121    ondansetron (ZOFRAN-ODT) disintegrating tablet 8 mg  8 mg SubLINGual Q8H PRN [facility], DO   8 mg at 09/18/22 2201    simvastatin (ZOCOR) tablet 40 mg  40 mg Oral Nightly [facility], DO   40 mg at 09/21/22 2133    sodium chloride flush 0.9 % injection 5-40 mL  5-40 mL IntraVENous 2 times per day [facility], DO   10 mL at 09/20/22 1010    sodium chloride flush 0.9 % injection 5-40 mL  5-40 mL IntraVENous PRN Ana Marques, DO   10 mL at 09/18/22 0910    0.9 % sodium chloride infusion   IntraVENous PRN Ana Marques, DO        ondansetron (ZOFRAN-ODT) disintegrating tablet 4 mg  4 mg Oral Q8H PRN Ana Marques, DO        Or    ondansetron [facility]) injection 4 mg  4 mg IntraVENous Q6H PRN Ana Marques, DO   4 mg at 09/19/22 1027    acetaminophen (TYLENOL) tablet 650 mg  650 mg Oral Q6H Corean Climes, DO   650 mg at 09/23/22 0827    oxyCODONE (ROXICODONE) immediate release tablet 5 mg  5 mg Oral Q4H PRN Corean Climes, DO        Or    oxyCODONE (ROXICODONE) immediate release tablet 10 mg  10 mg Oral Q4H PRN Corean Climes, DO   10 mg at 09/21/22 2004       Allergies: Allergies   Allergen Reactions    Other        Problem List:    Patient Active Problem List   Diagnosis Code    Kidney stones N20.0    Nocturia R35.1    Hypertrophy of prostate with urinary obstruction N40.1, N13.8    Malignant neoplasm of urinary bladder (HCC) C67.9    Urinary retention R33.9    Cancer of lateral wall of urinary bladder (HCC) C67.2    Acute respiratory failure with hypoxia ([facility]) J96.01    Other hyperlipidemia E78.49    Microcytic anemia D50.9    Mild protein-calorie malnutrition (HCC) E44.1    Pulmonary embolism without acute cor pulmonale ([facility]) I26.99    Hypoxia R09.02    Dyspnea A15.47    Acute systolic heart failure ([facility]) I50.21    Acute renal failure with tubular necrosis ([facility]) N17.0    CKD (chronic kidney disease), stage III ([facility]) N18.30    Dilated cardiomyopathy (HCC) I42.0    Bladder cancer (HCC) C67.9    Acute kidney injury ([facility] [address] N17.9       Past Medical History:        Diagnosis Date    Acute renal failure with tubular necrosis ([facility] [address] 05/26/2022    Likely ischemic ATN/prerenal acidemia from intractable nausea vomiting as result of chemotherapy, baseline 1.3-1.4 peaked up to 2.3 in May 2022    Arthritis     BPH with obstruction/lower urinary tract symptoms     CAD (coronary artery disease) 06/2022    nonobstructive on cath    Caffeine use     3 cans soda/pop    Cancer ([address]     bladder cancer.  Dr. Rachael Virk Urology    CKD (chronic kidney disease), stage III ([address] 05/26/2022    From 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   Baseline 1.3-1.4    COVID-19 2021    SOB, fatigue, fever, chills  x 3 weeks    Depression     Dilated cardiomyopathy (HCC) 2022    Ejection fraction 40 to 45%, does have symptoms of dyspnea and decreased effort tolerance    GERD (gastroesophageal reflux disease)     High cholesterol     Kidney calculi     Sleep apnea     does not use cpap    Under care of team     Dr. Sofiya Levy Nephrology    Under care of team     Dr. Jamaal Siddiqui. last visit approx 2021    Under care of team     Dr. Tushar Morales [facility] last visit 2022    Under care of team     Dr. Naif Rosario Under care of team     Dr. Juliette Mei       Past Surgical History:        Procedure Laterality Date    BLADDER REMOVAL      BLADDER REMOVAL N/A 2022    XI ROBOTIC LAPARASCOPIC ASSISTED RADICAL CYSTOPROSTATECTOMY, BILATERAL PELVIC LYMPHADENECTOMY AND ILEAL CONDUIT FORMATION performed by Genevieve Hernandez MD at [address] Right 2022    CYSTOSCOPY STENT REMOVAL performed by Genevieve Hernandez MD at [place] [address]  2022    nonobstructive CAD    COLONOSCOPY      IR PORT PLACEMENT EQUAL OR GREATER THAN 5 YEARS  2022    IR PORT PLACEMENT EQUAL OR GREATER THAN 5 YEARS 2022 [facility]    KNEE ARTHROSCOPY      left    PROSTATECTOMY  2022    Cystoprostatectomy, Bilateral Pelvic Lymphadenectomy, ileo conduit formation, cystectomy stent removal (right)       Social History:    Social History     Tobacco Use    Smoking status: Former     Packs/day: 0.25     Years: 9.00     Pack years: 2.25     Types: Cigarettes     Start date: 1983     Quit date: 2/10/1992     Years since quittin.6    Smokeless tobacco: Never   Substance Use Topics    Alcohol use: Not Currently                                Counseling given: Not

## 2022-09-23 NOTE — PLAN OF CARE

## 2022-09-23 NOTE — BRIEF OP NOTE
Brief Postoperative Note    [facility]  YOB: 1955  [id number]    Pre-operative Diagnosis: Hydronephrosis, Cystectomy, Need to decompress/protect ureteral anastomoses    Post-operative Diagnosis: Same    Procedure: Bilateral PCNs    Anesthesia: Local    Surgeons/Assistants: Susan Russ MD     Estimated Blood Loss: minimal    Complications: none immediate    Specimens: were obtained. Right urine found to be cloudy dark yellow (left was translucent yellow), so some fluid was sent for Cx. Findings: Successful U/S and Fluoro guided bilateral PCNs using 8fr x 25 cm pigtail catheter placement. These  were attached to gravity drainage bags.        Electronically signed by Susan Russ MD on 9/23/2022 at 6:37 PM

## 2022-09-23 NOTE — PROGRESS NOTES
Occupational [address]  Occupational Therapy Not Seen Note    DATE: 2022    NAME: Sam Griffith  MRN: [id number]   : 1955      Patient not seen this date for Occupational Therapy due to:    Patient is not appropriate for active participation in OT evaluation/treatment at this time d/t intubation/sedation.      Next Scheduled Treatment: Check 2022     Electronically signed by Beth Khan OT on 2022 at 8:35 AM

## 2022-09-23 NOTE — PROGRESS NOTES
ICU PROGRESS NOTE        PATIENT NAME: Kameron Bailon RECORD NO. [id number]  DATE: 2022    PRIMARY CARE PHYSICIAN: Claudetta Chafe, MD    HD: # 7    ASSESSMENT    Patient Active Problem List   Diagnosis    Kidney stones    Nocturia    Hypertrophy of prostate with urinary obstruction    Malignant neoplasm of urinary bladder (HCC)    Urinary retention    Cancer of lateral wall of urinary bladder (HCC)    Acute respiratory failure with hypoxia (HCC)    Other hyperlipidemia    Microcytic anemia    Mild protein-calorie malnutrition (HCC)    Pulmonary embolism without acute cor pulmonale (HCC)    Hypoxia    Dyspnea    Acute systolic heart failure (HCC)    Acute renal failure with tubular necrosis (HCC)    CKD (chronic kidney disease), stage III (HCC)    Dilated cardiomyopathy ([facility]    Bladder cancer ([facility] [address]    Acute kidney injury ([facility] [address]           MEDICAL DECISION MAKING AND PLAN  NEURO:   -Pain/MMT: tylenol 650 q6h, Fentanyl gtt 175, Dilaudid, Roxicodone 5q6h  -Sedation: Pt awake  -Meds: wellbutrin xl 300mg daily,   -Psych: Pt alert and oriented, responding well to commands, not in pain    2. CV:  -SBPs: 80s -90s  -BP dropped to 82/44 MAP 57 this morning, increasing fluids, 1L bolus LR  -MAP: upper 50s  -HR: 80s  -echocardiogram in 2022 showed an injection fraction of 40-45%. -heart catheterization in 2022 and they did not do a left ventriculargram.  -Home meds: Coreg 3.125 BID, Lopressor 5mg q6, Norco, xarelto 20 mg daily, prilosec 20 mg daily, finesteride 5mg daily, flomax 0.4 mg daily, zocar 40 mg nightly,     3. HEME:   -Hgb: 9.4 (9.3)   -Platelets: 378   -INR:     4.   RESP:   -Intubated   -PRVC: 60 FiO2/ 16 RR/ 600 Vt/ 5 PEEP   -AB.459 pH/ 31.2 Co2/ 203 O2/ 22.1 HCO3   -LA: 0.53  -PF ratio: 338     5.    GI  -Diet: NPO  -POD#7 robotic assisted laparoscopic cystoprostatectomy with ileal conduit creation  -POD#1 Ex Lap  -Procedure yesterday removed ~1.5 L of serosangious fluid from the abdomin. Portion of small bowel was lodged under left ureter, small bowel from transition point 70 cm to ligament of treitz was dilated. Abdomin left open. Plan to reevaluate today for resection.   -Wound Vac output: 250  -Bowel regimen: glycolax, dulcolax suppository  -Protonix  -    6. RENAL   -Conduit:  131 ml, 10 mL/hr   -IVF: /hr   -Net: -14L   -BUN/Cr: 40/2.31   -Na/K: 141/4.5   -Mg/P: 2.0/3.1   -iCa: 1.21   -Urology following,   -POD#7 robotic assisted laparoscopic cystoprostatectomy with ileal conduit creation    -POD#1 R and L ureteral defect that was closed  -Plan trend Cr, IVF, monitor drain output    7. MSK:    -Weight bearing status: bed rest   -PT/OT  8. ID  -Tmax: 37  -WBC: 16.8 (14.8)  -Abx: Cefepime   -LA: 0.53    9. ENDO   -B  -Insulin: none    10. Lines  - NG, ETT, Right Port, PIVx2 Right, urostomy conduit    11. PPX:  -DVT: Heparin subcutateneous BID  -GI: Protonix    12. CONSULTS   -Urology, General Surgery    13. DISPO:    -      CHECKLIST    CAM-ICU RASS: 0  RESTRAINTS: in place  IVF: LR  NUTRITION: NPO  ANTIBIOTICS: Cefepime  GI: protonix  DVT: heparin  GLYCEMIC CONTROL: none  HOB >45:   MOBILITY: bedrest      Chief Complaint: Abdominal pain    SUBJECTIVE    [facility]  POD#1 from ex lap for bilateral anastomosis repair of ureter conduit. Pt evaluated at bedside. Responding well to commands. Pt acknowledges bloating in abdomin. Denies pain. Plan for reevaluation of bowel today.        OBJECTIVE  VITALS: Temp: Temp: 97.8 °F (36.6 °C)Temp  Av.8 °F (36.6 °C)  Min: 96.8 °F (36 °C)  Max: 98.6 °F (37 °C) BP Systolic (80PLJ), MWS:115 , Min:79 , ARITA:757   Diastolic (33MXM), XEZ:10, Min:51, Max:105   Pulse Pulse  Av.2  Min: 80  Max: 103 Resp Resp  Av  Min: 14  Max: 22 Pulse ox SpO2  Av.3 %  Min: 94 %  Max: 100 %    VENT INFORMATION:  Lab Results   Component Value Date/Time    FIO2 60.0 2022 05:58 AM    MODE [facility] 2022 05:58 AM GENERAL: alert, no distress  HEENT: Eye:PERRL, ETT in place, NG tube in place  : Urostomy in place   LUNGS: ETT on vent, clear to auscultation bilaterally  HEART: normal rate and regular rhythm  ABDOMEN: soft, mild tenderness, mildly distended, hypoactive bowel sounds present in all 4 quadrants  EXTERMITY: no cyanosis, clubbing or edema      Drain/tube output:    NG- 200  Urostomy- 111  Wound Vac- 250    LAB:  CBC:   Recent Labs     09/22/22  1446 09/22/22  1802 09/23/22  0349   WBC 13.6* 14.8* 16.8*   HGB 10.2* 9.3* 9.4*   HCT 33.8* 28.8* 29.6*   MCV 92.6 90.3 91.6    141 196     BMP:   Recent Labs     09/22/22  1446 09/22/22  1554 09/22/22  1802 09/23/22  0349     --  142 141   K 4.2  --  4.0 4.5   *  --  112* 112*   CO2 20  --  20 21   BUN 38*  --  39* 40*   CREATININE 2.48* 2.23* 2.19* 2.31*   GLUCOSE 108*  --  142* 94         RADIOLOGY:  CT ABDOMEN PELVIS WO CONTRAST 9/22  Impression   Small bowel obstruction, with a transition point near the jejunoileal   junction adjacent/medial to the ileostomy site. Large pelvic fluid collection with varying heme densities, some of which is   recent, extending from the cystectomy bed, as above. Ureteral stents, extending out the conduit and ileostomy site with similar   moderate-severe right hydroureteronephrosis, and new mild-moderate left   hydroureteronephrosis. Postsurgical changes with scattered pneumoperitoneum/ascites, extensive   subcutaneous air/soft tissue changes. Enteric tube in satisfactory position with its tip in the stomach. Segmental/subsegmental lung base changes posteriorly. Multiple additional findings, either unchanged or incidental, as detailed in   the body of the report above. CXR9/22:       Impression   1. Lines and tubes in expected position as above. 2.   Elevation of the right hemidiaphragm with adjacent atelectasis.              Tony Galvez  9/23/22, 7:30 AM General Surgery Resident Statement/Addendum  I have discussed the case, including pertinent history and exam findings with the above medical student. I have personally seen the patient. Note was edited and changes made by me. Assessment and plan reviewed and changes made by me. I agree with the assessment and plan as stated above. Alisia Hurd DO PGY-1  9/23/22 10:20 AM            Trauma Attending Attestation      I have reviewed the above [facility] note(s) and confirmed the key elements of the medical history and physical exam. I have seen and examined the pt. I have discussed the findings, established the care plan and recommendations with Resident.       Michael Camacho DO  9/27/2022  2:49 PM

## 2022-09-23 NOTE — OP NOTE
Operative Note      Patient: Ines Cabrera  YOB: 1955  MRN: [id number]    Date of Procedure: 9/23/2022    Pre-Op Diagnosis: Open abdomen    Post-Op Diagnosis: Same       Procedure(s):  2ND LOOK LAPAROTOMY,  ABDOMINAL WASHOUT, DRAIN PLACEMENT X2, ABDOMINAL CLOSURE, WOUND VAC PLACEMENT    Surgeon(s):  Harshil Umana DO    Assistant:   Resident: Jeffy Boone DO    Anesthesia: General    Abx: Cefepime    Estimated Blood Loss (mL): 30ml    UOP: 224YS    Complications: None    Specimens:   * No specimens in log *    Implants:  * No implants in log *      Drains:   Closed/Suction Drain Right; Anterior Abdomen (Active)       Closed/Suction Drain Left; Anterior Abdomen Bulb (Active)       Negative Pressure Wound Therapy Abdomen Lower;Medial;Right (Active)       NG/OG/NJ/NE Tube Nasogastric 18 fr Left nostril (Active)       Urostomy Ileal conduit RLQ (Active)   Stomal Appliance Clean, dry & intact 09/23/22 0800   Flange Size (inches) 2.25 Inches 09/22/22 1715   Stoma  Assessment Red 09/23/22 0800   Peristomal Assessment Clean, dry & intact 09/23/22 0800   Collection Container Standard 09/23/22 0800   Treatment Bag change;Site care; Liquid skin barrier 09/22/22 1715   Urine Color Yellow 09/23/22 0400   Urine Appearance Clear 09/23/22 0400   Output (ml) 10 ml 09/23/22 1100       [REMOVED] Closed/Suction Drain Left LLQ;Abdomen Bulb (Removed)   Site Description Leaking at site 09/22/22 0750   Dressing Status New drainage noted 09/22/22 0750   Drainage Appearance Pink tinged 09/22/22 0750   Drain Status To bulb suction 09/22/22 0750   Output (ml) 100 ml 09/22/22 0750       [REMOVED] Negative Pressure Wound Therapy Abdomen Lower;Medial;Right (Removed)   Wound Type Surgical 09/23/22 0800   Dressing Type Other (Comment) 09/23/22 0800   Cycle Continuous; On 09/23/22 0800   Target Pressure (mmHg) 125 09/23/22 0800   Canister changed?  Yes 09/23/22 0200   Dressing Status Clean, dry & intact 09/23/22 0800   Drainage Amount place, general anesthesia induced through existing ET tube. The external portion of the ABThera wound vacuum device was removed and then the patient was prepped and draped in the usual sterile fashion with a sterile towel overlying the ileal conduit stoma site. A timeout was then performed sure the proper patient, procedure, positioning, antibiotics cefepime administered, acknowledging allergies and consent present in chart. All present in agreement. The internal portion of the ABThera wound device was taken off and disposed of, the bowel was then run from the ligament of Treitz to the cecum with blunt dissection of fibrinous adhesions performed along the way. The entero-enteral anastomosis site was noted to be without obvious leak and widely patent. The ileal conduit was identified and protected, the ureters noted to be grossly dilated and with urine leaking from their implant site on the base of the ileal conduit. Intraoperative phone call made to operating urologist to discuss urine leak, decision made at that time to irrigate and place drains with plan to pursue interventional radiology bilateral percutaneous nephrostomy tube placement. Abdomen was irrigated and suctioned of irrigant and urine. The bowel was then examined once more and noted to have long segment of patchy bruising, due to concern that this area may not have sufficient blood flow a fluorescein dye test was performed with a Woods lamp noting adequate illumination with peristalsis. Bowel was then placed back in the abdomen in anatomic position. At this time time to #10 MERLIN flat drains were placed exiting out the left lower quadrant with 1 drain sitting next to the ureterostomy sites and the second draped down into the bladder fossa. The MERLIN drains were attached to bulb suction and secured with 3-0 nylon suture. At this time the abdomen was irrigated once more and decision made to close.   The fascia was closed with 2 #0 looped PDS suture in running fashion. The subcutaneous tissues were irrigated and dried, the abdomen was washed and dried, and a white foam base with black foam overlying wound vacuum was placed on the midline incision, secured with acrylic drape and attached to suction with excellent seal.  Prior to leaving the operating room the patient's previous 12 Maltese nasogastric tube was exchanged for a larger 18 Maltese nasogastric tube due to continued distention of the small bowel prior to closure. Nasogastric tube confirmed in adequate position after abdominal plain film performed noting no obvious retained foreign bodies. This concluded our procedure, the patient was transferred back to this hospital bed to be transported to the ICU in stable condition. There were no immediate complications. All sponge, needle, and instrument counts were reported as correct at the end of the case. Dr. Speedy Mills was present and scrubbed for the entire procedure. Electronically signed by Sylvia Echols DO on 9/23/2022 at 2:01 PM          Trauma Attending Attestation      I have reviewed the above [facility] note(s) and confirmed the key elements of the medical history and physical exam. I have seen and examined the pt. I have discussed the findings, established the care plan and recommendations with Resident.       Letha Parrish DO  9/24/2022  8:26 AM

## 2022-09-23 NOTE — PROGRESS NOTES
Physical Therapy        Physical Therapy Cancel Note      DATE: 2022    NAME: Paulie Guillermo  MRN: [id number]   : 1955      Patient not seen this date for Physical Therapy due to:    Surgery/Procedure: Pt currently off the floor in the OR, Will check back tomorrow.        Electronically signed by Steffan Hashimoto, PTA on 2022 at 1:03 PM

## 2022-09-23 NOTE — PROGRESS NOTES
Margherita Goltz Chipper Aver, MD FACS   Urology Progress Note     Subjective:   Pt taken ystdy to OR and was found to have R and L ureteral defect that was closed   Conduit appeared ok  Per general surgery dusky and hyperemic proximal small bowel, small bowel folded on itself stuck under ureter   Left wound open and plan for OR relook today   Intubated sedated  UOP 10/hr overnight  Pressures 80s sys not on pressors     Cr 2.3(2.2)   WBC 17(15)   Hgb 9(9)     Patient Vitals for the past 24 hrs:   BP Temp Temp src Pulse Resp SpO2 Weight   09/23/22 0600 (!) 93/54 -- -- 81 16 100 % 254 lb 3.1 oz (115.3 kg)   09/23/22 0500 (!) 79/51 -- -- 80 16 100 % --   09/23/22 0400 120/64 -- Esophageal 83 14 100 % --   09/23/22 0333 -- -- -- 81 16 100 % --   09/23/22 0300 (!) 84/55 -- -- 81 16 100 % --   09/23/22 0200 (!) 94/59 -- -- 90 16 100 % --   09/23/22 0100 83/60 -- -- 84 16 100 % --   09/23/22 0000 (!) 82/57 -- Esophageal 86 16 100 % --   09/22/22 2324 -- -- -- 88 16 100 % --   09/22/22 2300 99/85 -- -- 94 19 98 % --   09/22/22 2200 (!) 94/59 -- -- 83 16 100 % --   09/22/22 2100 (!) 82/57 -- -- 87 16 100 % --   09/22/22 2000 115/65 97.8 °F (36.6 °C) Oral 88 16 100 % --   09/22/22 1941 -- -- -- 88 16 100 % --   09/22/22 1800 101/66 -- -- 90 16 98 % --   09/22/22 1700 (!) 145/77 -- -- 99 16 100 % --   09/22/22 1600 (!) 153/105 98.6 °F (37 °C) Oral (!) 103 22 99 % --   09/22/22 1510 -- -- -- 94 19 100 % --   09/22/22 1500 (!) 179/96 98.5 °F (36.9 °C) Oral 95 18 100 % --   09/22/22 1445 (!) 168/104 -- -- 95 16 100 % --   09/22/22 1424 -- -- -- 87 18 100 % --   09/22/22 1119 (!) 165/83 96.8 °F (36 °C) Temporal 97 22 96 % --   09/22/22 0830 137/78 97.4 °F (36.3 °C) Oral 97 18 94 % --       Intake/Output Summary (Last 24 hours) at 9/23/2022 0744  Last data filed at 9/23/2022 0649  Gross per 24 hour   Intake 1974.57 ml   Output 1831 ml   Net 143.57 ml       Recent Labs     09/22/22  1446 09/22/22  1802 09/23/22  0349   WBC 13.6* 14.8* 16.8*   HGB 10.2* 9.3* 9.4*   HCT 33.8* 28.8* 29.6*   MCV 92.6 90.3 91.6    141 196     Recent Labs     09/22/22  0525 09/22/22  1446 09/22/22  1554 09/22/22  1802 09/23/22  0349   * 142  --  142 141   K 3.7 4.2  --  4.0 4.5   * 112*  --  112* 112*   CO2 25 20  --  20 21   PHOS 2.6  --   --  2.6 3.1   BUN 39* 38*  --  39* 40*   CREATININE 2.45* 2.48* 2.23* 2.19* 2.31*       No results for input(s): COLORU, PHUR, LABCAST, WBCUA, RBCUA, MUCUS, TRICHOMONAS, YEAST, BACTERIA, CLARITYU, SPECGRAV, LEUKOCYTESUR, UROBILINOGEN, BILIRUBINUR, BLOODU in the last 72 hours. Invalid input(s): NITRATE, GLUCOSEUKETONESUAMORPHOUS      Additional Lab/culture results:    Physical Exam:   Intubated sedated   HEENT: NG tube in place  Neck: Supple   Chest: bilateral symmetrical chest rise MV FiO2 60    Circulatory: Peripheries warm , well perfused   P/A: soft, clean, dry and intact with skin glue, ostomy pink   Abthera in place   Extremities: No edema/calf tenderness    Interval Imaging Findings: none    Impression:  none    Jason Peace is a 79-year-old male with bladder cancer,     robotic assisted laparoscopic cystoprostatectomy with ileal conduit creation on 9/16/2022     Ex lap ROLANDO repair of BL ureteral anastamosis and abd washout with abthera due to ureteral anastamotic leaks and SBO      Plan:   Diet: Continue on n.p.o. -NG tube in place, appreciate general surgery management  Plan for relook today with general surgery   Continue intubation sedation  May require pressors if BP does not get better   Trend Cr   IV fluids: Continue IV fluids. SQH DVT PPX  Antibiotics  Drain output : continue to monitor; proceed with bilateral percutaneous nephrostomy tube to divert urine away from ureterointestinal anastomoses. Not appropriate for discharge    Dayne Tinoco MD, PGY-3  7:44 AM 9/23/2022    I have reviewed the history above and agree.    I have reviewed all laboratory findings and imaging reports/films. I agree with the plan as noted above.     Electronically signed by Joey Grimm MD on 9/23/2022 at 1:58 PM

## 2022-09-23 NOTE — PROGRESS NOTES
Pharmacy Note     Renal Dose Adjustment    Ruby Espinoza is a 74 y.o. male. Pharmacist assessment of renally cleared medications. Recent Labs     09/22/22  1802 09/23/22  0349   BUN 39* 40*       Recent Labs     09/22/22  1802 09/23/22  0349   CREATININE 2.19* 2.31*       Estimated Creatinine Clearance: 43 mL/min (A) (based on SCr of 2.31 mg/dL (H)).     Height:   Ht Readings from Last 1 Encounters:   09/17/22 6' 2.02\" (1.88 m)     Weight:  Wt Readings from Last 1 Encounters:   09/23/22 254 lb 3.1 oz (115.3 kg)       The following medication dose has been adjusted based upon renal function per P&T Guidelines:             Metoclopramide 10 mg IV q6h --> Metoclopramide 5 mg IV q6h  Naloxegol 25 mg daily --> Naloxegol 12.5 mg daily     David Morse, PharmD, 9/23/2022 11:34 AM

## 2022-09-23 NOTE — PLAN OF CARE
Problem: Discharge Planning  Goal: Discharge to home or other facility with appropriate resources  Outcome: Progressing     Problem: Pain  Goal: Verbalizes/displays adequate comfort level or baseline comfort level  Outcome: Progressing  Flowsheets (Taken 9/22/2022 1600 by Ernst Carrasco RN)  Verbalizes/displays adequate comfort level or baseline comfort level:   Encourage patient to monitor pain and request assistance   Assess pain using appropriate pain scale   Administer analgesics based on type and severity of pain and evaluate response   Implement non-pharmacological measures as appropriate and evaluate response   Consider cultural and social influences on pain and pain management   Notify Licensed Independent Practitioner if interventions unsuccessful or patient reports new pain     Problem: Safety - Adult  Goal: Free from fall injury  Outcome: Progressing     Problem: ABCDS Injury Assessment  Goal: Absence of physical injury  Outcome: Progressing     Problem: Nutrition Deficit:  Goal: Optimize nutritional status  Outcome: Progressing  Flowsheets (Taken 9/22/2022 1439 by Lexie Bush RD)  Nutrient intake appropriate for improving, restoring, or maintaining nutritional needs: Assess nutritional status and recommend course of action     Problem: Skin/Tissue Integrity  Goal: Absence of new skin breakdown  Description: 1. Monitor for areas of redness and/or skin breakdown  2. Assess vascular access sites hourly  3. Every 4-6 hours minimum:  Change oxygen saturation probe site  4. Every 4-6 hours:  If on nasal continuous positive airway pressure, respiratory therapy assess nares and determine need for appliance change or resting period. Outcome: Progressing     Problem: Safety - Medical Restraint  Goal: Remains free of injury from restraints (Restraint for Interference with Medical Device)  Description: INTERVENTIONS:  1.  Determine that other, less restrictive measures have been tried or would not be effective before applying the restraint  2. Evaluate the patient's condition at the time of restraint application  3. Inform patient/family regarding the reason for restraint  4.  Q2H: Monitor safety, psychosocial status, comfort, nutrition and hydration  Outcome: Progressing

## 2022-09-23 NOTE — PROGRESS NOTES
Renal Progress Note    Patient :  Issa Ugarte; 74 y.o. MRN# [id number]  Location:  4076/0123-38  Attending:  Graciella Severe, MD  Admit Date:  9/16/2022   Hospital Day: 7    Subjective:     Events of the last 24 hours noted. Went to the OR yesterday was found to have small bowel kinked under the ureters, there was some concern about compromised blood flow. There was also concern about anastomotic ureteral leak at the site of ileal conduit insertion, which was repaired. Wound VAC applied. Patient is planned for reexploration today to see the viability of small bowel mucosa. In the meantime NG tube in place draining about 10 to 20 mm an hour. Urine output has decreased significantly from the ileal conduit. Creatinine is gone up to 2.3. Hemodynamically stable although requiring fluid resuscitation. Currently on LR at 175 an hour. ABG reviewed, pH 7.45 PCO2 31 PO2 100 bicarbonate 22. Discussed the possibility that he may need renal replacement therapy and he is agreeable. History reviewed known history of chronic kidney disease stage IIIb baseline 1.3-1.4 secondary to chronic interstitial nephritis both from obstructive uropathy related to bladder tumor and chemotherapy with platinum-based analogs. He has seen me in the office before. Was brought into the hospital for elective cystoprostatectomy which was done on 9/16/2022 also underwent bilateral pelvic lymphadenectomy and ileal conduit formation. Postoperatively has developed small bowel ileus, which has persisted and has been refractory to conservative treatment. Outpatient Medications:     Medications Prior to Admission: ondansetron (ZOFRAN-ODT) 8 MG TBDP disintegrating tablet, DISSOLVE 1 TABLET IN MOUTH EVERY 8 HOURS AS NEEDED FOR NAUSEA FOR VOMITING  HYDROcodone-acetaminophen (NORCO) 5-325 MG per tablet, Take 1 tablet by mouth every 8 hours as needed for Pain for up to 30 days.   rivaroxaban (XARELTO) 20 MG TABS tablet, Take 1 tablet by mouth once daily with breakfast (Patient taking differently: Take 1 tablet by mouth once daily with breakfast/ per cardiology, pt. to stop 3 days pre-op for OR 9-16-22)  omeprazole (PRILOSEC) 20 MG delayed release capsule, Take 1 capsule by mouth daily  carvedilol (COREG) 3.125 MG tablet, TAKE 1 TABLET BY MOUTH TWICE DAILY  finasteride (PROSCAR) 5 MG tablet, Take 5 mg by mouth daily  tamsulosin (FLOMAX) 0.4 MG capsule, Take 0.4 mg by mouth daily  simvastatin (ZOCOR) 40 MG tablet, Take 40 mg by mouth nightly  buPROPion (WELLBUTRIN XL) 300 MG extended release tablet, Take 300 mg by mouth every morning    Current Medications:     Scheduled Meds:    sodium chloride  1,000 mL IntraVENous Once    sodium chloride flush  5-40 mL IntraVENous 2 times per day    chlorhexidine  15 mL Mouth/Throat BID    metoclopramide  10 mg IntraVENous Q6H    bisacodyl  10 mg Rectal Daily    pantoprazole (PROTONIX) 40 mg injection  40 mg IntraVENous Q12H    polyethylene glycol  17 g Oral Daily    cefepime  2,000 mg IntraVENous Q12H    heparin (porcine)  5,000 Units SubCUTAneous BID    naloxegol  25 mg Oral Daily    buPROPion  300 mg Oral QAM    carvedilol  3.125 mg Oral BID    simvastatin  40 mg Oral Nightly    sodium chloride flush  5-40 mL IntraVENous 2 times per day    acetaminophen  650 mg Oral Q6H     Continuous Infusions:    sodium chloride      propofol 5 mcg/kg/min (09/23/22 0649)    fentaNYL 50 mcg/mL 175 mcg/hr (09/23/22 0649)    dexmedetomidine 0.4 mcg/kg/hr (09/23/22 0649)    lactated ringers 125 mL/hr at 09/23/22 0649    sodium chloride       PRN Meds:  sodium chloride flush, sodium chloride, fentanNYL, fentanNYL, ondansetron, diphenhydrAMINE, oxyCODONE, diatrizoate meglumine-sodium, metoprolol, promethazine, HYDROmorphone, naloxone, ondansetron, ondansetron, sodium chloride flush, sodium chloride, ondansetron **OR** ondansetron, oxyCODONE **OR** oxyCODONE    Input/Output:       I/O last 3 completed shifts:   In: 1974.6 [I.V.:1974.6]  Out: 9502 [Urine:131; Emesis/NG output:2300; Drains:1700; Blood:100]    Vital Signs:   Temperature:  Temp: 97.8 °F (36.6 °C)  TMax:   Temp (24hrs), Av.9 °F (36.6 °C), Min:96.8 °F (36 °C), Max:98.6 °F (37 °C)    Respirations:  Resp: 16  Pulse:   Heart Rate: 79  BP:    BP: (!) 93/54  BP Range: Systolic (88DHQ), KVW:071 , Min:79 , DZO:650       Diastolic (80LPP), YGL:29, Min:51, Max:105      Physical Examination:     General:  Sedated on ventilator. FiO2 30% with a PEEP of 10  HEENT:  Atraumatic, normocephalic, no throat congestion, moist mucosa. Eyes:   Pupils equal, round and reactive to light, pallor, no icterus. Neck:   No JVD, no thyromegaly, no lymphadenopathy. Chest:  Air entry fair bilaterally, no crackles  Cardiac: S1 S2 RR no murmurs  Abdomen:  Soft, non-tender, no masses or organomegally appreciable, BS sluggish. :   No suprapubic or flank tenderness. Neuro:  Sedated on ventilator. Skin:   No rashes, good skin turgor. Extremities:  No edema, palpable peripheral pulses, no cyanosis, no calf tenderness. Labs:       Recent Labs     22  1446 22  0349   WBC 13.6* 14.8* 16.8*   RBC 3.65* 3.19* 3.23*   HGB 10.2* 9.3* 9.4*   HCT 33.8* 28.8* 29.6*   MCV 92.6 90.3 91.6   MCH 27.9 29.2 29.1   MCHC 30.2 32.3 31.8   RDW 15.2* 15.1* 15.1*    141 196   MPV 9.8 10.5 10.0      BMP:   Recent Labs     22  1446 22  1554 22  18022  0349     --  142 141   K 4.2  --  4.0 4.5   *  --  112* 112*   CO2 20  --  20 21   BUN 38*  --  39* 40*   CREATININE 2.48* 2.23* 2.19* 2.31*   GLUCOSE 108*  --  142* 94   CALCIUM 8.5*  --  8.2* 8.0*      Phosphorus:     Recent Labs     22  0525 22  18022  0349   PHOS 2.6 2.6 3.1     Magnesium:    Recent Labs     22  0525 22  18022  0349   MG 2.2 2.1 2.0     Albumin:  No results for input(s): LABALBU in the last 72 hours.   BNP:    No results found for: BNP  LISS:      Lab Results   Component Value Date/Time    LISS NEGATIVE 05/31/2022 10:45 AM     SPEP:  Lab Results   Component Value Date/Time    PROT 6.6 09/08/2022 01:46 PM     UPEP:   No results found for: LABPE  C3:     Lab Results   Component Value Date/Time    C3 119 05/31/2022 10:45 AM     C4:     Lab Results   Component Value Date/Time    C4 25 05/31/2022 10:45 AM     MPO ANCA:   No results found for: MPO  PR3 ANCA:   No results found for: PR3  Anti-GBM:   No results found for: GBMABIGG  Hep BsAg:       No results found for: HEPBSAG  Hep C AB:        No results found for: HEPCAB    Urinalysis/Chemistries:      Lab Results   Component Value Date/Time    NITRU POSITIVE 09/18/2022 05:17 PM    COLORU Yellow 09/18/2022 05:17 PM    PHUR 6.0 09/18/2022 05:17 PM    WBCUA 50  09/18/2022 05:17 PM    RBCUA TOO NUMEROUS TO COUNT 09/18/2022 05:17 PM    MUCUS 3+ 04/14/2022 09:43 AM    YEAST FEW 03/31/2022 01:01 PM    BACTERIA MODERATE 09/18/2022 05:17 PM    CLARITYU cloudy 08/04/2022 09:05 AM    SPECGRAV 1.016 09/18/2022 05:17 PM    LEUKOCYTESUR LARGE 09/18/2022 05:17 PM    UROBILINOGEN Normal 09/18/2022 05:17 PM    BILIRUBINUR NEGATIVE 09/18/2022 05:17 PM    BLOODU large 08/04/2022 09:05 AM    GLUCOSEU NEGATIVE 09/18/2022 05:17 PM    KETUA TRACE 09/18/2022 05:17 PM     Urine Sodium:   No results found for: ИРИНА  Urine Potassium:  No results found for: KUR  Urine Chloride:  No results found for: CLUR  Urine Osmolarity: No results found for: OSMOU  Urine Protein:   No results found for: TPU  Urine Creatinine:     Lab Results   Component Value Date/Time    LABCREA 121.3 09/18/2022 05:17 PM     Urine Eosinophils:  No components found for: UEOS    Radiology:     CXR:     Assessment:     1. Acute Kidney Injury: Secondary to ischemic ATN from depleted circulating volume related to small bowel obstruction ileus, sequestration, peritoneal inflammation from suspected anastomotic leak.   Baseline 1.3-1.4 postoperatively had gone up to 2.5, came down to 1.5, however in the past 2 to 3 days has been worsening and now up to 2.3 urine output has declined significantly, 10-20 mill an hour for more than 24 hours, acute kidney injury stage II. 2.  Small bowel obstruction related to dynamic causes, bowel was stuck behind the ureter which was extricated yesterday. Some suspicion of ischemia. 3.  Chronic kidney disease stage IIIb from chronic intestinal nephritis both from chemotherapy with platinum and bladder tumor causing obstructive uropathy, baseline 1.3-1.4  4. Bladder cancer failed local therapy status for cystoprostatectomy and ileal conduit formation 9/16/2022  5. Cardiomyopathy ejection fraction 40%    Plan:   1. Continue lactated Ringer's at 175 mill an hour  2. Keep systolic pressure more than 110  3. Await general surgical plans about reexploration and assessment of bowel ischemia  4. Would recommend patient get a temporary dialysis catheter placed if he is going to the OR in case he needs it over the weekend. 5.  Is a possibility he might need renal placement therapy same was discussed with him and he is agreeable  6. We will follow with you    Nutrition   Please ensure that patient is on a renal diet/TF. Avoid nephrotoxic drugs/contrast exposure. We will continue to follow along with you.

## 2022-09-24 ENCOUNTER — APPOINTMENT (OUTPATIENT)
Dept: GENERAL RADIOLOGY | Age: 67
DRG: 653 | End: 2022-09-24
Attending: SPECIALIST
Payer: MEDICARE

## 2022-09-24 LAB
ALLEN TEST: ABNORMAL
ANION GAP SERPL CALCULATED.3IONS-SCNC: 8 MMOL/L (ref 9–17)
BUN BLDV-MCNC: 46 MG/DL (ref 8–23)
CALCIUM SERPL-MCNC: 7.5 MG/DL (ref 8.6–10.4)
CHLORIDE BLD-SCNC: 114 MMOL/L (ref 98–107)
CO2: 20 MMOL/L (ref 20–31)
CREAT SERPL-MCNC: 2.47 MG/DL (ref 0.7–1.2)
CULTURE: NORMAL
FIO2: 40
GFR AFRICAN AMERICAN: 32 ML/MIN
GFR NON-AFRICAN AMERICAN: 26 ML/MIN
GFR SERPL CREATININE-BSD FRML MDRD: ABNORMAL ML/MIN/{1.73_M2}
GLUCOSE BLD-MCNC: 89 MG/DL (ref 74–100)
GLUCOSE BLD-MCNC: 99 MG/DL (ref 70–99)
HCT VFR BLD CALC: 25.8 % (ref 40.7–50.3)
HCT VFR BLD CALC: 26.9 % (ref 40.7–50.3)
HEMOGLOBIN: 8 G/DL (ref 13–17)
HEMOGLOBIN: 8.1 G/DL (ref 13–17)
LACTIC ACID, WHOLE BLOOD: 0.9 MMOL/L (ref 0.7–2.1)
MAGNESIUM: 1.8 MG/DL (ref 1.6–2.6)
MCH RBC QN AUTO: 28 PG (ref 25.2–33.5)
MCH RBC QN AUTO: 28.1 PG (ref 25.2–33.5)
MCHC RBC AUTO-ENTMCNC: 30.1 G/DL (ref 28.4–34.8)
MCHC RBC AUTO-ENTMCNC: 31 G/DL (ref 28.4–34.8)
MCV RBC AUTO: 90.2 FL (ref 82.6–102.9)
MCV RBC AUTO: 93.4 FL (ref 82.6–102.9)
NEGATIVE BASE EXCESS, ART: 4 (ref 0–2)
NRBC AUTOMATED: 0.1 PER 100 WBC
NRBC AUTOMATED: 0.1 PER 100 WBC
O2 DEVICE/FLOW/%: ABNORMAL
PATIENT TEMP: 37.1
PDW BLD-RTO: 15.3 % (ref 11.8–14.4)
PDW BLD-RTO: 15.4 % (ref 11.8–14.4)
PHOSPHORUS: 4.4 MG/DL (ref 2.5–4.5)
PLATELET # BLD: 193 K/UL (ref 138–453)
PLATELET # BLD: 201 K/UL (ref 138–453)
PMV BLD AUTO: 9.6 FL (ref 8.1–13.5)
PMV BLD AUTO: 9.8 FL (ref 8.1–13.5)
POC HCO3: 20.4 MMOL/L (ref 21–28)
POC O2 SATURATION: 99 % (ref 94–98)
POC PCO2: 33 MM HG (ref 35–48)
POC PH: 7.4 (ref 7.35–7.45)
POC PO2: 151.9 MM HG (ref 83–108)
POTASSIUM SERPL-SCNC: 4.5 MMOL/L (ref 3.7–5.3)
RBC # BLD: 2.86 M/UL (ref 4.21–5.77)
RBC # BLD: 2.88 M/UL (ref 4.21–5.77)
SAMPLE SITE: ABNORMAL
SODIUM BLD-SCNC: 142 MMOL/L (ref 135–144)
SPECIMEN DESCRIPTION: NORMAL
WBC # BLD: 19.7 K/UL (ref 3.5–11.3)
WBC # BLD: 20.4 K/UL (ref 3.5–11.3)

## 2022-09-24 PROCEDURE — 84100 ASSAY OF PHOSPHORUS: CPT

## 2022-09-24 PROCEDURE — 80048 BASIC METABOLIC PNL TOTAL CA: CPT

## 2022-09-24 PROCEDURE — 99232 SBSQ HOSP IP/OBS MODERATE 35: CPT | Performed by: INTERNAL MEDICINE

## 2022-09-24 PROCEDURE — 36415 COLL VENOUS BLD VENIPUNCTURE: CPT

## 2022-09-24 PROCEDURE — 74018 RADEX ABDOMEN 1 VIEW: CPT

## 2022-09-24 PROCEDURE — 2000000000 HC ICU R&B

## 2022-09-24 PROCEDURE — 6370000000 HC RX 637 (ALT 250 FOR IP): Performed by: STUDENT IN AN ORGANIZED HEALTH CARE EDUCATION/TRAINING PROGRAM

## 2022-09-24 PROCEDURE — 83605 ASSAY OF LACTIC ACID: CPT

## 2022-09-24 PROCEDURE — 2580000003 HC RX 258: Performed by: STUDENT IN AN ORGANIZED HEALTH CARE EDUCATION/TRAINING PROGRAM

## 2022-09-24 PROCEDURE — 83735 ASSAY OF MAGNESIUM: CPT

## 2022-09-24 PROCEDURE — 6360000002 HC RX W HCPCS: Performed by: STUDENT IN AN ORGANIZED HEALTH CARE EDUCATION/TRAINING PROGRAM

## 2022-09-24 PROCEDURE — 82803 BLOOD GASES ANY COMBINATION: CPT

## 2022-09-24 PROCEDURE — 94761 N-INVAS EAR/PLS OXIMETRY MLT: CPT

## 2022-09-24 PROCEDURE — 71045 X-RAY EXAM CHEST 1 VIEW: CPT

## 2022-09-24 PROCEDURE — 2500000003 HC RX 250 WO HCPCS: Performed by: STUDENT IN AN ORGANIZED HEALTH CARE EDUCATION/TRAINING PROGRAM

## 2022-09-24 PROCEDURE — 2700000000 HC OXYGEN THERAPY PER DAY

## 2022-09-24 PROCEDURE — C9113 INJ PANTOPRAZOLE SODIUM, VIA: HCPCS | Performed by: STUDENT IN AN ORGANIZED HEALTH CARE EDUCATION/TRAINING PROGRAM

## 2022-09-24 PROCEDURE — 37799 UNLISTED PX VASCULAR SURGERY: CPT

## 2022-09-24 PROCEDURE — 94003 VENT MGMT INPAT SUBQ DAY: CPT

## 2022-09-24 PROCEDURE — 85027 COMPLETE CBC AUTOMATED: CPT

## 2022-09-24 PROCEDURE — 82947 ASSAY GLUCOSE BLOOD QUANT: CPT

## 2022-09-24 RX ORDER — HEPARIN SODIUM 5000 [USP'U]/ML
5000 INJECTION, SOLUTION INTRAVENOUS; SUBCUTANEOUS 3 TIMES DAILY
Status: DISCONTINUED | OUTPATIENT
Start: 2022-09-24 | End: 2022-09-26

## 2022-09-24 RX ADMIN — SIMVASTATIN 40 MG: 40 TABLET, FILM COATED ORAL at 20:18

## 2022-09-24 RX ADMIN — HEPARIN SODIUM 5000 UNITS: 5000 INJECTION INTRAVENOUS; SUBCUTANEOUS at 14:58

## 2022-09-24 RX ADMIN — OXYCODONE 10 MG: 5 TABLET ORAL at 12:50

## 2022-09-24 RX ADMIN — WATER 2000 MG: 1 INJECTION INTRAMUSCULAR; INTRAVENOUS; SUBCUTANEOUS at 20:18

## 2022-09-24 RX ADMIN — ACETAMINOPHEN 650 MG: 325 TABLET ORAL at 08:31

## 2022-09-24 RX ADMIN — DEXMEDETOMIDINE HYDROCHLORIDE 0.4 MCG/KG/HR: 4 INJECTION, SOLUTION INTRAVENOUS at 07:38

## 2022-09-24 RX ADMIN — ACETAMINOPHEN 650 MG: 325 TABLET ORAL at 20:18

## 2022-09-24 RX ADMIN — Medication 125 MCG/HR: at 06:00

## 2022-09-24 RX ADMIN — SODIUM CHLORIDE, PRESERVATIVE FREE 40 MG: 5 INJECTION INTRAVENOUS at 20:18

## 2022-09-24 RX ADMIN — SODIUM CHLORIDE, POTASSIUM CHLORIDE, SODIUM LACTATE AND CALCIUM CHLORIDE 1000 ML: 600; 310; 30; 20 INJECTION, SOLUTION INTRAVENOUS at 00:32

## 2022-09-24 RX ADMIN — SODIUM CHLORIDE, PRESERVATIVE FREE 10 ML: 5 INJECTION INTRAVENOUS at 08:24

## 2022-09-24 RX ADMIN — SODIUM CHLORIDE, PRESERVATIVE FREE 40 MG: 5 INJECTION INTRAVENOUS at 08:23

## 2022-09-24 RX ADMIN — HEPARIN SODIUM 5000 UNITS: 5000 INJECTION INTRAVENOUS; SUBCUTANEOUS at 08:23

## 2022-09-24 RX ADMIN — OXYCODONE 10 MG: 5 TABLET ORAL at 20:22

## 2022-09-24 RX ADMIN — NALOXEGOL OXALATE 12.5 MG: 12.5 TABLET, FILM COATED ORAL at 08:30

## 2022-09-24 RX ADMIN — BISACODYL 10 MG: 10 SUPPOSITORY RECTAL at 08:23

## 2022-09-24 RX ADMIN — ACETAMINOPHEN 650 MG: 325 TABLET ORAL at 16:23

## 2022-09-24 RX ADMIN — METOCLOPRAMIDE 5 MG: 5 INJECTION, SOLUTION INTRAMUSCULAR; INTRAVENOUS at 07:39

## 2022-09-24 RX ADMIN — CARVEDILOL 3.12 MG: 3.12 TABLET, FILM COATED ORAL at 20:18

## 2022-09-24 RX ADMIN — POLYETHYLENE GLYCOL 3350 17 G: 17 POWDER, FOR SOLUTION ORAL at 08:30

## 2022-09-24 RX ADMIN — CHLORHEXIDINE GLUCONATE 0.12% ORAL RINSE 15 ML: 1.2 LIQUID ORAL at 08:23

## 2022-09-24 RX ADMIN — WATER 2000 MG: 1 INJECTION INTRAMUSCULAR; INTRAVENOUS; SUBCUTANEOUS at 08:40

## 2022-09-24 RX ADMIN — METOCLOPRAMIDE 5 MG: 5 INJECTION, SOLUTION INTRAMUSCULAR; INTRAVENOUS at 02:00

## 2022-09-24 RX ADMIN — OXYCODONE 10 MG: 5 TABLET ORAL at 07:34

## 2022-09-24 RX ADMIN — BUPROPION HYDROCHLORIDE 300 MG: 150 TABLET, EXTENDED RELEASE ORAL at 08:30

## 2022-09-24 RX ADMIN — HEPARIN SODIUM 5000 UNITS: 5000 INJECTION INTRAVENOUS; SUBCUTANEOUS at 20:18

## 2022-09-24 ASSESSMENT — PAIN SCALES - GENERAL
PAINLEVEL_OUTOF10: 4
PAINLEVEL_OUTOF10: 3
PAINLEVEL_OUTOF10: 8
PAINLEVEL_OUTOF10: 5
PAINLEVEL_OUTOF10: 6
PAINLEVEL_OUTOF10: 7

## 2022-09-24 ASSESSMENT — PULMONARY FUNCTION TESTS
PIF_VALUE: 0

## 2022-09-24 ASSESSMENT — PAIN DESCRIPTION - ORIENTATION
ORIENTATION: RIGHT;LOWER

## 2022-09-24 ASSESSMENT — PAIN DESCRIPTION - LOCATION
LOCATION: ABDOMEN

## 2022-09-24 ASSESSMENT — PAIN DESCRIPTION - DESCRIPTORS
DESCRIPTORS: ACHING

## 2022-09-24 ASSESSMENT — PAIN DESCRIPTION - PAIN TYPE
TYPE: SURGICAL PAIN

## 2022-09-24 ASSESSMENT — PAIN DESCRIPTION - ONSET
ONSET: ON-GOING

## 2022-09-24 ASSESSMENT — PAIN - FUNCTIONAL ASSESSMENT
PAIN_FUNCTIONAL_ASSESSMENT: PREVENTS OR INTERFERES SOME ACTIVE ACTIVITIES AND ADLS

## 2022-09-24 ASSESSMENT — PAIN DESCRIPTION - FREQUENCY
FREQUENCY: CONTINUOUS

## 2022-09-24 NOTE — PROGRESS NOTES
Faustino Hurtado MD [facility]   Urology Progress Note     Subjective:   Afebrile with soft pressure on art line  Went to OR two days ago  Intubated, weaning off precedex  Planning for extubation today   UOP left neph tube: 325cc/24 hours  Right neph tube: 110cc/24hours  Ileal conduit: 125cc/24hours  MERLIN drain upper 240cc  MERLIN drain lower 370cc  Cr 2.47 (2.36)  WBC 19.7 (18.5)  Hgb 8.1 (9.6)    Patient Vitals for the past 24 hrs:   BP Temp Temp src Pulse Resp SpO2 Weight   09/24/22 0930 -- -- -- 84 (P) 12 100 % --   09/24/22 0900 -- -- -- 90 12 100 % --   09/24/22 0830 -- -- -- 83 11 100 % --   09/24/22 0820 -- -- -- 84 12 100 % --   09/24/22 0817 -- -- -- 85 12 100 % --   09/24/22 0812 -- -- -- 85 12 100 % --   09/24/22 0800 -- 98.2 °F (36.8 °C) Esophageal 80 11 100 % --   09/24/22 0750 -- -- -- 81 16 -- --   09/24/22 0745 -- -- -- 82 16 -- --   09/24/22 0730 -- -- -- 91 16 100 % --   09/24/22 0700 -- -- -- 89 16 100 % --   09/24/22 0615 -- -- -- 79 (!) 0 100 % --   09/24/22 0600 -- -- -- 85 (!) 0 100 % 262 lb 2 oz (118.9 kg)   09/24/22 0545 -- -- -- 79 (!) 0 100 % --   09/24/22 0530 -- -- -- 80 (!) 0 100 % --   09/24/22 0515 -- -- -- 79 (!) 0 100 % --   09/24/22 0500 -- -- -- 82 (!) 0 100 % --   09/24/22 0445 -- -- -- 81 (!) 0 100 % --   09/24/22 0430 -- -- -- 82 (!) 0 100 % --   09/24/22 0421 -- -- -- 86 22 100 % --   09/24/22 0415 -- -- -- 83 (!) 0 100 % --   09/24/22 0400 -- -- Esophageal 86 (!) 0 100 % --   09/24/22 0345 -- -- -- 84 (!) 0 100 % --   09/24/22 0330 -- -- -- 78 (!) 0 100 % --   09/24/22 0315 -- -- -- 80 (!) 0 99 % --   09/24/22 0300 -- -- -- 83 17 100 % --   09/24/22 0245 -- -- -- 93 17 100 % --   09/24/22 0230 -- -- -- 91 19 (!) 85 % --   09/24/22 0215 -- -- -- 82 (!) 0 94 % --   09/24/22 0200 -- -- -- 80 11 91 % --   09/24/22 0145 -- -- -- 83 (!) 6 96 % --   09/24/22 0130 -- -- -- 85 (!) 2 92 % --   09/24/22 0115 -- -- -- 86 (!) 7 95 % --   09/24/22 0100 -- -- -- 84 16 98 % --   09/24/22 0045 -- -- -- 86 (!) 0 98 % --   09/24/22 0030 -- -- -- 92 (!) 0 97 % --   09/24/22 0015 -- -- -- 91 (!) 0 99 % --   09/24/22 0000 -- -- Esophageal 91 (!) 0 99 % --   09/23/22 2345 -- -- -- 96 -- 94 % --   09/23/22 2336 -- -- -- (!) 104 22 97 % --   09/23/22 2330 -- -- -- 91 -- 98 % --   09/23/22 2315 -- -- -- 91 -- 100 % --   09/23/22 2300 -- -- -- 91 16 -- --   09/23/22 2245 -- -- -- 91 -- -- --   09/23/22 2230 -- -- -- 89 -- -- --   09/23/22 2215 -- -- -- 94 -- 97 % --   09/23/22 2200 -- -- -- 91 16 97 % --   09/23/22 2145 -- -- -- 88 -- -- --   09/23/22 2130 -- -- -- 90 -- -- --   09/23/22 2115 -- -- -- 91 -- 98 % --   09/23/22 2100 -- -- -- 90 16 96 % --   09/23/22 2045 -- -- -- 89 -- 99 % --   09/23/22 2031 -- -- -- 88 16 99 % --   09/23/22 2030 -- -- -- 89 -- 100 % --   09/23/22 2015 -- -- -- 93 -- 96 % --   09/23/22 2000 -- -- Esophageal 95 16 98 % --   09/23/22 1900 -- -- -- -- 16 -- --   09/23/22 1830 -- -- -- 84 16 95 % --   09/23/22 1825 -- -- -- 85 16 100 % --   09/23/22 1820 -- -- -- 85 16 100 % --   09/23/22 1815 -- -- -- 85 16 100 % --   09/23/22 1810 -- -- -- 85 16 100 % --   09/23/22 1805 -- -- -- 87 16 100 % --   09/23/22 1800 -- -- -- 86 16 100 % --   09/23/22 1755 -- -- -- 87 16 100 % --   09/23/22 1750 -- -- -- 85 16 100 % --   09/23/22 1745 -- -- -- 87 16 100 % --   09/23/22 1740 -- -- -- 86 16 100 % --   09/23/22 1735 -- -- -- 87 16 100 % --   09/23/22 1730 -- -- -- 89 16 100 % --   09/23/22 1725 -- -- -- 88 16 100 % --   09/23/22 1720 -- -- -- 90 16 100 % --   09/23/22 1715 -- -- -- 95 16 100 % --   09/23/22 1710 -- -- -- 99 16 100 % --   09/23/22 1705 -- -- -- 100 16 100 % --   09/23/22 1600 -- 98.4 °F (36.9 °C) -- 93 16 100 % --   09/23/22 1533 -- -- -- 94 -- 100 % --   09/23/22 1500 126/71 98.4 °F (36.9 °C) -- 100 16 -- --   09/23/22 1429 133/71 -- -- (!) 101 -- 100 % --   09/23/22 1115 (!) 89/51 -- -- 75 16 100 % --   09/23/22 1100 114/61 -- -- 85 22 100 % --   09/23/22 1045 (!) 108/53 -- -- 77 16 100 % --         Intake/Output Summary (Last 24 hours) at 9/24/2022 1037  Last data filed at 9/24/2022 0956  Gross per 24 hour   Intake 2802.41 ml   Output 1425 ml   Net 1377.41 ml         Recent Labs     09/23/22  0349 09/23/22  1225 09/23/22  1436 09/24/22  0547   WBC 16.8*  --  18.5* 19.7*   HGB 9.4* 11.6* 9.6* 8.1*   HCT 29.6* 35.8* 31.6* 26.9*   MCV 91.6  --  93.8 93.4     --  235 201       Recent Labs     09/23/22  0349 09/23/22  1225 09/23/22  1436 09/24/22  0547    146* 143 142   K 4.5 4.3 4.6 4.5   *  --  114* 114*   CO2 21  --  21 20   PHOS 3.1  --  4.6* 4.4   BUN 40*  --  46* 46*   CREATININE 2.31*  --  2.36* 2.47*         No results for input(s): COLORU, PHUR, LABCAST, WBCUA, RBCUA, MUCUS, TRICHOMONAS, YEAST, BACTERIA, CLARITYU, SPECGRAV, LEUKOCYTESUR, UROBILINOGEN, BILIRUBINUR, BLOODU in the last 72 hours.     Invalid input(s): NITRATE, GLUCOSEUKETONESUAMORPHOUS      Additional Lab/culture results:    Physical Exam:   Intubated,responsive to commands  HEENT: NG tube in place  Neck: Supple   Chest: bilateral symmetrical chest rise   Circulatory: Peripheries warm , well perfused   P/A: soft, clean, dry and intact with skin glue, ostomy pink   Abthera in place, MERLIN x 2 in place with SS drainage  Extremities: No edema/calf tenderness    Interval Imaging Findings: none    Impression:  none    Lexi Cordoba is a 70-year-old male   Active  problem list  Hx of bladder cancer  Robotic assisted laparoscopic cystoprostatectomy with ileal conduit creation on 9/16/2022   Ex lap ROLANDO repair of BL ureteral anastamosis and abd washout with abthera due to ureteral anastamotic leaks and SBO and second look on 9/22 and 9/23      Plan:   Diet: NPO  Weaning sedation, planning for extubation today per critical care team   Pain control and antiemetic as needed  Maintain bilateral nephrostomy tubes   Maintain MERLIN drains   Appreciate general surgery recommendations ; defer timing of TPN to them.  Continue movantik  Continue IV cefepime   Urine culture growing pseudomonas, enterococcus and and second pseudomonas colony type, pansusceptible only gentamicin resistance   Trend Cr, Cr 2.47 today from 2.36  IV fluids: LR @ 125cc/24 hours  DVT PPX: Subq heparin 5000units TID  Continue to monitor hemoglobin    Joycelyn Florence MD, PGY-4  10:37 AM 9/24/2022    I have reviewed the history above and agree. I have repeated the key portions of the physical exam and concur with the resident's findings. I have reviewed all laboratory findings and imaging reports/films. I agree with the plan as noted above. Electronically signed by Arvis Blizzard, MD on 9/24/2022 at 11:09 AM    I have reviewed the history above and agree. I have repeated the key portions of the physical exam and concur with the resident's findings. I have reviewed all laboratory findings and imaging reports/films. I agree with the plan as noted above.     Electronically signed by Arvis Blizzard, MD on 9/24/2022 at 11:14 AM

## 2022-09-24 NOTE — PLAN OF CARE
Problem: Discharge Planning  Goal: Discharge to home or other facility with appropriate resources  9/23/2022 2217 by Tyler Hong RN  Outcome: Progressing  9/23/2022 1700 by Pily Bourgeois RN  Outcome: Progressing  Flowsheets (Taken 9/23/2022 0800)  Discharge to home or other facility with appropriate resources:   Identify barriers to discharge with patient and caregiver   Arrange for needed discharge resources and transportation as appropriate     Problem: Pain  Goal: Verbalizes/displays adequate comfort level or baseline comfort level  9/23/2022 2217 by Tyler Hong RN  Outcome: Progressing  9/23/2022 1700 by Pily Bourgeois RN  Outcome: Progressing     Problem: Safety - Adult  Goal: Free from fall injury  9/23/2022 2217 by Tyler Hong RN  Outcome: Progressing  9/23/2022 1700 by Pily Bourgeois RN  Outcome: Progressing     Problem: ABCDS Injury Assessment  Goal: Absence of physical injury  9/23/2022 2217 by Tyler Hong RN  Outcome: Progressing  9/23/2022 1700 by Pily Bourgeois RN  Outcome: Progressing     Problem: Nutrition Deficit:  Goal: Optimize nutritional status  9/23/2022 2217 by Tyler Hong RN  Outcome: Progressing  9/23/2022 1700 by Pily Bourgeois RN  Outcome: Progressing     Problem: Skin/Tissue Integrity  Goal: Absence of new skin breakdown  Description: 1. Monitor for areas of redness and/or skin breakdown  2. Assess vascular access sites hourly  3. Every 4-6 hours minimum:  Change oxygen saturation probe site  4. Every 4-6 hours:  If on nasal continuous positive airway pressure, respiratory therapy assess nares and determine need for appliance change or resting period. 9/23/2022 2217 by Tyler Hong RN  Outcome: Progressing  9/23/2022 1700 by Pily Bourgeois RN  Outcome: Progressing     Problem: Safety - Medical Restraint  Goal: Remains free of injury from restraints (Restraint for Interference with Medical Device)  Description: INTERVENTIONS:  1.  Determine that other, less restrictive measures have been tried or would not be effective before applying the restraint  2. Evaluate the patient's condition at the time of restraint application  3. Inform patient/family regarding the reason for restraint  4. Q2H: Monitor safety, psychosocial status, comfort, nutrition and hydration  9/23/2022 2217 by Alan Alexis RN  Outcome: Progressing  Flowsheets (Taken 9/23/2022 1800 by Iker Pickett RN)  Remains free of injury from restraints (restraint for interference with medical device): Determine that other, less restrictive measures have been tried or would not be effective before applying the restraint  9/23/2022 1700 by Iker Pickett RN  Outcome: Progressing  Flowsheets  Taken 9/23/2022 1600  Remains free of injury from restraints (restraint for interference with medical device): Determine that other, less restrictive measures have been tried or would not be effective before applying the restraint  Taken 9/23/2022 1500  Remains free of injury from restraints (restraint for interference with medical device): Determine that other, less restrictive measures have been tried or would not be effective before applying the restraint  Taken 9/23/2022 1000  Remains free of injury from restraints (restraint for interference with medical device): Determine that other, less restrictive measures have been tried or would not be effective before applying the restraint  Taken 9/23/2022 0800  Remains free of injury from restraints (restraint for interference with medical device): Determine that other, less restrictive measures have been tried or would not be effective before applying the restraint     Problem: Confusion  Goal: Confusion, delirium, dementia, or psychosis is improved or at baseline  Description: INTERVENTIONS:  1.  Assess for possible contributors to thought disturbance, including medications, impaired vision or hearing, underlying metabolic abnormalities, dehydration, psychiatric diagnoses, and notify attending LIP  2. [place] high risk fall precautions, as indicated  3. Provide frequent short contacts to provide reality reorientation, refocusing and direction  4. Decrease environmental stimuli, including noise as appropriate  5. Monitor and intervene to maintain adequate nutrition, hydration, elimination, sleep and activity  6. If unable to ensure safety without constant attention obtain sitter and review sitter guidelines with assigned personnel  7.  Initiate Psychosocial CNS and Spiritual Care consult, as indicated  9/23/2022 2217 by Farrukh Sy RN  Outcome: Progressing  9/23/2022 1700 by Rupal Gaona RN  Outcome: Progressing  Flowsheets  Taken 9/23/2022 1600  Effect of thought disturbance (confusion, delirium, dementia, or psychosis) are managed with adequate functional status:   Assess for contributors to thought disturbance, including medications, impaired vision or hearing, underlying metabolic abnormalities, dehydration, psychiatric diagnoses, notify Carlos Duran high risk fall precautions, as indicated  Taken 9/23/2022 0800  Effect of thought disturbance (confusion, delirium, dementia, or psychosis) are managed with adequate functional status:   [place] high risk fall precautions, as indicated   Decrease environmental stimuli, including noise as appropriate

## 2022-09-24 NOTE — PROGRESS NOTES
ICU PROGRESS NOTE        PATIENT NAME: Kameron Bailon RECORD NO. [id number]  DATE: 2022    PRIMARY CARE PHYSICIAN: Raya Max MD    HD: # 8    ASSESSMENT    Patient Active Problem List   Diagnosis    Kidney stones    Nocturia    Hypertrophy of prostate with urinary obstruction    Malignant neoplasm of urinary bladder (HCC)    Urinary retention    Cancer of lateral wall of urinary bladder (HCC)    Acute respiratory failure with hypoxia (HCC)    Other hyperlipidemia    Microcytic anemia    Mild protein-calorie malnutrition (HCC)    Pulmonary embolism without acute cor pulmonale (HCC)    Hypoxia    Dyspnea    Acute systolic heart failure (HCC)    Acute renal failure with tubular necrosis (HCC)    CKD (chronic kidney disease), stage III (HCC)    Dilated cardiomyopathy ([facility] [address]    Bladder cancer ([facility]    Acute kidney injury ([facility]           MEDICAL DECISION MAKING AND PLAN  NEURO:   -Pain/MMT: tylenol 650 q6h, Fentanyl gtt weaning, Roxicodone 5q6h  -Sedation: Prec 0.4  -Meds: wellbutrin xl 300mg daily,   -Psych: Pt alert and oriented, responding well to commands, minimal pain    2. CV:  -SBPs: 90s -110s  -MAP: upper 50-60s  -HR: 80s  -echocardiogram in 2022 showed an ejection fraction of 40-45%. -heart catheterization in 2022 and they did not do a left ventriculogram.  -Home meds: Coreg 3.125 BID, Lopressor 5mg q6, Norco, xarelto 20 mg daily, prilosec 20 mg daily, finesteride 5mg daily, flomax 0.4 mg daily, zocar 40 mg nightly,   -LA (0.74)    3. HEME:   -Hgb: 8.1 (9.4 (9.3)   -Platelets: 552, 561   -INR:     4.   RESP:   -Intubated   -PRVC: 40 FiO2/ 16 RR/ 660 Vt/ 5 PEEP   -AB.4 pH/ 33 Co2/ 99 O2/ 20.4 HCO3   -LA: 0.74 ovnt  -PF ratio: 247  -weaning to extubate    5.    GI  -Diet: NPO, meds thru OG  -will need NG  -Reglan held  -POD#7 robotic assisted laparoscopic cystoprostatectomy with ileal conduit creation  -POD#1 Ex Lap  -Procedure removed ~1.5 L of serosangious fluid from the abdomin. Portion of small bowel was lodged under left ureter, small bowel from transition point 70 cm to ligament of treitz was dilated. Abdomin left open. -OR / intraabdominal JPs placed, leak anast of ileal conduit, IR for perc nephrostomy tubes    -L MERLIN 160, R MERLIN 190  -Bowel regimen: glycolax, dulcolax suppository  -Protonix  -OG     6.   RENAL   -Conduit:  125    -L nephrostomy 325   -R nephrostomy 110   -IVF: /hr   -Net: -10L   -BUN/Cr: 46/2.5 (40/2.31)   -Na/K: 142/4.5   -Mg/P: 1.8 /4.4   -iCa: 1.15   -Urology following,   -POD#7 robotic assisted laparoscopic cystoprostatectomy with ileal conduit creation    -POD#2 R and L ureteral defect that was closed   -POD #1 perc nephrostomy tubes  -Plan trend Cr, IVF, monitor drain output    7. MSK:    -Weight bearing status: bed rest   -PT/OT  8. ID  -Tmax: 36.9 (98.4)  -WBC: 19.7 (16.8, 14.8)  -Abx: Cefepime (pharm recs Zosyn- enterococcus)      9. ENDO   -B  -Insulin: none    10. Lines  - OG, ETT, Right Port, PIVx3, urostomy conduit, MERLIN x2, wound vac, nephrostomy tubes bilat    11. PPX:  -DVT: Heparin subcutateneous BID  -GI: Protonix    12. CONSULTS   -Urology, General Surgery    13. DISPO:    - consider transfer to stepdown once extubated      CHECKLIST    CAM-ICU RASS: 0  RESTRAINTS: in place  IVF: LR  NUTRITION: NPO  ANTIBIOTICS: Cefepime  GI: protonix  DVT: heparin  GLYCEMIC CONTROL: none  HOB >45:   MOBILITY: bedrest      Chief Complaint: Abdominal pain    SUBJECTIVE    Parke Adalid  POD#2 from ex lap for bilateral anastomosis repair of ureter conduit. POD #1 from perc nephrostomy tube placement. Pt evaluated at bedside. Responding well to commands. Will wean to extubate.       OBJECTIVE  VITALS: Temp: Temp: 98.4 °F (36.9 °C)Temp  Av.5 °F (36.9 °C)  Min: 98.4 °F (36.9 °C)  Max: 98.6 °F (37 °C) BP Systolic (66JIT), BBN:804 , Min:89 , BSO:952   Diastolic (89DKX), MAX:63, Min:44, Max:71   Pulse Pulse  Av.4  Min: 73  Max: 104 Resp Resp  Av.7  Min: 0  Max: 22 Pulse ox SpO2  Av.8 %  Min: 85 %  Max: 100 %    VENT INFORMATION:  Lab Results   Component Value Date/Time    FIO2 40.0 2022 05:22 AM    MODE [facility] 2022 05:58 AM       GENERAL: alert, no distress  HEENT: Eye:PERRL, ETT in place, NG tube in place  : ileal conduit in place, neprhostomy tubes x2  LUNGS: ETT on vent, clear to auscultation bilaterally  HEART: normal rate and regular rhythm  ABDOMEN: soft, mild tenderness, mildly distended, h  EXTREMITY: no cyanosis, clubbing or edema        LAB:  CBC:   Recent Labs     22  0349 22  1225 22  1436 22  0547   WBC 16.8*  --  18.5* 19.7*   HGB 9.4* 11.6* 9.6* 8.1*   HCT 29.6* 35.8* 31.6* 26.9*   MCV 91.6  --  93.8 93.4     --  235 201       BMP:   Recent Labs     22  0349 22  1225 22  1436 22  0547    146* 143 142   K 4.5 4.3 4.6 4.5   *  --  114* 114*   CO2 21  --  21 20   BUN 40*  --  46* 46*   CREATININE 2.31*  --  2.36* 2.47*   GLUCOSE 94  --  107* 99           RADIOLOGY:  CT ABDOMEN PELVIS WO CONTRAST   Impression   Small bowel obstruction, with a transition point near the jejunoileal   junction adjacent/medial to the ileostomy site. Large pelvic fluid collection with varying heme densities, some of which is   recent, extending from the cystectomy bed, as above. Ureteral stents, extending out the conduit and ileostomy site with similar   moderate-severe right hydroureteronephrosis, and new mild-moderate left   hydroureteronephrosis. Postsurgical changes with scattered pneumoperitoneum/ascites, extensive   subcutaneous air/soft tissue changes. Enteric tube in satisfactory position with its tip in the stomach. Segmental/subsegmental lung base changes posteriorly. Multiple additional findings, either unchanged or incidental, as detailed in   the body of the report above. CXR:       Impression   1.   Lines and tubes in expected position as above. 2.   Elevation of the right hemidiaphragm with adjacent atelectasis. Trauma Attending Attestation      I have reviewed the above [facility] note(s) and confirmed the key elements of the medical history and physical exam. I have seen and examined the pt. I have discussed the findings, established the care plan and recommendations with Resident.   Lactic acid 0.9   19.7 leukocytosis   Hb 8.1  Extubated   Ambulation   Critical care min: [facility],   9/24/2022  11:28 AM

## 2022-09-24 NOTE — PROGRESS NOTES
Renal Progress Note    Patient :  Issa Ugarte; 74 y.o. MRN# [id number]  Location:  [facility]/6828-22  Attending:  Graciella Severe, MD  Admit Date:  9/16/2022   Hospital Day: 8    Subjective:       No acute episodes overnight  Patient had 2nd Laparotomy with abdominal washout, drain placement x 2, and wound vac placement  IR placed Bilateral PCN to decompress/protect ureteral anastomoses  Remains Intubated. On Prerecedex at 04 mcg/kg/hr  FiO2 21% / RR 16 /  / PEEP 0  pH 7.4 / HCO3 20.4 / CO2 33  Urine output 660 mL over last 24 hours  Drain output 610 mL over last 24 hours  Total fluid balance since admission -10.09 liters  AM Labs reviewed  Na 142  Potassium 4.5  Bicarbonate 20  BUN 46  Creatinine 2.47  Magnesium 1.8  Phosphorus 4.4  WBC 19.7  Hb 8.1    History reviewed known history of chronic kidney disease stage IIIb baseline 1.3-1.4 secondary to chronic interstitial nephritis both from obstructive uropathy related to bladder tumor and chemotherapy with platinum-based analogs. He has seen me in the office before. Was brought into the hospital for elective cystoprostatectomy which was done on 9/16/2022 also underwent bilateral pelvic lymphadenectomy and ileal conduit formation. Postoperatively has developed small bowel ileus, which has persisted and has been refractory to conservative treatment. Outpatient Medications:     Medications Prior to Admission: ondansetron (ZOFRAN-ODT) 8 MG TBDP disintegrating tablet, DISSOLVE 1 TABLET IN MOUTH EVERY 8 HOURS AS NEEDED FOR NAUSEA FOR VOMITING  HYDROcodone-acetaminophen (NORCO) 5-325 MG per tablet, Take 1 tablet by mouth every 8 hours as needed for Pain for up to 30 days.   rivaroxaban (XARELTO) 20 MG TABS tablet, Take 1 tablet by mouth once daily with breakfast (Patient taking differently: Take 1 tablet by mouth once daily with breakfast/ per cardiology, pt. to stop 3 days pre-op for OR 9-16-22)  omeprazole (PRILOSEC) 20 MG delayed release capsule, Take 1 capsule by mouth daily  carvedilol (COREG) 3.125 MG tablet, TAKE 1 TABLET BY MOUTH TWICE DAILY  finasteride (PROSCAR) 5 MG tablet, Take 5 mg by mouth daily  tamsulosin (FLOMAX) 0.4 MG capsule, Take 0.4 mg by mouth daily  simvastatin (ZOCOR) 40 MG tablet, Take 40 mg by mouth nightly  buPROPion (WELLBUTRIN XL) 300 MG extended release tablet, Take 300 mg by mouth every morning    Current Medications:     Scheduled Meds:    metoclopramide  5 mg IntraVENous Q6H    naloxegol  12.5 mg Oral Daily    chlorhexidine  15 mL Mouth/Throat BID    bisacodyl  10 mg Rectal Daily    pantoprazole (PROTONIX) 40 mg injection  40 mg IntraVENous Q12H    polyethylene glycol  17 g Oral Daily    cefepime  2,000 mg IntraVENous Q12H    heparin (porcine)  5,000 Units SubCUTAneous BID    buPROPion  300 mg Oral QAM    carvedilol  3.125 mg Oral BID    simvastatin  40 mg Oral Nightly    sodium chloride flush  5-40 mL IntraVENous 2 times per day    acetaminophen  650 mg Oral Q6H     Continuous Infusions:    propofol Stopped (22 0336)    fentaNYL 50 mcg/mL 100 mcg/hr (22 0620)    dexmedetomidine 0.4 mcg/kg/hr (22 0738)    lactated ringers 125 mL/hr at 22 0000    sodium chloride       PRN Meds:  diatrizoate meglumine-sodium, metoprolol, promethazine, HYDROmorphone, naloxone, ondansetron, ondansetron, sodium chloride flush, sodium chloride, oxyCODONE **OR** oxyCODONE    Input/Output:       I/O last 3 completed shifts: In: 4689.9 [I.V.:3717.8; IV Piggyback:972.1]  Out:  [Urine:771; Emesis/NG output:200; Drains:1110; Blood:30]    Vital Signs:   Temperature:  Temp: 98.4 °F (36.9 °C)  TMax:   Temp (24hrs), Av.6 °F (37 °C), Min:98.4 °F (36.9 °C), Max:99.1 °F (37.3 °C)    Respirations:  Resp: (!) 0  Pulse:   Heart Rate: 79  BP:    BP: 126/71  BP Range: Systolic (29TDC), OSD:442 , Min:89 , BMN:421       Diastolic (26KUF), NDN:38, Min:44, Max:71    Physical Examination:     General:  Sedated on ventilator.  FiO2 21% with a PEEP of 0  HEENT:  Atraumatic, normocephalic, no throat congestion, moist mucosa. Eyes:   Pupils equal, round and reactive to light, pallor, no icterus. Neck:   No JVD, no thyromegaly, no lymphadenopathy. Chest:  Air entry fair bilaterally, no crackles  Cardiac: S1 S2 RR no murmurs  Abdomen:  Soft, non-tender, no masses or organomegally appreciable, BS sluggish. :   No suprapubic or flank tenderness. Neuro:  Sedated on ventilator. Skin:   No rashes, good skin turgor. Extremities:  No edema, palpable peripheral pulses, no cyanosis, no calf tenderness. Labs:       Recent Labs     09/23/22 0349 09/23/22 1225 09/23/22 1436 09/24/22  0547   WBC 16.8*  --  18.5* 19.7*   RBC 3.23*  --  3.37* 2.88*   HGB 9.4* 11.6* 9.6* 8.1*   HCT 29.6* 35.8* 31.6* 26.9*   MCV 91.6  --  93.8 93.4   MCH 29.1  --  28.5 28.1   MCHC 31.8  --  30.4 30.1   RDW 15.1*  --  15.4* 15.3*     --  235 201   MPV 10.0  --  9.9 9.8        BMP:   Recent Labs     09/23/22 0349 09/23/22 1225 09/23/22  1436 09/24/22  0547    146* 143 142   K 4.5 4.3 4.6 4.5   *  --  114* 114*   CO2 21  --  21 20   BUN 40*  --  46* 46*   CREATININE 2.31*  --  2.36* 2.47*   GLUCOSE 94  --  107* 99   CALCIUM 8.0*  --  7.9* 7.5*        Phosphorus:     Recent Labs     09/23/22 0349 09/23/22 1436 09/24/22  0547   PHOS 3.1 4.6* 4.4       Magnesium:    Recent Labs     09/23/22 0349 09/23/22  1436 09/24/22  0547   MG 2.0 2.0 1.8       Albumin:  No results for input(s): LABALBU in the last 72 hours.   BNP:    No results found for: BNP  LISS:      Lab Results   Component Value Date/Time    LISS NEGATIVE 05/31/2022 10:45 AM     SPEP:  Lab Results   Component Value Date/Time    PROT 6.6 09/08/2022 01:46 PM     UPEP:   No results found for: LABPE  C3:     Lab Results   Component Value Date/Time    C3 119 05/31/2022 10:45 AM     C4:     Lab Results   Component Value Date/Time    C4 25 05/31/2022 10:45 AM     MPO ANCA:   No results found for: MPO  PR3 ANCA:   No results found for: PR3  Anti-GBM:   No results found for: GBMABIGG  Hep BsAg:       No results found for: HEPBSAG  Hep C AB:        No results found for: HEPCAB    Urinalysis/Chemistries:      Lab Results   Component Value Date/Time    NITRU POSITIVE 09/18/2022 05:17 PM    COLORU Yellow 09/18/2022 05:17 PM    PHUR 6.0 09/18/2022 05:17 PM    WBCUA 50  09/18/2022 05:17 PM    RBCUA TOO NUMEROUS TO COUNT 09/18/2022 05:17 PM    MUCUS 3+ 04/14/2022 09:43 AM    YEAST FEW 03/31/2022 01:01 PM    BACTERIA MODERATE 09/18/2022 05:17 PM    CLARITYU cloudy 08/04/2022 09:05 AM    SPECGRAV 1.016 09/18/2022 05:17 PM    LEUKOCYTESUR LARGE 09/18/2022 05:17 PM    UROBILINOGEN Normal 09/18/2022 05:17 PM    BILIRUBINUR NEGATIVE 09/18/2022 05:17 PM    BLOODU large 08/04/2022 09:05 AM    GLUCOSEU NEGATIVE 09/18/2022 05:17 PM    KETUA TRACE 09/18/2022 05:17 PM     Urine Sodium:   No results found for: ИРИНА  Urine Potassium:  No results found for: KUR  Urine Chloride:  No results found for: CLUR  Urine Osmolarity: No results found for: OSMOU  Urine Protein:   No results found for: TPU  Urine Creatinine:     Lab Results   Component Value Date/Time    LABCREA 121.3 09/18/2022 05:17 PM     Urine Eosinophils:  No components found for: UEOS    Radiology:     CXR:     Assessment:     1. Acute Kidney Injury: Secondary to ischemic ATN from depleted circulating volume related to small bowel obstruction ileus, sequestration, peritoneal inflammation from suspected anastomotic leak. Baseline 1.3-1.4 postoperatively had gone up to 2.5, came down to 1.5, however in the past 2 to 3 days has been worsening and now up to 2.3 urine output has declined significantly, 10-20 mill an hour for more than 24 hours, acute kidney injury stage II. 2.  Small bowel obstruction related to dynamic causes, bowel was stuck behind the ureter which was extricated yesterday. Some suspicion of ischemia.   3.  Chronic kidney disease stage IIIb from chronic intestinal nephritis both from chemotherapy with platinum and bladder tumor causing obstructive uropathy, baseline 1.3-1.4  4. Bladder cancer failed local therapy status for cystoprostatectomy and ileal conduit formation 9/16/2022  5. Cardiomyopathy ejection fraction 40%    Plan:   1. No change in fluid management at this time  2. Keep systolic pressure more than 110  3. Patient is at risk for requiring dialysis on this admission  4. Follow lab work and intake and output and hemodynamics  5. Further recommendations after discussion with Dr. Amanda Rosa MD  Nephrology  PGY-3, Internal Medicine Resident  [id number] [facility]  9/24/2022 8:00 AM    Attending Physician Statement  I have discussed the care of Sav Velasquez, including pertinent history and exam findings with the resident/fellow. I have reviewed the key elements of all parts of the encounter with the resident/fellow and have edited the documentation as appropriate. I have seen and examined the patient with the resident/fellow. I agree with the assessment and plan and status of the problem list as documented.        Madelyn Marcus MD   Nephrology Attending Physician  [facility]  9/24/2022

## 2022-09-25 ENCOUNTER — APPOINTMENT (OUTPATIENT)
Dept: GENERAL RADIOLOGY | Age: 67
DRG: 653 | End: 2022-09-25
Attending: SPECIALIST
Payer: MEDICARE

## 2022-09-25 LAB
ANION GAP SERPL CALCULATED.3IONS-SCNC: 10 MMOL/L (ref 9–17)
BUN BLDV-MCNC: 38 MG/DL (ref 8–23)
CALCIUM IONIZED: 1.2 MMOL/L (ref 1.13–1.33)
CALCIUM SERPL-MCNC: 7.8 MG/DL (ref 8.6–10.4)
CHLORIDE BLD-SCNC: 113 MMOL/L (ref 98–107)
CO2: 21 MMOL/L (ref 20–31)
CREAT SERPL-MCNC: 1.89 MG/DL (ref 0.7–1.2)
GFR AFRICAN AMERICAN: 44 ML/MIN
GFR NON-AFRICAN AMERICAN: 36 ML/MIN
GFR SERPL CREATININE-BSD FRML MDRD: ABNORMAL ML/MIN/{1.73_M2}
GLUCOSE BLD-MCNC: 76 MG/DL (ref 70–99)
GLUCOSE BLD-MCNC: 80 MG/DL (ref 75–110)
HCT VFR BLD CALC: 27.2 % (ref 40.7–50.3)
HEMOGLOBIN: 8.4 G/DL (ref 13–17)
MAGNESIUM: 1.8 MG/DL (ref 1.6–2.6)
MCH RBC QN AUTO: 28.7 PG (ref 25.2–33.5)
MCHC RBC AUTO-ENTMCNC: 30.9 G/DL (ref 28.4–34.8)
MCV RBC AUTO: 92.8 FL (ref 82.6–102.9)
NRBC AUTOMATED: 0 PER 100 WBC
PDW BLD-RTO: 15.8 % (ref 11.8–14.4)
PHOSPHORUS: 2.8 MG/DL (ref 2.5–4.5)
PLATELET # BLD: 163 K/UL (ref 138–453)
PMV BLD AUTO: 10.8 FL (ref 8.1–13.5)
POTASSIUM SERPL-SCNC: 4.3 MMOL/L (ref 3.7–5.3)
RBC # BLD: 2.93 M/UL (ref 4.21–5.77)
SODIUM BLD-SCNC: 144 MMOL/L (ref 135–144)
WBC # BLD: 20.6 K/UL (ref 3.5–11.3)

## 2022-09-25 PROCEDURE — 1200000000 HC SEMI PRIVATE

## 2022-09-25 PROCEDURE — 6360000002 HC RX W HCPCS: Performed by: STUDENT IN AN ORGANIZED HEALTH CARE EDUCATION/TRAINING PROGRAM

## 2022-09-25 PROCEDURE — 6370000000 HC RX 637 (ALT 250 FOR IP): Performed by: STUDENT IN AN ORGANIZED HEALTH CARE EDUCATION/TRAINING PROGRAM

## 2022-09-25 PROCEDURE — A4216 STERILE WATER/SALINE, 10 ML: HCPCS | Performed by: STUDENT IN AN ORGANIZED HEALTH CARE EDUCATION/TRAINING PROGRAM

## 2022-09-25 PROCEDURE — 84100 ASSAY OF PHOSPHORUS: CPT

## 2022-09-25 PROCEDURE — 74018 RADEX ABDOMEN 1 VIEW: CPT

## 2022-09-25 PROCEDURE — 36415 COLL VENOUS BLD VENIPUNCTURE: CPT

## 2022-09-25 PROCEDURE — 2580000003 HC RX 258: Performed by: STUDENT IN AN ORGANIZED HEALTH CARE EDUCATION/TRAINING PROGRAM

## 2022-09-25 PROCEDURE — 2500000003 HC RX 250 WO HCPCS: Performed by: STUDENT IN AN ORGANIZED HEALTH CARE EDUCATION/TRAINING PROGRAM

## 2022-09-25 PROCEDURE — 85027 COMPLETE CBC AUTOMATED: CPT

## 2022-09-25 PROCEDURE — 82330 ASSAY OF CALCIUM: CPT

## 2022-09-25 PROCEDURE — 99232 SBSQ HOSP IP/OBS MODERATE 35: CPT | Performed by: INTERNAL MEDICINE

## 2022-09-25 PROCEDURE — 82947 ASSAY GLUCOSE BLOOD QUANT: CPT

## 2022-09-25 PROCEDURE — 80048 BASIC METABOLIC PNL TOTAL CA: CPT

## 2022-09-25 PROCEDURE — C9113 INJ PANTOPRAZOLE SODIUM, VIA: HCPCS | Performed by: STUDENT IN AN ORGANIZED HEALTH CARE EDUCATION/TRAINING PROGRAM

## 2022-09-25 PROCEDURE — 83735 ASSAY OF MAGNESIUM: CPT

## 2022-09-25 RX ADMIN — POLYETHYLENE GLYCOL 3350 17 G: 17 POWDER, FOR SOLUTION ORAL at 08:35

## 2022-09-25 RX ADMIN — HEPARIN SODIUM 5000 UNITS: 5000 INJECTION INTRAVENOUS; SUBCUTANEOUS at 20:19

## 2022-09-25 RX ADMIN — SODIUM CHLORIDE, PRESERVATIVE FREE 40 MG: 5 INJECTION INTRAVENOUS at 08:35

## 2022-09-25 RX ADMIN — SODIUM CHLORIDE, POTASSIUM CHLORIDE, SODIUM LACTATE AND CALCIUM CHLORIDE: 600; 310; 30; 20 INJECTION, SOLUTION INTRAVENOUS at 16:17

## 2022-09-25 RX ADMIN — METOPROLOL TARTRATE 5 MG: 5 INJECTION, SOLUTION INTRAVENOUS at 16:08

## 2022-09-25 RX ADMIN — NALOXEGOL OXALATE 12.5 MG: 12.5 TABLET, FILM COATED ORAL at 08:35

## 2022-09-25 RX ADMIN — ACETAMINOPHEN 650 MG: 325 TABLET ORAL at 15:27

## 2022-09-25 RX ADMIN — SODIUM CHLORIDE, PRESERVATIVE FREE 5 ML: 5 INJECTION INTRAVENOUS at 08:35

## 2022-09-25 RX ADMIN — OXYCODONE 10 MG: 5 TABLET ORAL at 03:02

## 2022-09-25 RX ADMIN — CARVEDILOL 3.12 MG: 3.12 TABLET, FILM COATED ORAL at 08:34

## 2022-09-25 RX ADMIN — SIMVASTATIN 40 MG: 40 TABLET, FILM COATED ORAL at 20:19

## 2022-09-25 RX ADMIN — ONDANSETRON 8 MG: 4 TABLET, ORALLY DISINTEGRATING ORAL at 03:13

## 2022-09-25 RX ADMIN — BUPROPION HYDROCHLORIDE 300 MG: 150 TABLET, EXTENDED RELEASE ORAL at 08:33

## 2022-09-25 RX ADMIN — CARVEDILOL 3.12 MG: 3.12 TABLET, FILM COATED ORAL at 20:19

## 2022-09-25 RX ADMIN — ACETAMINOPHEN 650 MG: 325 TABLET ORAL at 02:59

## 2022-09-25 RX ADMIN — HEPARIN SODIUM 5000 UNITS: 5000 INJECTION INTRAVENOUS; SUBCUTANEOUS at 08:34

## 2022-09-25 RX ADMIN — WATER 2000 MG: 1 INJECTION INTRAMUSCULAR; INTRAVENOUS; SUBCUTANEOUS at 20:19

## 2022-09-25 RX ADMIN — OXYCODONE 10 MG: 5 TABLET ORAL at 16:42

## 2022-09-25 RX ADMIN — ACETAMINOPHEN 650 MG: 325 TABLET ORAL at 20:19

## 2022-09-25 RX ADMIN — BISACODYL 10 MG: 10 SUPPOSITORY RECTAL at 08:33

## 2022-09-25 RX ADMIN — SODIUM CHLORIDE, PRESERVATIVE FREE 40 MG: 5 INJECTION INTRAVENOUS at 20:19

## 2022-09-25 RX ADMIN — OXYCODONE HYDROCHLORIDE 5 MG: 5 TABLET ORAL at 08:39

## 2022-09-25 RX ADMIN — HEPARIN SODIUM 5000 UNITS: 5000 INJECTION INTRAVENOUS; SUBCUTANEOUS at 13:41

## 2022-09-25 RX ADMIN — ONDANSETRON 8 MG: 4 TABLET, ORALLY DISINTEGRATING ORAL at 11:21

## 2022-09-25 RX ADMIN — WATER 2000 MG: 1 INJECTION INTRAMUSCULAR; INTRAVENOUS; SUBCUTANEOUS at 08:50

## 2022-09-25 RX ADMIN — ACETAMINOPHEN 650 MG: 325 TABLET ORAL at 08:33

## 2022-09-25 RX ADMIN — SODIUM CHLORIDE, POTASSIUM CHLORIDE, SODIUM LACTATE AND CALCIUM CHLORIDE: 600; 310; 30; 20 INJECTION, SOLUTION INTRAVENOUS at 01:17

## 2022-09-25 ASSESSMENT — PAIN DESCRIPTION - PAIN TYPE
TYPE: SURGICAL PAIN

## 2022-09-25 ASSESSMENT — PAIN SCALES - GENERAL
PAINLEVEL_OUTOF10: 8
PAINLEVEL_OUTOF10: 3
PAINLEVEL_OUTOF10: 6
PAINLEVEL_OUTOF10: 4
PAINLEVEL_OUTOF10: 5
PAINLEVEL_OUTOF10: 7
PAINLEVEL_OUTOF10: 5
PAINLEVEL_OUTOF10: 4
PAINLEVEL_OUTOF10: 5
PAINLEVEL_OUTOF10: 2

## 2022-09-25 ASSESSMENT — PAIN DESCRIPTION - ONSET
ONSET: ON-GOING

## 2022-09-25 ASSESSMENT — PAIN DESCRIPTION - DESCRIPTORS
DESCRIPTORS: ACHING

## 2022-09-25 ASSESSMENT — PAIN DESCRIPTION - LOCATION
LOCATION: ABDOMEN

## 2022-09-25 ASSESSMENT — PAIN DESCRIPTION - FREQUENCY
FREQUENCY: CONTINUOUS

## 2022-09-25 ASSESSMENT — PAIN DESCRIPTION - ORIENTATION
ORIENTATION: RIGHT;LOWER

## 2022-09-25 NOTE — PROGRESS NOTES
Physical Therapy        Physical Therapy Cancel Note      DATE: 2022    NAME: Fabrizio Gutierrez  MRN: [id number]   : 1955      Patient not seen this date for Physical Therapy due to:    Pt refused transfer to chair and LE/UE mobility. Reports today is a bad day. Not feeling well. Nauseous today. Nursing is aware patient is not feeling well.       Electronically signed by Young Galvan PTA on 2022 at 10:49 AM

## 2022-09-25 NOTE — PROGRESS NOTES
Aurelio Dover MD FACS   Urology Progress Note     Subjective:   AFVSS  Extubated yesterday  Vomited 3x this AM   Refusing NG tube   Hiccuping   UOP left neph tube: 1.520cc/24 hours  Right neph tube: 30cc/24hours  Ileal conduit: 130cc/24hours  MERLIN drain upper 115cc  MERLIN drain lower 465cc  Cr 1.89 (2.47)  WBC 20.6 (20.4)  Hgb 8.4 (8.0)    Patient Vitals for the past 24 hrs:   BP Temp Temp src Pulse Resp SpO2   09/25/22 0600 (!) 141/65 98.7 °F (37.1 °C) Oral 93 16 98 %   09/25/22 0500 (!) 149/71 -- -- 87 -- 99 %   09/25/22 0400 136/62 98.7 °F (37.1 °C) Oral 88 15 98 %   09/25/22 0300 -- -- -- 95 -- 99 %   09/25/22 0200 134/84 98.8 °F (37.1 °C) Oral 93 -- 99 %   09/25/22 0100 (!) 175/80 -- -- (!) 114 -- 97 %   09/25/22 0000 (!) 165/60 98.7 °F (37.1 °C) Oral 98 17 98 %   09/24/22 2300 (!) 164/62 -- -- 99 -- 99 %   09/24/22 2200 (!) 163/71 98.7 °F (37.1 °C) Oral 96 -- 100 %   09/24/22 2100 (!) 140/56 -- -- 99 18 99 %   09/24/22 2000 (!) 151/61 99 °F (37.2 °C) Oral 97 16 91 %   09/24/22 1830 -- -- -- 98 12 99 %   09/24/22 1800 128/64 -- -- 97 12 99 %   09/24/22 1730 -- -- -- 96 12 98 %   09/24/22 1700 112/88 -- -- (!) 101 12 96 %   09/24/22 1630 -- -- -- 98 12 100 %   09/24/22 1600 (!) 126/58 98.8 °F (37.1 °C) Oral 95 12 100 %   09/24/22 1530 -- -- -- 97 12 100 %   09/24/22 1500 119/62 -- -- 94 12 100 %   09/24/22 1430 -- -- -- 97 11 100 %   09/24/22 1400 135/76 -- -- 95 12 100 %   09/24/22 1330 -- -- -- 94 12 95 %   09/24/22 1300 -- -- -- 96 10 97 %   09/24/22 1230 -- -- -- 95 10 97 %   09/24/22 1200 -- 98.6 °F (37 °C) Oral 90 10 100 %   09/24/22 1130 -- -- -- 91 12 93 %   09/24/22 1100 -- -- -- 83 12 100 %   09/24/22 1030 -- -- -- 85 13 100 %   09/24/22 1000 -- -- -- 87 13 100 %   09/24/22 0940 -- -- -- -- -- 99 %   09/24/22 0930 -- -- -- 84 12 100 %   09/24/22 0900 -- -- -- 90 12 100 %         Intake/Output Summary (Last 24 hours) at 9/25/2022 0849  Last data filed at 9/25/2022 0612  Gross per 24 hour Intake 651.16 ml   Output 2320 ml   Net -1668.84 ml         Recent Labs     09/24/22  0547 09/24/22  1243 09/25/22  0249   WBC 19.7* 20.4* 20.6*   HGB 8.1* 8.0* 8.4*   HCT 26.9* 25.8* 27.2*   MCV 93.4 90.2 92.8    193 163     Recent Labs     09/23/22  1436 09/24/22  0547 09/25/22  0249    142 144   K 4.6 4.5 4.3   * 114* 113*   CO2 21 20 21   PHOS 4.6* 4.4 2.8   BUN 46* 46* 38*   CREATININE 2.36* 2.47* 1.89*         No results for input(s): COLORU, PHUR, LABCAST, WBCUA, RBCUA, MUCUS, TRICHOMONAS, YEAST, BACTERIA, CLARITYU, SPECGRAV, LEUKOCYTESUR, UROBILINOGEN, BILIRUBINUR, BLOODU in the last 72 hours. Invalid input(s): NITRATE, GLUCOSEUKETONESUAMORPHOUS      Additional Lab/culture results:    Physical Exam:   General: A/O x 3, no acute distress  HEENT: moist mucous membranes  Neck: Supple   Chest: bilateral symmetrical chest rise   Circulatory: Peripheries warm , well perfused   P/A: soft, clean, dry and intact with skin glue, ostomy pink with red rubber in place, Abthera in place, MERLIN x 2 in place with SS drainage  Extremities: No edema/calf tenderness    Interval Imaging Findings: none    Impression:  none    Mt García is a 72-year-old male   Active  problem list  Hx of bladder cancer  Robotic assisted laparoscopic cystoprostatectomy with ileal conduit creation on 9/16/2022   Ex lap ROLANDO repair of BL ureteral anastamosis and abd washout with abthera due to ureteral anastamotic leaks and SBO and second look on 9/22 and 9/23      Plan:   Diet: per General Surgery  Patient is refusing NGT  Pain control and antiemetic as needed  Maintain bilateral nephrostomy tubes   Maintain MERLIN drains   Appreciate general surgery recommendations; defer timing of TPN to them.   Continue movantik  Continue IV cefepime   Urine culture growing pseudomonas, enterococcus and and second pseudomonas colony type, pansusceptible only gentamicin resistance   Trend Cr, Cr 1.89 today from 2.47  IV fluids: LR @ 125cc/24 hours  DVT PPX: Subq heparin 5000units TID  Continue to monitor    Leann Canales MD, PGY-4  8:49 AM 9/25/2022    I have reviewed the history above and agree. I have repeated the key portions of the physical exam and concur with the resident's findings. I have reviewed all laboratory findings and imaging reports/films. I agree with the plan as noted above.     Electronically signed by Saadia Porter MD on 9/25/2022 at 12:31 PM

## 2022-09-25 NOTE — PROGRESS NOTES
Renal Progress Note    Patient :  Li Gonzalez; 74 y.o. MRN# [id number]  Location:  3529/4964-95  Attending:  Quoc Prado MD  Admit Date:  9/16/2022   Hospital Day: 9    Subjective:       No acute episodes overnight  Patient is heme stable with Tmax of 99  Patient extubated yesterday and saturating well on 2 liters nasal canula  Urine output 1.68 liters over last 24 hours  Drain output 580 mL over last 24 hours  Total fluid balance since admission -11.85 liters  AM Labs reviewed  Na 144  Potassium 4.3  Bicarbonate 21  BUN 38  Creatinine 1.89  Magnesium 1.8  Phosphorus 2.8  WBC 20.6  Hb 8.4    History reviewed known history of chronic kidney disease stage IIIb baseline 1.3-1.4 secondary to chronic interstitial nephritis both from obstructive uropathy related to bladder tumor and chemotherapy with platinum-based analogs. He has seen me in the office before. Was brought into the hospital for elective cystoprostatectomy which was done on 9/16/2022 also underwent bilateral pelvic lymphadenectomy and ileal conduit formation. Postoperatively has developed small bowel ileus, which has persisted and has been refractory to conservative treatment. Outpatient Medications:     Medications Prior to Admission: ondansetron (ZOFRAN-ODT) 8 MG TBDP disintegrating tablet, DISSOLVE 1 TABLET IN MOUTH EVERY 8 HOURS AS NEEDED FOR NAUSEA FOR VOMITING  HYDROcodone-acetaminophen (NORCO) 5-325 MG per tablet, Take 1 tablet by mouth every 8 hours as needed for Pain for up to 30 days.   rivaroxaban (XARELTO) 20 MG TABS tablet, Take 1 tablet by mouth once daily with breakfast (Patient taking differently: Take 1 tablet by mouth once daily with breakfast/ per cardiology, pt. to stop 3 days pre-op for OR 9-16-22)  omeprazole (PRILOSEC) 20 MG delayed release capsule, Take 1 capsule by mouth daily  carvedilol (COREG) 3.125 MG tablet, TAKE 1 TABLET BY MOUTH TWICE DAILY  finasteride (PROSCAR) 5 MG tablet, Take 5 mg by mouth daily  tamsulosin (FLOMAX) 0.4 MG capsule, Take 0.4 mg by mouth daily  simvastatin (ZOCOR) 40 MG tablet, Take 40 mg by mouth nightly  buPROPion (WELLBUTRIN XL) 300 MG extended release tablet, Take 300 mg by mouth every morning    Current Medications:     Scheduled Meds:    heparin (porcine)  5,000 Units SubCUTAneous TID    [Held by provider] metoclopramide  5 mg IntraVENous Q6H    naloxegol  12.5 mg Oral Daily    chlorhexidine  15 mL Mouth/Throat BID    bisacodyl  10 mg Rectal Daily    pantoprazole (PROTONIX) 40 mg injection  40 mg IntraVENous Q12H    polyethylene glycol  17 g Oral Daily    cefepime  2,000 mg IntraVENous Q12H    buPROPion  300 mg Oral QAM    carvedilol  3.125 mg Oral BID    simvastatin  40 mg Oral Nightly    sodium chloride flush  5-40 mL IntraVENous 2 times per day    acetaminophen  650 mg Oral Q6H     Continuous Infusions:    propofol Stopped (22 0336)    fentaNYL 50 mcg/mL Stopped (22 0751)    dexmedetomidine Stopped (22 1103)    lactated ringers 125 mL/hr at 22 0612    sodium chloride       PRN Meds:  diatrizoate meglumine-sodium, metoprolol, promethazine, HYDROmorphone, ondansetron, sodium chloride flush, sodium chloride, oxyCODONE **OR** oxyCODONE    Input/Output:       I/O last 3 completed shifts: In: 3418.8 [I.V.:2594.2; IV Piggyback:824.6]  Out: 2833 [Urine:2150; Emesis/NG output:150; Drains:1075]    Vital Signs:   Temperature:  Temp: 98.7 °F (37.1 °C)  TMax:   Temp (24hrs), Av.8 °F (37.1 °C), Min:98.6 °F (37 °C), Max:99 °F (37.2 °C)    Respirations:  Resp: 16  Pulse:   Heart Rate: 93  BP:    BP: (!) 141/65  BP Range: Systolic (49OOJ), NXS:137 , Min:112 , ZBO:916       Diastolic (29YUU), VNN:79, Min:56, Max:88    Physical Examination:     General:  Extubated, Saturating well on 2 liters nasal canula  HEENT:  Atraumatic, normocephalic, no throat congestion, moist mucosa. Eyes:   Pupils equal, round and reactive to light, pallor, no icterus.   Neck:   No JVD, no thyromegaly, no lymphadenopathy. Chest:  Air entry fair bilaterally, no crackles  Cardiac: S1 S2 RR no murmurs  Abdomen:  Soft, non-tender, no masses or organomegally appreciable, BS sluggish. :   No suprapubic or flank tenderness. Neuro:  Sedated on ventilator. Skin:   No rashes, good skin turgor. Extremities:  No edema, palpable peripheral pulses, no cyanosis, no calf tenderness. Labs:       Recent Labs     09/24/22  0547 09/24/22  1243 09/25/22  0249   WBC 19.7* 20.4* 20.6*   RBC 2.88* 2.86* 2.93*   HGB 8.1* 8.0* 8.4*   HCT 26.9* 25.8* 27.2*   MCV 93.4 90.2 92.8   MCH 28.1 28.0 28.7   MCHC 30.1 31.0 30.9   RDW 15.3* 15.4* 15.8*    193 163   MPV 9.8 9.6 10.8        BMP:   Recent Labs     09/23/22  1436 09/24/22  0547 09/25/22  0249    142 144   K 4.6 4.5 4.3   * 114* 113*   CO2 21 20 21   BUN 46* 46* 38*   CREATININE 2.36* 2.47* 1.89*   GLUCOSE 107* 99 76   CALCIUM 7.9* 7.5* 7.8*        Phosphorus:     Recent Labs     09/23/22  1436 09/24/22  0547 09/25/22  0249   PHOS 4.6* 4.4 2.8       Magnesium:    Recent Labs     09/23/22  1436 09/24/22  0547 09/25/22  0249   MG 2.0 1.8 1.8       Albumin:  No results for input(s): LABALBU in the last 72 hours.   BNP:    No results found for: BNP  LISS:      Lab Results   Component Value Date/Time    LISS NEGATIVE 05/31/2022 10:45 AM     SPEP:  Lab Results   Component Value Date/Time    PROT 6.6 09/08/2022 01:46 PM     UPEP:   No results found for: LABPE  C3:     Lab Results   Component Value Date/Time    C3 119 05/31/2022 10:45 AM     C4:     Lab Results   Component Value Date/Time    C4 25 05/31/2022 10:45 AM     MPO ANCA:   No results found for: MPO  PR3 ANCA:   No results found for: PR3  Anti-GBM:   No results found for: GBMABIGG  Hep BsAg:       No results found for: HEPBSAG  Hep C AB:        No results found for: HEPCAB    Urinalysis/Chemistries:      Lab Results   Component Value Date/Time    NITRU POSITIVE 09/18/2022 05:17 PM    COLORU Yellow 09/18/2022 05:17 PM    PHUR 6.0 09/18/2022 05:17 PM    WBCUA 50  09/18/2022 05:17 PM    RBCUA TOO NUMEROUS TO COUNT 09/18/2022 05:17 PM    MUCUS 3+ 04/14/2022 09:43 AM    YEAST FEW 03/31/2022 01:01 PM    BACTERIA MODERATE 09/18/2022 05:17 PM    CLARITYU cloudy 08/04/2022 09:05 AM    SPECGRAV 1.016 09/18/2022 05:17 PM    LEUKOCYTESUR LARGE 09/18/2022 05:17 PM    UROBILINOGEN Normal 09/18/2022 05:17 PM    BILIRUBINUR NEGATIVE 09/18/2022 05:17 PM    BLOODU large 08/04/2022 09:05 AM    GLUCOSEU NEGATIVE 09/18/2022 05:17 PM    KETUA TRACE 09/18/2022 05:17 PM     Urine Sodium:   No results found for: ИРИНА  Urine Potassium:  No results found for: KUR  Urine Chloride:  No results found for: CLUR  Urine Osmolarity: No results found for: OSMOU  Urine Protein:   No results found for: TPU  Urine Creatinine:     Lab Results   Component Value Date/Time    LABCREA 121.3 09/18/2022 05:17 PM     Urine Eosinophils:  No components found for: UEOS    Radiology:     CXR:     Assessment:     1. Acute Kidney Injury: Secondary to ischemic ATN from depleted circulating volume related to small bowel obstruction ileus, sequestration, peritoneal inflammation from suspected anastomotic leak. Baseline 1.3-1.4 postoperatively had gone up to 2.5, came down to 1.5, however in the past 2 to 3 days has been worsening and now up to 2.3 urine output has declined significantly, 10-20 mill an hour for more than 24 hours, acute kidney injury stage II. 2.  Small bowel obstruction related to dynamic causes, bowel was stuck behind the ureter which was extricated yesterday. Some suspicion of ischemia. 3.  Chronic kidney disease stage IIIb from chronic intestinal nephritis both from chemotherapy with platinum and bladder tumor causing obstructive uropathy, baseline 1.3-1.4  4. Bladder cancer failed local therapy status for cystoprostatectomy and ileal conduit formation 9/16/2022  5. Cardiomyopathy ejection fraction 40%    Plan:   1.  Continue LR at 125 mL/hr with plans to de-escalate rate after starting TPN. 2. Keep systolic pressure more than 110  3. Urine output and creatinine greatly improved today. Patient is still at risk for requiring dialysis but may also have renal recovery. 4. Follow lab work, intake and output, daily weights, and hemodynamics  5. Further recommendations after discussion with Dr. Grace Moody MD  Nephrology  PGY-3, Internal Medicine Resident  [address] [facility]  9/25/2022 8:20 AM    Attending Physician Statement  I have discussed the care of Abhijit Priest, including pertinent history and exam findings with the resident/fellow. I have reviewed the key elements of all parts of the encounter with the resident/fellow. I have seen and examined the patient. I agree with the assessment and plan and status of the problem list as documented.        Amelie Patel MD   Nephrology Attending Physician  [facility]  9/25/2022

## 2022-09-25 NOTE — FLOWSHEET NOTE
09/25/22 0323   Treatment Team Notification   Reason for Communication Change in status  (pt vomitted)   Team Member Name Dr. Daniella Crenshaw Team Role Resident   Method of Communication Secure Message   Response See orders  (add NG)       Pt currently refusing NG. States he feels better after vomiting. Trauma resident aware, okay to skip NG for now.

## 2022-09-25 NOTE — PROGRESS NOTES
Patient vomiting green bile again. Stressed the importance of an NG tube. Patient refused and stated \"I will have to think about it. \"

## 2022-09-25 NOTE — PROGRESS NOTES
ICU PROGRESS NOTE    PATIENT NAME: Kameron Bailon RECORD NO. [id number]  DATE: 9/25/2022    PRIMARY CARE PHYSICIAN: Gustavo Coleman MD    HD: # 9    ASSESSMENT    Patient Active Problem List   Diagnosis    Kidney stones    Nocturia    Hypertrophy of prostate with urinary obstruction    Malignant neoplasm of urinary bladder (HCC)    Urinary retention    Cancer of lateral wall of urinary bladder (HCC)    Acute respiratory failure with hypoxia (HCC)    Other hyperlipidemia    Microcytic anemia    Mild protein-calorie malnutrition ([facility]    Pulmonary embolism without acute cor pulmonale (HCC)    Hypoxia    Dyspnea    Acute systolic heart failure (HCC)    Acute renal failure with tubular necrosis (HCC)    CKD (chronic kidney disease), stage III (HCC)    Dilated cardiomyopathy ([facility]    Bladder cancer ([facility]    Acute kidney injury ([facility]       MEDICAL DECISION MAKING AND PLAN  NEURO:   -MMPT: Tylenol, dilaudid PRN, Evelina PRN,   -Home meds:wellbutrin    2. CV:  -SBPs:112-175  -HR:   -Home meds:Coreg, simvastatin  -Lopressor PRN     3.   HEME:              - Hgb: 8.4 (8.0)   - plt 163 (193)  - DVT prophylaxis: heparin     4. RESP:              -2L NC  -CXR yesterday: no significant change, mild atelectasis     5. GI  -Diet: NPO  -protonix  -Bowel regimen:dulcolax, movantik, glycolax  -Zofran PRN, phenergan PRN  -Left abdomen bulb 115  -Right abdomen bulb 465     6. RENAL  -9/16 robotic lap cystoprostatectomy with ileal conduit creation  -9/22 Ex Lap, repair of ureteral anastomoses, abthera   -9/23 intraabdominal JPs placed, leak anast of ileal conduit, IR for perc nephrostomy tubes  -UOP: 1680 (0.6)  -Nephrostomy L 1520  -Nephrostomy R 30  -Urostomy ileal conduit 130              -IVF: ml/hr              -BUN/Cr: 38/1.89              -Na/K: 144/4.3   -ca 7.8, awaiting ionized ca     7. MSK:               -Weight bearing status: bedrest              -PT/OT    8.    ID  -Tmax: 99.0  -WBC: 20.6 (20.4)  -Abx: Cefepime     9. ENDO              -B     10. Lines  - right nephrostomy tube  - left nephrostomy tube  - left abdomen bulb  - right abdomen bulb  - Urostomy ileal conduit   - PIV x2  - wound vac     11. PPX:  -DVT: heparin  -GI: protonix    12. DISPO:               -TICU     CHECKLIST    CAM-ICU RASS: 0  RESTRAINTS: none  IVF: none  NUTRITION: none  ANTIBIOTICS: Cefepime  GI: protonix  DVT: heparin  GLYCEMIC CONTROL: controlled  HOB >45: yes  MOBILITY: bedrest    Chief Complaint: \"abdominal pain\"    SUBJECTIVE    Myrna Hdez  was seen and evaluated at the bedside. Patient had one episode of vomiting last night. Patient refused NG placement.     OBJECTIVE  VITALS: Temp: Temp: 98.7 °F (37.1 °C)Temp  Av.7 °F (37.1 °C)  Min: 98.2 °F (36.8 °C)  Max: 99 °F (21.3 °C) BP Systolic (01SQZ), ONU:351 , Min:112 , GNT:169   Diastolic (80BWB), MWF:65, Min:56, Max:88   Pulse Pulse  Av  Min: 80  Max: 114 Resp Resp  Av.8  Min: 10  Max: 18 Pulse ox SpO2  Av.7 %  Min: 91 %  Max: 100 %    GENERAL: alert, no distress  HEENT: PERRLA, NCAT  : ileal conduit in place, neprhostomy tubes x2  LUNGS: CTAB  HEART: RRR no M  ABDOMEN: soft, non-tender, non-distended,  right abdomen MERLIN drains in place  EXTREMITY: no cyanosis, clubbing or edema    Drain/tube output:    L nephrostomy 1520  R nephrostomy 30  Urostomy ileal conduit 130  Left abdomen drain 115  Right abdomen drain 465    LAB:  CBC:   Recent Labs     22  0547 22  1243 22  0249   WBC 19.7* 20.4* 20.6*   HGB 8.1* 8.0* 8.4*   HCT 26.9* 25.8* 27.2*   MCV 93.4 90.2 92.8    193 163     BMP:   Recent Labs     22  1436 22  0547 22  0249    142 144   K 4.6 4.5 4.3   * 114* 113*   CO2 21 20 21   BUN 46* 46* 38*   CREATININE 2.36* 2.47* 1.89*   GLUCOSE 107* 99 76         RADIOLOGY:  XR ABDOMEN (KUB) (SINGLE AP VIEW)    Result Date: 2022  EXAMINATION: ONE SUPINE XRAY VIEW(S) OF THE ABDOMEN 9/23/2022 1:55 pm COMPARISON: 09/22/2022 HISTORY: ORDERING SYSTEM PROVIDED HISTORY: for retained products TECHNOLOGIST PROVIDED HISTORY: for retained products Reason for Exam: pt abdomen open for multiple days, counts correct in OR, looking for retained objects FINDINGS: Stent in the right ureter appears to terminate in the right lower quadrant ostomy. There are multiple pelvic drains noted. No additional radiopaque foreign objects. There are multiple mildly dilated loops of small large bowel. Contrast is noted in the ascending colon. There is emphysema in the abdominal wall soft tissues. Degenerative changes in the lumbar spine. 1. Right ureteral stent with multiple lower abdominal and pelvic drains. No additional radiopaque foreign objects. 2. Mildly distended loops of small and large bowel, which could be due to ileus or obstruction. XR CHEST PORTABLE    Result Date: 9/24/2022  EXAMINATION: ONE XRAY VIEW OF THE CHEST 9/24/2022 6:12 am COMPARISON: 09/23/2022 HISTORY: ORDERING SYSTEM PROVIDED HISTORY: intubated TECHNOLOGIST PROVIDED HISTORY: intubated FINDINGS: Endotracheal tube, enteric tube, and right chest port remain in place. Cardiomediastinal contours are grossly stable. There is mild bibasilar atelectasis. No new airspace consolidation. No pneumothorax or pleural effusion. No significant interval change. Mild bibasilar atelectasis. XR CHEST PORTABLE    Result Date: 9/23/2022  EXAMINATION: ONE XRAY VIEW OF THE CHEST 9/23/2022 3:09 pm COMPARISON: 09/22/2022 HISTORY: ORDERING SYSTEM PROVIDED HISTORY: postop, intubated TECHNOLOGIST PROVIDED HISTORY: postop, intubated FINDINGS: MediPort is unchanged in position. Endotracheal tube terminates 5.5 cm above the kvng. Enteric tube terminates at the GE junction. Cardiomediastinal silhouette is unchanged in size. There is no pleural effusion or pneumothorax. There is atelectasis in both lung bases, unchanged.   No acute osseous abnormality. 1. Endotracheal tube is unchanged in position. 2. Enteric tube terminates at the GE junction. 3. Underinflated lungs with bibasilar atelectasis. XR ABDOMEN FOR NG/OG/NE TUBE PLACEMENT    Result Date: 9/24/2022  EXAMINATION: ONE SUPINE XRAY VIEW(S) OF THE ABDOMEN 9/24/2022 6:12 am COMPARISON: 09/23/2022 HISTORY: ORDERING SYSTEM PROVIDED HISTORY: Confirmation of course of NG/OG/NE tube and location of tip of tube TECHNOLOGIST PROVIDED HISTORY: Confirmation of course of NG/OG/NE tube and location of tip of tube Portable? ->Yes FINDINGS: Enteric tube is in place with distal tip and side hole overlying expected location of the proximal stomach. Bilateral nephrostomy tubes are noted. Right ureteral stent is partially visualized. Scattered mildly distended loops of small bowel noted in the visualized abdomen. 1.  Enteric tube in place with distal tip and side hole overlying proximal stomach. 2.  Bilateral nephrostomy tubes and right ureteral stent. 3.  Mild scattered gas distended small bowel loops in the visualized portions of the abdomen, similar to previous radiographs. Kraig Araujo DO  9/25/22, 6:18 AM          Trauma Attending Juliene Rubinstein      I have reviewed the above GCS note(s) and confirmed the key elements of the medical history and physical exam. I have seen and examined the pt. I have discussed the findings, established the care plan and recommendations with Resident.   MMPT  HB stable  on DVT prophy  been NPO  sips and chips may need TPN as well  on cefepime  wound vac change tomorrow  Awaiting bowel function   [facility]  9/25/2022  7:44 PM

## 2022-09-26 ENCOUNTER — APPOINTMENT (OUTPATIENT)
Dept: INTERVENTIONAL RADIOLOGY/VASCULAR | Age: 67
DRG: 653 | End: 2022-09-26
Attending: SPECIALIST
Payer: MEDICARE

## 2022-09-26 LAB
HCT VFR BLD CALC: 25.2 % (ref 40.7–50.3)
HEMOGLOBIN: 8 G/DL (ref 13–17)
MAGNESIUM: 1.9 MG/DL (ref 1.6–2.6)
MCH RBC QN AUTO: 28.7 PG (ref 25.2–33.5)
MCHC RBC AUTO-ENTMCNC: 31.7 G/DL (ref 28.4–34.8)
MCV RBC AUTO: 90.3 FL (ref 82.6–102.9)
NRBC AUTOMATED: 0 PER 100 WBC
PDW BLD-RTO: 15.7 % (ref 11.8–14.4)
PHOSPHORUS: 1.7 MG/DL (ref 2.5–4.5)
PLATELET # BLD: 240 K/UL (ref 138–453)
PMV BLD AUTO: 9.9 FL (ref 8.1–13.5)
RBC # BLD: 2.79 M/UL (ref 4.21–5.77)
WBC # BLD: 18.5 K/UL (ref 3.5–11.3)

## 2022-09-26 PROCEDURE — 6370000000 HC RX 637 (ALT 250 FOR IP): Performed by: STUDENT IN AN ORGANIZED HEALTH CARE EDUCATION/TRAINING PROGRAM

## 2022-09-26 PROCEDURE — C1729 CATH, DRAINAGE: HCPCS

## 2022-09-26 PROCEDURE — 2580000003 HC RX 258: Performed by: STUDENT IN AN ORGANIZED HEALTH CARE EDUCATION/TRAINING PROGRAM

## 2022-09-26 PROCEDURE — 0T25X0Z CHANGE DRAINAGE DEVICE IN KIDNEY, EXTERNAL APPROACH: ICD-10-PCS | Performed by: RADIOLOGY

## 2022-09-26 PROCEDURE — 1200000000 HC SEMI PRIVATE

## 2022-09-26 PROCEDURE — 2500000003 HC RX 250 WO HCPCS: Performed by: INTERNAL MEDICINE

## 2022-09-26 PROCEDURE — 6360000004 HC RX CONTRAST MEDICATION: Performed by: SPECIALIST

## 2022-09-26 PROCEDURE — 50435 EXCHANGE NEPHROSTOMY CATH: CPT

## 2022-09-26 PROCEDURE — 6360000002 HC RX W HCPCS: Performed by: STUDENT IN AN ORGANIZED HEALTH CARE EDUCATION/TRAINING PROGRAM

## 2022-09-26 PROCEDURE — 85027 COMPLETE CBC AUTOMATED: CPT

## 2022-09-26 PROCEDURE — 84100 ASSAY OF PHOSPHORUS: CPT

## 2022-09-26 PROCEDURE — 97605 NEG PRS WND THER DME<=50SQCM: CPT

## 2022-09-26 PROCEDURE — 2580000003 HC RX 258: Performed by: INTERNAL MEDICINE

## 2022-09-26 PROCEDURE — 99232 SBSQ HOSP IP/OBS MODERATE 35: CPT | Performed by: INTERNAL MEDICINE

## 2022-09-26 PROCEDURE — C1769 GUIDE WIRE: HCPCS

## 2022-09-26 PROCEDURE — 6370000000 HC RX 637 (ALT 250 FOR IP): Performed by: INTERNAL MEDICINE

## 2022-09-26 PROCEDURE — 97530 THERAPEUTIC ACTIVITIES: CPT

## 2022-09-26 PROCEDURE — 36415 COLL VENOUS BLD VENIPUNCTURE: CPT

## 2022-09-26 PROCEDURE — 80048 BASIC METABOLIC PNL TOTAL CA: CPT

## 2022-09-26 PROCEDURE — 2709999900 HC NON-CHARGEABLE SUPPLY

## 2022-09-26 PROCEDURE — C9113 INJ PANTOPRAZOLE SODIUM, VIA: HCPCS | Performed by: STUDENT IN AN ORGANIZED HEALTH CARE EDUCATION/TRAINING PROGRAM

## 2022-09-26 PROCEDURE — 83735 ASSAY OF MAGNESIUM: CPT

## 2022-09-26 PROCEDURE — 6360000002 HC RX W HCPCS: Performed by: RADIOLOGY

## 2022-09-26 RX ORDER — FENTANYL CITRATE 50 UG/ML
INJECTION, SOLUTION INTRAMUSCULAR; INTRAVENOUS
Status: COMPLETED | OUTPATIENT
Start: 2022-09-26 | End: 2022-09-26

## 2022-09-26 RX ORDER — HEPARIN SODIUM 5000 [USP'U]/ML
5000 INJECTION, SOLUTION INTRAVENOUS; SUBCUTANEOUS EVERY 8 HOURS SCHEDULED
Status: DISCONTINUED | OUTPATIENT
Start: 2022-09-26 | End: 2022-10-07

## 2022-09-26 RX ADMIN — BISACODYL 10 MG: 10 SUPPOSITORY RECTAL at 10:39

## 2022-09-26 RX ADMIN — DIBASIC SODIUM PHOSPHATE, MONOBASIC POTASSIUM PHOSPHATE AND MONOBASIC SODIUM PHOSPHATE 2 TABLET: 852; 155; 130 TABLET ORAL at 15:32

## 2022-09-26 RX ADMIN — ACETAMINOPHEN 650 MG: 325 TABLET ORAL at 03:27

## 2022-09-26 RX ADMIN — BUPROPION HYDROCHLORIDE 300 MG: 150 TABLET, EXTENDED RELEASE ORAL at 10:39

## 2022-09-26 RX ADMIN — ACETAMINOPHEN 650 MG: 325 TABLET ORAL at 15:32

## 2022-09-26 RX ADMIN — SODIUM CHLORIDE, PRESERVATIVE FREE 40 MG: 5 INJECTION INTRAVENOUS at 20:41

## 2022-09-26 RX ADMIN — IOPAMIDOL 30 ML: 755 INJECTION, SOLUTION INTRAVENOUS at 10:06

## 2022-09-26 RX ADMIN — POLYETHYLENE GLYCOL 3350 17 G: 17 POWDER, FOR SOLUTION ORAL at 10:39

## 2022-09-26 RX ADMIN — HYDROMORPHONE HYDROCHLORIDE 1 MG: 1 INJECTION, SOLUTION INTRAMUSCULAR; INTRAVENOUS; SUBCUTANEOUS at 04:36

## 2022-09-26 RX ADMIN — SODIUM CHLORIDE, PRESERVATIVE FREE 10 ML: 5 INJECTION INTRAVENOUS at 20:47

## 2022-09-26 RX ADMIN — CARVEDILOL 3.12 MG: 3.12 TABLET, FILM COATED ORAL at 10:39

## 2022-09-26 RX ADMIN — HYDROMORPHONE HYDROCHLORIDE 1 MG: 1 INJECTION, SOLUTION INTRAMUSCULAR; INTRAVENOUS; SUBCUTANEOUS at 10:43

## 2022-09-26 RX ADMIN — HYDROMORPHONE HYDROCHLORIDE 1 MG: 1 INJECTION, SOLUTION INTRAMUSCULAR; INTRAVENOUS; SUBCUTANEOUS at 13:45

## 2022-09-26 RX ADMIN — FENTANYL CITRATE 25 MCG: 50 INJECTION, SOLUTION INTRAMUSCULAR; INTRAVENOUS at 09:46

## 2022-09-26 RX ADMIN — SODIUM CHLORIDE: 234 INJECTION INTRAMUSCULAR; INTRAVENOUS; SUBCUTANEOUS at 15:36

## 2022-09-26 RX ADMIN — SODIUM CHLORIDE, PRESERVATIVE FREE 10 ML: 5 INJECTION INTRAVENOUS at 10:40

## 2022-09-26 RX ADMIN — ACETAMINOPHEN 650 MG: 325 TABLET ORAL at 10:39

## 2022-09-26 RX ADMIN — SODIUM CHLORIDE, PRESERVATIVE FREE 40 MG: 5 INJECTION INTRAVENOUS at 10:39

## 2022-09-26 RX ADMIN — HYDROMORPHONE HYDROCHLORIDE 1 MG: 1 INJECTION, SOLUTION INTRAMUSCULAR; INTRAVENOUS; SUBCUTANEOUS at 16:38

## 2022-09-26 RX ADMIN — HEPARIN SODIUM 5000 UNITS: 5000 INJECTION INTRAVENOUS; SUBCUTANEOUS at 10:39

## 2022-09-26 RX ADMIN — NALOXEGOL OXALATE 12.5 MG: 12.5 TABLET, FILM COATED ORAL at 10:39

## 2022-09-26 ASSESSMENT — PAIN SCALES - GENERAL
PAINLEVEL_OUTOF10: 8
PAINLEVEL_OUTOF10: 8
PAINLEVEL_OUTOF10: 9
PAINLEVEL_OUTOF10: 8
PAINLEVEL_OUTOF10: 9

## 2022-09-26 ASSESSMENT — PAIN DESCRIPTION - ONSET: ONSET: ON-GOING

## 2022-09-26 ASSESSMENT — PAIN DESCRIPTION - LOCATION
LOCATION: ABDOMEN;LEG
LOCATION: ABDOMEN

## 2022-09-26 ASSESSMENT — PAIN DESCRIPTION - PAIN TYPE: TYPE: SURGICAL PAIN

## 2022-09-26 ASSESSMENT — PULMONARY FUNCTION TESTS: PIF_VALUE: 0

## 2022-09-26 ASSESSMENT — PAIN DESCRIPTION - FREQUENCY: FREQUENCY: CONTINUOUS

## 2022-09-26 ASSESSMENT — PAIN DESCRIPTION - DESCRIPTORS
DESCRIPTORS: STABBING
DESCRIPTORS: ACHING

## 2022-09-26 ASSESSMENT — PAIN DESCRIPTION - ORIENTATION: ORIENTATION: MID;RIGHT

## 2022-09-26 ASSESSMENT — PAIN - FUNCTIONAL ASSESSMENT: PAIN_FUNCTIONAL_ASSESSMENT: PREVENTS OR INTERFERES SOME ACTIVE ACTIVITIES AND ADLS

## 2022-09-26 NOTE — PROGRESS NOTES
Ronda Rosales MD FACS   Urology Progress Note     Subjective:   AFVSS  NG tube replaced yesterday - 1.9L/24h  Hiccuping     UOP left neph tube: 1.68L/24 hours  Right neph tube: 70cc/24hours  Ileal conduit: 125cc/24hours  MERLIN drain upper 155cc/24h  MERLIN drain lower 700cc/24h    Am labs pending - hard stick, then refused labs, discussed with patient need for labs he agrees to have phlebotomy attempt again    Patient Vitals for the past 24 hrs:   BP Temp Temp src Pulse Resp SpO2 Weight   09/26/22 0600 -- -- -- -- -- -- 262 lb 2 oz (118.9 kg)   09/26/22 0506 -- -- -- -- 16 -- --   09/26/22 0429 (!) 141/80 98.3 °F (36.8 °C) Oral 91 16 92 % --   09/26/22 0013 (!) 151/82 98.2 °F (36.8 °C) Oral 89 16 95 % --   09/25/22 2130 (!) 154/74 98.1 °F (36.7 °C) Oral 86 16 96 % --   09/25/22 2000 (!) 147/60 98.1 °F (36.7 °C) Oral 85 16 100 % --   09/25/22 1900 (!) 143/61 -- -- 86 18 100 % --   09/25/22 1800 (!) 154/71 -- -- 86 14 99 % --   09/25/22 1700 131/74 -- -- 85 14 96 % --   09/25/22 1600 (!) 165/76 98.1 °F (36.7 °C) Oral 91 14 99 % --   09/25/22 1500 (!) 164/73 -- -- 87 14 93 % --   09/25/22 1400 (!) 150/75 -- -- 95 14 95 % --   09/25/22 1300 (!) 151/67 -- -- 99 15 94 % --   09/25/22 1200 (!) 154/75 98.1 °F (36.7 °C) Oral 97 15 96 % --   09/25/22 1100 -- -- -- 92 15 98 % --   09/25/22 1000 (!) 145/63 -- -- 94 14 98 % --   09/25/22 0900 (!) 147/64 -- -- 95 14 97 % --   09/25/22 0800 138/64 98.2 °F (36.8 °C) Oral 90 14 98 % --   09/25/22 0730 -- -- -- -- 15 -- --       Intake/Output Summary (Last 24 hours) at 9/26/2022 0709  Last data filed at 9/26/2022 0656  Gross per 24 hour   Intake 1829.84 ml   Output 4435 ml   Net -2605.16 ml       Recent Labs     09/24/22  1243 09/25/22  0249 09/26/22  1049   WBC 20.4* 20.6* 18.5*   HGB 8.0* 8.4* 8.0*   HCT 25.8* 27.2* 25.2*   MCV 90.2 92.8 90.3    163 240     Recent Labs     09/24/22  0547 09/25/22  0249 09/26/22  1049    144 149*   K 4.5 4.3 3.7   * 113* by Jenifer Arora MD on 9/26/2022 at 3:26 PM

## 2022-09-26 NOTE — PLAN OF CARE
Problem: Discharge Planning  Goal: Discharge to home or other facility with appropriate resources  9/26/2022 1712 by Paz Duncan RN  Outcome: Progressing  9/26/2022 0446 by Lisseth Baugh RN  Outcome: Progressing     Problem: Pain  Goal: Verbalizes/displays adequate comfort level or baseline comfort level  9/26/2022 1712 by Paz Duncan RN  Outcome: Progressing  9/26/2022 0446 by Lisseth Baugh RN  Outcome: Progressing  Flowsheets (Taken 9/25/2022 1600 by Mikaela Collins RN)  Verbalizes/displays adequate comfort level or baseline comfort level: Encourage patient to monitor pain and request assistance     Problem: Safety - Adult  Goal: Free from fall injury  9/26/2022 1712 by Paz Duncan RN  Outcome: Progressing  9/26/2022 0446 by Lisseth Baugh RN  Outcome: Progressing     Problem: ABCDS Injury Assessment  Goal: Absence of physical injury  9/26/2022 1712 by Paz Duncan RN  Outcome: Progressing  9/26/2022 0446 by Lisseth Baugh RN  Outcome: Progressing     Problem: Nutrition Deficit:  Goal: Optimize nutritional status  9/26/2022 1712 by Paz Duncan RN  Outcome: Progressing  9/26/2022 0446 by Lisseth Baugh RN  Outcome: Progressing     Problem: Skin/Tissue Integrity  Goal: Absence of new skin breakdown  Description: 1. Monitor for areas of redness and/or skin breakdown  2. Assess vascular access sites hourly  3. Every 4-6 hours minimum:  Change oxygen saturation probe site  4. Every 4-6 hours:  If on nasal continuous positive airway pressure, respiratory therapy assess nares and determine need for appliance change or resting period. 9/26/2022 1712 by Paz Duncan RN  Outcome: Progressing  9/26/2022 0446 by Lisseth Baugh RN  Outcome: Progressing     Problem: Safety - Medical Restraint  Goal: Remains free of injury from restraints (Restraint for Interference with Medical Device)  Description: INTERVENTIONS:  1.  Determine that other, less restrictive measures have been tried or would not be effective before applying the restraint  2. Evaluate the patient's condition at the time of restraint application  3. Inform patient/family regarding the reason for restraint  4. Q2H: Monitor safety, psychosocial status, comfort, nutrition and hydration  9/26/2022 1712 by Vikram Luke RN  Outcome: Progressing  9/26/2022 0446 by Tico Foster RN  Outcome: Progressing     Problem: Confusion  Goal: Confusion, delirium, dementia, or psychosis is improved or at baseline  Description: INTERVENTIONS:  1. Assess for possible contributors to thought disturbance, including medications, impaired vision or hearing, underlying metabolic abnormalities, dehydration, psychiatric diagnoses, and notify attending LIP  2. [place] high risk fall precautions, as indicated  3. Provide frequent short contacts to provide reality reorientation, refocusing and direction  4. Decrease environmental stimuli, including noise as appropriate  5. Monitor and intervene to maintain adequate nutrition, hydration, elimination, sleep and activity  6. If unable to ensure safety without constant attention obtain sitter and review sitter guidelines with assigned personnel  7.  Initiate Psychosocial CNS and Spiritual Care consult, as indicated  9/26/2022 1712 by Vikram Luke RN  Outcome: Progressing  9/26/2022 0446 by Tico Foster RN  Outcome: Progressing  Flowsheets (Taken 9/25/2022 1600 by Anjum Enrique RN)  Effect of thought disturbance (confusion, delirium, dementia, or psychosis) are managed with adequate functional status:   Assess for contributors to thought disturbance, including medications, impaired vision or hearing, underlying metabolic abnormalities, dehydration, psychiatric diagnoses, notify [facility] high risk fall precautions, as indicated   Provide frequent short contacts to provide reality reorientation, refocusing and direction   Decrease environmental stimuli, including noise as appropriate

## 2022-09-26 NOTE — ANESTHESIA POSTPROCEDURE EVALUATION
Department of Anesthesiology  Postprocedure Note    Patient: Bg Subramanian  MRN: [id number]  YOB: 1955  Date of evaluation: 9/26/2022      Procedure Summary     Date: 09/23/22 Room / Location: [address]    Anesthesia Start: 4282 Anesthesia Stop: 2696    Procedure: 2ND LOOK LAPAROTOMY,  ABDOMINAL WASHOUT, ABDOMINAL CLOSURE, WOUND VAC PLACEMENT Diagnosis:       Open wound of abdomen, subsequent encounter      (Open wound of abdomen, subsequent encounter [S31.109D])    Surgeons: Ladonna Peralta DO Responsible Provider: Nelson Rosenberg MD    Anesthesia Type: general ASA Status: 4          Anesthesia Type: No value filed.     Madhu Phase I: Madhu Score: 8    Madhu Phase II:        Anesthesia Post Evaluation    Patient location during evaluation: ICU  Patient participation: complete - patient cannot participate  Level of consciousness: sedated and ventilated  Pain score: 0  Airway patency: patent  Nausea & Vomiting: no nausea and no vomiting  Complications: no  Cardiovascular status: blood pressure returned to baseline  Respiratory status: intubated  Hydration status: euvolemic  Comments:   Mechanical Ventilation Data  SETTINGS (Comprehensive)  Vent Information  Equipment Changed: Expiratory Filter, HME  Ventilator Discontinue: Yes  Additional Respiratory Assessments  Heart Rate: 90  Resp: 16  SpO2: 95 %  End Tidal CO2: 0 (%)  Position: Semi-Casey's  Humidification Source: HME    ABG   No results found for: PH, PCO2, PO2, HCO3, O2SAT  Lab Results       Component                Value               Date/Time                  MODE                     [facility]                09/23/2022 05:58 AM

## 2022-09-26 NOTE — BRIEF OP NOTE
Brief Postoperative Note    Lisa Shelton  YOB: 1955  [id number]    Pre-operative Diagnosis: PAXTON; Nephrostomy tube not draining    Post-operative Diagnosis: Same    Procedure: Right Nephrostomy Tube Exchange     Anesthesia: Local     Surgeons/Assistants: Ronnie Graves MD    Estimated Blood Loss: Minimal    Complications: none    Specimens: were not obtained    Right nephrostomy tube was pulled back. Able to exchange 8 Fr Nephrostomy exchanged for new 8 Fr Nephrostomy tube. New nephrostomy tube sutured to the skin and dressing and gravity drainage bag applied. Vital signs were reviewed and were stable after the procedure.      Electronically signed by RAYMUNDO Esquivel on 9/26/2022 at 9:58 AM

## 2022-09-26 NOTE — CARE COORDINATION
Met with patient to discuss transitional planning. Plan is [facility]. Patient agreeable to plan. Left vm message for Gee Manzo in admissions with [facility] to confirm they are still following.

## 2022-09-26 NOTE — PROGRESS NOTES
Physical Therapy  Facility/Department: [facility] RENAL//MED SURG  Daily Treatment Note    Name: Dg Oropeza  : 1955  MRN: [id number]  Date of Service: 2022    Discharge Recommendations:  Patient would benefit from continued therapy after discharge   PT Equipment Recommendations  Equipment Needed: No  Other: Pt report owning walker, cane, shower seat and with grab bars, denies DME needs      Patient Diagnosis(es): The encounter diagnosis was Malignant neoplasm of lateral wall of urinary bladder ([address] Past Medical History:  has a past medical history of Acute renal failure with tubular necrosis ([address] Arthritis, BPH with obstruction/lower urinary tract symptoms, CAD (coronary artery disease), Caffeine use, Cancer ([address] CKD (chronic kidney disease), stage III ([address] COVID-19, Depression, Dilated cardiomyopathy ([facility] GERD (gastroesophageal reflux disease), High cholesterol, Kidney calculi, Sleep apnea, Under care of team, Under care of team, Under care of team, Under care of team, and Under care of team.  Past Surgical History:  has a past surgical history that includes Knee arthroscopy; Cardiac catheterization; Colonoscopy; IR PORT PLACEMENT > 5 YEARS (2022); Cardiac catheterization (2022); Bladder removal; Prostatectomy (2022); Bladder removal (N/A, 2022); Bladder surgery (Right, 2022); laparotomy (N/A, 2022); IR GUIDED NEPHROSTOMY CATH PLACEMENT LEFT (2022); and IR GUIDED NEPHROSTOMY CATH PLACEMENT RIGHT (2022). Assessment   Body Structures, Functions, Activity Limitations Requiring Skilled Therapeutic Intervention: Decreased functional mobility ; Decreased body mechanics; Decreased tolerance to work activity; Decreased strength;Decreased safe awareness;Decreased endurance; Increased pain  Assessment: Pt completed bed mobility and transfers with Hanane to RW. Pt ambulated ~5 steps from bed->recliner with RW with CGA/Hanane x 2 d/t multiple line mgmt.   Pt very particular t/o requiring constant encouragement and increased time for all functional mobility. Pt would benefit from continued PT. Specific Instructions for Next Treatment: progress gait with AD  Therapy Prognosis: Good  Activity Tolerance  Activity Tolerance: Patient limited by endurance  Activity Tolerance Comments: increased time t/o d/t pt being very particular     Plan   Plan  Plan:  (5-6x/wk)  Specific Instructions for Next Treatment: progress gait with AD  Current Treatment Recommendations: Therapeutic activities, Strengthening, Balance training, Functional mobility training, Transfer training, Endurance training, Gait training, Stair training  Safety Devices  Type of Devices: All fall risk precautions in place, Call light within reach, Left in chair, Nurse notified, Gait belt (IR entered room to transport pt)  Restraints  Restraints Initially in Place: No     Restrictions  Restrictions/Precautions  Restrictions/Precautions: NPO, Surgical Protocols, Fall Risk, General Precautions, Up as Tolerated  Required Braces or Orthoses?: No  Position Activity Restriction  Other position/activity restrictions: Diet NPO Exceptions are: Popsicles, Sips of Water with Meds (Karl Sees) 09/26/2022, wound vac, multiple drains     Subjective   General  Chart Reviewed: Yes  Response To Previous Treatment: Patient with no complaints from previous session. Family / Caregiver Present: No  Subjective  Subjective: RN and pt agreeable to PT after several attempts of encouragement. Pt is very particular and very talkative t/o and delaying progression to EOB with most attempts. Pt has no c/o t/o. Cognition   Orientation  Overall Orientation Status: Within Functional Limits  Cognition  Cognition Comment: Pt easily agitated, self limiting at times, requires encouragement, extra time and effort     Objective   Bed mobility  Supine to Sit: Moderate assistance (with trunk progression.   RN present to assist with lines)  Sit to Supine: Unable to assess (Pt retired to [employer] [facility])  Scooting: Stand by assistance  Bed Mobility Comments: Assessed with HOB elevated. RN assisted with lines as pt has multiple lines. Increased time to complete d/t encouragement needed and pt being very particular t/o. Transfers  Sit to Stand: Minimal Assistance  Stand to sit: Minimal Assistance  Bed to Chair: Minimal assistance  Comment: assessed to RW  Ambulation  Surface: level tile  Device: Rolling Walker  Assistance: Minimal assistance;2 Person assistance;Contact guard assistance  Quality of Gait: fwd flexed posture  Gait Deviations: Slow Mignon;Decreased step length;Decreased step height  Distance: ~5 steps from bed->recliner  Comments: verbal cues t/o for safety d/t multiple drainage lines. CGA/Hanane x 2 for multiple line mgmt     Balance  Posture: Good  Sitting - Static: Good  Sitting - Dynamic: Good;-  Standing - Static: Fair  Standing - Dynamic: Fair;-  Comments: Pt leans heavily onto walker  Exercise Treatment: B ankle pumps x 10 reps; further exercises deferred d/t IR transport arriving        OutComes Score    AM-PAC Score  -PAC Inpatient Mobility Raw Score : 16 (09/26/22 0907)  AM-PAC Inpatient T-Scale Score : 40.78 (09/26/22 0907)  Mobility Inpatient CMS 0-100% Score: 54.16 (09/26/22 0907)  Mobility Inpatient CMS G-Code Modifier : CK (09/26/22 0907)     Goals  Short Term Goals  Time Frame for Short term goals: 5 visits  Short term goal 1: Pt to complete supine <> sit mod I  Short term goal 2: Sit <> stand Mod I with AD  Short term goal 3: pt to ambulate 50 ft with RW CGA  Long Term Goals  Time Frame for Long term goals : 14 visits  Long term goal 1: Pt to ambulate 100 ft Mod I RW  Long term goal 2: Ascend /descend 4 steps mod I  Patient Goals   Patient goals : to get stronger       Education  Patient Education  Education Given To: Patient  Education Provided: Role of Therapy;Plan of Care; Fall Prevention Strategies;Transfer Training  Education Provided Comments: Pt educated on the importance of functional mobility  Education Method: Verbal  Barriers to Learning: Other (Comment) (Pt very particular t/o and self limiting)  Education Outcome: Verbalized understanding      Therapy Time   Individual Concurrent Group Co-treatment   Time In 0825         Time Out 0905         Minutes 40         Timed Code Treatment Minutes: Caleb Naqvi, PTA

## 2022-09-26 NOTE — PROGRESS NOTES
PROGRESS NOTE    PATIENT NAME: Kameron Bailon RECORD NO. [id number]  DATE: 2022    PRIMARY CARE PHYSICIAN: Claudetta Chafe, MD    HD: # 10    ASSESSMENT    Patient Active Problem List   Diagnosis    Kidney stones    Nocturia    Hypertrophy of prostate with urinary obstruction    Malignant neoplasm of urinary bladder (HCC)    Urinary retention    Cancer of lateral wall of urinary bladder (HCC)    Acute respiratory failure with hypoxia (HCC)    Other hyperlipidemia    Microcytic anemia    Mild protein-calorie malnutrition (HCC)    Pulmonary embolism without acute cor pulmonale (HCC)    Hypoxia    Dyspnea    Acute systolic heart failure (HCC)    Acute renal failure with tubular necrosis (HCC)    CKD (chronic kidney disease), stage III (HCC)    Dilated cardiomyopathy ([facility]    Bladder cancer ([facility]    Acute kidney injury [facility])       MEDICAL DECISION MAKING AND PLAN    SBO s/p release  Still significant amount NGT output, but patient states no nausea and wants popsicles for comfort  Would hold on clamp trial until <1000ml out in 24hrs or return of bowel function  Midline wound  Wound vac change today, white foam base and black foam top  MERLIN drain  To stay until deemed ok to removed per urology  [place]  Nephrology on board, defer to their service        Chief Complaint: \"No one listens to me\"    [address] seen and examined, FRANKY, VSS, NGT in place to [facility], 1.9L out in past 24hr, denies CP or SOB, midline vac w/ good seal    OBJECTIVE  VITALS: Temp: Temp: 98.3 °F (36.8 °C)Temp  Av.2 °F (36.8 °C)  Min: 98.1 °F (36.7 °C)  Max: 98.3 °F (40.8 °C) BP Systolic (82PTP), TI , Min:131 , IAQ:818   Diastolic (87QSZ), QCQ:52, Min:60, Max:82   Pulse Pulse  Av.5  Min: 85  Max: 99 Resp Resp  Avg: 15  Min: 14  Max: 18 Pulse ox SpO2  Av.6 %  Min: 92 %  Max: 100 %    GENERAL: alert, no distress  HEENT: PERRLA, NCAT, NGT  : ileal conduit in place, neprhostomy tubes x2  LUNGS: CTAB  HEART: RRR no M  ABDOMEN: soft, non-tender, non-distended,  left abdomen MERLIN drains in place  EXTREMITY: no cyanosis, clubbing or edema      LAB:  CBC:   Recent Labs     09/24/22  0547 09/24/22  1243 09/25/22  0249   WBC 19.7* 20.4* 20.6*   HGB 8.1* 8.0* 8.4*   HCT 26.9* 25.8* 27.2*   MCV 93.4 90.2 92.8    193 163       BMP:   Recent Labs     09/23/22  1436 09/24/22  0547 09/25/22  0249    142 144   K 4.6 4.5 4.3   * 114* 113*   CO2 21 20 21   BUN 46* 46* 38*   CREATININE 2.36* 2.47* 1.89*   GLUCOSE 107* 99 76           RADIOLOGY:  XR ABDOMEN FOR NG/OG/NE TUBE PLACEMENT    Result Date: 9/25/2022  Nasogastric tube tip is in the stomach            Electronically signed by Kristopher Yao DO on 9/26/2022 at 8:01 AM    Attestation signed by Ruperto Rea MD    I personally evaluated the patient and directed the medical decision making with Resident/TERRY after the physical/radiologic exam and laboratory values were reviewed and confirmed. Await GI fx, possible TPN.      Ruperto Rea MD

## 2022-09-26 NOTE — PROGRESS NOTES
Renal Progress Note    Patient :  Michael Herrera; 74 y.o. MRN# [id number]  Location:  5248/6395-47  Attending:  Jj Lowry MD  Admit Date:  9/16/2022   Hospital Day: 10    Subjective:       No acute episodes overnight  Patient is heme stable with Tmax of 99  Urine output about 2.5 L   drain output continues to be significant over last 24 hours, exceeds 855 ml  Total fluid balance since admission -14 liters  AM Labs reviewed, creat 1.4, Na 149, magnesium 1.9 calcium 8.0 phosphorus 1.7    History reviewed known history of chronic kidney disease stage IIIb baseline 1.3-1.4 secondary to chronic interstitial nephritis both from obstructive uropathy related to bladder tumor and chemotherapy with platinum-based analogs. He has seen me in the office before. Was brought into the hospital for elective cystoprostatectomy which was done on 9/16/2022 also underwent bilateral pelvic lymphadenectomy and ileal conduit formation. Postoperatively has developed small bowel ileus, which has persisted and has been refractory to conservative treatment. Outpatient Medications:     Medications Prior to Admission: ondansetron (ZOFRAN-ODT) 8 MG TBDP disintegrating tablet, DISSOLVE 1 TABLET IN MOUTH EVERY 8 HOURS AS NEEDED FOR NAUSEA FOR VOMITING  HYDROcodone-acetaminophen (NORCO) 5-325 MG per tablet, Take 1 tablet by mouth every 8 hours as needed for Pain for up to 30 days.   rivaroxaban (XARELTO) 20 MG TABS tablet, Take 1 tablet by mouth once daily with breakfast (Patient taking differently: Take 1 tablet by mouth once daily with breakfast/ per cardiology, pt. to stop 3 days pre-op for OR 9-16-22)  omeprazole (PRILOSEC) 20 MG delayed release capsule, Take 1 capsule by mouth daily  carvedilol (COREG) 3.125 MG tablet, TAKE 1 TABLET BY MOUTH TWICE DAILY  finasteride (PROSCAR) 5 MG tablet, Take 5 mg by mouth daily  tamsulosin (FLOMAX) 0.4 MG capsule, Take 0.4 mg by mouth daily  simvastatin (ZOCOR) 40 MG tablet, Take 40 mg by mouth or flank tenderness. Neuro:  Sedated on ventilator. Skin:   No rashes, good skin turgor. Extremities:  No edema, palpable peripheral pulses, no cyanosis, no calf tenderness. Labs:       Recent Labs     09/24/22  1243 09/25/22  0249 09/26/22  1049   WBC 20.4* 20.6* 18.5*   RBC 2.86* 2.93* 2.79*   HGB 8.0* 8.4* 8.0*   HCT 25.8* 27.2* 25.2*   MCV 90.2 92.8 90.3   MCH 28.0 28.7 28.7   MCHC 31.0 30.9 31.7   RDW 15.4* 15.8* 15.7*    163 240   MPV 9.6 10.8 9.9        BMP:   Recent Labs     09/24/22  0547 09/25/22  0249 09/26/22  1049    144 149*   K 4.5 4.3 3.7   * 113* 116*   CO2 20 21 22   BUN 46* 38* 29*   CREATININE 2.47* 1.89* 1.48*   GLUCOSE 99 76 80   CALCIUM 7.5* 7.8* 8.0*        Phosphorus:     Recent Labs     09/24/22  0547 09/25/22  0249 09/26/22  1049   PHOS 4.4 2.8 1.7*       Magnesium:    Recent Labs     09/24/22  0547 09/25/22  0249 09/26/22  1049   MG 1.8 1.8 1.9       Albumin:  No results for input(s): LABALBU in the last 72 hours.   BNP:    No results found for: BNP  LISS:      Lab Results   Component Value Date/Time    LISS NEGATIVE 05/31/2022 10:45 AM     SPEP:  Lab Results   Component Value Date/Time    PROT 6.6 09/08/2022 01:46 PM     UPEP:   No results found for: LABPE  C3:     Lab Results   Component Value Date/Time    C3 119 05/31/2022 10:45 AM     C4:     Lab Results   Component Value Date/Time    C4 25 05/31/2022 10:45 AM     MPO ANCA:   No results found for: MPO  PR3 ANCA:   No results found for: PR3  Anti-GBM:   No results found for: GBMABIGG  Hep BsAg:       No results found for: HEPBSAG  Hep C AB:        No results found for: HEPCAB    Urinalysis/Chemistries:      Lab Results   Component Value Date/Time    NITRU POSITIVE 09/18/2022 05:17 PM    COLORU Yellow 09/18/2022 05:17 PM    PHUR 6.0 09/18/2022 05:17 PM    WBCUA 50  09/18/2022 05:17 PM    RBCUA TOO NUMEROUS TO COUNT 09/18/2022 05:17 PM    MUCUS 3+ 04/14/2022 09:43 AM    YEAST FEW 03/31/2022 01:01 PM BACTERIA MODERATE 09/18/2022 05:17 PM    CLARITYU cloudy 08/04/2022 09:05 AM    SPECGRAV 1.016 09/18/2022 05:17 PM    LEUKOCYTESUR LARGE 09/18/2022 05:17 PM    UROBILINOGEN Normal 09/18/2022 05:17 PM    BILIRUBINUR NEGATIVE 09/18/2022 05:17 PM    BLOODU large 08/04/2022 09:05 AM    GLUCOSEU NEGATIVE 09/18/2022 05:17 PM    KETUA TRACE 09/18/2022 05:17 PM     Urine Sodium:   No results found for: ИРИНА  Urine Potassium:  No results found for: KUR  Urine Chloride:  No results found for: CLUR  Urine Osmolarity: No results found for: OSMOU  Urine Protein:   No results found for: TPU  Urine Creatinine:     Lab Results   Component Value Date/Time    LABCREA 121.3 09/18/2022 05:17 PM     Urine Eosinophils:  No components found for: UEOS    Radiology:     CXR:     Assessment:     1. Acute Kidney Injury: Secondary to ischemic ATN from depleted circulating volume related to small bowel obstruction ileus, sequestration, peritoneal inflammation from suspected anastomotic leak. Baseline 1.3-1.4 postoperatively had gone up to 2.5, came down to 1.5, however i then started worsening peaked at 2.5, found to have anastomotic leak and Bowel and ureter. Required operative intervention, bilateral nephrostomy tube placement, since then urine output improving down to 1.5, insensible losses continue to be high. 2.  Small bowel obstruction related to dynamic causes, bowel was stuck behind the ureter which was extricated yesterday. Some suspicion of ischemia. 3.  Chronic kidney disease stage IIIb from chronic intestinal nephritis both from chemotherapy with platinum and bladder tumor causing obstructive uropathy, baseline 1.3-1.4  4. Bladder cancer failed local therapy status for cystoprostatectomy and ileal conduit formation 9/16/2022  5. Cardiomyopathy ejection fraction 40%  6. Hypernatremia from insensible free water losses  7. Hypophosphatemia    Plan:   1. Change IV fluids to one third saline at 125 an hour  2.  Keep systolic pressure more than 110  3. Replace phosphorus  4. Follow lab work, intake and output, daily weights, and hemodynamics  5.   Advance diet as recommended by surgery  6, monitor urine output

## 2022-09-26 NOTE — PLAN OF CARE
Problem: Discharge Planning  Goal: Discharge to home or other facility with appropriate resources  Outcome: Progressing     Problem: Pain  Goal: Verbalizes/displays adequate comfort level or baseline comfort level  Outcome: Progressing  Flowsheets (Taken 9/25/2022 1600 by Eleanor Altman RN)  Verbalizes/displays adequate comfort level or baseline comfort level: Encourage patient to monitor pain and request assistance     Problem: Safety - Adult  Goal: Free from fall injury  Outcome: Progressing     Problem: ABCDS Injury Assessment  Goal: Absence of physical injury  Outcome: Progressing     Problem: Nutrition Deficit:  Goal: Optimize nutritional status  Outcome: Progressing     Problem: Skin/Tissue Integrity  Goal: Absence of new skin breakdown  Description: 1. Monitor for areas of redness and/or skin breakdown  2. Assess vascular access sites hourly  3. Every 4-6 hours minimum:  Change oxygen saturation probe site  4. Every 4-6 hours:  If on nasal continuous positive airway pressure, respiratory therapy assess nares and determine need for appliance change or resting period. Outcome: Progressing     Problem: Safety - Medical Restraint  Goal: Remains free of injury from restraints (Restraint for Interference with Medical Device)  Description: INTERVENTIONS:  1. Determine that other, less restrictive measures have been tried or would not be effective before applying the restraint  2. Evaluate the patient's condition at the time of restraint application  3. Inform patient/family regarding the reason for restraint  4. Q2H: Monitor safety, psychosocial status, comfort, nutrition and hydration  Outcome: Progressing     Problem: Confusion  Goal: Confusion, delirium, dementia, or psychosis is improved or at baseline  Description: INTERVENTIONS:  1.  Assess for possible contributors to thought disturbance, including medications, impaired vision or hearing, underlying metabolic abnormalities, dehydration, psychiatric diagnoses, and notify attending LIP  2. [place] high risk fall precautions, as indicated  3. Provide frequent short contacts to provide reality reorientation, refocusing and direction  4. Decrease environmental stimuli, including noise as appropriate  5. Monitor and intervene to maintain adequate nutrition, hydration, elimination, sleep and activity  6. If unable to ensure safety without constant attention obtain sitter and review sitter guidelines with assigned personnel  7.  Initiate Psychosocial CNS and Spiritual Care consult, as indicated  Outcome: Progressing  Flowsheets (Taken 9/25/2022 1600 by Rossana Munson RN)  Effect of thought disturbance (confusion, delirium, dementia, or psychosis) are managed with adequate functional status:   Assess for contributors to thought disturbance, including medications, impaired vision or hearing, underlying metabolic abnormalities, dehydration, psychiatric diagnoses, notify [place] high risk fall precautions, as indicated   Provide frequent short contacts to provide reality reorientation, refocusing and direction   Decrease environmental stimuli, including noise as appropriate

## 2022-09-26 NOTE — PROGRESS NOTES
Here for a right nephrostogram. Prone on table. Strap on. Dr Yadira Campbell at side. Right PCNT injected with contrast. Not in proper position. New 8fr x 35 cm Right PCNT placed. Sutured and stay fixed. Drain placed to leg bag. Patient back to room.

## 2022-09-26 NOTE — PROGRESS NOTES
Comprehensive Nutrition Assessment    Type and Reason for Visit:  Reassess    Nutrition Recommendations/Plan:   Continue NPO, monitor for diet advancement/tolerance  Pt without nutrition x 10 days, severe malnutrition noted. If pt unable to tolerate oral diet, recommend initiating TPN at 42 mL/hr. Will monitor for start of nutrition, plan of care     Malnutrition Assessment:  Malnutrition Status:  Severe malnutrition (09/17/22 1345)    Context:  Chronic Illness     Findings of the 6 clinical characteristics of malnutrition:  Energy Intake:  75% or less estimated energy requirements for 1 month or longer  Weight Loss:  Greater than 10% over 6 months     Body Fat Loss:  Unable to assess     Muscle Mass Loss:  Unable to assess    Fluid Accumulation:  No significant fluid accumulation     Strength:  Not Performed    Nutrition Assessment:    Chart reviewed. Extubated 9/24. NGT in place to LIWS, 1.9L out in past 24hr. Spoke with RN, pt started on sips of water with meds, popsicles. No plans to start TPN. Wts reviewed. Nutrition Related Findings:    labs/meds reviewed. +2 RUE, +1 LUE, +1 BLE edema noted. Wound Type: Surgical Incision, Wound Vac       Current Nutrition Intake & Therapies:    Average Meal Intake: NPO  Average Supplements Intake: NPO  Diet NPO Exceptions are: Popsicles, Sips of Water with Meds    Anthropometric Measures:  Height: 6' 2.02\" (188 cm)  Ideal Body Weight (IBW): 190 lbs (86 kg)       Current Body Weight: 262 lb 2 oz (118.9 kg), 133.7 % IBW. Weight Source: Bed Scale  Current BMI (kg/m2): 33.6  Usual Body Weight: 254 lb 13 oz (115.6 kg) (7/14/22 per wt hx review)  % Weight Change (Calculated): -0.3                    BMI Categories: Obese Class 1 (BMI 30.0-34. 9)    Estimated Daily Nutrient Needs:  Energy Requirements Based On: Kcal/kg  Weight Used for Energy Requirements: Current  Energy (kcal/day): 8624-9154 kcal/d  Weight Used for Protein Requirements: Ideal  Protein (g/day): 100-120 gm pro/d  Method Used for Fluid Requirements: ml/Kg  Fluid (ml/day): 2800ml/d    Nutrition Diagnosis:   Inadequate oral intake related to altered GI function as evidenced by NPO or clear liquid status due to medical condition    Nutrition Interventions:   Food and/or Nutrient Delivery: Continue NPO  Nutrition Education/Counseling: No recommendation at this time  Coordination of Nutrition Care: Continue to monitor while inpatient       Goals:  Previous Goal Met: No Progress toward Goal(s)  Goals: Meet at least 75% of estimated needs, prior to discharge       Nutrition Monitoring and Evaluation:   Behavioral-Environmental Outcomes: None Identified  Food/Nutrient Intake Outcomes: Diet Advancement/Tolerance  Physical Signs/Symptoms Outcomes: Biochemical Data, Nutrition Focused Physical Findings, Skin, Weight, GI Status, Nausea or Vomiting    Discharge Planning:     Too soon to determine     Vaughn Mckeon, [address]  Contact: [phone]

## 2022-09-26 NOTE — PROGRESS NOTES
[facility]  Nurse  Consult Note       NAME:  Riccardo Rachel  MEDICAL RECORD NUMBER:  [id number]  AGE: 74 y.o. GENDER: male  : 1955  TODAY'S DATE:  2022    Subjective   Reason for [id number] Nurse Evaluation and Assessment: NPWT dressing changes to mid abdominal surgical wound using white foam to base under black granufoam at -125 mHg vacuum. Urostomy care and education. Riccardo Rachel is a 74 y.o. male referred by:   [x] Physician  [] Nursing  [] Other:         PAST MEDICAL HISTORY        Diagnosis Date    Acute renal failure with tubular necrosis ([facility] [address] 2022    Likely ischemic ATN/prerenal acidemia from intractable nausea vomiting as result of chemotherapy, baseline 1.3-1.4 peaked up to 2.3 in May 2022    Arthritis     BPH with obstruction/lower urinary tract symptoms     CAD (coronary artery disease) 2022    nonobstructive on cath    Caffeine use     3 cans soda/pop    Cancer ([facility] [address]     bladder cancer. Dr. Skye Bundy Urology    CKD (chronic kidney disease), stage III (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   Baseline 1.3-1.4    COVID-19 2021    SOB, fatigue, fever, chills  x 3 weeks    Depression     Dilated cardiomyopathy ([facility] [address] 2022    Ejection fraction 40 to 45%, does have symptoms of dyspnea and decreased effort tolerance    GERD (gastroesophageal reflux disease)     High cholesterol     Kidney calculi     Sleep apnea     does not use cpap    Under care of team     Dr. Chan Tellez Nephrology    Under care of team     Dr. Ladonna Paige. last visit approx 2021    Under care of team     Dr. Abram Robles Cardiology [facility] last visit 2022    Under care of team     Dr. Edith Rocha    Under care of team     Dr. Nathalia Orlando Pulmon[facility]       PAST SURGICAL HISTORY    Past Surgical History:   Procedure Laterality Date    BLADDER REMOVAL      BLADDER REMOVAL N/A 2022    XI ROBOTIC LAPARASCOPIC ASSISTED RADICAL CYSTOPROSTATECTOMY, BILATERAL PELVIC LYMPHADENECTOMY AND ILEAL CONDUIT FORMATION performed by Mabel Sanchez MD at [facility] Right 2022    CYSTOSCOPY STENT REMOVAL performed by Mabel Sanchez MD at [address]  2022    nonobstructive CAD    COLONOSCOPY      IR NEPHROSTOMY PERCUTANEOUS LEFT  2022    IR NEPHROSTOMY PERCUTANEOUS LEFT 2022 Larwence Halsted, MD [facility]    IR NEPHROSTOMY PERCUTANEOUS RIGHT  2022    IR NEPHROSTOMY PERCUTANEOUS RIGHT 2022 Larwence Halsted, MD [facility]    IR PORT PLACEMENT EQUAL OR GREATER THAN 5 YEARS  2022    IR PORT PLACEMENT EQUAL OR GREATER THAN 5 YEARS 2022 [facility]    KNEE ARTHROSCOPY      left    LAPAROTOMY N/A 2022    LAPAROTOMY EXPLORATORY, LYSIS OF ADHESIONS, REPAIR OF BILATERAL URETERAL ANASTOMOSES, ABDOMINAL WASHOUT, PLACEMENT OF ABTHERA WOUND VAC performed by Sue Richardson DO at [address] N/A 2022    2ND LOOK LAPAROTOMY,  ABDOMINAL WASHOUT, ABDOMINAL CLOSURE, WOUND VAC PLACEMENT performed by Sue Richardson DO at [facility]  2022    Cystoprostatectomy, Bilateral Pelvic Lymphadenectomy, ileo conduit formation, cystectomy stent removal (right)       FAMILY HISTORY    Family History   Problem Relation Age of Onset    Cancer Father         bladder cancer    Urolithiasis Father        SOCIAL HISTORY    Social History     Tobacco Use    Smoking status: Former     Packs/day: 0.25     Years: 9.00     Pack years: 2.25     Types: Cigarettes     Start date: 1983     Quit date: 2/10/1992     Years since quittin.6    Smokeless tobacco: Never   Vaping Use    Vaping Use: Never used   Substance Use Topics    Alcohol use: Not Currently    Drug use: No       ALLERGIES    Allergies   Allergen Reactions    Other        MEDICATIONS    No current facility-administered medications on file prior to encounter.      Current Outpatient Medications on File Prior to Encounter   Medication Sig Dispense Refill    rivaroxaban (XARELTO) 20 MG TABS tablet Take 1 tablet by mouth once daily with breakfast (Patient taking differently: Take 1 tablet by mouth once daily with breakfast/ per cardiology, pt. to stop 3 days pre-op for OR 9-16-22) 30 tablet 1    omeprazole (PRILOSEC) 20 MG delayed release capsule Take 1 capsule by mouth daily 30 capsule 3    carvedilol (COREG) 3.125 MG tablet TAKE 1 TABLET BY MOUTH TWICE DAILY      finasteride (PROSCAR) 5 MG tablet Take 5 mg by mouth daily      tamsulosin (FLOMAX) 0.4 MG capsule Take 0.4 mg by mouth daily      simvastatin (ZOCOR) 40 MG tablet Take 40 mg by mouth nightly      buPROPion (WELLBUTRIN XL) 300 MG extended release tablet Take 300 mg by mouth every morning         Objective    /69   Pulse 90   Temp 97.8 °F (36.6 °C)   Resp 16   Ht 6' 2.02\" (1.88 m)   Wt 262 lb 2 oz (118.9 kg)   SpO2 95%   BMI 33.64 kg/m²     LABS:  WBC:    Lab Results   Component Value Date/Time    WBC 18.5 09/26/2022 10:49 AM     H/H:    Lab Results   Component Value Date/Time    HGB 8.0 09/26/2022 10:49 AM    HCT 25.2 09/26/2022 10:49 AM     PTT:  No results found for: APTT, PTT[APTT}  PT/INR:    Lab Results   Component Value Date/Time    PROTIME 13.6 02/25/2022 12:26 PM    INR 1.1 02/25/2022 12:26 PM     HgBA1c:  No results found for: LABA1C    Assessment   Camacho Risk Score: Camacho Scale Score: 18    Patient Active Problem List   Diagnosis Code    Kidney stones N20.0    Nocturia R35.1    Hypertrophy of prostate with urinary obstruction N40.1, N13.8    Malignant neoplasm of urinary bladder (HCC) C67.9    Urinary retention R33.9    Cancer of lateral wall of urinary bladder (HCC) C67.2    Acute respiratory failure with hypoxia (HCC) J96.01    Other hyperlipidemia E78.49    Microcytic anemia D50.9    Mild protein-calorie malnutrition (HCC) E44.1    Pulmonary embolism without acute cor pulmonale (HCC) I26.99    Hypoxia R09.02    Dyspnea R20.62    Acute systolic heart failure (HCC) I50.21    Acute renal failure with tubular necrosis (HCC) N17.0    CKD (chronic kidney disease), stage III (HCC) N18.30    Dilated cardiomyopathy (HCC) I42.0    Bladder cancer (HCC) C67.9    Acute kidney injury ([facility] [address] N17.9       Measurements:     09/26/22 1400   Urostomy Ileal conduit RLQ   Placement Date/Time: 09/16/22 1413   Present on Admission/Arrival: No  Inserted by: Dr. Venessa Simms Type: Ileal conduit  Location: RLQ   Stomal Appliance 2 piece; Changed   Flange Size (inches) 2.25 Inches   Stoma  Assessment Red;Flush   Peristomal Assessment Clean, dry & intact   Collection Container Standard   Treatment Bag change;Site care; Liquid skin barrier  (Brava seal)   Urine Color Yellow   Urine Appearance Cloudy   Negative Pressure Wound Therapy Abdomen Mid   Placement Date/Time: 09/23/22 1310   Location: Abdomen  Wound Location Orientation: Mid   Wound Type Surgical   Unit Type VAC Ulta   Dressing Type White Foam;Black Foam   Number of pieces used 3  (2 white 1 black)   Number of pieces removed 3   Cycle Continuous; On   Target Pressure (mmHg) 125   Canister changed?  No   Dressing Status New dressing applied   Dressing Changed Changed/New   Drainage Amount Moderate   Drainage Description Serosanguinous   Wound Assessment Subcutaneous   Verito-wound Assessment Intact   Wound 09/22/22 Abdomen Mid SURGICAL INCISION   Date First Assessed: 09/22/22   Present on Hospital Admission: No  Primary Wound Type: Surgical Type  Location: Abdomen  Wound Location Orientation: Mid  Wound Description (Comments): SURGICAL INCISION   Wound Image    Wound Etiology Surgical   Dressing Status New dressing applied   Wound Cleansed Cleansed with saline   Dressing/Treatment Negative pressure wound therapy   Dressing Change Due 09/28/22   Wound Length (cm) 16 cm   Wound Width (cm) 3.9 cm   Wound Depth (cm) 3 cm   Wound Surface Area (cm^2) 62.4 cm^2   Wound Volume (cm^3) 187.2 cm^3   Wound Assessment Subcutaneous  (suture visible)   Drainage Amount Moderate   Drainage Description Serosanguinous   Odor None   Verito-wound Assessment Intact  (SurePREP; bordered with drape)   Wound Thickness Description not for Pressure Injury Full thickness     Medicated with IV Dilaudid prior to start  of procedure. Patient is manipulating his NG tube and seems somewhat obsessed with the amount of drainage visible in the tubing. NPWT dressing removed using adhesive remove and sponge loosened with saline. Wound rinsed with saline. Periwound skin prepped with SurePREP and bordered with drape. Strips of white sponge placed in base of wound with black granufoam to fill wound. Good seal was achieved. Urostomy appliance changed; wife present for demonstration. Good recall from prior pouch change. Response to treatment:  Well tolerated by patient. Pain Assessment:  Premedicated: Yes    Plan   Plan of Care:     ABDOMINAL WOUND:  NPWT with white foam to base of wound under black granufoam at -125 mmHG vacuum. Change sponge M-W-F. Change canister when full or weekly. UROSTOMY:  Routine pouch changes using a 2 piece urostomy and ring barrier. Turn every 2 hours  Float heels off of bed with pillows under calves    Use lift sling to reposition patient to minimize potential for shear injury. Foam sacrum dressing to sacrococcygeal area. Peel back dressing, inspect skin beneath, re-secure. Change every 72 hours and prn wrinkles, soilage. Discontinue Sacral dressing if repeatedly soiled by incontinence.      Moisture wicking under pads     Specialty Bed Required :    [] Low Air Loss   [x] Pressure Redistribution  [] Fluid Immersion  [] Bariatric  [] Total Pressure Relief  [] Other:     Current Diet: Diet NPO Exceptions are: Popsicles, Sips of Water with Meds      Patient/Caregiver Teaching:  Level of patient/caregiver understanding able to:   [] Indicates understanding       [x] Needs reinforcement  [] Unsuccessful      [] Verbal Understanding  [] Demonstrated understanding       [] No evidence of learning  [] Refused teaching         [] N/A     Luis Manuel Aguayo RN BSN, [employer]

## 2022-09-27 PROBLEM — D72.825 BANDEMIA: Status: ACTIVE | Noted: 2022-09-27

## 2022-09-27 PROBLEM — N39.0 COMPLICATED UTI (URINARY TRACT INFECTION): Status: ACTIVE | Noted: 2022-09-27

## 2022-09-27 LAB
ANION GAP SERPL CALCULATED.3IONS-SCNC: 11 MMOL/L (ref 9–17)
ANION GAP SERPL CALCULATED.3IONS-SCNC: 11 MMOL/L (ref 9–17)
BUN BLDV-MCNC: 27 MG/DL (ref 8–23)
BUN BLDV-MCNC: 29 MG/DL (ref 8–23)
CALCIUM SERPL-MCNC: 7.7 MG/DL (ref 8.6–10.4)
CALCIUM SERPL-MCNC: 8 MG/DL (ref 8.6–10.4)
CHLORIDE BLD-SCNC: 115 MMOL/L (ref 98–107)
CHLORIDE BLD-SCNC: 116 MMOL/L (ref 98–107)
CO2: 21 MMOL/L (ref 20–31)
CO2: 22 MMOL/L (ref 20–31)
CREAT SERPL-MCNC: 1.39 MG/DL (ref 0.7–1.2)
CREAT SERPL-MCNC: 1.48 MG/DL (ref 0.7–1.2)
GFR AFRICAN AMERICAN: 58 ML/MIN
GFR AFRICAN AMERICAN: >60 ML/MIN
GFR NON-AFRICAN AMERICAN: 48 ML/MIN
GFR NON-AFRICAN AMERICAN: 51 ML/MIN
GFR SERPL CREATININE-BSD FRML MDRD: ABNORMAL ML/MIN/{1.73_M2}
GFR SERPL CREATININE-BSD FRML MDRD: ABNORMAL ML/MIN/{1.73_M2}
GLUCOSE BLD-MCNC: 72 MG/DL (ref 70–99)
GLUCOSE BLD-MCNC: 80 MG/DL (ref 70–99)
HCT VFR BLD CALC: 24.2 % (ref 40.7–50.3)
HEMOGLOBIN: 7.6 G/DL (ref 13–17)
MAGNESIUM: 1.8 MG/DL (ref 1.6–2.6)
MCH RBC QN AUTO: 28.5 PG (ref 25.2–33.5)
MCHC RBC AUTO-ENTMCNC: 31.4 G/DL (ref 28.4–34.8)
MCV RBC AUTO: 90.6 FL (ref 82.6–102.9)
NRBC AUTOMATED: 0.1 PER 100 WBC
PDW BLD-RTO: 15.9 % (ref 11.8–14.4)
PHOSPHORUS: 2.3 MG/DL (ref 2.5–4.5)
PLATELET # BLD: 256 K/UL (ref 138–453)
PMV BLD AUTO: 10.1 FL (ref 8.1–13.5)
POTASSIUM SERPL-SCNC: 3.5 MMOL/L (ref 3.7–5.3)
POTASSIUM SERPL-SCNC: 3.7 MMOL/L (ref 3.7–5.3)
RBC # BLD: 2.67 M/UL (ref 4.21–5.77)
SODIUM BLD-SCNC: 147 MMOL/L (ref 135–144)
SODIUM BLD-SCNC: 149 MMOL/L (ref 135–144)
WBC # BLD: 20.6 K/UL (ref 3.5–11.3)

## 2022-09-27 PROCEDURE — 83735 ASSAY OF MAGNESIUM: CPT

## 2022-09-27 PROCEDURE — 2500000003 HC RX 250 WO HCPCS: Performed by: INTERNAL MEDICINE

## 2022-09-27 PROCEDURE — 2580000003 HC RX 258: Performed by: STUDENT IN AN ORGANIZED HEALTH CARE EDUCATION/TRAINING PROGRAM

## 2022-09-27 PROCEDURE — 99232 SBSQ HOSP IP/OBS MODERATE 35: CPT | Performed by: INTERNAL MEDICINE

## 2022-09-27 PROCEDURE — 6370000000 HC RX 637 (ALT 250 FOR IP): Performed by: STUDENT IN AN ORGANIZED HEALTH CARE EDUCATION/TRAINING PROGRAM

## 2022-09-27 PROCEDURE — 6360000002 HC RX W HCPCS: Performed by: INTERNAL MEDICINE

## 2022-09-27 PROCEDURE — 6360000002 HC RX W HCPCS: Performed by: STUDENT IN AN ORGANIZED HEALTH CARE EDUCATION/TRAINING PROGRAM

## 2022-09-27 PROCEDURE — C9113 INJ PANTOPRAZOLE SODIUM, VIA: HCPCS | Performed by: STUDENT IN AN ORGANIZED HEALTH CARE EDUCATION/TRAINING PROGRAM

## 2022-09-27 PROCEDURE — 1200000000 HC SEMI PRIVATE

## 2022-09-27 PROCEDURE — 80048 BASIC METABOLIC PNL TOTAL CA: CPT

## 2022-09-27 PROCEDURE — 85027 COMPLETE CBC AUTOMATED: CPT

## 2022-09-27 PROCEDURE — 99222 1ST HOSP IP/OBS MODERATE 55: CPT | Performed by: INTERNAL MEDICINE

## 2022-09-27 PROCEDURE — 97166 OT EVAL MOD COMPLEX 45 MIN: CPT

## 2022-09-27 PROCEDURE — 97530 THERAPEUTIC ACTIVITIES: CPT

## 2022-09-27 PROCEDURE — 2580000003 HC RX 258: Performed by: INTERNAL MEDICINE

## 2022-09-27 PROCEDURE — 97535 SELF CARE MNGMENT TRAINING: CPT

## 2022-09-27 PROCEDURE — 6370000000 HC RX 637 (ALT 250 FOR IP): Performed by: INTERNAL MEDICINE

## 2022-09-27 PROCEDURE — 84100 ASSAY OF PHOSPHORUS: CPT

## 2022-09-27 PROCEDURE — 36415 COLL VENOUS BLD VENIPUNCTURE: CPT

## 2022-09-27 PROCEDURE — 6360000002 HC RX W HCPCS: Performed by: SPECIALIST

## 2022-09-27 PROCEDURE — 2500000003 HC RX 250 WO HCPCS: Performed by: STUDENT IN AN ORGANIZED HEALTH CARE EDUCATION/TRAINING PROGRAM

## 2022-09-27 RX ORDER — METOPROLOL TARTRATE 5 MG/5ML
5 INJECTION INTRAVENOUS EVERY 8 HOURS
Status: DISCONTINUED | OUTPATIENT
Start: 2022-09-27 | End: 2022-10-07

## 2022-09-27 RX ORDER — FLUCONAZOLE 2 MG/ML
400 INJECTION, SOLUTION INTRAVENOUS EVERY 24 HOURS
Status: DISCONTINUED | OUTPATIENT
Start: 2022-09-27 | End: 2022-09-30

## 2022-09-27 RX ADMIN — METOPROLOL TARTRATE 5 MG: 1 INJECTION, SOLUTION INTRAVENOUS at 15:34

## 2022-09-27 RX ADMIN — PIPERACILLIN AND TAZOBACTAM 4500 MG: 4; .5 INJECTION, POWDER, FOR SOLUTION INTRAVENOUS at 14:26

## 2022-09-27 RX ADMIN — SODIUM CHLORIDE, PRESERVATIVE FREE 40 MG: 5 INJECTION INTRAVENOUS at 09:59

## 2022-09-27 RX ADMIN — POLYETHYLENE GLYCOL 3350 17 G: 17 POWDER, FOR SOLUTION ORAL at 10:09

## 2022-09-27 RX ADMIN — HEPARIN SODIUM 5000 UNITS: 5000 INJECTION INTRAVENOUS; SUBCUTANEOUS at 22:31

## 2022-09-27 RX ADMIN — ACETAMINOPHEN 650 MG: 325 TABLET ORAL at 21:18

## 2022-09-27 RX ADMIN — DIBASIC SODIUM PHOSPHATE, MONOBASIC POTASSIUM PHOSPHATE AND MONOBASIC SODIUM PHOSPHATE 2 TABLET: 852; 155; 130 TABLET ORAL at 21:18

## 2022-09-27 RX ADMIN — PIPERACILLIN AND TAZOBACTAM 3375 MG: 3; .375 INJECTION, POWDER, FOR SOLUTION INTRAVENOUS at 17:59

## 2022-09-27 RX ADMIN — HEPARIN SODIUM 5000 UNITS: 5000 INJECTION INTRAVENOUS; SUBCUTANEOUS at 14:27

## 2022-09-27 RX ADMIN — BUPROPION HYDROCHLORIDE 300 MG: 150 TABLET, EXTENDED RELEASE ORAL at 10:04

## 2022-09-27 RX ADMIN — DIBASIC SODIUM PHOSPHATE, MONOBASIC POTASSIUM PHOSPHATE AND MONOBASIC SODIUM PHOSPHATE 2 TABLET: 852; 155; 130 TABLET ORAL at 14:27

## 2022-09-27 RX ADMIN — METOPROLOL TARTRATE 5 MG: 1 INJECTION, SOLUTION INTRAVENOUS at 09:59

## 2022-09-27 RX ADMIN — ACETAMINOPHEN 650 MG: 325 TABLET ORAL at 15:35

## 2022-09-27 RX ADMIN — BISACODYL 10 MG: 10 SUPPOSITORY RECTAL at 10:04

## 2022-09-27 RX ADMIN — HYDROMORPHONE HYDROCHLORIDE 1 MG: 1 INJECTION, SOLUTION INTRAMUSCULAR; INTRAVENOUS; SUBCUTANEOUS at 03:27

## 2022-09-27 RX ADMIN — NALOXEGOL OXALATE 12.5 MG: 12.5 TABLET, FILM COATED ORAL at 10:04

## 2022-09-27 RX ADMIN — POTASSIUM CHLORIDE: 2 INJECTION, SOLUTION, CONCENTRATE INTRAVENOUS at 16:17

## 2022-09-27 RX ADMIN — ACETAMINOPHEN 650 MG: 325 TABLET ORAL at 10:09

## 2022-09-27 RX ADMIN — CHLORHEXIDINE GLUCONATE 0.12% ORAL RINSE 15 ML: 1.2 LIQUID ORAL at 10:04

## 2022-09-27 RX ADMIN — SODIUM CHLORIDE: 234 INJECTION INTRAMUSCULAR; INTRAVENOUS; SUBCUTANEOUS at 11:43

## 2022-09-27 RX ADMIN — HYDROMORPHONE HYDROCHLORIDE 1 MG: 1 INJECTION, SOLUTION INTRAMUSCULAR; INTRAVENOUS; SUBCUTANEOUS at 00:06

## 2022-09-27 RX ADMIN — SODIUM CHLORIDE, PRESERVATIVE FREE 40 MG: 5 INJECTION INTRAVENOUS at 21:18

## 2022-09-27 RX ADMIN — CHLORHEXIDINE GLUCONATE 0.12% ORAL RINSE 15 ML: 1.2 LIQUID ORAL at 21:20

## 2022-09-27 RX ADMIN — METOPROLOL TARTRATE 5 MG: 1 INJECTION, SOLUTION INTRAVENOUS at 22:31

## 2022-09-27 RX ADMIN — SODIUM CHLORIDE, PRESERVATIVE FREE 10 ML: 5 INJECTION INTRAVENOUS at 10:04

## 2022-09-27 RX ADMIN — SIMVASTATIN 40 MG: 40 TABLET, FILM COATED ORAL at 22:31

## 2022-09-27 RX ADMIN — SODIUM CHLORIDE: 234 INJECTION INTRAMUSCULAR; INTRAVENOUS; SUBCUTANEOUS at 02:45

## 2022-09-27 RX ADMIN — FLUCONAZOLE 400 MG: 2 INJECTION, SOLUTION INTRAVENOUS at 09:56

## 2022-09-27 RX ADMIN — DIBASIC SODIUM PHOSPHATE, MONOBASIC POTASSIUM PHOSPHATE AND MONOBASIC SODIUM PHOSPHATE 2 TABLET: 852; 155; 130 TABLET ORAL at 10:04

## 2022-09-27 ASSESSMENT — ENCOUNTER SYMPTOMS
ABDOMINAL PAIN: 0
ABDOMINAL DISTENTION: 0
SHORTNESS OF BREATH: 0
COLOR CHANGE: 0
COUGH: 0
APNEA: 0
EYE DISCHARGE: 0

## 2022-09-27 ASSESSMENT — PAIN DESCRIPTION - ORIENTATION
ORIENTATION: MID
ORIENTATION: MID

## 2022-09-27 ASSESSMENT — PAIN DESCRIPTION - LOCATION
LOCATION: ABDOMEN

## 2022-09-27 ASSESSMENT — PAIN DESCRIPTION - DESCRIPTORS
DESCRIPTORS: ACHING
DESCRIPTORS: ACHING
DESCRIPTORS: ACHING;DISCOMFORT

## 2022-09-27 ASSESSMENT — PAIN SCALES - GENERAL
PAINLEVEL_OUTOF10: 9
PAINLEVEL_OUTOF10: 10

## 2022-09-27 NOTE — CARE COORDINATION
Transitional planning note: plan is [facility]. Spoke with Brenedn Gao in admissions at [facility] who states they are following patient and can accept. Advised that patient may be ready by the end of the week and that patient will not be coming with an NG tube but he does have a wound vac.

## 2022-09-27 NOTE — DISCHARGE INSTRUCTIONS
OK to dc home when recovered. Medications in chart  Pt will Follow up with Dr. He Bateman as scheduled  No heavy lifting >15 lbs for 4 weeks   Will discuss drains and percutaneous tube removals in office     Call your doctor for the following:   Chills   Temperature greater than 101   Pain that is not tolerable despite taking pain medicine as ordered   There is increased swelling, redness or warmth at surgical site   There is increased drainage or bleeding from surgical site        Discharge Instructions for General Surgery    No lifting above 10Ibs for next 2 weeks. No soaking in bathtubs or submerging yourself in water for 4 weeks  Resume activity as tolerated, No operating heavy equipment while using narcotics Wash incision gently with soap and water, pat dry. If steri-strips or surgical glue in place wash gently and leave in place until the glue or strips fall off. (Do not pull/tug)    F/u in trauma/acute care surgery clinic in 1-2 weeks Call your doctor for the following:  Chills  Temperature greater than 101  Pain that is not tolerable despite taking pain medicine as ordered There is increased swelling, redness or warmth at surgical site There is increased drainage or bleeding from surgical site    Recommended diet: regular diet  Depending on your injuries, your doctor may want you to follow a specific diet. Some pain medicine can cause constipation . To avoid this problem:  Drink plenty of fluids. Eat foods high in fiber , such as:  Whole grain cereals and bread  Fruits and vegtables   Legumes (eg, beans, lentils)      If you are still having problems, talk to your doctor about using laxatives or stool softeners. General questions or concerns may be called to the nurse line at [phone] and please leave a message.

## 2022-09-27 NOTE — CONSULTS
[facility] -   Infectious diseases evaluation  admission date 9/16/2022    reason for consultation:   bandemia    Impression :   Current:  9/16 bladder cancer involving wall and lymph nodes, post radical cystectomy, prostatectomy, groin lymph node dissection  9/22 SBO, had lysis of adhesions  9/22 bilateral ureteral anastomotic leak, with large abdominal collection, post repair 9/22 9/23 bilat nephrostomy tube placed  9/23 closure of the fascia, abdominal wound open with VAC  Ongoing bandemia  Pseudomonas/Enterococcus UTI, 9/16-  Cefepime 9/19 until 9/25  Right nephrostomy urine infection with Candida albicans 9/23  Noted at the time of right percutaneous nephrostomy placement 9/23  Ongoing bandemia    Other:    Discussion / summary of stay / plan of care     Recommendations   The cause of the bandemia could be multifactorial, including a persistent urinoma  Enterococcus is not well covered by cefepime course that the patient had and would suggest trying a course of Zosyn for now  I agree with the Diflucan,  Both antibiotics to be given for 7 days until 10/3  Monitor response of the white count otherwise get a CT of the abdomen look for further collection  Case discussed with general surgery and patient    Infection Control Recommendations   [place] Precautions    Antimicrobial Stewardship Recommendations   Simplification of therapy  Targeted therapy      History of Present Illness:   Initial history:  Paty Sims is a 74y.o.-year-old male with history of bladder cancer involving the wall of the bladder as well as the groin lymph nodes. Came in for radical cystectomy done on 9/16 with ileal conduit placement, removal of the groin lymph nodes, but complicated with a small bowel obstruction and bilateral ureteral anastomotic leak.     CT scan of the abdomen  9/22 which had showed at the time a large anterior pelvic fluid collection 15 x 13 x 6 cmWith variable densities extending from the cystectomy bed    Taken back to surgery on 9/22, lysis of adhesion that was thought to be causing the SBO, as well as repair of the anastomotic leak, and placement of 2 bilateral nephrostomy tubes. 9/23 patient went back to surgery for closure of abdominal fascia and subcutaneous tissue that stayed open with a VAC placement over it -    Due to hydronephrosis, bilat PCNT placed by IR 9/23, A repeat urine culture from 9/23 done due to cloudy R urine, by IR,  is showing Candida albicans 9/26. The patient has been started on Diflucan. Along the way on 9/16 the urine grew Pseudomonas and Enterococcus, resistant to Cipro and patient has received a course of cefepime from 9/19 and 2 until 9/25. His white count remains elevated actually after a feeling that it was improving, he started going worse, today it is up to 20. Infectious is consulted for the concern of a ongoing infection. The right nephro drain was giving very little urine and had a urogram  9/26 that showed good positioning and no leak. Since then right nephrostomy line has been giving a better input. Today the patient is alert appropriate he has an NG tube, was trying to have some liquids p.o. and today was able to tolerate 2 popsicles without vomiting. He does have gurgling over the abdomen, and has no abdominal tenderness otherwise. He has only abdominal pain on the surgical site. He has 2 MERLIN drains giving serosanguineous fluid, and he has 2 percutaneous nephrostomy drains that are giving clear urine.       Interval changes  9/27/2022   Patient Vitals for the past 8 hrs:   BP Temp Temp src Pulse Resp SpO2   09/27/22 0817 (!) 146/75 97.7 °F (36.5 °C) -- 87 18 93 %   09/27/22 0400 (!) 159/72 99 °F (37.2 °C) Oral 88 16 98 %   09/27/22 0357 -- -- -- -- 16 --       Summary of relevant labs:  Labs:  WBC 16-77-48-18-20  Creatinine1.39 High    Micro:  Urine culture from 9/16 Pseudomonas aeruginosa 2 strains, 1 sensitive to Cipro the other resistant,  and Enterococcus faecalis sensitive to Cipro    Urine culture from  R nephrostomy due to cloudiness, 9/23 showing Candida albicans/W   Imaging:  CTAP 9/22  Small bowel obstruction, with a transition point near the jejunoileal junction adjacent/medial to the ileostomy site. Large pelvic fluid collection with varying heme densities, some of which is recent, extending from the cystectomy bed, as above. Ureteral stents, extending out the conduit and ileostomy site with similar moderate-severe right hydroureteronephrosis, and new mild-moderate left hydroureteronephrosis. Postsurgical changes with scattered pneumoperitoneum/ascites, extensive subcutaneous air/soft tissue changes. Enteric tube in satisfactory position with its tip in the stomach. Segmental/subsegmental lung base changes posteriorly. Multiple additional findings, either unchanged or incidental, as detailed in the body of the report above. I have personally reviewed the past medical history, past surgical history, medications, social history, and family history, and I haveupdated the database accordingly. Allergies: Other     Review of Systems:     Review of Systems   Constitutional:  Negative for activity change. HENT:  Negative for congestion. Eyes:  Negative for discharge. Respiratory:  Negative for apnea, cough and shortness of breath. Cardiovascular:  Negative for chest pain. Gastrointestinal:  Negative for abdominal distention and abdominal pain. GT and abd gargling   Endocrine: Negative for cold intolerance. Genitourinary:  Negative for dysuria. Ileal urinary poutch   Musculoskeletal:  Negative for arthralgias. Skin:  Negative for color change. Allergic/Immunologic: Negative for immunocompromised state. Neurological:  Negative for facial asymmetry. Hematological:  Negative for adenopathy. Psychiatric/Behavioral:  Negative for agitation.       Physical Examination : Physical Exam  Constitutional:       General: He is not in acute distress. Appearance: Normal appearance. HENT:      Head: Normocephalic and atraumatic. Nose: Nose normal.      Mouth/Throat:      Mouth: Mucous membranes are moist.   Eyes:      General: No scleral icterus. Pupils: Pupils are equal, round, and reactive to light. Cardiovascular:      Rate and Rhythm: Normal rate and regular rhythm. Heart sounds: Normal heart sounds. No murmur heard. Pulmonary:      Effort: No respiratory distress. Breath sounds: Normal breath sounds. Abdominal:      General: There is no distension. Palpations: Abdomen is soft. Tenderness: There is no abdominal tenderness. Comments: Bd wound w VAC   Genitourinary:     Comments: Urostomy bag   Musculoskeletal:         General: No swelling or deformity. Cervical back: Neck supple. No rigidity. Skin:     General: Skin is dry. Coloration: Skin is not jaundiced. Neurological:      General: No focal deficit present. Mental Status: He is alert and oriented to person, place, and time. Psychiatric:         Mood and Affect: Mood normal.         Thought Content: Thought content normal.       Past Medical History:     Past Medical History:   Diagnosis Date    Acute renal failure with tubular necrosis ([facility] [address] 05/26/2022    Likely ischemic ATN/prerenal acidemia from intractable nausea vomiting as result of chemotherapy, baseline 1.3-1.4 peaked up to 2.3 in May 2022    Arthritis     BPH with obstruction/lower urinary tract symptoms     CAD (coronary artery disease) 06/2022    nonobstructive on cath    Caffeine use     3 cans soda/pop    Cancer ([address]     bladder cancer.  Dr. 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   Baseline 1.3-1.4    COVID-19 08/16/2021    SOB, fatigue, fever, chills  x 3 weeks    Depression     Dilated cardiomyopathy ([address] 05/26/2022    Ejection fraction 40 to 45%, does have symptoms of dyspnea and decreased effort tolerance    GERD (gastroesophageal reflux disease)     High cholesterol     Kidney calculi     Sleep apnea     does not use cpap    Under care of team     Dr. Ashley Roblero Nephrology    Under care of team     Dr. Abdiel Livingston. last visit approx 9/2021    Under care of team     Dr. Ginette Parker [facility] last visit 8/03/2022    Under care of team     Dr. Constance Dugan    Under care of team     Dr. Funmilayo Gonzáles       Past Surgical  History:     Past Surgical History:   Procedure Laterality Date    BLADDER REMOVAL      BLADDER REMOVAL N/A 9/16/2022    XI ROBOTIC LAPARASCOPIC ASSISTED RADICAL CYSTOPROSTATECTOMY, BILATERAL PELVIC LYMPHADENECTOMY AND ILEAL CONDUIT FORMATION performed by Ching Tolliver MD at [facility] 9/16/2022    CYSTOSCOPY STENT REMOVAL performed by Ching Tolliver MD at [facility]  06/16/2022    nonobstructive CAD    COLONOSCOPY      IR NEPHROSTOMY PERCUTANEOUS LEFT  9/23/2022    IR NEPHROSTOMY PERCUTANEOUS LEFT 9/23/2022 MD MARSHALL [facility]    IR NEPHROSTOMY PERCUTANEOUS RIGHT  9/23/2022    IR NEPHROSTOMY PERCUTANEOUS RIGHT 9/23/2022 MD MARSHALL Holman [facility]    IR PORT PLACEMENT EQUAL OR GREATER THAN 5 YEARS  02/25/2022    IR PORT PLACEMENT EQUAL OR GREATER THAN 5 YEARS 2/25/2022 [facility]    KNEE ARTHROSCOPY      left    LAPAROTOMY N/A 9/22/2022    LAPAROTOMY EXPLORATORY, LYSIS OF ADHESIONS, REPAIR OF BILATERAL URETERAL ANASTOMOSES, ABDOMINAL WASHOUT, PLACEMENT OF ABTHERA WOUND VAC performed by Nedra Gomez DO at [address] N/A 2022    2ND LOOK LAPAROTOMY,  ABDOMINAL WASHOUT, ABDOMINAL CLOSURE, WOUND VAC PLACEMENT [facility]  [facility]    Cystoprostatectomy, Bilateral Pelvic Lymphadenectomy, ileo conduit formation, cystectomy stent removal (right)       Medications:      metoprolol  5 mg IntraVENous Q8H    fluconazole  400 mg IntraVENous Q24H    phosphorus  500 mg Oral TID    heparin (porcine)  5,000 Units SubCUTAneous 3 times per day    [Held by provider] metoclopramide  5 mg IntraVENous Q6H    naloxegol  12.5 mg Oral Daily    chlorhexidine  15 mL Mouth/Throat BID    bisacodyl  10 mg Rectal Daily    pantoprazole (PROTONIX) 40 mg injection  40 mg IntraVENous Q12H    polyethylene glycol  17 g Oral Daily    buPROPion  300 mg Oral QAM    simvastatin  40 mg Oral Nightly    sodium chloride flush  5-40 mL IntraVENous 2 times per day    acetaminophen  650 mg Oral Q6H       Social History:     Social History     Socioeconomic History    Marital status:      Spouse name: Not on file    Number of children: Not on file    Years of education: Not on file    Highest education level: Not on file   Occupational History    Not on file   Tobacco Use    Smoking status: Former     Packs/day: 0.25     Years: 9.00     Pack years: 2.25     Types: Cigarettes     Start date: 1983     Quit date: 2/10/1992     Years since quittin.6    Smokeless tobacco: Never   Vaping Use    Vaping Use: Never used   Substance and Sexual Activity    Alcohol use: Not Currently    Drug use: No    Sexual activity: Yes     Partners: Female   Other Topics Concern    Not on file   Social History Narrative    Not on file     Social Determinants of Health     Financial Resource Strain: Not on file   Food Insecurity: Not on file   Transportation Needs: Not on file   Physical Activity: Not on file   Stress: Not on file   Social Connections: Not on file   Intimate Partner Violence: Not on file Housing Stability: Not on file       Family History:     Family History   Problem Relation Age of Onset    Cancer Father         bladder cancer    Urolithiasis Father       Medical Decision Making:   I have independently reviewed/ordered the following labs:    CBC with Differential:   Recent Labs     09/26/22  1049 09/27/22  0547   WBC 18.5* 20.6*   HGB 8.0* 7.6*   HCT 25.2* 24.2*    256     BMP:  Recent Labs     09/26/22  1049 09/27/22  0547   * 147*   K 3.7 3.5*   * 115*   CO2 22 21   BUN 29* 27*   CREATININE 1.48* 1.39*   MG 1.9 1.8     Hepatic Function Panel: No results for input(s): PROT, LABALBU, BILIDIR, IBILI, BILITOT, ALKPHOS, ALT, AST in the last 72 hours. No results for input(s): RPR in the last 72 hours. No results for input(s): HIV in the last 72 hours. No results for input(s): BC in the last 72 hours. Lab Results   Component Value Date/Time    CREATININE 1.39 09/27/2022 05:47 AM    GLUCOSE 72 09/27/2022 05:47 AM       Detailed results: Thank you for allowing us to participate in the care of this patient. Please call with questions. This note is created with the assistance of a speech recognition program.  While intending to generate adocument that actually reflects the content of the visit, the document can still have some errors including those of syntax and sound a like substitutions which may escape proof reading. It such instances, actual meaningcan be extrapolated by contextual diversion.     Samara Arnold MD  Office: [phone]  Perfect serve / office [phone]

## 2022-09-27 NOTE — PLAN OF CARE
Problem: Discharge Planning  Goal: Discharge to home or other facility with appropriate resources  9/27/2022 0444 by Ysabel Michel RN  Outcome: Progressing  9/26/2022 1712 by April Cho RN  Outcome: Progressing     Problem: Pain  Goal: Verbalizes/displays adequate comfort level or baseline comfort level  9/27/2022 0444 by Ysabel Michel RN  Outcome: Progressing  9/26/2022 1712 by April Cho RN  Outcome: Progressing     Problem: Safety - Adult  Goal: Free from fall injury  9/27/2022 0444 by Ysabel Michel RN  Outcome: Progressing  9/26/2022 1712 by April Cho RN  Outcome: Progressing     Problem: ABCDS Injury Assessment  Goal: Absence of physical injury  9/27/2022 0444 by Ysabel Michel RN  Outcome: Progressing  9/26/2022 1712 by April Cho RN  Outcome: Progressing     Problem: Nutrition Deficit:  Goal: Optimize nutritional status  9/27/2022 0444 by Ysabel Michel RN  Outcome: Progressing  9/26/2022 1712 by April Cho RN  Outcome: Progressing     Problem: Skin/Tissue Integrity  Goal: Absence of new skin breakdown  Description: 1. Monitor for areas of redness and/or skin breakdown  2. Assess vascular access sites hourly  3. Every 4-6 hours minimum:  Change oxygen saturation probe site  4. Every 4-6 hours:  If on nasal continuous positive airway pressure, respiratory therapy assess nares and determine need for appliance change or resting period. 9/27/2022 0444 by Ysabel Michel RN  Outcome: Progressing  9/26/2022 1712 by April Cho RN  Outcome: Progressing     Problem: Safety - Medical Restraint  Goal: Remains free of injury from restraints (Restraint for Interference with Medical Device)  Description: INTERVENTIONS:  1. Determine that other, less restrictive measures have been tried or would not be effective before applying the restraint  2. Evaluate the patient's condition at the time of restraint application  3. Inform patient/family regarding the reason for restraint  4.  Q2H: Monitor safety, psychosocial status, comfort, nutrition and hydration  9/27/2022 0444 by Marvin Angel RN  Outcome: Progressing  9/26/2022 1712 by Jody Tolliver RN  Outcome: Progressing     Problem: Confusion  Goal: Confusion, delirium, dementia, or psychosis is improved or at baseline  Description: INTERVENTIONS:  1. Assess for possible contributors to thought disturbance, including medications, impaired vision or hearing, underlying metabolic abnormalities, dehydration, psychiatric diagnoses, and notify attending LIP  2. [place] high risk fall precautions, as indicated  3. Provide frequent short contacts to provide reality reorientation, refocusing and direction  4. Decrease environmental stimuli, including noise as appropriate  5. Monitor and intervene to maintain adequate nutrition, hydration, elimination, sleep and activity  6. If unable to ensure safety without constant attention obtain sitter and review sitter guidelines with assigned personnel  7.  Initiate Psychosocial CNS and Spiritual Care consult, as indicated  9/27/2022 0444 by Marvin Angel RN  Outcome: Progressing  9/26/2022 1712 by Jody Tolliver RN  Outcome: Progressing

## 2022-09-27 NOTE — PROGRESS NOTES
Physical Therapy Cancel Note      DATE: 2022    NAME: Srinivasa Huertas  MRN: [id number]   : 1955      Patient not seen this date for Physical Therapy due to:     Other: pt incontinent of stool, needs to be cleaned up; check back later as time allows      Electronically signed by Deborah Butt PT on 2022 at 1:48 PM

## 2022-09-27 NOTE — PROGRESS NOTES
PROGRESS NOTE    PATIENT NAME: Kameron Bailon RECORD NO. [id number]  DATE: 2022    PRIMARY CARE PHYSICIAN: Jennifer Mcmahon MD    HD: # 11    ASSESSMENT    Patient Active Problem List   Diagnosis    Kidney stones    Nocturia    Hypertrophy of prostate with urinary obstruction    Malignant neoplasm of urinary bladder (HCC)    Urinary retention    Cancer of lateral wall of urinary bladder (HCC)    Acute respiratory failure with hypoxia (HCC)    Other hyperlipidemia    Microcytic anemia    Mild protein-calorie malnutrition ([facility] [address]    Pulmonary embolism without acute cor pulmonale (HCC)    Hypoxia    Dyspnea    Acute systolic heart failure (HCC)    Acute renal failure with tubular necrosis (HCC)    CKD (chronic kidney disease), stage III ([facility] [address]    Dilated cardiomyopathy ([facility] [address]    Bladder cancer ([facility] [address]    Acute kidney injury [facility])       MEDICAL DECISION MAKING AND PLAN    SBO s/p release  Patient having BM this AM, clamp NGT and trial CLD for 6 hours, if tolerates can pull NGT and slowly advance diet  Midline wound  Wound vac change Wednesday, white foam base and black foam top  MERLIN drain  To stay until deemed ok to removed per urology  [place]  Nephrology on board, defer to their service        Chief Complaint: \"No one listens to me\"    [address] seen and examined, FRANKY, VSS, NGT in place to LIWS,  denies CP or SOB, midline vac w/ good seal, needs to have BM per patient    OBJECTIVE  VITALS: Temp: Temp: 97.7 °F (36.5 °C)Temp  Av.9 °F (36.6 °C)  Min: 97.4 °F (36.3 °C)  Max: 99 °F (19.5 °C) BP Systolic (84LPL), TIS:053 , Min:137 , FREDERIC:170   Diastolic (24NKO), PHC:47, Min:66, Max:75   Pulse Pulse  Av.3  Min: 86  Max: 108 Resp Resp  Av.6  Min: 16  Max: 18 Pulse ox SpO2  Av.6 %  Min: 93 %  Max: 98 %    GENERAL: alert, no distress  HEENT: PERRLA, NCAT, NGT  : ileal conduit in place, neprhostomy tubes x2  LUNGS: CTAB  HEART: RRR no M  ABDOMEN: soft, non-tender, non-distended, left abdomen MERLIN drains in place  EXTREMITY: no cyanosis, clubbing or edema      LAB:  CBC:   Recent Labs     09/25/22  0249 09/26/22  1049 09/27/22  0547   WBC 20.6* 18.5* 20.6*   HGB 8.4* 8.0* 7.6*   HCT 27.2* 25.2* 24.2*   MCV 92.8 90.3 90.6    240 256       BMP:   Recent Labs     09/25/22  0249 09/26/22  1049 09/27/22  0547    149* 147*   K 4.3 3.7 3.5*   * 116* 115*   CO2 21 22 21   BUN 38* 29* 27*   CREATININE 1.89* 1.48* 1.39*   GLUCOSE 76 80 72           RADIOLOGY:  XR ABDOMEN FOR NG/OG/NE TUBE PLACEMENT    Result Date: 9/25/2022  Nasogastric tube tip is in the stomach              Electronically signed by Marlo Bermudez DO on 9/27/2022 at 8:53 AM       Attestation signed by Sean Miguel MD    I personally evaluated the patient and directed the medical decision making with Resident/TERRY after the physical/radiologic exam and laboratory values were reviewed and confirmed. TPN if unable to take po.      Sean Miguel MD

## 2022-09-27 NOTE — PROGRESS NOTES
Augusto Reeves MD FACS   Urology Progress Note     Subjective:   AFVSS  NG tube - 258ba24g  +gas     UOP left neph tube: 945cc/24 hours  Right neph tube: 120cc/24hours  Ileal conduit: 200cc/24hours  MERLIN drain upper 380cc/24h  MERLIN drain lower 520 cc/24h    Cr 1.4(1.5) WBC 20.6(18.5) hGB 7.6(8)     Patient Vitals for the past 24 hrs:   BP Temp Temp src Pulse Resp SpO2   09/27/22 0400 (!) 159/72 99 °F (37.2 °C) Oral 88 16 98 %   09/27/22 0357 -- -- -- -- 16 --   09/27/22 0036 -- -- -- -- 16 --   09/27/22 0004 (!) 155/68 97.4 °F (36.3 °C) Oral 90 18 93 %   09/26/22 2109 -- -- -- -- 16 --   09/26/22 2043 (!) 141/66 97.5 °F (36.4 °C) Oral 90 16 94 %   09/26/22 1708 -- -- -- -- 16 --   09/26/22 1638 -- -- -- -- 16 --   09/26/22 1631 (!) 149/73 98.1 °F (36.7 °C) -- 86 18 98 %   09/26/22 1415 -- -- -- -- 16 --   09/26/22 1122 137/69 97.8 °F (36.6 °C) -- 90 18 95 %   09/26/22 1113 -- -- -- -- 16 --   09/26/22 1016 -- -- -- -- 16 --   09/26/22 0949 -- -- -- (!) 108 17 98 %   09/26/22 0757 (!) 141/72 97.8 °F (36.6 °C) -- 90 18 94 %       Intake/Output Summary (Last 24 hours) at 9/27/2022 0724  Last data filed at 9/27/2022 6137  Gross per 24 hour   Intake --   Output 2565 ml   Net -2565 ml       Recent Labs     09/25/22  0249 09/26/22  1049 09/27/22  0547   WBC 20.6* 18.5* 20.6*   HGB 8.4* 8.0* 7.6*   HCT 27.2* 25.2* 24.2*   MCV 92.8 90.3 90.6    240 256     Recent Labs     09/25/22  0249 09/26/22  1049 09/27/22  0547    149* 147*   K 4.3 3.7 3.5*   * 116* 115*   CO2 21 22 21   PHOS 2.8 1.7* 2.3*   BUN 38* 29* 27*   CREATININE 1.89* 1.48* 1.39*       No results for input(s): COLORU, PHUR, LABCAST, WBCUA, RBCUA, MUCUS, TRICHOMONAS, YEAST, BACTERIA, CLARITYU, SPECGRAV, LEUKOCYTESUR, UROBILINOGEN, BILIRUBINUR, BLOODU in the last 72 hours.     Invalid input(s): NITRATE, GLUCOSEUKETONESUAMORPHOUS      Additional Lab/culture results:    Physical Exam:   General: A/O x 3, no acute distress  HEENT: moist mucous membranes, NG tube in place green bilious  Neck: Supple   Chest: bilateral symmetrical chest rise   Circulatory: Peripheries warm , well perfused   P/A: soft, clean, dry and intact with skin glue, ostomy pink with red rubber in place, wound vac in place, MERLIN x 2 in place with SS drainage  Extremities: No edema/calf tenderness    Interval Imaging Findings: none    Impression:  none    Lexi Cordoba is a 79-year-old male   Active  problem list  Hx of bladder cancer  Robotic assisted laparoscopic cystoprostatectomy with ileal conduit creation on 9/16/2022   Ex lap ROLANDO repair of BL ureteral anastamosis and abd washout with abthera due to ureteral anastamotic leaks and SBO and second look on 9/22 and 9/23      Plan:   Diet: per General Surgery  Maintain NG tube - being managed per general surgery  Pain control and antiemetic as needed  Maintain bilateral nephrostomy tubes   Maintain MERLIN drains   Appreciate general surgery and ID input  Continue movantik  Urine culture growing pseudomonas, enterococcus and and second pseudomonas colony type, pansusceptible only gentamicin resistance s/p 7 days cefepime however WBC inc, will decide if abx is needed  Trend Cr  IV fluids  DVT PPX: Subq heparin 5000units TID  Continue to monitor    Priscilla Gustafson MD, PGY-3  7:24 AM 9/27/2022    I have reviewed the history above and agree. I have repeated the key portions of the physical exam and concur with the resident's findings. I have reviewed all laboratory findings and imaging reports/films. I agree with the plan as noted above.     Electronically signed by Jaime Waddell MD on 9/27/2022 at 11:55 AM

## 2022-09-27 NOTE — PROGRESS NOTES
Occupational Therapy  Facility/Department: [facility] RENAL//MED SURG  Occupational Therapy Initial Assessment    Name: Issa Ugarte  : 1955  MRN: [id number]  Date of Service: 2022      Discharge Recommendations:  Patient would benefit from continued therapy after discharge  OT Equipment Recommendations  Equipment Needed: Yes  Mobility Devices: ADL Assistive Devices  ADL Assistive Devices: Toileting - Drop Arm Commode, Heavy Duty Drop Arm Commode;Transfer Tub Bench;Reacher;Long-handled Shoe Horn;Long-handled Sponge;Sock-Aid Hard       Patient Diagnosis(es): The encounter diagnosis was Malignant neoplasm of lateral wall of urinary bladder ([address] Past Medical History:  has a past medical history of Acute renal failure with tubular necrosis ([address] Arthritis, BPH with obstruction/lower urinary tract symptoms, CAD (coronary artery disease), Caffeine use, Cancer ([address] CKD (chronic kidney disease), stage III ([address] COVID-19, Depression, Dilated cardiomyopathy ([address] GERD (gastroesophageal reflux disease), High cholesterol, Kidney calculi, Sleep apnea, Under care of team, Under care of team, Under care of team, Under care of team, and Under care of team.  Past Surgical History:  has a past surgical history that includes Knee arthroscopy; Cardiac catheterization; Colonoscopy; IR PORT PLACEMENT > 5 YEARS (2022); Cardiac catheterization (2022); Bladder removal; Prostatectomy (2022); Bladder removal (N/A, 2022); Bladder surgery (Right, 2022); laparotomy (N/A, 2022); IR GUIDED NEPHROSTOMY CATH PLACEMENT LEFT (2022); IR GUIDED NEPHROSTOMY CATH PLACEMENT RIGHT (2022); and laparotomy (N/A, 2022). Assessment   Performance deficits / Impairments: Decreased functional mobility ; Decreased ADL status; Decreased safe awareness;Decreased cognition;Decreased endurance;Decreased high-level IADLs;Decreased balance  Assessment: Pt completed supine to sit transfer EOB with Min A, functional sit<>stand transfers with CGA and functional mobility with CGA. Pt very impulsive throughout entire session and easily agitated. OT facilitated LB dressing EOB with Edgard WALSH, toileting at [facility] with Edgard WALSH and completed grooming tasks seated independently. See below for detail. Pt overall limited by cognition, balance, and endurance. Pt is expected to require skilled OT services during their acute hospitalization stay to address the above noted deficits through skilled occupational therapy intervention for promotion of increased independence throughout ADLs, IADLs and functional mobility tasks. Prognosis: Good  Decision Making: Medium Complexity  REQUIRES OT FOLLOW-UP: Yes  Activity Tolerance  Activity Tolerance: Patient limited by fatigue;Treatment limited secondary to decreased cognition        Plan   Plan  Times per Week: 3-5x/wk  Current Treatment Recommendations: Balance training, Functional mobility training, Endurance training, Safety education & training, Pain management, Patient/Caregiver education & training, Equipment evaluation, education, & procurement, Self-Care / ADL, Home management training, Cognitive/Perceptual training     Restrictions  Restrictions/Precautions  Restrictions/Precautions: Surgical Protocols, Fall Risk, Up as Tolerated  Required Braces or Orthoses?: No  Position Activity Restriction  Other position/activity restrictions: Ambulate pt    Subjective   General  Patient assessed for rehabilitation services?: Yes  Family / Caregiver Present: No  General Comment  Comments: RN ok'd for OT/PT eval this AM. Pt agreeable to session, pleasent/cooperative throughout. Pt denies pain.      Social/Functional History  Social/Functional History  Lives With: Significant other  Type of Home: House  Home Layout: Multi-level, Bed/Bath upstairs  Home Access: Stairs to enter with rails  Entrance Stairs - Number of Steps: 3  Entrance Stairs - Rails: Right  Bathroom Shower/Tub: Tub/Shower unit  Bathroom Equipment: Grab bars in shower  Bathroom Accessibility: Accessible  Home Equipment: Oxygen, Wheelchair-manual, Walker, rolling, Cane, quad  Has the patient had two or more falls in the past year or any fall with injury in the past year?: No  Receives Help From: Family  ADL Assistance: Independent  Homemaking Assistance: Needs assistance  Meal Prep: Moderate  Laundry: Moderate  Vacuuming: Moderate  Cleaning: Moderate  Gardening: Moderate  Yard Work: Moderate  Shopping: Moderate  Homemaking Responsibilities: Yes  Ambulation Assistance: Independent  Transfer Assistance: Independent  Active : Yes  Mode of Transportation: Car  Occupation: Part time employment  Type of Occupation: business owner, 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 [address] [place] [address]: outdoor activity  Additional Comments: Pt report prior to hospitalization he was independent and used WC for long distance Mobility       Objective         Safety Devices  Type of Devices: Call light within reach; Left in bed; Chair alarm in place; Left in chair;Gait belt;Nurse notified  Restraints  Restraints Initially in Place: No    Balance  Sitting:  (Seated EOB unsupported with SBA for ~7 minutes, seated unsupported on BSC for ~4 minutes w/ CGA, seated unsupported edge of chair for ~7 minutes with SBA)  Standing: With support (Stood statically ~15 seconds prior to mobility with CGA and RW. Stood statically ~15 seconds for bottom hygiene with CGA. Poor standing tolerance.)    Gait  Overall Level of Assistance:  (Completed functional mobility EOB->recliner->BSC->recliner. Very brief distances. Use of RW.)    Toilet Transfers  Toilet - Technique: Ambulating  Equipment Used: Standard bedside commode  Toilet Transfer: Contact guard assistance  Toilet Transfers Comments: VCs to await writer's readiness and bring awareness to lines    AROM: Within functional limits  Strength:  Within functional limits (BUE 5/5)  Coordination: Within functional limits  Tone: Normal  Sensation: Intact (Decreased sensation to B LEs)    ADL  Feeding: Independent  Grooming: Independent;Setup  Grooming Skilled Clinical Factors: Pt washed face and completed oral hygiene seated unsupported edge of bedside recliner with setup. UE Bathing: Contact guard assistance;Setup; Increased time to complete  LE Bathing: Increased time to complete; Moderate assistance;Setup;Verbal cueing  UE Dressing: Contact guard assistance;Setup; Increased time to complete  LE Dressing: Increased time to complete;Maximum assistance;Setup;Verbal cueing  LE Dressing Skilled Clinical Factors: Max A to don socks EOB d/t fatigue and pt impulsivity  Toileting: Maximum assistance;Setup; Increased time to complete  Toileting Skilled Clinical Factors: CGA transfer on/off BSC. Required max A to complete bottom hygiene standing d/t decreased standing balance and tolerance. Additional Comments: Pt limited by impulsivity and activity tolerance. Bed mobility  Rolling to Left: Minimal assistance (Assist upon entry to roll to assist with hygiene d/t pt soiling linens)  Rolling to Right: Minimal assistance (Assist upon entry to roll to assist with hygiene d/t pt soiling linens)  Supine to Sit: Minimal assistance (Asisst for trunk progression, pt required significant cues encouragement to attempt without physical help. Use of bedrail.)  Sit to Supine:  (Pt retired to bedside recliner upon conclusion of session)  Scooting: Contact guard assistance  Bed Mobility Comments: HOB elevated ~30 degrees    Transfers  Sit to stand: Contact guard assistance  Stand to sit: Contact guard assistance  Transfer Comments: Required ponce verbal cues to remain seated, pt impulsively attempeting to stand prior to lines being managed and writer being ready. Use of Rw. Cognition  Overall Cognitive Status: Exceptions  Arousal/Alertness: Appropriate responses to stimuli  Following Commands:  Follows one step commands with repetition; Follows multistep commands with repitition  Attention Span: Difficulty dividing attention; Difficulty attending to directions  Safety Judgement: Decreased awareness of need for assistance;Decreased awareness of need for safety  Problem Solving: Decreased awareness of errors;Assistance required to identify errors made;Assistance required to generate solutions  Insights: Decreased awareness of deficits  Initiation: Does not require cues  Sequencing: Requires cues for some  Cognition Comment: Pt very impulsve throughout session and easily agitated.   Orientation  Overall Orientation Status: Within Normal Limits  Orientation Level: Oriented X4;Oriented to place;Oriented to time;Oriented to situation;Oriented to person                  Education Provided Comments: Pt educated on OT role, OT POC, activity promotion, safety awareness, transfer training, balance maint.-fair return d/t cognition/agitation  LUE AROM (degrees)  LUE AROM : WFL  Left Hand AROM (degrees)  Left Hand AROM: WFL  RUE AROM (degrees)  RUE AROM : WFL  Right Hand AROM (degrees)  Right Hand AROM: WFL                 AM-PAC Score        AM-PAC Inpatient Daily Activity Raw Score: 17 (09/27/22 1722)  AM-PAC Inpatient ADL T-Scale Score : 37.26 (09/27/22 1722)  ADL Inpatient CMS 0-100% Score: 50.11 (09/27/22 1722)  ADL Inpatient CMS G-Code Modifier : CK (09/27/22 1722)      Goals  Short Term Goals  Time Frame for Short term goals: By discharge, pt will:  Short Term Goal 1: Demo bed mobility supine<>sit with SBA to promote increased engagement in ADLs  Short Term Goal 2: Demo functional sit<>stand transfers and functional mobility with SBA, using LRD and 0 VCs for safety  Short Term Goal 3: Demo 5 minutes of standing tolerance to promote increased engagement in ADLs/IADLs  Short Term Goal 4: Demo UB ADLs with Mod IND  Short Term Goal 5: Demo LB ADLs/toileting with Min A to promote increased engagement in ADLs  Short Term Goal 6: Demo good safety awareness IND throughout all functional ADLs/activities with no more than 2 VC each session       Therapy Time   Individual Concurrent Group Co-treatment   Time In 1349         Time Out 1435         Minutes 46         Timed Code Treatment Minutes: [address], OTR/L

## 2022-09-27 NOTE — PROGRESS NOTES
[address] [facility] [address]  PROGRESS NOTE    Shift date: 9/26/2022  Shift day: Monday   Shift # 2    Room # 0494/4135-72   Name: Jason Peace                  Referral: Routine Visit    Admit Date & Time: 9/16/2022  5:26 AM    Assessment:  Jason Peace is a 74 y.o. male in the hospital. Upon entering the room writer observes the patient laying in bed with the television on and reaching for air tube. The patient appeared calm and coping. The patient expressed that he was waiting for medication and that he was tired. Intervention:  Writer introduced self and title as  Writer offered space for the patient  to express feelings, needs, and concerns and provided a ministry presence. Outcome:  Patient expressed gratitude for the visit. Plan:  Chaplains will remain available to offer spiritual and emotional support as needed.        09/26/22 2104   Encounter Summary   Service Provided For: Patient   Referral/Consult From: Rounding   Support System Unknown   Last Encounter  09/26/22   Complexity of Encounter Low   Begin Time 2030   End Time  2040   Total Time Calculated 10 min   Assessment/Intervention/Outcome   Assessment Calm;Coping   Intervention Active listening;Sustaining Presence/Ministry of presence   Outcome Expressed Gratitude;Engaged in conversation       Electronically signed by Nika Jolly, [address] [place] [address] Road Resident, on 9/26/2022 at 9:06 PM.  [facility]  [phone]

## 2022-09-27 NOTE — DISCHARGE INSTR - COC
Continuity of Care Form    Patient Name: Mattie Bridges   :  1955  MRN:  [id number]    Admit date:  2022  Discharge date:  10/10/2022    Code Status Order: Full Code   Advance Directives:   [facility] Documentation       Date/Time Healthcare Directive Type of Healthcare Directive Copy in [address] Agent's Name Healthcare Agent's Phone Number    22 [phone] Yes, patient has an advance directive for healthcare treatment -- -- -- -- --    22 0700 Yes, patient has an advance directive for healthcare treatment Durable power of  for health care;Living will;Health care treatment directive No, copy requested from family -- -- --            Admitting Physician:  Saadia Porter MD  PCP: Welton Hashimoto, MD    Discharging Nurse: Ernesto Garcia Unit/Room#: 0319/0319-01  Discharging Unit Phone Number: [phone]    Emergency Contact:   Extended Emergency Contact Information  Primary Emergency Contact: [facility]  Address: [address] Phone: [phone]  Relation: Other    Past Surgical History:  Past Surgical History:   Procedure Laterality Date    BLADDER REMOVAL      BLADDER REMOVAL N/A 2022    XI ROBOTIC LAPARASCOPIC ASSISTED RADICAL CYSTOPROSTATECTOMY, BILATERAL PELVIC LYMPHADENECTOMY AND ILEAL CONDUIT FORMATION performed by Saadia Porter MD at [address] 2022    CYSTOSCOPY STENT REMOVAL performed by Saadia Porter MD at [address] [place] [address]  2022    nonobstructive CAD    COLONOSCOPY      IR NEPHROSTOMY PERCUTANEOUS LEFT  2022    IR NEPHROSTOMY PERCUTANEOUS LEFT 2022 MD MARSHALL Pablo [facility]    IR NEPHROSTOMY PERCUTANEOUS LEFT  10/5/2022    IR NEPHROSTOMY PERCUTANEOUS LEFT 10/5/2022 [facility]    IR NEPHROSTOMY PERCUTANEOUS RIGHT  9/23/2022    IR NEPHROSTOMY PERCUTANEOUS RIGHT 9/23/2022 MD MARSHALL Olivares [facility]    IR NEPHROSTOMY PERCUTANEOUS RIGHT  10/5/2022    IR NEPHROSTOMY PERCUTANEOUS RIGHT 10/5/2022 [facility]    IR PORT PLACEMENT EQUAL OR GREATER THAN 5 YEARS  02/25/2022    IR PORT PLACEMENT EQUAL OR GREATER THAN 5 YEARS 2/25/2022 [facility]    KNEE ARTHROSCOPY      left    LAPAROTOMY N/A 9/22/2022    LAPAROTOMY EXPLORATORY, LYSIS OF ADHESIONS, REPAIR OF BILATERAL URETERAL ANASTOMOSES, ABDOMINAL WASHOUT, PLACEMENT OF ABTHERA WOUND VAC performed by Yuan Quispe DO at [address] N/A 9/23/2022    2ND LOOK LAPAROTOMY,  ABDOMINAL WASHOUT, ABDOMINAL CLOSURE, WOUND VAC PLACEMENT performed by Yuan Quispe DO at [address]  09/16/2022    Cystoprostatectomy, Bilateral Pelvic Lymphadenectomy, ileo conduit formation, cystectomy stent removal (right)       Immunization History:   Immunization History   Administered Date(s) Administered    COVID-19, PFIZER PURPLE top, DILUTE for use, (age 15 y+), 30mcg/0.3mL 11/10/2021, 12/02/2021       Active Problems:  Patient Active Problem List   Diagnosis Code    Kidney stones N20.0    Nocturia R35.1    Hypertrophy of prostate with urinary obstruction N40.1, N13.8    Malignant neoplasm of urinary bladder (HCC) C67.9    Urinary retention R33.9    Cancer of lateral wall of urinary bladder (HCC) C67.2    Acute respiratory failure with hypoxia (HCC) J96.01    Other hyperlipidemia E78.49    Microcytic anemia D50.9    Mild protein-calorie malnutrition (HCC) E44.1    Pulmonary embolism without acute cor pulmonale (HCC) I26.99    Hypoxia R09.02    Dyspnea R43.26    Acute systolic heart failure (HCC) I50.21    Acute renal failure with tubular necrosis (HCC) N17.0    CKD (chronic kidney disease), stage III (HCC) N18.30    Dilated cardiomyopathy (HCC) I42.0    Bladder cancer (HCC) C67.9    PAXTON (acute kidney injury) ([facility] N17.9    Bandemia G44.937    Complicated UTI (urinary tract infection) N39.0    Hypokalemia E87.6    Hypophosphatemia E83.39    Small bowel obstruction ([facility]) K56.609    Acute pyelonephritis N10    Nephrostomy complication ([facility]) F17.782    Candida infection B37.9    Enterococcus infection in shunt ([facility]) T85.79XA, B95.2    Hypomagnesemia E83.42    Functional diarrhea K59.1       Isolation/Infection:   Isolation            No Isolation          Patient Infection Status       None to display            Nurse Assessment:  Last Vital Signs: BP (!) 141/73   Pulse 76   Temp 98.4 °F (36.9 °C) (Oral)   Resp 16   Ht 6' 2.02\" (1.88 m)   Wt 250 lb (113.4 kg)   SpO2 95%   BMI 32.08 kg/m²     Last documented pain score (0-10 scale): Pain Level: 3  Last Weight:   Wt Readings from Last 1 Encounters:   10/10/22 250 lb (113.4 kg)     Mental Status:  oriented    IV Access:  - None    Nursing Mobility/ADLs:  Walking   Assisted  Transfer  Assisted  Bathing  Assisted  Dressing  Assisted  Toileting  Assisted  Feeding  Independent  Med Admin  Assisted  Med Delivery   whole    Wound Care Documentation and Therapy:  Negative Pressure Wound Therapy Abdomen Mid (Active)   Wound Type Surgical 10/10/22 1347   Unit Type VAC Ulta 10/10/22 0800   Dressing Type White Foam;Black Foam 10/07/22 1046   Number of pieces used 3 10/07/22 1046   Number of pieces removed 3 10/07/22 1046   Cycle Off 10/10/22 1347   Target Pressure (mmHg) 125 10/07/22 1046   Canister changed?  No 10/07/22 1046   Dressing Status New dressing applied 10/10/22 1347   Dressing Changed Changed/New 10/10/22 1347   Drainage Amount Small 10/07/22 1046   Drainage Description Serosanguinous 10/07/22 1046   Dressing Change Due 10/10/22 10/07/22 1046   Output (ml) 10 ml 09/29/22 1757   Wound Assessment Subcutaneous;Granulation tissue 10/03/22 1330   Verito-wound Assessment Intact 10/03/22 1330   Odor None 09/26/22 0005   Number of days: 17       Wound 09/22/22 Abdomen Mid SURGICAL INCISION (Active)   Wound Image   10/10/22 1347   Wound Etiology Surgical 10/10/22 1347   Dressing Status New dressing applied 10/10/22 1347   Wound Cleansed Irrigated with saline 10/10/22 1347   Dressing/Treatment Moist to moist;Moisten with saline;ABD 10/10/22 1347   Dressing Change Due 10/10/22 10/07/22 1046   Wound Length (cm) 12.9 cm 10/07/22 1046   Wound Width (cm) 3.7 cm 10/07/22 1046   Wound Depth (cm) 3 cm 10/07/22 1046   Wound Surface Area (cm^2) 47.73 cm^2 10/07/22 1046   Change in Wound Size % (l*w) 23.51 10/07/22 1046   Wound Volume (cm^3) 143.19 cm^3 10/07/22 1046   Wound Healing % 24 10/07/22 1046   Wound Assessment Granulation tissue 10/10/22 1347   Drainage Amount Scant 10/10/22 1347   Drainage Description Serosanguinous 10/10/22 1347   Odor None 10/05/22 1549   Verito-wound Assessment Intact 10/07/22 1046   Wound Thickness Description not for Pressure Injury Full thickness 10/03/22 1330   Number of days: 18     Resume NPWT with black granufoam over white to base of wound at -125,mmHg vacuum. Elimination:  Continence: Bowel: Yes  Bladder: Yes  Urinary Catheter: Bilateral NEPHROSTOMY TUBES to straight drainage bags. Ileal Conduit: Yes  Urostomy Ileal conduit RLQ-Stomal Appliance: 1 piece, Changed  Urostomy Ileal conduit RLQ-Flange Size (inches): 2.25 Inches  Urostomy Ileal conduit RLQ-Stoma  Assessment: Pink, Moist, Protrudes (1 inch circular)  Urostomy Ileal conduit RLQ-Peristomal Assessment: Clean, dry & intact  Urostomy Ileal conduit RLQ-Treatment: Bag change, Site care, Liquid skin barrier (Brava seal)    Coloplast Convex Light Urostomy pouch # [id number] with Brava protective seal [id number]  Re measure stoma periodically to ensure 1\" remains an appropriate cut opening and adjust DME order accordingly. Change appliance routinely 2 times/week; empty when no more than 1/2 full;  may  connect to straight drainage bag for overnight.     Date of Last BM: 10/10/2022    Intake/Output Summary (Last 24 hours) at 10/10/2022 1628  Last data filed at 10/10/2022 1347  Gross per 24 hour   Intake --   Output 2275 ml   Net -2275 ml     I/O last 3 completed shifts:  In: -   Out: 4295 [Urine:3775]    Safety Concerns:     None    Impairments/Disabilities:      None    Nutrition Therapy:  Current Nutrition Therapy:   - Oral Diet:  General    Routes of Feeding: Oral  Liquids: No Restrictions  Daily Fluid Restriction: no  Last Modified Barium Swallow with Video (Video Swallowing Test): not done    Treatments at the Time of Hospital Discharge:   Respiratory Treatments: None  Oxygen Therapy:  is not on home oxygen therapy. Ventilator:    - No ventilator support    Rehab Therapies: Physical Therapy and Occupational Therapy eval and treat   Weight Bearing Status/Restrictions: No weight bearing restrictions  Other Medical Equipment (for information only, NOT a DME order):  walker  Other Treatments: Change cannister once a week or when full. If suction fails or patient is discharged:  - remove vac dressing  - cleanse wound with normal saline   - pack wound with saline moistened gauze and change every 12 hours  Apply To (location): midline abdomen  Target Pressure: 125 mm Hg  Cycle Time: Continuous  Dressing Type:  White Foam (Black foam on top)  Change MWF     Patient's personal belongings (please select all that are sent with patient):  Glasses    RN SIGNATURE:  Electronically signed by Hardik Parish RN on 10/10/22 at 4:55 PM EDT    CASE MANAGEMENT/SOCIAL WORK SECTION    Inpatient Status Date: 9/16/22    Readmission Risk Assessment Score:  Readmission Risk              Risk of Unplanned Readmission:  28           Discharging to Facility/ Agency   Name: Corey Pena   Address:  [facility]:[phone]   Fax:    Dialysis Facility (if applicable)   Name:  Address:  Dialysis Schedule:  Phone:  Fax:    / signature: Electronically signed by Lele Santos RN on 10/10/2022 at 4:28 PM    PHYSICIAN SECTION    Prognosis: Good    Condition at Discharge: Stable    Rehab Potential (if transferring to Rehab): Good    Recommended Labs or Other Treatments After Discharge: Follow up with Dr. Magali Carey in 1-2 weeks. Flush R nephrostomy tube once daily with 3-5mL sterile NS    Physician Certification: I certify the above information and transfer of Michael Herrera  is necessary for the continuing treatment of the diagnosis listed and that he requires [place] for less 30 days.      Update Admission H&P: No change in H&P    PHYSICIAN SIGNATURE:  Electronically signed by Davin Doss MD on 10/10/22 at 4:52 PM EDT

## 2022-09-27 NOTE — PROGRESS NOTES
Renal Progress Note    Patient :  Abhijit Priest; 74 y.o. MRN# [id number]  Location:  [facility]/9796-22  Attending:  Heather Jung MD  Admit Date:  9/16/2022   Hospital Day: 11    Subjective:       No acute episodes overnight  Patient is heme stable with Tmax of 99  Urine output about 2.5 L, insensible losses continue to be high including acid from drain and NG tube. drain output continues to be significant over last 24 hours, exceeds 900 ml  Total fluid balance since admission -17 liters  AM Labs reviewed, creat 1.4, Na 147, magnesium 1.9 calcium 8.0 phosphorus 2.7  Sodium improving slowly. Phosphorus improving after replacement was ordered yesterday. History reviewed known history of chronic kidney disease stage IIIb baseline 1.3-1.4 secondary to chronic interstitial nephritis both from obstructive uropathy related to bladder tumor and chemotherapy with platinum-based analogs. He has seen me in the office before. Was brought into the hospital for elective cystoprostatectomy which was done on 9/16/2022 also underwent bilateral pelvic lymphadenectomy and ileal conduit formation. Postoperatively has developed small bowel ileus, which has persisted and has been refractory to conservative treatment. Outpatient Medications:     Medications Prior to Admission: ondansetron (ZOFRAN-ODT) 8 MG TBDP disintegrating tablet, DISSOLVE 1 TABLET IN MOUTH EVERY 8 HOURS AS NEEDED FOR NAUSEA FOR VOMITING  HYDROcodone-acetaminophen (NORCO) 5-325 MG per tablet, Take 1 tablet by mouth every 8 hours as needed for Pain for up to 30 days.   rivaroxaban (XARELTO) 20 MG TABS tablet, Take 1 tablet by mouth once daily with breakfast (Patient taking differently: Take 1 tablet by mouth once daily with breakfast/ per cardiology, pt. to stop 3 days pre-op for OR 9-16-22)  omeprazole (PRILOSEC) 20 MG delayed release capsule, Take 1 capsule by mouth daily  carvedilol (COREG) 3.125 MG tablet, TAKE 1 TABLET BY MOUTH TWICE DAILY  finasteride (PROSCAR) 5 MG tablet, Take 5 mg by mouth daily  tamsulosin (FLOMAX) 0.4 MG capsule, Take 0.4 mg by mouth daily  simvastatin (ZOCOR) 40 MG tablet, Take 40 mg by mouth nightly  buPROPion (WELLBUTRIN XL) 300 MG extended release tablet, Take 300 mg by mouth every morning    Current Medications:     Scheduled Meds:    metoprolol  5 mg IntraVENous Q8H    fluconazole  400 mg IntraVENous Q24H    piperacillin-tazobactam  3,375 mg IntraVENous Q8H    phosphorus  500 mg Oral TID    heparin (porcine)  5,000 Units SubCUTAneous 3 times per day    [Held by provider] metoclopramide  5 mg IntraVENous Q6H    naloxegol  12.5 mg Oral Daily    chlorhexidine  15 mL Mouth/Throat BID    bisacodyl  10 mg Rectal Daily    pantoprazole (PROTONIX) 40 mg injection  40 mg IntraVENous Q12H    polyethylene glycol  17 g Oral Daily    buPROPion  300 mg Oral QAM    simvastatin  40 mg Oral Nightly    sodium chloride flush  5-40 mL IntraVENous 2 times per day    acetaminophen  650 mg Oral Q6H     Continuous Infusions:    IV infusion builder 125 mL/hr at 22 1143    sodium chloride       PRN Meds:  diatrizoate meglumine-sodium, metoprolol, promethazine, HYDROmorphone, ondansetron, sodium chloride flush, sodium chloride, oxyCODONE **OR** oxyCODONE    Input/Output:       I/O last 3 completed shifts: In: 1418.5 [I.V.:1418.5]  Out: 5 [Urine:2110; Emesis/NG output:1300; Drains:1310]    Vital Signs:   Temperature:  Temp: 97.5 °F (36.4 °C)  TMax:   Temp (24hrs), Av.9 °F (36.6 °C), Min:97.4 °F (36.3 °C), Max:99 °F (37.2 °C)    Respirations:  Resp: 18  Pulse:   Heart Rate: 80  BP:    BP: (!) 149/76  BP Range: Systolic (32GTD), HHX:124 , Min:141 , XAR:996       Diastolic (04EZT), WDS:00, Min:66, Max:76    Physical Examination:     General:   Saturating well on 2 liters nasal canula  HEENT:  Atraumatic, normocephalic, no throat congestion, moist mucosa. Eyes:   Pupils equal, round and reactive to light, pallor, no icterus.   Neck:   No JVD, no thyromegaly, no lymphadenopathy. Chest:  Air entry fair bilaterally, no crackles  Cardiac: S1 S2 RR no murmurs  Abdomen:  Soft, non-tender, no masses or organomegally appreciable, BS sluggish. :   No suprapubic or flank tenderness. Neuro:  Sedated on ventilator. Skin:   No rashes, good skin turgor. Extremities:  No edema, palpable peripheral pulses, no cyanosis, no calf tenderness. Labs:       Recent Labs     09/25/22 0249 09/26/22 1049 09/27/22  0547   WBC 20.6* 18.5* 20.6*   RBC 2.93* 2.79* 2.67*   HGB 8.4* 8.0* 7.6*   HCT 27.2* 25.2* 24.2*   MCV 92.8 90.3 90.6   MCH 28.7 28.7 28.5   MCHC 30.9 31.7 31.4   RDW 15.8* 15.7* 15.9*    240 256   MPV 10.8 9.9 10.1        BMP:   Recent Labs     09/25/22 0249 09/26/22 1049 09/27/22  0547    149* 147*   K 4.3 3.7 3.5*   * 116* 115*   CO2 21 22 21   BUN 38* 29* 27*   CREATININE 1.89* 1.48* 1.39*   GLUCOSE 76 80 72   CALCIUM 7.8* 8.0* 7.7*        Phosphorus:     Recent Labs     09/25/22 0249 09/26/22 1049 09/27/22  0547   PHOS 2.8 1.7* 2.3*       Magnesium:    Recent Labs     09/25/22 0249 09/26/22 1049 09/27/22  0547   MG 1.8 1.9 1.8       Albumin:  No results for input(s): LABALBU in the last 72 hours.   BNP:    No results found for: BNP  LISS:      Lab Results   Component Value Date/Time    LISS NEGATIVE 05/31/2022 10:45 AM     SPEP:  Lab Results   Component Value Date/Time    PROT 6.6 09/08/2022 01:46 PM     UPEP:   No results found for: LABPE  C3:     Lab Results   Component Value Date/Time    C3 119 05/31/2022 10:45 AM     C4:     Lab Results   Component Value Date/Time    C4 25 05/31/2022 10:45 AM     MPO ANCA:   No results found for: MPO  PR3 ANCA:   No results found for: PR3  Anti-GBM:   No results found for: GBMABIGG  Hep BsAg:       No results found for: HEPBSAG  Hep C AB:        No results found for: HEPCAB    Urinalysis/Chemistries:      Lab Results   Component Value Date/Time    NITRU POSITIVE 09/18/2022 05:17 PM    COLORU Yellow 09/18/2022 05:17 PM    PHUR 6.0 09/18/2022 05:17 PM    WBCUA 50  09/18/2022 05:17 PM    RBCUA TOO NUMEROUS TO COUNT 09/18/2022 05:17 PM    MUCUS 3+ 04/14/2022 09:43 AM    YEAST FEW 03/31/2022 01:01 PM    BACTERIA MODERATE 09/18/2022 05:17 PM    CLARITYU cloudy 08/04/2022 09:05 AM    SPECGRAV 1.016 09/18/2022 05:17 PM    LEUKOCYTESUR LARGE 09/18/2022 05:17 PM    UROBILINOGEN Normal 09/18/2022 05:17 PM    BILIRUBINUR NEGATIVE 09/18/2022 05:17 PM    BLOODU large 08/04/2022 09:05 AM    GLUCOSEU NEGATIVE 09/18/2022 05:17 PM    KETUA TRACE 09/18/2022 05:17 PM     Urine Sodium:   No results found for: ИРИНА  Urine Potassium:  No results found for: KUR  Urine Chloride:  No results found for: CLUR  Urine Osmolarity: No results found for: OSMOU  Urine Protein:   No results found for: TPU  Urine Creatinine:     Lab Results   Component Value Date/Time    LABCREA 121.3 09/18/2022 05:17 PM     Urine Eosinophils:  No components found for: UEOS    Radiology:     CXR:     Assessment:     1. Acute Kidney Injury: Secondary to ischemic ATN from depleted circulating volume related to small bowel obstruction ileus, sequestration, peritoneal inflammation from suspected anastomotic leak. Baseline 1.3-1.4 postoperatively had gone up to 2.5, came down to 1.5, however i then started worsening peaked at 2.5, found to have anastomotic leak and Bowel and ureter. Required operative intervention, bilateral nephrostomy tube placement, since then urine output improving down to 1.5, insensible losses continue to be high. 2.  Small bowel obstruction related to dynamic causes, bowel was stuck behind the ureter which was extricated yesterday. Some suspicion of ischemia. 3.  Chronic kidney disease stage IIIb from chronic intestinal nephritis both from chemotherapy with platinum and bladder tumor causing obstructive uropathy, baseline 1.3-1.4  4.   Bladder cancer failed local therapy status for cystoprostatectomy and ileal conduit formation 9/16/2022  5. Cardiomyopathy ejection fraction 40%  6. Hypernatremia from insensible free water losses  7. Hypophosphatemia    Plan:   1. Change IV fluids to one third saline with 40 of K at 150 mL an hour. 2. Keep systolic pressure more than 110  3. Replace phosphorus as ordered  4. Follow lab work, intake and output, daily weights, and hemodynamics  5.   Advance diet as recommended by surgery  6, monitor urine output

## 2022-09-28 PROBLEM — E87.6 HYPOKALEMIA: Status: ACTIVE | Noted: 2022-09-28

## 2022-09-28 PROBLEM — K56.609 SMALL BOWEL OBSTRUCTION (HCC): Status: ACTIVE | Noted: 2022-09-28

## 2022-09-28 PROBLEM — E83.39 HYPOPHOSPHATEMIA: Status: ACTIVE | Noted: 2022-09-28

## 2022-09-28 LAB
ANION GAP SERPL CALCULATED.3IONS-SCNC: 10 MMOL/L (ref 9–17)
ANION GAP SERPL CALCULATED.3IONS-SCNC: 9 MMOL/L (ref 9–17)
BUN BLDV-MCNC: 22 MG/DL (ref 8–23)
BUN BLDV-MCNC: 25 MG/DL (ref 8–23)
CALCIUM SERPL-MCNC: 7.5 MG/DL (ref 8.6–10.4)
CALCIUM SERPL-MCNC: 7.5 MG/DL (ref 8.6–10.4)
CHLORIDE BLD-SCNC: 113 MMOL/L (ref 98–107)
CHLORIDE BLD-SCNC: 114 MMOL/L (ref 98–107)
CO2: 22 MMOL/L (ref 20–31)
CO2: 24 MMOL/L (ref 20–31)
CREAT SERPL-MCNC: 1.39 MG/DL (ref 0.7–1.2)
CREAT SERPL-MCNC: 1.41 MG/DL (ref 0.7–1.2)
GFR AFRICAN AMERICAN: >60 ML/MIN
GFR AFRICAN AMERICAN: >60 ML/MIN
GFR NON-AFRICAN AMERICAN: 50 ML/MIN
GFR NON-AFRICAN AMERICAN: 51 ML/MIN
GFR SERPL CREATININE-BSD FRML MDRD: ABNORMAL ML/MIN/{1.73_M2}
GFR SERPL CREATININE-BSD FRML MDRD: ABNORMAL ML/MIN/{1.73_M2}
GLUCOSE BLD-MCNC: 88 MG/DL (ref 70–99)
GLUCOSE BLD-MCNC: 94 MG/DL (ref 70–99)
HCT VFR BLD CALC: 26.4 % (ref 40.7–50.3)
HEMOGLOBIN: 8.3 G/DL (ref 13–17)
MAGNESIUM: 1.7 MG/DL (ref 1.6–2.6)
MCH RBC QN AUTO: 28.4 PG (ref 25.2–33.5)
MCHC RBC AUTO-ENTMCNC: 31.4 G/DL (ref 28.4–34.8)
MCV RBC AUTO: 90.4 FL (ref 82.6–102.9)
NRBC AUTOMATED: 0.1 PER 100 WBC
PDW BLD-RTO: 16.2 % (ref 11.8–14.4)
PHOSPHORUS: 2.8 MG/DL (ref 2.5–4.5)
PLATELET # BLD: 287 K/UL (ref 138–453)
PMV BLD AUTO: 10.2 FL (ref 8.1–13.5)
POTASSIUM SERPL-SCNC: 3.6 MMOL/L (ref 3.7–5.3)
POTASSIUM SERPL-SCNC: 3.7 MMOL/L (ref 3.7–5.3)
RBC # BLD: 2.92 M/UL (ref 4.21–5.77)
SODIUM BLD-SCNC: 145 MMOL/L (ref 135–144)
SODIUM BLD-SCNC: 147 MMOL/L (ref 135–144)
WBC # BLD: 20.3 K/UL (ref 3.5–11.3)

## 2022-09-28 PROCEDURE — 99232 SBSQ HOSP IP/OBS MODERATE 35: CPT | Performed by: INTERNAL MEDICINE

## 2022-09-28 PROCEDURE — 2580000003 HC RX 258: Performed by: STUDENT IN AN ORGANIZED HEALTH CARE EDUCATION/TRAINING PROGRAM

## 2022-09-28 PROCEDURE — 76937 US GUIDE VASCULAR ACCESS: CPT

## 2022-09-28 PROCEDURE — 6360000002 HC RX W HCPCS: Performed by: SPECIALIST

## 2022-09-28 PROCEDURE — 6370000000 HC RX 637 (ALT 250 FOR IP): Performed by: STUDENT IN AN ORGANIZED HEALTH CARE EDUCATION/TRAINING PROGRAM

## 2022-09-28 PROCEDURE — 2500000003 HC RX 250 WO HCPCS: Performed by: STUDENT IN AN ORGANIZED HEALTH CARE EDUCATION/TRAINING PROGRAM

## 2022-09-28 PROCEDURE — 1200000000 HC SEMI PRIVATE

## 2022-09-28 PROCEDURE — 80048 BASIC METABOLIC PNL TOTAL CA: CPT

## 2022-09-28 PROCEDURE — 6370000000 HC RX 637 (ALT 250 FOR IP): Performed by: INTERNAL MEDICINE

## 2022-09-28 PROCEDURE — 6360000002 HC RX W HCPCS: Performed by: INTERNAL MEDICINE

## 2022-09-28 PROCEDURE — 83735 ASSAY OF MAGNESIUM: CPT

## 2022-09-28 PROCEDURE — 6360000002 HC RX W HCPCS: Performed by: STUDENT IN AN ORGANIZED HEALTH CARE EDUCATION/TRAINING PROGRAM

## 2022-09-28 PROCEDURE — 97605 NEG PRS WND THER DME<=50SQCM: CPT

## 2022-09-28 PROCEDURE — 84100 ASSAY OF PHOSPHORUS: CPT

## 2022-09-28 PROCEDURE — C9113 INJ PANTOPRAZOLE SODIUM, VIA: HCPCS | Performed by: STUDENT IN AN ORGANIZED HEALTH CARE EDUCATION/TRAINING PROGRAM

## 2022-09-28 PROCEDURE — 36415 COLL VENOUS BLD VENIPUNCTURE: CPT

## 2022-09-28 PROCEDURE — 2500000003 HC RX 250 WO HCPCS: Performed by: INTERNAL MEDICINE

## 2022-09-28 PROCEDURE — 2580000003 HC RX 258: Performed by: INTERNAL MEDICINE

## 2022-09-28 PROCEDURE — 85027 COMPLETE CBC AUTOMATED: CPT

## 2022-09-28 RX ORDER — SODIUM CHLORIDE 450 MG/100ML
INJECTION, SOLUTION INTRAVENOUS CONTINUOUS
Status: DISCONTINUED | OUTPATIENT
Start: 2022-09-28 | End: 2022-09-28

## 2022-09-28 RX ORDER — MAGNESIUM SULFATE IN WATER 40 MG/ML
2000 INJECTION, SOLUTION INTRAVENOUS ONCE
Status: COMPLETED | OUTPATIENT
Start: 2022-09-28 | End: 2022-09-28

## 2022-09-28 RX ADMIN — METOPROLOL TARTRATE 5 MG: 1 INJECTION, SOLUTION INTRAVENOUS at 08:49

## 2022-09-28 RX ADMIN — HEPARIN SODIUM 5000 UNITS: 5000 INJECTION INTRAVENOUS; SUBCUTANEOUS at 20:45

## 2022-09-28 RX ADMIN — MAGNESIUM SULFATE HEPTAHYDRATE 2000 MG: 40 INJECTION, SOLUTION INTRAVENOUS at 16:41

## 2022-09-28 RX ADMIN — DIBASIC SODIUM PHOSPHATE, MONOBASIC POTASSIUM PHOSPHATE AND MONOBASIC SODIUM PHOSPHATE 2 TABLET: 852; 155; 130 TABLET ORAL at 08:36

## 2022-09-28 RX ADMIN — SIMVASTATIN 40 MG: 40 TABLET, FILM COATED ORAL at 20:39

## 2022-09-28 RX ADMIN — METOPROLOL TARTRATE 5 MG: 1 INJECTION, SOLUTION INTRAVENOUS at 17:52

## 2022-09-28 RX ADMIN — SODIUM CHLORIDE, PRESERVATIVE FREE 40 MG: 5 INJECTION INTRAVENOUS at 20:37

## 2022-09-28 RX ADMIN — SODIUM CHLORIDE, PRESERVATIVE FREE 10 ML: 5 INJECTION INTRAVENOUS at 20:39

## 2022-09-28 RX ADMIN — HEPARIN SODIUM 5000 UNITS: 5000 INJECTION INTRAVENOUS; SUBCUTANEOUS at 05:56

## 2022-09-28 RX ADMIN — SODIUM CHLORIDE, PRESERVATIVE FREE 40 MG: 5 INJECTION INTRAVENOUS at 08:36

## 2022-09-28 RX ADMIN — PIPERACILLIN AND TAZOBACTAM 3375 MG: 3; .375 INJECTION, POWDER, FOR SOLUTION INTRAVENOUS at 11:25

## 2022-09-28 RX ADMIN — HYDROMORPHONE HYDROCHLORIDE 1 MG: 1 INJECTION, SOLUTION INTRAMUSCULAR; INTRAVENOUS; SUBCUTANEOUS at 21:23

## 2022-09-28 RX ADMIN — SODIUM CHLORIDE, PRESERVATIVE FREE 10 ML: 5 INJECTION INTRAVENOUS at 21:24

## 2022-09-28 RX ADMIN — PIPERACILLIN AND TAZOBACTAM 3375 MG: 3; .375 INJECTION, POWDER, FOR SOLUTION INTRAVENOUS at 02:05

## 2022-09-28 RX ADMIN — POTASSIUM CHLORIDE 75 ML/HR: 2 INJECTION, SOLUTION, CONCENTRATE INTRAVENOUS at 16:41

## 2022-09-28 RX ADMIN — DIBASIC SODIUM PHOSPHATE, MONOBASIC POTASSIUM PHOSPHATE AND MONOBASIC SODIUM PHOSPHATE 2 TABLET: 852; 155; 130 TABLET ORAL at 20:38

## 2022-09-28 RX ADMIN — CHLORHEXIDINE GLUCONATE 0.12% ORAL RINSE 15 ML: 1.2 LIQUID ORAL at 20:37

## 2022-09-28 RX ADMIN — POLYETHYLENE GLYCOL 3350 17 G: 17 POWDER, FOR SOLUTION ORAL at 08:36

## 2022-09-28 RX ADMIN — POTASSIUM CHLORIDE: 2 INJECTION, SOLUTION, CONCENTRATE INTRAVENOUS at 01:46

## 2022-09-28 RX ADMIN — HEPARIN SODIUM 5000 UNITS: 5000 INJECTION INTRAVENOUS; SUBCUTANEOUS at 15:20

## 2022-09-28 RX ADMIN — ACETAMINOPHEN 650 MG: 325 TABLET ORAL at 20:37

## 2022-09-28 RX ADMIN — HYDROMORPHONE HYDROCHLORIDE 1 MG: 1 INJECTION, SOLUTION INTRAMUSCULAR; INTRAVENOUS; SUBCUTANEOUS at 02:12

## 2022-09-28 RX ADMIN — SODIUM CHLORIDE: 9 INJECTION, SOLUTION INTRAVENOUS at 08:29

## 2022-09-28 RX ADMIN — HYDROMORPHONE HYDROCHLORIDE 1 MG: 1 INJECTION, SOLUTION INTRAMUSCULAR; INTRAVENOUS; SUBCUTANEOUS at 09:46

## 2022-09-28 RX ADMIN — NALOXEGOL OXALATE 12.5 MG: 12.5 TABLET, FILM COATED ORAL at 08:36

## 2022-09-28 RX ADMIN — BUPROPION HYDROCHLORIDE 300 MG: 150 TABLET, EXTENDED RELEASE ORAL at 08:36

## 2022-09-28 RX ADMIN — FLUCONAZOLE 400 MG: 2 INJECTION, SOLUTION INTRAVENOUS at 08:29

## 2022-09-28 ASSESSMENT — PAIN DESCRIPTION - ORIENTATION
ORIENTATION: RIGHT
ORIENTATION: MID

## 2022-09-28 ASSESSMENT — PAIN SCALES - GENERAL
PAINLEVEL_OUTOF10: 8
PAINLEVEL_OUTOF10: 8
PAINLEVEL_OUTOF10: 7
PAINLEVEL_OUTOF10: 9
PAINLEVEL_OUTOF10: 0
PAINLEVEL_OUTOF10: 4

## 2022-09-28 ASSESSMENT — PAIN DESCRIPTION - LOCATION
LOCATION: ABDOMEN;FLANK
LOCATION: ABDOMEN;FLANK
LOCATION: ABDOMEN

## 2022-09-28 ASSESSMENT — ENCOUNTER SYMPTOMS
EYE REDNESS: 0
COLOR CHANGE: 0
DIARRHEA: 0
SHORTNESS OF BREATH: 0
NAUSEA: 0
SINUS PAIN: 0

## 2022-09-28 ASSESSMENT — PAIN DESCRIPTION - DESCRIPTORS
DESCRIPTORS: SHARP

## 2022-09-28 NOTE — PROGRESS NOTES
Renal Progress Note    Patient :  Li Gonzalez; 74 y.o. MRN# [id number]  Location:  0466/[id number]  Attending:  Quoc Prado MD  Admit Date:  9/16/2022   Hospital Day: 12    Subjective:       History reviewed known history of chronic kidney disease stage IIIb baseline 1.3-1.4 secondary to chronic interstitial nephritis both from obstructive uropathy related to bladder tumor and chemotherapy with platinum-based analogs. He has seen me in the office before. Was brought into the hospital for elective cystoprostatectomy which was done on 9/16/2022 also underwent bilateral pelvic lymphadenectomy and ileal conduit formation. Postoperatively has developed small bowel ileus, which has persisted and has been refractory to conservative treatment requiring repeat OR expiratory laparotomy lysis of adhesions, repair of bilateral ureteral anastomosis, abdominal washout and placement of wound VAC on 9/22/2022. [facility] Patient also required bilateral PCN tubes placement 9/23/2022. Patient seen at bedside. Labs reviewed. Sodium 145, potassium 3.6, BUN 25, creatinine 1.41. Creatinine is within baseline limits. Urine output 1 L past 24 hours. Apparently he is having bowel movements. General surgery signed off. Hemodynamically stable afebrile overnight. Urine culture 9/16/2022 positive for Pseudomonas aeruginosa and Enterococcus faecalis. Outpatient Medications:     Medications Prior to Admission: ondansetron (ZOFRAN-ODT) 8 MG TBDP disintegrating tablet, DISSOLVE 1 TABLET IN MOUTH EVERY 8 HOURS AS NEEDED FOR NAUSEA FOR VOMITING  HYDROcodone-acetaminophen (NORCO) 5-325 MG per tablet, Take 1 tablet by mouth every 8 hours as needed for Pain for up to 30 days.   rivaroxaban (XARELTO) 20 MG TABS tablet, Take 1 tablet by mouth once daily with breakfast (Patient taking differently: Take 1 tablet by mouth once daily with breakfast/ per cardiology, pt. to stop 3 days pre-op for OR 9-16-22)  omeprazole (PRILOSEC) 20 MG delayed release capsule, Take 1 capsule by mouth daily  carvedilol (COREG) 3.125 MG tablet, TAKE 1 TABLET BY MOUTH TWICE DAILY  finasteride (PROSCAR) 5 MG tablet, Take 5 mg by mouth daily  tamsulosin (FLOMAX) 0.4 MG capsule, Take 0.4 mg by mouth daily  simvastatin (ZOCOR) 40 MG tablet, Take 40 mg by mouth nightly  buPROPion (WELLBUTRIN XL) 300 MG extended release tablet, Take 300 mg by mouth every morning    Current Medications:     Scheduled Meds:    metoprolol  5 mg IntraVENous Q8H    fluconazole  400 mg IntraVENous Q24H    piperacillin-tazobactam  3,375 mg IntraVENous Q8H    phosphorus  500 mg Oral TID    heparin (porcine)  5,000 Units SubCUTAneous 3 times per day    [Held by provider] metoclopramide  5 mg IntraVENous Q6H    naloxegol  12.5 mg Oral Daily    chlorhexidine  15 mL Mouth/Throat BID    bisacodyl  10 mg Rectal Daily    pantoprazole (PROTONIX) 40 mg injection  40 mg IntraVENous Q12H    polyethylene glycol  17 g Oral Daily    buPROPion  300 mg Oral QAM    simvastatin  40 mg Oral Nightly    sodium chloride flush  5-40 mL IntraVENous 2 times per day    acetaminophen  650 mg Oral Q6H     Continuous Infusions:    IV infusion builder 150 mL/hr at 22 0146    sodium chloride 10 mL/hr at 22 0829     PRN Meds:  diatrizoate meglumine-sodium, metoprolol, promethazine, HYDROmorphone, ondansetron, sodium chloride flush, sodium chloride, oxyCODONE **OR** oxyCODONE    Input/Output:       I/O last 3 completed shifts:  In: -   Out: 4037 [Urine:2030; Emesis/NG output:460; Drains:1025]    Vital Signs:   Temperature:  Temp: 97.4 °F (36.3 °C)  TMax:   Temp (24hrs), Av.6 °F (36.4 °C), Min:97.4 °F (36.3 °C), Max:97.7 °F (36.5 °C)    Respirations:  Resp: 20  Pulse:   Heart Rate: 73  BP:    BP: 138/74  BP Range: Systolic (96UVX), EYH:453 , Min:136 , CGN:909       Diastolic (77MRX), NNS:43, Min:68, Max:74    Physical Examination:     General:  Alert awake oriented x3, no acute distress, saturating well on RA  HEENT: Atraumatic, normocephalic, no throat congestion, moist mucosa. Eyes:   Pupils equal, round and reactive to light, pallor, no icterus. Neck:   No JVD, no thyromegaly, no lymphadenopathy. Chest:  Air entry fair bilaterally, no crackles  Cardiac: S1 S2 RR no murmurs  Abdomen:  Soft, non-tender, no masses or organomegally appreciable, BS sluggish. :   No suprapubic or flank tenderness. Neuro:  Sedated on ventilator. Skin:   No rashes, good skin turgor. Extremities:  No edema.     Labs:       Recent Labs     09/26/22  1049 09/27/22  0547 09/28/22  0551   WBC 18.5* 20.6* 20.3*   RBC 2.79* 2.67* 2.92*   HGB 8.0* 7.6* 8.3*   HCT 25.2* 24.2* 26.4*   MCV 90.3 90.6 90.4   MCH 28.7 28.5 28.4   MCHC 31.7 31.4 31.4   RDW 15.7* 15.9* 16.2*    256 287   MPV 9.9 10.1 10.2      BMP:   Recent Labs     09/26/22  1049 09/27/22  0547 09/28/22  0551   * 147* 145*   K 3.7 3.5* 3.6*   * 115* 113*   CO2 22 21 22   BUN 29* 27* 25*   CREATININE 1.48* 1.39* 1.41*   GLUCOSE 80 72 88   CALCIUM 8.0* 7.7* 7.5*      Phosphorus:     Recent Labs     09/26/22  1049 09/27/22  0547 09/28/22  0551   PHOS 1.7* 2.3* 2.8     Magnesium:    Recent Labs     09/26/22  1049 09/27/22  0547 09/28/22  0551   MG 1.9 1.8 1.7     LISS:      Lab Results   Component Value Date/Time    LISS NEGATIVE 05/31/2022 10:45 AM     SPEP:  Lab Results   Component Value Date/Time    PROT 6.6 09/08/2022 01:46 PM     C3:     Lab Results   Component Value Date/Time    C3 119 05/31/2022 10:45 AM     C4:     Lab Results   Component Value Date/Time    C4 25 05/31/2022 10:45 AM       Urinalysis/Chemistries:      Lab Results   Component Value Date/Time    NITRU POSITIVE 09/18/2022 05:17 PM    COLORU Yellow 09/18/2022 05:17 PM    PHUR 6.0 09/18/2022 05:17 PM    WBCUA 50  09/18/2022 05:17 PM    RBCUA TOO NUMEROUS TO COUNT 09/18/2022 05:17 PM    MUCUS 3+ 04/14/2022 09:43 AM    YEAST FEW 03/31/2022 01:01 PM    BACTERIA MODERATE 09/18/2022 05:17 PM    CLARITYU cloudy 08/04/2022 09:05 AM    SPECGRAV 1.016 09/18/2022 05:17 PM    LEUKOCYTESUR LARGE 09/18/2022 05:17 PM    UROBILINOGEN Normal 09/18/2022 05:17 PM    BILIRUBINUR NEGATIVE 09/18/2022 05:17 PM    BLOODU large 08/04/2022 09:05 AM    GLUCOSEU NEGATIVE 09/18/2022 05:17 PM    KETUA TRACE 09/18/2022 05:17 PM     Urine Creatinine:     Lab Results   Component Value Date/Time    LABCREA 121.3 09/18/2022 05:17 PM       Radiology:       Assessment:     1. Acute Kidney Injury: Secondary to ischemic ATN from depleted circulating volume related to small bowel obstruction ileus, sequestration, peritoneal inflammation from suspected anastomotic leak. Baseline 1.3-1.4 postoperatively had gone up to 2.5, came down to 1.5, however then started worsening peaked at 2.5, found to have anastomotic leak and Bowel and ureter. Required operative intervention, bilateral nephrostomy tube placement, since then urine output 1L. And now creatinine back at baseline. 2.  Small bowel obstruction related to dynamic causes, bowel was stuck behind the ureter which was extricated yesterday. Some suspicion of ischemia. 3.  Chronic kidney disease stage IIIb from chronic intestinal nephritis both from chemotherapy with platinum and bladder tumor causing obstructive uropathy, baseline 1.3-1.4  4. Bladder cancer failed local therapy status for cystoprostatectomy and ileal conduit formation 9/16/2022  5. Cardiomyopathy ejection fraction 40%  6. Hypernatremia from insensible free water losses  7. Hypophosphatemia. 8.  Hypokalemia. Plan:   1. Change to one third saline with 40 mEq of potassium chloride at 75 cc an hour  2. Keep systolic pressure more than 110  3. Replace electrolytes as needed   4. Follow lab work, intake and output, daily weights, and hemodynamics  5. Advance diet as recommended by surgery  6. Continue monitor strict I's and O's renal function. 7.  BMP in evening and in AM.  8.  Will follow.     Attending Physician Statement  I have discussed the care of this patient, including pertinent history and exam findings, with the Resident/CNP. I have reviewed and edited the key elements of all parts of the encounter with the Resident/CNP. I agree with the assessment, plan and orders as documented by the Resident/CNP. Axel Fragoso MD   Nephrology [address] [place] [address] Drive    This note is created with the assistance of a speech-recognition program. While intending to generate a document that actually reflects the content of the visit, no guarantees can be provided that every mistake has been identified and corrected by editing.

## 2022-09-28 NOTE — CONSULTS
Nutrition Note    Consulted for ONS. Pt receiving Clear ONS. Recommend High Porfirio/High Pro ONS when diet advanced to full liquid. See RD note 9/26 for full nutrition assessment.      Electronically signed by Morro Newsome RD on 9/28/22 at 3:03 PM EDT    Contact: [phone]

## 2022-09-28 NOTE — PROGRESS NOTES
[facility] -   Infectious diseases evaluation  admission date 9/16/2022    reason for consultation:   bandemia    Impression :   Current:  9/16 bladder cancer involving wall and lymph nodes, post radical cystectomy, prostatectomy, groin lymph node dissection  9/22 SBO, had lysis of adhesions  9/22 bilateral ureteral anastomotic leak, with large abdominal collection, post repair 9/22 9/23 bilat nephrostomy tube placed  9/23 closure of the fascia, abdominal wound open with VAC  Ongoing bandemia  Pseudomonas/Enterococcus UTI, 9/16-  Cefepime 9/19 until 9/25  Right nephrostomy urine infection with Candida albicans 9/23  Noted at the time of right percutaneous nephrostomy placement 9/23  Ongoing bandemia  Hypernatremia -on fluids    Other:    Discussion / summary of stay / plan of care     Recommendations   The cause of the bandemia could be multifactorial, including a persistent urinoma  Enterococcus is not well covered by cefepime course that the patient had and would suggest trying a course of Zosyn for now  I agree with the Diflucan,  Both antibiotics to be given for 7 days until 10/3  Monitor response of the white count otherwise get a CT of the abdomen look for further collection  Case discussed with general surgery and patient    Continue Zosyn and Diflucan until 10/3  Order LFTs      Infection Control Recommendations   [place] Precautions    Antimicrobial Stewardship Recommendations   Simplification of therapy  Targeted therapy      History of Present Illness:   Initial history:  Gillermina Hashimoto is a 74y.o.-year-old male with history of bladder cancer involving the wall of the bladder as well as the groin lymph nodes. Came in for radical cystectomy done on 9/16 with ileal conduit placement, removal of the groin lymph nodes, but complicated with a small bowel obstruction and bilateral ureteral anastomotic leak.     CT scan of the abdomen  9/22 which had showed at the time a large anterior pelvic fluid collection 15 x 13 x 6 cmWith variable densities extending from the cystectomy bed    Taken back to surgery on 9/22, lysis of adhesion that was thought to be causing the SBO, as well as repair of the anastomotic leak, and placement of 2 bilateral nephrostomy tubes. 9/23 patient went back to surgery for closure of abdominal fascia and subcutaneous tissue that stayed open with a VAC placement over it -    Due to hydronephrosis, bilat PCNT placed by IR 9/23, A repeat urine culture from 9/23 done due to cloudy R urine, by IR,  is showing Candida albicans 9/26. The patient has been started on Diflucan. Along the way on 9/16 the urine grew Pseudomonas and Enterococcus, resistant to Cipro and patient has received a course of cefepime from 9/19 and 2 until 9/25. His white count remains elevated actually after a feeling that it was improving, he started going worse, today it is up to 20. Infectious is consulted for the concern of a ongoing infection. The right nephro drain was giving very little urine and had a urogram  9/26 that showed good positioning and no leak. Since then right nephrostomy line has been giving a better input. Today the patient is alert appropriate he has an NG tube, was trying to have some liquids p.o. and today was able to tolerate 2 popsicles without vomiting. He does have gurgling over the abdomen, and has no abdominal tenderness otherwise. He has only abdominal pain on the surgical site. He has 2 MERLIN drains giving serosanguineous fluid, and he has 2 percutaneous nephrostomy drains that are giving clear urine.       Interval changes  9/28/2022   Patient Vitals for the past 8 hrs:   BP Temp Temp src Pulse Resp SpO2   09/28/22 1145 138/74 97.4 °F (36.3 °C) Oral 73 20 96 %   09/28/22 0838 (!) 147/71 97.7 °F (36.5 °C) Oral 77 18 95 %   09/28/22 0615 (!) 143/70 97.6 °F (36.4 °C) Oral 78 20 --   9/28  Afebrile  R and L nephrostomy tubes continue to drain urine  MERLIN drains with serosanguinous fluid  Patient reports having multiple BM  NG tube present  Midline wound with wound VAC  Patient suctioning phlegm and bile     Summary of relevant labs:  Labs:  WBC 83-48-16-18-20.3  Creatinine1.39 -1.41  Micro:  UA 9/18 - large leukocyte esterase and positive nitrates  9/16 UC Pseudomonas aeruginosa 2 strains, 1 sensitive to Cipro the other resistant,  and Enterococcus faecalis sensitive to Cipro  9/23 UC with yeast     Urine culture from  R nephrostomy due to cloudiness, 9/23 showing Candida albicans/W   Imaging:  CTAP 9/22  Small bowel obstruction, with a transition point near the jejunoileal junction adjacent/medial to the ileostomy site. Large pelvic fluid collection with varying heme densities, some of which is recent, extending from the cystectomy bed, as above. Ureteral stents, extending out the conduit and ileostomy site with similar moderate-severe right hydroureteronephrosis, and new mild-moderate left hydroureteronephrosis. Postsurgical changes with scattered pneumoperitoneum/ascites, extensive subcutaneous air/soft tissue changes. Enteric tube in satisfactory position with its tip in the stomach. Segmental/subsegmental lung base changes posteriorly. Multiple additional findings, either unchanged or incidental, as detailed in the body of the report above. I have personally reviewed the past medical history, past surgical history, medications, social history, and family history, and I haveupdated the database accordingly. Allergies: Other     Review of Systems:     Review of Systems   Constitutional:  Negative for appetite change, chills, diaphoresis and fatigue. HENT:  Negative for congestion and sinus pain. Eyes:  Negative for redness. Respiratory:  Negative for shortness of breath. Cardiovascular:  Negative for chest pain and palpitations. Gastrointestinal:  Negative for diarrhea and nausea.         Pt reports having multiple bowel movements    Endocrine: Negative for cold intolerance and heat intolerance. Genitourinary:  Negative for difficulty urinating and dysuria. Musculoskeletal:  Negative for gait problem. Skin:  Negative for color change and pallor. Allergic/Immunologic: Negative for immunocompromised state. Neurological:  Negative for dizziness and light-headedness. Hematological:  Negative for adenopathy. Psychiatric/Behavioral:  Negative for agitation. Physical Examination :       Physical Exam  Constitutional:       General: He is not in acute distress. Appearance: He is obese. He is not ill-appearing, toxic-appearing or diaphoretic. HENT:      Head: Normocephalic and atraumatic. Right Ear: Tympanic membrane normal. There is no impacted cerumen. Left Ear: Tympanic membrane normal. There is no impacted cerumen. Nose: Nose normal.      Mouth/Throat:      Mouth: Mucous membranes are moist.      Pharynx: Oropharynx is clear. No oropharyngeal exudate. Eyes:      General: No scleral icterus. Extraocular Movements: Extraocular movements intact. Pupils: Pupils are equal, round, and reactive to light. Cardiovascular:      Rate and Rhythm: Normal rate and regular rhythm. Pulses: Normal pulses. Heart sounds: No murmur heard. No friction rub. No gallop. Pulmonary:      Effort: Pulmonary effort is normal. No respiratory distress. Breath sounds: Normal breath sounds. No wheezing. Abdominal:      General: Abdomen is flat. Bowel sounds are normal. There is no distension. Tenderness: There is abdominal tenderness. Genitourinary:     Comments: MERLIN drains with serosanguinous fluid  Musculoskeletal:         General: Normal range of motion. Cervical back: Normal range of motion. No rigidity. Right lower leg: No edema. Left lower leg: No edema. Lymphadenopathy:      Cervical: No cervical adenopathy. Skin:     General: Skin is warm. Capillary Refill: Capillary refill takes less than 2 seconds. Coloration: Skin is not jaundiced. Findings: No bruising. Neurological:      General: No focal deficit present. Mental Status: He is alert and oriented to person, place, and time. Sensory: No sensory deficit. Motor: No weakness. Psychiatric:         Mood and Affect: Mood normal.         Thought Content: Thought content normal.       Past Medical History:     Past Medical History:   Diagnosis Date    Acute renal failure with tubular necrosis ([facility] 05/26/2022    Likely ischemic ATN/prerenal acidemia from intractable nausea vomiting as result of chemotherapy, baseline 1.3-1.4 peaked up to 2.3 in May 2022    Arthritis     BPH with obstruction/lower urinary tract symptoms     CAD (coronary artery disease) 06/2022    nonobstructive on cath    Caffeine use     3 cans soda/pop    Cancer ([facility] [address]     bladder cancer. Dr. 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   Baseline 1.3-1.4    COVID-19 08/16/2021    SOB, fatigue, fever, chills  x 3 weeks    Depression     Dilated cardiomyopathy ([facility] [address] 05/26/2022    Ejection fraction 40 to 45%, does have symptoms of dyspnea and decreased effort tolerance    GERD (gastroesophageal reflux disease)     High cholesterol     Kidney calculi     Sleep apnea     does not use cpap    Under care of team     Dr. Harrison Logan [facility]    Under care of team     Dr. Agustina Oshea. last visit approx 9/2021    Under care of team     Dr. Errol Gonzalez [facility] last visit 8/03/2022    Under care of team     Dr. Greta Galvan    Under care of team     Dr. Patrick Cotter       Past Surgical  History: Past Surgical History:   Procedure Laterality Date    BLADDER REMOVAL      BLADDER REMOVAL N/A 9/16/2022    XI ROBOTIC LAPARASCOPIC ASSISTED RADICAL CYSTOPROSTATECTOMY, BILATERAL PELVIC LYMPHADENECTOMY AND ILEAL CONDUIT FORMATION performed by Ernestine Lujan MD at [address] Right 9/16/2022    CYSTOSCOPY STENT REMOVAL performed by Ernestine Lujan MD at [facility]  06/16/2022    nonobstructive CAD    COLONOSCOPY      IR NEPHROSTOMY PERCUTANEOUS LEFT  9/23/2022    IR NEPHROSTOMY PERCUTANEOUS LEFT 9/23/2022 Susan Russ MD [facility] SPECIAL PROCEDURES    IR NEPHROSTOMY PERCUTANEOUS RIGHT  9/23/2022    IR NEPHROSTOMY PERCUTANEOUS RIGHT 9/23/2022 MD MARSHALL Melgar SPECIAL PROCEDURES    IR PORT PLACEMENT EQUAL OR GREATER THAN 5 YEARS  02/25/2022    IR PORT PLACEMENT EQUAL OR GREATER THAN 5 YEARS 2/25/2022 [facility] SPECIAL PROCEDURES    KNEE ARTHROSCOPY      left    LAPAROTOMY N/A 9/22/2022    LAPAROTOMY EXPLORATORY, LYSIS OF ADHESIONS, REPAIR OF BILATERAL URETERAL ANASTOMOSES, ABDOMINAL WASHOUT, PLACEMENT OF ABTHERA WOUND VAC performed by Desiree Azevedo DO at [address] N/A 9/23/2022    2ND LOOK LAPAROTOMY,  ABDOMINAL WASHOUT, ABDOMINAL CLOSURE, WOUND VAC PLACEMENT performed by Desiree Azevedo DO at [place] [address]  09/16/2022    Cystoprostatectomy, Bilateral Pelvic Lymphadenectomy, ileo conduit formation, cystectomy stent removal (right)       Medications:      metoprolol  5 mg IntraVENous Q8H    fluconazole  400 mg IntraVENous Q24H    piperacillin-tazobactam  3,375 mg IntraVENous Q8H    phosphorus  500 mg Oral TID    heparin (porcine)  5,000 Units SubCUTAneous 3 times per day    [Held by provider] metoclopramide  5 mg IntraVENous Q6H    naloxegol  12.5 mg Oral Daily    chlorhexidine  15 mL Mouth/Throat BID    bisacodyl  10 mg Rectal Daily    pantoprazole (PROTONIX) 40 mg injection  40 mg IntraVENous Q12H polyethylene glycol  17 g Oral Daily    buPROPion  300 mg Oral QAM    simvastatin  40 mg Oral Nightly    sodium chloride flush  5-40 mL IntraVENous 2 times per day    acetaminophen  650 mg Oral Q6H       Social History:     Social History     Socioeconomic History    Marital status:      Spouse name: Not on file    Number of children: Not on file    Years of education: Not on file    Highest education level: Not on file   Occupational History    Not on file   Tobacco Use    Smoking status: Former     Packs/day: 0.25     Years: 9.00     Pack years: 2.25     Types: Cigarettes     Start date: 1983     Quit date: 2/10/1992     Years since quittin.6    Smokeless tobacco: Never   Vaping Use    Vaping Use: Never used   Substance and Sexual Activity    Alcohol use: Not Currently    Drug use: No    Sexual activity: Yes     Partners: Female   Other Topics Concern    Not on file   Social History Narrative    Not on file     Social Determinants of Health     Financial Resource Strain: Not on file   Food Insecurity: Not on file   Transportation Needs: Not on file   Physical Activity: Not on file   Stress: Not on file   Social Connections: Not on file   Intimate Partner Violence: Not on file   Housing Stability: Not on file       Family History:     Family History   Problem Relation Age of Onset    Cancer Father         bladder cancer    Urolithiasis Father       Medical Decision Making:   I have independently reviewed/ordered the following labs:    CBC with Differential:   Recent Labs     22  0547 22  0551   WBC 20.6* 20.3*   HGB 7.6* 8.3*   HCT 24.2* 26.4*    287     BMP:  Recent Labs     22  0547 22  0551   * 145*   K 3.5* 3.6*   * 113*   CO2 21 22   BUN 27* 25*   CREATININE 1.39* 1.41*   MG 1.8 1.7     Hepatic Function Panel: No results for input(s): PROT, LABALBU, BILIDIR, IBILI, BILITOT, ALKPHOS, ALT, AST in the last 72 hours.   No results for input(s): RPR in the last 72 hours. No results for input(s): HIV in the last 72 hours. No results for input(s): BC in the last 72 hours. Lab Results   Component Value Date/Time    CREATININE 1.41 09/28/2022 05:51 AM    GLUCOSE 88 09/28/2022 05:51 AM       Detailed results: Thank you for allowing us to participate in the care of this patient. Please call with questions. This note is created with the assistance of a speech recognition program.  While intending to generate adocument that actually reflects the content of the visit, the document can still have some errors including those of syntax and sound a like substitutions which may escape proof reading. It such instances, actual meaningcan be extrapolated by contextual diversion. Office: [phone]  Perfect serve / office [phone]      Miguel A Thomson, OMS3      I have discussed the care of the patient, including pertinent history and exam findings,  with the resident. I have seen and examined the patient and the key elements of all parts of the encounter have been performed by me. I agree with the assessment, plan and orders as documented by the resident.     Sindi Raymond, Infectious Diseases

## 2022-09-28 NOTE — PROGRESS NOTES
[id number] [facility] Wound Ostomy  Nurse  Follow up  Note       NAME:  Kameron Bailon RECORD NUMBER:  [id number]  AGE: 74 y.o. GENDER: male  : 1955  TODAY'S DATE:  2022    Subjective   Reason for [id number] Nurse Evaluation and Assessment: NPWT dressing changes to mid abdominal surgical wound using white foam to base under black granufoam at -125 mHg vacuum. Urostomy care and education.          Objective    BP (!) 147/71   Pulse 77   Temp 97.7 °F (36.5 °C) (Oral)   Resp 18   Ht 6' 2.02\" (1.88 m)   Wt 262 lb 2 oz (118.9 kg)   SpO2 95%   BMI 33.64 kg/m²     LABS:  WBC:    Lab Results   Component Value Date/Time    WBC 20.3 2022 05:51 AM     H/H:    Lab Results   Component Value Date/Time    HGB 8.3 2022 05:51 AM    HCT 26.4 2022 05:51 AM       Assessment   Camacho Risk Score: Camacho Scale Score: 17    Patient Active Problem List   Diagnosis Code    Kidney stones N20.0    Nocturia R35.1    Hypertrophy of prostate with urinary obstruction N40.1, N13.8    Malignant neoplasm of urinary bladder (HCC) C67.9    Urinary retention R33.9    Cancer of lateral wall of urinary bladder (HCC) C67.2    Acute respiratory failure with hypoxia (HCC) J96.01    Other hyperlipidemia E78.49    Microcytic anemia D50.9    Mild protein-calorie malnutrition (HCC) E44.1    Pulmonary embolism without acute cor pulmonale (HCC) I26.99    Hypoxia R09.02    Dyspnea X62.53    Acute systolic heart failure (HCC) I50.21    Acute renal failure with tubular necrosis (HCC) N17.0    CKD (chronic kidney disease), stage III (HCC) N18.30    Dilated cardiomyopathy (HCC) I42.0    Bladder cancer (HCC) C67.9    Acute kidney injury ([address] N17.9    Bandemia H31.257    Complicated UTI (urinary tract infection) N39.0       Measurements:         22 1030   Urostomy Ileal conduit RLQ   Placement Date/Time: 22 1413   Present on Admission/Arrival: No  Inserted by: Dr. Jamila Lyman  Urostomy Type: Ileal conduit  Location: RLQ Stomal Appliance 1 piece;Clean, dry & intact; Changed   Flange Size (inches) 2.2 Inches   Stoma  Assessment Pink;Moist  (28mm sl oval; 2 ureteral stents & red rubber catheter; sl mucocutaneous separation at 12 o'clock)   Peristomal Assessment Clean, dry & intact   Collection Container Standard   Treatment Bag change;Site care; Liquid skin barrier  (Brava seal)   Urine Color Yellow   Urine Appearance Mucous   Negative Pressure Wound Therapy Abdomen Mid   Placement Date/Time: 09/23/22 1310   Location: Abdomen  Wound Location Orientation: Mid   Wound Type Surgical   Unit Type KCI VAC Ulta   Dressing Type White Foam;Black Foam   Number of pieces used 3   Number of pieces removed 3   Cycle Continuous; On   Target Pressure (mmHg) 125   Canister changed? No   Dressing Status New dressing applied   Dressing Changed Changed/New   Wound 09/22/22 Abdomen Mid SURGICAL INCISION   Date First Assessed: 09/22/22   Present on Hospital Admission: No  Primary Wound Type: Surgical Type  Location: Abdomen  Wound Location Orientation: Mid  Wound Description (Comments): SURGICAL INCISION   Wound Image    Wound Etiology Surgical   Dressing Status New dressing applied   Wound Cleansed Cleansed with saline   Dressing/Treatment Negative pressure wound therapy   Dressing Change Due 09/30/22   Wound Length (cm) 14 cm   Wound Width (cm) 3.4 cm   Wound Depth (cm) 4 cm   Wound Surface Area (cm^2) 47.6 cm^2   Change in Wound Size % (l*w) 23.72   Wound Volume (cm^3) 190.4 cm^3   Wound Healing % -2   Wound Assessment Subcutaneous;Pink/red   Drainage Amount Small   Drainage Description Serosanguinous   Odor None   Verito-wound Assessment Intact     Medicated with IV Dilaudid prior to start  of procedure. NPWT dressing removed using adhesive remove and sponge loosened with saline. Wound rinsed with saline. Periwound skin prepped with SurePREP and bordered with drape. Strips of white sponge placed in base of wound with black granufoam to fill wound.

## 2022-09-28 NOTE — PROGRESS NOTES
PROGRESS NOTE    PATIENT NAME: Kameron Bailon RECORD NO. [id number]  DATE: 2022    PRIMARY CARE PHYSICIAN: Mitchel Faith MD    HD: # 12    ASSESSMENT    Patient Active Problem List   Diagnosis    Kidney stones    Nocturia    Hypertrophy of prostate with urinary obstruction    Malignant neoplasm of urinary bladder (HCC)    Urinary retention    Cancer of lateral wall of urinary bladder (HCC)    Acute respiratory failure with hypoxia (HCC)    Other hyperlipidemia    Microcytic anemia    Mild protein-calorie malnutrition (HCC)    Pulmonary embolism without acute cor pulmonale (HCC)    Hypoxia    Dyspnea    Acute systolic heart failure (HCC)    Acute renal failure with tubular necrosis (HCC)    CKD (chronic kidney disease), stage III (HCC)    Dilated cardiomyopathy (HCC)    Bladder cancer ([facility] [address]    Acute kidney injury ([facility]    Bandemia    Complicated UTI (urinary tract infection)       MEDICAL DECISION MAKING AND PLAN    SBO s/p release  Passing flatus, having BM, ok for NGT out  Midline wound  Wound vac change today, white foam base and black foam top  Anticipate wound able to be wet to dry on discharge vs. Continued black foam per wound ostomy recommendations  MERLIN drain  To stay until deemed ok to removed per urology  [place]  Nephrology on board, defer to their service    General surgery to sign off, please contact with questions or concerns    Chief Complaint: \"I feel good today\"    SUBJECTIVE    Ines Cabrera seen and examined, FRANKY, VSS, NGT in place to LIWS,  denies CP or SOB, midline vac w/ good seal, having BM and passing flatus    OBJECTIVE  VITALS: Temp: Temp: 97.7 °F (36.5 °C)Temp  Av.6 °F (36.4 °C)  Min: 97.5 °F (36.4 °C)  Max: 97.7 °F (99.3 °C) BP Systolic (97ZJR), GUJ:786 , Min:136 , AHB:365   Diastolic (44LEA), MNA:65, Min:68, Max:76   Pulse Pulse  Av.8  Min: 76  Max: 80 Resp Resp  Av.3  Min: 17  Max: 20 Pulse ox SpO2  Av %  Min: 95 %  Max: 95 %    GENERAL: alert, no distress  HEENT: PERRLA, NCAT, NGT  : ileal conduit in place, neprhostomy tubes x2  LUNGS: CTAB  HEART: RRR no M  ABDOMEN: soft, non-tender, non-distended,  left abdomen MERLIN drains in place  EXTREMITY: no cyanosis, clubbing or edema      LAB:  CBC:   Recent Labs     09/26/22  1049 09/27/22  0547 09/28/22  0551   WBC 18.5* 20.6* 20.3*   HGB 8.0* 7.6* 8.3*   HCT 25.2* 24.2* 26.4*   MCV 90.3 90.6 90.4    256 287       BMP:   Recent Labs     09/26/22  1049 09/27/22  0547 09/28/22  0551   * 147* 145*   K 3.7 3.5* 3.6*   * 115* 113*   CO2 22 21 22   BUN 29* 27* 25*   CREATININE 1.48* 1.39* 1.41*   GLUCOSE 80 72 88           RADIOLOGY:  No results found. Electronically signed by Nayana Lujan DO on 9/28/2022 at 11:41 AM        Attending Note    Tolerating advancement of diet. [facility] and I either performed the key elements of the medical history and physical exam or was present with the resident when the key elements of the medical history and physical exam were performed. I have discussed the findings, established the care plan and recommendations with Resident, ALMA RN, bedside nurse.     Sam Miles MD  9/28/2022  5:06 PM

## 2022-09-28 NOTE — PROGRESS NOTES
Ivet Gabriel MD FACS   Urology Progress Note     Subjective:   No acute events overnight  No fevers or chills  Some nausea, no vomiting  NG tube has been clamped since yesterday per general surgery, patient tolerated this well  Had approximately 3 bowel movements last evening  Generally feels very well today compared to yesterday    Right nephrostomy tube 140 cc light pink urine/24 hours  Left nephrostomy tube 625 cc jared urine/24 hours  Lower MERLIN drain 320 cc serosanguineous  Upper MERLIN drain 225 cc serosanguineous    Creatinine 1.41 (1.39)  Leukocytosis 20.3 (20.6)  Hemoglobin stable 8.3 (7.6)    Patient Vitals for the past 24 hrs:   BP Temp Temp src Pulse Resp SpO2   09/28/22 0615 (!) 143/70 97.6 °F (36.4 °C) Oral 78 20 --   09/28/22 0008 136/71 -- -- 76 -- --   09/27/22 1946 (!) 145/68 97.6 °F (36.4 °C) Oral 78 17 --   09/27/22 1145 (!) 149/76 97.5 °F (36.4 °C) Oral 80 18 --   09/27/22 0817 (!) 146/75 97.7 °F (36.5 °C) -- 87 18 93 %       Intake/Output Summary (Last 24 hours) at 9/28/2022 0808  Last data filed at 9/28/2022 0602  Gross per 24 hour   Intake --   Output 2070 ml   Net -2070 ml       Recent Labs     09/26/22  1049 09/27/22  0547 09/28/22  0551   WBC 18.5* 20.6* 20.3*   HGB 8.0* 7.6* 8.3*   HCT 25.2* 24.2* 26.4*   MCV 90.3 90.6 90.4    256 287     Recent Labs     09/26/22  1049 09/27/22  0547 09/28/22  0551   * 147* 145*   K 3.7 3.5* 3.6*   * 115* 113*   CO2 22 21 22   PHOS 1.7* 2.3* 2.8   BUN 29* 27* 25*   CREATININE 1.48* 1.39* 1.41*       No results for input(s): COLORU, PHUR, LABCAST, WBCUA, RBCUA, MUCUS, TRICHOMONAS, YEAST, BACTERIA, CLARITYU, SPECGRAV, LEUKOCYTESUR, UROBILINOGEN, BILIRUBINUR, BLOODU in the last 72 hours.     Invalid input(s): NITRATE, GLUCOSEUKETONESUAMORPHOUS      Additional Lab/culture results:    Physical Exam:   General: A/O x 3, no acute distress  HEENT: moist mucous membranes, NG tube in place and clamped  Neck: Supple   Chest: bilateral symmetrical chest rise   Circulatory: Peripheries warm , well perfused   P/A: soft, clean, dry and intact with skin glue, ostomy pink with red rubber in place, wound vac in place, MERLIN x 2 in place with SS drainage  Extremities: No edema/calf tenderness    Interval Imaging Findings: none    Impression:  none    Ted Blanton is a 22-year-old male   Active  problem list  Hx of bladder cancer  Robotic assisted laparoscopic cystoprostatectomy with ileal conduit creation on 9/16/2022   Ex lap ROLANDO repair of BL ureteral anastamosis and abd washout with abthera due to ureteral anastamotic leaks and SBO and second look on 9/22 and 9/23      Plan:   Diet: per General Surgery  NG tube clamped per general surgery  Pain control and antiemetic as needed  Maintain bilateral nephrostomy tubes   Maintain MERLIN drains   Appreciate general surgery and ID input  Continue movantik  Urine culture growing pseudomonas, enterococcus and and second pseudomonas colony type, pansusceptible only gentamicin resistance s/p 7 days cefepime   Infectious disease consulted, starting Zosyn for Enterococcus coverage, Diflucan for urine culture 9/23/2022 growing Candida albicans/dubliensis   IV fluids  DVT PPX: Subq heparin 5000units TID  Continue to monitor  Discharge planning: inappropriate for discharge today    [facility], DO, PGY-3  8:08 AM 9/28/2022    I have reviewed the history above and agree. I have reviewed all laboratory findings and imaging reports/films. I agree with the plan as noted above.     Electronically signed by Garth Mcmahan MD on 9/28/2022 at 12:04 PM

## 2022-09-28 NOTE — PROGRESS NOTES
Physical Therapy Cancel Note      DATE: 2022    NAME: Rita Canales  MRN: [id number]   : 1955      Patient not seen this date for Physical Therapy due to:     Other: per RN, wound VAC change; check back later as time allows       Electronically signed by Camacho Ordonez PT on 2022 at 10:17 AM

## 2022-09-29 LAB
ALBUMIN SERPL-MCNC: 1.9 G/DL (ref 3.5–5.2)
ALBUMIN/GLOBULIN RATIO: 0.6 (ref 1–2.5)
ALP BLD-CCNC: 73 U/L (ref 40–129)
ALT SERPL-CCNC: 5 U/L (ref 5–41)
ANION GAP SERPL CALCULATED.3IONS-SCNC: 9 MMOL/L (ref 9–17)
AST SERPL-CCNC: 15 U/L
BILIRUB SERPL-MCNC: 0.4 MG/DL (ref 0.3–1.2)
BILIRUBIN DIRECT: 0.2 MG/DL
BILIRUBIN, INDIRECT: 0.2 MG/DL (ref 0–1)
BUN BLDV-MCNC: 22 MG/DL (ref 8–23)
CALCIUM SERPL-MCNC: 7.7 MG/DL (ref 8.6–10.4)
CHLORIDE BLD-SCNC: 115 MMOL/L (ref 98–107)
CO2: 22 MMOL/L (ref 20–31)
CREAT SERPL-MCNC: 1.41 MG/DL (ref 0.7–1.2)
CREATININE FLUID: 28 MG/DL
CREATININE FLUID: 8.9 MG/DL
GFR AFRICAN AMERICAN: >60 ML/MIN
GFR NON-AFRICAN AMERICAN: 50 ML/MIN
GFR SERPL CREATININE-BSD FRML MDRD: ABNORMAL ML/MIN/{1.73_M2}
GLUCOSE BLD-MCNC: 109 MG/DL (ref 70–99)
HCT VFR BLD CALC: 26 % (ref 40.7–50.3)
HEMOGLOBIN: 8.2 G/DL (ref 13–17)
MAGNESIUM: 1.9 MG/DL (ref 1.6–2.6)
MCH RBC QN AUTO: 28.8 PG (ref 25.2–33.5)
MCHC RBC AUTO-ENTMCNC: 31.5 G/DL (ref 28.4–34.8)
MCV RBC AUTO: 91.2 FL (ref 82.6–102.9)
NRBC AUTOMATED: 0.2 PER 100 WBC
PDW BLD-RTO: 16.1 % (ref 11.8–14.4)
PHOSPHORUS: 2.3 MG/DL (ref 2.5–4.5)
PLATELET # BLD: 287 K/UL (ref 138–453)
PMV BLD AUTO: 10.3 FL (ref 8.1–13.5)
POTASSIUM SERPL-SCNC: 3.7 MMOL/L (ref 3.7–5.3)
RBC # BLD: 2.85 M/UL (ref 4.21–5.77)
SODIUM BLD-SCNC: 146 MMOL/L (ref 135–144)
SPECIMEN TYPE: NORMAL
TOTAL PROTEIN: 5.3 G/DL (ref 6.4–8.3)
WBC # BLD: 16.5 K/UL (ref 3.5–11.3)

## 2022-09-29 PROCEDURE — 2500000003 HC RX 250 WO HCPCS: Performed by: STUDENT IN AN ORGANIZED HEALTH CARE EDUCATION/TRAINING PROGRAM

## 2022-09-29 PROCEDURE — 99232 SBSQ HOSP IP/OBS MODERATE 35: CPT | Performed by: INTERNAL MEDICINE

## 2022-09-29 PROCEDURE — 84100 ASSAY OF PHOSPHORUS: CPT

## 2022-09-29 PROCEDURE — 80048 BASIC METABOLIC PNL TOTAL CA: CPT

## 2022-09-29 PROCEDURE — 1200000000 HC SEMI PRIVATE

## 2022-09-29 PROCEDURE — 2580000003 HC RX 258: Performed by: STUDENT IN AN ORGANIZED HEALTH CARE EDUCATION/TRAINING PROGRAM

## 2022-09-29 PROCEDURE — 36415 COLL VENOUS BLD VENIPUNCTURE: CPT

## 2022-09-29 PROCEDURE — 85027 COMPLETE CBC AUTOMATED: CPT

## 2022-09-29 PROCEDURE — C9113 INJ PANTOPRAZOLE SODIUM, VIA: HCPCS | Performed by: STUDENT IN AN ORGANIZED HEALTH CARE EDUCATION/TRAINING PROGRAM

## 2022-09-29 PROCEDURE — 6370000000 HC RX 637 (ALT 250 FOR IP): Performed by: STUDENT IN AN ORGANIZED HEALTH CARE EDUCATION/TRAINING PROGRAM

## 2022-09-29 PROCEDURE — 80076 HEPATIC FUNCTION PANEL: CPT

## 2022-09-29 PROCEDURE — 82570 ASSAY OF URINE CREATININE: CPT

## 2022-09-29 PROCEDURE — 83735 ASSAY OF MAGNESIUM: CPT

## 2022-09-29 PROCEDURE — 6360000002 HC RX W HCPCS: Performed by: STUDENT IN AN ORGANIZED HEALTH CARE EDUCATION/TRAINING PROGRAM

## 2022-09-29 PROCEDURE — 6360000002 HC RX W HCPCS: Performed by: INTERNAL MEDICINE

## 2022-09-29 PROCEDURE — 2580000003 HC RX 258: Performed by: INTERNAL MEDICINE

## 2022-09-29 PROCEDURE — 6360000002 HC RX W HCPCS: Performed by: SPECIALIST

## 2022-09-29 RX ORDER — OXYCODONE HYDROCHLORIDE 5 MG/1
5 TABLET ORAL EVERY 6 HOURS PRN
Qty: 20 TABLET | Refills: 0 | Status: SHIPPED | OUTPATIENT
Start: 2022-09-29 | End: 2022-10-04

## 2022-09-29 RX ORDER — DOCUSATE SODIUM 100 MG/1
100 CAPSULE, LIQUID FILLED ORAL 2 TIMES DAILY
Qty: 30 CAPSULE | Refills: 0 | Status: SHIPPED | OUTPATIENT
Start: 2022-09-29 | End: 2022-10-14

## 2022-09-29 RX ORDER — PIPERACILLIN SODIUM, TAZOBACTAM SODIUM 3; .375 G/15ML; G/15ML
3375 INJECTION, POWDER, LYOPHILIZED, FOR SOLUTION INTRAVENOUS EVERY 8 HOURS
Qty: 12 EACH | Refills: 0 | Status: SHIPPED | OUTPATIENT
Start: 2022-09-29 | End: 2022-10-08 | Stop reason: HOSPADM

## 2022-09-29 RX ORDER — FLUCONAZOLE 100 MG/1
200 TABLET ORAL DAILY
Qty: 8 TABLET | Refills: 0 | Status: SHIPPED | OUTPATIENT
Start: 2022-09-29 | End: 2022-10-08 | Stop reason: SDUPTHER

## 2022-09-29 RX ADMIN — ACETAMINOPHEN 650 MG: 325 TABLET ORAL at 15:13

## 2022-09-29 RX ADMIN — METOPROLOL TARTRATE 5 MG: 1 INJECTION, SOLUTION INTRAVENOUS at 09:01

## 2022-09-29 RX ADMIN — PIPERACILLIN AND TAZOBACTAM 3375 MG: 3; .375 INJECTION, POWDER, FOR SOLUTION INTRAVENOUS at 02:26

## 2022-09-29 RX ADMIN — POTASSIUM CHLORIDE 75 ML/HR: 2 INJECTION, SOLUTION, CONCENTRATE INTRAVENOUS at 22:08

## 2022-09-29 RX ADMIN — SODIUM CHLORIDE: 9 INJECTION, SOLUTION INTRAVENOUS at 21:07

## 2022-09-29 RX ADMIN — POTASSIUM CHLORIDE 75 ML/HR: 2 INJECTION, SOLUTION, CONCENTRATE INTRAVENOUS at 06:16

## 2022-09-29 RX ADMIN — SIMVASTATIN 40 MG: 40 TABLET, FILM COATED ORAL at 22:08

## 2022-09-29 RX ADMIN — HYDROMORPHONE HYDROCHLORIDE 1 MG: 1 INJECTION, SOLUTION INTRAMUSCULAR; INTRAVENOUS; SUBCUTANEOUS at 16:40

## 2022-09-29 RX ADMIN — BISACODYL 10 MG: 10 SUPPOSITORY RECTAL at 10:16

## 2022-09-29 RX ADMIN — METOPROLOL TARTRATE 5 MG: 1 INJECTION, SOLUTION INTRAVENOUS at 15:13

## 2022-09-29 RX ADMIN — HYDROMORPHONE HYDROCHLORIDE 1 MG: 1 INJECTION, SOLUTION INTRAMUSCULAR; INTRAVENOUS; SUBCUTANEOUS at 09:36

## 2022-09-29 RX ADMIN — PIPERACILLIN AND TAZOBACTAM 3375 MG: 3; .375 INJECTION, POWDER, FOR SOLUTION INTRAVENOUS at 12:25

## 2022-09-29 RX ADMIN — ACETAMINOPHEN 650 MG: 325 TABLET ORAL at 21:03

## 2022-09-29 RX ADMIN — BUPROPION HYDROCHLORIDE 300 MG: 150 TABLET, EXTENDED RELEASE ORAL at 09:32

## 2022-09-29 RX ADMIN — SODIUM CHLORIDE, PRESERVATIVE FREE 40 MG: 5 INJECTION INTRAVENOUS at 21:04

## 2022-09-29 RX ADMIN — PIPERACILLIN AND TAZOBACTAM 3375 MG: 3; .375 INJECTION, POWDER, FOR SOLUTION INTRAVENOUS at 21:08

## 2022-09-29 RX ADMIN — HEPARIN SODIUM 5000 UNITS: 5000 INJECTION INTRAVENOUS; SUBCUTANEOUS at 06:16

## 2022-09-29 RX ADMIN — SODIUM CHLORIDE, PRESERVATIVE FREE 10 ML: 5 INJECTION INTRAVENOUS at 09:33

## 2022-09-29 RX ADMIN — FLUCONAZOLE 400 MG: 2 INJECTION, SOLUTION INTRAVENOUS at 10:02

## 2022-09-29 ASSESSMENT — ENCOUNTER SYMPTOMS
ABDOMINAL PAIN: 0
SHORTNESS OF BREATH: 0
ABDOMINAL DISTENTION: 0

## 2022-09-29 ASSESSMENT — PAIN SCALES - GENERAL
PAINLEVEL_OUTOF10: 5
PAINLEVEL_OUTOF10: 0
PAINLEVEL_OUTOF10: 6
PAINLEVEL_OUTOF10: 6
PAINLEVEL_OUTOF10: 5
PAINLEVEL_OUTOF10: 7

## 2022-09-29 ASSESSMENT — PAIN DESCRIPTION - ONSET: ONSET: ON-GOING

## 2022-09-29 ASSESSMENT — PAIN DESCRIPTION - LOCATION
LOCATION: ABDOMEN

## 2022-09-29 ASSESSMENT — PAIN DESCRIPTION - DESCRIPTORS
DESCRIPTORS: ACHING
DESCRIPTORS: STABBING
DESCRIPTORS: STABBING
DESCRIPTORS: DISCOMFORT

## 2022-09-29 ASSESSMENT — PAIN - FUNCTIONAL ASSESSMENT: PAIN_FUNCTIONAL_ASSESSMENT: ACTIVITIES ARE NOT PREVENTED

## 2022-09-29 ASSESSMENT — PAIN DESCRIPTION - ORIENTATION: ORIENTATION: MID

## 2022-09-29 ASSESSMENT — PAIN DESCRIPTION - PAIN TYPE: TYPE: ACUTE PAIN

## 2022-09-29 ASSESSMENT — PAIN DESCRIPTION - FREQUENCY: FREQUENCY: CONTINUOUS

## 2022-09-29 ASSESSMENT — PAIN SCALES - WONG BAKER: WONGBAKER_NUMERICALRESPONSE: 4;0

## 2022-09-29 NOTE — PROGRESS NOTES
[facility] -   Infectious diseases evaluation  admission date 9/16/2022    reason for consultation:   bandemia    Impression :   Current:  9/16 bladder cancer involving wall and lymph nodes, post radical cystectomy, prostatectomy, groin lymph node dissection  9/22 SBO, had lysis of adhesions  9/22 bilateral ureteral anastomotic leak, with large abdominal collection, post repair 9/22 9/23 bilat nephrostomy tube placed  9/23 closure of the fascia, abdominal wound open with VAC  Ongoing bandemia  Pseudomonas/Enterococcus UTI, 9/16-  Cefepime 9/19 until 9/25  Right nephrostomy urine infection with Candida albicans 9/23  Noted at the time of right percutaneous nephrostomy placement 9/23  Ongoing bandemia  Hypernatremia -on fluids    Other:    Discussion / summary of stay / plan of care     Recommendations   The cause of the bandemia could be multifactorial, including a persistent urinoma  Enterococcus is not well covered by cefepime course that the patient had and would suggest trying a course of Zosyn for now  I agree with the Diflucan,  Both antibiotics to be given for 7 days until 10/3  Monitor response of the white count otherwise get a CT of the abdomen look for further collection  Case discussed with general surgery and patient    LFTs wnl  Continue Zosyn and Diflucan until 10/3  reconsiled    Infection Control Recommendations   [place] Precautions    Antimicrobial Stewardship Recommendations   Simplification of therapy  Targeted therapy      History of Present Illness:   Initial history:  Rey Simons is a 74y.o.-year-old male with history of bladder cancer involving the wall of the bladder as well as the groin lymph nodes. Came in for radical cystectomy done on 9/16 with ileal conduit placement, removal of the groin lymph nodes, but complicated with a small bowel obstruction and bilateral ureteral anastomotic leak.     CT scan of the abdomen  9/22 which had yesterday  Wound vac in midline wound changed yesterday  NG tube removed  R and L nephrostomy tubes in place, MERLIN drains in place  From urology, in last 24 hours:   R nephrostomy tube 200 cc light pink urine  L nephrostomy tube 470 cc jared urine  Lower MERLIN 435 cc   Upper MERLIN 310 cc  MERLIN drainage sent to lab    Summary of relevant labs:  Labs:  WBC 25-54-71-18-20.3- 16.5  Creatinine1.39 -1.41  Micro:  UA 9/18 - large leukocyte esterase and positive nitrates  9/16 UC Pseudomonas aeruginosa 2 strains, 1 sensitive to Cipro the other resistant,  and Enterococcus faecalis sensitive to Cipro  9/23 UC with Candida    Urine culture from  R nephrostomy due to cloudiness, 9/23 showing Candida albicans/W   Imaging:  CTAP 9/22  Small bowel obstruction, with a transition point near the jejunoileal junction adjacent/medial to the ileostomy site. Large pelvic fluid collection with varying heme densities, some of which is recent, extending from the cystectomy bed, as above. Ureteral stents, extending out the conduit and ileostomy site with similar moderate-severe right hydroureteronephrosis, and new mild-moderate left hydroureteronephrosis. Postsurgical changes with scattered pneumoperitoneum/ascites, extensive subcutaneous air/soft tissue changes. Enteric tube in satisfactory position with its tip in the stomach. Segmental/subsegmental lung base changes posteriorly. Multiple additional findings, either unchanged or incidental, as detailed in the body of the report above. I have personally reviewed the past medical history, past surgical history, medications, social history, and family history, and I haveupdated the database accordingly. Allergies: Other     Review of Systems:     Review of Systems   Constitutional:  Negative for activity change, appetite change and chills. HENT:  Negative for congestion. Eyes:  Negative for visual disturbance. Respiratory:  Negative for shortness of breath. Cardiovascular:  Negative for chest pain. Gastrointestinal:  Negative for abdominal distention and abdominal pain. Endocrine: Negative for cold intolerance and heat intolerance. Genitourinary:  Negative for dysuria. Musculoskeletal:  Negative for joint swelling. Skin:  Negative for rash. Allergic/Immunologic: Negative for immunocompromised state. Neurological:  Negative for dizziness, light-headedness and numbness. Hematological:  Negative for adenopathy. Psychiatric/Behavioral:  Negative for agitation. Physical Examination :       Physical Exam  Constitutional:       General: He is not in acute distress. Appearance: He is obese. He is not toxic-appearing. HENT:      Head: Normocephalic and atraumatic. Right Ear: Tympanic membrane normal. There is no impacted cerumen. Left Ear: Tympanic membrane normal. There is no impacted cerumen. Nose: Nose normal. No congestion. Mouth/Throat:      Mouth: Mucous membranes are moist.      Pharynx: Oropharynx is clear. No oropharyngeal exudate. Eyes:      General: No scleral icterus. Extraocular Movements: Extraocular movements intact. Pupils: Pupils are equal, round, and reactive to light. Cardiovascular:      Rate and Rhythm: Normal rate and regular rhythm. Pulses: Normal pulses. Heart sounds: Normal heart sounds. No murmur heard. No gallop. Pulmonary:      Effort: Pulmonary effort is normal.      Breath sounds: Normal breath sounds. Abdominal:      General: Abdomen is flat. Bowel sounds are normal.      Tenderness: There is abdominal tenderness. Musculoskeletal:         General: No swelling or tenderness. Normal range of motion. Cervical back: Normal range of motion and neck supple. No rigidity. Lymphadenopathy:      Cervical: No cervical adenopathy. Skin:     General: Skin is warm and dry. Capillary Refill: Capillary refill takes less than 2 seconds.       Coloration: Skin is not jaundiced. Findings: No bruising. Neurological:      General: No focal deficit present. Mental Status: He is alert and oriented to person, place, and time. Sensory: No sensory deficit. Motor: No weakness. Psychiatric:         Mood and Affect: Mood normal.         Behavior: Behavior normal.       Past Medical History:     Past Medical History:   Diagnosis Date    Acute renal failure with tubular necrosis ([facility] 05/26/2022    Likely ischemic ATN/prerenal acidemia from intractable nausea vomiting as result of chemotherapy, baseline 1.3-1.4 peaked up to 2.3 in May 2022    Arthritis     BPH with obstruction/lower urinary tract symptoms     CAD (coronary artery disease) 06/2022    nonobstructive on cath    Caffeine use     3 cans soda/pop    Cancer ([facility]     bladder cancer. Dr. 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   Baseline 1.3-1.4    COVID-19 08/16/2021    SOB, fatigue, fever, chills  x 3 weeks    Depression     Dilated cardiomyopathy ([facility] 05/26/2022    Ejection fraction 40 to 45%, does have symptoms of dyspnea and decreased effort tolerance    GERD (gastroesophageal reflux disease)     High cholesterol     Kidney calculi     Sleep apnea     does not use cpap    Under care of team     Dr. Zaki Villela [facility]    Under care of team     Dr. Zahra Grande. last visit approx 9/2021    Under care of team     Dr. Fredrick Balderas [facility] last visit 8/03/2022    Under care of team     Dr. Boo Cleis    Under care of team     Dr. Cortez Check       Past Surgical  History:     Past Surgical History:   Procedure Laterality Date    BLADDER REMOVAL      BLADDER REMOVAL N/A 9/16/2022    XI ROBOTIC LAPARASCOPIC ASSISTED RADICAL CYSTOPROSTATECTOMY, BILATERAL PELVIC LYMPHADENECTOMY AND ILEAL CONDUIT FORMATION [facility] 9/16/2022    CYSTOSCOPY STENT REMOVAL performed by Walt Dozier MD at [address]  06/16/2022    nonobstructive CAD    COLONOSCOPY      IR NEPHROSTOMY PERCUTANEOUS LEFT  9/23/2022    IR NEPHROSTOMY PERCUTANEOUS LEFT 9/23/2022 MD LORENZO Hernández[facility]    IR NEPHROSTOMY PERCUTANEOUS RIGHT  9/23/2022    IR NEPHROSTOMY PERCUTANEOUS RIGHT 9/23/2022 MD MARSHALL Hernández [facility]    IR PORT PLACEMENT EQUAL OR GREATER THAN 5 YEARS  02/25/2022    IR PORT PLACEMENT EQUAL OR GREATER THAN 5 YEARS 2/25/2022 [facility]    KNEE ARTHROSCOPY      left    LAPAROTOMY N/A 9/22/2022    LAPAROTOMY EXPLORATORY, LYSIS OF ADHESIONS, REPAIR OF BILATERAL URETERAL ANASTOMOSES, ABDOMINAL WASHOUT, PLACEMENT OF ABTHERA WOUND VAC performed by Erica Baird DO at [address] [place] [address] N/A 9/23/2022    2ND LOOK LAPAROTOMY,  ABDOMINAL WASHOUT, ABDOMINAL CLOSURE, WOUND VAC PLACEMENT performed by Erica Baird DO at [place] [address]  09/16/2022    Cystoprostatectomy, Bilateral Pelvic Lymphadenectomy, ileo conduit formation, cystectomy stent removal (right)       Medications:      metoprolol  5 mg IntraVENous Q8H    fluconazole  400 mg IntraVENous Q24H    piperacillin-tazobactam  3,375 mg IntraVENous Q8H    phosphorus  500 mg Oral TID    heparin (porcine)  5,000 Units SubCUTAneous 3 times per day    [Held by provider] metoclopramide  5 mg IntraVENous Q6H    naloxegol  12.5 mg Oral Daily    chlorhexidine  15 mL Mouth/Throat BID    bisacodyl  10 mg Rectal Daily    pantoprazole (PROTONIX) 40 mg injection  40 mg IntraVENous Q12H    polyethylene glycol  17 g Oral Daily    buPROPion  300 mg Oral QAM    simvastatin  40 mg Oral Nightly    sodium chloride flush  5-40 mL IntraVENous 2 times per day    acetaminophen  650 mg Oral Q6H       Social History:     Social History     Socioeconomic History    Marital status:      Spouse name: Not on file    Number of children: Not on file    Years of education: Not on file    Highest education level: Not on file   Occupational History    Not on file   Tobacco Use    Smoking status: Former     Packs/day: 0.25     Years: 9.00     Pack years: 2.25     Types: Cigarettes     Start date: 1983     Quit date: 2/10/1992     Years since quittin.6    Smokeless tobacco: Never   Vaping Use    Vaping Use: Never used   Substance and Sexual Activity    Alcohol use: Not Currently    Drug use: No    Sexual activity: Yes     Partners: Female   Other Topics Concern    Not on file   Social History Narrative    Not on file     Social Determinants of Health     Financial Resource Strain: Not on file   Food Insecurity: Not on file   Transportation Needs: Not on file   Physical Activity: Not on file   Stress: Not on file   Social Connections: Not on file   Intimate Partner Violence: Not on file   Housing Stability: Not on file       Family History:     Family History   Problem Relation Age of Onset    Cancer Father         bladder cancer    Urolithiasis Father       Medical Decision Making:   I have independently reviewed/ordered the following labs:    CBC with Differential:   Recent Labs     22  0551 22  0540   WBC 20.3* 16.5*   HGB 8.3* 8.2*   HCT 26.4* 26.0*    287       BMP:  Recent Labs     22  0551 22  1834 22  0540   * 147* 146*   K 3.6* 3.7 3.7   * 114* 115*   CO2 22 24 22   BUN 25* 22 22   CREATININE 1.41* 1.39* 1.41*   MG 1.7  --  1.9       Hepatic Function Panel:   Recent Labs     22  0540   PROT 5.3*   LABALBU 1.9*   BILIDIR 0.2   IBILI 0.2   BILITOT 0.4   ALKPHOS 73   ALT 5   AST 15     No results for input(s): RPR in the last 72 hours.   No results for input(s): HIV in the last 72 hours. No results for input(s): BC in the last 72 hours. Lab Results   Component Value Date/Time    CREATININE 1.41 09/29/2022 05:40 AM    GLUCOSE 109 09/29/2022 05:40 AM       Detailed results: Thank you for allowing us to participate in the care of this patient. Please call with questions. This note is created with the assistance of a speech recognition program.  While intending to generate adocument that actually reflects the content of the visit, the document can still have some errors including those of syntax and sound a like substitutions which may escape proof reading. It such instances, actual meaningcan be extrapolated by contextual diversion. Office: [phone]  Perfect serve / office [phone]      Yanira Mendoza, OMS3      I have discussed the care of the patient, including pertinent history and exam findings,  with the resident. I have seen and examined the patient and the key elements of all parts of the encounter have been performed by me. I agree with the assessment, plan and orders as documented by the resident.     Sindi Raymond, Infectious Diseases

## 2022-09-29 NOTE — PROGRESS NOTES
Comprehensive Nutrition Assessment    Type and Reason for Visit:  Reassess    Nutrition Recommendations/Plan:   Continue with current diet, nutrition supplement. Monitor and encourage intakes and further diet advancement. Malnutrition Assessment:  Malnutrition Status:  Severe malnutrition (09/17/22 1345)    Context:  Chronic Illness         Nutrition Assessment:    Chart reviewed, discussed with RN-reports pt has been taking liquids w/o issues. NG discontinued, diet advanced to full liquid this am.    Nutrition Related Findings:    labs/meds reviewed Wound Type: Surgical Incision, Wound Vac       Current Nutrition Intake & Therapies:    Average Meal Intake: 26-50%  Average Supplements Intake: 26-50%  ADULT ORAL NUTRITION SUPPLEMENT; Breakfast, Lunch, Dinner; Clear Liquid Oral Supplement  ADULT DIET; Full Liquid    Anthropometric Measures:  Height: 6' 2.02\" (188 cm)  Ideal Body Weight (IBW): 190 lbs (86 kg)       Current Body Weight: 262 lb 2 oz (118.9 kg), 133.7 % IBW. Weight Source: Bed Scale  Current BMI (kg/m2): 33.6  Usual Body Weight: 254 lb 13 oz (115.6 kg) (7/14/22 per wt hx review)  % Weight Change (Calculated): -0.3                    BMI Categories: Obese Class 1 (BMI 30.0-34. 9)    Estimated Daily Nutrient Needs:  Energy Requirements Based On: Kcal/kg  Weight Used for Energy Requirements: Current  Energy (kcal/day): 4722-0274 kcal/d  Weight Used for Protein Requirements: Ideal  Protein (g/day): 100-120 gm pro/d  Method Used for Fluid Requirements: ml/Kg  Fluid (ml/day): 2800ml/d    Nutrition Diagnosis:   Inadequate oral intake related to altered GI function as evidenced by intake 26-50%    Nutrition Interventions:   Food and/or Nutrient Delivery: Continue Current Diet, Continue Oral Nutrition Supplement  Nutrition Education/Counseling: No recommendation at this time  Coordination of Nutrition Care: Continue to monitor while inpatient       Goals:  Previous Goal Met: Progressing toward Goal(s)  Goals: Meet at least 75% of estimated needs, prior to discharge       Nutrition Monitoring and Evaluation:   Behavioral-Environmental Outcomes: None Identified  Food/Nutrient Intake Outcomes: Diet Advancement/Tolerance, Food and Nutrient Intake  Physical Signs/Symptoms Outcomes: Biochemical Data, Nutrition Focused Physical Findings, Skin, Weight, GI Status, Meal Time Behavior    Discharge Planning:     Too soon to determine     Oleksandr Merrill RD, LD  Contact: [phone]

## 2022-09-29 NOTE — PROGRESS NOTES
Renal Progress Note    Patient :  Beatris Joe; 74 y.o. MRN# [id number]  Location:  2924/7015-02  Attending:  Mary Ovalle MD  Admit Date:  9/16/2022   Hospital Day: 13    Subjective:       History reviewed known history of chronic kidney disease stage IIIb baseline 1.3-1.4 secondary to chronic interstitial nephritis both from obstructive uropathy related to bladder tumor and chemotherapy with platinum-based analogs. He has seen me in the office before. Was brought into the hospital for elective cystoprostatectomy which was done on 9/16/2022 also underwent bilateral pelvic lymphadenectomy and ileal conduit formation. Postoperatively has developed small bowel ileus, which has persisted and has been refractory to conservative treatment requiring repeat OR expiratory laparotomy lysis of adhesions, repair of bilateral ureteral anastomosis, abdominal washout and placement of wound VAC on 9/22/2022. Goldy Song Patient also required bilateral PCN tubes placement 9/23/2022. Patient seen at bedside. Labs reviewed. Sodium 145, potassium 3.6, BUN 25, creatinine 1.41. Creatinine is within baseline limits. Urine output 1 L past 24 hours. Apparently he is having bowel movements. General surgery signed off. Hemodynamically stable afebrile overnight. Urine culture 9/16/2022 positive for Pseudomonas aeruginosa and Enterococcus faecalis. Outpatient Medications:     Medications Prior to Admission: ondansetron (ZOFRAN-ODT) 8 MG TBDP disintegrating tablet, DISSOLVE 1 TABLET IN MOUTH EVERY 8 HOURS AS NEEDED FOR NAUSEA FOR VOMITING  HYDROcodone-acetaminophen (NORCO) 5-325 MG per tablet, Take 1 tablet by mouth every 8 hours as needed for Pain for up to 30 days.   [DISCONTINUED] rivaroxaban (XARELTO) 20 MG TABS tablet, Take 1 tablet by mouth once daily with breakfast (Patient taking differently: Take 1 tablet by mouth once daily with breakfast/ per cardiology, pt. to stop 3 days pre-op for OR 9-16-22)  omeprazole ([address] [place] [address]) 20 MG delayed release capsule, Take 1 capsule by mouth daily  carvedilol (COREG) 3.125 MG tablet, TAKE 1 TABLET BY MOUTH TWICE DAILY  finasteride (PROSCAR) 5 MG tablet, Take 5 mg by mouth daily  tamsulosin (FLOMAX) 0.4 MG capsule, Take 0.4 mg by mouth daily  simvastatin (ZOCOR) 40 MG tablet, Take 40 mg by mouth nightly  buPROPion (WELLBUTRIN XL) 300 MG extended release tablet, Take 300 mg by mouth every morning    Current Medications:     Scheduled Meds:    metoprolol  5 mg IntraVENous Q8H    fluconazole  400 mg IntraVENous Q24H    piperacillin-tazobactam  3,375 mg IntraVENous Q8H    phosphorus  500 mg Oral TID    heparin (porcine)  5,000 Units SubCUTAneous 3 times per day    [Held by provider] metoclopramide  5 mg IntraVENous Q6H    naloxegol  12.5 mg Oral Daily    chlorhexidine  15 mL Mouth/Throat BID    bisacodyl  10 mg Rectal Daily    pantoprazole (PROTONIX) 40 mg injection  40 mg IntraVENous Q12H    polyethylene glycol  17 g Oral Daily    buPROPion  300 mg Oral QAM    simvastatin  40 mg Oral Nightly    sodium chloride flush  5-40 mL IntraVENous 2 times per day    acetaminophen  650 mg Oral Q6H     Continuous Infusions:     IV fluid builder 75 mL/hr (22 0616)    sodium chloride 10 mL/hr at 22 0829     PRN Meds:  diatrizoate meglumine-sodium, metoprolol, promethazine, HYDROmorphone, ondansetron, sodium chloride flush, sodium chloride, oxyCODONE **OR** oxyCODONE    Input/Output:       I/O last 3 completed shifts:   In: 1100.5 [P.O.:340; I.V.:511; IV Piggyback:249.5]  Out: 8319 [Urine:1720; Emesis/NG output:60; Drains:1080]    Vital Signs:   Temperature:  Temp: 97.9 °F (36.6 °C)  TMax:   Temp (24hrs), Av.8 °F (36.6 °C), Min:97.4 °F (36.3 °C), Max:98.1 °F (36.7 °C)    Respirations:  Resp: 18  Pulse:   Heart Rate: 72  BP:    BP: (!) 146/72  BP Range: Systolic (98QOK), PBD:643 , Min:134 , LAMIN:496       Diastolic (66NPM), FPI:49, Min:66, Max:75    Physical Examination:     General:  Alert awake oriented x3, no acute distress, saturating well on RA  HEENT:  Atraumatic, normocephalic, no throat congestion, moist mucosa. Eyes:   Pupils equal, round and reactive to light, pallor, no icterus. Neck:   No JVD, no thyromegaly, no lymphadenopathy. Chest:  Air entry fair bilaterally, no crackles  Cardiac: S1 S2 RR no murmurs  Abdomen:  Soft, non-tender, no masses or organomegally appreciable, BS sluggish. :   No suprapubic or flank tenderness. Neuro:  Sedated on ventilator. Skin:   No rashes, good skin turgor. Extremities:  No edema.     Labs:       Recent Labs     09/27/22  0547 09/28/22  0551 09/29/22  0540   WBC 20.6* 20.3* 16.5*   RBC 2.67* 2.92* 2.85*   HGB 7.6* 8.3* 8.2*   HCT 24.2* 26.4* 26.0*   MCV 90.6 90.4 91.2   MCH 28.5 28.4 28.8   MCHC 31.4 31.4 31.5   RDW 15.9* 16.2* 16.1*    287 287   MPV 10.1 10.2 10.3        BMP:   Recent Labs     09/28/22  0551 09/28/22  1834 09/29/22  0540   * 147* 146*   K 3.6* 3.7 3.7   * 114* 115*   CO2 22 24 22   BUN 25* 22 22   CREATININE 1.41* 1.39* 1.41*   GLUCOSE 88 94 109*   CALCIUM 7.5* 7.5* 7.7*        Phosphorus:     Recent Labs     09/27/22  0547 09/28/22  0551 09/29/22  0540   PHOS 2.3* 2.8 2.3*       Magnesium:    Recent Labs     09/27/22  0547 09/28/22  0551 09/29/22  0540   MG 1.8 1.7 1.9       LISS:      Lab Results   Component Value Date/Time    LISS NEGATIVE 05/31/2022 10:45 AM     SPEP:  Lab Results   Component Value Date/Time    PROT 5.3 09/29/2022 05:40 AM     C3:     Lab Results   Component Value Date/Time    C3 119 05/31/2022 10:45 AM     C4:     Lab Results   Component Value Date/Time    C4 25 05/31/2022 10:45 AM       Urinalysis/Chemistries:      Lab Results   Component Value Date/Time    NITRU POSITIVE 09/18/2022 05:17 PM    COLORU Yellow 09/18/2022 05:17 PM    PHUR 6.0 09/18/2022 05:17 PM    WBCUA 50  09/18/2022 05:17 PM    RBCUA TOO NUMEROUS TO COUNT 09/18/2022 05:17 PM    MUCUS 3+ 04/14/2022 09:43 AM    YEAST FEW 03/31/2022 01:01 PM    BACTERIA MODERATE 09/18/2022 05:17 PM    CLARITYU cloudy 08/04/2022 09:05 AM    SPECGRAV 1.016 09/18/2022 05:17 PM    LEUKOCYTESUR LARGE 09/18/2022 05:17 PM    UROBILINOGEN Normal 09/18/2022 05:17 PM    BILIRUBINUR NEGATIVE 09/18/2022 05:17 PM    BLOODU large 08/04/2022 09:05 AM    GLUCOSEU NEGATIVE 09/18/2022 05:17 PM    KETUA TRACE 09/18/2022 05:17 PM     Urine Creatinine:     Lab Results   Component Value Date/Time    LABCREA 121.3 09/18/2022 05:17 PM       Radiology:       Assessment:     1. Acute Kidney Injury: Secondary to ischemic ATN from depleted circulating volume related to small bowel obstruction ileus, sequestration, peritoneal inflammation from suspected anastomotic leak. Baseline 1.3-1.4 postoperatively had gone up to 2.5, came down to 1.5, however then started worsening peaked at 2.5, found to have anastomotic leak and Bowel and ureter. Required operative intervention, bilateral nephrostomy tube placement, since then urine output 1L. And now creatinine back at baseline. 2.  Small bowel obstruction related to dynamic causes, bowel was stuck behind the ureter which was extricated yesterday. Some suspicion of ischemia. 3.  Chronic kidney disease stage IIIb from chronic intestinal nephritis both from chemotherapy with platinum and bladder tumor causing obstructive uropathy, baseline 1.3-1.4  4. Bladder cancer failed local therapy status for cystoprostatectomy and ileal conduit formation 9/16/2022  5. Cardiomyopathy ejection fraction 40%  6. Mild hypernatremia from insensible free water losses, doing better  7. Hypophosphatemia on phosphorus supplements  8. Hypokalemia. Plan:   1. Continue D5/0.3 with 40 of K at 75 mill an hour   2. Keep systolic pressure more than 110  3. Replace electrolytes as needed   4. Follow lab work, intake and output, daily weights, and hemodynamics  5. Advance diet as recommended by surgery  6.   Continue monitor strict I's and O's renal function. 7.  BMP daily  8. Will follow.

## 2022-09-29 NOTE — PLAN OF CARE
Problem: Discharge Planning  Goal: Discharge to home or other facility with appropriate resources  Outcome: Progressing     Problem: Pain  Goal: Verbalizes/displays adequate comfort level or baseline comfort level  Outcome: Progressing     Problem: Safety - Adult  Goal: Free from fall injury  Outcome: Progressing     Problem: ABCDS Injury Assessment  Goal: Absence of physical injury  Outcome: Progressing     Problem: Nutrition Deficit:  Goal: Optimize nutritional status  Outcome: Progressing     Problem: Skin/Tissue Integrity  Goal: Absence of new skin breakdown  Description: 1. Monitor for areas of redness and/or skin breakdown  2. Assess vascular access sites hourly  3. Every 4-6 hours minimum:  Change oxygen saturation probe site  4. Every 4-6 hours:  If on nasal continuous positive airway pressure, respiratory therapy assess nares and determine need for appliance change or resting period. Outcome: Progressing     Problem: Safety - Medical Restraint  Goal: Remains free of injury from restraints (Restraint for Interference with Medical Device)  Description: INTERVENTIONS:  1. Determine that other, less restrictive measures have been tried or would not be effective before applying the restraint  2. Evaluate the patient's condition at the time of restraint application  3. Inform patient/family regarding the reason for restraint  4. Q2H: Monitor safety, psychosocial status, comfort, nutrition and hydration  Outcome: Progressing     Problem: Confusion  Goal: Confusion, delirium, dementia, or psychosis is improved or at baseline  Description: INTERVENTIONS:  1. Assess for possible contributors to thought disturbance, including medications, impaired vision or hearing, underlying metabolic abnormalities, dehydration, psychiatric diagnoses, and notify attending LIP  2. [place] high risk fall precautions, as indicated  3.  Provide frequent short contacts to provide reality reorientation, refocusing and direction  4. Decrease environmental stimuli, including noise as appropriate  5. Monitor and intervene to maintain adequate nutrition, hydration, elimination, sleep and activity  6. If unable to ensure safety without constant attention obtain sitter and review sitter guidelines with assigned personnel  7.  Initiate Psychosocial CNS and Spiritual Care consult, as indicated  Outcome: Progressing

## 2022-09-29 NOTE — PROGRESS NOTES
Occupational [address] [place]  Occupational Therapy Not Seen Note    DATE: 2022    NAME: Fabrizio Gutierrez  MRN: [id number]   : 1955      Patient not seen this date for Occupational Therapy due to: , RN and nursing student in w/pt assessing abdomen.     Next Scheduled Treatment: 22    Electronically signed by AMALIA Lance on 2022 at 12:33 PM

## 2022-09-29 NOTE — PLAN OF CARE
Problem: Discharge Planning  Goal: Discharge to home or other facility with appropriate resources  9/29/2022 0955 by Logan Richmond RN  Outcome: Progressing  9/29/2022 0635 by Shirin Negro RN  Outcome: Progressing     Problem: Pain  Goal: Verbalizes/displays adequate comfort level or baseline comfort level  9/29/2022 0955 by Logan Richmond RN  Outcome: Progressing  9/29/2022 0635 by Shirin Negro RN  Outcome: Progressing     Problem: Safety - Adult  Goal: Free from fall injury  9/29/2022 0955 by Logan Richmond RN  Outcome: Progressing  9/29/2022 0635 by Shirin Negro RN  Outcome: Progressing     Problem: ABCDS Injury Assessment  Goal: Absence of physical injury  9/29/2022 0955 by Logan Richmond RN  Outcome: Progressing  9/29/2022 0635 by Shirin Negro RN  Outcome: Progressing     Problem: Nutrition Deficit:  Goal: Optimize nutritional status  9/29/2022 0955 by Logan Richmond RN  Outcome: Progressing  9/29/2022 0635 by Shirin Negro RN  Outcome: Progressing     Problem: Skin/Tissue Integrity  Goal: Absence of new skin breakdown  Description: 1. Monitor for areas of redness and/or skin breakdown  2. Assess vascular access sites hourly  3. Every 4-6 hours minimum:  Change oxygen saturation probe site  4. Every 4-6 hours:  If on nasal continuous positive airway pressure, respiratory therapy assess nares and determine need for appliance change or resting period. 9/29/2022 0955 by Logan Richmond RN  Outcome: Progressing  9/29/2022 0635 by Shirin Negro RN  Outcome: Progressing     Problem: Safety - Medical Restraint  Goal: Remains free of injury from restraints (Restraint for Interference with Medical Device)  Description: INTERVENTIONS:  1. Determine that other, less restrictive measures have been tried or would not be effective before applying the restraint  2. Evaluate the patient's condition at the time of restraint application  3. Inform patient/family regarding the reason for restraint  4. Q2H: Monitor safety, psychosocial status, comfort, nutrition and hydration  9/29/2022 0955 by Ziggy Richmond RN  Outcome: Progressing  9/29/2022 0635 by Renate Castillo RN  Outcome: Progressing     Problem: Confusion  Goal: Confusion, delirium, dementia, or psychosis is improved or at baseline  Description: INTERVENTIONS:  1. Assess for possible contributors to thought disturbance, including medications, impaired vision or hearing, underlying metabolic abnormalities, dehydration, psychiatric diagnoses, and notify attending LIP  2. [place] high risk fall precautions, as indicated  3. Provide frequent short contacts to provide reality reorientation, refocusing and direction  4. Decrease environmental stimuli, including noise as appropriate  5. Monitor and intervene to maintain adequate nutrition, hydration, elimination, sleep and activity  6. If unable to ensure safety without constant attention obtain sitter and review sitter guidelines with assigned personnel  7.  Initiate Psychosocial CNS and Spiritual Care consult, as indicated  9/29/2022 0955 by Ziggy Richmond RN  Outcome: Progressing  9/29/2022 0635 by Renate Castillo RN  Outcome: Progressing

## 2022-09-29 NOTE — PROGRESS NOTES
Willow Sacks Jannelle Dubonnet, MD FACS   Urology Progress Note     Subjective:   No acute events overnight  No fevers or chills  NG removed ystdy no nausea   Had bowel movements last evening  Generally feels very well today compared to yesterday    Right nephrostomy tube 200 cc light pink urine/24 hours  Left nephrostomy tube 470 cc jared urine/24 hours  Lower MERLIN drain 435 cc serosanguineous  Upper MERLIN drain 310 cc serosanguineous  Conduit 425     Creatinine 1.41 (1.39) ystdy pending this am   Leukocytosis 20.3 to 16.5   Hemoglobin stable 8.3 to 8.2    Patient Vitals for the past 24 hrs:   BP Temp Temp src Pulse Resp SpO2   09/29/22 0626 (!) 146/72 97.9 °F (36.6 °C) Oral 72 18 99 %   09/29/22 0412 (!) 141/75 97.4 °F (36.3 °C) Oral 77 16 98 %   09/29/22 0019 (!) 150/74 98 °F (36.7 °C) Oral 71 16 98 %   09/28/22 2153 -- -- -- -- 18 --   09/28/22 2139 134/67 98.1 °F (36.7 °C) Oral 72 18 100 %   09/28/22 2123 -- -- -- -- 20 --   09/28/22 1714 (!) 146/66 97.7 °F (36.5 °C) Oral 74 18 97 %   09/28/22 1145 138/74 97.4 °F (36.3 °C) Oral 73 20 96 %   09/28/22 0838 (!) 147/71 97.7 °F (36.5 °C) Oral 77 18 95 %       Intake/Output Summary (Last 24 hours) at 9/29/2022 0628  Last data filed at 9/29/2022 0556  Gross per 24 hour   Intake 1049.19 ml   Output 1840 ml   Net -790.81 ml       Recent Labs     09/27/22  0547 09/28/22  0551 09/29/22  0540   WBC 20.6* 20.3* 16.5*   HGB 7.6* 8.3* 8.2*   HCT 24.2* 26.4* 26.0*   MCV 90.6 90.4 91.2    287 287     Recent Labs     09/26/22  1049 09/27/22  0547 09/28/22  0551 09/28/22  1834   * 147* 145* 147*   K 3.7 3.5* 3.6* 3.7   * 115* 113* 114*   CO2 22 21 22 24   PHOS 1.7* 2.3* 2.8  --    BUN 29* 27* 25* 22   CREATININE 1.48* 1.39* 1.41* 1.39*       No results for input(s): COLORU, PHUR, LABCAST, WBCUA, RBCUA, MUCUS, TRICHOMONAS, YEAST, BACTERIA, CLARITYU, SPECGRAV, LEUKOCYTESUR, UROBILINOGEN, BILIRUBINUR, BLOODU in the last 72 hours.     Invalid input(s): NITRATE, GLUCOSEUKETONESUAMORPHOUS      Additional Lab/culture results:    Physical Exam:   General: A/O x 3, no acute distress  HEENT: moist mucous membranes  Neck: Supple   Chest: bilateral symmetrical chest rise   Circulatory: Peripheries warm , well perfused   P/A: soft, clean, dry and intact with skin glue, ostomy pink with red rubber in place, wound vac in place, MERLIN x 2 in place with SS drainage  Extremities: No edema/calf tenderness    Interval Imaging Findings: none    Impression:  none    Ines Cabrera is a 57-year-old male   Active  problem list  Hx of bladder cancer  Robotic assisted laparoscopic cystoprostatectomy with ileal conduit creation on 9/16/2022   Ex lap ROLANDO repair of BL ureteral anastamosis and abd washout with abthera due to ureteral anastamotic leaks and SBO and second look on 9/22 and 9/23      Plan:   Diet: CLD  Pain control and antiemetic as needed  Maintain bilateral nephrostomy tubes   Maintain MERLIN drains; check MERLIN creatinine to check status of urine leak  Appreciate ID input  Continue movantik  Urine culture growing pseudomonas, enterococcus and and second pseudomonas colony type, pansusceptible only gentamicin resistance s/p 7 days cefepime   Infectious disease consulted, starting Zosyn for Enterococcus coverage, Diflucan for urine culture 9/23/2022 growing Candida albicans/dubliensis   IV fluids  DVT PPX: Subq heparin 5000units TID  Continue to monitor  Discharge planning: inappropriate for discharge today    Ehsan Abraham MD, PGY-3  6:28 AM 9/29/2022    I have reviewed the history above and agree. I have repeated the key portions of the physical exam and concur with the resident's findings. I have reviewed all laboratory findings and imaging reports/films. I agree with the plan as noted above.     Electronically signed by Jenifer Arora MD on 9/29/2022 at 9:06 AM

## 2022-09-30 ENCOUNTER — TELEPHONE (OUTPATIENT)
Dept: INFUSION THERAPY | Age: 67
End: 2022-09-30

## 2022-09-30 LAB
ALBUMIN SERPL-MCNC: 1.6 G/DL (ref 3.5–5.2)
ALBUMIN/GLOBULIN RATIO: 0.4 (ref 1–2.5)
ALP BLD-CCNC: 68 U/L (ref 40–129)
ALT SERPL-CCNC: 5 U/L (ref 5–41)
ANION GAP SERPL CALCULATED.3IONS-SCNC: 10 MMOL/L (ref 9–17)
AST SERPL-CCNC: 14 U/L
BILIRUB SERPL-MCNC: 0.4 MG/DL (ref 0.3–1.2)
BILIRUBIN DIRECT: 0.2 MG/DL
BILIRUBIN, INDIRECT: 0.2 MG/DL (ref 0–1)
BUN BLDV-MCNC: 18 MG/DL (ref 8–23)
CALCIUM SERPL-MCNC: 7.6 MG/DL (ref 8.6–10.4)
CHLORIDE BLD-SCNC: 115 MMOL/L (ref 98–107)
CO2: 18 MMOL/L (ref 20–31)
CREAT SERPL-MCNC: 1.39 MG/DL (ref 0.7–1.2)
CREATININE FLUID: 2.5 MG/DL
CREATININE FLUID: 4.9 MG/DL
GFR AFRICAN AMERICAN: >60 ML/MIN
GFR NON-AFRICAN AMERICAN: 51 ML/MIN
GFR SERPL CREATININE-BSD FRML MDRD: ABNORMAL ML/MIN/{1.73_M2}
GLUCOSE BLD-MCNC: 110 MG/DL (ref 70–99)
HCT VFR BLD CALC: 25.3 % (ref 40.7–50.3)
HEMOGLOBIN: 7.7 G/DL (ref 13–17)
MAGNESIUM: 1.9 MG/DL (ref 1.6–2.6)
MCH RBC QN AUTO: 27.8 PG (ref 25.2–33.5)
MCHC RBC AUTO-ENTMCNC: 30.4 G/DL (ref 28.4–34.8)
MCV RBC AUTO: 91.3 FL (ref 82.6–102.9)
NRBC AUTOMATED: 0.2 PER 100 WBC
PDW BLD-RTO: 16.4 % (ref 11.8–14.4)
PHOSPHORUS: 2.6 MG/DL (ref 2.5–4.5)
PLATELET # BLD: 352 K/UL (ref 138–453)
PMV BLD AUTO: 10.3 FL (ref 8.1–13.5)
POTASSIUM SERPL-SCNC: 3.9 MMOL/L (ref 3.7–5.3)
RBC # BLD: 2.77 M/UL (ref 4.21–5.77)
SODIUM BLD-SCNC: 143 MMOL/L (ref 135–144)
SPECIMEN TYPE: NORMAL
SPECIMEN TYPE: NORMAL
TOTAL PROTEIN: 5.3 G/DL (ref 6.4–8.3)
WBC # BLD: 19.1 K/UL (ref 3.5–11.3)

## 2022-09-30 PROCEDURE — 85027 COMPLETE CBC AUTOMATED: CPT

## 2022-09-30 PROCEDURE — 97605 NEG PRS WND THER DME<=50SQCM: CPT

## 2022-09-30 PROCEDURE — 6370000000 HC RX 637 (ALT 250 FOR IP): Performed by: STUDENT IN AN ORGANIZED HEALTH CARE EDUCATION/TRAINING PROGRAM

## 2022-09-30 PROCEDURE — 80076 HEPATIC FUNCTION PANEL: CPT

## 2022-09-30 PROCEDURE — 99232 SBSQ HOSP IP/OBS MODERATE 35: CPT | Performed by: INTERNAL MEDICINE

## 2022-09-30 PROCEDURE — 6360000002 HC RX W HCPCS: Performed by: STUDENT IN AN ORGANIZED HEALTH CARE EDUCATION/TRAINING PROGRAM

## 2022-09-30 PROCEDURE — 83735 ASSAY OF MAGNESIUM: CPT

## 2022-09-30 PROCEDURE — 2580000003 HC RX 258: Performed by: STUDENT IN AN ORGANIZED HEALTH CARE EDUCATION/TRAINING PROGRAM

## 2022-09-30 PROCEDURE — 2500000003 HC RX 250 WO HCPCS: Performed by: STUDENT IN AN ORGANIZED HEALTH CARE EDUCATION/TRAINING PROGRAM

## 2022-09-30 PROCEDURE — C9113 INJ PANTOPRAZOLE SODIUM, VIA: HCPCS | Performed by: STUDENT IN AN ORGANIZED HEALTH CARE EDUCATION/TRAINING PROGRAM

## 2022-09-30 PROCEDURE — 84100 ASSAY OF PHOSPHORUS: CPT

## 2022-09-30 PROCEDURE — 97530 THERAPEUTIC ACTIVITIES: CPT

## 2022-09-30 PROCEDURE — 6360000002 HC RX W HCPCS: Performed by: SPECIALIST

## 2022-09-30 PROCEDURE — 6370000000 HC RX 637 (ALT 250 FOR IP): Performed by: INTERNAL MEDICINE

## 2022-09-30 PROCEDURE — 82570 ASSAY OF URINE CREATININE: CPT

## 2022-09-30 PROCEDURE — 36415 COLL VENOUS BLD VENIPUNCTURE: CPT

## 2022-09-30 PROCEDURE — 6360000002 HC RX W HCPCS: Performed by: INTERNAL MEDICINE

## 2022-09-30 PROCEDURE — 2580000003 HC RX 258: Performed by: INTERNAL MEDICINE

## 2022-09-30 PROCEDURE — 80048 BASIC METABOLIC PNL TOTAL CA: CPT

## 2022-09-30 PROCEDURE — 1200000000 HC SEMI PRIVATE

## 2022-09-30 RX ORDER — FLUCONAZOLE 200 MG/1
400 TABLET ORAL DAILY
Status: DISCONTINUED | OUTPATIENT
Start: 2022-10-01 | End: 2022-09-30

## 2022-09-30 RX ORDER — FLUCONAZOLE 200 MG/1
200 TABLET ORAL DAILY
Status: DISCONTINUED | OUTPATIENT
Start: 2022-10-01 | End: 2022-10-05

## 2022-09-30 RX ADMIN — BUPROPION HYDROCHLORIDE 300 MG: 150 TABLET, EXTENDED RELEASE ORAL at 08:45

## 2022-09-30 RX ADMIN — ONDANSETRON 8 MG: 4 TABLET, ORALLY DISINTEGRATING ORAL at 23:17

## 2022-09-30 RX ADMIN — SODIUM CHLORIDE, PRESERVATIVE FREE 40 MG: 5 INJECTION INTRAVENOUS at 08:46

## 2022-09-30 RX ADMIN — NALOXEGOL OXALATE 12.5 MG: 12.5 TABLET, FILM COATED ORAL at 08:46

## 2022-09-30 RX ADMIN — DIBASIC SODIUM PHOSPHATE, MONOBASIC POTASSIUM PHOSPHATE AND MONOBASIC SODIUM PHOSPHATE 2 TABLET: 852; 155; 130 TABLET ORAL at 08:45

## 2022-09-30 RX ADMIN — ACETAMINOPHEN 650 MG: 325 TABLET ORAL at 02:39

## 2022-09-30 RX ADMIN — FLUCONAZOLE 400 MG: 2 INJECTION, SOLUTION INTRAVENOUS at 09:35

## 2022-09-30 RX ADMIN — METOPROLOL TARTRATE 5 MG: 1 INJECTION, SOLUTION INTRAVENOUS at 15:53

## 2022-09-30 RX ADMIN — SODIUM CHLORIDE: 9 INJECTION, SOLUTION INTRAVENOUS at 02:40

## 2022-09-30 RX ADMIN — PIPERACILLIN AND TAZOBACTAM 3375 MG: 3; .375 INJECTION, POWDER, FOR SOLUTION INTRAVENOUS at 22:23

## 2022-09-30 RX ADMIN — METOPROLOL TARTRATE 5 MG: 1 INJECTION, SOLUTION INTRAVENOUS at 08:46

## 2022-09-30 RX ADMIN — ACETAMINOPHEN 650 MG: 325 TABLET ORAL at 08:45

## 2022-09-30 RX ADMIN — PIPERACILLIN AND TAZOBACTAM 3375 MG: 3; .375 INJECTION, POWDER, FOR SOLUTION INTRAVENOUS at 13:50

## 2022-09-30 RX ADMIN — OXYCODONE 10 MG: 5 TABLET ORAL at 02:39

## 2022-09-30 RX ADMIN — SODIUM CHLORIDE: 9 INJECTION, SOLUTION INTRAVENOUS at 22:22

## 2022-09-30 RX ADMIN — SODIUM CHLORIDE, PRESERVATIVE FREE 40 MG: 5 INJECTION INTRAVENOUS at 21:15

## 2022-09-30 RX ADMIN — METOPROLOL TARTRATE 5 MG: 1 INJECTION, SOLUTION INTRAVENOUS at 00:26

## 2022-09-30 RX ADMIN — HYDROMORPHONE HYDROCHLORIDE 1 MG: 1 INJECTION, SOLUTION INTRAMUSCULAR; INTRAVENOUS; SUBCUTANEOUS at 11:06

## 2022-09-30 RX ADMIN — BISACODYL 10 MG: 10 SUPPOSITORY RECTAL at 08:46

## 2022-09-30 RX ADMIN — CHLORHEXIDINE GLUCONATE 0.12% ORAL RINSE 15 ML: 1.2 LIQUID ORAL at 08:47

## 2022-09-30 RX ADMIN — OXYCODONE 10 MG: 5 TABLET ORAL at 08:56

## 2022-09-30 RX ADMIN — ACETAMINOPHEN 650 MG: 325 TABLET ORAL at 21:14

## 2022-09-30 RX ADMIN — ONDANSETRON 8 MG: 4 TABLET, ORALLY DISINTEGRATING ORAL at 11:27

## 2022-09-30 RX ADMIN — ACETAMINOPHEN 650 MG: 325 TABLET ORAL at 15:53

## 2022-09-30 RX ADMIN — SIMVASTATIN 40 MG: 40 TABLET, FILM COATED ORAL at 21:14

## 2022-09-30 RX ADMIN — HEPARIN SODIUM 5000 UNITS: 5000 INJECTION INTRAVENOUS; SUBCUTANEOUS at 14:10

## 2022-09-30 RX ADMIN — PIPERACILLIN AND TAZOBACTAM 3375 MG: 3; .375 INJECTION, POWDER, FOR SOLUTION INTRAVENOUS at 02:41

## 2022-09-30 RX ADMIN — POLYETHYLENE GLYCOL 3350 17 G: 17 POWDER, FOR SOLUTION ORAL at 08:46

## 2022-09-30 ASSESSMENT — PAIN SCALES - WONG BAKER
WONGBAKER_NUMERICALRESPONSE: 2
WONGBAKER_NUMERICALRESPONSE: 0
WONGBAKER_NUMERICALRESPONSE: 2

## 2022-09-30 ASSESSMENT — PAIN SCALES - GENERAL
PAINLEVEL_OUTOF10: 0
PAINLEVEL_OUTOF10: 5
PAINLEVEL_OUTOF10: 7
PAINLEVEL_OUTOF10: 6
PAINLEVEL_OUTOF10: 7
PAINLEVEL_OUTOF10: 6
PAINLEVEL_OUTOF10: 7
PAINLEVEL_OUTOF10: 0
PAINLEVEL_OUTOF10: 9
PAINLEVEL_OUTOF10: 7
PAINLEVEL_OUTOF10: 6

## 2022-09-30 ASSESSMENT — PAIN - FUNCTIONAL ASSESSMENT
PAIN_FUNCTIONAL_ASSESSMENT: ACTIVITIES ARE NOT PREVENTED

## 2022-09-30 ASSESSMENT — PAIN DESCRIPTION - DESCRIPTORS
DESCRIPTORS: ACHING
DESCRIPTORS: CRAMPING;ACHING
DESCRIPTORS: CRAMPING;ACHING
DESCRIPTORS: ACHING
DESCRIPTORS: DISCOMFORT
DESCRIPTORS: CRAMPING;ACHING

## 2022-09-30 ASSESSMENT — ENCOUNTER SYMPTOMS
SINUS PRESSURE: 0
ABDOMINAL DISTENTION: 0
COLOR CHANGE: 0
CHEST TIGHTNESS: 0

## 2022-09-30 ASSESSMENT — PAIN DESCRIPTION - LOCATION
LOCATION: ABDOMEN

## 2022-09-30 ASSESSMENT — PAIN DESCRIPTION - ONSET: ONSET: ON-GOING

## 2022-09-30 ASSESSMENT — PAIN DESCRIPTION - ORIENTATION
ORIENTATION: MID

## 2022-09-30 ASSESSMENT — PAIN DESCRIPTION - PAIN TYPE: TYPE: ACUTE PAIN

## 2022-09-30 ASSESSMENT — PAIN DESCRIPTION - FREQUENCY: FREQUENCY: CONTINUOUS

## 2022-09-30 NOTE — CONSULTS
Perla from Dr. Leary Kyle office ([phone]) returns phone call from [facility] regarding accessing patient's port; approval to access granted.

## 2022-09-30 NOTE — PROGRESS NOTES
[facility] -   Infectious diseases evaluation  admission date 9/16/2022    reason for consultation:   bandemia    Impression :   Current:  9/16 bladder cancer involving wall and lymph nodes, post radical cystectomy, prostatectomy, groin lymph node dissection  9/22 SBO, had lysis of adhesions  9/22 bilateral ureteral anastomotic leak, with large abdominal collection, post repair 9/22 9/23 bilat nephrostomy tube placed  9/23 closure of the fascia, abdominal wound open with VAC  Ongoing bandemia  Pseudomonas/Enterococcus UTI, 9/16-  Cefepime 9/19 until 9/25  Right nephrostomy urine infection with Candida albicans 9/23  Noted at the time of right percutaneous nephrostomy placement 9/23  Ongoing bandemia  Hypernatremia -on fluids    Other:    Discussion / summary of stay / plan of care     Recommendations   The cause of the bandemia could be multifactorial, including a persistent urinoma  Enterococcus is not well covered by cefepime course that the patient had and would suggest trying a course of Zosyn for now  I agree with the Diflucan,  Both antibiotics to be given for 7 days until 10/3  Monitor response of the white count otherwise get a CT of the abdomen look for further collection  Case discussed with general surgery and patient    On Zosyn and diflucan  Switch to oral diflucan   Continue medications until 10/3. Reconsiled  Monitor bandemia      Infection Control Recommendations   [place] Precautions    Antimicrobial Stewardship Recommendations   Simplification of therapy  Targeted therapy      History of Present Illness:   Initial history:  Rodolfo Modi is a 74y.o.-year-old male with history of bladder cancer involving the wall of the bladder as well as the groin lymph nodes.   Came in for radical cystectomy done on 9/16 with ileal conduit placement, removal of the groin lymph nodes, but complicated with a small bowel obstruction and bilateral ureteral anastomotic leak. CT scan of the abdomen  9/22 which had showed at the time a large anterior pelvic fluid collection 15 x 13 x 6 cmWith variable densities extending from the cystectomy bed    Taken back to surgery on 9/22, lysis of adhesion that was thought to be causing the SBO, as well as repair of the anastomotic leak, and placement of 2 bilateral nephrostomy tubes. 9/23 patient went back to surgery for closure of abdominal fascia and subcutaneous tissue that stayed open with a VAC placement over it -    Due to hydronephrosis, bilat PCNT placed by IR 9/23, A repeat urine culture from 9/23 done due to cloudy R urine, by IR,  is showing Candida albicans 9/26. The patient has been started on Diflucan. Along the way on 9/16 the urine grew Pseudomonas and Enterococcus, resistant to Cipro and patient has received a course of cefepime from 9/19 and 2 until 9/25. His white count remains elevated actually after a feeling that it was improving, he started going worse, today it is up to 20. Infectious is consulted for the concern of a ongoing infection. The right nephro drain was giving very little urine and had a urogram  9/26 that showed good positioning and no leak. Since then right nephrostomy line has been giving a better input. Today the patient is alert appropriate he has an NG tube, was trying to have some liquids p.o. and today was able to tolerate 2 popsicles without vomiting. He does have gurgling over the abdomen, and has no abdominal tenderness otherwise. He has only abdominal pain on the surgical site. He has 2 MERLIN drains giving serosanguineous fluid, and he has 2 percutaneous nephrostomy drains that are giving clear urine.       Interval changes  9/30/2022   Patient Vitals for the past 8 hrs:   BP Temp Temp src Pulse Resp SpO2 Weight   09/30/22 1136 -- -- -- -- 16 -- --   09/30/22 1130 122/71 97.7 °F (36.5 °C) Oral 79 18 98 % --   09/30/22 1106 -- -- -- -- 16 -- --   09/30/22 0926 -- -- -- -- 16 -- --   09/30/22 0730 (!) 143/76 97.8 °F (36.6 °C) Oral 73 17 -- --   09/30/22 0600 -- -- -- -- -- -- 267 lb 3.2 oz (121.2 kg)     9/30  Afebrile  Eating well  Continues to have bowel movements  R and L nephrostomy tubes in place, MERLIN drains in place  Wound care on board to address abdominal dressings   Education patient on how to keep suction for phlegm clean    Summary of relevant labs:  Labs:  WBC 62-51-71-18-20.3- 16.5   Creatinine1.39 -1.41 - 1.39  Micro:  UA 9/18 - large leukocyte esterase and positive nitrates  9/16 UC Pseudomonas aeruginosa 2 strains, 1 sensitive to Cipro the other resistant,  and Enterococcus faecalis sensitive to Cipro  9/23 UC with Candida    Urine culture from  R nephrostomy due to cloudiness, 9/23 showing Candida albicans/W   Imaging:  CTAP 9/22  Small bowel obstruction, with a transition point near the jejunoileal junction adjacent/medial to the ileostomy site. Large pelvic fluid collection with varying heme densities, some of which is recent, extending from the cystectomy bed, as above. Ureteral stents, extending out the conduit and ileostomy site with similar moderate-severe right hydroureteronephrosis, and new mild-moderate left hydroureteronephrosis. Postsurgical changes with scattered pneumoperitoneum/ascites, extensive subcutaneous air/soft tissue changes. Enteric tube in satisfactory position with its tip in the stomach. Segmental/subsegmental lung base changes posteriorly. Multiple additional findings, either unchanged or incidental, as detailed in the body of the report above. I have personally reviewed the past medical history, past surgical history, medications, social history, and family history, and I haveupdated the database accordingly. Allergies: Other     Review of Systems:     Review of Systems   Constitutional:  Negative for chills, diaphoresis and fatigue. HENT:  Negative for sinus pressure. Eyes:  Negative for visual disturbance. Respiratory:  Negative for chest tightness. Cardiovascular:  Negative for chest pain. Gastrointestinal:  Negative for abdominal distention. Endocrine: Negative for cold intolerance and heat intolerance. Genitourinary:  Negative for difficulty urinating and enuresis. Skin:  Negative for color change. Allergic/Immunologic: Negative for environmental allergies. Neurological:  Negative for dizziness. Psychiatric/Behavioral:  Negative for agitation. Physical Examination :       Physical Exam  Constitutional:       General: He is not in acute distress. Appearance: He is obese. He is not toxic-appearing. HENT:      Head: Normocephalic and atraumatic. Right Ear: There is no impacted cerumen. Left Ear: There is no impacted cerumen. Nose: Nose normal. No congestion. Mouth/Throat:      Mouth: Mucous membranes are moist.      Pharynx: Oropharynx is clear. No oropharyngeal exudate. Eyes:      General: No scleral icterus. Extraocular Movements: Extraocular movements intact. Pupils: Pupils are equal, round, and reactive to light. Cardiovascular:      Rate and Rhythm: Normal rate and regular rhythm. Pulses: Normal pulses. Heart sounds: Normal heart sounds. No gallop. Pulmonary:      Effort: Pulmonary effort is normal. No respiratory distress. Breath sounds: Normal breath sounds. No wheezing. Abdominal:      General: Bowel sounds are normal. There is no distension. Palpations: Abdomen is soft. Tenderness: There is abdominal tenderness. Musculoskeletal:         General: No swelling or tenderness. Normal range of motion. Cervical back: Normal range of motion. No rigidity. Lymphadenopathy:      Cervical: No cervical adenopathy. Skin:     General: Skin is warm. Capillary Refill: Capillary refill takes less than 2 seconds. Coloration: Skin is not jaundiced. Findings: No bruising.    Neurological:      General: No focal deficit present. Mental Status: He is alert and oriented to person, place, and time. Cranial Nerves: No cranial nerve deficit. Motor: No weakness. Psychiatric:         Mood and Affect: Mood normal.         Behavior: Behavior normal.       Past Medical History:     Past Medical History:   Diagnosis Date    Acute renal failure with tubular necrosis ([facility] [address] 05/26/2022    Likely ischemic ATN/prerenal acidemia from intractable nausea vomiting as result of chemotherapy, baseline 1.3-1.4 peaked up to 2.3 in May 2022    Arthritis     BPH with obstruction/lower urinary tract symptoms     CAD (coronary artery disease) 06/2022    nonobstructive on cath    Caffeine use     3 cans soda/pop    Cancer ([facility] [address]     bladder cancer. Dr. 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   Baseline 1.3-1.4    COVID-19 08/16/2021    SOB, fatigue, fever, chills  x 3 weeks    Depression     Dilated cardiomyopathy ([facility] [address] 05/26/2022    Ejection fraction 40 to 45%, does have symptoms of dyspnea and decreased effort tolerance    GERD (gastroesophageal reflux disease)     High cholesterol     Kidney calculi     Sleep apnea     does not use cpap    Under care of team     Dr. Serene Frausto Nephrology    Under care of team     Dr. Juli Ceballos. last visit approx 9/2021    Under care of team     Dr. Karis Gutierrez [facility] last visit 8/03/2022    Under care of team     Dr. Jake Abdullahi    Under care of team     Dr. Kerri Virgen       Past Surgical  History:     Past Surgical History:   Procedure Laterality Date    BLADDER REMOVAL      BLADDER REMOVAL N/A 9/16/2022    XI ROBOTIC LAPARASCOPIC ASSISTED RADICAL CYSTOPROSTATECTOMY, BILATERAL PELVIC LYMPHADENECTOMY AND ILEAL CONDUIT FORMATION [facility] 9/16/2022    CYSTOSCOPY STENT REMOVAL performed by Tiffany Ortiz MD at [facility]  06/16/2022    nonobstructive CAD    COLONOSCOPY      IR NEPHROSTOMY PERCUTANEOUS LEFT  9/23/2022    IR NEPHROSTOMY PERCUTANEOUS LEFT 9/23/2022 Gayle Dubose MD [facility] SPECIAL PROCEDURES    IR NEPHROSTOMY PERCUTANEOUS RIGHT  9/23/2022    IR NEPHROSTOMY PERCUTANEOUS RIGHT 9/23/2022 Gayle Dubose MD [facility] SPECIAL PROCEDURES    IR PORT PLACEMENT EQUAL OR GREATER THAN 5 YEARS  02/25/2022    IR PORT PLACEMENT EQUAL OR GREATER THAN 5 YEARS 2/25/2022 [facility] SPECIAL PROCEDURES    KNEE ARTHROSCOPY      left    LAPAROTOMY N/A 9/22/2022    LAPAROTOMY EXPLORATORY, LYSIS OF ADHESIONS, REPAIR OF BILATERAL URETERAL ANASTOMOSES, ABDOMINAL WASHOUT, PLACEMENT OF ABTHERA WOUND VAC performed by Chaparro Mohan DO at [address] [place] [address] N/A 9/23/2022    2ND LOOK LAPAROTOMY,  ABDOMINAL WASHOUT, ABDOMINAL CLOSURE, WOUND VAC PLACEMENT performed by Chaparro Mohan DO at [place] [address]  09/16/2022    Cystoprostatectomy, Bilateral Pelvic Lymphadenectomy, ileo conduit formation, cystectomy stent removal (right)       Medications:      [START ON 10/1/2022] fluconazole  200 mg Oral Daily    phosphorus  500 mg Oral TID    metoprolol  5 mg IntraVENous Q8H    piperacillin-tazobactam  3,375 mg IntraVENous Q8H    heparin (porcine)  5,000 Units SubCUTAneous 3 times per day    [Held by provider] metoclopramide  5 mg IntraVENous Q6H    naloxegol  12.5 mg Oral Daily    chlorhexidine  15 mL Mouth/Throat BID    bisacodyl  10 mg Rectal Daily    pantoprazole (PROTONIX) 40 mg injection  40 mg IntraVENous Q12H    polyethylene glycol  17 g Oral Daily    buPROPion  300 mg Oral QAM    simvastatin  40 mg Oral Nightly    sodium chloride flush  5-40 mL IntraVENous 2 times per day    acetaminophen  650 mg Oral Q6H       Social History:     Social History     Socioeconomic History    Marital status:      Spouse name: Not on file    Number of children: Not on file    Years of education: Not on file    Highest education level: Not on file   Occupational History    Not on file   Tobacco Use    Smoking status: Former     Packs/day: 0.25     Years: 9.00     Pack years: 2.25     Types: Cigarettes     Start date: 1983     Quit date: 2/10/1992     Years since quittin.6    Smokeless tobacco: Never   Vaping Use    Vaping Use: Never used   Substance and Sexual Activity    Alcohol use: Not Currently    Drug use: No    Sexual activity: Yes     Partners: Female   Other Topics Concern    Not on file   Social History Narrative    Not on file     Social Determinants of Health     Financial Resource Strain: Not on file   Food Insecurity: Not on file   Transportation Needs: Not on file   Physical Activity: Not on file   Stress: Not on file   Social Connections: Not on file   Intimate Partner Violence: Not on file   Housing Stability: Not on file       Family History:     Family History   Problem Relation Age of Onset    Cancer Father         bladder cancer    Urolithiasis Father       Medical Decision Making:   I have independently reviewed/ordered the following labs:    CBC with Differential:   Recent Labs     22  0540 22  0913   WBC 16.5* 19.1*   HGB 8.2* 7.7*   HCT 26.0* 25.3*    352     BMP:  Recent Labs     22  0540 22  0913   * 143   K 3.7 3.9   * 115*   CO2 22 18*   BUN 22 18   CREATININE 1.41* 1.39*   MG 1.9 1.9     Hepatic Function Panel:   Recent Labs     22  0540 22  0913   PROT 5.3* 5.3*   LABALBU 1.9* 1.6*   BILIDIR 0.2 0.2   IBILI 0.2 0.2   BILITOT 0.4 0.4   ALKPHOS 73 68   ALT 5 5   AST 15 14     No results for input(s): RPR in the last 72 hours.   No results for input(s): HIV in the last 72 hours. No results for input(s): BC in the last 72 hours. Lab Results   Component Value Date/Time    CREATININE 1.39 09/30/2022 09:13 AM    GLUCOSE 110 09/30/2022 09:13 AM       Detailed results: Thank you for allowing us to participate in the care of this patient. Please call with questions. This note is created with the assistance of a speech recognition program.  While intending to generate adocument that actually reflects the content of the visit, the document can still have some errors including those of syntax and sound a like substitutions which may escape proof reading. It such instances, actual meaningcan be extrapolated by contextual diversion. Office: [phone]  Perfect serve / office [phone]      Adilene Ortizer, OMS3      I have discussed the care of the patient, including pertinent history and exam findings,  with the resident. I have seen and examined the patient and the key elements of all parts of the encounter have been performed by me. I agree with the assessment, plan and orders as documented by the resident.     Sindi Raymond, Infectious Diseases

## 2022-09-30 NOTE — PLAN OF CARE
Problem: Discharge Planning  Goal: Discharge to home or other facility with appropriate resources  Outcome: Progressing     Problem: Pain  Goal: Verbalizes/displays adequate comfort level or baseline comfort level  Outcome: Progressing     Problem: Safety - Adult  Goal: Free from fall injury  Outcome: Progressing     Problem: ABCDS Injury Assessment  Goal: Absence of physical injury  Outcome: Progressing     Problem: Nutrition Deficit:  Goal: Optimize nutritional status  Outcome: Progressing  Flowsheets (Taken 9/29/2022 8739 by Fátima Moeller RD, RADHIKA)  Nutrient intake appropriate for improving, restoring, or maintaining nutritional needs:   Assess nutritional status and recommend course of action   Monitor oral intake, labs, and treatment plans     Problem: Skin/Tissue Integrity  Goal: Absence of new skin breakdown  Description: 1. Monitor for areas of redness and/or skin breakdown  2. Assess vascular access sites hourly  3. Every 4-6 hours minimum:  Change oxygen saturation probe site  4. Every 4-6 hours:  If on nasal continuous positive airway pressure, respiratory therapy assess nares and determine need for appliance change or resting period. Outcome: Progressing     Problem: Safety - Medical Restraint  Goal: Remains free of injury from restraints (Restraint for Interference with Medical Device)  Description: INTERVENTIONS:  1. Determine that other, less restrictive measures have been tried or would not be effective before applying the restraint  2. Evaluate the patient's condition at the time of restraint application  3. Inform patient/family regarding the reason for restraint  4. Q2H: Monitor safety, psychosocial status, comfort, nutrition and hydration  Outcome: Progressing     Problem: Confusion  Goal: Confusion, delirium, dementia, or psychosis is improved or at baseline  Description: INTERVENTIONS:  1.  Assess for possible contributors to thought disturbance, including medications, impaired vision or hearing, underlying metabolic abnormalities, dehydration, psychiatric diagnoses, and notify attending LIP  2. [place] high risk fall precautions, as indicated  3. Provide frequent short contacts to provide reality reorientation, refocusing and direction  4. Decrease environmental stimuli, including noise as appropriate  5. Monitor and intervene to maintain adequate nutrition, hydration, elimination, sleep and activity  6. If unable to ensure safety without constant attention obtain sitter and review sitter guidelines with assigned personnel  7.  Initiate Psychosocial CNS and Spiritual Care consult, as indicated  Outcome: Progressing     Problem: Gastrointestinal - Adult  Goal: Minimal or absence of nausea and vomiting  Outcome: Progressing  Goal: Maintains or returns to baseline bowel function  Outcome: Progressing  Goal: Maintains adequate nutritional intake  Outcome: Progressing  Goal: Establish and maintain optimal ostomy function  Outcome: Progressing     Problem: Genitourinary - Adult  Goal: Urinary catheter remains patent  Outcome: Progressing

## 2022-09-30 NOTE — PROGRESS NOTES
Physical Therapy  Facility/Department: [facility] RENAL//MED SURG  Daily Treatment Note  NAME: Keke Yusuf  : 1955  MRN: [id number]    Date of Service: 2022    Discharge Recommendations:  Patient would benefit from continued therapy after discharge, Continue to assess pending progress   PT Equipment Recommendations  Equipment Needed: No  Other: Pt report owning walker, cane, shower seat and with grab bars, denies DME needs    Patient Diagnosis(es): The primary encounter diagnosis was Malignant neoplasm of overlapping sites of bladder ([address] Diagnoses of Malignant neoplasm of lateral wall of urinary bladder ([address] Bandemia, and Complicated UTI (urinary tract infection) were also pertinent to this visit. Assessment   Assessment: Pt completed sup<>sit and stand pivot both w/modA, anxious and fearful of falling, abd pain limits time spent standing/mobilizing. Pt will benefit from continued PT after d/c facilitating return to functional IND. Pt would be unsafe to return to his prior living arrangements- will benefit from further therapy at discharge. Activity Tolerance: Patient limited by endurance  Equipment Needed: No  Other: Pt report owning walker, cane, shower seat and with grab bars, denies DME needs     Plan    Physcial Therapy Plan  General Plan: 5-6 times a week  Specific Instructions for Next Treatment: progress gait with AD  Current Treatment Recommendations: Therapeutic activities;Strengthening;Balance training;Functional mobility training;Transfer training; Endurance training;Gait training;Stair training  PT Plan of Care: Daily     Restrictions  Restrictions/Precautions  Restrictions/Precautions: Surgical Protocols, Fall Risk, Up as Tolerated  Required Braces or Orthoses?: No  Position Activity Restriction  Other position/activity restrictions: Ambulate pt     Subjective    Subjective  Subjective: Pt in supine when therapy arrived. He is anxious but willing to mobilize.   Pain: 7/10, abd incision/wound vac placement  Orientation  Overall Orientation Status: Within Normal Limits  Orientation Level: Oriented X4;Oriented to place;Oriented to time;Oriented to situation;Oriented to person  Cognition  Overall Cognitive Status: Exceptions  Arousal/Alertness: Appropriate responses to stimuli  Following Commands: Follows one step commands with repetition; Follows multistep commands with repitition  Attention Span: Difficulty dividing attention; Difficulty attending to directions  Safety Judgement: Decreased awareness of need for assistance;Decreased awareness of need for safety  Problem Solving: Decreased awareness of errors;Assistance required to identify errors made;Assistance required to generate solutions  Insights: Decreased awareness of deficits  Initiation: Does not require cues  Sequencing: Requires cues for some  Cognition Comment: Pt remains mildly impulsive 2* pain and anxiety, no agitation noted this date. Objective      Bed Mobility Training  Bed Mobility Training: Yes  Overall Level of Assistance: Moderate assistance  Interventions: Safety awareness training;Manual cues; Tactile cues; Verbal cues  Rolling: Moderate assistance; Additional time  Supine to Sit: Moderate assistance; Additional time (Pt has B PCN tubes, X2 unilateral MERLIN drains, denis catheter and abd wound vac.)  Sit to Supine: Moderate assistance; Additional time  Scooting: Minimum assistance  Balance  Sitting: Without support  Standing: With support  Transfer Training  Transfer Training: Yes  Overall Level of Assistance: Moderate assistance; Additional time  Interventions: Manual cues; Safety awareness training; Tactile cues; Verbal cues  Sit to Stand: Moderate assistance; Additional time  Stand to Sit: Moderate assistance (Pt often sits impulsively 2* anxiety and fatigue.)  Stand Pivot Transfers: Moderate assistance; Additional time  Toilet Transfer: Moderate assistance; Additional time; Adaptive equipment  Duration: 20 (Pt had approx 20 min total sitting time at EOB and on commode. On commode he presents with flexed posture using his UE's to stabilize.)  Gait Training  Gait Training: No  Wheelchair Management  Wheelchair Management: No  Neuromuscular Education  Neuromuscular Education: No  Weight Bearing  Weight Bearing Technique: No  PT Exercises  Exercise Treatment: Pt completed sup<>sit w/modA, sit<>stand and stand pivot modA, mildly impulsive w/sitting from standing position * anxiety/fear of falling, pain and fatigue. Safety Devices  Type of Devices: Call light within reach; Left in bed; Chair alarm in place; Left in chair;Gait belt;Nurse notified  Restraints  Restraints Initially in Place: No     Goals  Short Term Goals  Time Frame for Short Term Goals: 5 visits  Short Term Goal 1: Pt to complete supine <> sit mod I  Short Term Goal 2: Sit <> stand Mod I with AD  Short Term Goal 3: pt to ambulate 50 ft with RW CGA  Long Term Goals  Time Frame for Long Term Goals : 14 visits  Long Term Goal 1: Pt to ambulate 100 ft Mod I RW  Long Term Goal 2: Ascend /descend 4 steps mod I  Patient Goals   Patient Goals : to get stronger    Education  Patient Education  Education Given To: Patient  Education Provided: Role of Therapy;Plan of Care; Fall Prevention Strategies;Transfer Training  Education Provided Comments: Pt educated on the importance of functional mobility and areas to improve safety when mobilizing.   Education Method: Verbal  Education Outcome: Verbalized understanding    Therapy Time   Individual Concurrent Group Co-treatment   Time In 1400         Time Out 1455         Minutes [id number] Andrei Wetzel, PTA

## 2022-09-30 NOTE — PROGRESS NOTES
Renal Progress Note    Patient :  Dg Oropeza; 74 y.o. MRN# [id number]  Location:  4674/3181-47  Attending:  Bolivar Taylor MD  Admit Date:  9/16/2022   Hospital Day: 14    Subjective:       History reviewed known history of chronic kidney disease stage IIIb baseline 1.3-1.4 secondary to chronic interstitial nephritis both from obstructive uropathy related to bladder tumor and chemotherapy with platinum-based analogs. He has seen me in the office before. Was brought into the hospital for elective cystoprostatectomy which was done on 9/16/2022 also underwent bilateral pelvic lymphadenectomy and ileal conduit formation. Postoperatively has developed small bowel ileus, which has persisted and has been refractory to conservative treatment requiring repeat OR expiratory laparotomy lysis of adhesions, repair of bilateral ureteral anastomosis, abdominal washout and placement of wound VAC on 9/22/2022. Alison Styles Patient also required bilateral PCN tubes placement 9/23/2022. Patient seen at bedside. /76  Labs reviewed. No BMP today  Creatinine is within baseline limits. Urine output 1.2 L past 24 hours. Abdomen mild generalised tenderness. Hemodynamically stable afebrile overnight. Continues on D5 0.3 with 40 of K at 100 mL an hour. Oral intake is being advanced slowly. Output from the nephrostomy tube seems to have decreased, however output from the Muñoz catheter seems to have increased. Urine culture 9/16/2022 positive for Pseudomonas aeruginosa and Enterococcus faecalis. ID switched antibiotics to oral Diflucan and Zosyn. Outpatient Medications:     Medications Prior to Admission: ondansetron (ZOFRAN-ODT) 8 MG TBDP disintegrating tablet, DISSOLVE 1 TABLET IN MOUTH EVERY 8 HOURS AS NEEDED FOR NAUSEA FOR VOMITING  HYDROcodone-acetaminophen (NORCO) 5-325 MG per tablet, Take 1 tablet by mouth every 8 hours as needed for Pain for up to 30 days.   [DISCONTINUED] rivaroxaban (XARELTO) 20 MG TABS tablet, Take 1 tablet by mouth once daily with breakfast (Patient taking differently: Take 1 tablet by mouth once daily with breakfast/ per cardiology, pt. to stop 3 days pre-op for OR 22)  omeprazole (PRILOSEC) 20 MG delayed release capsule, Take 1 capsule by mouth daily  carvedilol (COREG) 3.125 MG tablet, TAKE 1 TABLET BY MOUTH TWICE DAILY  finasteride (PROSCAR) 5 MG tablet, Take 5 mg by mouth daily  tamsulosin (FLOMAX) 0.4 MG capsule, Take 0.4 mg by mouth daily  simvastatin (ZOCOR) 40 MG tablet, Take 40 mg by mouth nightly  buPROPion (WELLBUTRIN XL) 300 MG extended release tablet, Take 300 mg by mouth every morning    Current Medications:     Scheduled Meds:    [START ON 10/1/2022] fluconazole  200 mg Oral Daily    phosphorus  500 mg Oral TID    metoprolol  5 mg IntraVENous Q8H    piperacillin-tazobactam  3,375 mg IntraVENous Q8H    heparin (porcine)  5,000 Units SubCUTAneous 3 times per day    [Held by provider] metoclopramide  5 mg IntraVENous Q6H    naloxegol  12.5 mg Oral Daily    chlorhexidine  15 mL Mouth/Throat BID    bisacodyl  10 mg Rectal Daily    pantoprazole (PROTONIX) 40 mg injection  40 mg IntraVENous Q12H    polyethylene glycol  17 g Oral Daily    buPROPion  300 mg Oral QAM    simvastatin  40 mg Oral Nightly    sodium chloride flush  5-40 mL IntraVENous 2 times per day    acetaminophen  650 mg Oral Q6H     Continuous Infusions:     IV fluid builder 75 mL/hr (22 2208)    sodium chloride 10 mL/hr at 22 0240     PRN Meds:  diatrizoate meglumine-sodium, metoprolol, promethazine, HYDROmorphone, ondansetron, sodium chloride flush, sodium chloride, oxyCODONE **OR** oxyCODONE    Input/Output:       I/O last 3 completed shifts:   In: 1070.5 [P.O.:310; I.V.:511; IV Piggyback:249.5]  Out: 8181 [Urine:2000; Drains:1285]    Vital Signs:   Temperature:  Temp: 97.8 °F (36.6 °C)  TMax:   Temp (24hrs), Av.7 °F (36.5 °C), Min:97.3 °F (36.3 °C), Max:97.9 °F (36.6 °C)    Respirations:  Resp: 16  Pulse:   Heart Rate: 73  BP:    BP: (!) 143/76  BP Range: Systolic (75PYV), UEQ:234 , Min:135 , DWH:666       Diastolic (07ULF), YKA:77, Min:66, Max:79    Physical Examination:     General:  Alert awake oriented x3, no acute distress, saturating well on RA  HEENT:  Atraumatic, normocephalic, no throat congestion, moist mucosa. Eyes:   Pupils equal, round and reactive to light, pallor, no icterus. Neck:   No JVD, no thyromegaly, no lymphadenopathy. Chest:  Air entry fair bilaterally, no crackles  Cardiac: S1 S2 RR no murmurs  Abdomen:  Soft, non-tender, no masses or organomegally appreciable, BS sluggish. :   No suprapubic or flank tenderness. Neuro:  Sedated on ventilator. Skin:   No rashes, good skin turgor. Extremities:  No edema.     Labs:       Recent Labs     09/28/22  0551 09/29/22  0540 09/30/22  0913   WBC 20.3* 16.5* 19.1*   RBC 2.92* 2.85* 2.77*   HGB 8.3* 8.2* 7.7*   HCT 26.4* 26.0* 25.3*   MCV 90.4 91.2 91.3   MCH 28.4 28.8 27.8   MCHC 31.4 31.5 30.4   RDW 16.2* 16.1* 16.4*    287 352   MPV 10.2 10.3 10.3      BMP:   Recent Labs     09/28/22  0551 09/28/22  1834 09/29/22  0540   * 147* 146*   K 3.6* 3.7 3.7   * 114* 115*   CO2 22 24 22   BUN 25* 22 22   CREATININE 1.41* 1.39* 1.41*   GLUCOSE 88 94 109*   CALCIUM 7.5* 7.5* 7.7*      Phosphorus:     Recent Labs     09/28/22  0551 09/29/22  0540   PHOS 2.8 2.3*     Magnesium:    Recent Labs     09/28/22  0551 09/29/22  0540   MG 1.7 1.9     LISS:      Lab Results   Component Value Date/Time    LISS NEGATIVE 05/31/2022 10:45 AM     SPEP:  Lab Results   Component Value Date/Time    PROT 5.3 09/29/2022 05:40 AM     C3:     Lab Results   Component Value Date/Time    C3 119 05/31/2022 10:45 AM     C4:     Lab Results   Component Value Date/Time    C4 25 05/31/2022 10:45 AM       Urinalysis/Chemistries:      Lab Results   Component Value Date/Time    NITRU POSITIVE 09/18/2022 05:17 PM    COLORU Yellow 09/18/2022 05:17 PM    PHUR 6.0 09/18/2022 05:17 PM    WBCUA 50  09/18/2022 05:17 PM    RBCUA TOO NUMEROUS TO COUNT 09/18/2022 05:17 PM    MUCUS 3+ 04/14/2022 09:43 AM    YEAST FEW 03/31/2022 01:01 PM    BACTERIA MODERATE 09/18/2022 05:17 PM    CLARITYU cloudy 08/04/2022 09:05 AM    SPECGRAV 1.016 09/18/2022 05:17 PM    LEUKOCYTESUR LARGE 09/18/2022 05:17 PM    UROBILINOGEN Normal 09/18/2022 05:17 PM    BILIRUBINUR NEGATIVE 09/18/2022 05:17 PM    BLOODU large 08/04/2022 09:05 AM    GLUCOSEU NEGATIVE 09/18/2022 05:17 PM    KETUA TRACE 09/18/2022 05:17 PM     Urine Creatinine:     Lab Results   Component Value Date/Time    LABCREA 121.3 09/18/2022 05:17 PM       Radiology:       Assessment:     1. Acute Kidney Injury: Secondary to ischemic ATN from depleted circulating volume related to small bowel obstruction ileus, sequestration, peritoneal inflammation from suspected anastomotic leak. Baseline 1.3-1.4 postoperatively had gone up to 2.5, came down to 1.5, however then started worsening peaked at 2.5, found to have anastomotic leak and Bowel and ureter. Required operative intervention, bilateral nephrostomy tube placement, since then urine output 1L. And now creatinine back at baseline. 2.  Small bowel obstruction related to dynamic causes, bowel was stuck behind the ureter which was extricated yesterday. Some suspicion of ischemia. 3.  Chronic kidney disease stage IIIb from chronic intestinal nephritis both from chemotherapy with platinum and bladder tumor causing obstructive uropathy, baseline 1.3-1.4  4. Bladder cancer failed local therapy status for cystoprostatectomy and ileal conduit formation 9/16/2022  5. Cardiomyopathy ejection fraction 40%  6. Mild hypernatremia from insensible free water losses, doing better  7. Hypophosphatemia on phosphorus supplements  8. Hypokalemia. Plan:   1. Continue D5/0.3 with 40 of K at 75 mill an hour   2. Keep systolic pressure more than 110  3. Replace electrolytes as needed   4. Follow lab work, intake and output, daily weights, and hemodynamics  5. Advance diet as recommended by surgery  6. Continue monitor strict I's and O's renal function. 7.  BMP daily  8. Will follow. Christiano Najera MD  Internal Medicine Resident, PGY- [address] [place], [place]  9/30/2022, 11:34 AM    Patient seen with resident. Known history of chronic kidney disease stage IIIb baseline 1.3-1.4 secondary to chronic interstitial nephritis from obstructive uropathy related to bladder tumor and chemotherapy. Was admitted to the hospital for elective cystoprostatectomy 9/16/2012. Underwent bilateral pelvic lymphadenectomy and ileal conduit creation. Postoperatively developed small bowel ileus, also developed anastomotic leak required PCNs and washout and lysis of additions. Since then making slow steady progress. Developed acute kidney injury creatinine peaked at 2.5 now improving. Clinical exam unremarkable except for mild tenderness in the abdominal cavity   impression  1. Acute kidney injury secondary to ischemic ATN from tubular circulating volume from insensible losses Baseline 1.3-1.4 peaked up to 2.5 now down to 1.3. His baseline  2. Small bowel obstruction related dynamic causes, status post lysis of additions. #3 status post cystoprostatectomy with anastomotic leak now has bilateral nephrostomy tubes  4. Bladder cancer  Plan  1. Continue IV fluids as ordered (d5/0.3 with 40 K) at 100 and  2. Encourage oral intake  3. Follow renal function  4. Avoid nephrotoxic agents  Attending Physician Statement  I have discussed the care of Sam Griffith, including pertinent history and exam findings with the resident/fellow. I have reviewed the key elements of all parts of the encounter with the resident/fellow. I have seen and examined the patient with the resident/fellow. I agree with the assessment and plan and status of the problem list as documented. Gurinder Perdomo   Electronically signed by Kb Church MD on 9/30/2022 at 2:27 PM

## 2022-09-30 NOTE — TELEPHONE ENCOUNTER
[facility] called and asked if we could use the pt port for his infusions at pt request.   He is not on any active treatment so advised them they could use it.

## 2022-09-30 NOTE — PROGRESS NOTES
[facility] Wound Ostomy  Nurse  Follow up  Note       NAME:  Kameron Bailon RECORD NUMBER:  [id number]  AGE: 74 y.o. GENDER: male  : 1955  TODAY'S DATE:  2022    Subjective   Reason for [id number] Nurse Evaluation and Assessment:   Assessment: NPWT dressing changes to mid abdominal surgical wound using white foam to base under black granufoam at -125 mHg vacuum. Urostomy care and education.      Objective    /71   Pulse 79   Temp 97.7 °F (36.5 °C) (Oral)   Resp 16   Ht 6' 2.02\" (1.88 m)   Wt 267 lb 3.2 oz (121.2 kg)   SpO2 98%   BMI 34.29 kg/m²     LABS:  WBC:    Lab Results   Component Value Date/Time    WBC 19.1 2022 09:13 AM     H/H:    Lab Results   Component Value Date/Time    HGB 7.7 2022 09:13 AM    HCT 25.3 2022 09:13 AM     Assessment   Camacho Risk Score: Camacho Scale Score: 19    Patient Active Problem List   Diagnosis Code    Kidney stones N20.0    Nocturia R35.1    Hypertrophy of prostate with urinary obstruction N40.1, N13.8    Malignant neoplasm of urinary bladder (HCC) C67.9    Urinary retention R33.9    Cancer of lateral wall of urinary bladder (HCC) C67.2    Acute respiratory failure with hypoxia (HCC) J96.01    Other hyperlipidemia E78.49    Microcytic anemia D50.9    Mild protein-calorie malnutrition (HCC) E44.1    Pulmonary embolism without acute cor pulmonale (HCC) I26.99    Hypoxia R09.02    Dyspnea L89.00    Acute systolic heart failure (HCC) I50.21    Acute renal failure with tubular necrosis (HCC) N17.0    CKD (chronic kidney disease), stage III (HCC) N18.30    Dilated cardiomyopathy (HCC) I42.0    Bladder cancer (HCC) C67.9    PAXTON (acute kidney injury) ([address] N17.9    Bandemia Q73.948    Complicated UTI (urinary tract infection) N39.0    Hypokalemia E87.6    Hypophosphatemia E83.39    Small bowel obstruction ([address] K56.609       Measurements:     22 1217   Urostomy Ileal conduit RLQ   Placement Date/Time: 22 1413   Present on Admission/Arrival: No  Inserted by: Dr. Dillon Butt Type: Ileal conduit  Location: RLQ   Stomal Appliance 2 piece;Clean, dry & intact; Changed   Flange Size (inches) 2.25 Inches   Stoma  Assessment Moist;Protrudes;Pink   Peristomal Assessment Clean, dry & intact   Collection Container Standard   Treatment Bag change; Liquid skin barrier;Site care  (Protective Barrier Seal)   Urine Color Yellow   Urine Appearance Mucous   Negative Pressure Wound Therapy Abdomen Mid   Placement Date/Time: 09/23/22 1310   Location: Abdomen  Wound Location Orientation: Mid   Wound Type Surgical   Unit Type KCI VAC Ulta   Dressing Type White Foam;Black Foam   Number of pieces used 3   Number of pieces removed 3   Cycle Continuous; On   Target Pressure (mmHg) 125   Canister changed? Yes   Dressing Status New dressing applied   Dressing Changed Changed/New   Dressing Change Due 10/03/22   Wound 09/22/22 Abdomen Mid SURGICAL INCISION   Date First Assessed: 09/22/22   Present on Hospital Admission: No  Primary Wound Type: Surgical Type  Location: Abdomen  Wound Location Orientation: Mid  Wound Description (Comments): SURGICAL INCISION   Wound Image    Wound Etiology Surgical   Dressing Status New dressing applied   Wound Cleansed Cleansed with saline   Dressing/Treatment Negative pressure wound therapy   Dressing Change Due 10/03/22   Wound Assessment Subcutaneous;Pink/red;Granulation tissue   Drainage Amount Small   Drainage Description Serosanguinous   Odor None   Verito-wound Assessment Intact     Medicated with IV Dilaudid prior to start  of procedure. NPWT dressing removed using adhesive remove and sponge loosened with saline. Wound rinsed with saline. Periwound skin prepped with SurePREP and bordered with drape. Strips of white sponge placed in base of wound with black granufoam to fill wound. Good seal was achieved. Urostomy appliance changed.           Response to treatment:  Well tolerated by patient., With complaints of pain. Pain Assessment:  Premedicated: Yes    Plan   Plan of Care: Wound 09/22/22 Abdomen Mid SURGICAL INCISION-Dressing/Treatment: Negative pressure wound therapy    ABDOMINAL WOUND:  NPWT with white foam to base of wound under black granufoam at -125 mmHG vacuum. Change sponge M-W-F. Change canister when full or weekly. UROSTOMY:  Routine pouch changes using a 2 piece urostomy and ring barrier. Turn every 2 hours  Float heels off of bed with pillows under calves     Use lift sling to reposition patient to minimize potential for shear injury. Foam sacrum dressing to sacrococcygeal area. Peel back dressing, inspect skin beneath, re-secure. Change every 72 hours and prn wrinkles, soilage. Discontinue Sacral dressing if repeatedly soiled by incontinence. Moisture wicking under pads     Specialty Bed Required :    [] Low Air Loss   [x] Pressure Redistribution  [] Fluid Immersion  [] Bariatric  [] Total Pressure Relief  [] Other:     Current Diet: ADULT ORAL NUTRITION SUPPLEMENT; Breakfast, Lunch, Dinner; Clear Liquid Oral Supplement  ADULT DIET;  Regular      Patient/Caregiver Teaching:  Level of patient/caregiver understanding able to:   [] Indicates understanding       [x] Needs reinforcement  [] Unsuccessful      [x] Verbal Understanding  [] Demonstrated understanding       [] No evidence of learning  [] Refused teaching         [] Sherri Jeter RN BSN,  [employer]

## 2022-09-30 NOTE — PROGRESS NOTES
Itzel Christensen MD FACS   Urology Progress Note     Subjective:   No acute events overnight  No fevers or chills, vital stable  Passing flatus and continues to have bowel movements, they have been runny  Feels better today compared to yesterday  Not ambulating, stated that he will work on this today  No nausea or vomiting, tolerating only small amounts of food, only had some broth yesterday, is not taking in protein shakes    Right nephrostomy tube 70 cc / 24 hours jared  Left nephrostomy tube 210 cc / 24 hours jared  Urostomy 950 cc / 24 hours clear yellow urine  Upper abdominal drain 180 cc / 24 hours serous  Lower abdominal drain 605 cc / 24 hours serous  Wound VAC 10 cc serosanguineous    A.m. labs pending    Patient Vitals for the past 24 hrs:   BP Temp Temp src Pulse Resp SpO2 Weight   09/30/22 0600 -- -- -- -- -- -- 267 lb 3.2 oz (121.2 kg)   09/30/22 0309 -- -- -- -- 18 -- --   09/29/22 2345 135/66 -- -- 75 -- -- --   09/29/22 2058 (!) 140/79 97.3 °F (36.3 °C) Oral 76 16 99 % --   09/29/22 1533 (!) 150/70 97.7 °F (36.5 °C) Oral 70 16 96 % --   09/29/22 1500 (!) 141/70 97.9 °F (36.6 °C) Oral 79 18 -- --       Intake/Output Summary (Last 24 hours) at 9/30/2022 0655  Last data filed at 9/30/2022 0526  Gross per 24 hour   Intake 210 ml   Output 2025 ml   Net -1815 ml       Recent Labs     09/28/22  0551 09/29/22  0540 09/30/22  0913   WBC 20.3* 16.5* 19.1*   HGB 8.3* 8.2* 7.7*   HCT 26.4* 26.0* 25.3*   MCV 90.4 91.2 91.3    287 352     Recent Labs     09/28/22  0551 09/28/22  1834 09/29/22  0540   * 147* 146*   K 3.6* 3.7 3.7   * 114* 115*   CO2 22 24 22   PHOS 2.8  --  2.3*   BUN 25* 22 22   CREATININE 1.41* 1.39* 1.41*       No results for input(s): COLORU, PHUR, LABCAST, WBCUA, RBCUA, MUCUS, TRICHOMONAS, YEAST, BACTERIA, CLARITYU, SPECGRAV, LEUKOCYTESUR, UROBILINOGEN, BILIRUBINUR, BLOODU in the last 72 hours.     Invalid input(s): NITRATE, GLUCOSEUKETONESUAMORPHOUS      Additional Lab/culture results:    Physical Exam:   General: A/O x 3, no acute distress  HEENT: moist mucous membranes  Neck: Supple   Chest: bilateral symmetrical chest rise   Circulatory: Peripheries warm , well perfused   P/A: soft, clean, dry and intact with skin glue, ostomy pink with red rubber in place, bilateral ureteral catheters not visible, wound vac in place, MERLIN x 2 in place with serous drainage  Extremities: No edema/calf tenderness    Interval Imaging Findings: none    Impression:  none    Vasile Katie is a 29-year-old male   Active  problem list  Hx of bladder cancer  Robotic assisted laparoscopic cystoprostatectomy with ileal conduit creation on 9/16/2022   Ex lap ROLANDO repair of BL ureteral anastamosis and abd washout with abthera due to ureteral anastamotic leaks and SBO and second look on 9/22 and 9/23      Plan:  Inpatient consult to social work-need assistance in determining placement for the patient following discharge  Diet: Regular-appreciate dietary recommendations for improving appetite and oral intake  Pain control and antiemetic as needed  Maintain bilateral nephrostomy tubes and bilateral ureteral stents  Maintain MERLIN drains; check MERLIN creatinine to check status of urine leak  Appreciate ID input  Continue movantik  Urine culture growing pseudomonas, enterococcus and and second pseudomonas colony type, pansusceptible only gentamicin resistance s/p 7 days cefepime   Infectious disease consulted, on Zosyn for Enterococcus coverage, Diflucan for urine culture 9/23/2022 growing Candida albicans/dubliensis-both antibiotics for 7 days total coverage until 10/3/2022  IV fluids  DVT PPX: Subq heparin 5000units TID  Continue to monitor  Discharge planning: inappropriate for discharge today      [facility], DO, PGY-3  6:55 AM 9/30/2022  I have reviewed the history above and agree. I have reviewed all laboratory findings and imaging reports/films.   I agree with the plan as noted above.     Electronically signed by Feliciano Carroll MD on 9/30/2022 at 3:13 PM

## 2022-10-01 ENCOUNTER — APPOINTMENT (OUTPATIENT)
Dept: GENERAL RADIOLOGY | Age: 67
DRG: 653 | End: 2022-10-01
Attending: SPECIALIST
Payer: MEDICARE

## 2022-10-01 PROBLEM — N99.528 NEPHROSTOMY COMPLICATION (HCC): Status: ACTIVE | Noted: 2022-10-01

## 2022-10-01 PROBLEM — B37.9 CANDIDA ALBICANS INFECTION: Status: ACTIVE | Noted: 2022-10-01

## 2022-10-01 PROBLEM — B95.2 ENTEROCOCCUS INFECTION IN SHUNT (HCC): Status: ACTIVE | Noted: 2022-10-01

## 2022-10-01 PROBLEM — N10 ACUTE PYELONEPHRITIS: Status: ACTIVE | Noted: 2022-10-01

## 2022-10-01 PROBLEM — T85.79XA ENTEROCOCCUS INFECTION IN SHUNT (HCC): Status: ACTIVE | Noted: 2022-10-01

## 2022-10-01 LAB
ANION GAP SERPL CALCULATED.3IONS-SCNC: 10 MMOL/L (ref 9–17)
ANION GAP SERPL CALCULATED.3IONS-SCNC: 10 MMOL/L (ref 9–17)
BUN BLDV-MCNC: 15 MG/DL (ref 8–23)
BUN BLDV-MCNC: 16 MG/DL (ref 8–23)
CALCIUM SERPL-MCNC: 7.8 MG/DL (ref 8.6–10.4)
CALCIUM SERPL-MCNC: 7.8 MG/DL (ref 8.6–10.4)
CHLORIDE BLD-SCNC: 111 MMOL/L (ref 98–107)
CHLORIDE BLD-SCNC: 112 MMOL/L (ref 98–107)
CO2: 18 MMOL/L (ref 20–31)
CO2: 22 MMOL/L (ref 20–31)
CREAT SERPL-MCNC: 1.36 MG/DL (ref 0.7–1.2)
CREAT SERPL-MCNC: 1.41 MG/DL (ref 0.7–1.2)
GFR AFRICAN AMERICAN: >60 ML/MIN
GFR AFRICAN AMERICAN: >60 ML/MIN
GFR NON-AFRICAN AMERICAN: 50 ML/MIN
GFR NON-AFRICAN AMERICAN: 53 ML/MIN
GFR SERPL CREATININE-BSD FRML MDRD: ABNORMAL ML/MIN/{1.73_M2}
GFR SERPL CREATININE-BSD FRML MDRD: ABNORMAL ML/MIN/{1.73_M2}
GLUCOSE BLD-MCNC: 117 MG/DL (ref 75–110)
GLUCOSE BLD-MCNC: 68 MG/DL (ref 75–110)
GLUCOSE BLD-MCNC: 78 MG/DL (ref 70–99)
GLUCOSE BLD-MCNC: 92 MG/DL (ref 70–99)
HCT VFR BLD CALC: 21.7 % (ref 40.7–50.3)
HCT VFR BLD CALC: 25.8 % (ref 40.7–50.3)
HCT VFR BLD CALC: 29.3 % (ref 40.7–50.3)
HEMOGLOBIN: 6.6 G/DL (ref 13–17)
HEMOGLOBIN: 8 G/DL (ref 13–17)
HEMOGLOBIN: 8.9 G/DL (ref 13–17)
MAGNESIUM: 1.6 MG/DL (ref 1.6–2.6)
MCH RBC QN AUTO: 27.8 PG (ref 25.2–33.5)
MCHC RBC AUTO-ENTMCNC: 30.4 G/DL (ref 28.4–34.8)
MCV RBC AUTO: 91.6 FL (ref 82.6–102.9)
NRBC AUTOMATED: 0 PER 100 WBC
PDW BLD-RTO: 16.7 % (ref 11.8–14.4)
PHOSPHORUS: 3.3 MG/DL (ref 2.5–4.5)
PLATELET # BLD: ABNORMAL K/UL (ref 138–453)
PLATELET, FLUORESCENCE: NORMAL K/UL (ref 138–453)
POTASSIUM SERPL-SCNC: 3.9 MMOL/L (ref 3.7–5.3)
POTASSIUM SERPL-SCNC: 6.1 MMOL/L (ref 3.7–5.3)
RBC # BLD: 2.37 M/UL (ref 4.21–5.77)
SODIUM BLD-SCNC: 140 MMOL/L (ref 135–144)
SODIUM BLD-SCNC: 143 MMOL/L (ref 135–144)
WBC # BLD: 14.3 K/UL (ref 3.5–11.3)

## 2022-10-01 PROCEDURE — 6370000000 HC RX 637 (ALT 250 FOR IP): Performed by: INTERNAL MEDICINE

## 2022-10-01 PROCEDURE — 86920 COMPATIBILITY TEST SPIN: CPT

## 2022-10-01 PROCEDURE — 99232 SBSQ HOSP IP/OBS MODERATE 35: CPT | Performed by: INTERNAL MEDICINE

## 2022-10-01 PROCEDURE — 6370000000 HC RX 637 (ALT 250 FOR IP): Performed by: STUDENT IN AN ORGANIZED HEALTH CARE EDUCATION/TRAINING PROGRAM

## 2022-10-01 PROCEDURE — 2580000003 HC RX 258: Performed by: STUDENT IN AN ORGANIZED HEALTH CARE EDUCATION/TRAINING PROGRAM

## 2022-10-01 PROCEDURE — 74018 RADEX ABDOMEN 1 VIEW: CPT

## 2022-10-01 PROCEDURE — 85027 COMPLETE CBC AUTOMATED: CPT

## 2022-10-01 PROCEDURE — 2580000003 HC RX 258: Performed by: INTERNAL MEDICINE

## 2022-10-01 PROCEDURE — 1200000000 HC SEMI PRIVATE

## 2022-10-01 PROCEDURE — 36415 COLL VENOUS BLD VENIPUNCTURE: CPT

## 2022-10-01 PROCEDURE — 6360000002 HC RX W HCPCS: Performed by: INTERNAL MEDICINE

## 2022-10-01 PROCEDURE — 2500000003 HC RX 250 WO HCPCS: Performed by: STUDENT IN AN ORGANIZED HEALTH CARE EDUCATION/TRAINING PROGRAM

## 2022-10-01 PROCEDURE — 86901 BLOOD TYPING SEROLOGIC RH(D): CPT

## 2022-10-01 PROCEDURE — 6360000002 HC RX W HCPCS: Performed by: STUDENT IN AN ORGANIZED HEALTH CARE EDUCATION/TRAINING PROGRAM

## 2022-10-01 PROCEDURE — 6360000002 HC RX W HCPCS: Performed by: SPECIALIST

## 2022-10-01 PROCEDURE — C9113 INJ PANTOPRAZOLE SODIUM, VIA: HCPCS | Performed by: STUDENT IN AN ORGANIZED HEALTH CARE EDUCATION/TRAINING PROGRAM

## 2022-10-01 PROCEDURE — 85055 RETICULATED PLATELET ASSAY: CPT

## 2022-10-01 PROCEDURE — 82947 ASSAY GLUCOSE BLOOD QUANT: CPT

## 2022-10-01 PROCEDURE — 6370000000 HC RX 637 (ALT 250 FOR IP): Performed by: NURSE PRACTITIONER

## 2022-10-01 PROCEDURE — 2500000003 HC RX 250 WO HCPCS: Performed by: NURSE PRACTITIONER

## 2022-10-01 PROCEDURE — 2500000003 HC RX 250 WO HCPCS: Performed by: INTERNAL MEDICINE

## 2022-10-01 PROCEDURE — 80048 BASIC METABOLIC PNL TOTAL CA: CPT

## 2022-10-01 PROCEDURE — 86900 BLOOD TYPING SEROLOGIC ABO: CPT

## 2022-10-01 PROCEDURE — 84100 ASSAY OF PHOSPHORUS: CPT

## 2022-10-01 PROCEDURE — 86850 RBC ANTIBODY SCREEN: CPT

## 2022-10-01 PROCEDURE — 83735 ASSAY OF MAGNESIUM: CPT

## 2022-10-01 PROCEDURE — 85014 HEMATOCRIT: CPT

## 2022-10-01 PROCEDURE — 85018 HEMOGLOBIN: CPT

## 2022-10-01 RX ORDER — DEXTROSE MONOHYDRATE 25 G/50ML
25 INJECTION, SOLUTION INTRAVENOUS ONCE
Status: COMPLETED | OUTPATIENT
Start: 2022-10-01 | End: 2022-10-01

## 2022-10-01 RX ORDER — SODIUM CHLORIDE 9 MG/ML
INJECTION, SOLUTION INTRAVENOUS PRN
Status: DISCONTINUED | OUTPATIENT
Start: 2022-10-01 | End: 2022-10-11 | Stop reason: HOSPADM

## 2022-10-01 RX ADMIN — METOPROLOL TARTRATE 5 MG: 1 INJECTION, SOLUTION INTRAVENOUS at 00:57

## 2022-10-01 RX ADMIN — PIPERACILLIN AND TAZOBACTAM 3375 MG: 3; .375 INJECTION, POWDER, FOR SOLUTION INTRAVENOUS at 21:26

## 2022-10-01 RX ADMIN — METOPROLOL TARTRATE 5 MG: 1 INJECTION, SOLUTION INTRAVENOUS at 18:00

## 2022-10-01 RX ADMIN — SODIUM CHLORIDE: 9 INJECTION, SOLUTION INTRAVENOUS at 06:10

## 2022-10-01 RX ADMIN — INSULIN HUMAN 8 UNITS: 100 INJECTION, SOLUTION PARENTERAL at 15:30

## 2022-10-01 RX ADMIN — HEPARIN SODIUM 5000 UNITS: 5000 INJECTION INTRAVENOUS; SUBCUTANEOUS at 21:29

## 2022-10-01 RX ADMIN — ONDANSETRON 8 MG: 4 TABLET, ORALLY DISINTEGRATING ORAL at 22:44

## 2022-10-01 RX ADMIN — Medication 25 G: at 15:30

## 2022-10-01 RX ADMIN — ONDANSETRON 8 MG: 4 TABLET, ORALLY DISINTEGRATING ORAL at 15:28

## 2022-10-01 RX ADMIN — SODIUM CHLORIDE, PRESERVATIVE FREE 40 MG: 5 INJECTION INTRAVENOUS at 21:29

## 2022-10-01 RX ADMIN — METOPROLOL TARTRATE 5 MG: 1 INJECTION, SOLUTION INTRAVENOUS at 22:45

## 2022-10-01 RX ADMIN — PIPERACILLIN AND TAZOBACTAM 3375 MG: 3; .375 INJECTION, POWDER, FOR SOLUTION INTRAVENOUS at 06:11

## 2022-10-01 RX ADMIN — PROMETHAZINE HYDROCHLORIDE 6.25 MG: 25 INJECTION INTRAMUSCULAR; INTRAVENOUS at 03:36

## 2022-10-01 RX ADMIN — SODIUM CHLORIDE, PRESERVATIVE FREE 40 MG: 5 INJECTION INTRAVENOUS at 10:11

## 2022-10-01 RX ADMIN — SODIUM ZIRCONIUM CYCLOSILICATE 10 G: 10 POWDER, FOR SUSPENSION ORAL at 15:18

## 2022-10-01 RX ADMIN — SODIUM CHLORIDE, PRESERVATIVE FREE 10 ML: 5 INJECTION INTRAVENOUS at 10:11

## 2022-10-01 RX ADMIN — SODIUM BICARBONATE: 84 INJECTION, SOLUTION INTRAVENOUS at 15:21

## 2022-10-01 RX ADMIN — PIPERACILLIN AND TAZOBACTAM 3375 MG: 3; .375 INJECTION, POWDER, FOR SOLUTION INTRAVENOUS at 15:23

## 2022-10-01 RX ADMIN — METOPROLOL TARTRATE 5 MG: 1 INJECTION, SOLUTION INTRAVENOUS at 10:11

## 2022-10-01 RX ADMIN — FLUCONAZOLE 200 MG: 200 TABLET ORAL at 22:44

## 2022-10-01 ASSESSMENT — PAIN SCALES - GENERAL
PAINLEVEL_OUTOF10: 8
PAINLEVEL_OUTOF10: 0
PAINLEVEL_OUTOF10: 5

## 2022-10-01 ASSESSMENT — ENCOUNTER SYMPTOMS
ABDOMINAL DISTENTION: 0
COLOR CHANGE: 0
CHEST TIGHTNESS: 0
SINUS PRESSURE: 0

## 2022-10-01 NOTE — PROGRESS NOTES
Willow Sacks Jannelle Dubonnet, MD FACS   Urology Progress Note     Subjective:   No acute events overnight  No fevers or chills, vital stable  Passing flatus and continues to have bowel movements, they have been runny  Feels better today compared to yesterday  Overnight, patient was nauseous with 1 episode of emesis. KUB obtained showed mild ileus      Right nephrostomy tube 50 cc / 24 hours jared  Left nephrostomy tube 190 cc / 24 hours jared  Urostomy 950 cc / 24 hours clear yellow urine  Upper abdominal drain 50 cc / 24 hours serous  Lower abdominal drain 2555 cc / 24 hours serous  Wound VAC 10 cc serosanguineous  Conduit- 500 cc/24 hrs    A.m. labs pending    Patient Vitals for the past 24 hrs:   BP Temp Temp src Pulse Resp SpO2 Weight   10/01/22 1000 (!) 143/73 97.7 °F (36.5 °C) Oral 76 18 97 % --   10/01/22 0600 -- -- -- -- -- -- 267 lb 3.2 oz (121.2 kg)   10/01/22 0045 130/74 -- -- 75 -- -- --   09/30/22 1945 131/78 97.6 °F (36.4 °C) Oral 73 18 99 % --   09/30/22 1600 137/73 97.5 °F (36.4 °C) Oral 78 17 95 % --   09/30/22 1136 -- -- -- -- 16 -- --   09/30/22 1130 122/71 97.7 °F (36.5 °C) Oral 79 18 98 % --   09/30/22 1106 -- -- -- -- 16 -- --       Intake/Output Summary (Last 24 hours) at 10/1/2022 1051  Last data filed at 10/1/2022 0820  Gross per 24 hour   Intake --   Output 1615 ml   Net -1615 ml       Recent Labs     09/29/22  0540 09/30/22  0913   WBC 16.5* 19.1*   HGB 8.2* 7.7*   HCT 26.0* 25.3*   MCV 91.2 91.3    352     Recent Labs     09/28/22  1834 09/29/22  0540 09/30/22  0913   * 146* 143   K 3.7 3.7 3.9   * 115* 115*   CO2 24 22 18*   PHOS  --  2.3* 2.6   BUN 22 22 18   CREATININE 1.39* 1.41* 1.39*       No results for input(s): COLORU, PHUR, LABCAST, WBCUA, RBCUA, MUCUS, TRICHOMONAS, YEAST, BACTERIA, CLARITYU, SPECGRAV, LEUKOCYTESUR, UROBILINOGEN, BILIRUBINUR, BLOODU in the last 72 hours.     Invalid input(s): NITRATE, GLUCOSEUKETONESUAMORPHOUS      Additional Lab/culture results:    Physical Exam:   General: A/O x 3, no acute distress  HEENT: moist mucous membranes  Neck: Supple   Chest: bilateral symmetrical chest rise   Circulatory: Peripheries warm , well perfused   P/A: soft, clean, dry and intact with skin glue, ostomy pink with red rubber in place, bilateral ureteral catheters not visible, wound vac in place, MERLIN x 2 in place with serous drainage  Extremities: No edema/calf tenderness    Interval Imaging Findings: none    Impression:  none    Li Gonzalez is a 62-year-old male   Active  problem list  Hx of bladder cancer  Robotic assisted laparoscopic cystoprostatectomy with ileal conduit creation on 9/16/2022   Ex lap ROLANDO repair of BL ureteral anastamosis and abd washout with abthera due to ureteral anastamotic leaks and SBO and second look on 9/22 and 9/23      Plan:  Inpatient consult to social work-need assistance in determining placement for the patient following discharge  Diet: N.p.o. for now. Advance diet as tolerated. Start with clears and slowly advance diet. Pain control and antiemetic as needed  Maintain bilateral nephrostomy tubes and bilateral ureteral stents  Maintain MERLIN drains; check MERLIN creatinine to check status of urine leak  Appreciate ID input  Urine culture growing pseudomonas, enterococcus and and second pseudomonas colony type, pansusceptible only gentamicin resistance s/p 7 days cefepime   Infectious disease consulted, on Zosyn for Enterococcus coverage, Diflucan for urine culture 9/23/2022 growing Candida albicans/dubliensis-both antibiotics for 7 days total coverage until 10/3/2022.   Appreciate infectious disease recommendations  IV fluids  DVT PPX: Subq heparin 5000units TID  Continue to monitor  Discharge planning: inappropriate for discharge today      Bang Verdugo MD, PGY-4  10:51 AM 10/1/2022

## 2022-10-01 NOTE — PROGRESS NOTES
Renal Progress Note    Patient :  Rachelle Correa; 74 y.o. MRN# [id number]  Location:  [facility]9/4622-26  Attending:  Petar Douglas MD  Admit Date:  9/16/2022   Hospital Day: 15    Subjective:           Patient seen at bedside. Currently NPO as he vomited overnight. He feels weak and thirsty  /73 pulse 76 afebrile on room air  650 mL urine output last 24 hours. 550ml this morning  305 mg from drains. Labs reviewed. No Labs this morning. Previous Cr 1.39 BUN 18. Bicarb 18, AG 10  WBC 19, Hb 7.7  Na down to 143. Cl 115. K+ 3.9  Continues on D5 0.3 with 40 of K at 75 mL an hour. Brief History  History reviewed known history of chronic kidney disease stage IIIb baseline 1.3-1.4 secondary to chronic interstitial nephritis both from obstructive uropathy related to bladder tumor and chemotherapy with platinum-based analogs. Was brought into the hospital for elective cystoprostatectomy which was done on 9/16/2022 also underwent bilateral pelvic lymphadenectomy and ileal conduit formation. Postoperatively has developed small bowel ileus, which has persisted and has been refractory to conservative treatment requiring repeat OR expiratory laparotomy lysis of adhesions, repair of bilateral ureteral anastomosis, abdominal washout and placement of wound VAC on 9/22/2022. Pink Kush Patient also required bilateral PCN tubes placement 9/23/2022. Urine culture 9/16/2022 positive for Pseudomonas aeruginosa and Enterococcus faecalis. ID switched antibiotics to oral Diflucan and Zosyn. Outpatient Medications:     Medications Prior to Admission: ondansetron (ZOFRAN-ODT) 8 MG TBDP disintegrating tablet, DISSOLVE 1 TABLET IN MOUTH EVERY 8 HOURS AS NEEDED FOR NAUSEA FOR VOMITING  HYDROcodone-acetaminophen (NORCO) 5-325 MG per tablet, Take 1 tablet by mouth every 8 hours as needed for Pain for up to 30 days.   [DISCONTINUED] rivaroxaban (XARELTO) 20 MG TABS tablet, Take 1 tablet by mouth once daily with breakfast (Patient taking differently: Take 1 tablet by mouth once daily with breakfast/ per cardiology, pt. to stop 3 days pre-op for OR 22)  omeprazole (PRILOSEC) 20 MG delayed release capsule, Take 1 capsule by mouth daily  carvedilol (COREG) 3.125 MG tablet, TAKE 1 TABLET BY MOUTH TWICE DAILY  finasteride (PROSCAR) 5 MG tablet, Take 5 mg by mouth daily  tamsulosin (FLOMAX) 0.4 MG capsule, Take 0.4 mg by mouth daily  simvastatin (ZOCOR) 40 MG tablet, Take 40 mg by mouth nightly  buPROPion (WELLBUTRIN XL) 300 MG extended release tablet, Take 300 mg by mouth every morning    Current Medications:     Scheduled Meds:    fluconazole  200 mg Oral Daily    phosphorus  500 mg Oral TID    metoprolol  5 mg IntraVENous Q8H    piperacillin-tazobactam  3,375 mg IntraVENous Q8H    heparin (porcine)  5,000 Units SubCUTAneous 3 times per day    [Held by provider] metoclopramide  5 mg IntraVENous Q6H    naloxegol  12.5 mg Oral Daily    chlorhexidine  15 mL Mouth/Throat BID    bisacodyl  10 mg Rectal Daily    pantoprazole (PROTONIX) 40 mg injection  40 mg IntraVENous Q12H    polyethylene glycol  17 g Oral Daily    buPROPion  300 mg Oral QAM    simvastatin  40 mg Oral Nightly    sodium chloride flush  5-40 mL IntraVENous 2 times per day    acetaminophen  650 mg Oral Q6H     Continuous Infusions:     IV fluid builder 75 mL/hr (22 2208)    sodium chloride 10 mL/hr at 10/01/22 0610     PRN Meds:  diatrizoate meglumine-sodium, metoprolol, promethazine, HYDROmorphone, ondansetron, sodium chloride flush, sodium chloride, oxyCODONE **OR** oxyCODONE    Input/Output:       I/O last 3 completed shifts:   In: 360 [P.O.:360]  Out:  [Urine:1400; Drains:640]    Vital Signs:   Temperature:  Temp: 97.7 °F (36.5 °C)  TMax:   Temp (24hrs), Av.6 °F (36.4 °C), Min:97.5 °F (36.4 °C), Max:97.7 °F (36.5 °C)    Respirations:  Resp: 18  Pulse:   Heart Rate: 76  BP:    BP: (!) 143/73  BP Range: Systolic (18NAG), WHS:824 , Min:122 , Max:143 Diastolic (46GGD), ZJF:86, Min:71, Max:78    Physical Examination:     General:  Alert awake oriented x3, no acute distress, saturating well on RA  HEENT:  Atraumatic, normocephalic, no throat congestion, moist mucosa. Eyes:   Pupils equal, round and reactive to light, pallor, no icterus. Neck:   No JVD, no thyromegaly, no lymphadenopathy. Chest:  Air entry fair bilaterally, no crackles  Cardiac: S1 S2 RR no murmurs  Abdomen:  Soft, non-tender, no masses or organomegally appreciable, BS sluggish. :   No suprapubic or flank tenderness. Neuro:  Sedated on ventilator. Skin:   No rashes, good skin turgor. Extremities:  No edema.     Labs:       Recent Labs     09/29/22  0540 09/30/22  0913   WBC 16.5* 19.1*   RBC 2.85* 2.77*   HGB 8.2* 7.7*   HCT 26.0* 25.3*   MCV 91.2 91.3   MCH 28.8 27.8   MCHC 31.5 30.4   RDW 16.1* 16.4*    352   MPV 10.3 10.3      BMP:   Recent Labs     09/28/22  1834 09/29/22  0540 09/30/22  0913   * 146* 143   K 3.7 3.7 3.9   * 115* 115*   CO2 24 22 18*   BUN 22 22 18   CREATININE 1.39* 1.41* 1.39*   GLUCOSE 94 109* 110*   CALCIUM 7.5* 7.7* 7.6*      Phosphorus:     Recent Labs     09/29/22  0540 09/30/22  0913   PHOS 2.3* 2.6     Magnesium:    Recent Labs     09/29/22  0540 09/30/22  0913   MG 1.9 1.9     LISS:      Lab Results   Component Value Date/Time    LISS NEGATIVE 05/31/2022 10:45 AM     SPEP:  Lab Results   Component Value Date/Time    PROT 5.3 09/30/2022 09:13 AM     C3:     Lab Results   Component Value Date/Time    C3 119 05/31/2022 10:45 AM     C4:     Lab Results   Component Value Date/Time    C4 25 05/31/2022 10:45 AM       Urinalysis/Chemistries:      Lab Results   Component Value Date/Time    NITRU POSITIVE 09/18/2022 05:17 PM    COLORU Yellow 09/18/2022 05:17 PM    PHUR 6.0 09/18/2022 05:17 PM    WBCUA 50  09/18/2022 05:17 PM    RBCUA TOO NUMEROUS TO COUNT 09/18/2022 05:17 PM    MUCUS 3+ 04/14/2022 09:43 AM    YEAST FEW 03/31/2022 01:01 PM BACTERIA MODERATE 09/18/2022 05:17 PM    CLARITYU cloudy 08/04/2022 09:05 AM    SPECGRAV 1.016 09/18/2022 05:17 PM    LEUKOCYTESUR LARGE 09/18/2022 05:17 PM    UROBILINOGEN Normal 09/18/2022 05:17 PM    BILIRUBINUR NEGATIVE 09/18/2022 05:17 PM    BLOODU large 08/04/2022 09:05 AM    GLUCOSEU NEGATIVE 09/18/2022 05:17 PM    KETUA TRACE 09/18/2022 05:17 PM     Urine Creatinine:     Lab Results   Component Value Date/Time    LABCREA 121.3 09/18/2022 05:17 PM       Radiology:       Assessment:     1. Acute Kidney Injury: Secondary to ischemic ATN from depleted circulating volume related to small bowel obstruction ileus, sequestration, peritoneal inflammation from suspected anastomotic leak. Baseline 1.3-1.4 postoperatively had gone up to 2.5, came down to 1.5, however then started worsening peaked at 2.5, found to have anastomotic leak and Bowel and ureter. Required operative intervention, bilateral nephrostomy tube placement, since then urine output 1L. And now creatinine back at baseline. 2.  Small bowel obstruction related to dynamic causes, bowel was stuck behind the ureter which was extricated yesterday. Some suspicion of ischemia. 3.  Chronic kidney disease stage IIIb from chronic intestinal nephritis both from chemotherapy with platinum and bladder tumor causing obstructive uropathy, baseline 1.3-1.4  4. Bladder cancer failed local therapy status for cystoprostatectomy and ileal conduit formation 9/16/2022  5. Cardiomyopathy ejection fraction 40%  6. Mild hypernatremia from insensible free water losses, doing better  7. Hypophosphatemia on phosphorus supplements  8. Hypokalemia. Plan:   1. Continue D5/0.3 with 40 of K at 75mill an hour   2. Keep systolic pressure more than 110  3. Replace electrolytes as needed   4. Encourage oral intake  5. Advance diet as recommended by surgery  6. Continue monitor strict I's and O's renal function. 7.  BMP daily  8. Will follow.       Kathleen Wong [facility] MD  Internal Medicine Resident, PGY- [address] [facility]  10/1/2022, 10:23 AM    Patient had symptoms of nausea this morning. Also vomited overnight. Currently NPO. Hemodynamically stable blood pressure in the 140s. Output from the nephrostomy tubes have decreased however urostomy output has picked up. Overall fluid balance even. Labs done this morning showed a potassium of 6.2 with a bicarb of 18 anion gap of 16. Creatinine stable at 1.39. Clearly developing metabolic acidosis with hyperkalemia from extracellular shift and from iatrogenic causes. Clinical exam unchanged. 1+ lower extremity edema. Impression  1. Acute kidney injury from ischemic ATN from depleted circulating volume related to small bowel obstruction ileus sequestration, peritoneal inflammation from suspected anastomotic leak Baseline 1.3-1.4 peaked up to 2.5 now down to 1.3 resolving nonoliguric. 2.  Obstructive uropathy from last medically status post bilateral nephrostomy tubes placement  3. Small bowel obstruction related to dynamic causes status post lysis of adhesions  4. Chronic kidney disease stage IIIb  4. Hyperkalemia from extracellular shift related to metabolic acidosis and iatrogenic causes  5. Cardiomyopathy Ejection Fraction 40%  6. Metabolic Acidosis gap and nongap from bicarbonate losses through the ileal conduit  Plan  1. Change fluids to D5W with 150 [place] sodium bicarbonate 75 mill an hour  2. Treat potassium medically  3. Check lites again in about 6 hours and treat medically  4. Expect potassium to recover  5. Follow urine output    Attending Physician Statement  I have discussed the care of Dg Oropeza, including pertinent history and exam findings with the resident/fellow. I have reviewed the key elements of all parts of the encounter with the resident/fellow. I have seen and examined the patient with the resident/fellow.   I agree with the assessment and plan and status of the problem list as documented.       .  Electronically signed by Yves Lindsey MD on 10/1/2022 at 5:30 PM

## 2022-10-01 NOTE — PLAN OF CARE
Problem: Discharge Planning  Goal: Discharge to home or other facility with appropriate resources  10/1/2022 0218 by Ysabel Michel RN  Outcome: Progressing  9/30/2022 1801 by Melissa Jacobs RN  Outcome: Progressing     Problem: Pain  Goal: Verbalizes/displays adequate comfort level or baseline comfort level  10/1/2022 0218 by Ysabel Michel RN  Outcome: Progressing  9/30/2022 1801 by Melissa Jacobs RN  Outcome: Progressing     Problem: Safety - Adult  Goal: Free from fall injury  10/1/2022 0218 by Ysabel Michel RN  Outcome: Progressing  9/30/2022 1801 by Melissa Jacobs RN  Outcome: Progressing     Problem: ABCDS Injury Assessment  Goal: Absence of physical injury  10/1/2022 0218 by Ysabel Michel RN  Outcome: Progressing  9/30/2022 1801 by Melissa Jacobs RN  Outcome: Progressing     Problem: Nutrition Deficit:  Goal: Optimize nutritional status  10/1/2022 0218 by Ysabel Michel RN  Outcome: Progressing  9/30/2022 1801 by Melissa Jacobs RN  Outcome: Progressing     Problem: Skin/Tissue Integrity  Goal: Absence of new skin breakdown  Description: 1. Monitor for areas of redness and/or skin breakdown  2. Assess vascular access sites hourly  3. Every 4-6 hours minimum:  Change oxygen saturation probe site  4. Every 4-6 hours:  If on nasal continuous positive airway pressure, respiratory therapy assess nares and determine need for appliance change or resting period. 10/1/2022 0218 by Ysabel Michel RN  Outcome: Progressing  9/30/2022 1801 by Melissa Jacobs RN  Outcome: Progressing     Problem: Safety - Medical Restraint  Goal: Remains free of injury from restraints (Restraint for Interference with Medical Device)  Description: INTERVENTIONS:  1. Determine that other, less restrictive measures have been tried or would not be effective before applying the restraint  2. Evaluate the patient's condition at the time of restraint application  3. Inform patient/family regarding the reason for restraint  4.  Q2H: Monitor safety, psychosocial status, comfort, nutrition and hydration  10/1/2022 0218 by Richa Spaulding RN  Outcome: Progressing  9/30/2022 1801 by Coty Middleton RN  Outcome: Progressing     Problem: Confusion  Goal: Confusion, delirium, dementia, or psychosis is improved or at baseline  Description: INTERVENTIONS:  1. Assess for possible contributors to thought disturbance, including medications, impaired vision or hearing, underlying metabolic abnormalities, dehydration, psychiatric diagnoses, and notify attending LIP  2. [place] high risk fall precautions, as indicated  3. Provide frequent short contacts to provide reality reorientation, refocusing and direction  4. Decrease environmental stimuli, including noise as appropriate  5. Monitor and intervene to maintain adequate nutrition, hydration, elimination, sleep and activity  6. If unable to ensure safety without constant attention obtain sitter and review sitter guidelines with assigned personnel  7.  Initiate Psychosocial CNS and Spiritual Care consult, as indicated  10/1/2022 0218 by Richa Spaulding RN  Outcome: Progressing  9/30/2022 1801 by Coty Middleton RN  Outcome: Progressing     Problem: Gastrointestinal - Adult  Goal: Minimal or absence of nausea and vomiting  10/1/2022 0218 by Richa Spaulding RN  Outcome: Progressing  9/30/2022 1801 by Coty Middleton RN  Outcome: Progressing  Goal: Maintains or returns to baseline bowel function  10/1/2022 0218 by Richa Spaulding RN  Outcome: Progressing  9/30/2022 1801 by Coty Middleton RN  Outcome: Progressing  Goal: Maintains adequate nutritional intake  10/1/2022 0218 by Richa Spaulding RN  Outcome: Progressing  9/30/2022 1801 by Coty Middleton RN  Outcome: Progressing  Goal: Establish and maintain optimal ostomy function  10/1/2022 0218 by Richa Spaulding RN  Outcome: Progressing  9/30/2022 1801 by Coty Middleton RN  Outcome: Progressing     Problem: Genitourinary - Adult  Goal: Urinary catheter remains patent  10/1/2022 0218 by Jade Robles RN  Outcome: Progressing  9/30/2022 1801 by Cuco Soto RN  Outcome: Progressing

## 2022-10-01 NOTE — PROGRESS NOTES
Renal Progress Note    Patient :  Jason Peace; 74 y.o. MRN# [id number]  Location:  9108/4747-07  Attending:  Heather Le MD  Admit Date:  9/16/2022   Hospital Day: 15      Subjective:     Summary: History reviewed known history of chronic kidney disease stage IIIb baseline 1.3-1.4 secondary to chronic interstitial nephritis both from obstructive uropathy related to bladder tumor and chemotherapy with platinum-based analogs. He has seen by Dr Damaris Giraldo in the office before. Was brought into the hospital for elective cystoprostatectomy which was done on 9/16/2022 also underwent bilateral pelvic lymphadenectomy and ileal conduit formation. Postoperatively has developed small bowel ileus, which has persisted and has been refractory to conservative treatment requiring repeat OR expiratory laparotomy lysis of adhesions, repair of bilateral ureteral anastomosis, abdominal washout and placement of wound VAC on 9/22/2022. Radha Burton Patient also required bilateral PCN tubes placement 9/23/2022. Patient was seen and examined at bedside. He vomited twice last night General surgery has been reconsulted. Continues on D5 0.3 with 40 of K at 100 mL an hour  Minimal output from PCNs, ileal conduit has good urine output  He refused labs earlier this morning, now willing to have labs drawn  Continues on Zosyn and Diflucan   Outpatient Medications:     Medications Prior to Admission: ondansetron (ZOFRAN-ODT) 8 MG TBDP disintegrating tablet, DISSOLVE 1 TABLET IN MOUTH EVERY 8 HOURS AS NEEDED FOR NAUSEA FOR VOMITING  HYDROcodone-acetaminophen (NORCO) 5-325 MG per tablet, Take 1 tablet by mouth every 8 hours as needed for Pain for up to 30 days.   [DISCONTINUED] rivaroxaban (XARELTO) 20 MG TABS tablet, Take 1 tablet by mouth once daily with breakfast (Patient taking differently: Take 1 tablet by mouth once daily with breakfast/ per cardiology, pt. to stop 3 days pre-op for OR 9-16-22)  omeprazole (PRILOSEC) 20 MG delayed release capsule, Take 1 capsule by mouth daily  carvedilol (COREG) 3.125 MG tablet, TAKE 1 TABLET BY MOUTH TWICE DAILY  finasteride (PROSCAR) 5 MG tablet, Take 5 mg by mouth daily  tamsulosin (FLOMAX) 0.4 MG capsule, Take 0.4 mg by mouth daily  simvastatin (ZOCOR) 40 MG tablet, Take 40 mg by mouth nightly  buPROPion (WELLBUTRIN XL) 300 MG extended release tablet, Take 300 mg by mouth every morning    Current Medications:     Scheduled Meds:    fluconazole  200 mg Oral Daily    phosphorus  500 mg Oral TID    metoprolol  5 mg IntraVENous Q8H    piperacillin-tazobactam  3,375 mg IntraVENous Q8H    heparin (porcine)  5,000 Units SubCUTAneous 3 times per day    [Held by provider] metoclopramide  5 mg IntraVENous Q6H    chlorhexidine  15 mL Mouth/Throat BID    bisacodyl  10 mg Rectal Daily    pantoprazole (PROTONIX) 40 mg injection  40 mg IntraVENous Q12H    polyethylene glycol  17 g Oral Daily    buPROPion  300 mg Oral QAM    simvastatin  40 mg Oral Nightly    sodium chloride flush  5-40 mL IntraVENous 2 times per day    acetaminophen  650 mg Oral Q6H     Continuous Infusions:     IV fluid builder 75 mL/hr (22)    sodium chloride 10 mL/hr at 10/01/22 0610     PRN Meds:  diatrizoate meglumine-sodium, metoprolol, promethazine, HYDROmorphone, ondansetron, sodium chloride flush, sodium chloride, oxyCODONE **OR** oxyCODONE    Input/Output:       I/O last 3 completed shifts: In: 360 [P.O.:360]  Out:  [Urine:1400; Drains:640].     Patient Vitals for the past 96 hrs (Last 3 readings):   Weight   10/01/22 0600 267 lb 3.2 oz (121.2 kg)   22 0600 267 lb 3.2 oz (121.2 kg)       Vital Signs:   Temperature:  Temp: 97.7 °F (36.5 °C)  TMax:   Temp (24hrs), Av.6 °F (36.4 °C), Min:97.5 °F (36.4 °C), Max:97.7 °F (36.5 °C)    Respirations:  Resp: 18  Pulse:   Heart Rate: 76  BP:    BP: (!) 143/73  BP Range: Systolic (17HVR), MWE:136 , Min:130 , PND:168       Diastolic (60KFB), RGL:51, Min:73, Max:78      Physical Examination:     General:  AAO x 3, speaking in full sentences, no accessory muscle use. HEENT: Atraumatic, normocephalic, no throat congestion, moist mucosa. Neck:   No JVD, no thyromegaly, no lymphadenopathy. Chest:   Bilateral vesicular breath sounds, no rales or wheezes. Cardiac:  S1 S2 RR, no murmurs, gallops or rubs, JVP not raised. Abdomen: Soft, non-tender, no masses or organomegaly, BS audible. :   No suprapubic or flank tenderness. Neuro:   AAO x 3, No FND. SKIN:  No rashes, good skin turgor. Extremities:  No edema, palpable peripheral pulses, no calf tenderness.     Labs:       Recent Labs     09/29/22  0540 09/30/22  0913   WBC 16.5* 19.1*   RBC 2.85* 2.77*   HGB 8.2* 7.7*   HCT 26.0* 25.3*   MCV 91.2 91.3   MCH 28.8 27.8   MCHC 31.5 30.4   RDW 16.1* 16.4*    352   MPV 10.3 10.3      BMP:   Recent Labs     09/28/22  1834 09/29/22  0540 09/30/22  0913   * 146* 143   K 3.7 3.7 3.9   * 115* 115*   CO2 24 22 18*   BUN 22 22 18   CREATININE 1.39* 1.41* 1.39*   GLUCOSE 94 109* 110*   CALCIUM 7.5* 7.7* 7.6*      Phosphorus:     Recent Labs     09/29/22  0540 09/30/22  0913   PHOS 2.3* 2.6     Magnesium:    Recent Labs     09/29/22  0540 09/30/22  0913   MG 1.9 1.9     Albumin:    Recent Labs     09/29/22  0540 09/30/22  0913   LABALBU 1.9* 1.6*     BNP:    No results found for: BNP  LISS:      Lab Results   Component Value Date/Time    LISS NEGATIVE 05/31/2022 10:45 AM     SPEP:  Lab Results   Component Value Date/Time    PROT 5.3 09/30/2022 09:13 AM     UPEP:   No results found for: LABPE  C3:     Lab Results   Component Value Date/Time    C3 119 05/31/2022 10:45 AM     C4:     Lab Results   Component Value Date/Time    C4 25 05/31/2022 10:45 AM       Urinalysis/Chemistries:      Lab Results   Component Value Date/Time    NITRU POSITIVE 09/18/2022 05:17 PM    COLORU Yellow 09/18/2022 05:17 PM    PHUR 6.0 09/18/2022 05:17 PM    WBCUA 50  09/18/2022 05:17 PM    RBCUA TOO NUMEROUS TO COUNT 09/18/2022 05:17 PM    MUCUS 3+ 04/14/2022 09:43 AM    YEAST FEW 03/31/2022 01:01 PM    BACTERIA MODERATE 09/18/2022 05:17 PM    CLARITYU cloudy 08/04/2022 09:05 AM    SPECGRAV 1.016 09/18/2022 05:17 PM    LEUKOCYTESUR LARGE 09/18/2022 05:17 PM    UROBILINOGEN Normal 09/18/2022 05:17 PM    BILIRUBINUR NEGATIVE 09/18/2022 05:17 PM    BLOODU large 08/04/2022 09:05 AM    GLUCOSEU NEGATIVE 09/18/2022 05:17 PM    KETUA TRACE 09/18/2022 05:17 PM       Urine Creatinine:     Lab Results   Component Value Date/Time    LABCREA 121.3 09/18/2022 05:17 PM       Radiology:     Matthew Callejas   Impression   Presence of bilateral nephrostomy tubes, stable. There is been interval   placement of an apparent right ureteral stent with distal end likely in an   ileal loop pouch. Bowel gas pattern favors ileus with evolving partial small   bowel obstruction not excluded. No free air is demonstrated. Assessment:     1. Acute Kidney Injury: Secondary to ischemic ATN from depleted circulating volume related to small bowel obstruction ileus, sequestration, peritoneal inflammation from suspected anastomotic leak. Baseline 1.3-1.4 postoperatively had gone up to 2.5, came down to 1.5, however then started worsening peaked at 2.5, found to have anastomotic leak and Bowel and ureter. Required operative intervention, bilateral nephrostomy tube placement, since then urine output 1L. And now creatinine back at baseline. 2.  Small bowel obstruction related to dynamic causes, bowel was stuck behind the ureter which was extricated. Some suspicion of ischemia. 3.  Chronic kidney disease stage IIIb from chronic intestinal nephritis both from chemotherapy with platinum and bladder tumor causing obstructive uropathy, baseline 1.3-1.4  4. Bladder cancer failed local therapy status for cystoprostatectomy and ileal conduit formation 9/16/2022  5. Cardiomyopathy ejection fraction 40%  6.   Mild hypernatremia from insensible free water losses, doing better  7. Hypophosphatemia on phosphorus supplements  8. Hypokalemia. Plan:   1. Continue D5/0.3 with 40 of K at 75 mill an hour   2. Keep systolic pressure more than 110  3. Replace electrolytes as needed   4. Follow lab work, intake and output, daily weights, and hemodynamics  5. Advance diet as recommended by surgery  6. Continue monitor strict I's and O's renal function. 7.  BMP daily  8. Will follow. Nutrition   Please ensure that patient is on a renal diet/TF. Avoid nephrotoxic drugs/contrast exposure. We will continue to follow along with you.      Electronically signed by LINWOOD Gilliam CNP on 10/1/2022 at 11:44 AM

## 2022-10-02 LAB
ABO/RH: NORMAL
ANION GAP SERPL CALCULATED.3IONS-SCNC: 7 MMOL/L (ref 9–17)
ANTIBODY SCREEN: NEGATIVE
ARM BAND NUMBER: NORMAL
BLD PROD TYP BPU: NORMAL
BLD PROD TYP BPU: NORMAL
BPU ID: NORMAL
BPU ID: NORMAL
BUN BLDV-MCNC: 15 MG/DL (ref 8–23)
CALCIUM SERPL-MCNC: 7.7 MG/DL (ref 8.6–10.4)
CHLORIDE BLD-SCNC: 107 MMOL/L (ref 98–107)
CO2: 26 MMOL/L (ref 20–31)
CREAT SERPL-MCNC: 1.44 MG/DL (ref 0.7–1.2)
CROSSMATCH RESULT: NORMAL
CROSSMATCH RESULT: NORMAL
DISPENSE STATUS BLOOD BANK: NORMAL
DISPENSE STATUS BLOOD BANK: NORMAL
EXPIRATION DATE: NORMAL
GFR AFRICAN AMERICAN: 60 ML/MIN
GFR NON-AFRICAN AMERICAN: 49 ML/MIN
GFR SERPL CREATININE-BSD FRML MDRD: ABNORMAL ML/MIN/{1.73_M2}
GLUCOSE BLD-MCNC: 106 MG/DL (ref 70–99)
GLUCOSE BLD-MCNC: 107 MG/DL (ref 75–110)
HCT VFR BLD CALC: 24.9 % (ref 40.7–50.3)
HEMOGLOBIN: 8 G/DL (ref 13–17)
MAGNESIUM: 1.5 MG/DL (ref 1.6–2.6)
MCH RBC QN AUTO: 28.8 PG (ref 25.2–33.5)
MCHC RBC AUTO-ENTMCNC: 32.1 G/DL (ref 28.4–34.8)
MCV RBC AUTO: 89.6 FL (ref 82.6–102.9)
NRBC AUTOMATED: 0 PER 100 WBC
PDW BLD-RTO: 16.8 % (ref 11.8–14.4)
PHOSPHORUS: 2.6 MG/DL (ref 2.5–4.5)
PLATELET # BLD: 346 K/UL (ref 138–453)
PMV BLD AUTO: 10 FL (ref 8.1–13.5)
POTASSIUM SERPL-SCNC: 3.7 MMOL/L (ref 3.7–5.3)
RBC # BLD: 2.78 M/UL (ref 4.21–5.77)
SODIUM BLD-SCNC: 140 MMOL/L (ref 135–144)
TRANSFUSION STATUS: NORMAL
TRANSFUSION STATUS: NORMAL
UNIT DIVISION: 0
UNIT DIVISION: 0
WBC # BLD: 18.1 K/UL (ref 3.5–11.3)

## 2022-10-02 PROCEDURE — 2580000003 HC RX 258: Performed by: INTERNAL MEDICINE

## 2022-10-02 PROCEDURE — 84100 ASSAY OF PHOSPHORUS: CPT

## 2022-10-02 PROCEDURE — 6360000002 HC RX W HCPCS: Performed by: INTERNAL MEDICINE

## 2022-10-02 PROCEDURE — 6370000000 HC RX 637 (ALT 250 FOR IP): Performed by: STUDENT IN AN ORGANIZED HEALTH CARE EDUCATION/TRAINING PROGRAM

## 2022-10-02 PROCEDURE — 36415 COLL VENOUS BLD VENIPUNCTURE: CPT

## 2022-10-02 PROCEDURE — 2500000003 HC RX 250 WO HCPCS: Performed by: INTERNAL MEDICINE

## 2022-10-02 PROCEDURE — 99232 SBSQ HOSP IP/OBS MODERATE 35: CPT | Performed by: INTERNAL MEDICINE

## 2022-10-02 PROCEDURE — 6370000000 HC RX 637 (ALT 250 FOR IP): Performed by: INTERNAL MEDICINE

## 2022-10-02 PROCEDURE — C9113 INJ PANTOPRAZOLE SODIUM, VIA: HCPCS | Performed by: STUDENT IN AN ORGANIZED HEALTH CARE EDUCATION/TRAINING PROGRAM

## 2022-10-02 PROCEDURE — 6360000002 HC RX W HCPCS: Performed by: SPECIALIST

## 2022-10-02 PROCEDURE — 83735 ASSAY OF MAGNESIUM: CPT

## 2022-10-02 PROCEDURE — 85027 COMPLETE CBC AUTOMATED: CPT

## 2022-10-02 PROCEDURE — 2500000003 HC RX 250 WO HCPCS: Performed by: STUDENT IN AN ORGANIZED HEALTH CARE EDUCATION/TRAINING PROGRAM

## 2022-10-02 PROCEDURE — 1200000000 HC SEMI PRIVATE

## 2022-10-02 PROCEDURE — 6360000002 HC RX W HCPCS: Performed by: STUDENT IN AN ORGANIZED HEALTH CARE EDUCATION/TRAINING PROGRAM

## 2022-10-02 PROCEDURE — 2580000003 HC RX 258: Performed by: STUDENT IN AN ORGANIZED HEALTH CARE EDUCATION/TRAINING PROGRAM

## 2022-10-02 PROCEDURE — 82947 ASSAY GLUCOSE BLOOD QUANT: CPT

## 2022-10-02 PROCEDURE — 80048 BASIC METABOLIC PNL TOTAL CA: CPT

## 2022-10-02 RX ORDER — SODIUM CHLORIDE 450 MG/100ML
INJECTION, SOLUTION INTRAVENOUS CONTINUOUS
Status: DISCONTINUED | OUTPATIENT
Start: 2022-10-02 | End: 2022-10-11 | Stop reason: HOSPADM

## 2022-10-02 RX ADMIN — SODIUM CHLORIDE, PRESERVATIVE FREE 40 MG: 5 INJECTION INTRAVENOUS at 21:27

## 2022-10-02 RX ADMIN — PIPERACILLIN AND TAZOBACTAM 3375 MG: 3; .375 INJECTION, POWDER, FOR SOLUTION INTRAVENOUS at 05:55

## 2022-10-02 RX ADMIN — SODIUM CHLORIDE, PRESERVATIVE FREE 40 MG: 5 INJECTION INTRAVENOUS at 14:06

## 2022-10-02 RX ADMIN — PIPERACILLIN AND TAZOBACTAM 3375 MG: 3; .375 INJECTION, POWDER, FOR SOLUTION INTRAVENOUS at 14:12

## 2022-10-02 RX ADMIN — SODIUM BICARBONATE: 84 INJECTION, SOLUTION INTRAVENOUS at 14:06

## 2022-10-02 RX ADMIN — OXYCODONE 10 MG: 5 TABLET ORAL at 04:19

## 2022-10-02 RX ADMIN — METOPROLOL TARTRATE 5 MG: 1 INJECTION, SOLUTION INTRAVENOUS at 14:07

## 2022-10-02 RX ADMIN — SODIUM CHLORIDE: 4.5 INJECTION, SOLUTION INTRAVENOUS at 21:27

## 2022-10-02 RX ADMIN — HEPARIN SODIUM 5000 UNITS: 5000 INJECTION INTRAVENOUS; SUBCUTANEOUS at 14:08

## 2022-10-02 RX ADMIN — HEPARIN SODIUM 5000 UNITS: 5000 INJECTION INTRAVENOUS; SUBCUTANEOUS at 21:27

## 2022-10-02 RX ADMIN — PIPERACILLIN AND TAZOBACTAM 3375 MG: 3; .375 INJECTION, POWDER, FOR SOLUTION INTRAVENOUS at 21:28

## 2022-10-02 RX ADMIN — FLUCONAZOLE 200 MG: 200 TABLET ORAL at 14:07

## 2022-10-02 RX ADMIN — HEPARIN SODIUM 5000 UNITS: 5000 INJECTION INTRAVENOUS; SUBCUTANEOUS at 05:55

## 2022-10-02 ASSESSMENT — PAIN SCALES - WONG BAKER
WONGBAKER_NUMERICALRESPONSE: 0

## 2022-10-02 ASSESSMENT — PAIN SCALES - GENERAL
PAINLEVEL_OUTOF10: 9
PAINLEVEL_OUTOF10: 8
PAINLEVEL_OUTOF10: 2
PAINLEVEL_OUTOF10: 0

## 2022-10-02 ASSESSMENT — ENCOUNTER SYMPTOMS
SINUS PRESSURE: 0
ABDOMINAL DISTENTION: 0
COLOR CHANGE: 0
CHEST TIGHTNESS: 0

## 2022-10-02 NOTE — PROGRESS NOTES
Ronda Rosales MD FACS   Urology Progress Note     Subjective:   No acute events overnight  No fevers or chills, vital stable  Passing flatus and continues to have bowel movements, they have been runny  Continues to have recurrent nausea and bilious emesis      Right nephrostomy tube 50 cc / 24 hours jared  Left nephrostomy tube 10 cc / 24 hours jared  Urostomy 950 cc / 24 hours clear yellow urine  Upper abdominal drain 390 cc / 24 hours serous  Lower abdominal drain 270 cc / 24 hours serous  Wound VAC 10 cc serosanguineous  Conduit- 1750 cc/24 hrs    Cr 1.44 (1.36)  Hgb 8.0 (8.9)  WBC 18.1 (14.3)      Patient Vitals for the past 24 hrs:   BP Temp Temp src Pulse Resp SpO2   10/02/22 0800 (!) 140/71 98.2 °F (36.8 °C) Oral 82 18 93 %   10/02/22 0449 -- -- -- -- 18 --   10/01/22 2148 (!) 127/59 98.5 °F (36.9 °C) Oral 80 16 98 %   10/01/22 1715 (!) 142/75 98.2 °F (36.8 °C) Oral 83 16 95 %   10/01/22 1000 (!) 143/73 97.7 °F (36.5 °C) Oral 76 18 97 %       Intake/Output Summary (Last 24 hours) at 10/2/2022 0829  Last data filed at 10/2/2022 0423  Gross per 24 hour   Intake --   Output 1791 ml   Net -1791 ml       Recent Labs     09/30/22  0913 10/01/22  1146 10/01/22  1411 10/01/22  1827 10/02/22  0618   WBC 19.1* 14.3*  --   --  18.1*   HGB 7.7* 6.6* 8.0* 8.9* 8.0*   HCT 25.3* 21.7* 25.8* 29.3* 24.9*   MCV 91.3 91.6  --   --  89.6    See Reflexed IPF Result  --   --  346     Recent Labs     09/30/22  0913 10/01/22  1146 10/01/22  1827 10/02/22  0618    140 143 140   K 3.9 6.1* 3.9 3.7   * 112* 111* 107   CO2 18* 18* 22 26   PHOS 2.6 3.3  --  2.6   BUN 18 16 15 15   CREATININE 1.39* 1.41* 1.36* 1.44*       No results for input(s): COLORU, PHUR, LABCAST, WBCUA, RBCUA, MUCUS, TRICHOMONAS, YEAST, BACTERIA, CLARITYU, SPECGRAV, LEUKOCYTESUR, UROBILINOGEN, BILIRUBINUR, BLOODU in the last 72 hours.     Invalid input(s): NITRATE, GLUCOSEUKETONESUAMORPHOUS      Additional Lab/culture results:    Physical Exam:   General: A/O x 3, no acute distress  HEENT: moist mucous membranes  Neck: Supple   Chest: bilateral symmetrical chest rise   Circulatory: Peripheries warm , well perfused   P/A: soft, clean, dry and intact with skin glue, ostomy pink with red rubber in place, bilateral ureteral catheters not visible, wound vac in place, MERLIN x 2 in place with serous drainage  Extremities: No edema/calf tenderness    Interval Imaging Findings: none    Impression:  none    Lisa Shelton is a 62-year-old male   Active  problem list  Hx of bladder cancer  Robotic assisted laparoscopic cystoprostatectomy with ileal conduit creation on 9/16/2022   Ex lap ROLANDO repair of BL ureteral anastamosis and abd washout with abthera due to ureteral anastamotic leaks and SBO and second look on 9/22 and 9/23      Plan:  Inpatient consult to social work-need assistance in determining placement for the patient following discharge  Diet: N.p.o. for now. Advance diet as tolerated. Start with clears and slowly advance diet. Will reconsult general surgery  Pain control and antiemetic as needed  Maintain bilateral nephrostomy tubes and bilateral ureteral stents  Maintain MERLIN drains; check MERLIN creatinine to check status of urine leak  Appreciate ID input  Urine culture growing pseudomonas, enterococcus and and second pseudomonas colony type, pansusceptible only gentamicin resistance s/p 7 days cefepime   Infectious disease consulted, on Zosyn for Enterococcus coverage, Diflucan for urine culture 9/23/2022 growing Candida albicans/dubliensis-both antibiotics for 7 days total coverage until 10/3/2022.   Appreciate infectious disease recommendations  IV fluids  DVT PPX: Subq heparin 5000units TID  Continue to monitor  Discharge planning: inappropriate for discharge today      Fartun Jenkins MD, PGY-4  8:29 AM 10/2/2022

## 2022-10-02 NOTE — PROGRESS NOTES
Physical Therapy        Physical Therapy Cancel Note      DATE: 10/2/2022    NAME: Mt García  MRN: [id number]   : 1955      Patient not seen this date for Physical Therapy due to:    Patient is not appropriate for PT evaluation/treatment at this time d/t :  Pt is very nauseated, attempted to ck back later in day but no time.         Electronically signed by Raphael Lin PTA on 10/2/2022 at 4:48 PM

## 2022-10-02 NOTE — PROGRESS NOTES
[facility] -   Infectious diseases evaluation    Progress Note    admission date 9/16/2022    reason for consultation:   bandemia    Impression :     9/16 bladder cancer involving wall and lymph nodes, post radical cystectomy, prostatectomy, groin lymph node dissection  9/22 SBO, had lysis of adhesions  9/22 bilateral ureteral anastomotic leak, with large abdominal collection, post repair 9/22 9/23 bilat nephrostomy tubes placed  9/23 closure of the fascia, abdominal wound open with VAC  Ongoing bandemia  Pseudomonas/Enterococcus UTI, 9/16-  On zosyn until 10-3-22  Right nephrostomy urine infection with Candida albicans 9/23  Noted at the time of right percutaneous nephrostomy placement 9/23  Ongoing bandemia  Hypernatremia -on fluids      Discussion / summary of stay / plan of care     Recommendations       Continue IV Zosyn and po diflucan  Continue medications until 10/3. Monitor bandemia      Infection Control Recommendations   [place] Precautions    Antimicrobial Stewardship Recommendations   Simplification of therapy  Targeted therapy      History of Present Illness:   Initial history:  Sam Griffith is a 74y.o.-year-old male with history of bladder cancer involving the wall of the bladder as well as the groin lymph nodes. Came in for radical cystectomy done on 9/16 with ileal conduit placement, removal of the groin lymph nodes, but complicated with a small bowel obstruction and bilateral ureteral anastomotic leak. CT scan of the abdomen  9/22 which had showed at the time a large anterior pelvic fluid collection 15 x 13 x 6 cmWith variable densities extending from the cystectomy bed    Taken back to surgery on 9/22, lysis of adhesion that was thought to be causing the SBO, as well as repair of the anastomotic leak, and placement of 2 bilateral nephrostomy tubes.   9/23 patient went back to surgery for closure of abdominal fascia and subcutaneous tissue that stayed open with a VAC placement over it -    Due to hydronephrosis, bilat PCNT placed by IR 9/23, A repeat urine culture from 9/23 done due to cloudy R urine, by IR,  is showing Candida albicans 9/26. The patient has been started on Diflucan. Along the way on 9/16 the urine grew Pseudomonas and Enterococcus, resistant to Cipro and patient has received a course of cefepime from 9/19 and 2 until 9/25. His white count remains elevated actually after a feeling that it was improving, he started going worse, today it is up to 20. Infectious is consulted for the concern of a ongoing infection. The right nephro drain was giving very little urine and had a urogram  9/26 that showed good positioning and no leak. Since then right nephrostomy line has been giving a better input. Today the patient is alert appropriate he has an NG tube, was trying to have some liquids p.o. and today was able to tolerate 2 popsicles without vomiting. He does have gurgling over the abdomen, and has no abdominal tenderness otherwise. He has only abdominal pain on the surgical site. He has 2 MERLIN drains giving serosanguineous fluid, and he has 2 percutaneous nephrostomy drains that are giving clear urine.     9/30 Visit:  Afebrile  Eating well  Continues to have bowel movements  R and L nephrostomy tubes in place, MERLIN drains in place  Wound care on board to address abdominal dressings   Education of patient on how to keep suction for phlegm clean    Interval changes  10/2/2022   Patient Vitals for the past 8 hrs:   BP Temp Temp src Pulse Resp SpO2   10/02/22 0800 (!) 140/71 98.2 °F (36.8 °C) Oral 82 18 93 %   10/02/22 0449 -- -- -- -- 18 --       Afebrile  VS stable    Patient stable  Residual feeling of weakness  No complaints  No new issues per RN    Abdominal drains with serous fluid  Rt and Lt nephrostomy  tubes with jared urine  Urostomy with clear urine  Wound VAC in place    Summary of relevant labs: 10/2/2022    Labs:  WBC 20.3- 16.5 -->14.3-->18.1  Hb 8.9-->8.0  Plat 346    BUN 15  Creatinine 1.41 - >1.36-->1.44    Micro:  UA 9/18 - large leukocyte esterase and positive nitrates    Urine:  9/16 UC Pseudomonas aeruginosa 2 strains, 1 sensitive to Cipro the other resistant,and Enterococcus faecalis sensitive to Cipro  9/23 Candida albicans>100,000 CFU      Imaging:  CTAP 9/22  Small bowel obstruction, with a transition point near the jejunoileal junction adjacent/medial to the ileostomy site. Large pelvic fluid collection with varying heme densities, some of which is recent, extending from the cystectomy bed, as above. Ureteral stents, extending out the conduit and ileostomy site with similar moderate-severe right hydroureteronephrosis, and new mild-moderate left hydroureteronephrosis. Postsurgical changes with scattered pneumoperitoneum/ascites, extensive subcutaneous air/soft tissue changes. Enteric tube in satisfactory position with its tip in the stomach. Segmental/subsegmental lung base changes posteriorly. Multiple additional findings, either unchanged or incidental, as detailed in the body of the report above. I have personally reviewed the past medical history, past surgical history, medications, social history, and family history, and I haveupdated the database accordingly. Allergies: Other     Review of Systems:     Review of Systems   Constitutional:  Negative for chills, diaphoresis and fatigue. HENT:  Negative for sinus pressure. Eyes:  Negative for visual disturbance. Respiratory:  Negative for chest tightness. Cardiovascular:  Negative for chest pain. Gastrointestinal:  Negative for abdominal distention. Endocrine: Negative for cold intolerance and heat intolerance. Genitourinary:  Negative for difficulty urinating and enuresis. Skin:  Negative for color change. Allergic/Immunologic: Negative for environmental allergies. Neurological:  Negative for dizziness. Psychiatric/Behavioral:  Negative for agitation. Physical Examination :       Physical Exam  Constitutional:       General: He is not in acute distress. Appearance: He is obese. He is not toxic-appearing. HENT:      Head: Normocephalic and atraumatic. Right Ear: There is no impacted cerumen. Left Ear: There is no impacted cerumen. Nose: Nose normal. No congestion. Mouth/Throat:      Mouth: Mucous membranes are moist.      Pharynx: Oropharynx is clear. No oropharyngeal exudate. Eyes:      General: No scleral icterus. Extraocular Movements: Extraocular movements intact. Pupils: Pupils are equal, round, and reactive to light. Cardiovascular:      Rate and Rhythm: Normal rate and regular rhythm. Pulses: Normal pulses. Heart sounds: Normal heart sounds. No gallop. Pulmonary:      Effort: Pulmonary effort is normal. No respiratory distress. Breath sounds: Normal breath sounds. No wheezing. Abdominal:      General: Bowel sounds are normal. There is no distension. Palpations: Abdomen is soft. Tenderness: There is abdominal tenderness. Musculoskeletal:         General: No swelling or tenderness. Normal range of motion. Cervical back: Normal range of motion. No rigidity. Lymphadenopathy:      Cervical: No cervical adenopathy. Skin:     General: Skin is warm. Capillary Refill: Capillary refill takes less than 2 seconds. Coloration: Skin is not jaundiced. Findings: No bruising. Neurological:      General: No focal deficit present. Mental Status: He is alert and oriented to person, place, and time. Cranial Nerves: No cranial nerve deficit. Motor: No weakness.    Psychiatric:         Mood and Affect: Mood normal.         Behavior: Behavior normal.       Past Medical History:     Past Medical History:   Diagnosis Date    [facility] 05/26/2022    Likely ischemic ATN/prerenal acidemia from intractable nausea vomiting as result of chemotherapy, baseline 1.3-1.4 peaked up to 2.3 in May 2022    Arthritis     BPH with obstruction/lower urinary tract symptoms     CAD (coronary artery disease) 06/2022    nonobstructive on cath    Caffeine use     3 cans soda/pop    Cancer (HCC)     bladder cancer. Dr. 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   Baseline 1.3-1.4    COVID-19 08/16/2021    SOB, fatigue, fever, chills  x 3 weeks    Depression     Dilated cardiomyopathy ([facility] 05/26/2022    Ejection fraction 40 to 45%, does have symptoms of dyspnea and decreased effort tolerance    GERD (gastroesophageal reflux disease)     High cholesterol     Kidney calculi     Sleep apnea     does not use cpap    Under care of team     Dr. Hollie Colvin Nephrology    Under care of team     Dr. Cliff Brown. last visit approx 9/2021    Under care of team     Dr. Gisselle Burrows [facility] last visit 8/03/2022    Under care of team     Dr. Jim Thompson    Under care of team     Dr. Moira Nieto       Past Surgical  History:     Past Surgical History:   Procedure Laterality Date    BLADDER REMOVAL      BLADDER REMOVAL N/A 9/16/2022    XI ROBOTIC LAPARASCOPIC ASSISTED RADICAL CYSTOPROSTATECTOMY, BILATERAL PELVIC LYMPHADENECTOMY AND ILEAL CONDUIT FORMATION performed by Mary Ovalle MD at [facility] 9/16/2022    CYSTOSCOPY STENT REMOVAL performed by Mary Ovalle MD at [address] [place] [address]  06/16/2022    nonobstructive CAD    COLONOSCOPY      IR NEPHROSTOMY PERCUTANEOUS LEFT  9/23/2022    IR NEPHROSTOMY PERCUTANEOUS LEFT 9/23/2022 MD MARSHALL Orte[facility]    IR NEPHROSTOMY PERCUTANEOUS RIGHT  9/23/2022    IR NEPHROSTOMY PERCUTANEOUS RIGHT 9/23/2022 MD MARSHALL Ortega [facility]    IR PORT PLACEMENT EQUAL OR GREATER THAN 5 YEARS  02/25/2022    IR PORT PLACEMENT EQUAL OR GREATER THAN 5 YEARS 2/25/2022 [facility]    KNEE ARTHROSCOPY      left    LAPAROTOMY N/A 9/22/2022    LAPAROTOMY EXPLORATORY, LYSIS OF ADHESIONS, REPAIR OF BILATERAL URETERAL ANASTOMOSES, ABDOMINAL WASHOUT, PLACEMENT OF ABTHERA WOUND VAC performed by Tamica Asher DO at [address] [place] [address] N/A 9/23/2022    2ND LOOK LAPAROTOMY,  ABDOMINAL WASHOUT, ABDOMINAL CLOSURE, WOUND VAC PLACEMENT performed by Tamica Asher DO at [place] [address]  09/16/2022    Cystoprostatectomy, Bilateral Pelvic Lymphadenectomy, ileo conduit formation, cystectomy stent removal (right)       Medications:      sodium zirconium cyclosilicate  10 g Oral Daily    fluconazole  200 mg Oral Daily    metoprolol  5 mg IntraVENous Q8H    piperacillin-tazobactam  3,375 mg IntraVENous Q8H    heparin (porcine)  5,000 Units SubCUTAneous 3 times per day    [Held by provider] metoclopramide  5 mg IntraVENous Q6H    chlorhexidine  15 mL Mouth/Throat BID    bisacodyl  10 mg Rectal Daily    pantoprazole (PROTONIX) 40 mg injection  40 mg IntraVENous Q12H    polyethylene glycol  17 g Oral Daily    buPROPion  300 mg Oral QAM    simvastatin  40 mg Oral Nightly    sodium chloride flush  5-40 mL IntraVENous 2 times per day    acetaminophen  650 mg Oral Q6H       Social History:     Social History     Socioeconomic History    Marital status:      Spouse name: Not on file    Number of children: Not on file    Years of education: Not on file    Highest education level: Not on file   Occupational History    Not on file   Tobacco Use    Smoking status: Former     Packs/day: 0.25     Years: 9.00     Pack years: 2.25     Types: Cigarettes     Start date: 1983     Quit date: 2/10/1992     Years since quittin.6    Smokeless tobacco: Never   Vaping Use    Vaping Use: Never used   Substance and Sexual Activity    Alcohol use: Not Currently    Drug use: No    Sexual activity: Yes     Partners: Female   Other Topics Concern    Not on file   Social History Narrative    Not on file     Social Determinants of Health     Financial Resource Strain: Not on file   Food Insecurity: Not on file   Transportation Needs: Not on file   Physical Activity: Not on file   Stress: Not on file   Social Connections: Not on file   Intimate Partner Violence: Not on file   Housing Stability: Not on file       Family History:     Family History   Problem Relation Age of Onset    Cancer Father         bladder cancer    Urolithiasis Father       Medical Decision Making:   I have independently reviewed/ordered the following labs:    CBC with Differential:   Recent Labs     10/01/22  1146 10/01/22  1411 10/01/22  1827 10/02/22  0618   WBC 14.3*  --   --  18.1*   HGB 6.6*   < > 8.9* 8.0*   HCT 21.7*   < > 29.3* 24.9*   PLT See Reflexed IPF Result  --   --  346    < > = values in this interval not displayed. BMP:  Recent Labs     10/01/22  1146 10/01/22  1827 10/02/22  0618    143 140   K 6.1* 3.9 3.7   * 111* 107   CO2 18* 22 26   BUN 16 15 15   CREATININE 1.41* 1.36* 1.44*   MG 1.6  --  1.5*       Hepatic Function Panel:   Recent Labs     22  0913   PROT 5.3*   LABALBU 1.6*   BILIDIR 0.2   IBILI 0.2   BILITOT 0.4   ALKPHOS 68   ALT 5   AST 14       No results for input(s): RPR in the last 72 hours. No results for input(s): HIV in the last 72 hours. No results for input(s): BC in the last 72 hours. Lab Results   Component Value Date/Time    CREATININE 1.44 10/02/2022 06:18 AM    GLUCOSE 106 10/02/2022 06:18 AM       Detailed results: Thank you for allowing us to participate in the care of this patient. Please call with questions.     This note is created with the assistance of a speech recognition program.  While intending to generate adocument that actually reflects the content of the visit, the document can still have some errors including those of syntax and sound a like substitutions which may escape proof reading. It such instances, actual meaningcan be extrapolated by contextual diversion.     Office: [phone]  Perfect serve / office [phone]    Hao Harvey MD

## 2022-10-02 NOTE — PROGRESS NOTES
Renal Progress Note    Patient :  Esha Roche; 74 y.o. MRN# [id number]  Location:  9872/8819-05  Attending:  Leslie Ortez MD  Admit Date:  9/16/2022   Hospital Day: 16    Subjective:     Patient seen at bedside. Diet was changed this morning from n.p.o. to liquids as he was vomiting yesterday. Appetite is minimal he is hesitant to drink due to fear of vomiting. Nephrostomy tube drains 660 mL, 1.8L from conduit  Sodium bicarb 150meq in dextrose 5% infusing  Blood pressures 337L to 822N systolic  Creatinine stable at 1.44, baseline 1.3-1.4  Potassium is better today, 3.7  Zosyn and Diflucan per ID    Brief History  History reviewed known history of chronic kidney disease stage IIIb baseline 1.3-1.4 secondary to chronic interstitial nephritis both from obstructive uropathy related to bladder tumor and chemotherapy with platinum-based analogs. Was brought into the hospital for elective cystoprostatectomy which was done on 9/16/2022 also underwent bilateral pelvic lymphadenectomy and ileal conduit formation. Postoperatively has developed small bowel ileus, which has persisted and has been refractory to conservative treatment requiring repeat OR expiratory laparotomy lysis of adhesions, repair of bilateral ureteral anastomosis, abdominal washout and placement of wound VAC on 9/22/2022. Author [place] Patient also required bilateral PCN tubes placement 9/23/2022. Urine culture 9/16/2022 positive for Pseudomonas aeruginosa and Enterococcus faecalis. ID switched antibiotics to oral Diflucan and Zosyn. Outpatient Medications:     Medications Prior to Admission: ondansetron (ZOFRAN-ODT) 8 MG TBDP disintegrating tablet, DISSOLVE 1 TABLET IN MOUTH EVERY 8 HOURS AS NEEDED FOR NAUSEA FOR VOMITING  HYDROcodone-acetaminophen (NORCO) 5-325 MG per tablet, Take 1 tablet by mouth every 8 hours as needed for Pain for up to 30 days.   [DISCONTINUED] rivaroxaban (XARELTO) 20 MG TABS tablet, Take 1 tablet by mouth once daily with breakfast (Patient taking differently: Take 1 tablet by mouth once daily with breakfast/ per cardiology, pt. to stop 3 days pre-op for OR 22)  omeprazole (PRILOSEC) 20 MG delayed release capsule, Take 1 capsule by mouth daily  carvedilol (COREG) 3.125 MG tablet, TAKE 1 TABLET BY MOUTH TWICE DAILY  finasteride (PROSCAR) 5 MG tablet, Take 5 mg by mouth daily  tamsulosin (FLOMAX) 0.4 MG capsule, Take 0.4 mg by mouth daily  simvastatin (ZOCOR) 40 MG tablet, Take 40 mg by mouth nightly  buPROPion (WELLBUTRIN XL) 300 MG extended release tablet, Take 300 mg by mouth every morning    Current Medications:     Scheduled Meds:    sodium zirconium cyclosilicate  10 g Oral Daily    fluconazole  200 mg Oral Daily    metoprolol  5 mg IntraVENous Q8H    piperacillin-tazobactam  3,375 mg IntraVENous Q8H    heparin (porcine)  5,000 Units SubCUTAneous 3 times per day    [Held by provider] metoclopramide  5 mg IntraVENous Q6H    chlorhexidine  15 mL Mouth/Throat BID    bisacodyl  10 mg Rectal Daily    pantoprazole (PROTONIX) 40 mg injection  40 mg IntraVENous Q12H    polyethylene glycol  17 g Oral Daily    buPROPion  300 mg Oral QAM    simvastatin  40 mg Oral Nightly    sodium chloride flush  5-40 mL IntraVENous 2 times per day    acetaminophen  650 mg Oral Q6H     Continuous Infusions:    sodium chloride      IV infusion builder 100 mL/hr at 10/01/22 1521    sodium chloride 10 mL/hr at 10/01/22 0610     PRN Meds:  sodium chloride, diatrizoate meglumine-sodium, metoprolol, promethazine, HYDROmorphone, ondansetron, sodium chloride flush, sodium chloride, oxyCODONE **OR** oxyCODONE    Input/Output:       I/O last 3 completed shifts:  In: -   Out: 6065 [Urine:2460; Emesis/NG output:1; Drains:775]    Vital Signs:   Temperature:  Temp: 98.2 °F (36.8 °C)  TMax:   Temp (24hrs), Av.3 °F (36.8 °C), Min:98.2 °F (36.8 °C), Max:98.5 °F (36.9 °C)    Respirations:  Resp: 18  Pulse:   Heart Rate: 82  BP:    BP: (!) 140/71  BP Range: Systolic (24hrs), Av , Min:127 , [place]:570       Diastolic (26VBI), MYL:26, Min:59, Max:75    Physical Examination:     General:  Alert awake oriented x3, no acute distress, saturating well on RA  HEENT:  Atraumatic, normocephalic, no throat congestion, moist mucosa. Eyes:   Pupils equal, round and reactive to light, pallor, no icterus. Neck:   No JVD, no thyromegaly, no lymphadenopathy. Chest:  Air entry fair bilaterally, no crackles  Cardiac: S1 S2 RR no murmurs  Abdomen:  Soft, non-tender, no masses or organomegally appreciable, BS sluggish. :   No suprapubic or flank tenderness. Neuro:  Sedated on ventilator. Skin:   No rashes, good skin turgor. Extremities:  No edema.     Labs:       Recent Labs     22  0913 10/01/22  1146 10/01/22  1411 10/01/22  1827 10/02/22  0618   WBC 19.1* 14.3*  --   --  18.1*   RBC 2.77* 2.37*  --   --  2.78*   HGB 7.7* 6.6* 8.0* 8.9* 8.0*   HCT 25.3* 21.7* 25.8* 29.3* 24.9*   MCV 91.3 91.6  --   --  89.6   MCH 27.8 27.8  --   --  28.8   MCHC 30.4 30.4  --   --  32.1   RDW 16.4* 16.7*  --   --  16.8*    See Reflexed IPF Result  --   --  346   MPV 10.3  --   --   --  10.0        BMP:   Recent Labs     10/01/22  1146 10/01/22  1827 10/02/22  0618    143 140   K 6.1* 3.9 3.7   * 111* 107   CO2 18* 22 26   BUN 16 15 15   CREATININE 1.41* 1.36* 1.44*   GLUCOSE 78 92 106*   CALCIUM 7.8* 7.8* 7.7*        Phosphorus:     Recent Labs     22  0913 10/01/22  1146 10/02/22  0618   PHOS 2.6 3.3 2.6       Magnesium:    Recent Labs     22  0913 10/01/22  1146 10/02/22  0618   MG 1.9 1.6 1.5*       LISS:      Lab Results   Component Value Date/Time    LISS NEGATIVE 2022 10:45 AM     SPEP:  Lab Results   Component Value Date/Time    PROT 5.3 2022 09:13 AM     C3:     Lab Results   Component Value Date/Time    C3 119 2022 10:45 AM     C4:     Lab Results   Component Value Date/Time    C4 25 2022 10:45 AM       Urinalysis/Chemistries:      Lab Results   Component Value Date/Time    NITRU POSITIVE 09/18/2022 05:17 PM    COLORU Yellow 09/18/2022 05:17 PM    PHUR 6.0 09/18/2022 05:17 PM    WBCUA 50  09/18/2022 05:17 PM    RBCUA TOO NUMEROUS TO COUNT 09/18/2022 05:17 PM    MUCUS 3+ 04/14/2022 09:43 AM    YEAST FEW 03/31/2022 01:01 PM    BACTERIA MODERATE 09/18/2022 05:17 PM    CLARITYU cloudy 08/04/2022 09:05 AM    SPECGRAV 1.016 09/18/2022 05:17 PM    LEUKOCYTESUR LARGE 09/18/2022 05:17 PM    UROBILINOGEN Normal 09/18/2022 05:17 PM    BILIRUBINUR NEGATIVE 09/18/2022 05:17 PM    BLOODU large 08/04/2022 09:05 AM    GLUCOSEU NEGATIVE 09/18/2022 05:17 PM    KETUA TRACE 09/18/2022 05:17 PM     Urine Creatinine:     Lab Results   Component Value Date/Time    LABCREA 121.3 09/18/2022 05:17 PM       Radiology:       Assessment:     1. Acute Kidney Injury: Secondary to ischemic ATN from depleted circulating volume related to small bowel obstruction ileus, sequestration, peritoneal inflammation from suspected anastomotic leak. Baseline 1.3-1.4 postoperatively had gone up to 2.5, came down to 1.5, however then started worsening peaked at 2.5, found to have anastomotic leak and Bowel and ureter. Required operative intervention, bilateral nephrostomy tube placement, since then urine output 1L. And now creatinine back at baseline. 2.  Small bowel obstruction related to dynamic causes, bowel was stuck behind the ureter which was extricated yesterday. Some suspicion of ischemia. 3.  Chronic kidney disease stage IIIb from chronic intestinal nephritis both from chemotherapy with platinum and bladder tumor causing obstructive uropathy, baseline 1.3-1.4  4. Bladder cancer failed local therapy status for cystoprostatectomy and ileal conduit formation 9/16/2022  5. Cardiomyopathy ejection fraction 40%  6. Mild hypernatremia from insensible free water losses, doing better  7. Hypophosphatemia on phosphorus supplements  8. Hypokalemia. Plan:   1.   Continue fluids, changed to half saline at her 100 ml-hour  2. Keep systolic pressure more than 110  3. Replace electrolytes as needed   4. Encourage oral intake  5. Advance diet as recommended by surgery  6. Continue monitor strict I's and O's renal function. 7.  BMP daily  8. Will follow. .  Electronically signed by LINWOOD Espinoza CNP on 10/2/2022 at 11:26 AM     Patient seen with nurse practitioner. Potassium appears to have corrected acidosis corrected as well with bicarbonate. Still having intermittent nausea and vomiting. On clear liquid diet at present. Hemodynamically stable  Plan  1. Change fluids to half saline at 100 an hour  2. Keep systolic more than 646  3. Follow electrolytes and replace them as needed  4. Follow BMP  5. Daily weights  Attending Physician Statement  I have discussed the care of Riccardo Rachel, including pertinent history and exam findings with the resident/fellow. I have reviewed the key elements of all parts of the encounter with the resident/fellow. I have seen and examined the patient with the resident/fellow. I agree with the assessment and plan and status of the problem list as documented.       .  Electronically signed by Marley Ontiveros MD on 10/2/2022 at 7:46 PM

## 2022-10-02 NOTE — PROGRESS NOTES
[facility] -   Infectious diseases evaluation    Progress Note    admission date 9/16/2022    reason for consultation:   bandemia    Impression :     9/16 bladder cancer involving wall and lymph nodes, post radical cystectomy, prostatectomy, groin lymph node dissection  9/22 SBO, had lysis of adhesions  9/22 bilateral ureteral anastomotic leak, with large abdominal collection, post repair 9/22 9/23 bilat nephrostomy tubes placed  9/23 closure of the fascia, abdominal wound open with VAC  Ongoing bandemia  Pseudomonas/Enterococcus UTI, 9/16-  Cefepime 9/19 until 9/25  Right nephrostomy urine infection with Candida albicans 9/23  Noted at the time of right percutaneous nephrostomy placement 9/23  Ongoing bandemia  Hypernatremia -on fluids      Discussion / summary of stay / plan of care     Recommendations   The cause of the bandemia could be multifactorial, including a persistent urinoma  Enterococcus is not well covered by cefepime course that the patient had and would suggest trying a course of Zosyn for now  I agree with the Diflucan,  Both antibiotics to be given for 7 days until 10/3  Monitor response of the white count otherwise get a CT of the abdomen look for further collection  Case discussed with general surgery and patient    On Zosyn and diflucan  Switch to oral diflucan   Continue medications until 10/3. Reconciled  Monitor bandemia      Infection Control Recommendations   [place] Precautions    Antimicrobial Stewardship Recommendations   Simplification of therapy  Targeted therapy      History of Present Illness:   Initial history:  Sav Velasquez is a 74y.o.-year-old male with history of bladder cancer involving the wall of the bladder as well as the groin lymph nodes.   Came in for radical cystectomy done on 9/16 with ileal conduit placement, removal of the groin lymph nodes, but complicated with a small bowel obstruction and bilateral ureteral anastomotic leak. CT scan of the abdomen  9/22 which had showed at the time a large anterior pelvic fluid collection 15 x 13 x 6 cmWith variable densities extending from the cystectomy bed    Taken back to surgery on 9/22, lysis of adhesion that was thought to be causing the SBO, as well as repair of the anastomotic leak, and placement of 2 bilateral nephrostomy tubes. 9/23 patient went back to surgery for closure of abdominal fascia and subcutaneous tissue that stayed open with a VAC placement over it -    Due to hydronephrosis, bilat PCNT placed by IR 9/23, A repeat urine culture from 9/23 done due to cloudy R urine, by IR,  is showing Candida albicans 9/26. The patient has been started on Diflucan. Along the way on 9/16 the urine grew Pseudomonas and Enterococcus, resistant to Cipro and patient has received a course of cefepime from 9/19 and 2 until 9/25. His white count remains elevated actually after a feeling that it was improving, he started going worse, today it is up to 20. Infectious is consulted for the concern of a ongoing infection. The right nephro drain was giving very little urine and had a urogram  9/26 that showed good positioning and no leak. Since then right nephrostomy line has been giving a better input. Today the patient is alert appropriate he has an NG tube, was trying to have some liquids p.o. and today was able to tolerate 2 popsicles without vomiting. He does have gurgling over the abdomen, and has no abdominal tenderness otherwise. He has only abdominal pain on the surgical site. He has 2 MERLIN drains giving serosanguineous fluid, and he has 2 percutaneous nephrostomy drains that are giving clear urine.     9/30 Visit:  Afebrile  Eating well  Continues to have bowel movements  R and L nephrostomy tubes in place, MERLIN drains in place  Wound care on board to address abdominal dressings   Education of patient on how to keep suction for phlegm clean    Interval changes 10/1/2022   Patient Vitals for the past 8 hrs:   BP Temp Temp src Pulse Resp SpO2   10/01/22 1715 (!) 142/75 98.2 °F (36.8 °C) Oral 83 16 95 %       Afebrile  VS stable    Patient had emesis overnight  Feeling weak  No new issues per RN      Summary of relevant labs: 10/1/2022    Labs:  WBC 11-15-59-18-20.3- 16.5 -->14.3  Hb 8.9  Plat    BUN 15  Creatinine1.39 -1.41 - >1.36    Micro:  UA 9/18 - large leukocyte esterase and positive nitrates  9/16 UC Pseudomonas aeruginosa 2 strains, 1 sensitive to Cipro the other resistant,  and Enterococcus faecalis sensitive to Cipro  9/23 UC with Candida    Urine culture from  R nephrostomy due to cloudiness, 9/23 showing Candida albicans/W     Imaging:  CTAP 9/22  Small bowel obstruction, with a transition point near the jejunoileal junction adjacent/medial to the ileostomy site. Large pelvic fluid collection with varying heme densities, some of which is recent, extending from the cystectomy bed, as above. Ureteral stents, extending out the conduit and ileostomy site with similar moderate-severe right hydroureteronephrosis, and new mild-moderate left hydroureteronephrosis. Postsurgical changes with scattered pneumoperitoneum/ascites, extensive subcutaneous air/soft tissue changes. Enteric tube in satisfactory position with its tip in the stomach. Segmental/subsegmental lung base changes posteriorly. Multiple additional findings, either unchanged or incidental, as detailed in the body of the report above. I have personally reviewed the past medical history, past surgical history, medications, social history, and family history, and I haveupdated the database accordingly. Allergies: Other     Review of Systems:     Review of Systems   Constitutional:  Negative for chills, diaphoresis and fatigue. HENT:  Negative for sinus pressure. Eyes:  Negative for visual disturbance. Respiratory:  Negative for chest tightness.     Cardiovascular:  Negative for chest pain. Gastrointestinal:  Negative for abdominal distention. Endocrine: Negative for cold intolerance and heat intolerance. Genitourinary:  Negative for difficulty urinating and enuresis. Skin:  Negative for color change. Allergic/Immunologic: Negative for environmental allergies. Neurological:  Negative for dizziness. Psychiatric/Behavioral:  Negative for agitation. Physical Examination :       Physical Exam  Constitutional:       General: He is not in acute distress. Appearance: He is obese. He is not toxic-appearing. HENT:      Head: Normocephalic and atraumatic. Right Ear: There is no impacted cerumen. Left Ear: There is no impacted cerumen. Nose: Nose normal. No congestion. Mouth/Throat:      Mouth: Mucous membranes are moist.      Pharynx: Oropharynx is clear. No oropharyngeal exudate. Eyes:      General: No scleral icterus. Extraocular Movements: Extraocular movements intact. Pupils: Pupils are equal, round, and reactive to light. Cardiovascular:      Rate and Rhythm: Normal rate and regular rhythm. Pulses: Normal pulses. Heart sounds: Normal heart sounds. No gallop. Pulmonary:      Effort: Pulmonary effort is normal. No respiratory distress. Breath sounds: Normal breath sounds. No wheezing. Abdominal:      General: Bowel sounds are normal. There is no distension. Palpations: Abdomen is soft. Tenderness: There is abdominal tenderness. Musculoskeletal:         General: No swelling or tenderness. Normal range of motion. Cervical back: Normal range of motion. No rigidity. Lymphadenopathy:      Cervical: No cervical adenopathy. Skin:     General: Skin is warm. Capillary Refill: Capillary refill takes less than 2 seconds. Coloration: Skin is not jaundiced. Findings: No bruising. Neurological:      General: No focal deficit present.       Mental Status: He is alert and oriented to person, place, and time. Cranial Nerves: No cranial nerve deficit. Motor: No weakness. Psychiatric:         Mood and Affect: Mood normal.         Behavior: Behavior normal.       Past Medical History:     Past Medical History:   Diagnosis Date    Acute renal failure with tubular necrosis ([facility] [address] 05/26/2022    Likely ischemic ATN/prerenal acidemia from intractable nausea vomiting as result of chemotherapy, baseline 1.3-1.4 peaked up to 2.3 in May 2022    Arthritis     BPH with obstruction/lower urinary tract symptoms     CAD (coronary artery disease) 06/2022    nonobstructive on cath    Caffeine use     3 cans soda/pop    Cancer ([address]     bladder cancer. Dr. Janine Stubbs [facility]    CKD (chronic kidney disease), stage III ([facility] [address] 05/26/2022    From 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   Baseline 1.3-1.4    COVID-19 08/16/2021    SOB, fatigue, fever, chills  x 3 weeks    Depression     Dilated cardiomyopathy ([address] 05/26/2022    Ejection fraction 40 to 45%, does have symptoms of dyspnea and decreased effort tolerance    GERD (gastroesophageal reflux disease)     High cholesterol     Kidney calculi     Sleep apnea     does not use cpap    Under care of team     Dr. Ezekiel Morgan Nephrology    Under care of team     Dr. Shannon Galindo. last visit approx 9/2021    Under care of team     Dr. Oskar Hernandez [facility] last visit 8/03/2022    Under care of team     Dr. Giovani York    Under care of team     Dr. Argueta Poster       Past Surgical  History:     Past Surgical History:   Procedure Laterality Date    BLADDER REMOVAL      BLADDER REMOVAL N/A 9/16/2022    XI ROBOTIC LAPARASCOPIC ASSISTED RADICAL CYSTOPROSTATECTOMY, BILATERAL PELVIC LYMPHADENECTOMY AND ILEAL CONDUIT FORMATION performed History    Marital status:      Spouse name: Not on file    Number of children: Not on file    Years of education: Not on file    Highest education level: Not on file   Occupational History    Not on file   Tobacco Use    Smoking status: Former     Packs/day: 0.25     Years: 9.00     Pack years: 2.25     Types: Cigarettes     Start date: 1983     Quit date: 2/10/1992     Years since quittin.6    Smokeless tobacco: Never   Vaping Use    Vaping Use: Never used   Substance and Sexual Activity    Alcohol use: Not Currently    Drug use: No    Sexual activity: Yes     Partners: Female   Other Topics Concern    Not on file   Social History Narrative    Not on file     Social Determinants of Health     Financial Resource Strain: Not on file   Food Insecurity: Not on file   Transportation Needs: Not on file   Physical Activity: Not on file   Stress: Not on file   Social Connections: Not on file   Intimate Partner Violence: Not on file   Housing Stability: Not on file       Family History:     Family History   Problem Relation Age of Onset    Cancer Father         bladder cancer    Urolithiasis Father       Medical Decision Making:   I have independently reviewed/ordered the following labs:    CBC with Differential:   Recent Labs     22  0913 10/01/22  1146 10/01/22  1411 10/01/22  1827   WBC 19.1* 14.3*  --   --    HGB 7.7* 6.6* 8.0* 8.9*   HCT 25.3* 21.7* 25.8* 29.3*    See Reflexed IPF Result  --   --        BMP:  Recent Labs     22  0913 10/01/22  1146 10/01/22  1827    140 143   K 3.9 6.1* 3.9   * 112* 111*   CO2 18* 18* 22   BUN 18 16 15   CREATININE 1.39* 1.41* 1.36*   MG 1.9 1.6  --        Hepatic Function Panel:   Recent Labs     22  0540 22  0913   PROT 5.3* 5.3*   LABALBU 1.9* 1.6*   BILIDIR 0.2 0.2   IBILI 0.2 0.2   BILITOT 0.4 0.4   ALKPHOS 73 68   ALT 5 5   AST 15 14       No results for input(s): RPR in the last 72 hours.   No results for input(s): HIV

## 2022-10-02 NOTE — PROGRESS NOTES
PROGRESS NOTE    PATIENT NAME: Bertha Candelaria  MEDICAL RECORD NO. [id number]  DATE: 10/2/2022    PRIMARY CARE PHYSICIAN: Natacha Leonardo MD    HD: # [id number]    ASSESSMENT    Patient Active Problem List   Diagnosis    Kidney stones    Nocturia    Hypertrophy of prostate with urinary obstruction    Malignant neoplasm of urinary bladder (HCC)    Urinary retention    Cancer of lateral wall of urinary bladder (HCC)    Acute respiratory failure with hypoxia (HCC)    Other hyperlipidemia    Microcytic anemia    Mild protein-calorie malnutrition (HCC)    Pulmonary embolism without acute cor pulmonale (HCC)    Hypoxia    Dyspnea    Acute systolic heart failure (HCC)    Acute renal failure with tubular necrosis (HCC)    CKD (chronic kidney disease), stage III (HCC)    Dilated cardiomyopathy ([address]    Bladder cancer (HCC)    PAXTON (acute kidney injury) ([facility]    Bandemia    Complicated UTI (urinary tract infection)    Hypokalemia    Hypophosphatemia    Small bowel obstruction (HCC)    Acute pyelonephritis    Nephrostomy complication (HCC)    Candida albicans infection    Enterococcus infection in shunt [facility])       MEDICAL DECISION MAKING AND PLAN    Called back to eval after two small episode of emesis, KUB reviewed and mild diffuse small bowel distention without apparent SBO, patient clinically states feeling well, refusing NGT at this time.     SBO s/p release  Passing flatus this morning, BM yesterday ok trial advancement back to CLD  Patient emesis appears related to oxycodone and dilaudid administration, avoid narcotics as able and pre-treat with zofran  Midline wound  Wound vac with good seal    Chief Complaint: \"I feel fine\"    SUBJECTIVE    Bertha Candelaria seen and examined, FRANKY, VSS, denies CP or SOB, having flatus, last BM yesterday, denies abdominal pain or bloating sensation, denies current nausea    OBJECTIVE  VITALS: Temp: Temp: 98.2 °F (36.8 °C)Temp  Av.2 °F (36.8 °C)  Min: 97.7 °F (36.5 °C)  Max: 98.5 °F (36.9 °C) BP Systolic (51QXK), BHK:723 , Min:127 , OUT:118   Diastolic (26MOU), YJA:18, Min:59, Max:75   Pulse Pulse  Av.3  Min: 76  Max: 83 Resp Resp  Av.2  Min: 16  Max: 18 Pulse ox SpO2  Av.8 %  Min: 93 %  Max: 98 %    GENERAL: alert, no distress  HEENT: PERRLA, NCAT  : ileal conduit in place, neprhostomy tubes x2  LUNGS: CTAB  HEART: RRR no M  ABDOMEN: soft, non-tender, non-distended,  left abdomen MERLIN drains in place  EXTREMITY: no cyanosis, clubbing or edema      LAB:  CBC:   Recent Labs     22  0913 10/01/22  1146 10/01/22  1411 10/01/22  1827 10/02/22  0618   WBC 19.1* 14.3*  --   --  18.1*   HGB 7.7* 6.6* 8.0* 8.9* 8.0*   HCT 25.3* 21.7* 25.8* 29.3* 24.9*   MCV 91.3 91.6  --   --  89.6    See Reflexed IPF Result  --   --  346       BMP:   Recent Labs     10/01/22  1146 10/01/22  1827 10/02/22  0618    143 140   K 6.1* 3.9 3.7   * 111* 107   CO2 18* 22 26   BUN 16 15 15   CREATININE 1.41* 1.36* 1.44*   GLUCOSE 78 92 106*           RADIOLOGY:  XR ABDOMEN (KUB) (SINGLE AP VIEW)    Result Date: 10/1/2022  Presence of bilateral nephrostomy tubes, stable. There is been interval placement of an apparent right ureteral stent with distal end likely in an ileal loop pouch. Bowel gas pattern favors ileus with evolving partial small bowel obstruction not excluded. No free air is demonstrated. Electronically signed by Swapnil Foster DO on 10/2/2022 at 8:32 AM        Attending Note    Patient known to our service. The nausea occurred with narcotic administration. Doubt recurrance of SBO, but will follow  I have reviewed the above TECSS note(s) and I either performed the key elements of the medical history and physical exam or was present with the resident when the key elements of the medical history and physical exam were performed. I have discussed the findings, established the care plan and recommendations with Resident.     Trang Escalera MD  10/2/2022  1:38 PM

## 2022-10-02 NOTE — PLAN OF CARE
Problem: Discharge Planning  Goal: Discharge to home or other facility with appropriate resources  10/2/2022 1727 by Oelg Parra RN  Outcome: Progressing     Problem: Pain  Goal: Verbalizes/displays adequate comfort level or baseline comfort level  10/2/2022 1727 by Oleg Parra RN  Outcome: Progressing     Problem: Safety - Adult  Goal: Free from fall injury  10/2/2022 1727 by Oleg Parra RN  Outcome: Progressing     Problem: ABCDS Injury Assessment  Goal: Absence of physical injury  10/2/2022 1727 by Oleg Parra RN  Outcome: Progressing     Problem: Skin/Tissue Integrity  Goal: Absence of new skin breakdown  Description: 1. Monitor for areas of redness and/or skin breakdown  2. Assess vascular access sites hourly  3. Every 4-6 hours minimum:  Change oxygen saturation probe site  4. Every 4-6 hours:  If on nasal continuous positive airway pressure, respiratory therapy assess nares and determine need for appliance change or resting period. 10/2/2022 1727 by Oleg Parra RN  Outcome: Progressing     Problem: Confusion  Goal: Confusion, delirium, dementia, or psychosis is improved or at baseline  Description: INTERVENTIONS:  1. Assess for possible contributors to thought disturbance, including medications, impaired vision or hearing, underlying metabolic abnormalities, dehydration, psychiatric diagnoses, and notify attending LIP  2. [place] high risk fall precautions, as indicated  3. Provide frequent short contacts to provide reality reorientation, refocusing and direction  4. Decrease environmental stimuli, including noise as appropriate  5. Monitor and intervene to maintain adequate nutrition, hydration, elimination, sleep and activity  6. If unable to ensure safety without constant attention obtain sitter and review sitter guidelines with assigned personnel  7.  Initiate Psychosocial CNS and Spiritual Care consult, as indicated  10/2/2022 1727 by Oleg Parra RN  Outcome: Progressing  10/2/2022 0626 by Ni Todd RN  Outcome: Progressing

## 2022-10-02 NOTE — PLAN OF CARE
Problem: Discharge Planning  Goal: Discharge to home or other facility with appropriate resources  Outcome: Progressing     Problem: Pain  Goal: Verbalizes/displays adequate comfort level or baseline comfort level  Outcome: Progressing     Problem: Safety - Adult  Goal: Free from fall injury  Outcome: Progressing     Problem: ABCDS Injury Assessment  Goal: Absence of physical injury  Outcome: Progressing     Problem: Nutrition Deficit:  Goal: Optimize nutritional status  Outcome: Progressing     Problem: Skin/Tissue Integrity  Goal: Absence of new skin breakdown  Description: 1. Monitor for areas of redness and/or skin breakdown  2. Assess vascular access sites hourly  3. Every 4-6 hours minimum:  Change oxygen saturation probe site  4. Every 4-6 hours:  If on nasal continuous positive airway pressure, respiratory therapy assess nares and determine need for appliance change or resting period. Outcome: Progressing     Problem: Safety - Medical Restraint  Goal: Remains free of injury from restraints (Restraint for Interference with Medical Device)  Description: INTERVENTIONS:  1. Determine that other, less restrictive measures have been tried or would not be effective before applying the restraint  2. Evaluate the patient's condition at the time of restraint application  3. Inform patient/family regarding the reason for restraint  4. Q2H: Monitor safety, psychosocial status, comfort, nutrition and hydration  Outcome: Progressing     Problem: Confusion  Goal: Confusion, delirium, dementia, or psychosis is improved or at baseline  Description: INTERVENTIONS:  1. Assess for possible contributors to thought disturbance, including medications, impaired vision or hearing, underlying metabolic abnormalities, dehydration, psychiatric diagnoses, and notify attending LIP  2. [place] high risk fall precautions, as indicated  3.  Provide frequent short contacts to provide reality reorientation, refocusing and direction  4. Decrease environmental stimuli, including noise as appropriate  5. Monitor and intervene to maintain adequate nutrition, hydration, elimination, sleep and activity  6. If unable to ensure safety without constant attention obtain sitter and review sitter guidelines with assigned personnel  7.  Initiate Psychosocial CNS and Spiritual Care consult, as indicated  Outcome: Progressing     Problem: Gastrointestinal - Adult  Goal: Minimal or absence of nausea and vomiting  Outcome: Progressing  Goal: Maintains or returns to baseline bowel function  Outcome: Progressing  Goal: Maintains adequate nutritional intake  Outcome: Progressing  Goal: Establish and maintain optimal ostomy function  Outcome: Progressing     Problem: Genitourinary - Adult  Goal: Urinary catheter remains patent  Outcome: Progressing

## 2022-10-03 PROBLEM — E83.42 HYPOMAGNESEMIA: Status: ACTIVE | Noted: 2022-10-03

## 2022-10-03 LAB
ANION GAP SERPL CALCULATED.3IONS-SCNC: 10 MMOL/L (ref 9–17)
BUN BLDV-MCNC: 13 MG/DL (ref 8–23)
CALCIUM SERPL-MCNC: 7.3 MG/DL (ref 8.6–10.4)
CHLORIDE BLD-SCNC: 105 MMOL/L (ref 98–107)
CO2: 26 MMOL/L (ref 20–31)
CREAT SERPL-MCNC: 1.49 MG/DL (ref 0.7–1.2)
CREATININE FLUID: 85.7 MG/DL
GFR AFRICAN AMERICAN: 57 ML/MIN
GFR NON-AFRICAN AMERICAN: 47 ML/MIN
GFR SERPL CREATININE-BSD FRML MDRD: ABNORMAL ML/MIN/{1.73_M2}
GLUCOSE BLD-MCNC: 81 MG/DL (ref 70–99)
HCT VFR BLD CALC: 24.5 % (ref 40.7–50.3)
HEMOGLOBIN: 7.7 G/DL (ref 13–17)
MAGNESIUM: 1.4 MG/DL (ref 1.6–2.6)
MCH RBC QN AUTO: 28.2 PG (ref 25.2–33.5)
MCHC RBC AUTO-ENTMCNC: 31.4 G/DL (ref 28.4–34.8)
MCV RBC AUTO: 89.7 FL (ref 82.6–102.9)
NRBC AUTOMATED: 0 PER 100 WBC
PDW BLD-RTO: 16.7 % (ref 11.8–14.4)
PHOSPHORUS: 2.9 MG/DL (ref 2.5–4.5)
PLATELET # BLD: 312 K/UL (ref 138–453)
PMV BLD AUTO: 10.5 FL (ref 8.1–13.5)
POTASSIUM SERPL-SCNC: 3.8 MMOL/L (ref 3.7–5.3)
RBC # BLD: 2.73 M/UL (ref 4.21–5.77)
SODIUM BLD-SCNC: 141 MMOL/L (ref 135–144)
SPECIMEN TYPE: NORMAL
WBC # BLD: 15.1 K/UL (ref 3.5–11.3)

## 2022-10-03 PROCEDURE — 6360000002 HC RX W HCPCS: Performed by: SPECIALIST

## 2022-10-03 PROCEDURE — 82570 ASSAY OF URINE CREATININE: CPT

## 2022-10-03 PROCEDURE — 83735 ASSAY OF MAGNESIUM: CPT

## 2022-10-03 PROCEDURE — 97530 THERAPEUTIC ACTIVITIES: CPT

## 2022-10-03 PROCEDURE — 97110 THERAPEUTIC EXERCISES: CPT

## 2022-10-03 PROCEDURE — C9113 INJ PANTOPRAZOLE SODIUM, VIA: HCPCS | Performed by: STUDENT IN AN ORGANIZED HEALTH CARE EDUCATION/TRAINING PROGRAM

## 2022-10-03 PROCEDURE — 97535 SELF CARE MNGMENT TRAINING: CPT

## 2022-10-03 PROCEDURE — 2500000003 HC RX 250 WO HCPCS: Performed by: STUDENT IN AN ORGANIZED HEALTH CARE EDUCATION/TRAINING PROGRAM

## 2022-10-03 PROCEDURE — 6360000002 HC RX W HCPCS: Performed by: STUDENT IN AN ORGANIZED HEALTH CARE EDUCATION/TRAINING PROGRAM

## 2022-10-03 PROCEDURE — 1200000000 HC SEMI PRIVATE

## 2022-10-03 PROCEDURE — 6360000002 HC RX W HCPCS

## 2022-10-03 PROCEDURE — 2580000003 HC RX 258: Performed by: STUDENT IN AN ORGANIZED HEALTH CARE EDUCATION/TRAINING PROGRAM

## 2022-10-03 PROCEDURE — 6370000000 HC RX 637 (ALT 250 FOR IP): Performed by: INTERNAL MEDICINE

## 2022-10-03 PROCEDURE — 6360000002 HC RX W HCPCS: Performed by: INTERNAL MEDICINE

## 2022-10-03 PROCEDURE — 36415 COLL VENOUS BLD VENIPUNCTURE: CPT

## 2022-10-03 PROCEDURE — 97116 GAIT TRAINING THERAPY: CPT

## 2022-10-03 PROCEDURE — 85027 COMPLETE CBC AUTOMATED: CPT

## 2022-10-03 PROCEDURE — 97605 NEG PRS WND THER DME<=50SQCM: CPT

## 2022-10-03 PROCEDURE — 6370000000 HC RX 637 (ALT 250 FOR IP): Performed by: STUDENT IN AN ORGANIZED HEALTH CARE EDUCATION/TRAINING PROGRAM

## 2022-10-03 PROCEDURE — 99232 SBSQ HOSP IP/OBS MODERATE 35: CPT | Performed by: INTERNAL MEDICINE

## 2022-10-03 PROCEDURE — 2580000003 HC RX 258: Performed by: INTERNAL MEDICINE

## 2022-10-03 PROCEDURE — 80048 BASIC METABOLIC PNL TOTAL CA: CPT

## 2022-10-03 PROCEDURE — 84100 ASSAY OF PHOSPHORUS: CPT

## 2022-10-03 RX ORDER — MAGNESIUM SULFATE IN WATER 40 MG/ML
2000 INJECTION, SOLUTION INTRAVENOUS ONCE
Status: COMPLETED | OUTPATIENT
Start: 2022-10-03 | End: 2022-10-03

## 2022-10-03 RX ADMIN — METOPROLOL TARTRATE 5 MG: 1 INJECTION, SOLUTION INTRAVENOUS at 16:35

## 2022-10-03 RX ADMIN — METOPROLOL TARTRATE 5 MG: 1 INJECTION, SOLUTION INTRAVENOUS at 10:07

## 2022-10-03 RX ADMIN — SODIUM CHLORIDE: 4.5 INJECTION, SOLUTION INTRAVENOUS at 07:51

## 2022-10-03 RX ADMIN — PIPERACILLIN AND TAZOBACTAM 3375 MG: 3; .375 INJECTION, POWDER, FOR SOLUTION INTRAVENOUS at 15:21

## 2022-10-03 RX ADMIN — SIMVASTATIN 40 MG: 40 TABLET, FILM COATED ORAL at 22:10

## 2022-10-03 RX ADMIN — SODIUM CHLORIDE, PRESERVATIVE FREE 10 ML: 5 INJECTION INTRAVENOUS at 22:12

## 2022-10-03 RX ADMIN — PIPERACILLIN AND TAZOBACTAM 3375 MG: 3; .375 INJECTION, POWDER, FOR SOLUTION INTRAVENOUS at 05:38

## 2022-10-03 RX ADMIN — HEPARIN SODIUM 5000 UNITS: 5000 INJECTION INTRAVENOUS; SUBCUTANEOUS at 22:10

## 2022-10-03 RX ADMIN — SODIUM CHLORIDE, PRESERVATIVE FREE 40 MG: 5 INJECTION INTRAVENOUS at 12:02

## 2022-10-03 RX ADMIN — HYDROMORPHONE HYDROCHLORIDE 1 MG: 1 INJECTION, SOLUTION INTRAMUSCULAR; INTRAVENOUS; SUBCUTANEOUS at 12:32

## 2022-10-03 RX ADMIN — METOPROLOL TARTRATE 5 MG: 1 INJECTION, SOLUTION INTRAVENOUS at 23:38

## 2022-10-03 RX ADMIN — HEPARIN SODIUM 5000 UNITS: 5000 INJECTION INTRAVENOUS; SUBCUTANEOUS at 15:21

## 2022-10-03 RX ADMIN — PIPERACILLIN AND TAZOBACTAM 3375 MG: 3; .375 INJECTION, POWDER, FOR SOLUTION INTRAVENOUS at 23:32

## 2022-10-03 RX ADMIN — SODIUM CHLORIDE, PRESERVATIVE FREE 40 MG: 5 INJECTION INTRAVENOUS at 22:09

## 2022-10-03 RX ADMIN — MAGNESIUM SULFATE HEPTAHYDRATE 2000 MG: 40 INJECTION, SOLUTION INTRAVENOUS at 11:01

## 2022-10-03 RX ADMIN — ACETAMINOPHEN 650 MG: 325 TABLET ORAL at 22:10

## 2022-10-03 RX ADMIN — FLUCONAZOLE 200 MG: 200 TABLET ORAL at 12:17

## 2022-10-03 RX ADMIN — HEPARIN SODIUM 5000 UNITS: 5000 INJECTION INTRAVENOUS; SUBCUTANEOUS at 05:38

## 2022-10-03 RX ADMIN — BUPROPION HYDROCHLORIDE 300 MG: 150 TABLET, EXTENDED RELEASE ORAL at 12:19

## 2022-10-03 ASSESSMENT — ENCOUNTER SYMPTOMS
COLOR CHANGE: 0
ABDOMINAL PAIN: 1
SHORTNESS OF BREATH: 1
DIARRHEA: 1
SINUS PRESSURE: 0

## 2022-10-03 ASSESSMENT — PAIN SCALES - GENERAL
PAINLEVEL_OUTOF10: 8
PAINLEVEL_OUTOF10: 3

## 2022-10-03 NOTE — PROGRESS NOTES
Comprehensive Nutrition Assessment    Type and Reason for Visit:  Reassess    Nutrition Recommendations/Plan:   Advance diet as tolerated  Continue Clear liquid ONS  Monitor weight, labs and intake. Malnutrition Assessment:  Malnutrition Status:  Severe malnutrition (09/17/22 1345)    Context:  Chronic Illness     Findings of the 6 clinical characteristics of malnutrition:  Energy Intake:  75% or less estimated energy requirements for 1 month or longer  Weight Loss:  Greater than 10% over 6 months     Body Fat Loss:  Unable to assess     Muscle Mass Loss:  Unable to assess    Fluid Accumulation:  No significant fluid accumulation     Strength:  Not Performed    Nutrition Assessment:    Chart reviewed. Patient on clear liquid diet, Clear liquid ONS in place. Ca 7.3. meds reviewed, include protonix, glycolax. Nutrition Related Findings:    labs/meds reviewed Wound Type: Surgical Incision, Wound Vac       Current Nutrition Intake & Therapies:    Average Meal Intake: 26-50%, 51-75%  Average Supplements Intake: 26-50%  ADULT DIET; Clear Liquid  ADULT ORAL NUTRITION SUPPLEMENT; Breakfast, Lunch, Dinner; Clear Liquid Oral Supplement    Anthropometric Measures:  Height: 6' 2.02\" (188 cm)  Ideal Body Weight (IBW): 190 lbs (86 kg)       Current Body Weight: 262 lb 2 oz (118.9 kg), 133.7 % IBW. Weight Source: Bed Scale  Current BMI (kg/m2): 33.6  Usual Body Weight: 254 lb 13 oz (115.6 kg) (7/14/22 per wt hx review)  % Weight Change (Calculated): -0.3                    BMI Categories: Obese Class 1 (BMI 30.0-34. 9)    Estimated Daily Nutrient Needs:  Energy Requirements Based On: Kcal/kg  Weight Used for Energy Requirements: Current  Energy (kcal/day): 3919-6755 kcal/d  Weight Used for Protein Requirements: Ideal  Protein (g/day): 100-120 gm pro/d  Method Used for Fluid Requirements: ml/Kg  Fluid (ml/day): 2800ml/d    Nutrition Diagnosis:   Inadequate oral intake related to altered GI function as evidenced by NPO or clear liquid status due to medical condition    Nutrition Interventions:   Food and/or Nutrient Delivery: Continue Current Diet, Continue Oral Nutrition Supplement  Nutrition Education/Counseling: No recommendation at this time  Coordination of Nutrition Care: Continue to monitor while inpatient       Goals:  Previous Goal Met: Progressing toward Goal(s)  Goals: Meet at least 75% of estimated needs, prior to discharge       Nutrition Monitoring and Evaluation:   Behavioral-Environmental Outcomes: None Identified  Food/Nutrient Intake Outcomes: Diet Advancement/Tolerance, Food and Nutrient Intake  Physical Signs/Symptoms Outcomes: Biochemical Data, Nutrition Focused Physical Findings, Skin, Weight, GI Status, Meal Time Behavior    Discharge Planning:     Too soon to determine     Mono Snowball, [address]  Contact: [phone]

## 2022-10-03 NOTE — PROGRESS NOTES
Renal Progress Note    Patient :  Riccardo Rachel; 74 y.o. MRN# [id number]  Location:  8090/3274-22  Attending:  Oseas Cao MD  Admit Date:  9/16/2022   Hospital Day: 17    Subjective:     Patient seen at bedside. No acute complaints  SBPs in the 130s. Afebrile, on room air  750ml UOP , 1345 from drains  Na 141, K+ 3.8, bicarb 26, Cr 1.49, BUN 13, Mg 1.4  On half saline at 100ml / hr and clear liquid diet. tolerating  Zosyn and Diflucan per ID. Leucocytosis 15    Brief History  History reviewed known history of chronic kidney disease stage IIIb baseline 1.3-1.4 secondary to chronic interstitial nephritis both from obstructive uropathy related to bladder tumor and chemotherapy with platinum-based analogs. Was brought into the hospital for elective cystoprostatectomy which was done on 9/16/2022 also underwent bilateral pelvic lymphadenectomy and ileal conduit formation. Postoperatively has developed small bowel ileus, which has persisted and has been refractory to conservative treatment requiring repeat OR expiratory laparotomy lysis of adhesions, repair of bilateral ureteral anastomosis, abdominal washout and placement of wound VAC on 9/22/2022. Dashawn Maharaj Patient also required bilateral PCN tubes placement 9/23/2022. Urine culture 9/16/2022 positive for Pseudomonas aeruginosa and Enterococcus faecalis. ID switched antibiotics to oral Diflucan and Zosyn. Outpatient Medications:     Medications Prior to Admission: ondansetron (ZOFRAN-ODT) 8 MG TBDP disintegrating tablet, DISSOLVE 1 TABLET IN MOUTH EVERY 8 HOURS AS NEEDED FOR NAUSEA FOR VOMITING  HYDROcodone-acetaminophen (NORCO) 5-325 MG per tablet, Take 1 tablet by mouth every 8 hours as needed for Pain for up to 30 days.   [DISCONTINUED] rivaroxaban (XARELTO) 20 MG TABS tablet, Take 1 tablet by mouth once daily with breakfast (Patient taking differently: Take 1 tablet by mouth once daily with breakfast/ per cardiology, pt. to stop 3 days pre-op for OR 22)  omeprazole (PRILOSEC) 20 MG delayed release capsule, Take 1 capsule by mouth daily  carvedilol (COREG) 3.125 MG tablet, TAKE 1 TABLET BY MOUTH TWICE DAILY  finasteride (PROSCAR) 5 MG tablet, Take 5 mg by mouth daily  tamsulosin (FLOMAX) 0.4 MG capsule, Take 0.4 mg by mouth daily  simvastatin (ZOCOR) 40 MG tablet, Take 40 mg by mouth nightly  buPROPion (WELLBUTRIN XL) 300 MG extended release tablet, Take 300 mg by mouth every morning    Current Medications:     Scheduled Meds:    fluconazole  200 mg Oral Daily    metoprolol  5 mg IntraVENous Q8H    piperacillin-tazobactam  3,375 mg IntraVENous Q8H    heparin (porcine)  5,000 Units SubCUTAneous 3 times per day    [Held by provider] metoclopramide  5 mg IntraVENous Q6H    chlorhexidine  15 mL Mouth/Throat BID    bisacodyl  10 mg Rectal Daily    pantoprazole (PROTONIX) 40 mg injection  40 mg IntraVENous Q12H    polyethylene glycol  17 g Oral Daily    buPROPion  300 mg Oral QAM    simvastatin  40 mg Oral Nightly    sodium chloride flush  5-40 mL IntraVENous 2 times per day    acetaminophen  650 mg Oral Q6H     Continuous Infusions:    sodium chloride 100 mL/hr at 10/03/22 0751    sodium chloride      sodium chloride 10 mL/hr at 10/01/22 0610     PRN Meds:  sodium chloride, diatrizoate meglumine-sodium, metoprolol, promethazine, HYDROmorphone, ondansetron, sodium chloride flush, sodium chloride, oxyCODONE **OR** oxyCODONE    Input/Output:       I/O last 3 completed shifts: In: 1240 [P.O.:240;  I.V.:1000]  Out: 7980 [Urine:1110; Emesis/NG output:1; Drains:1745]    Vital Signs:   Temperature:  Temp: 97.5 °F (36.4 °C)  TMax:   Temp (24hrs), Av.9 °F (36.6 °C), Min:97.5 °F (36.4 °C), Max:98.3 °F (36.8 °C)    Respirations:  Resp: 14  Pulse:   Heart Rate: 80  BP:    BP: (!) 171/84  BP Range: Systolic (80IGQ), QYE:028 , Min:132 , HVS:528       Diastolic (67ESZ), EWB:05, Min:70, Max:84    Physical Examination:     General:  Alert awake oriented x3, no acute distress, saturating well on RA  HEENT:  Atraumatic, normocephalic, no throat congestion, moist mucosa. Eyes:   Pupils equal, round and reactive to light, pallor, no icterus. Neck:   No JVD, no thyromegaly, no lymphadenopathy. Chest:   Air entry fair bilaterally, no crackles  Cardiac: S1 S2 RR no murmurs  Abdomen:  Soft, non-tender, no masses or organomegally appreciable, BS sluggish. :   No suprapubic or flank tenderness. Neuro:  Sedated on ventilator. Skin:   No rashes, good skin turgor. Extremities:  No edema. Labs:       Recent Labs     10/01/22  1146 10/01/22  1411 10/01/22  1827 10/02/22  0618 10/03/22  0533   WBC 14.3*  --   --  18.1* 15.1*   RBC 2.37*  --   --  2.78* 2.73*   HGB 6.6*   < > 8.9* 8.0* 7.7*   HCT 21.7*   < > 29.3* 24.9* 24.5*   MCV 91.6  --   --  89.6 89.7   MCH 27.8  --   --  28.8 28.2   MCHC 30.4  --   --  32.1 31.4   RDW 16.7*  --   --  16.8* 16.7*   PLT See Reflexed IPF Result  --   --  346 312   MPV  --   --   --  10.0 10.5    < > = values in this interval not displayed.       BMP:   Recent Labs     10/01/22  1827 10/02/22  0618 10/03/22  0533    140 141   K 3.9 3.7 3.8   * 107 105   CO2 22 26 26   BUN 15 15 13   CREATININE 1.36* 1.44* 1.49*   GLUCOSE 92 106* 81   CALCIUM 7.8* 7.7* 7.3*      Phosphorus:     Recent Labs     10/01/22  1146 10/02/22  0618 10/03/22  0533   PHOS 3.3 2.6 2.9     Magnesium:    Recent Labs     10/01/22  1146 10/02/22  0618 10/03/22  0533   MG 1.6 1.5* 1.4*     LISS:      Lab Results   Component Value Date/Time    LISS NEGATIVE 05/31/2022 10:45 AM     SPEP:  Lab Results   Component Value Date/Time    PROT 5.3 09/30/2022 09:13 AM     C3:     Lab Results   Component Value Date/Time    C3 119 05/31/2022 10:45 AM     C4:     Lab Results   Component Value Date/Time    C4 25 05/31/2022 10:45 AM       Urinalysis/Chemistries:      Lab Results   Component Value Date/Time    NITRU POSITIVE 09/18/2022 05:17 PM    COLORU Yellow 09/18/2022 05:17 PM    PHUR 6.0 09/18/2022 05:17 PM    WBCUA 50  09/18/2022 05:17 PM    RBCUA TOO NUMEROUS TO COUNT 09/18/2022 05:17 PM    MUCUS 3+ 04/14/2022 09:43 AM    YEAST FEW 03/31/2022 01:01 PM    BACTERIA MODERATE 09/18/2022 05:17 PM    CLARITYU cloudy 08/04/2022 09:05 AM    SPECGRAV 1.016 09/18/2022 05:17 PM    LEUKOCYTESUR LARGE 09/18/2022 05:17 PM    UROBILINOGEN Normal 09/18/2022 05:17 PM    BILIRUBINUR NEGATIVE 09/18/2022 05:17 PM    BLOODU large 08/04/2022 09:05 AM    GLUCOSEU NEGATIVE 09/18/2022 05:17 PM    KETUA TRACE 09/18/2022 05:17 PM     Urine Creatinine:     Lab Results   Component Value Date/Time    LABCREA 121.3 09/18/2022 05:17 PM       Radiology:       Assessment:     1. Acute Kidney Injury: Secondary to ischemic ATN from depleted circulating volume related to small bowel obstruction ileus, sequestration, peritoneal inflammation from suspected anastomotic leak. Baseline 1.3-1.4 postoperatively had gone up to 2.5, came down to 1.4, now creatinine back at baseline. 2.  Small bowel obstruction related to dynamic causes, bowel was stuck behind the ureter which was extricated yesterday. Some suspicion of ischemia. 3.  Chronic kidney disease stage IIIb from chronic intestinal nephritis both from chemotherapy with platinum and bladder tumor causing obstructive uropathy, baseline 1.3-1.4  4. Bladder cancer failed local therapy status for cystoprostatectomy and ileal conduit formation 9/16/2022  5. Cardiomyopathy ejection fraction 40%  6. Mild hypernatremia from insensible free water losses, doing better  7. Hypophosphatemia on phosphorus supplements  8. Hypokalemia. 9. Hypomagnesemia    Plan:   1. Continue fluids,  half saline at 100 ml-hour  2. Keep systolic pressure more than 110  3. Replace electrolytes as needed   4. Encourage oral intake  5. Advance diet as recommended by surgery  6. Continue monitor strict I's and O's renal function. 7.  BMP daily  8. Will follow.     Jose L Medel MD  Internal Medicine Resident, [facility]; [place], [place]  10/3/2022, 9:44 AM  Attending Physician Statement  I have discussed the care of Sam Griffith, including pertinent history and exam findings with the resident/fellow. I have reviewed the key elements of all parts of the encounter with the resident/fellow. I have seen and examined the patient with the resident/fellow. I agree with the assessment and plan and status of the problem list as documented. Patient tells me that he is actually feeling better. Had a reasonably good BM today he is on clear liquids he is tolerating fluids pretty well. Creatinine is stable at 1.4 which is pretty close to his baseline. Have bilateral PCNs which were placed about a week or so ago. Patient is still requiring IV antibiotics. Since he is tolerating his fluids quite well, his IV fluids can be decreased down to 60 mL an hour. Given that his renal function is now at baseline, I will sign off, please call back if any questions arise.      Jacquie Costa MD , MD

## 2022-10-03 NOTE — PROGRESS NOTES
Nasir Weiner MD FACS   Urology Progress Note     Subjective:   No acute events overnight  No fevers or chills, vital stable  No nausea or vomiting  Passing flatus and continues to have bowel movements, they have been runny  Walking in the room, not in hallways yet    Right nephrostomy tube 10 cc / 24 hours jared  Left nephrostomy tube 50 cc / 24 hours jared  Urostomy 750 cc / 24 hours clear yellow urine  Upper abdominal drain 680 cc / 24 hours serous  Lower abdominal drain 665 cc / 24 hours serous  Wound VAC 10 cc serosanguineous  Conduit- 1750 cc/24 hrs    Cr 1.44 (1.36)  Hgb 8.0 (8.9)  WBC 18.1 (14.3)      Patient Vitals for the past 24 hrs:   BP Temp Temp src Pulse Resp SpO2   10/03/22 0015 132/71 -- -- 80 -- --   10/02/22 1947 (!) 142/70 97.9 °F (36.6 °C) Oral 76 18 97 %   10/02/22 1613 137/75 98.3 °F (36.8 °C) Oral 75 18 99 %   10/02/22 1130 132/74 97.9 °F (36.6 °C) Oral 82 18 97 %   10/02/22 0800 (!) 140/71 98.2 °F (36.8 °C) Oral 82 18 93 %       Intake/Output Summary (Last 24 hours) at 10/3/2022 0646  Last data filed at 10/3/2022 0540  Gross per 24 hour   Intake 1240 ml   Output 2095 ml   Net -855 ml       Recent Labs     10/01/22  1146 10/01/22  1411 10/01/22  1827 10/02/22  0618 10/03/22  0533   WBC 14.3*  --   --  18.1* 15.1*   HGB 6.6*   < > 8.9* 8.0* 7.7*   HCT 21.7*   < > 29.3* 24.9* 24.5*   MCV 91.6  --   --  89.6 89.7   PLT See Reflexed IPF Result  --   --  346 312    < > = values in this interval not displayed. Recent Labs     10/01/22  1146 10/01/22  1827 10/02/22  0618 10/03/22  0533    143 140 141   K 6.1* 3.9 3.7 3.8   * 111* 107 105   CO2 18* 22 26 26   PHOS 3.3  --  2.6 2.9   BUN 16 15 15 13   CREATININE 1.41* 1.36* 1.44* 1.49*       No results for input(s): COLORU, PHUR, LABCAST, WBCUA, RBCUA, MUCUS, TRICHOMONAS, YEAST, BACTERIA, CLARITYU, SPECGRAV, LEUKOCYTESUR, UROBILINOGEN, BILIRUBINUR, BLOODU in the last 72 hours.     Invalid input(s): NITRATE, GLUCOSEUKETONESUAMORPHOUS      Additional Lab/culture results:    Physical Exam:   General: A/O x 3, no acute distress  HEENT: moist mucous membranes  Neck: Supple   Chest: bilateral symmetrical chest rise   Circulatory: Peripheries warm , well perfused   P/A: soft, clean, dry and intact with skin glue, ostomy pink with red rubber in place, bilateral ureteral catheters not visible, wound vac in place, MERLIN x 2 in place with serous drainage  Extremities: Bilateral LE pitting edema     Interval Imaging Findings: none    Impression:  none    Issa Ugarte is a 51-year-old male   Active  problem list  Hx of bladder cancer  Robotic assisted laparoscopic cystoprostatectomy with ileal conduit creation on 9/16/2022   Ex lap ROLANDO repair of BL ureteral anastamosis and abd washout with abthera due to ureteral anastamotic leaks and SBO and second look on 9/22 and 9/23      Plan:  Inpatient consult to social work-need assistance in determining placement for the patient following discharge  Diet: Clear liquid diet with supplements. General surgery following, dietary on board as well  Pain control and antiemetic as needed  Maintain bilateral nephrostomy tubes and bilateral ureteral stents  Maintain MERLIN drains; check MERLIN creatinine to check status of urine leak  Appreciate ID input  Urine culture growing pseudomonas, enterococcus and and second pseudomonas colony type, pansusceptible only gentamicin resistance s/p 7 days cefepime   Infectious disease consulted, on Zosyn for Enterococcus coverage, Diflucan for urine culture 9/23/2022 growing Candida albicans/dubliensis-both antibiotics for 7 days total coverage until 10/3/2022.   Appreciate infectious disease recommendations  IV fluids  DVT PPX: Subq heparin 5000units TID  Continue to monitor  Discharge planning: inappropriate for discharge today      Lucille Gamble DO, PGY-3  6:46 AM 10/3/2022

## 2022-10-03 NOTE — PROGRESS NOTES
[facility] -   Infectious diseases evaluation  admission date 9/16/2022    reason for consultation:   bandemia    Impression :   Current:  9/16 bladder cancer involving wall and lymph nodes, post radical cystectomy, prostatectomy, groin lymph node dissection  9/22 SBO, had lysis of adhesions  9/22 bilateral ureteral anastomotic leak, with large abdominal collection, post repair 9/22 9/23 bilat nephrostomy tube placed  9/23 closure of the fascia, abdominal wound open with VAC  Ongoing bandemia  Pseudomonas/Enterococcus UTI, 9/16-  Cefepime 9/19 until 9/25  Right nephrostomy urine infection with Candida albicans 9/23  Noted at the time of right percutaneous nephrostomy placement 9/23  Ongoing bandemia  Hypernatremia -on fluids    Other:    Discussion / summary of stay / plan of care     Recommendations   The cause of the bandemia could be multifactorial, including a persistent urinoma  Enterococcus is not well covered by cefepime course that the patient had and would suggest trying a course of Zosyn for now  I agree with the Diflucan,  Both antibiotics to be given for 7 days until 10/3  Monitor response of the white count otherwise get a CT of the abdomen look for further collection  Case discussed with general surgery and patient    Zosyn and diflucan ends 10/3  Patient clinically better  -continues to have leukocytosis - possibly from a urinoma - but Suggest CT AP to eval for any remaining collection      Infection Control Recommendations   [place] Precautions    Antimicrobial Stewardship Recommendations   Simplification of therapy  Targeted therapy      History of Present Illness:   Initial history:  Lexi Cordoba is a 74y.o.-year-old male with history of bladder cancer involving the wall of the bladder as well as the groin lymph nodes.   Came in for radical cystectomy done on 9/16 with ileal conduit placement, removal of the groin lymph nodes, but complicated with a small bowel obstruction and bilateral ureteral anastomotic leak. CT scan of the abdomen  9/22 which had showed at the time a large anterior pelvic fluid collection 15 x 13 x 6 cmWith variable densities extending from the cystectomy bed    Taken back to surgery on 9/22, lysis of adhesion that was thought to be causing the SBO, as well as repair of the anastomotic leak, and placement of 2 bilateral nephrostomy tubes. 9/23 patient went back to surgery for closure of abdominal fascia and subcutaneous tissue that stayed open with a VAC placement over it -    Due to hydronephrosis, bilat PCNT placed by IR 9/23, A repeat urine culture from 9/23 done due to cloudy R urine, by IR,  is showing Candida albicans 9/26. The patient has been started on Diflucan. Along the way on 9/16 the urine grew Pseudomonas and Enterococcus, resistant to Cipro and patient has received a course of cefepime from 9/19 and 2 until 9/25. His white count remains elevated actually after a feeling that it was improving, he started going worse, today it is up to 20. Infectious is consulted for the concern of a ongoing infection. The right nephro drain was giving very little urine and had a urogram  9/26 that showed good positioning and no leak. Since then right nephrostomy line has been giving a better input. Today the patient is alert appropriate he has an NG tube, was trying to have some liquids p.o. and today was able to tolerate 2 popsicles without vomiting. He does have gurgling over the abdomen, and has no abdominal tenderness otherwise. He has only abdominal pain on the surgical site. He has 2 MERLIN drains giving serosanguineous fluid, and he has 2 percutaneous nephrostomy drains that are giving clear urine.       Interval changes  10/3/2022   Patient Vitals for the past 8 hrs:   BP Temp Temp src Pulse Resp SpO2   10/03/22 1302 -- -- -- -- 15 --   10/03/22 1300 (!) 145/70 98 °F (36.7 °C) Oral 73 16 93 %   10/03/22 1232 -- -- -- -- 15 --   10/03/22 0926 (!) 171/84 97.5 °F (36.4 °C) Oral 80 14 96 %       10/3  Afebrile, hypertensive  Patient feel like he has a \"tight band\" across his stomach from the wound vac  Patient still has nephrostomy tubes and b/l utereral drains placed   No issues with urinating  +Diarrhea   Vomitting 2 days ago after receiving pain med  Patient has only eaten liquid diet      Summary of relevant labs:  Labs:  WBC 43-36-63-18-20.3- 16.5 - 19.1 - 14.3 - 18.1 -15.1   Creatinine1.39 -1.41 - 1.39 - 1.36 - 1.44 -1.49   Micro:  UA 9/18 - large leukocyte esterase and positive nitrates  9/16 UC Pseudomonas aeruginosa 2 strains, 1 sensitive to Cipro the other resistant,  and Enterococcus faecalis sensitive to Cipro  9/23 UC with Candida    Urine culture from  R nephrostomy due to cloudiness, 9/23 showing Candida albicans/W   Imaging:  CTAP 9/22  Small bowel obstruction, with a transition point near the jejunoileal junction adjacent/medial to the ileostomy site. Large pelvic fluid collection with varying heme densities, some of which is recent, extending from the cystectomy bed, as above. Ureteral stents, extending out the conduit and ileostomy site with similar moderate-severe right hydroureteronephrosis, and new mild-moderate left hydroureteronephrosis. Postsurgical changes with scattered pneumoperitoneum/ascites, extensive subcutaneous air/soft tissue changes. Enteric tube in satisfactory position with its tip in the stomach. Segmental/subsegmental lung base changes posteriorly. Multiple additional findings, either unchanged or incidental, as detailed in the body of the report above. I have personally reviewed the past medical history, past surgical history, medications, social history, and family history, and I haveupdated the database accordingly. Allergies: Other     Review of Systems:     Review of Systems   Constitutional:  Negative for chills, diaphoresis and fatigue.         Only eating liquids   HENT:  Negative for sinus pressure. Eyes:  Negative for visual disturbance. Respiratory:  Positive for shortness of breath. Cardiovascular:  Negative for chest pain. Gastrointestinal:  Positive for abdominal pain and diarrhea. Endocrine: Negative for cold intolerance and heat intolerance. Genitourinary:  Negative for difficulty urinating and dysuria. Musculoskeletal:  Negative for joint swelling. Skin:  Negative for color change. Allergic/Immunologic: Negative for environmental allergies. Neurological:  Negative for numbness and headaches. Hematological:  Negative for adenopathy. Psychiatric/Behavioral:  Negative for agitation. Physical Examination :       Physical Exam  Constitutional:       General: He is not in acute distress. Appearance: He is obese. HENT:      Head: Normocephalic and atraumatic. Right Ear: There is no impacted cerumen. Left Ear: There is no impacted cerumen. Nose: Nose normal. No congestion. Mouth/Throat:      Mouth: Mucous membranes are moist.      Pharynx: Oropharynx is clear. No oropharyngeal exudate. Eyes:      General: No scleral icterus. Extraocular Movements: Extraocular movements intact. Pupils: Pupils are equal, round, and reactive to light. Cardiovascular:      Rate and Rhythm: Normal rate and regular rhythm. Pulses: Normal pulses. Heart sounds: Normal heart sounds. No murmur heard. No gallop. Pulmonary:      Effort: Pulmonary effort is normal. No respiratory distress. Breath sounds: No wheezing. Abdominal:      Tenderness: There is abdominal tenderness. Musculoskeletal:         General: No swelling or tenderness. Cervical back: Normal range of motion. No rigidity. Lymphadenopathy:      Cervical: No cervical adenopathy. Skin:     General: Skin is warm. Capillary Refill: Capillary refill takes less than 2 seconds. Coloration: Skin is not jaundiced or pale. Neurological:      General: No focal deficit present. Mental Status: He is alert and oriented to person, place, and time. Mental status is at baseline. Cranial Nerves: No cranial nerve deficit. Motor: No weakness. Psychiatric:         Mood and Affect: Mood normal.         Behavior: Behavior normal.       Past Medical History:     Past Medical History:   Diagnosis Date    Acute renal failure with tubular necrosis ([facility] [address] 05/26/2022    Likely ischemic ATN/prerenal acidemia from intractable nausea vomiting as result of chemotherapy, baseline 1.3-1.4 peaked up to 2.3 in May 2022    Arthritis     BPH with obstruction/lower urinary tract symptoms     CAD (coronary artery disease) 06/2022    nonobstructive on cath    Caffeine use     3 cans soda/pop    Cancer ([facility] [address]     bladder cancer. Dr. 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   Baseline 1.3-1.4    COVID-19 08/16/2021    SOB, fatigue, fever, chills  x 3 weeks    Depression     Dilated cardiomyopathy ([facility] [address] 05/26/2022    Ejection fraction 40 to 45%, does have symptoms of dyspnea and decreased effort tolerance    GERD (gastroesophageal reflux disease)     High cholesterol     Kidney calculi     Sleep apnea     does not use cpap    Under care of team     Dr. Ezekiel Morgan Nephrology    Under care of team     Dr. Shannon Galindo. last visit approx 9/2021    Under care of team     Dr. Oskar Hernandez [facility] last visit 8/03/2022    Under care of team     Dr. Giovani York    Under care of team     Dr. Kendall Figueroa       Past Surgical  History:     Past Surgical History:   Procedure Laterality Date    BLADDER REMOVAL      BLADDER REMOVAL N/A 9/16/2022    XI ROBOTIC LAPARASCOPIC ASSISTED RADICAL CYSTOPROSTATECTOMY, BILATERAL PELVIC LYMPHADENECTOMY AND ILEAL CONDUIT FORMATION performed by Jj Lowry MD at [address] Right 9/16/2022    CYSTOSCOPY STENT REMOVAL performed by Jj Lowry MD at [address]  06/16/2022    nonobstructive CAD    COLONOSCOPY      IR NEPHROSTOMY PERCUTANEOUS LEFT  9/23/2022    IR NEPHROSTOMY PERCUTANEOUS LEFT 9/23/2022 Keven Allen MD [facility] PROC[facility]    IR NEPHROSTOMY PERCUTANEOUS RIGHT  9/23/2022    IR NEPHROSTOMY PERCUTANEOUS RIGHT 9/23/2022 Keven Allen MD [facility] PROCEDURES    IR PORT PLACEMENT EQUAL OR GREATER THAN 5 YEARS  02/25/2022    IR PORT PLACEMENT EQUAL OR GREATER THAN 5 YEARS 2/25/2022 [facility]    KNEE ARTHROSCOPY      left    LAPAROTOMY N/A 9/22/2022    LAPAROTOMY EXPLORATORY, LYSIS OF ADHESIONS, REPAIR OF BILATERAL URETERAL ANASTOMOSES, ABDOMINAL WASHOUT, PLACEMENT OF ABTHERA WOUND VAC performed by Lisbet Chavira DO at [address] N/A 9/23/2022    2ND LOOK LAPAROTOMY,  ABDOMINAL WASHOUT, ABDOMINAL CLOSURE, WOUND VAC PLACEMENT performed by [facility] DO [facility]  09/16/2022    Cystoprostatectomy, Bilateral Pelvic Lymphadenectomy, ileo conduit formation, cystectomy stent removal (right)       Medications:      fluconazole  200 mg Oral Daily    metoprolol  5 mg IntraVENous Q8H    piperacillin-tazobactam  3,375 mg IntraVENous Q8H    heparin (porcine)  5,000 Units SubCUTAneous 3 times per day    [Held by provider] metoclopramide  5 mg IntraVENous Q6H    chlorhexidine  15 mL Mouth/Throat BID    bisacodyl  10 mg Rectal Daily    pantoprazole (PROTONIX) 40 mg injection  40 mg IntraVENous Q12H    polyethylene glycol  17 g Oral Daily    buPROPion  300 mg Oral QAM    simvastatin  40 mg Oral Nightly    sodium chloride flush  5-40 mL IntraVENous 2 times per day acetaminophen  650 mg Oral Q6H       Social History:     Social History     Socioeconomic History    Marital status:      Spouse name: Not on file    Number of children: Not on file    Years of education: Not on file    Highest education level: Not on file   Occupational History    Not on file   Tobacco Use    Smoking status: Former     Packs/day: 0.25     Years: 9.00     Pack years: 2.25     Types: Cigarettes     Start date: 1983     Quit date: 2/10/1992     Years since quittin.6    Smokeless tobacco: Never   Vaping Use    Vaping Use: Never used   Substance and Sexual Activity    Alcohol use: Not Currently    Drug use: No    Sexual activity: Yes     Partners: Female   Other Topics Concern    Not on file   Social History Narrative    Not on file     Social Determinants of Health     Financial Resource Strain: Not on file   Food Insecurity: Not on file   Transportation Needs: Not on file   Physical Activity: Not on file   Stress: Not on file   Social Connections: Not on file   Intimate Partner Violence: Not on file   Housing Stability: Not on file       Family History:     Family History   Problem Relation Age of Onset    Cancer Father         bladder cancer    Urolithiasis Father       Medical Decision Making:   I have independently reviewed/ordered the following labs:    CBC with Differential:   Recent Labs     10/02/22  0618 10/03/22  0533   WBC 18.1* 15.1*   HGB 8.0* 7.7*   HCT 24.9* 24.5*    312       BMP:  Recent Labs     10/02/22  0618 10/03/22  0533    141   K 3.7 3.8    105   CO2 26 26   BUN 15 13   CREATININE 1.44* 1.49*   MG 1.5* 1.4*       Hepatic Function Panel:   No results for input(s): PROT, LABALBU, BILIDIR, IBILI, BILITOT, ALKPHOS, ALT, AST in the last 72 hours. No results for input(s): RPR in the last 72 hours. No results for input(s): HIV in the last 72 hours. No results for input(s): BC in the last 72 hours.   Lab Results   Component Value Date/Time CREATININE 1.49 10/03/2022 05:33 AM    GLUCOSE 81 10/03/2022 05:33 AM       Detailed results: Thank you for allowing us to participate in the care of this patient. Please call with questions. This note is created with the assistance of a speech recognition program.  While intending to generate adocument that actually reflects the content of the visit, the document can still have some errors including those of syntax and sound a like substitutions which may escape proof reading. It such instances, actual meaningcan be extrapolated by contextual diversion. Office: [phone]  Perfect serve / office [phone]      Jonny Fry, OMS3      I have discussed the care of the patient, including pertinent history and exam findings,  with the resident. I have seen and examined the patient and the key elements of all parts of the encounter have been performed by me. I agree with the assessment, plan and orders as documented by the resident.     Sindi Raymond, Infectious Diseases

## 2022-10-03 NOTE — PLAN OF CARE
Problem: Discharge Planning  Goal: Discharge to home or other facility with appropriate resources  10/3/2022 1558 by Marian Frey RN  Outcome: Progressing  10/3/2022 0527 by Esvin Rivera RN  Outcome: Progressing     Problem: Pain  Goal: Verbalizes/displays adequate comfort level or baseline comfort level  10/3/2022 1558 by Marian Frey RN  Outcome: Progressing  10/3/2022 0527 by Esvin Rivera RN  Outcome: Progressing     Problem: Safety - Adult  Goal: Free from fall injury  10/3/2022 1558 by Marian Frey RN  Outcome: Progressing  10/3/2022 0527 by Esvin Rivera RN  Outcome: Progressing     Problem: ABCDS Injury Assessment  Goal: Absence of physical injury  10/3/2022 1558 by Marian Frey RN  Outcome: Progressing  10/3/2022 0527 by Esvin Rivera RN  Outcome: Progressing     Problem: Nutrition Deficit:  Goal: Optimize nutritional status  10/3/2022 1558 by Marian Frey RN  Outcome: Progressing  10/3/2022 0527 by Esvin Rivera RN  Outcome: Progressing     Problem: Skin/Tissue Integrity  Goal: Absence of new skin breakdown  Description: 1. Monitor for areas of redness and/or skin breakdown  2. Assess vascular access sites hourly  3. Every 4-6 hours minimum:  Change oxygen saturation probe site  4. Every 4-6 hours:  If on nasal continuous positive airway pressure, respiratory therapy assess nares and determine need for appliance change or resting period. 10/3/2022 1558 by Marian Frey RN  Outcome: Progressing  10/3/2022 0527 by Esvin Rivera RN  Outcome: Progressing     Problem: Safety - Medical Restraint  Goal: Remains free of injury from restraints (Restraint for Interference with Medical Device)  Description: INTERVENTIONS:  1. Determine that other, less restrictive measures have been tried or would not be effective before applying the restraint  2. Evaluate the patient's condition at the time of restraint application  3. Inform patient/family regarding the reason for restraint  4.  Q2H: Monitor safety, psychosocial status, comfort, nutrition and hydration  10/3/2022 1558 by Derek Arndt RN  Outcome: Progressing  10/3/2022 0527 by Odalys Allan RN  Outcome: Progressing     Problem: Confusion  Goal: Confusion, delirium, dementia, or psychosis is improved or at baseline  Description: INTERVENTIONS:  1. Assess for possible contributors to thought disturbance, including medications, impaired vision or hearing, underlying metabolic abnormalities, dehydration, psychiatric diagnoses, and notify attending LIP  2. [place] high risk fall precautions, as indicated  3. Provide frequent short contacts to provide reality reorientation, refocusing and direction  4. Decrease environmental stimuli, including noise as appropriate  5. Monitor and intervene to maintain adequate nutrition, hydration, elimination, sleep and activity  6. If unable to ensure safety without constant attention obtain sitter and review sitter guidelines with assigned personnel  7.  Initiate Psychosocial CNS and Spiritual Care consult, as indicated  10/3/2022 1558 by Derek Arndt RN  Outcome: Progressing  10/3/2022 0527 by Odalys Allan RN  Outcome: Progressing     Problem: Gastrointestinal - Adult  Goal: Minimal or absence of nausea and vomiting  10/3/2022 1558 by Derek Arndt RN  Outcome: Progressing  Flowsheets (Taken 10/3/2022 0800)  Minimal or absence of nausea and vomiting:   Administer IV fluids as ordered to ensure adequate hydration   Maintain NPO status until nausea and vomiting are resolved  10/3/2022 0527 by Odalys Allan RN  Outcome: Progressing  Goal: Maintains or returns to baseline bowel function  10/3/2022 1558 by Derek Arndt RN  Outcome: Progressing  10/3/2022 0527 by Odalys Allan RN  Outcome: Progressing  Goal: Maintains adequate nutritional intake  10/3/2022 1558 by Derek Arndt RN  Outcome: Progressing  10/3/2022 0527 by Odalys Allan RN  Outcome: Progressing  Goal: Establish and maintain optimal ostomy function  10/3/2022 1558 by Mona White RN  Outcome: Progressing  10/3/2022 0527 by Delmar Goldberg RN  Outcome: Progressing     Problem: Genitourinary - Adult  Goal: Urinary catheter remains patent  10/3/2022 1558 by Mona White RN  Outcome: Progressing  10/3/2022 0527 by Delmar Goldberg RN  Outcome: Progressing     Problem: Discharge Planning  Goal: Discharge to home or other facility with appropriate resources  10/3/2022 1558 by Mona White RN  Outcome: Progressing  10/3/2022 0527 by Delmar Goldberg RN  Outcome: Progressing     Problem: Pain  Goal: Verbalizes/displays adequate comfort level or baseline comfort level  10/3/2022 1558 by Mona White RN  Outcome: Progressing  10/3/2022 0527 by Delmar Goldberg RN  Outcome: Progressing     Problem: Safety - Adult  Goal: Free from fall injury  10/3/2022 1558 by Mona White RN  Outcome: Progressing  10/3/2022 0527 by Delmar Goldberg RN  Outcome: Progressing     Problem: ABCDS Injury Assessment  Goal: Absence of physical injury  10/3/2022 1558 by Mona White RN  Outcome: Progressing  10/3/2022 0527 by Delmar Goldberg RN  Outcome: Progressing     Problem: Nutrition Deficit:  Goal: Optimize nutritional status  10/3/2022 1558 by Mona White RN  Outcome: Progressing  10/3/2022 0527 by Delmar Goldberg RN  Outcome: Progressing     Problem: Skin/Tissue Integrity  Goal: Absence of new skin breakdown  Description: 1. Monitor for areas of redness and/or skin breakdown  2. Assess vascular access sites hourly  3. Every 4-6 hours minimum:  Change oxygen saturation probe site  4. Every 4-6 hours:  If on nasal continuous positive airway pressure, respiratory therapy assess nares and determine need for appliance change or resting period.   10/3/2022 1558 by Mona White RN  Outcome: Progressing  10/3/2022 0527 by Delmar Goldberg RN  Outcome: Progressing     Problem: Safety - Medical Restraint  Goal: Remains free of injury from restraints (Restraint for Interference with Medical Device)  Description: INTERVENTIONS:  1. Determine that other, less restrictive measures have been tried or would not be effective before applying the restraint  2. Evaluate the patient's condition at the time of restraint application  3. Inform patient/family regarding the reason for restraint  4. Q2H: Monitor safety, psychosocial status, comfort, nutrition and hydration  10/3/2022 1558 by Gemini Mccloud RN  Outcome: Progressing  10/3/2022 0527 by Ana Cervatnes RN  Outcome: Progressing     Problem: Confusion  Goal: Confusion, delirium, dementia, or psychosis is improved or at baseline  Description: INTERVENTIONS:  1. Assess for possible contributors to thought disturbance, including medications, impaired vision or hearing, underlying metabolic abnormalities, dehydration, psychiatric diagnoses, and notify attending LIP  2. [place] high risk fall precautions, as indicated  3. Provide frequent short contacts to provide reality reorientation, refocusing and direction  4. Decrease environmental stimuli, including noise as appropriate  5. Monitor and intervene to maintain adequate nutrition, hydration, elimination, sleep and activity  6. If unable to ensure safety without constant attention obtain sitter and review sitter guidelines with assigned personnel  7.  Initiate Psychosocial CNS and Spiritual Care consult, as indicated  10/3/2022 1558 by Gemini Mccluod RN  Outcome: Progressing  10/3/2022 0527 by Ana Cervantes RN  Outcome: Progressing     Problem: Gastrointestinal - Adult  Goal: Minimal or absence of nausea and vomiting  10/3/2022 1558 by Gemini Mccloud RN  Outcome: Progressing  Flowsheets (Taken 10/3/2022 0800)  Minimal or absence of nausea and vomiting:   Administer IV fluids as ordered to ensure adequate hydration   Maintain NPO status until nausea and vomiting are resolved  10/3/2022 0527 by Ana Cervantes RN  Outcome: Progressing  Goal: Maintains or returns to baseline bowel function  10/3/2022 1558 by Kierra Ureañ Cheyenne Lacey RN  Outcome: Progressing  10/3/2022 0527 by Fela Haney RN  Outcome: Progressing  Goal: Maintains adequate nutritional intake  10/3/2022 1558 by Ankush Up RN  Outcome: Progressing  10/3/2022 0527 by Fela Haney RN  Outcome: Progressing  Goal: Establish and maintain optimal ostomy function  10/3/2022 1558 by Ankush Up, RN  Outcome: Progressing  10/3/2022 0527 by Fela Haney RN  Outcome: Progressing     Problem: Genitourinary - Adult  Goal: Urinary catheter remains patent  10/3/2022 1558 by Ankush Up, RN  Outcome: Progressing  10/3/2022 0527 by Fela Haney RN  Outcome: Progressing     Problem: Discharge Planning  Goal: Discharge to home or other facility with appropriate resources  10/3/2022 1558 by Ankush Up RN  Outcome: Progressing  10/3/2022 0527 by Fela Haney RN  Outcome: Progressing     Problem: Pain  Goal: Verbalizes/displays adequate comfort level or baseline comfort level  10/3/2022 1558 by Ankush Up, RN  Outcome: Progressing  10/3/2022 0527 by Fela Haney RN  Outcome: Progressing     Problem: Safety - Adult  Goal: Free from fall injury  10/3/2022 1558 by Ankush Up, RN  Outcome: Progressing  10/3/2022 0527 by Fela Haney RN  Outcome: Progressing     Problem: ABCDS Injury Assessment  Goal: Absence of physical injury  10/3/2022 1558 by Ankush Up RN  Outcome: Progressing  10/3/2022 0527 by Fela Haney RN  Outcome: Progressing     Problem: Nutrition Deficit:  Goal: Optimize nutritional status  10/3/2022 1558 by Ankush Up RN  Outcome: Progressing  10/3/2022 0527 by Fela Haney RN  Outcome: Progressing     Problem: Skin/Tissue Integrity  Goal: Absence of new skin breakdown  Description: 1. Monitor for areas of redness and/or skin breakdown  2. Assess vascular access sites hourly  3. Every 4-6 hours minimum:  Change oxygen saturation probe site  4.   Every 4-6 hours:  If on nasal continuous positive airway pressure, respiratory therapy assess nares and 0800)  Minimal or absence of nausea and vomiting:   Administer IV fluids as ordered to ensure adequate hydration   Maintain NPO status until nausea and vomiting are resolved  10/3/2022 0527 by Alhaji Dukes RN  Outcome: Progressing  Goal: Maintains or returns to baseline bowel function  10/3/2022 1558 by Jamari Gamboa RN  Outcome: Progressing  10/3/2022 0527 by Alhaji Dukes RN  Outcome: Progressing  Goal: Maintains adequate nutritional intake  10/3/2022 1558 by Jamari Gamboa RN  Outcome: Progressing  10/3/2022 0527 by Alhaji Dukes RN  Outcome: Progressing  Goal: Establish and maintain optimal ostomy function  10/3/2022 1558 by Jamari Gamboa RN  Outcome: Progressing  10/3/2022 0527 by Alhaji Dukes RN  Outcome: Progressing     Problem: Genitourinary - Adult  Goal: Urinary catheter remains patent  10/3/2022 1558 by Jamari Gamboa RN  Outcome: Progressing  10/3/2022 0527 by Alhaji Dukes RN  Outcome: Progressing

## 2022-10-03 NOTE — PROGRESS NOTES
Went to flush nephrostomy tubes unable to do so no way to access to flush IR called  wanted writer to ut 3 ways on went to IR obtained stop-cocks  attempted to disconnect at [facility] lock  unable to get connection loose .  IR called informed them said they would talk with team  about bringing him down to attach

## 2022-10-03 NOTE — PLAN OF CARE
Problem: Discharge Planning  Goal: Discharge to home or other facility with appropriate resources  10/3/2022 0527 by Lucas Encarnacion RN  Outcome: Progressing     Problem: Pain  Goal: Verbalizes/displays adequate comfort level or baseline comfort level  10/3/2022 0527 by Lucas Encarnacion RN  Outcome: Progressing     Problem: Safety - Adult  Goal: Free from fall injury  10/3/2022 0527 by Lucas Encarnacion RN  Outcome: Progressing     Problem: ABCDS Injury Assessment  Goal: Absence of physical injury  10/3/2022 0527 by Lucas Encarnacion RN  Outcome: Progressing     Problem: Nutrition Deficit:  Goal: Optimize nutritional status  10/3/2022 0527 by Lucas Encarnacion RN  Outcome: Progressing     Problem: Safety - Medical Restraint  Goal: Remains free of injury from restraints (Restraint for Interference with Medical Device)  Description: INTERVENTIONS:  1. Determine that other, less restrictive measures have been tried or would not be effective before applying the restraint  2. Evaluate the patient's condition at the time of restraint application  3. Inform patient/family regarding the reason for restraint  4. Q2H: Monitor safety, psychosocial status, comfort, nutrition and hydration  10/3/2022 0527 by Lucas Encarnacion RN  Outcome: Progressing     Problem: Confusion  Goal: Confusion, delirium, dementia, or psychosis is improved or at baseline  Description: INTERVENTIONS:  1. Assess for possible contributors to thought disturbance, including medications, impaired vision or hearing, underlying metabolic abnormalities, dehydration, psychiatric diagnoses, and notify attending LIP  2. [place] high risk fall precautions, as indicated  3. Provide frequent short contacts to provide reality reorientation, refocusing and direction  4. Decrease environmental stimuli, including noise as appropriate  5. Monitor and intervene to maintain adequate nutrition, hydration, elimination, sleep and activity  6.  If unable to ensure safety without constant attention obtain sitter and review sitter guidelines with assigned personnel  7.  Initiate Psychosocial CNS and Spiritual Care consult, as indicated  10/3/2022 0527 by Blake Temple RN  Outcome: Progressing     Problem: Gastrointestinal - Adult  Goal: Minimal or absence of nausea and vomiting  10/3/2022 0527 by Blake Temple RN  Outcome: Progressing     Problem: Gastrointestinal - Adult  Goal: Maintains adequate nutritional intake  10/3/2022 0527 by Blake Temple RN  Outcome: Progressing     Problem: Gastrointestinal - Adult  Goal: Establish and maintain optimal ostomy function  10/3/2022 0527 by Blake Temple RN  Outcome: Progressing     Problem: Genitourinary - Adult  Goal: Urinary catheter remains patent  10/3/2022 0527 by Blake Temple RN  Outcome: Progressing

## 2022-10-03 NOTE — PROGRESS NOTES
Physical Therapy  Facility/Department: [facility]  Physical Therapy Daily treatment note    Name: Louann Dillon  : 1955  MRN: [id number]  Date of Service: 10/3/2022    Discharge Recommendations:  Patient would benefit from continued therapy after discharge, Continue to assess pending progress   PT Equipment Recommendations  Equipment Needed: No  Other: Pt report owning walker, cane, shower seat and with grab bars, denies DME needs, therapist recomends  use of RW at this time      Patient Diagnosis(es): The primary encounter diagnosis was Malignant neoplasm of overlapping sites of bladder ([address] Diagnoses of Malignant neoplasm of lateral wall of urinary bladder ([address] Bandemia, and Complicated UTI (urinary tract infection) were also pertinent to this visit. Past Medical History:  has a past medical history of Acute renal failure with tubular necrosis ([address] Arthritis, BPH with obstruction/lower urinary tract symptoms, CAD (coronary artery disease), Caffeine use, Cancer ([address] CKD (chronic kidney disease), stage III ([address] COVID-19, Depression, Dilated cardiomyopathy ([address] GERD (gastroesophageal reflux disease), High cholesterol, Kidney calculi, Sleep apnea, Under care of team, Under care of team, Under care of team, Under care of team, and Under care of team.  Past Surgical History:  has a past surgical history that includes Knee arthroscopy; Cardiac catheterization; Colonoscopy; IR PORT PLACEMENT > 5 YEARS (2022); Cardiac catheterization (2022); Bladder removal; Prostatectomy (2022); Bladder removal (N/A, 2022); Bladder surgery (Right, 2022); laparotomy (N/A, 2022); IR GUIDED NEPHROSTOMY CATH PLACEMENT LEFT (2022); IR GUIDED NEPHROSTOMY CATH PLACEMENT RIGHT (2022); and laparotomy (N/A, 2022). Assessment   Body Structures, Functions, Activity Limitations Requiring Skilled Therapeutic Intervention: Decreased functional mobility ; Decreased body mechanics; Decreased tolerance to work activity; Decreased strength;Decreased safe awareness;Decreased endurance; Increased pain  Assessment: Pt completed bed mobility and transfers with MIN up to MOD A , refused RW despite therapist recomendation insisted on [address] Pt ambulated ~10 ft to bathroom with CGA/Hanane x 2 d/t multiple line mgmt, and pt unsafe/impulsive behavior. Therapist SBA t/o toilting due to impulsiveness, unsafe behavior. Pt very particular t/o requiring constant encouragement and increased time for all functional mobility. Pt ambulated 10 ft to chair, than 5 ft return to bed for increased hygiene secondary to loose bowels. Pt would benefit from continued PT. Specific Instructions for Next Treatment: progress gait with AD, dynamic seated and standing balance training  Therapy Prognosis: Good  Decision Making: Medium Complexity  Clinical Presentation: evolving  Requires PT Follow-Up: Yes  Activity Tolerance  Activity Tolerance: Patient limited by pain; Patient limited by endurance  Activity Tolerance Comments: increased time t/o d/t pt being very particular and in pain     Plan   Physcial Therapy Plan  General Plan:  (5-6 x week)  Specific Instructions for Next Treatment: progress gait with AD, dynamic seated and standing balance training  Current Treatment Recommendations: Therapeutic activities, Strengthening, Balance training, Functional mobility training, Transfer training, Endurance training, Gait training, Stair training  Safety Devices  Type of Devices: Call light within reach, Gait belt, Nurse notified, Bed alarm in place, Patient at risk for falls, Left in bed  Restraints  Restraints Initially in Place: No     Restrictions  Restrictions/Precautions  Restrictions/Precautions: Surgical Protocols, Fall Risk, Up as Tolerated  Required Braces or Orthoses?: No  Position Activity Restriction  Other position/activity restrictions: Ambulate pt     Subjective   General  Chart Reviewed: Yes  Patient assessed for rehabilitation services?: Yes  Additional Pertinent Hx: A/P: Lisa Shelton is a 74y.o. year-old s/p XI ROBOTIC LAPARASCOPIC ASSISTED RADICAL CYSTOPROSTATECTOMY, BILATERAL PELVIC LYMPHADENECTOMY AND ILEAL CONDUIT FORMATION 9/17/22  Response To Previous Treatment: Patient with no complaints from previous session. Family / Caregiver Present: No  Follows Commands: Within Functional Limits  Other (Comment): Pt awake and alert in agreement with PTtreatment, RN in agreement with PT evaluation  General Comment  Comments: Post bathroom and hygiene, pt asked to return to bed, tolerated ~20 min in chair working on seated  dynamic and static balance , returned to bed, required increased hygiene/brief change before retiring supine, bed alarm set with call night near  Subjective  Subjective: Pt awake and alert in agreement with PT treatment, RN in agreement with PT treatment, Pt is very particular, talkative, reguarding movement and drain/line managment,Pt c/o of  high pain 9/10 in abdomen secondary to surgical pain, very tender around drains and his back, increased time needed t/o treatment, with increased cues for encourgment. RN aware stated he will be further medicated at 12:30 for wound care treatment     Cognition   Orientation  Overall Orientation Status: Within Normal Limits  Orientation Level: Oriented X4  Cognition  Overall Cognitive Status: Exceptions  Arousal/Alertness: Appropriate responses to stimuli  Following Commands: Follows multistep commands with repitition; Follows multistep commands with increased time  Attention Span: Attends with cues to redirect  Memory: Appears intact  Safety Judgement: Decreased awareness of need for assistance;Decreased awareness of need for safety  Problem Solving: Decreased awareness of errors;Assistance required to identify errors made;Assistance required to generate solutions  Insights: Decreased awareness of deficits  Initiation: Does not require cues  Sequencing: Requires cues for some  Cognition Comment: Pt remained impulsive t/o treatment, unaware of extensive need for assist for line/drain managment, unsafe t/o entire treatment         Bed Mobility Training  Bed Mobility Training: Yes  Overall Level of Assistance: Moderate assistance  Interventions: Safety awareness training;Manual cues; Tactile cues; Verbal cues  Rolling:  (MIN/ MODAssist dt pain needed upon re- entry to bed to roll several times to assist with hygiene d/t pt soiling linens)  Sit to Supine: Moderate assistance; Additional time, (Pt has B PCN tubes, X2 unilateral MERLIN drains, denis catheter and abd wound vac.)  Scooting: Minimum assistance  Balance  Sitting: With support (EOB SBA>CGA for safety)  Standing: With support (Refused use of RW, insisted on [facility] MOD A for safety due to multiple lines/drains present)  Transfer Training  Transfer Training: Yes  Overall Level of Assistance: Moderate assistance; Additional time  Interventions: Manual cues; Safety awareness training; Tactile cues; Verbal cues  Sit to Stand:  MIN/MOD Assist X1;Additional time;Stand to Sit: Moderate assistance  Stand Pivot Transfers: Moderate assistance; Additional time  Toilet Transfer: Moderate assistance; Additional time; Adaptive equipment (Therapist assisted t/o toileting due to impulsiveness, and leaning on toilet)  Duration: 30 (Pt had approx 30 min total sitting time at EOB and on toilet. On toilet he presents with flexed posture using his UE's to stabilize, very frusturated with excessive lines pain, and hygiene needed to clean up after loose bowels) ~20 min chair time for static and dynamic seated activity  Gait Training  Gait Training: Yes  Gait  Overall Level of Assistance: Minimum assistance 1-2 for safety, pt impulsive/unsafe  Interventions: Safety awareness training; Tactile cues; Verbal cues  Base of Support: Widened  Speed/Mignon: Accelerated  Gait Abnormalities: Ataxic  Distance (ft):  (10 ft x2 and 5 ft from chair to bed with SPC)  Assistive Device: Cane, straight     Balance  Posture: Fair (Pt forward flexed due to abdominal incisions and drains t/o)  Sitting - Static: Fair  Sitting - Dynamic: Fair  Standing - Static: Fair  Standing - Dynamic: Fair;-  Comments: seated on toilet with supervision for safety,  extremely forward flexed leaning on elbows, very forward flexed while standing with RW  A/AROM Exercises: Pt instructed in bilateral LE AROM including ankle pumps heels slides, SLR in supine with good tolerance and pt understanding, pt states he does ankle pumps frequently  Static Sitting Balance Exercises: Pt sat toilet and EOB for a total of ~30 mins, sat in chair ~20 mins performing seated balance tasks    AM-PAC Score  AM-PAC Inpatient Mobility Raw Score : 15 (10/03/22 1338)  AM-PAC Inpatient T-Scale Score : 39.45 (10/03/22 1338)  Mobility Inpatient CMS 0-100% Score: 57.7 (10/03/22 1338)  Mobility Inpatient CMS G-Code Modifier : CK (10/03/22 1338)   Goals  Short Term Goals  Time Frame for Short Term Goals: 5 visits  Short Term Goal 1: Pt to complete supine <> sit mod I  Short Term Goal 2: Sit <> stand Mod I with AD  Short Term Goal 3: pt to ambulate 50 ft with RW CGA  Long Term Goals  Time Frame for Long Term Goals : 14 visits  Long Term Goal 1: Pt to ambulate 100 ft Mod I RW  Long Term Goal 2: Ascend /descend 4 steps mod I  Patient Goals   Patient Goals : to get stronger       Education  Patient Education  Education Given To: Patient  Education Provided: Role of Therapy;Plan of Care; Fall Prevention Strategies;Transfer Training;Home Exercise Program  Education Provided Comments: Pt educated on the importance of functional mobility and areas to improve safety when mobilizing.   Education Method: Verbal  Barriers to Learning: Other (Comment) (Pt very particular t/o and self limiting)  Education Outcome: Verbalized understanding;Continued education needed      Therapy Time   Individual Concurrent Group Co-treatment   Time In 1110 Time Out Carmen 7, PTA

## 2022-10-03 NOTE — CARE COORDINATION
Met with patient to discuss transitional planning. Plan remains [facility]. Patient agreeable to plan.

## 2022-10-03 NOTE — PROGRESS NOTES
Occupational Therapy  Facility/Department: [facility] RENAL//MED SURG  Occupational Therapy Daily Treatment Note    Name: Li Gonzalez  : 1955  MRN: [id number]  Date of Service: 10/3/2022    Discharge Recommendations:  Patient would benefit from continued therapy after discharge      Patient Diagnosis(es): The primary encounter diagnosis was Malignant neoplasm of overlapping sites of bladder [facility]). Diagnoses of Malignant neoplasm of lateral wall of urinary bladder ([address] Bandemia, and Complicated UTI (urinary tract infection) were also pertinent to this visit. Assessment   Performance deficits / Impairments: Decreased functional mobility ; Decreased ADL status; Decreased safe awareness;Decreased cognition;Decreased endurance;Decreased high-level IADLs;Decreased balance  Assessment: Patient participated in OT session focused on ADL tasks, bed mobility, transfers, body mechanics and safety.  Pt fatigues quickly and will continue to benefit from further OT services following discharge from [facility]  Prognosis: Good  Activity Tolerance  Activity Tolerance: Patient limited by fatigue  Activity Tolerance Comments: Pt required frequent short rest breaks        Plan   Occupational Therapy Plan  Times Per Week: 3-5x/wk  Current Treatment Recommendations: Balance training, Functional mobility training, Endurance training, Safety education & training, Pain management, Patient/Caregiver education & training, Equipment evaluation, education, & procurement, Self-Care / ADL, Home management training, Cognitive/Perceptual training     Restrictions  Restrictions/Precautions  Restrictions/Precautions: Surgical Protocols, Fall Risk, Up as Tolerated  Required Braces or Orthoses?: No  Position Activity Restriction  Other position/activity restrictions: Ambulate pt    Subjective   General  Patient assessed for rehabilitation services?: Yes  Response to previous treatment: Patient with no complaints from previous session  Family / Caregiver Present: No  Subjective  Subjective: RN ok'd patient for OT treatment this morning. Pt supine in bed upon [place] arrival. Pt agreeable to session and denied pain. Objective   Safety Devices  Type of Devices: Call light within reach; Left in chair;Gait belt;Nurse notified  Restraints  Restraints Initially in Place: No  Balance  Sitting: With support (EOB SBA>CGA for safety)  Standing: With support (RW MIN A for safety due to multiple lines/drains present)  Transfer Training  Transfer Training: Yes  Bed to Chair: Minimum assistance (Due to multiple lines/drains present)  Gait  Overall Level of Assistance: Minimum assistance  Distance (ft):  (EOB to recliner with RW)  Assistive Device: Walker, rolling        ADL  Grooming: Setup; Independent  Grooming Skilled Clinical Factors: Pt completed seated in recliner oral care, wash face and head  UE Bathing: Setup;Stand by assistance  LE Bathing: Minimal assistance;Setup; Increased time to complete  UE Dressing: Contact guard assistance;Setup; Increased time to complete  Additional Comments: Pt limited by impulsivity and activity tolerance. Pt fatigues quickly        Bed mobility  Rolling to Left: Contact guard assistance  Supine to Sit: Contact guard assistance  Sit to Supine: Unable to assess  Scooting: Stand by assistance;Contact guard assistance  Bed Mobility Comments: HOB elevated ~30 degrees, bedrail use and increased effort  Transfers  Sit to stand: Contact guard assistance  Stand to sit: Contact guard assistance  Transfer Comments: Required ponce verbal cues to remain seated, pt impulsively attempeting to stand prior to lines being managed and writer being ready. Use of Rw. Multiple lines/drains present     Cognition  Overall Cognitive Status: Exceptions  Arousal/Alertness: Appropriate responses to stimuli  Following Commands: Follows multistep commands with repitition; Follows multistep commands with increased time  Attention Span: Attends with cues to redirect  Safety Judgement: Decreased awareness of need for assistance;Decreased awareness of need for safety  Problem Solving: Decreased awareness of errors;Assistance required to identify errors made;Assistance required to generate solutions  Insights: Decreased awareness of deficits  Initiation: Does not require cues  Cognition Comment: Pt remains impulsive during transfer from EOB to recliner with RW  Orientation  Overall Orientation Status: Within Normal Limits         Education Given To: Patient  Education Provided: Role of Therapy; ADL Adaptive Strategies; Fall Prevention Strategies;Precautions  Education Provided Comments: body mechanics and safety  Education Method: Demonstration;Verbal  Barriers to Learning: None  Education Outcome: Continued education needed    AM-PAC Score  AM-[facility] Inpatient Daily Activity Raw Score: 17 (10/03/22 1132)  AM-PAC Inpatient ADL T-Scale Score : 37.26 (10/03/22 1132)  ADL Inpatient CMS 0-100% Score: 50.11 (10/03/22 1132)  ADL Inpatient CMS G-Code Modifier : CK (10/03/22 1132)      Goals  Short Term Goals  Time Frame for Short Term Goals: By discharge, pt will:  Short Term Goal 1: Demo bed mobility supine<>sit with SBA to promote increased engagement in ADLs  Short Term Goal 2: Demo functional sit<>stand transfers and functional mobility with SBA, using LRD and 0 VCs for safety  Short Term Goal 3: Demo 5 minutes of standing tolerance to promote increased engagement in ADLs/IADLs  Short Term Goal 4: Demo UB ADLs with Mod IND  Short Term Goal 5: Demo LB ADLs/toileting with Min A to promote increased engagement in ADLs  Short Term Goal 6: Demo good safety awareness IND throughout all functional ADLs/activities with no more than 2 VC each session       Therapy Time   Individual Concurrent Group Co-treatment   Time In 1030         Time Out 1115         Minutes 45         Timed Code Treatment Minutes: 126 [facility], [place]/L

## 2022-10-03 NOTE — PROGRESS NOTES
[id number] [facility] Wound Ostomy  Nurse  Follow up  Note       NAME:  Kameron Bailon RECORD NUMBER:  [id number]  AGE: 74 y.o. GENDER: male  : 1955  TODAY'S DATE:  10/3/2022    Subjective   Reason for [address] Nurse Evaluation and Assessment:   Assessment: NPWT dressing changes to mid abdominal surgical wound using white foam to base under black granufoam at -125 mHg vacuum. Urostomy care and education.      Assessment   Camacho Risk Score: Camacho Scale Score: 18    Patient Active Problem List   Diagnosis Code    Kidney stones N20.0    Nocturia R35.1    Hypertrophy of prostate with urinary obstruction N40.1, N13.8    Malignant neoplasm of urinary bladder (HCC) C67.9    Urinary retention R33.9    Cancer of lateral wall of urinary bladder (HCC) C67.2    Acute respiratory failure with hypoxia ([facility]) J96.01    Other hyperlipidemia E78.49    Microcytic anemia D50.9    Mild protein-calorie malnutrition ([facility]) E44.1    Pulmonary embolism without acute cor pulmonale ([facility]) I26.99    Hypoxia R09.02    Dyspnea O04.16    Acute systolic heart failure ([facility]) I50.21    Acute renal failure with tubular necrosis ([facility]) N17.0    CKD (chronic kidney disease), stage III ([facility]) N18.30    Dilated cardiomyopathy ([facility]) I42.0    Bladder cancer ([facility]) C67.9    PAXTON (acute kidney injury) ([facility]) N17.9    Bandemia K14.916    Complicated UTI (urinary tract infection) N39.0    Hypokalemia E87.6    Hypophosphatemia E83.39    Small bowel obstruction ([facility]) K56.609    Acute pyelonephritis N10    Nephrostomy complication ([facility]) G41.738    Candida albicans infection B37.9    Enterococcus infection in shunt ([facility]) T85.79XA, B95.2    Hypomagnesemia E83.42       Measurements:     10/03/22 1330   Urostomy Ileal conduit RLQ   Placement Date/Time: 22 1413   Present on Admission/Arrival: No  Inserted by: Dr. Santana Arreguin Type: Ileal conduit  Location: RLQ   Stomal Appliance Changed;1 piece   Stoma  Assessment Red;Moist;Protrudes  (2 ureteral stents; red rubber is OUT)   Peristomal Assessment Clean, dry & intact   Collection Container Standard   Treatment Bag change;Site care;Stoma powder  (Brava seal)   Urine Color Yellow   Urine Appearance Mucous   Negative Pressure Wound Therapy Abdomen Mid   Placement Date/Time: 09/23/22 1310   Location: Abdomen  Wound Location Orientation: Mid   Wound Type Surgical   Unit Type KCI VAC ULTRA   Dressing Type White Foam;Black Foam   Number of pieces used 5  (2 white, 3 black)   Number of pieces removed 3   Cycle Continuous; On   Target Pressure (mmHg) 125   Canister changed? No   Dressing Status New dressing applied   Dressing Changed Changed/New   Drainage Amount Small   Drainage Description Serosanguinous   Dressing Change Due 10/05/22   Wound Assessment Subcutaneous;Granulation tissue   Verito-wound Assessment Intact   Wound 09/22/22 Abdomen Mid SURGICAL INCISION   Date First Assessed: 09/22/22   Present on Hospital Admission: No  Primary Wound Type: Surgical Type  Location: Abdomen  Wound Location Orientation: Mid  Wound Description (Comments): SURGICAL INCISION   Wound Image    Wound Etiology Surgical   Dressing Status New dressing applied   Wound Cleansed Cleansed with saline   Dressing/Treatment Negative pressure wound therapy   Dressing Change Due 10/05/22   Wound Assessment Subcutaneous;Granulation tissue   Drainage Amount Scant   Drainage Description Serosanguinous   Verito-wound Assessment Intact  (bordered with drape)   Wound Thickness Description not for Pressure Injury Full thickness     Medicated with IV Dilaudid prior to start  of procedure. NPWT dressing removed using adhesive remove and sponge loosened with saline. Wound rinsed with saline. Periwound skin prepped with SurePREP and bordered with drape. Strips of white sponge placed in base of wound with black granufoam to fill wound. Good seal was achieved. Urostomy appliance changed. Red rubber catheter was OUT   Needs convexity;   Soft convex appliance with a Brava seal was used. Response to treatment:  Well tolerated by patient. Pain Assessment:  Premedicated: Yes    Plan   Plan of Care:   ABDOMINAL WOUND:  NPWT with white foam to base of wound under black granufoam at -125 mmHG vacuum. Change sponge M-W-F. Change canister when full or weekly. UROSTOMY:  Routine pouch changes using a 1 piece convex urostomy appliance and ring barrier. Turn every 2 hours  Float heels off of bed with pillows under calves     Use lift sling to reposition patient to minimize potential for shear injury. Foam sacrum dressing to sacrococcygeal area. Peel back dressing, inspect skin beneath, re-secure. Change every 72 hours and prn wrinkles, soilage. Discontinue Sacral dressing if repeatedly soiled by incontinence. Moisture wicking under pads     Current Diet: ADULT DIET;  Clear Liquid  ADULT ORAL NUTRITION SUPPLEMENT; Breakfast, Lunch, Dinner; Clear Liquid Oral Supplement      Patient/Caregiver Teaching:  Level of patient/caregiver understanding able to:   [] Indicates understanding       [x] Needs reinforcement  [] Unsuccessful      [x] Verbal Understanding  [] Demonstrated understanding       [] No evidence of learning  [] Refused teaching         [] Issa Hager RN BSN, [employer]

## 2022-10-04 ENCOUNTER — APPOINTMENT (OUTPATIENT)
Dept: INTERVENTIONAL RADIOLOGY/VASCULAR | Age: 67
DRG: 653 | End: 2022-10-04
Attending: SPECIALIST
Payer: MEDICARE

## 2022-10-04 LAB
ANION GAP SERPL CALCULATED.3IONS-SCNC: 8 MMOL/L (ref 9–17)
BUN BLDV-MCNC: 12 MG/DL (ref 8–23)
CALCIUM SERPL-MCNC: 7.5 MG/DL (ref 8.6–10.4)
CHLORIDE BLD-SCNC: 102 MMOL/L (ref 98–107)
CO2: 26 MMOL/L (ref 20–31)
CREAT SERPL-MCNC: 1.28 MG/DL (ref 0.7–1.2)
GFR SERPL CREATININE-BSD FRML MDRD: >60 ML/MIN/1.73M2
GLUCOSE BLD-MCNC: 74 MG/DL (ref 70–99)
HCT VFR BLD CALC: 25.1 % (ref 40.7–50.3)
HEMOGLOBIN: 7.8 G/DL (ref 13–17)
MAGNESIUM: 1.7 MG/DL (ref 1.6–2.6)
MCH RBC QN AUTO: 27.6 PG (ref 25.2–33.5)
MCHC RBC AUTO-ENTMCNC: 31.1 G/DL (ref 28.4–34.8)
MCV RBC AUTO: 88.7 FL (ref 82.6–102.9)
NRBC AUTOMATED: 0 PER 100 WBC
PDW BLD-RTO: 16.5 % (ref 11.8–14.4)
PHOSPHORUS: 2.8 MG/DL (ref 2.5–4.5)
PLATELET # BLD: 307 K/UL (ref 138–453)
PMV BLD AUTO: 9.9 FL (ref 8.1–13.5)
POTASSIUM SERPL-SCNC: 3.5 MMOL/L (ref 3.7–5.3)
RBC # BLD: 2.83 M/UL (ref 4.21–5.77)
SODIUM BLD-SCNC: 136 MMOL/L (ref 135–144)
WBC # BLD: 11.8 K/UL (ref 3.5–11.3)

## 2022-10-04 PROCEDURE — 2500000003 HC RX 250 WO HCPCS: Performed by: STUDENT IN AN ORGANIZED HEALTH CARE EDUCATION/TRAINING PROGRAM

## 2022-10-04 PROCEDURE — 6360000004 HC RX CONTRAST MEDICATION: Performed by: SPECIALIST

## 2022-10-04 PROCEDURE — 84100 ASSAY OF PHOSPHORUS: CPT

## 2022-10-04 PROCEDURE — BT141ZZ FLUOROSCOPY OF KIDNEYS, URETERS AND BLADDER USING LOW OSMOLAR CONTRAST: ICD-10-PCS | Performed by: RADIOLOGY

## 2022-10-04 PROCEDURE — 83735 ASSAY OF MAGNESIUM: CPT

## 2022-10-04 PROCEDURE — 80048 BASIC METABOLIC PNL TOTAL CA: CPT

## 2022-10-04 PROCEDURE — 6370000000 HC RX 637 (ALT 250 FOR IP): Performed by: STUDENT IN AN ORGANIZED HEALTH CARE EDUCATION/TRAINING PROGRAM

## 2022-10-04 PROCEDURE — 2580000003 HC RX 258: Performed by: STUDENT IN AN ORGANIZED HEALTH CARE EDUCATION/TRAINING PROGRAM

## 2022-10-04 PROCEDURE — 6360000002 HC RX W HCPCS: Performed by: INTERNAL MEDICINE

## 2022-10-04 PROCEDURE — 50431 NJX PX NFROSGRM &/URTRGRM: CPT

## 2022-10-04 PROCEDURE — 85027 COMPLETE CBC AUTOMATED: CPT

## 2022-10-04 PROCEDURE — 2709999900 HC NON-CHARGEABLE SUPPLY

## 2022-10-04 PROCEDURE — 6360000002 HC RX W HCPCS: Performed by: STUDENT IN AN ORGANIZED HEALTH CARE EDUCATION/TRAINING PROGRAM

## 2022-10-04 PROCEDURE — 94761 N-INVAS EAR/PLS OXIMETRY MLT: CPT

## 2022-10-04 PROCEDURE — 99232 SBSQ HOSP IP/OBS MODERATE 35: CPT | Performed by: INTERNAL MEDICINE

## 2022-10-04 PROCEDURE — C9113 INJ PANTOPRAZOLE SODIUM, VIA: HCPCS | Performed by: STUDENT IN AN ORGANIZED HEALTH CARE EDUCATION/TRAINING PROGRAM

## 2022-10-04 PROCEDURE — 1200000000 HC SEMI PRIVATE

## 2022-10-04 PROCEDURE — 36415 COLL VENOUS BLD VENIPUNCTURE: CPT

## 2022-10-04 PROCEDURE — 2580000003 HC RX 258: Performed by: INTERNAL MEDICINE

## 2022-10-04 PROCEDURE — 6360000002 HC RX W HCPCS: Performed by: SPECIALIST

## 2022-10-04 RX ADMIN — PIPERACILLIN AND TAZOBACTAM 3375 MG: 3; .375 INJECTION, POWDER, FOR SOLUTION INTRAVENOUS at 08:59

## 2022-10-04 RX ADMIN — HEPARIN SODIUM 5000 UNITS: 5000 INJECTION INTRAVENOUS; SUBCUTANEOUS at 06:26

## 2022-10-04 RX ADMIN — SODIUM CHLORIDE, PRESERVATIVE FREE 40 MG: 5 INJECTION INTRAVENOUS at 22:56

## 2022-10-04 RX ADMIN — SODIUM CHLORIDE: 4.5 INJECTION, SOLUTION INTRAVENOUS at 06:17

## 2022-10-04 RX ADMIN — HEPARIN SODIUM 5000 UNITS: 5000 INJECTION INTRAVENOUS; SUBCUTANEOUS at 14:53

## 2022-10-04 RX ADMIN — CHLORHEXIDINE GLUCONATE 0.12% ORAL RINSE 15 ML: 1.2 LIQUID ORAL at 22:56

## 2022-10-04 RX ADMIN — HEPARIN SODIUM 5000 UNITS: 5000 INJECTION INTRAVENOUS; SUBCUTANEOUS at 23:07

## 2022-10-04 RX ADMIN — PIPERACILLIN AND TAZOBACTAM 3375 MG: 3; .375 INJECTION, POWDER, FOR SOLUTION INTRAVENOUS at 14:55

## 2022-10-04 RX ADMIN — SODIUM CHLORIDE, PRESERVATIVE FREE 10 ML: 5 INJECTION INTRAVENOUS at 22:57

## 2022-10-04 RX ADMIN — METOPROLOL TARTRATE 5 MG: 1 INJECTION, SOLUTION INTRAVENOUS at 16:45

## 2022-10-04 RX ADMIN — SODIUM CHLORIDE, PRESERVATIVE FREE 40 MG: 5 INJECTION INTRAVENOUS at 11:16

## 2022-10-04 RX ADMIN — IOPAMIDOL 30 ML: 755 INJECTION, SOLUTION INTRAVENOUS at 10:07

## 2022-10-04 RX ADMIN — SIMVASTATIN 40 MG: 40 TABLET, FILM COATED ORAL at 22:56

## 2022-10-04 ASSESSMENT — PAIN SCALES - GENERAL
PAINLEVEL_OUTOF10: 5
PAINLEVEL_OUTOF10: 0
PAINLEVEL_OUTOF10: 5

## 2022-10-04 ASSESSMENT — ENCOUNTER SYMPTOMS
CHEST TIGHTNESS: 0
ABDOMINAL PAIN: 1

## 2022-10-04 NOTE — PROGRESS NOTES
Aurelio Dover MD FACS   Urology Progress Note     Subjective:   No acute events overnight  No fevers or chills, vital stable  No nausea or vomiting  Passing flatus and continues to have bowel movements, they have been getting more solid  Walking in the room, not in hallways yet    Right nephrostomy tube 50 cc / 24 hours jared  Left nephrostomy tube 20 cc / 24 hours jared  Urostomy 1.1L cc / 24 hours clear yellow urine  Upper abdominal drain 940 cc / 24 hours serous  Lower abdominal drain 860 cc / 24 hours serous    Am labs pending    Patient Vitals for the past 24 hrs:   BP Temp Temp src Pulse Resp SpO2   10/03/22 2330 133/70 -- -- 83 -- --   10/03/22 2152 (!) 142/74 97.7 °F (36.5 °C) Oral 82 16 96 %   10/03/22 1302 -- -- -- -- 15 --   10/03/22 1300 (!) 145/70 98 °F (36.7 °C) Oral 73 16 93 %   10/03/22 1232 -- -- -- -- 15 --   10/03/22 0926 (!) 171/84 97.5 °F (36.4 °C) Oral 80 14 96 %       Intake/Output Summary (Last 24 hours) at 10/4/2022 0620  Last data filed at 10/4/2022 9956  Gross per 24 hour   Intake --   Output 2970 ml   Net -2970 ml       Recent Labs     10/01/22  1146 10/01/22  1411 10/01/22  1827 10/02/22  0618 10/03/22  0533   WBC 14.3*  --   --  18.1* 15.1*   HGB 6.6*   < > 8.9* 8.0* 7.7*   HCT 21.7*   < > 29.3* 24.9* 24.5*   MCV 91.6  --   --  89.6 89.7   PLT See Reflexed IPF Result  --   --  346 312    < > = values in this interval not displayed. Recent Labs     10/01/22  1146 10/01/22  1827 10/02/22  0618 10/03/22  0533    143 140 141   K 6.1* 3.9 3.7 3.8   * 111* 107 105   CO2 18* 22 26 26   PHOS 3.3  --  2.6 2.9   BUN 16 15 15 13   CREATININE 1.41* 1.36* 1.44* 1.49*       No results for input(s): COLORU, PHUR, LABCAST, WBCUA, RBCUA, MUCUS, TRICHOMONAS, YEAST, BACTERIA, CLARITYU, SPECGRAV, LEUKOCYTESUR, UROBILINOGEN, BILIRUBINUR, BLOODU in the last 72 hours.     Invalid input(s): NITRATE, GLUCOSEUKETONESUAMORPHOUS      Additional Lab/culture results:    Physical Exam: General: A/O x 3, no acute distress  HEENT: moist mucous membranes  Neck: Supple   Chest: bilateral symmetrical chest rise   Circulatory: Peripheries warm , well perfused   P/A: soft, clean, dry and intact with skin glue, ostomy pink with red rubber in place, bilateral ureteral catheters not visible, wound vac in place, MERLIN x 2 in place with serous drainage  Extremities: Bilateral LE pitting edema     Interval Imaging Findings: none    Impression:  none    Fabrizio Gutierrez is a 22-year-old male   Active  problem list  Hx of bladder cancer  Robotic assisted laparoscopic cystoprostatectomy with ileal conduit creation on 9/16/2022   Ex lap ROLANDO repair of BL ureteral anastamosis and abd washout with abthera due to ureteral anastamotic leaks and SBO and second look on 9/22 and 9/23      Plan:  Bilateral nephrostograms this a.m. to assess correct positioning of nephrostomy tubes and degree of distal ureteral intestinal anastomotic leak. Will consider placing a fenestrated drain within ileal conduit and placing a smaller catheter within this to continuous suctioning to see if we can get leak to resolve conservatively. Diet:per general General surgery, dietary on board as well  Pain control and antiemetic as needed  Maintain bilateral nephrostomy tubes and bilateral ureteral stents  Maintain MERLIN drains; check MERLIN creatinine to check status of urine leak  Appreciate ID input  Urine culture growing pseudomonas, enterococcus and and second pseudomonas colony type, pansusceptible only gentamicin resistance s/p 7 days cefepime   Infectious disease consulted, on Zosyn for Enterococcus coverage, Diflucan for urine culture 9/23/2022 growing Candida albicans/dubliensis-both antibiotics for 7 days total coverage until 10/3/2022.   Appreciate infectious disease recommendations  IV fluids  DVT PPX: Subq heparin 5000 units TID  Continue to monitor  Discharge planning: inappropriate for discharge today      Ricky Riojas DO, PGY-3  6:20 AM 10/4/2022    I have reviewed the history above and agree. I have repeated the key portions of the physical exam and concur with the resident's findings. I have reviewed all laboratory findings and imaging reports/films. I agree with the plan as noted above.     Electronically signed by Mabel Sanchez MD on 10/4/2022 at 9:52 AM

## 2022-10-04 NOTE — PROGRESS NOTES
Occupational [address]  Occupational Therapy Not Seen Note    DATE: 10/4/2022    NAME: Fabrizio Gutierrez  MRN: [id number]   : 1955      Patient not seen this date for Occupational Therapy due to:    Testing: IR    Next Scheduled Treatment: 10/5    Electronically signed by AMALIA Hobbs on 10/4/2022 at 11:04 AM

## 2022-10-04 NOTE — PROGRESS NOTES
[facility] -   Infectious diseases evaluation  admission date 9/16/2022    reason for consultation:   bandemia    Impression :   Current:  9/16 bladder cancer involving wall and lymph nodes, post radical cystectomy, prostatectomy, groin lymph node dissection  9/22 SBO, had lysis of adhesions  9/22 bilateral ureteral anastomotic leak, with large abdominal collection, post repair 9/22 9/23 bilat nephrostomy tube placed  10/4 B/l nephrostograms to assess nephrostomy tube positioning and degrees of distal ureteral intestinal anastomotic leak  9/23 closure of the fascia, abdominal wound open with VAC  Ongoing bandemia  Pseudomonas/Enterococcus UTI, 9/16-  Cefepime 9/19 until 9/25  Right nephrostomy urine infection with Candida albicans 9/23  Noted at the time of right percutaneous nephrostomy placement 9/23  Ongoing bandemia -resolving  Hypernatremia -on fluids    Other:    Discussion / summary of stay / plan of care     Recommendations   The cause of the bandemia could be multifactorial, including a persistent urinoma  Enterococcus is not well covered by cefepime course that the patient had and would suggest trying a course of Zosyn for now  I agree with the Diflucan,  Both antibiotics to be given for 7 days until 10/3  Monitor response of the white count otherwise get a CT of the abdomen look for further collection  Case discussed with general surgery and patient    Zosyn and diflucan ends 10/3  Patient clinically better  -continues to have leukocytosis - possibly from a urinoma - but Suggest CT AP to eval for any remaining collection    Leukocytosis resolving. Continue to monitor OFF abx  If patient spikes a fever, get CT for possibility of bacteria within abdominal urinoma.        Infection Control Recommendations   [place] Precautions    Antimicrobial Stewardship Recommendations   Simplification of therapy  Targeted therapy      History of Present Illness:   Initial history:  Vasile Wilson is a 74y.o.-year-old male with history of bladder cancer involving the wall of the bladder as well as the groin lymph nodes. Came in for radical cystectomy done on 9/16 with ileal conduit placement, removal of the groin lymph nodes, but complicated with a small bowel obstruction and bilateral ureteral anastomotic leak. CT scan of the abdomen  9/22 which had showed at the time a large anterior pelvic fluid collection 15 x 13 x 6 cmWith variable densities extending from the cystectomy bed    Taken back to surgery on 9/22, lysis of adhesion that was thought to be causing the SBO, as well as repair of the anastomotic leak, and placement of 2 bilateral nephrostomy tubes. 9/23 patient went back to surgery for closure of abdominal fascia and subcutaneous tissue that stayed open with a VAC placement over it -    Due to hydronephrosis, bilat PCNT placed by IR 9/23, A repeat urine culture from 9/23 done due to cloudy R urine, by IR,  is showing Candida albicans 9/26. The patient has been started on Diflucan. Along the way on 9/16 the urine grew Pseudomonas and Enterococcus, resistant to Cipro and patient has received a course of cefepime from 9/19 and 2 until 9/25. His white count remains elevated actually after a feeling that it was improving, he started going worse, today it is up to 20. Infectious is consulted for the concern of a ongoing infection. The right nephro drain was giving very little urine and had a urogram  9/26 that showed good positioning and no leak. Since then right nephrostomy line has been giving a better input. Today the patient is alert appropriate he has an NG tube, was trying to have some liquids p.o. and today was able to tolerate 2 popsicles without vomiting. He does have gurgling over the abdomen, and has no abdominal tenderness otherwise. He has only abdominal pain on the surgical site.   He has 2 MERLIN drains giving serosanguineous fluid, and he has 2 percutaneous nephrostomy drains that are giving clear urine. Interval changes  10/4/2022   Patient Vitals for the past 8 hrs:   BP Temp Temp src Pulse Resp SpO2   10/04/22 0956 -- -- -- -- -- 98 %   10/04/22 0700 (!) 149/72 98.5 °F (36.9 °C) Oral 85 19 97 %       10/4  Afebrile, hypertensive  Patient returning from anterograde pyelogram   +abdominal tenderness, +discomfort from Wound VAC. No signs of discharge or dehiscence   Continues to have drainage out clear yellow drainage out MERLIN drains and dark yellow out nephrostomy tubes  No new complaint of diarrhea  Leukocytosis resolving         Summary of relevant labs:  Labs:  WBC 11-29-79-18-20.3- 16.5 - 19.1 - 14.3 - 18.1 -15.1 - 11.8   Creatinine1.39 -1.41 - 1.39 - 1.36 - 1.44 -1.49 - 1.28   Micro:  UA 9/18 - large leukocyte esterase and positive nitrates  9/16 UC Pseudomonas aeruginosa 2 strains, 1 sensitive to Cipro the other resistant,  and Enterococcus faecalis sensitive to Cipro  9/23 UC with Candida    Urine culture from  R nephrostomy due to cloudiness, 9/23 showing Candida albicans/W   Imaging:  10/4 IR antegrade pyelogram Migration of the left double-J stent mostly into the ileal loop with the proximal portion of the double-J stent in the distal left ureter. Distal left ureter is patent and empties promptly into the ileal loop. Moderate hydroureter and hydronephrosis identified. There is prominent right-sided hydronephrosis and hydroureter proximally. There is a kink in the ureter proximally that appears to create a functional obstruction in the proximal ureter. Under higher pressure injection contrast than extends distally into the ureter and empties into the ileal loop. It is also noted that the distal ureter on the right is considerably dilated. Right-sided double-J stent may not be working. KUB 10/1   Presence of bilateral nephrostomy tubes, stable.   There is been interval placement of an apparent right ureteral stent with distal end likely in an ileal loop pouch. Bowel gas pattern favors ileus with evolving partial small bowel obstruction not excluded. No free air is demonstrated. CTAP 9/22  Small bowel obstruction, with a transition point near the jejunoileal junction adjacent/medial to the ileostomy site. Large pelvic fluid collection with varying heme densities, some of which is recent, extending from the cystectomy bed, as above. Ureteral stents, extending out the conduit and ileostomy site with similar moderate-severe right hydroureteronephrosis, and new mild-moderate left hydroureteronephrosis. Postsurgical changes with scattered pneumoperitoneum/ascites, extensive subcutaneous air/soft tissue changes. Enteric tube in satisfactory position with its tip in the stomach. Segmental/subsegmental lung base changes posteriorly. Multiple additional findings, either unchanged or incidental, as detailed in the body of the report above. I have personally reviewed the past medical history, past surgical history, medications, social history, and family history, and I haveupdated the database accordingly. Allergies: Other     Review of Systems:     Review of Systems   Constitutional:  Negative for activity change, appetite change and chills. HENT:  Negative for ear discharge. Respiratory:  Negative for chest tightness. Cardiovascular:  Negative for chest pain. Gastrointestinal:  Positive for abdominal pain. Endocrine: Negative for cold intolerance and heat intolerance. Genitourinary:  Negative for difficulty urinating and dysuria. Musculoskeletal:  Negative for arthralgias. Skin:  Positive for wound. Allergic/Immunologic: Negative for immunocompromised state. Neurological:  Negative for dizziness and facial asymmetry. Hematological:  Negative for adenopathy. Psychiatric/Behavioral:  Negative for agitation.       Physical Examination :       Physical Exam  Constitutional:       General: He is not in acute distress. Appearance: Normal appearance. He is normal weight. He is not toxic-appearing. HENT:      Head: Normocephalic and atraumatic. Right Ear: There is no impacted cerumen. Left Ear: There is no impacted cerumen. Nose: Nose normal. No congestion. Mouth/Throat:      Mouth: Mucous membranes are moist.      Pharynx: Oropharynx is clear. No oropharyngeal exudate. Eyes:      General: No scleral icterus. Extraocular Movements: Extraocular movements intact. Pupils: Pupils are equal, round, and reactive to light. Cardiovascular:      Rate and Rhythm: Normal rate and regular rhythm. Pulses: Normal pulses. Heart sounds: Normal heart sounds. No murmur heard. No friction rub. Pulmonary:      Effort: Pulmonary effort is normal. No respiratory distress. Breath sounds: No wheezing. Abdominal:      General: Bowel sounds are normal.      Palpations: There is no mass. Tenderness: There is guarding. Musculoskeletal:         General: Swelling (b/l extremity with +1 pitting edema) present. No deformity. Normal range of motion. Cervical back: Normal range of motion. No rigidity. Lymphadenopathy:      Cervical: No cervical adenopathy. Skin:     General: Skin is warm. Capillary Refill: Capillary refill takes less than 2 seconds. Coloration: Skin is not jaundiced. Findings: No bruising. Neurological:      General: No focal deficit present. Mental Status: Mental status is at baseline. Cranial Nerves: No cranial nerve deficit. Motor: No weakness.    Psychiatric:         Mood and Affect: Mood normal.         Behavior: Behavior normal.       Past Medical History:     Past Medical History:   Diagnosis Date    Acute renal failure with tubular necrosis ([facility] [address] 05/26/2022    Likely ischemic ATN/prerenal acidemia from intractable nausea vomiting as result of chemotherapy, baseline 1.3-1.4 peaked up to 2.3 in May 2022    Arthritis     BPH with obstruction/lower urinary tract symptoms     CAD (coronary artery disease) 06/2022    nonobstructive on cath    Caffeine use     3 cans soda/pop    Cancer (HCC)     bladder cancer. Dr. 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   Baseline 1.3-1.4    COVID-19 08/16/2021    SOB, fatigue, fever, chills  x 3 weeks    Depression     Dilated cardiomyopathy ([facility] [address] 05/26/2022    Ejection fraction 40 to 45%, does have symptoms of dyspnea and decreased effort tolerance    GERD (gastroesophageal reflux disease)     High cholesterol     Kidney calculi     Sleep apnea     does not use cpap    Under care of team     Dr. Prudy Osgood Nephrology    Under care of team     Dr. Joanie Alvarado. last visit approx 9/2021    Under care of team     Dr. Lilian Pineda [facility] last visit 8/03/2022    Under care of team     Dr. Virginia Berg    Under care of team     Dr. Jamarcus Abbott       Past Surgical  History:     Past Surgical History:   Procedure Laterality Date    BLADDER REMOVAL      BLADDER REMOVAL N/A 9/16/2022    XI ROBOTIC LAPARASCOPIC ASSISTED RADICAL CYSTOPROSTATECTOMY, BILATERAL PELVIC LYMPHADENECTOMY AND ILEAL CONDUIT FORMATION performed by Jacek Irwin MD at [facility] Right 9/16/2022    CYSTOSCOPY STENT REMOVAL performed by Jacek Irwin MD at [address] [place] [address]  06/16/2022    nonobstructive CAD    COLONOSCOPY      IR NEPHROSTOMY PERCUTANEOUS LEFT  9/23/2022    IR NEPHROSTOMY PERCUTANEOUS LEFT 9/23/2022 Leeann Rodas MD [facility]    IR NEPHROSTOMY PERCUTANEOUS RIGHT  9/23/2022    IR NEPHROSTOMY PERCUTANEOUS RIGHT 2022 Bakari Reyez MD [facility]    IR PORT PLACEMENT EQUAL OR GREATER THAN 5 YEARS  2022    IR PORT PLACEMENT EQUAL OR GREATER THAN 5 YEARS 2022 [facility]    KNEE ARTHROSCOPY      left    LAPAROTOMY N/A 2022    LAPAROTOMY EXPLORATORY, LYSIS OF ADHESIONS, REPAIR OF BILATERAL URETERAL ANASTOMOSES, ABDOMINAL WASHOUT, PLACEMENT OF ABTHERA WOUND VAC performed by Johanna Morris DO at [address] [place] [address] N/A 2022    2ND LOOK LAPAROTOMY,  ABDOMINAL WASHOUT, ABDOMINAL CLOSURE, WOUND VAC PLACEMENT performed by Johanna Morris DO at [place] [address]  2022    Cystoprostatectomy, Bilateral Pelvic Lymphadenectomy, ileo conduit formation, cystectomy stent removal (right)       Medications:      fluconazole  200 mg Oral Daily    metoprolol  5 mg IntraVENous Q8H    piperacillin-tazobactam  3,375 mg IntraVENous Q8H    heparin (porcine)  5,000 Units SubCUTAneous 3 times per day    [Held by provider] metoclopramide  5 mg IntraVENous Q6H    chlorhexidine  15 mL Mouth/Throat BID    bisacodyl  10 mg Rectal Daily    pantoprazole (PROTONIX) 40 mg injection  40 mg IntraVENous Q12H    polyethylene glycol  17 g Oral Daily    buPROPion  300 mg Oral QAM    simvastatin  40 mg Oral Nightly    sodium chloride flush  5-40 mL IntraVENous 2 times per day    acetaminophen  650 mg Oral Q6H       Social History:     Social History     Socioeconomic History    Marital status:      Spouse name: Not on file    Number of children: Not on file    Years of education: Not on file    Highest education level: Not on file   Occupational History    Not on file   Tobacco Use    Smoking status: Former     Packs/day: 0.25     Years: 9.00     Pack years: 2.25     Types: Cigarettes     Start date: 1983     Quit date: 2/10/1992     Years since quittin.6    Smokeless tobacco: Never   Vaping Use    Vaping Use: Never used   Substance and Sexual Activity    Alcohol use: Not Currently    Drug use: No    Sexual activity: Yes     Partners: Female   Other Topics Concern    Not on file   Social History Narrative    Not on file     Social Determinants of Health     Financial Resource Strain: Not on file   Food Insecurity: Not on file   Transportation Needs: Not on file   Physical Activity: Not on file   Stress: Not on file   Social Connections: Not on file   Intimate Partner Violence: Not on file   Housing Stability: Not on file       Family History:     Family History   Problem Relation Age of Onset    Cancer Father         bladder cancer    Urolithiasis Father       Medical Decision Making:   I have independently reviewed/ordered the following labs:    CBC with Differential:   Recent Labs     10/03/22  0533 10/04/22  0550   WBC 15.1* 11.8*   HGB 7.7* 7.8*   HCT 24.5* 25.1*    307       BMP:  Recent Labs     10/03/22  0533 10/04/22  0550    136   K 3.8 3.5*    102   CO2 26 26   BUN 13 12   CREATININE 1.49* 1.28*   MG 1.4* 1.7       Hepatic Function Panel:   No results for input(s): PROT, LABALBU, BILIDIR, IBILI, BILITOT, ALKPHOS, ALT, AST in the last 72 hours. No results for input(s): RPR in the last 72 hours. No results for input(s): HIV in the last 72 hours. No results for input(s): BC in the last 72 hours. Lab Results   Component Value Date/Time    CREATININE 1.28 10/04/2022 05:50 AM    GLUCOSE 74 10/04/2022 05:50 AM       Detailed results: Thank you for allowing us to participate in the care of this patient. Please call with questions. This note is created with the assistance of a speech recognition program.  While intending to generate adocument that actually reflects the content of the visit, the document can still have some errors including those of syntax and sound a like substitutions which may escape proof reading. It such instances, actual meaningcan be extrapolated by contextual diversion.     Office: [phone]  Perfect serve / office Jes have discussed the care of the patient, including pertinent history and exam findings,  with the resident. I have seen and examined the patient and the key elements of all parts of the encounter have been performed by me. I agree with the assessment, plan and orders as documented by the resident.     Sindi Raymond, Infectious Diseases

## 2022-10-04 NOTE — PLAN OF CARE
Problem: Discharge Planning  Goal: Discharge to home or other facility with appropriate resources  10/4/2022 0249 by Orlando Sequeira RN  Outcome: Progressing  10/3/2022 1558 by Jamari Gamboa RN  Outcome: Progressing     Problem: Pain  Goal: Verbalizes/displays adequate comfort level or baseline comfort level  10/4/2022 0249 by Orlando Sequeira RN  Outcome: Progressing  10/3/2022 1558 by Jamari Gamboa RN  Outcome: Progressing     Problem: Safety - Adult  Goal: Free from fall injury  10/4/2022 0249 by Orlando Sequeira RN  Outcome: Progressing  10/3/2022 1558 by Jamari Gamboa RN  Outcome: Progressing     Problem: ABCDS Injury Assessment  Goal: Absence of physical injury  10/4/2022 0249 by Orlando Sequeira RN  Outcome: Progressing  10/3/2022 1558 by Jamari Gamboa RN  Outcome: Progressing     Problem: Nutrition Deficit:  Goal: Optimize nutritional status  10/4/2022 0249 by Orlando Sequeira RN  Outcome: Progressing  10/3/2022 1558 by Jamari Gamboa RN  Outcome: Progressing

## 2022-10-04 NOTE — PLAN OF CARE
Problem: Discharge Planning  Goal: Discharge to home or other facility with appropriate resources  Outcome: Progressing     Problem: Pain  Goal: Verbalizes/displays adequate comfort level or baseline comfort level  Outcome: Progressing     Problem: Safety - Adult  Goal: Free from fall injury  Outcome: Progressing     Problem: ABCDS Injury Assessment  Goal: Absence of physical injury  Outcome: Progressing     Problem: Nutrition Deficit:  Goal: Optimize nutritional status  Outcome: Progressing     Problem: Skin/Tissue Integrity  Goal: Absence of new skin breakdown  Description: 1. Monitor for areas of redness and/or skin breakdown  2. Assess vascular access sites hourly  3. Every 4-6 hours minimum:  Change oxygen saturation probe site  4. Every 4-6 hours:  If on nasal continuous positive airway pressure, respiratory therapy assess nares and determine need for appliance change or resting period. Outcome: Progressing     Problem: Safety - Medical Restraint  Goal: Remains free of injury from restraints (Restraint for Interference with Medical Device)  Description: INTERVENTIONS:  1. Determine that other, less restrictive measures have been tried or would not be effective before applying the restraint  2. Evaluate the patient's condition at the time of restraint application  3. Inform patient/family regarding the reason for restraint  4. Q2H: Monitor safety, psychosocial status, comfort, nutrition and hydration  Outcome: Progressing     Problem: Confusion  Goal: Confusion, delirium, dementia, or psychosis is improved or at baseline  Description: INTERVENTIONS:  1. Assess for possible contributors to thought disturbance, including medications, impaired vision or hearing, underlying metabolic abnormalities, dehydration, psychiatric diagnoses, and notify attending LIP  2. [place] high risk fall precautions, as indicated  3.  Provide frequent short contacts to provide reality reorientation, refocusing and direction  4. Decrease environmental stimuli, including noise as appropriate  5. Monitor and intervene to maintain adequate nutrition, hydration, elimination, sleep and activity  6. If unable to ensure safety without constant attention obtain sitter and review sitter guidelines with assigned personnel  7.  Initiate Psychosocial CNS and Spiritual Care consult, as indicated  Outcome: Progressing     Problem: Gastrointestinal - Adult  Goal: Minimal or absence of nausea and vomiting  Outcome: Progressing  Goal: Maintains or returns to baseline bowel function  Outcome: Progressing  Goal: Maintains adequate nutritional intake  Outcome: Progressing  Goal: Establish and maintain optimal ostomy function  Outcome: Progressing     Problem: Genitourinary - Adult  Goal: Urinary catheter remains patent  Outcome: Progressing

## 2022-10-05 ENCOUNTER — APPOINTMENT (OUTPATIENT)
Dept: INTERVENTIONAL RADIOLOGY/VASCULAR | Age: 67
DRG: 653 | End: 2022-10-05
Attending: SPECIALIST
Payer: MEDICARE

## 2022-10-05 LAB
ALBUMIN SERPL-MCNC: 1.7 G/DL (ref 3.5–5.2)
ALBUMIN/GLOBULIN RATIO: 0.4 (ref 1–2.5)
ALP BLD-CCNC: 65 U/L (ref 40–129)
ALT SERPL-CCNC: 8 U/L (ref 5–41)
ANION GAP SERPL CALCULATED.3IONS-SCNC: 10 MMOL/L (ref 9–17)
AST SERPL-CCNC: 19 U/L
BILIRUB SERPL-MCNC: 0.4 MG/DL (ref 0.3–1.2)
BILIRUBIN DIRECT: 0.2 MG/DL
BILIRUBIN, INDIRECT: 0.2 MG/DL (ref 0–1)
BUN BLDV-MCNC: 9 MG/DL (ref 8–23)
CALCIUM SERPL-MCNC: 7.5 MG/DL (ref 8.6–10.4)
CHLORIDE BLD-SCNC: 103 MMOL/L (ref 98–107)
CO2: 24 MMOL/L (ref 20–31)
CREAT SERPL-MCNC: 1.25 MG/DL (ref 0.7–1.2)
GFR SERPL CREATININE-BSD FRML MDRD: >60 ML/MIN/1.73M2
GLUCOSE BLD-MCNC: 93 MG/DL (ref 70–99)
HCT VFR BLD CALC: 23.2 % (ref 40.7–50.3)
HEMOGLOBIN: 7.5 G/DL (ref 13–17)
MAGNESIUM: 1.6 MG/DL (ref 1.6–2.6)
MCH RBC QN AUTO: 28.5 PG (ref 25.2–33.5)
MCHC RBC AUTO-ENTMCNC: 32.3 G/DL (ref 28.4–34.8)
MCV RBC AUTO: 88.2 FL (ref 82.6–102.9)
NRBC AUTOMATED: 0 PER 100 WBC
PDW BLD-RTO: 16.3 % (ref 11.8–14.4)
PHOSPHORUS: 2.5 MG/DL (ref 2.5–4.5)
PLATELET # BLD: 320 K/UL (ref 138–453)
PMV BLD AUTO: 9.8 FL (ref 8.1–13.5)
POTASSIUM SERPL-SCNC: 3.5 MMOL/L (ref 3.7–5.3)
RBC # BLD: 2.63 M/UL (ref 4.21–5.77)
SODIUM BLD-SCNC: 137 MMOL/L (ref 135–144)
TOTAL PROTEIN: 5.6 G/DL (ref 6.4–8.3)
WBC # BLD: 10.3 K/UL (ref 3.5–11.3)

## 2022-10-05 PROCEDURE — 2709999900 HC NON-CHARGEABLE SUPPLY

## 2022-10-05 PROCEDURE — 2580000003 HC RX 258: Performed by: STUDENT IN AN ORGANIZED HEALTH CARE EDUCATION/TRAINING PROGRAM

## 2022-10-05 PROCEDURE — 85027 COMPLETE CBC AUTOMATED: CPT

## 2022-10-05 PROCEDURE — 2500000003 HC RX 250 WO HCPCS: Performed by: STUDENT IN AN ORGANIZED HEALTH CARE EDUCATION/TRAINING PROGRAM

## 2022-10-05 PROCEDURE — 83735 ASSAY OF MAGNESIUM: CPT

## 2022-10-05 PROCEDURE — 80048 BASIC METABOLIC PNL TOTAL CA: CPT

## 2022-10-05 PROCEDURE — 99152 MOD SED SAME PHYS/QHP 5/>YRS: CPT

## 2022-10-05 PROCEDURE — 36415 COLL VENOUS BLD VENIPUNCTURE: CPT

## 2022-10-05 PROCEDURE — 1200000000 HC SEMI PRIVATE

## 2022-10-05 PROCEDURE — C1769 GUIDE WIRE: HCPCS

## 2022-10-05 PROCEDURE — 0T25X0Z CHANGE DRAINAGE DEVICE IN KIDNEY, EXTERNAL APPROACH: ICD-10-PCS | Performed by: RADIOLOGY

## 2022-10-05 PROCEDURE — 6360000004 HC RX CONTRAST MEDICATION: Performed by: SPECIALIST

## 2022-10-05 PROCEDURE — 6360000002 HC RX W HCPCS: Performed by: RADIOLOGY

## 2022-10-05 PROCEDURE — 99232 SBSQ HOSP IP/OBS MODERATE 35: CPT | Performed by: INTERNAL MEDICINE

## 2022-10-05 PROCEDURE — 6360000002 HC RX W HCPCS: Performed by: SPECIALIST

## 2022-10-05 PROCEDURE — 2580000003 HC RX 258: Performed by: INTERNAL MEDICINE

## 2022-10-05 PROCEDURE — 99153 MOD SED SAME PHYS/QHP EA: CPT

## 2022-10-05 PROCEDURE — 6360000002 HC RX W HCPCS: Performed by: STUDENT IN AN ORGANIZED HEALTH CARE EDUCATION/TRAINING PROGRAM

## 2022-10-05 PROCEDURE — 6370000000 HC RX 637 (ALT 250 FOR IP): Performed by: STUDENT IN AN ORGANIZED HEALTH CARE EDUCATION/TRAINING PROGRAM

## 2022-10-05 PROCEDURE — 50434 CONVERT NEPHROSTOMY CATHETER: CPT

## 2022-10-05 PROCEDURE — C9113 INJ PANTOPRAZOLE SODIUM, VIA: HCPCS | Performed by: STUDENT IN AN ORGANIZED HEALTH CARE EDUCATION/TRAINING PROGRAM

## 2022-10-05 PROCEDURE — 97606 NEG PRS WND THER DME>50 SQCM: CPT

## 2022-10-05 PROCEDURE — 80076 HEPATIC FUNCTION PANEL: CPT

## 2022-10-05 PROCEDURE — C2617 STENT, NON-COR, TEM W/O DEL: HCPCS

## 2022-10-05 PROCEDURE — 6370000000 HC RX 637 (ALT 250 FOR IP): Performed by: INTERNAL MEDICINE

## 2022-10-05 PROCEDURE — 84100 ASSAY OF PHOSPHORUS: CPT

## 2022-10-05 RX ORDER — FLUCONAZOLE 40 MG/ML
200 POWDER, FOR SUSPENSION ORAL EVERY 24 HOURS
Status: DISCONTINUED | OUTPATIENT
Start: 2022-10-05 | End: 2022-10-10

## 2022-10-05 RX ORDER — MIDAZOLAM HYDROCHLORIDE 2 MG/2ML
INJECTION, SOLUTION INTRAMUSCULAR; INTRAVENOUS
Status: COMPLETED | OUTPATIENT
Start: 2022-10-05 | End: 2022-10-05

## 2022-10-05 RX ORDER — FENTANYL CITRATE 50 UG/ML
INJECTION, SOLUTION INTRAMUSCULAR; INTRAVENOUS
Status: COMPLETED | OUTPATIENT
Start: 2022-10-05 | End: 2022-10-05

## 2022-10-05 RX ORDER — FLUCONAZOLE 200 MG/1
400 TABLET ORAL DAILY
Status: DISCONTINUED | OUTPATIENT
Start: 2022-10-06 | End: 2022-10-05

## 2022-10-05 RX ORDER — POTASSIUM CHLORIDE 20 MEQ/1
20 TABLET, EXTENDED RELEASE ORAL ONCE
Status: COMPLETED | OUTPATIENT
Start: 2022-10-05 | End: 2022-10-05

## 2022-10-05 RX ADMIN — POTASSIUM CHLORIDE 20 MEQ: 1500 TABLET, EXTENDED RELEASE ORAL at 18:45

## 2022-10-05 RX ADMIN — IOPAMIDOL 8 ML: 755 INJECTION, SOLUTION INTRAVENOUS at 14:43

## 2022-10-05 RX ADMIN — SODIUM CHLORIDE, PRESERVATIVE FREE 10 ML: 5 INJECTION INTRAVENOUS at 08:39

## 2022-10-05 RX ADMIN — HEPARIN SODIUM 5000 UNITS: 5000 INJECTION INTRAVENOUS; SUBCUTANEOUS at 20:30

## 2022-10-05 RX ADMIN — HYDROMORPHONE HYDROCHLORIDE 1 MG: 1 INJECTION, SOLUTION INTRAMUSCULAR; INTRAVENOUS; SUBCUTANEOUS at 13:10

## 2022-10-05 RX ADMIN — METOPROLOL TARTRATE 5 MG: 1 INJECTION, SOLUTION INTRAVENOUS at 08:37

## 2022-10-05 RX ADMIN — CHLORHEXIDINE GLUCONATE 0.12% ORAL RINSE 15 ML: 1.2 LIQUID ORAL at 20:30

## 2022-10-05 RX ADMIN — FENTANYL CITRATE 50 MCG: 50 INJECTION, SOLUTION INTRAMUSCULAR; INTRAVENOUS at 14:12

## 2022-10-05 RX ADMIN — SODIUM CHLORIDE, PRESERVATIVE FREE 40 MG: 5 INJECTION INTRAVENOUS at 20:30

## 2022-10-05 RX ADMIN — SODIUM CHLORIDE, PRESERVATIVE FREE 40 MG: 5 INJECTION INTRAVENOUS at 08:37

## 2022-10-05 RX ADMIN — SIMVASTATIN 40 MG: 40 TABLET, FILM COATED ORAL at 20:31

## 2022-10-05 RX ADMIN — SODIUM CHLORIDE: 4.5 INJECTION, SOLUTION INTRAVENOUS at 07:29

## 2022-10-05 RX ADMIN — FLUCONAZOLE 200 MG: 40 POWDER, FOR SUSPENSION ORAL at 18:45

## 2022-10-05 RX ADMIN — METOPROLOL TARTRATE 5 MG: 1 INJECTION, SOLUTION INTRAVENOUS at 00:55

## 2022-10-05 RX ADMIN — MIDAZOLAM HYDROCHLORIDE 1 MG: 1 INJECTION, SOLUTION INTRAMUSCULAR; INTRAVENOUS at 14:12

## 2022-10-05 RX ADMIN — SODIUM CHLORIDE, PRESERVATIVE FREE 10 ML: 5 INJECTION INTRAVENOUS at 20:36

## 2022-10-05 RX ADMIN — IOPAMIDOL 15 ML: 755 INJECTION, SOLUTION INTRAVENOUS at 14:46

## 2022-10-05 RX ADMIN — HEPARIN SODIUM 5000 UNITS: 5000 INJECTION INTRAVENOUS; SUBCUTANEOUS at 06:12

## 2022-10-05 ASSESSMENT — PAIN DESCRIPTION - DESCRIPTORS
DESCRIPTORS: SHARP
DESCRIPTORS: SHARP

## 2022-10-05 ASSESSMENT — PAIN DESCRIPTION - LOCATION
LOCATION: ABDOMEN
LOCATION: ABDOMEN

## 2022-10-05 ASSESSMENT — ENCOUNTER SYMPTOMS
ABDOMINAL DISTENTION: 0
COLOR CHANGE: 0
ABDOMINAL PAIN: 1
CHEST TIGHTNESS: 0
SINUS PRESSURE: 0

## 2022-10-05 ASSESSMENT — PAIN SCALES - GENERAL
PAINLEVEL_OUTOF10: 5
PAINLEVEL_OUTOF10: 6

## 2022-10-05 NOTE — PROGRESS NOTES
[facility] -   Infectious diseases evaluation  admission date 9/16/2022    reason for consultation:   bandemia    Impression :   Current:  9/16 bladder cancer involving wall and lymph nodes, post radical cystectomy, prostatectomy, groin lymph node dissection  9/22 SBO, had lysis of adhesions  9/22 bilateral ureteral anastomotic leak, with large abdominal collection, post repair 9/22 9/23 bilat nephrostomy tube placed  10/4 B/l nephrostograms to assess nephrostomy tube positioning and degrees of distal ureteral intestinal anastomotic leak - NEGATIVE for leak  9/23 closure of the fascia, abdominal wound open with VAC  Ongoing bandemia  Pseudomonas/Enterococcus UTI, 9/16-  Cefepime 9/19 until 9/25  Right nephrostomy urine infection with Candida albicans 9/23  Noted at the time of right percutaneous nephrostomy placement 9/23  Ongoing bandemia -resolving  Hypernatremia -on fluids    Other:    Discussion / summary of stay / plan of care     Recommendations   The cause of the bandemia could be multifactorial, including a persistent urinoma  Enterococcus is not well covered by cefepime course that the patient had and would suggest trying a course of Zosyn for now  I agree with the Diflucan,  Both antibiotics to be given for 7 days until 10/3  Monitor response of the white count otherwise get a CT of the abdomen look for further collection  Case discussed with general surgery and patient    Zosyn and diflucan ends 10/3  Patient clinically better  -continues to have leukocytosis - possibly from a urinoma - but Suggest CT AP to eval for any remaining collection    Leukocytosis resolving. Patient has very cloudy urine with flakes, likely yeast in R percutaneous bag. Obtain urine culture for sensitivity  Will resume 200 mg Diflucan SOLUTION, as patient struggled to take oral pills  If patient spikes a fever, get CT for possibility of bacteria within abdominal urinoma. Infection Control Recommendations   [place] Precautions    Antimicrobial Stewardship Recommendations   Simplification of therapy  Targeted therapy      History of Present Illness:   Initial history:  Riccardo Rachel is a 74y.o.-year-old male with history of bladder cancer involving the wall of the bladder as well as the groin lymph nodes. Came in for radical cystectomy done on 9/16 with ileal conduit placement, removal of the groin lymph nodes, but complicated with a small bowel obstruction and bilateral ureteral anastomotic leak. CT scan of the abdomen  9/22 which had showed at the time a large anterior pelvic fluid collection 15 x 13 x 6 cmWith variable densities extending from the cystectomy bed    Taken back to surgery on 9/22, lysis of adhesion that was thought to be causing the SBO, as well as repair of the anastomotic leak, and placement of 2 bilateral nephrostomy tubes. 9/23 patient went back to surgery for closure of abdominal fascia and subcutaneous tissue that stayed open with a VAC placement over it -    Due to hydronephrosis, bilat PCNT placed by IR 9/23, A repeat urine culture from 9/23 done due to cloudy R urine, by IR,  is showing Candida albicans 9/26. The patient has been started on Diflucan. Along the way on 9/16 the urine grew Pseudomonas and Enterococcus, resistant to Cipro and patient has received a course of cefepime from 9/19 and 2 until 9/25. His white count remains elevated actually after a feeling that it was improving, he started going worse, today it is up to 20. Infectious is consulted for the concern of a ongoing infection. The right nephro drain was giving very little urine and had a urogram  9/26 that showed good positioning and no leak. Since then right nephrostomy line has been giving a better input.     Today the patient is alert appropriate he has an NG tube, was trying to have some liquids p.o. and today was able to tolerate 2 popsicles without vomiting. He does have gurgling over the abdomen, and has no abdominal tenderness otherwise. He has only abdominal pain on the surgical site. He has 2 MERLIN drains giving serosanguineous fluid, and he has 2 percutaneous nephrostomy drains that are giving clear urine. Interval changes  10/5/2022   Patient Vitals for the past 8 hrs:   BP Temp Temp src Pulse Resp SpO2 Weight   10/05/22 1200 132/70 98.1 °F (36.7 °C) Oral 82 16 96 % --   10/05/22 0830 138/69 98.4 °F (36.9 °C) Oral 80 16 97 % --   10/05/22 0600 -- -- -- -- -- -- 263 lb (119.3 kg)     10/4  Afebrile, hypertensive  Patient returning from anterograde pyelogram   +abdominal tenderness, +discomfort from Wound VAC. No signs of discharge or dehiscence   Continues to have drainage out clear yellow drainage out MERLIN drains and dark yellow out nephrostomy tubes  No new complaint of diarrhea  Leukocytosis resolving     10/5   Afebrile  Continues to have abdominal pain and tenderness  Drainage out MERLIN drains and nephrostomy tubes  R nephrostomy collecting yellow fluid with cloudy chunks, L nephrostomy jared and clear  No diarrhea  Leukocytosis continues to resolve      Summary of relevant labs:  Labs:  WBC 50-02-75-18-20.3- 16.5 - 19.1 - 14.3 - 18.1 -15.1 - 11.8 -10.3  Creatinine1.39 -1.41 - 1.39 - 1.36 - 1.44 -1.49 - 1.28   Micro:  UA 9/18 - large leukocyte esterase and positive nitrates  9/16 UC Pseudomonas aeruginosa 2 strains, 1 sensitive to Cipro the other resistant,  and Enterococcus faecalis sensitive to Cipro  9/23 UC with Candida    Urine culture from  R nephrostomy due to cloudiness, 9/23 showing Candida albicans/W   Imaging:  10/4 IR antegrade pyelogram Migration of the left double-J stent mostly into the ileal loop with the proximal portion of the double-J stent in the distal left ureter. Distal left ureter is patent and empties promptly into the ileal loop. Moderate hydroureter and hydronephrosis identified.     There is prominent right-sided hydronephrosis and hydroureter proximally. There is a kink in the ureter proximally that appears to create a functional obstruction in the proximal ureter. Under higher pressure injection contrast than extends distally into the ureter and empties into the ileal loop. It is also noted that the distal ureter on the right is considerably dilated. Right-sided double-J stent may not be working. KUB 10/1   Presence of bilateral nephrostomy tubes, stable. There is been interval placement of an apparent right ureteral stent with distal end likely in an ileal loop pouch. Bowel gas pattern favors ileus with evolving partial small bowel obstruction not excluded. No free air is demonstrated. CTAP 9/22  Small bowel obstruction, with a transition point near the jejunoileal junction adjacent/medial to the ileostomy site. Large pelvic fluid collection with varying heme densities, some of which is recent, extending from the cystectomy bed, as above. Ureteral stents, extending out the conduit and ileostomy site with similar moderate-severe right hydroureteronephrosis, and new mild-moderate left hydroureteronephrosis. Postsurgical changes with scattered pneumoperitoneum/ascites, extensive subcutaneous air/soft tissue changes. Enteric tube in satisfactory position with its tip in the stomach. Segmental/subsegmental lung base changes posteriorly. Multiple additional findings, either unchanged or incidental, as detailed in the body of the report above. I have personally reviewed the past medical history, past surgical history, medications, social history, and family history, and I haveupdated the database accordingly. Allergies: Other     Review of Systems:     Review of Systems   Constitutional:  Negative for appetite change, chills and diaphoresis. HENT:  Negative for congestion and sinus pressure. Eyes:  Negative for visual disturbance. Respiratory:  Negative for chest tightness. Cardiovascular:  Negative for chest pain. Gastrointestinal:  Positive for abdominal pain. Negative for abdominal distention. Endocrine: Negative for cold intolerance and heat intolerance. Genitourinary:  Negative for difficulty urinating and dysuria. Musculoskeletal:  Negative for arthralgias. Skin:  Negative for color change. Allergic/Immunologic: Negative for immunocompromised state. Neurological:  Negative for dizziness. Hematological:  Negative for adenopathy. Psychiatric/Behavioral:  Negative for agitation. Physical Examination :       Physical Exam  Constitutional:       General: He is not in acute distress. Appearance: He is obese. He is not toxic-appearing. Comments: complains of being cold   HENT:      Head: Normocephalic and atraumatic. Right Ear: External ear normal.      Left Ear: External ear normal.      Nose: Nose normal. No congestion. Mouth/Throat:      Mouth: Mucous membranes are moist.      Pharynx: Oropharynx is clear. No oropharyngeal exudate. Eyes:      General: No scleral icterus. Extraocular Movements: Extraocular movements intact. Pupils: Pupils are equal, round, and reactive to light. Cardiovascular:      Rate and Rhythm: Normal rate and regular rhythm. Pulses: Normal pulses. Heart sounds: No murmur heard. No gallop. Pulmonary:      Effort: Pulmonary effort is normal. No respiratory distress. Breath sounds: No wheezing. Abdominal:      General: Bowel sounds are normal. There is no distension. Tenderness: There is no abdominal tenderness. Musculoskeletal:         General: No swelling or deformity. Cervical back: Normal range of motion. No rigidity. Lymphadenopathy:      Cervical: No cervical adenopathy. Skin:     General: Skin is warm. Capillary Refill: Capillary refill takes less than 2 seconds. Coloration: Skin is not jaundiced. Findings: No bruising.    Neurological:      General: No focal deficit present. Mental Status: He is alert and oriented to person, place, and time. Psychiatric:         Mood and Affect: Mood normal.         Behavior: Behavior normal.       Past Medical History:     Past Medical History:   Diagnosis Date    Acute renal failure with tubular necrosis ([facility] [address] 05/26/2022    Likely ischemic ATN/prerenal acidemia from intractable nausea vomiting as result of chemotherapy, baseline 1.3-1.4 peaked up to 2.3 in May 2022    Arthritis     BPH with obstruction/lower urinary tract symptoms     CAD (coronary artery disease) 06/2022    nonobstructive on cath    Caffeine use     3 cans soda/pop    Cancer ([facility] [address]     bladder cancer. Dr. 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   Baseline 1.3-1.4    COVID-19 08/16/2021    SOB, fatigue, fever, chills  x 3 weeks    Depression     Dilated cardiomyopathy ([address] 05/26/2022    Ejection fraction 40 to 45%, does have symptoms of dyspnea and decreased effort tolerance    GERD (gastroesophageal reflux disease)     High cholesterol     Kidney calculi     Sleep apnea     does not use cpap    Under care of team     Dr. Serene Frausto Nephrology    Under care of team     Dr. Juli Ceballos. last visit approx 9/2021    Under care of team     Dr. Karis Gutierrez [facility] last visit 8/03/2022    Under care of team     Dr. Jake Abdullahi    Under care of team     Dr. Kerri Virgen       Past Surgical  History:     Past Surgical History:   Procedure Laterality Date    BLADDER REMOVAL      BLADDER REMOVAL N/A 9/16/2022    XI ROBOTIC LAPARASCOPIC ASSISTED RADICAL CYSTOPROSTATECTOMY, BILATERAL PELVIC LYMPHADENECTOMY AND ILEAL CONDUIT FORMATION performed by Annette Thompson MD at [facility] Right 9/16/2022    CYSTOSCOPY STENT REMOVAL performed by Annette Thompson MD at [address]  06/16/2022    nonobstructive CAD    COLONOSCOPY      IR NEPHROSTOMY PERCUTANEOUS LEFT  9/23/2022    IR NEPHROSTOMY PERCUTANEOUS LEFT 9/23/2022 MD MARSHALL [facility]    IR NEPHROSTOMY PERCUTANEOUS RIGHT  9/23/2022    IR NEPHROSTOMY PERCUTANEOUS RIGHT 9/23/2022 MD MARSHALL [facility]    IR PORT PLACEMENT EQUAL OR GREATER THAN 5 YEARS  02/25/2022    IR PORT PLACEMENT EQUAL OR GREATER THAN 5 YEARS 2/25/2022 [facility]    KNEE ARTHROSCOPY      left    LAPAROTOMY N/A 9/22/2022    LAPAROTOMY EXPLORATORY, LYSIS OF ADHESIONS, REPAIR OF BILATERAL URETERAL ANASTOMOSES, ABDOMINAL WASHOUT, PLACEMENT OF ABTHERA WOUND VAC performed by Yuan Quispe DO at [address] N/A 9/23/2022    2ND LOOK LAPAROTOMY,  ABDOMINAL WASHOUT, ABDOMINAL CLOSURE, WOUND VAC PLACEMENT performed by Yuan Quispe DO at [place] [id number]  09/16/2022    Cystoprostatectomy, Bilateral Pelvic Lymphadenectomy, ileo conduit formation, cystectomy stent removal (right)       Medications:      potassium chloride  20 mEq Oral Once    fluconazole  200 mg Oral Daily    metoprolol  5 mg IntraVENous Q8H    heparin (porcine)  5,000 Units SubCUTAneous 3 times per day    [Held by provider] metoclopramide  5 mg IntraVENous Q6H    chlorhexidine  15 mL Mouth/Throat BID    bisacodyl  10 mg Rectal Daily    pantoprazole (PROTONIX) 40 mg injection  40 mg IntraVENous Q12H    polyethylene glycol  17 g Oral Daily    buPROPion  300 mg Oral QAM    simvastatin  40 mg Oral Nightly    sodium chloride flush  5-40 mL IntraVENous 2 times per day    acetaminophen  650 mg Oral Q6H       Social History:     Social History     Socioeconomic History    Marital status:      Spouse name: Not on file    Number of children: Not on file    Years of education: Not on file    Highest education level: Not on file   Occupational History    Not on file   Tobacco Use    Smoking status: Former     Packs/day: 0.25     Years: 9.00     Pack years: 2.25     Types: Cigarettes     Start date: 1983     Quit date: 2/10/1992     Years since quittin.6    Smokeless tobacco: Never   Vaping Use    Vaping Use: Never used   Substance and Sexual Activity    Alcohol use: Not Currently    Drug use: No    Sexual activity: Yes     Partners: Female   Other Topics Concern    Not on file   Social History Narrative    Not on file     Social Determinants of Health     Financial Resource Strain: Not on file   Food Insecurity: Not on file   Transportation Needs: Not on file   Physical Activity: Not on file   Stress: Not on file   Social Connections: Not on file   Intimate Partner Violence: Not on file   Housing Stability: Not on file       Family History:     Family History   Problem Relation Age of Onset    Cancer Father         bladder cancer    Urolithiasis Father       Medical Decision Making:   I have independently reviewed/ordered the following labs:    CBC with Differential:   Recent Labs     10/04/22  0550 10/05/22  0536   WBC 11.8* 10.3   HGB 7.8* 7.5*   HCT 25.1* 23.2*    320     BMP:  Recent Labs     10/04/22  0550 10/05/22  0536    137   K 3.5* 3.5*    103   CO2 26 24   BUN 12 9   CREATININE 1.28* 1.25*   MG 1.7 1.6     Hepatic Function Panel:   No results for input(s): PROT, LABALBU, BILIDIR, IBILI, BILITOT, ALKPHOS, ALT, AST in the last 72 hours. No results for input(s): RPR in the last 72 hours. No results for input(s): HIV in the last 72 hours. No results for input(s): BC in the last 72 hours. Lab Results   Component Value Date/Time    CREATININE 1.25 10/05/2022 05:36 AM    GLUCOSE 93 10/05/2022 05:36 AM       Detailed results:         Thank you for allowing us to participate in the care of this patient. Please call with questions. This note is created with the assistance of a speech recognition program.  While intending to generate adocument that actually reflects the content of the visit, the document can still have some errors including those of syntax and sound a like substitutions which may escape proof reading. It such instances, actual meaningcan be extrapolated by contextual diversion. Office: [phone]  Perfect serve / office [phone]      Jonny Fry, OMS3      I have discussed the care of the patient, including pertinent history and exam findings,  with the resident. I have seen and examined the patient and the key elements of all parts of the encounter have been performed by me. I agree with the assessment, plan and orders as documented by the resident.     Sindi Raymond, Infectious Diseases

## 2022-10-05 NOTE — PLAN OF CARE
Problem: Discharge Planning  Goal: Discharge to home or other facility with appropriate resources  10/5/2022 1801 by Shaheen Masterson RN  Outcome: Progressing  10/5/2022 0644 by Stanley Hall RN  Outcome: Progressing     Problem: Pain  Goal: Verbalizes/displays adequate comfort level or baseline comfort level  10/5/2022 1801 by Shaheen Masterson RN  Outcome: Progressing  10/5/2022 0644 by Stanley Hall RN  Outcome: Progressing     Problem: Safety - Adult  Goal: Free from fall injury  10/5/2022 1801 by Shaheen Masterson RN  Outcome: Progressing  10/5/2022 0644 by Stanley Hall RN  Outcome: Progressing     Problem: ABCDS Injury Assessment  Goal: Absence of physical injury  10/5/2022 1801 by Shaheen Masterson RN  Outcome: Progressing  10/5/2022 0644 by Stanley Hall RN  Outcome: Progressing     Problem: Nutrition Deficit:  Goal: Optimize nutritional status  10/5/2022 1801 by Shaheen Masterson RN  Outcome: Progressing  10/5/2022 0644 by Stanley Hall RN  Outcome: Progressing     Problem: Skin/Tissue Integrity  Goal: Absence of new skin breakdown  Description: 1. Monitor for areas of redness and/or skin breakdown  2. Assess vascular access sites hourly  3. Every 4-6 hours minimum:  Change oxygen saturation probe site  4. Every 4-6 hours:  If on nasal continuous positive airway pressure, respiratory therapy assess nares and determine need for appliance change or resting period. 10/5/2022 1801 by Shaheen Masterson RN  Outcome: Progressing  10/5/2022 0644 by Stanley Hall RN  Outcome: Progressing     Problem: Safety - Medical Restraint  Goal: Remains free of injury from restraints (Restraint for Interference with Medical Device)  Description: INTERVENTIONS:  1. Determine that other, less restrictive measures have been tried or would not be effective before applying the restraint  2. Evaluate the patient's condition at the time of restraint application  3.  Inform patient/family regarding the reason for restraint  4. Q2H: Monitor safety, psychosocial status, comfort, nutrition and hydration  10/5/2022 1801 by Valente Maria RN  Outcome: Progressing  10/5/2022 0644 by Dejah Haley RN  Outcome: Progressing     Problem: Confusion  Goal: Confusion, delirium, dementia, or psychosis is improved or at baseline  Description: INTERVENTIONS:  1. Assess for possible contributors to thought disturbance, including medications, impaired vision or hearing, underlying metabolic abnormalities, dehydration, psychiatric diagnoses, and notify attending LIP  2. [place] high risk fall precautions, as indicated  3. Provide frequent short contacts to provide reality reorientation, refocusing and direction  4. Decrease environmental stimuli, including noise as appropriate  5. Monitor and intervene to maintain adequate nutrition, hydration, elimination, sleep and activity  6. If unable to ensure safety without constant attention obtain sitter and review sitter guidelines with assigned personnel  7.  Initiate Psychosocial CNS and Spiritual Care consult, as indicated  10/5/2022 1801 by Valente Maria RN  Outcome: Progressing  10/5/2022 0644 by Dejah Haley RN  Outcome: Progressing     Problem: Gastrointestinal - Adult  Goal: Minimal or absence of nausea and vomiting  10/5/2022 1801 by Valente Maria RN  Outcome: Progressing  10/5/2022 0644 by Dejah Haley RN  Outcome: Progressing  Goal: Maintains or returns to baseline bowel function  10/5/2022 1801 by Valente Maria RN  Outcome: Progressing  10/5/2022 0644 by Dejah Haley RN  Outcome: Progressing  Goal: Maintains adequate nutritional intake  10/5/2022 1801 by Valente Maria RN  Outcome: Progressing  10/5/2022 0644 by Dejah Haley RN  Outcome: Progressing  Goal: Establish and maintain optimal ostomy function  10/5/2022 1801 by Valente Maria RN  Outcome: Progressing  10/5/2022 0644 by Fabian Jackson Milton Barlow RN  Outcome: Progressing     Problem: Genitourinary - Adult  Goal: Urinary catheter remains patent  10/5/2022 1801 by Jesus Teresa RN  Outcome: Progressing  10/5/2022 0644 by Errol Steele RN  Outcome: Progressing

## 2022-10-05 NOTE — PROGRESS NOTES
Occupational [address]  Occupational Therapy Not Seen Note    DATE: 10/5/2022    NAME: Riccardo Rachel  MRN: [id number]   : 1955      Patient not seen this date for Occupational Therapy due to:    Patient Declined: d/t fatigue, noting to have sx later this day.  RN agreed to hold therapy    Next Scheduled Treatment: Attempt 10/6    Electronically signed by AMALIA Stephens on 10/5/2022 at 11:50 AM'

## 2022-10-05 NOTE — PROGRESS NOTES
[facility] Nurse  Follow Up      NAME:  Kameron Bailon RECORD NUMBER:  [id number]  AGE: 74 y.o. GENDER: male  : 1955  TODAY'S DATE:  10/5/2022    Subjective:     Reason for [id number] Nurse Evaluation and Assessment:   Assessment: NPWT dressing changes to mid abdominal surgical wound using white foam to base under black granufoam at -125 mHg vacuum. Urostomy care and education.             Objective:      /76   Pulse 85   Temp 98.1 °F (36.7 °C) (Oral)   Resp 14   Ht 6' 2.02\" (1.88 m)   Wt 263 lb (119.3 kg)   SpO2 100%   BMI 33.75 kg/m²   Camacho Risk Score: Camacho Scale Score: 14    LABS    CBC:   Lab Results   Component Value Date/Time    WBC 10.3 10/05/2022 05:36 AM    RBC 2.63 10/05/2022 05:36 AM    HGB 7.5 10/05/2022 05:36 AM     CMP:  Albumin:    Lab Results   Component Value Date/Time    LABALBU 1.6 2022 09:13 AM     PT/INR:    Lab Results   Component Value Date/Time    PROTIME 13.6 2022 12:26 PM    INR 1.1 2022 12:26 PM     HgBA1c:  No results found for: LABA1C  PTT: No components found for: LABPTT      Assessment:       Measurements:     10/05/22 1549   Wound 22 Abdomen Mid SURGICAL INCISION   Date First Assessed: 22   Present on Hospital Admission: No  Primary Wound Type: Surgical Type  Location: Abdomen  Wound Location Orientation: Mid  Wound Description (Comments): SURGICAL INCISION   Wound Image    Wound Etiology Surgical   Dressing Status New dressing applied   Wound Cleansed Cleansed with saline   Dressing/Treatment Negative pressure wound therapy   Dressing Change Due 10/07/22   Wound Assessment Subcutaneous;Pink/red;Slough   Drainage Amount Small   Drainage Description Serosanguinous   Odor None   Verito-wound Assessment Intact   Negative Pressure Wound Therapy Abdomen Mid   Placement Date/Time: 22 1310   Location: Abdomen  Wound Location Orientation: Mid   Wound Type Surgical   Unit Type KCI VAC Ulta   Dressing Type White Foam;Black Foam   Number of pieces used 3   Canister changed? Yes   Dressing Status New dressing applied   Dressing Changed Changed/New   Dressing Change Due 10/07/22          Response to treatment:  Well tolerated by patient. Pain Assessment:  Premedicated: Yes     Plan   Plan of Care:   ABDOMINAL WOUND:  NPWT with white foam to base of wound under black granufoam at -125 mmHG vacuum. Change sponge M-W-F. Change canister when full or weekly. UROSTOMY:  pouch is intact with drain protruding from the os. Maintain. Turn every 2 hours  Float heels off of bed with pillows under calves     Use lift sling to reposition patient to minimize potential for shear injury. Foam sacrum dressing to sacrococcygeal area. Peel back dressing, inspect skin beneath, re-secure. Change every 72 hours and prn wrinkles, soilage. Discontinue Sacral dressing if repeatedly soiled by incontinence. Moisture wicking under pads      Current Diet: ADULT DIET;  Clear Liquid  ADULT ORAL NUTRITION SUPPLEMENT; Breakfast, Lunch, Dinner; Clear Liquid Oral Supplement        Patient/Caregiver Teaching:  Level of patient/caregiver understanding able to:   [] Indicates understanding                [x] Needs reinforcement  [] Unsuccessful                                  [x] Verbal Understanding  [] Demonstrated understanding        [] No evidence of learning  [] Refused teaching                           [] Robert Hunter RN BSN, [employer]

## 2022-10-05 NOTE — PROGRESS NOTES
Physical Therapy        Physical Therapy Cancel Note      DATE: 10/5/2022    NAME: Ruby Espinoza  MRN: [id number]   : 1955      Patient not seen this date for Physical Therapy due to:    Patient Declined: Pt stated, \"I am having surgery. \"  RN stated that patient will be going to IR, but not sure when.   Plan to check back tomorrow 10/6/2022      Electronically signed by Claude Brown, PTA on 10/5/2022 at 10:13 AM

## 2022-10-05 NOTE — PLAN OF CARE
Problem: Discharge Planning  Goal: Discharge to home or other facility with appropriate resources  10/5/2022 0644 by Blanca Caldwell RN  Outcome: Progressing  10/4/2022 1905 by Miriam Marin RN  Outcome: Progressing     Problem: Pain  Goal: Verbalizes/displays adequate comfort level or baseline comfort level  10/5/2022 0644 by Blanca Caldwell RN  Outcome: Progressing  10/4/2022 1905 by Miriam Marin RN  Outcome: Progressing     Problem: ABCDS Injury Assessment  Goal: Absence of physical injury  10/5/2022 0644 by Blanca Caldwell RN  Outcome: Progressing  10/4/2022 1905 by Miriam Marin RN  Outcome: Progressing     Problem: Nutrition Deficit:  Goal: Optimize nutritional status  10/5/2022 0644 by Blanca Caldwell RN  Outcome: Progressing  10/4/2022 1905 by Miriam Marin RN  Outcome: Progressing     Problem: Skin/Tissue Integrity  Goal: Absence of new skin breakdown  Description: 1. Monitor for areas of redness and/or skin breakdown  2. Assess vascular access sites hourly  3. Every 4-6 hours minimum:  Change oxygen saturation probe site  4. Every 4-6 hours:  If on nasal continuous positive airway pressure, respiratory therapy assess nares and determine need for appliance change or resting period. 10/5/2022 0644 by Blanca Caldwell RN  Outcome: Progressing  10/4/2022 1905 by Miriam Marin RN  Outcome: Progressing     Problem: Safety - Medical Restraint  Goal: Remains free of injury from restraints (Restraint for Interference with Medical Device)  Description: INTERVENTIONS:  1. Determine that other, less restrictive measures have been tried or would not be effective before applying the restraint  2. Evaluate the patient's condition at the time of restraint application  3. Inform patient/family regarding the reason for restraint  4.  Q2H: Monitor safety, psychosocial status, comfort, nutrition and hydration  10/5/2022 0644 by Blanca Caldwell RN  Outcome: Progressing  10/4/2022 1905 by Bea Catherine RN  Outcome: Progressing

## 2022-10-05 NOTE — PROGRESS NOTES
Ivet Gabriel MD FACS   Urology Progress Note     Subjective:   No acute events overnight  No fevers or chills, vital stable  No nausea or vomiting  Passing flatus and continues to have bowel movements, they have been getting more solid  Walking in the room, not in hallways yet    Antegrade nephrostograms obtained yesterday, significantly and persistently dilated right ureter with possible functional obstruction proximally, but with contrast reaching conduit on both sides and no clear evidence of leak    Suction device inserted into stoma 10/4/2022    Right nephrostomy tube 200cc cloudy white urine with debris  Left nephrostomy tube 1.4L / 24 hours jared  Urostomy 700 cc / 24 hours clear yellow urine  Upper abdominal drain 480 cc / 24 hours serous  Lower abdominal drain 425 cc / 24 hours serous    Am labs pending    Patient Vitals for the past 24 hrs:   BP Temp Temp src Pulse Resp SpO2 Weight   10/05/22 0600 -- -- -- -- -- -- 263 lb (119.3 kg)   10/05/22 0010 139/71 -- -- 83 -- -- --   10/04/22 2019 (!) 140/70 98.2 °F (36.8 °C) Oral 85 19 96 % --   10/04/22 1600 135/73 -- -- 83 -- 96 % --   10/04/22 1127 125/67 -- -- 85 -- -- --   10/04/22 0956 -- -- -- -- -- 98 % --   10/04/22 0700 (!) 149/72 98.5 °F (36.9 °C) Oral 85 19 97 % --       Intake/Output Summary (Last 24 hours) at 10/5/2022 0622  Last data filed at 10/4/2022 2313  Gross per 24 hour   Intake --   Output 3005 ml   Net -3005 ml       Recent Labs     10/03/22  0533 10/04/22  0550   WBC 15.1* 11.8*   HGB 7.7* 7.8*   HCT 24.5* 25.1*   MCV 89.7 88.7    307     Recent Labs     10/03/22  0533 10/04/22  0550    136   K 3.8 3.5*    102   CO2 26 26   PHOS 2.9 2.8   BUN 13 12   CREATININE 1.49* 1.28*       No results for input(s): COLORU, PHUR, LABCAST, WBCUA, RBCUA, MUCUS, TRICHOMONAS, YEAST, BACTERIA, CLARITYU, SPECGRAV, LEUKOCYTESUR, UROBILINOGEN, BILIRUBINUR, BLOODU in the last 72 hours.     Invalid input(s): NITRATE, GLUCOSEUKETONESUAMORPHOUS      Additional Lab/culture results:    Physical Exam:   General: A/O x 3, no acute distress  HEENT: moist mucous membranes  Neck: Supple   Chest: bilateral symmetrical chest rise   Circulatory: Peripheries warm , well perfused   P/A: soft, clean, dry and intact with skin glue, ostomy pink with suction device in place, bilateral ureteral catheters barely visible, wound vac in place, MERLIN x 2 in place with serous drainage  Extremities: Bilateral LE pitting edema   : left nephrostomy tube with clear jared urine, right nephrostomy tube with cloudy white urine and debris    Interval Imaging Findings: none    Impression:  none    Srinivasa Huertas is a 82-year-old male   Active  problem list  Hx of bladder cancer  Robotic assisted laparoscopic cystoprostatectomy with ileal conduit creation on 9/16/2022   Ex lap ROLANDO repair of BL ureteral anastamosis and abd washout with abthera due to ureteral anastamotic leaks and SBO and second look on 9/22 and 9/23      Plan:  Bilateral nephrostograms negative for leak but continues to demonstrate significantly dilated right collecting system  Will request IR to place bilateral nephroureteral catheters  Continue suction device within conduit  Diet:per general General surgery, dietary on board as well  Pain control and antiemetic as needed  Maintain bilateral nephrostomy tubes and bilateral ureteral stents  Maintain MERLIN drains - just to gravity, no suction  Appreciate ID input  Urine culture growing pseudomonas, enterococcus and and second pseudomonas colony type, pansusceptible only gentamicin resistance s/p 7 days cefepime   Infectious disease consulted, on Zosyn for Enterococcus coverage, Diflucan for urine culture 9/23/2022 growing Candida albicans/dubliensis-both antibiotics for 7 days total coverage until 10/3/2022 - completed.   Appreciate infectious disease recommendations  IV fluids  DVT PPX: Subq heparin 5000 units TID  Continue to monitor  Discharge planning: inappropriate for discharge [facility], DO, PGY-3  6:22 AM 10/5/2022    I have reviewed the history above and agree. I have repeated the key portions of the physical exam and concur with the resident's findings. I have reviewed all laboratory findings and imaging reports/films. I agree with the plan as noted above.     Electronically signed by Arvis Blizzard, MD on 10/6/2022 at 7:44 AM

## 2022-10-06 LAB
ANION GAP SERPL CALCULATED.3IONS-SCNC: 10 MMOL/L (ref 9–17)
BUN BLDV-MCNC: 7 MG/DL (ref 8–23)
CALCIUM SERPL-MCNC: 7.7 MG/DL (ref 8.6–10.4)
CHLORIDE BLD-SCNC: 100 MMOL/L (ref 98–107)
CO2: 23 MMOL/L (ref 20–31)
CREAT SERPL-MCNC: 1.12 MG/DL (ref 0.7–1.2)
CREATININE FLUID: 11 MG/DL
CREATININE FLUID: 2.3 MG/DL
GFR SERPL CREATININE-BSD FRML MDRD: >60 ML/MIN/1.73M2
GLUCOSE BLD-MCNC: 77 MG/DL (ref 70–99)
HCT VFR BLD CALC: 25.6 % (ref 40.7–50.3)
HEMOGLOBIN: 8.2 G/DL (ref 13–17)
MAGNESIUM: 1.6 MG/DL (ref 1.6–2.6)
MCH RBC QN AUTO: 28.3 PG (ref 25.2–33.5)
MCHC RBC AUTO-ENTMCNC: 32 G/DL (ref 28.4–34.8)
MCV RBC AUTO: 88.3 FL (ref 82.6–102.9)
NRBC AUTOMATED: 0 PER 100 WBC
PDW BLD-RTO: 16.2 % (ref 11.8–14.4)
PHOSPHORUS: 2.8 MG/DL (ref 2.5–4.5)
PLATELET # BLD: 329 K/UL (ref 138–453)
PMV BLD AUTO: 9.9 FL (ref 8.1–13.5)
POTASSIUM SERPL-SCNC: 3.7 MMOL/L (ref 3.7–5.3)
RBC # BLD: 2.9 M/UL (ref 4.21–5.77)
SODIUM BLD-SCNC: 133 MMOL/L (ref 135–144)
SPECIMEN TYPE: NORMAL
SPECIMEN TYPE: NORMAL
WBC # BLD: 14.3 K/UL (ref 3.5–11.3)

## 2022-10-06 PROCEDURE — 6370000000 HC RX 637 (ALT 250 FOR IP): Performed by: STUDENT IN AN ORGANIZED HEALTH CARE EDUCATION/TRAINING PROGRAM

## 2022-10-06 PROCEDURE — 83735 ASSAY OF MAGNESIUM: CPT

## 2022-10-06 PROCEDURE — 6360000002 HC RX W HCPCS: Performed by: STUDENT IN AN ORGANIZED HEALTH CARE EDUCATION/TRAINING PROGRAM

## 2022-10-06 PROCEDURE — 6360000002 HC RX W HCPCS: Performed by: SPECIALIST

## 2022-10-06 PROCEDURE — 2580000003 HC RX 258: Performed by: STUDENT IN AN ORGANIZED HEALTH CARE EDUCATION/TRAINING PROGRAM

## 2022-10-06 PROCEDURE — 1200000000 HC SEMI PRIVATE

## 2022-10-06 PROCEDURE — 85027 COMPLETE CBC AUTOMATED: CPT

## 2022-10-06 PROCEDURE — 84100 ASSAY OF PHOSPHORUS: CPT

## 2022-10-06 PROCEDURE — 82570 ASSAY OF URINE CREATININE: CPT

## 2022-10-06 PROCEDURE — 2580000003 HC RX 258: Performed by: INTERNAL MEDICINE

## 2022-10-06 PROCEDURE — 99232 SBSQ HOSP IP/OBS MODERATE 35: CPT | Performed by: INTERNAL MEDICINE

## 2022-10-06 PROCEDURE — 2500000003 HC RX 250 WO HCPCS: Performed by: STUDENT IN AN ORGANIZED HEALTH CARE EDUCATION/TRAINING PROGRAM

## 2022-10-06 PROCEDURE — 80048 BASIC METABOLIC PNL TOTAL CA: CPT

## 2022-10-06 PROCEDURE — C9113 INJ PANTOPRAZOLE SODIUM, VIA: HCPCS | Performed by: STUDENT IN AN ORGANIZED HEALTH CARE EDUCATION/TRAINING PROGRAM

## 2022-10-06 PROCEDURE — 36415 COLL VENOUS BLD VENIPUNCTURE: CPT

## 2022-10-06 PROCEDURE — 6370000000 HC RX 637 (ALT 250 FOR IP): Performed by: INTERNAL MEDICINE

## 2022-10-06 RX ADMIN — ACETAMINOPHEN 650 MG: 325 TABLET ORAL at 21:33

## 2022-10-06 RX ADMIN — SODIUM CHLORIDE: 4.5 INJECTION, SOLUTION INTRAVENOUS at 06:06

## 2022-10-06 RX ADMIN — OXYCODONE 10 MG: 5 TABLET ORAL at 02:04

## 2022-10-06 RX ADMIN — SIMVASTATIN 40 MG: 40 TABLET, FILM COATED ORAL at 21:23

## 2022-10-06 RX ADMIN — ACETAMINOPHEN 650 MG: 325 TABLET ORAL at 16:40

## 2022-10-06 RX ADMIN — METOPROLOL TARTRATE 5 MG: 1 INJECTION, SOLUTION INTRAVENOUS at 16:35

## 2022-10-06 RX ADMIN — BUPROPION HYDROCHLORIDE 300 MG: 150 TABLET, EXTENDED RELEASE ORAL at 09:25

## 2022-10-06 RX ADMIN — METOPROLOL TARTRATE 5 MG: 1 INJECTION, SOLUTION INTRAVENOUS at 01:58

## 2022-10-06 RX ADMIN — SODIUM CHLORIDE, PRESERVATIVE FREE 40 MG: 5 INJECTION INTRAVENOUS at 09:12

## 2022-10-06 RX ADMIN — SODIUM CHLORIDE, PRESERVATIVE FREE 40 MG: 5 INJECTION INTRAVENOUS at 21:24

## 2022-10-06 RX ADMIN — FLUCONAZOLE 200 MG: 40 POWDER, FOR SUSPENSION ORAL at 21:21

## 2022-10-06 RX ADMIN — CHLORHEXIDINE GLUCONATE 0.12% ORAL RINSE 15 ML: 1.2 LIQUID ORAL at 21:21

## 2022-10-06 RX ADMIN — METOPROLOL TARTRATE 5 MG: 1 INJECTION, SOLUTION INTRAVENOUS at 09:19

## 2022-10-06 RX ADMIN — HEPARIN SODIUM 5000 UNITS: 5000 INJECTION INTRAVENOUS; SUBCUTANEOUS at 21:23

## 2022-10-06 ASSESSMENT — PAIN DESCRIPTION - LOCATION
LOCATION: ABDOMEN

## 2022-10-06 ASSESSMENT — PAIN SCALES - GENERAL
PAINLEVEL_OUTOF10: 6
PAINLEVEL_OUTOF10: 8
PAINLEVEL_OUTOF10: 6
PAINLEVEL_OUTOF10: 2
PAINLEVEL_OUTOF10: 3
PAINLEVEL_OUTOF10: 6

## 2022-10-06 ASSESSMENT — ENCOUNTER SYMPTOMS
BACK PAIN: 0
CHEST TIGHTNESS: 0
COLOR CHANGE: 0
FACIAL SWELLING: 0
ABDOMINAL PAIN: 1
APNEA: 0

## 2022-10-06 ASSESSMENT — PAIN DESCRIPTION - ORIENTATION: ORIENTATION: MID;RIGHT

## 2022-10-06 ASSESSMENT — PAIN DESCRIPTION - FREQUENCY
FREQUENCY: CONTINUOUS
FREQUENCY: CONTINUOUS

## 2022-10-06 ASSESSMENT — PAIN DESCRIPTION - DESCRIPTORS: DESCRIPTORS: SHARP;STABBING

## 2022-10-06 NOTE — PROGRESS NOTES
[facility] -   Infectious diseases evaluation  admission date 9/16/2022    reason for consultation:   bandemia    Impression :   Current:  9/16 bladder cancer involving wall and lymph nodes, post radical cystectomy, prostatectomy, groin lymph node dissection  9/22 SBO, had lysis of adhesions  9/22 bilateral ureteral anastomotic leak, with large abdominal collection, post repair 9/22 9/23 bilat nephrostomy tube placed  10/4 B/l nephrostograms to assess nephrostomy tube positioning and degrees of distal ureteral intestinal anastomotic leak - NEGATIVE for leak  10/5 Urology exchanged b/l nephrostomy drains to b/l nephroureteral stent catheters placed to bag drainage  9/23 closure of the fascia, abdominal wound open with VAC  Ongoing bandemia  Pseudomonas/Enterococcus UTI, 9/16-  Cefepime 9/19 until 9/25  Right nephrostomy urine infection with Candida albicans 9/23  Noted at the time of right percutaneous nephrostomy placement 9/23  Ongoing bandemia -resolving  Hypernatremia -on fluids    Other:    Discussion / summary of stay / plan of care     Recommendations   The cause of the bandemia could be multifactorial, including a persistent urinoma  Enterococcus is not well covered by cefepime hence Zosyn   Diflucan,  Both x  7 days until 10/3    Patient clinically better  -continues to have leukocytosis - possibly from a urinoma - but Suggest CT AP to eval for any remaining collection    10/5 -Leukocytosis resolving. Patient has very cloudy urine with flakes, likely yeast in R percutaneous bag.    Obtain urine culture for sensitivity  resume 200 mg Diflucan SOLUTION, as patient struggles w pills    10/5 s/p b/l nephrostomy drains exchanged for b/l nephroureteral stent catheters to bag drainage    Infection Control Recommendations   [place] Precautions    Antimicrobial Stewardship Recommendations   Simplification of therapy  Targeted therapy      History of Present Illness: Initial history:  Beatris Joe is a 74y.o.-year-old male with history of bladder cancer involving the wall of the bladder as well as the groin lymph nodes. Came in for radical cystectomy done on 9/16 with ileal conduit placement, removal of the groin lymph nodes, but complicated with a small bowel obstruction and bilateral ureteral anastomotic leak. CT scan of the abdomen  9/22 which had showed at the time a large anterior pelvic fluid collection 15 x 13 x 6 cmWith variable densities extending from the cystectomy bed    Taken back to surgery on 9/22, lysis of adhesion that was thought to be causing the SBO, as well as repair of the anastomotic leak, and placement of 2 bilateral nephrostomy tubes. 9/23 patient went back to surgery for closure of abdominal fascia and subcutaneous tissue that stayed open with a VAC placement over it -    Due to hydronephrosis, bilat PCNT placed by IR 9/23, A repeat urine culture from 9/23 done due to cloudy R urine, by IR,  is showing Candida albicans 9/26. The patient has been started on Diflucan. Along the way on 9/16 the urine grew Pseudomonas and Enterococcus, resistant to Cipro and patient has received a course of cefepime from 9/19 and 2 until 9/25. His white count remains elevated actually after a feeling that it was improving, he started going worse, today it is up to 20. Infectious is consulted for the concern of a ongoing infection. The right nephro drain was giving very little urine and had a urogram  9/26 that showed good positioning and no leak. Since then right nephrostomy line has been giving a better input. Today the patient is alert appropriate he has an NG tube, was trying to have some liquids p.o. and today was able to tolerate 2 popsicles without vomiting. He does have gurgling over the abdomen, and has no abdominal tenderness otherwise. He has only abdominal pain on the surgical site.   He has 2 MERLIN drains giving serosanguineous fluid, and he has 2 percutaneous nephrostomy drains that are giving clear urine. Interval changes  10/6/2022   Patient Vitals for the past 8 hrs:   BP Temp Pulse Resp SpO2   10/06/22 1623 132/72 98.1 °F (36.7 °C) 85 18 95 %       10/4  Afebrile, hypertensive  Patient returning from anterograde pyelogram   +abdominal tenderness, +discomfort from Wound VAC. No signs of discharge or dehiscence   Continues to have drainage out clear yellow drainage out MERLIN drains and dark yellow out nephrostomy tubes  No new complaint of diarrhea  Leukocytosis resolving     10/5   Afebrile  Continues to have abdominal pain and tenderness  Drainage out MERLIN drains and nephrostomy tubes  R nephrostomy collecting yellow fluid with cloudy chunks, L nephrostomy jared and clear  No diarrhea  Leukocytosis continues to resolve    10/6   Afebrile - on po clears  Patient continues to have drainage  No diarrhea   Leukocytosis spiked  Tolerating diflucan solution  R PCNT and denis shows less turbid urine    Summary of relevant labs:  Labs:  WBC 28-39-36-18-20.3- 16.5 - 19.1 - 14.3 - 18.1 -15.1 - 11.8 -10.3 - 14.3  Creatinine1.39 -1.41 - 1.39 - 1.36 - 1.44 -1.49 - 1.28   Micro:  UA 9/18 - large leukocyte esterase and positive nitrates  9/16 UC Pseudomonas aeruginosa 2 strains, 1 sensitive to Cipro the other resistant,  and Enterococcus faecalis sensitive to Cipro  9/23 UC with Candida    Urine culture from  R nephrostomy due to cloudiness, 9/23 showing Candida albicans/W   Imaging:  10/4 IR antegrade pyelogram Migration of the left double-J stent mostly into the ileal loop with the proximal portion of the double-J stent in the distal left ureter. Distal left ureter is patent and empties promptly into the ileal loop. Moderate hydroureter and hydronephrosis identified. There is prominent right-sided hydronephrosis and hydroureter proximally. There is a kink in the ureter proximally that appears to create a functional obstruction in the proximal ureter. Under higher pressure injection contrast than extends distally into the ureter and empties into the ileal loop. It is also noted that the distal ureter on the right is considerably dilated. Right-sided double-J stent may not be working. KUB 10/1   Presence of bilateral nephrostomy tubes, stable. There is been interval placement of an apparent right ureteral stent with distal end likely in an ileal loop pouch. Bowel gas pattern favors ileus with evolving partial small bowel obstruction not excluded. No free air is demonstrated. CTAP 9/22  Small bowel obstruction, with a transition point near the jejunoileal junction adjacent/medial to the ileostomy site. Large pelvic fluid collection with varying heme densities, some of which is recent, extending from the cystectomy bed, as above. Ureteral stents, extending out the conduit and ileostomy site with similar moderate-severe right hydroureteronephrosis, and new mild-moderate left hydroureteronephrosis. Postsurgical changes with scattered pneumoperitoneum/ascites, extensive subcutaneous air/soft tissue changes. Enteric tube in satisfactory position with its tip in the stomach. Segmental/subsegmental lung base changes posteriorly. Multiple additional findings, either unchanged or incidental, as detailed in the body of the report above. I have personally reviewed the past medical history, past surgical history, medications, social history, and family history, and I haveupdated the database accordingly. Allergies: Other     Review of Systems:     Review of Systems   Constitutional:  Negative for appetite change, chills and diaphoresis. HENT:  Negative for facial swelling. Eyes:  Negative for visual disturbance. Respiratory:  Negative for apnea and chest tightness. Cardiovascular:  Negative for chest pain. Gastrointestinal:  Positive for abdominal pain. Endocrine: Negative for polydipsia and polyphagia.    Genitourinary:  Negative for difficulty urinating and dysuria. Musculoskeletal:  Negative for arthralgias and back pain. Skin:  Negative for color change. Allergic/Immunologic: Negative for immunocompromised state. Neurological:  Negative for facial asymmetry. Hematological:  Does not bruise/bleed easily. Psychiatric/Behavioral:  Negative for agitation. Physical Examination :       Physical Exam  Constitutional:       General: He is not in acute distress. Appearance: He is obese. He is not toxic-appearing. HENT:      Head: Normocephalic and atraumatic. Right Ear: External ear normal.      Left Ear: External ear normal.      Nose: Nose normal.      Mouth/Throat:      Mouth: Mucous membranes are moist.      Pharynx: Oropharynx is clear. Eyes:      General: No scleral icterus. Extraocular Movements: Extraocular movements intact. Pupils: Pupils are equal, round, and reactive to light. Cardiovascular:      Rate and Rhythm: Normal rate and regular rhythm. Pulses: Normal pulses. Heart sounds: No murmur heard. No gallop. Pulmonary:      Effort: Pulmonary effort is normal. No respiratory distress. Breath sounds: No wheezing. Abdominal:      General: Bowel sounds are normal. There is no distension. Palpations: There is no mass. Musculoskeletal:         General: No swelling or deformity. Normal range of motion. Cervical back: Normal range of motion. No rigidity. Lymphadenopathy:      Cervical: No cervical adenopathy. Skin:     Capillary Refill: Capillary refill takes less than 2 seconds. Coloration: Skin is not jaundiced. Findings: No bruising. Neurological:      General: No focal deficit present. Mental Status: He is alert and oriented to person, place, and time.    Psychiatric:         Mood and Affect: Mood normal.         Behavior: Behavior normal.       Past Medical History:     Past Medical History:   Diagnosis Date    Acute renal failure with tubular necrosis [facility] 05/26/2022    Likely ischemic ATN/prerenal acidemia from intractable nausea vomiting as result of chemotherapy, baseline 1.3-1.4 peaked up to 2.3 in May 2022    Arthritis     BPH with obstruction/lower urinary tract symptoms     CAD (coronary artery disease) 06/2022    nonobstructive on cath    Caffeine use     3 cans soda/pop    Cancer (HCC)     bladder cancer. Dr. Alex Rosales [facility]    CKD (chronic kidney disease), stage III (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   Baseline 1.3-1.4    COVID-19 08/16/2021    SOB, fatigue, fever, chills  x 3 weeks    Depression     Dilated cardiomyopathy ([facility] 05/26/2022    Ejection fraction 40 to 45%, does have symptoms of dyspnea and decreased effort tolerance    GERD (gastroesophageal reflux disease)     High cholesterol     Kidney calculi     Sleep apnea     does not use cpap    Under care of team     Dr. Jasmeet Yun Nephrology    Under care of team     Dr. Ness Adjutant. last visit approx 9/2021    Under care of team     Dr. Kallie Banks [facility] last visit 8/03/2022    Under care of team     Dr. Juancho Mathew    Under care of team     Dr. Mariel Powers       Past Surgical  History:     Past Surgical History:   Procedure Laterality Date    BLADDER REMOVAL      BLADDER REMOVAL N/A 9/16/2022    XI ROBOTIC LAPARASCOPIC ASSISTED RADICAL CYSTOPROSTATECTOMY, BILATERAL PELVIC LYMPHADENECTOMY AND ILEAL CONDUIT FORMATION performed by Yadira Greene MD at [address] Right 9/16/2022    CYSTOSCOPY STENT REMOVAL performed by Yadira Greene MD at [address] [facility]  06/16/2022    nonobstructive CAD    COLONOSCOPY      IR NEPHROSTOMY PERCUTANEOUS LEFT  9/23/2022    IR NEPHROSTOMY PERCUTANEOUS LEFT 9/23/2022 MD [facility]ORIA [facility]    IR NEPHROSTOMY PERCUTANEOUS LEFT  10/5/2022    IR NEPHROSTOMY PERCUTANEOUS LEFT 10/5/2022 [facility]    IR NEPHROSTOMY PERCUTANEOUS RIGHT  9/23/2022    IR NEPHROSTOMY PERCUTANEOUS RIGHT 9/23/2022 MD [facility]ALL [facility]    IR NEPHROSTOMY PERCUTANEOUS RIGHT  10/5/2022    IR NEPHROSTOMY PERCUTANEOUS RIGHT 10/5/2022 [facility]    IR PORT PLACEMENT EQUAL OR GREATER THAN 5 YEARS  02/25/2022    IR PORT PLACEMENT EQUAL OR GREATER THAN 5 YEARS 2/25/2022 [facility]    KNEE ARTHROSCOPY      left    LAPAROTOMY N/A 9/22/2022    LAPAROTOMY EXPLORATORY, LYSIS OF ADHESIONS, REPAIR OF BILATERAL URETERAL ANASTOMOSES, ABDOMINAL WASHOUT, PLACEMENT OF ABTHERA WOUND VAC performed by Mike Moreno DO at [address] [place] [address] Way N/A 9/23/2022    2ND LOOK LAPAROTOMY,  ABDOMINAL WASHOUT, ABDOMINAL CLOSURE, WOUND VAC PLACEMENT performed by Mike Moreno DO at [place] [address]  09/16/2022    Cystoprostatectomy, Bilateral Pelvic Lymphadenectomy, ileo conduit formation, cystectomy stent removal (right)       Medications:      fluconazole  200 mg Oral Q24H    metoprolol  5 mg IntraVENous Q8H    heparin (porcine)  5,000 Units SubCUTAneous 3 times per day    [Held by provider] metoclopramide  5 mg IntraVENous Q6H    chlorhexidine  15 mL Mouth/Throat BID    bisacodyl  10 mg Rectal Daily    pantoprazole (PROTONIX) 40 mg injection  40 mg IntraVENous Q12H    polyethylene glycol  17 g Oral Daily    buPROPion  300 mg Oral QAM    simvastatin  40 mg Oral Nightly    sodium chloride flush  5-40 mL IntraVENous 2 times per day    acetaminophen  650 mg Oral Q6H       Social History:     Social History     Socioeconomic History    Marital status:      Spouse name: Not on file    Number of children: Not on file    Years of education: Not on file    Highest education level: Not on file   Occupational History    Not on file   Tobacco Use    Smoking status: Former     Packs/day: 0.25     Years: 9.00     Pack years: 2.25     Types: Cigarettes     Start date: 1983     Quit date: 2/10/1992     Years since quittin.6    Smokeless tobacco: Never   Vaping Use    Vaping Use: Never used   Substance and Sexual Activity    Alcohol use: Not Currently    Drug use: No    Sexual activity: Yes     Partners: Female   Other Topics Concern    Not on file   Social History Narrative    Not on file     Social Determinants of Health     Financial Resource Strain: Not on file   Food Insecurity: Not on file   Transportation Needs: Not on file   Physical Activity: Not on file   Stress: Not on file   Social Connections: Not on file   Intimate Partner Violence: Not on file   Housing Stability: Not on file       Family History:     Family History   Problem Relation Age of Onset    Cancer Father         bladder cancer    Urolithiasis Father       Medical Decision Making:   I have independently reviewed/ordered the following labs:    CBC with Differential:   Recent Labs     10/05/22  0536 10/06/22  0433   WBC 10.3 14.3*   HGB 7.5* 8.2*   HCT 23.2* 25.6*    329       BMP:  Recent Labs     10/05/22  0536 10/06/22  0433    133*   K 3.5* 3.7    100   CO2 24 23   BUN 9 7*   CREATININE 1.25* 1.12   MG 1.6 1.6       Hepatic Function Panel:   Recent Labs     10/05/22  0536   PROT 5.6*   LABALBU 1.7*   BILIDIR 0.2   IBILI 0.2   BILITOT 0.4   ALKPHOS 65   ALT 8   AST 19       No results for input(s): RPR in the last 72 hours. No results for input(s): HIV in the last 72 hours. No results for input(s): BC in the last 72 hours. Lab Results   Component Value Date/Time    CREATININE 1.12 10/06/2022 04:33 AM    GLUCOSE 77 10/06/2022 04:33 AM       Detailed results: Thank you for allowing us to participate in the care of this patient. Please call with questions.     This note is created with the assistance of a speech recognition program.  While intending to generate adocument that actually reflects the content of the visit, the document can still have some errors including those of syntax and sound a like substitutions which may escape proof reading. It such instances, actual meaningcan be extrapolated by contextual diversion. Office: [phone]  Perfect serve / office [phone]      Kal Peter, OMS3        I have discussed the care of the patient, including pertinent history and exam findings,  with the resident. I have seen and examined the patient and the key elements of all parts of the encounter have been performed by me. I agree with the assessment, plan and orders as documented by the resident.     Sindi Raymond, Infectious Diseases

## 2022-10-06 NOTE — PROGRESS NOTES
Liz Jama MD FACS   Urology Progress Note     Subjective:  Underwent bilateral nephroureteral catheter placement yesterday    No acute events overnight  No fevers or chills, vital stable  No nausea or vomiting  Passing flatus and having bowel movements  Walking in the room, not in hallways yet    Suction device inserted into stoma 10/4/2022    Right nephrostomy tube 120 cc clear yellow urine  Left nephrostomy tube 1.05 L / 24 hours jared  Urostomy 1.3 cc / 24 hours clear yellow urine  Upper abdominal drain 125 cc / 24 hours serous  Lower abdominal drain 215 cc / 24 hours serous    Am labs pending    Patient Vitals for the past 24 hrs:   BP Temp Temp src Pulse Resp SpO2   10/06/22 0234 -- -- -- -- 18 --   10/06/22 0145 135/72 -- -- 89 -- --   10/05/22 2026 138/67 97 °F (36.1 °C) Oral 83 -- --   10/05/22 1615 136/75 98.1 °F (36.7 °C) Oral 74 16 95 %   10/05/22 1435 138/76 -- -- 85 14 100 %   10/05/22 1430 136/76 -- -- 84 17 99 %   10/05/22 1425 (!) 141/80 -- -- 85 15 98 %   10/05/22 1420 (!) 141/78 -- -- 84 12 99 %   10/05/22 1415 125/79 -- -- 79 17 100 %   10/05/22 1410 (!) 143/85 -- -- 81 17 100 %   10/05/22 1405 (!) 143/85 -- -- 77 17 100 %   10/05/22 1200 132/70 98.1 °F (36.7 °C) Oral 82 16 96 %   10/05/22 0830 138/69 98.4 °F (36.9 °C) Oral 80 16 97 %       Intake/Output Summary (Last 24 hours) at 10/6/2022 0636  Last data filed at 10/6/2022 [id number]  Gross per 24 hour   Intake --   Output 2810 ml   Net -2810 ml       Recent Labs     10/04/22  0550 10/05/22  0536 10/06/22  0433   WBC 11.8* 10.3 14.3*   HGB 7.8* 7.5* 8.2*   HCT 25.1* 23.2* 25.6*   MCV 88.7 88.2 88.3    320 329     Recent Labs     10/04/22  0550 10/05/22  0536 10/06/22  0433    137 133*   K 3.5* 3.5* 3.7    103 100   CO2 26 24 23   PHOS 2.8 2.5 2.8   BUN 12 9 7*   CREATININE 1.28* 1.25* 1.12       No results for input(s): COLORU, PHUR, LABCAST, WBCUA, RBCUA, MUCUS, TRICHOMONAS, YEAST, BACTERIA, CLARITYU, SPECGRAV, LEUKOCYTESUR, UROBILINOGEN, [place] in the last 72 hours. Invalid input(s): NITRATE, GLUCOSEUKETONESUAMORPHOUS      Additional Lab/culture results:    Physical Exam:   General: A/O x 3, no acute distress  HEENT: moist mucous membranes  Neck: Supple   Chest: bilateral symmetrical chest rise   Circulatory: Peripheries warm , well perfused   P/A: soft, clean, dry and intact with skin glue, ostomy pink with suction device in place, bilateral ureteral catheters barely visible, wound vac in place, MERLIN x 2 in place with serous drainage  Extremities: Bilateral LE pitting edema   : left nephrostomy tube with clear jared urine, right nephrostomy tube with clear yellow urine    Interval Imaging Findings: none    Impression:  none    Janeth Cons is a 27-year-old male   Active  problem list  Hx of bladder cancer  Robotic assisted laparoscopic cystoprostatectomy with ileal conduit creation on 9/16/2022   Ex lap ROLANDO repair of BL ureteral anastamosis and abd washout with abthera due to ureteral anastamotic leaks and SBO and second look on 9/22 and 9/23      Plan:  Maintain bilateral nephroureteral catheters  Bilateral nephrostograms negative for leak but continues to demonstrate significantly dilated right collecting system  Continue suction device within conduit  Diet:per general General surgery, dietary on board as well  Pain control and antiemetic as needed  Maintain bilateral nephrostomy tubes and bilateral ureteral stents  Maintain MERLIN drains - just to gravity, no suction; repeat MERLIN creatinine  Appreciate ID input  Urine culture growing pseudomonas, enterococcus and and second pseudomonas colony type, pansusceptible only gentamicin resistance s/p 7 days cefepime   Infectious disease consulted, on Zosyn for Enterococcus coverage, Diflucan for urine culture 9/23/2022 growing Candida albicans/dubliensis-both antibiotics for 7 days total coverage until 10/3/2022 - completed.   Appreciate infectious disease recommendations  IV fluids  DVT PPX: Subq heparin 5000 units TID  Continue to monitor  Discharge planning: [facility], DO, PGY-3  6:36 AM 10/6/2022    I have reviewed the history above and agree. I have reviewed all laboratory findings and imaging reports/films. I agree with the plan as noted above.     Electronically signed by Yuan Epps MD on 10/6/2022 at 7:45 AM

## 2022-10-06 NOTE — PLAN OF CARE
Problem: Discharge Planning  Goal: Discharge to home or other facility with appropriate resources  10/6/2022 0643 by Adilia Yap RN  Outcome: Progressing  10/5/2022 1801 by Shakira Servin RN  Outcome: Progressing     Problem: Pain  Goal: Verbalizes/displays adequate comfort level or baseline comfort level  10/6/2022 0643 by Adilia Yap RN  Outcome: Progressing  10/5/2022 1801 by Shakira Servin RN  Outcome: Progressing     Problem: Safety - Adult  Goal: Free from fall injury  10/6/2022 0643 by Adilia Yap RN  Outcome: Progressing  10/5/2022 1801 by Shakira Servin RN  Outcome: Progressing     Problem: ABCDS Injury Assessment  Goal: Absence of physical injury  10/6/2022 0643 by Adilia Yap RN  Outcome: Progressing  10/5/2022 1801 by Shakira Servin RN  Outcome: Progressing     Problem: Nutrition Deficit:  Goal: Optimize nutritional status  10/6/2022 0643 by Adilia Yap RN  Outcome: Progressing  10/5/2022 1801 by Shakira Servin RN  Outcome: Progressing     Problem: Skin/Tissue Integrity  Goal: Absence of new skin breakdown  Description: 1. Monitor for areas of redness and/or skin breakdown  2. Assess vascular access sites hourly  3. Every 4-6 hours minimum:  Change oxygen saturation probe site  4. Every 4-6 hours:  If on nasal continuous positive airway pressure, respiratory therapy assess nares and determine need for appliance change or resting period. 10/6/2022 5505 by Adilia Yap RN  Outcome: Progressing  10/5/2022 1801 by Shakira Servin RN  Outcome: Progressing     Problem: Safety - Medical Restraint  Goal: Remains free of injury from restraints (Restraint for Interference with Medical Device)  Description: INTERVENTIONS:  1. Determine that other, less restrictive measures have been tried or would not be effective before applying the restraint  2. Evaluate the patient's condition at the time of restraint application  3.  Inform patient/family regarding the reason for restraint  4. Q2H: Monitor safety, psychosocial status, comfort, nutrition and hydration  10/6/2022 0643 by Dayami Adames RN  Outcome: Progressing  10/5/2022 1801 by Valente Maria RN  Outcome: Progressing     Problem: Confusion  Goal: Confusion, delirium, dementia, or psychosis is improved or at baseline  Description: INTERVENTIONS:  1. Assess for possible contributors to thought disturbance, including medications, impaired vision or hearing, underlying metabolic abnormalities, dehydration, psychiatric diagnoses, and notify attending LIP  2. [place] high risk fall precautions, as indicated  3. Provide frequent short contacts to provide reality reorientation, refocusing and direction  4. Decrease environmental stimuli, including noise as appropriate  5. Monitor and intervene to maintain adequate nutrition, hydration, elimination, sleep and activity  6. If unable to ensure safety without constant attention obtain sitter and review sitter guidelines with assigned personnel  7.  Initiate Psychosocial CNS and Spiritual Care consult, as indicated  10/6/2022 2280 by Dayami Adames RN  Outcome: Progressing  10/5/2022 1801 by Valente Maria RN  Outcome: Progressing     Problem: Gastrointestinal - Adult  Goal: Minimal or absence of nausea and vomiting  10/6/2022 0643 by Dayami Adames RN  Outcome: Progressing  10/5/2022 1801 by Valente Maria RN  Outcome: Progressing  Goal: Maintains or returns to baseline bowel function  10/6/2022 0643 by Dayami Adames RN  Outcome: Progressing  10/5/2022 1801 by Valente Maria RN  Outcome: Progressing  Goal: Maintains adequate nutritional intake  10/6/2022 0643 by Dayami Adames RN  Outcome: Progressing  10/5/2022 1801 by Valente Maria RN  Outcome: Progressing  Goal: Establish and maintain optimal ostomy function  10/6/2022 0643 by Dayami Adames RN  Outcome: Progressing  10/5/2022 1801 by Valente Maria RN  Outcome: Progressing     Problem: Genitourinary - Adult  Goal: Urinary catheter remains patent  10/6/2022 0643 by Janelle Ledbetter RN  Outcome: Progressing  10/5/2022 1801 by Ying Bal RN  Outcome: Progressing

## 2022-10-06 NOTE — PLAN OF CARE
Problem: Discharge Planning  Goal: Discharge to home or other facility with appropriate resources  10/6/2022 1730 by Alba Briones RN  Outcome: Progressing  10/6/2022 0643 by Maya Mabry RN  Outcome: Progressing     Problem: Pain  Goal: Verbalizes/displays adequate comfort level or baseline comfort level  10/6/2022 1730 by Alba Briones RN  Outcome: Progressing  10/6/2022 0643 by Maya Mabry RN  Outcome: Progressing     Problem: Safety - Adult  Goal: Free from fall injury  10/6/2022 1730 by Alba Briones RN  Outcome: Progressing  10/6/2022 0643 by Maya Mabry RN  Outcome: Progressing     Problem: ABCDS Injury Assessment  Goal: Absence of physical injury  10/6/2022 1730 by Alba Briones RN  Outcome: Progressing  10/6/2022 0643 by Maya Mabry RN  Outcome: Progressing     Problem: Nutrition Deficit:  Goal: Optimize nutritional status  10/6/2022 1730 by Alba Briones RN  Outcome: Progressing  10/6/2022 0643 by Maya Mabry RN  Outcome: Progressing     Problem: Skin/Tissue Integrity  Goal: Absence of new skin breakdown  Description: 1. Monitor for areas of redness and/or skin breakdown  2. Assess vascular access sites hourly  3. Every 4-6 hours minimum:  Change oxygen saturation probe site  4. Every 4-6 hours:  If on nasal continuous positive airway pressure, respiratory therapy assess nares and determine need for appliance change or resting period. 10/6/2022 1730 by Alba Briones RN  Outcome: Progressing  10/6/2022 0643 by Maya Mabry RN  Outcome: Progressing     Problem: Safety - Medical Restraint  Goal: Remains free of injury from restraints (Restraint for Interference with Medical Device)  Description: INTERVENTIONS:  1. Determine that other, less restrictive measures have been tried or would not be effective before applying the restraint  2. Evaluate the patient's condition at the time of restraint application  3.  Inform patient/family regarding the reason for restraint  4. Q2H: Monitor safety, psychosocial status, comfort, nutrition and hydration  10/6/2022 1730 by Ling Cevallos RN  Outcome: Progressing  10/6/2022 0643 by Cuba Russo RN  Outcome: Progressing     Problem: Confusion  Goal: Confusion, delirium, dementia, or psychosis is improved or at baseline  Description: INTERVENTIONS:  1. Assess for possible contributors to thought disturbance, including medications, impaired vision or hearing, underlying metabolic abnormalities, dehydration, psychiatric diagnoses, and notify attending LIP  2. [place] high risk fall precautions, as indicated  3. Provide frequent short contacts to provide reality reorientation, refocusing and direction  4. Decrease environmental stimuli, including noise as appropriate  5. Monitor and intervene to maintain adequate nutrition, hydration, elimination, sleep and activity  6. If unable to ensure safety without constant attention obtain sitter and review sitter guidelines with assigned personnel  7.  Initiate Psychosocial CNS and Spiritual Care consult, as indicated  10/6/2022 1730 by Ling Cevallos RN  Outcome: Progressing  10/6/2022 0643 by Cuba Russo RN  Outcome: Progressing     Problem: Gastrointestinal - Adult  Goal: Minimal or absence of nausea and vomiting  10/6/2022 1730 by Ling Cevallos RN  Outcome: Progressing  10/6/2022 0643 by Cuba Russo RN  Outcome: Progressing  Goal: Maintains or returns to baseline bowel function  10/6/2022 1730 by Ling Cevallos RN  Outcome: Progressing  10/6/2022 0643 by Cuba Russo RN  Outcome: Progressing  Goal: Maintains adequate nutritional intake  10/6/2022 1730 by Ling Cevallos RN  Outcome: Progressing  10/6/2022 0643 by Cuba Russo RN  Outcome: Progressing  Goal: Establish and maintain optimal ostomy function  10/6/2022 1730 by Ling Cevallos RN  Outcome: Progressing  10/6/2022 0643 by Cuba Russo RN  Outcome: Progressing     Problem: Genitourinary - Adult  Goal: Urinary catheter remains patent  10/6/2022 1730 by Farzaneh Summers RN  Outcome: Progressing  10/6/2022 0643 by Yu Howard RN  Outcome: Progressing

## 2022-10-06 NOTE — PROGRESS NOTES
Physical Therapy Cancel Note      DATE: 10/6/2022    NAME: Ted Blanton  MRN: [id number]   : 1955      Patient not seen this date for Physical Therapy due to: Other: Per RN, currently unable to mobilize d/t issues with catheter; issued pt blue t-band and instructed in use for UEs; reviewed ankle pumps and LE AROM; pt very sleepy and not wanting to participate with PT at this time, but states he will use the t-band and ex LEs \"later\".         Electronically signed by Vera Wong PT on 10/6/2022 at 9:01 AM

## 2022-10-07 LAB
ANION GAP SERPL CALCULATED.3IONS-SCNC: 7 MMOL/L (ref 9–17)
BUN BLDV-MCNC: 6 MG/DL (ref 8–23)
CALCIUM SERPL-MCNC: 7.7 MG/DL (ref 8.6–10.4)
CHLORIDE BLD-SCNC: 101 MMOL/L (ref 98–107)
CO2: 26 MMOL/L (ref 20–31)
CREAT SERPL-MCNC: 1.02 MG/DL (ref 0.7–1.2)
GFR SERPL CREATININE-BSD FRML MDRD: >60 ML/MIN/1.73M2
GLUCOSE BLD-MCNC: 82 MG/DL (ref 70–99)
HCT VFR BLD CALC: 23.6 % (ref 40.7–50.3)
HEMOGLOBIN: 7.5 G/DL (ref 13–17)
MAGNESIUM: 1.6 MG/DL (ref 1.6–2.6)
MCH RBC QN AUTO: 27.7 PG (ref 25.2–33.5)
MCHC RBC AUTO-ENTMCNC: 31.8 G/DL (ref 28.4–34.8)
MCV RBC AUTO: 87.1 FL (ref 82.6–102.9)
NRBC AUTOMATED: 0 PER 100 WBC
PDW BLD-RTO: 16.2 % (ref 11.8–14.4)
PHOSPHORUS: 2.9 MG/DL (ref 2.5–4.5)
PLATELET # BLD: 317 K/UL (ref 138–453)
PMV BLD AUTO: 9.8 FL (ref 8.1–13.5)
POTASSIUM SERPL-SCNC: 3.5 MMOL/L (ref 3.7–5.3)
RBC # BLD: 2.71 M/UL (ref 4.21–5.77)
SODIUM BLD-SCNC: 134 MMOL/L (ref 135–144)
WBC # BLD: 12.4 K/UL (ref 3.5–11.3)

## 2022-10-07 PROCEDURE — 99232 SBSQ HOSP IP/OBS MODERATE 35: CPT | Performed by: INTERNAL MEDICINE

## 2022-10-07 PROCEDURE — 97605 NEG PRS WND THER DME<=50SQCM: CPT

## 2022-10-07 PROCEDURE — 2500000003 HC RX 250 WO HCPCS: Performed by: STUDENT IN AN ORGANIZED HEALTH CARE EDUCATION/TRAINING PROGRAM

## 2022-10-07 PROCEDURE — 6360000002 HC RX W HCPCS: Performed by: STUDENT IN AN ORGANIZED HEALTH CARE EDUCATION/TRAINING PROGRAM

## 2022-10-07 PROCEDURE — 97110 THERAPEUTIC EXERCISES: CPT

## 2022-10-07 PROCEDURE — 6370000000 HC RX 637 (ALT 250 FOR IP): Performed by: STUDENT IN AN ORGANIZED HEALTH CARE EDUCATION/TRAINING PROGRAM

## 2022-10-07 PROCEDURE — 83735 ASSAY OF MAGNESIUM: CPT

## 2022-10-07 PROCEDURE — 6360000002 HC RX W HCPCS: Performed by: SPECIALIST

## 2022-10-07 PROCEDURE — 1200000000 HC SEMI PRIVATE

## 2022-10-07 PROCEDURE — 6370000000 HC RX 637 (ALT 250 FOR IP): Performed by: INTERNAL MEDICINE

## 2022-10-07 PROCEDURE — C9113 INJ PANTOPRAZOLE SODIUM, VIA: HCPCS | Performed by: STUDENT IN AN ORGANIZED HEALTH CARE EDUCATION/TRAINING PROGRAM

## 2022-10-07 PROCEDURE — 97530 THERAPEUTIC ACTIVITIES: CPT

## 2022-10-07 PROCEDURE — 84100 ASSAY OF PHOSPHORUS: CPT

## 2022-10-07 PROCEDURE — 36415 COLL VENOUS BLD VENIPUNCTURE: CPT

## 2022-10-07 PROCEDURE — 80048 BASIC METABOLIC PNL TOTAL CA: CPT

## 2022-10-07 PROCEDURE — 2580000003 HC RX 258: Performed by: STUDENT IN AN ORGANIZED HEALTH CARE EDUCATION/TRAINING PROGRAM

## 2022-10-07 PROCEDURE — 85027 COMPLETE CBC AUTOMATED: CPT

## 2022-10-07 RX ORDER — CARVEDILOL 3.12 MG/1
3.12 TABLET ORAL 2 TIMES DAILY WITH MEALS
Status: DISCONTINUED | OUTPATIENT
Start: 2022-10-07 | End: 2022-10-11 | Stop reason: HOSPADM

## 2022-10-07 RX ADMIN — BUPROPION HYDROCHLORIDE 300 MG: 150 TABLET, EXTENDED RELEASE ORAL at 09:18

## 2022-10-07 RX ADMIN — ACETAMINOPHEN 650 MG: 325 TABLET ORAL at 18:28

## 2022-10-07 RX ADMIN — HYDROMORPHONE HYDROCHLORIDE 1 MG: 1 INJECTION, SOLUTION INTRAMUSCULAR; INTRAVENOUS; SUBCUTANEOUS at 09:41

## 2022-10-07 RX ADMIN — FLUCONAZOLE 200 MG: 40 POWDER, FOR SUSPENSION ORAL at 18:27

## 2022-10-07 RX ADMIN — CARVEDILOL 3.12 MG: 3.12 TABLET, FILM COATED ORAL at 18:28

## 2022-10-07 RX ADMIN — SODIUM CHLORIDE, PRESERVATIVE FREE 40 MG: 5 INJECTION INTRAVENOUS at 21:47

## 2022-10-07 RX ADMIN — ACETAMINOPHEN 650 MG: 325 TABLET ORAL at 09:19

## 2022-10-07 RX ADMIN — HEPARIN SODIUM 5000 UNITS: 5000 INJECTION INTRAVENOUS; SUBCUTANEOUS at 05:54

## 2022-10-07 RX ADMIN — ONDANSETRON 8 MG: 4 TABLET, ORALLY DISINTEGRATING ORAL at 09:41

## 2022-10-07 RX ADMIN — METOPROLOL TARTRATE 5 MG: 1 INJECTION, SOLUTION INTRAVENOUS at 00:47

## 2022-10-07 RX ADMIN — SODIUM CHLORIDE, PRESERVATIVE FREE 40 MG: 5 INJECTION INTRAVENOUS at 09:34

## 2022-10-07 RX ADMIN — SIMVASTATIN 40 MG: 40 TABLET, FILM COATED ORAL at 21:46

## 2022-10-07 RX ADMIN — RIVAROXABAN 20 MG: 20 TABLET, FILM COATED ORAL at 18:27

## 2022-10-07 RX ADMIN — POLYETHYLENE GLYCOL 3350 17 G: 17 POWDER, FOR SOLUTION ORAL at 09:17

## 2022-10-07 ASSESSMENT — PAIN DESCRIPTION - LOCATION
LOCATION: ABDOMEN

## 2022-10-07 ASSESSMENT — PAIN SCALES - WONG BAKER
WONGBAKER_NUMERICALRESPONSE: 0

## 2022-10-07 ASSESSMENT — PAIN DESCRIPTION - ORIENTATION
ORIENTATION: MID

## 2022-10-07 ASSESSMENT — ENCOUNTER SYMPTOMS
ABDOMINAL DISTENTION: 0
SHORTNESS OF BREATH: 0
COLOR CHANGE: 0
SINUS PRESSURE: 0

## 2022-10-07 ASSESSMENT — PAIN DESCRIPTION - DESCRIPTORS
DESCRIPTORS: ACHING
DESCRIPTORS: ACHING;DULL
DESCRIPTORS: ACHING;CRAMPING;PRESSURE;SHARP

## 2022-10-07 ASSESSMENT — PAIN SCALES - GENERAL
PAINLEVEL_OUTOF10: 6
PAINLEVEL_OUTOF10: 6
PAINLEVEL_OUTOF10: 1
PAINLEVEL_OUTOF10: 0
PAINLEVEL_OUTOF10: 0
PAINLEVEL_OUTOF10: 4

## 2022-10-07 ASSESSMENT — PAIN - FUNCTIONAL ASSESSMENT: PAIN_FUNCTIONAL_ASSESSMENT: ACTIVITIES ARE NOT PREVENTED

## 2022-10-07 NOTE — PLAN OF CARE
Problem: Discharge Planning  Goal: Discharge to home or other facility with appropriate resources  10/7/2022 1838 by Ron Short RN  Outcome: Progressing     Problem: Pain  Goal: Verbalizes/displays adequate comfort level or baseline comfort level  10/7/2022 1838 by Ron Short RN  Outcome: Progressing     Problem: Safety - Adult  Goal: Free from fall injury  10/7/2022 1838 by Ron Short RN  Outcome: Progressing     Problem: ABCDS Injury Assessment  Goal: Absence of physical injury  10/7/2022 1838 by Ron Short RN  Outcome: Progressing

## 2022-10-07 NOTE — PROGRESS NOTES
[facility] -   Infectious diseases evaluation  admission date 9/16/2022    reason for consultation:   bandemia    Impression :   Current:  9/16 bladder cancer involving wall and lymph nodes, post radical cystectomy, prostatectomy, groin lymph node dissection  9/22 SBO, had lysis of adhesions  9/22 bilateral ureteral anastomotic leak, with large abdominal collection, post repair 9/22 9/23 bilat nephrostomy tube placed  10/4 B/l nephrostograms to assess nephrostomy tube positioning and degrees of distal ureteral intestinal anastomotic leak - NEGATIVE for leak  10/5 Urology exchanged b/l nephrostomy drains to b/l nephroureteral stent catheters placed to bag drainage  9/23 closure of the fascia, abdominal wound open with VAC  Ongoing bandemia  Pseudomonas/Enterococcus UTI, 9/16-  Cefepime 9/19 until 9/25  Right nephrostomy urine infection with Candida albicans 9/23  Noted at the time of right percutaneous nephrostomy placement 9/23  Ongoing bandemia -resolving  Hypernatremia -on fluids    Other:    Discussion / summary of stay / plan of care     Recommendations   The cause of the bandemia could be multifactorial, including a persistent urinoma  Zosyn for Enterococcus till 10/4 - 7 days  Diflucan, keep till urine clears, seems to have fungal flakes  Patient clinically better  -continues to have leukocytosis -   Has wound vac, 2 nephrostomy tubes, and 2 MERLIN drains      Infection Control Recommendations   [place] Precautions    Antimicrobial Stewardship Recommendations   Simplification of therapy  Targeted therapy      History of Present Illness:   Initial history:  Ruby Espinoza is a 74y.o.-year-old male with history of bladder cancer involving the wall of the bladder as well as the groin lymph nodes.   Came in for radical cystectomy done on 9/16 with ileal conduit placement, removal of the groin lymph nodes, but complicated with a small bowel obstruction and bilateral ureteral anastomotic leak. CT scan of the abdomen  9/22 which had showed at the time a large anterior pelvic fluid collection 15 x 13 x 6 cmWith variable densities extending from the cystectomy bed    Taken back to surgery on 9/22, lysis of adhesion that was thought to be causing the SBO, as well as repair of the anastomotic leak, and placement of 2 bilateral nephrostomy tubes. 9/23 patient went back to surgery for closure of abdominal fascia and subcutaneous tissue that stayed open with a VAC placement over it -    Due to hydronephrosis, bilat PCNT placed by IR 9/23, A repeat urine culture from 9/23 done due to cloudy R urine, by IR,  is showing Candida albicans 9/26. The patient has been started on Diflucan. Along the way on 9/16 the urine grew Pseudomonas and Enterococcus, resistant to Cipro and patient has received a course of cefepime from 9/19 and 2 until 9/25. His white count remains elevated actually after a feeling that it was improving, he started going worse, today it is up to 20. Infectious is consulted for the concern of a ongoing infection. The right nephro drain was giving very little urine and had a urogram  9/26 that showed good positioning and no leak. Since then right nephrostomy line has been giving a better input. Today the patient is alert appropriate he has an NG tube, was trying to have some liquids p.o. and today was able to tolerate 2 popsicles without vomiting. He does have gurgling over the abdomen, and has no abdominal tenderness otherwise. He has only abdominal pain on the surgical site. He has 2 MERLIN drains giving serosanguineous fluid, and he has 2 percutaneous nephrostomy drains that are giving clear urine.       Interval changes  10/7/2022   Patient Vitals for the past 8 hrs:   BP Temp Temp src Pulse Resp SpO2   10/07/22 1159 110/65 97.9 °F (36.6 °C) Oral 82 17 96 %   10/07/22 1011 -- -- -- -- 16 --   10/07/22 0941 -- -- -- -- 16 --   10/07/22 0756 126/65 98.1 °F (36.7 °C) Oral 81 18 95 %       10/4  Afebrile, hypertensive  Patient returning from anterograde pyelogram   +abdominal tenderness, +discomfort from Wound VAC. No signs of discharge or dehiscence   Continues to have drainage out clear yellow drainage out MERLIN drains and dark yellow out nephrostomy tubes  No new complaint of diarrhea  Leukocytosis resolving     10/5   Afebrile  Continues to have abdominal pain and tenderness  Drainage out MERLIN drains and nephrostomy tubes  R nephrostomy collecting yellow fluid with cloudy chunks, L nephrostomy jared and clear  No diarrhea  Leukocytosis continues to resolve    10/6   Afebrile - on po clears  Patient continues to have drainage  No diarrhea   Leukocytosis spiked  Tolerating diflucan solution  R PCNT and denis shows less turbid urine    10/7   Afebrile  S/p cath placement  Tolerating diflucan solution  Clear liquid in nephrostomy bags  Flakes within denis, likely yeast     Summary of relevant labs:  Labs:  WBC  19.1 - 14.3 - 18.1 -15.1 - 11.8 -10.3 - 14.3 - 12.4  Creatinine1.39 -1.41 - 1.39 - 1.36 - 1.44 -1.49 - 1.28   Micro:  UA 9/18 - large leukocyte esterase and positive nitrates  9/16 UC Pseudomonas aeruginosa 2 strains, 1 sensitive to Cipro the other resistant,  and Enterococcus faecalis sensitive to Cipro  9/23 UC with Candida    Urine culture from  R nephrostomy due to cloudiness, 9/23 showing Candida albicans/W   Imaging:  10/4 IR antegrade pyelogram Migration of the left double-J stent mostly into the ileal loop with the proximal portion of the double-J stent in the distal left ureter. Distal left ureter is patent and empties promptly into the ileal loop. Moderate hydroureter and hydronephrosis identified. There is prominent right-sided hydronephrosis and hydroureter proximally. There is a kink in the ureter proximally that appears to create a functional obstruction in the proximal ureter.   Under higher pressure injection contrast than extends distally into the ureter and empties into the ileal loop. It is also noted that the distal ureter on the right is considerably dilated. Right-sided double-J stent may not be working. KUB 10/1   Presence of bilateral nephrostomy tubes, stable. There is been interval placement of an apparent right ureteral stent with distal end likely in an ileal loop pouch. Bowel gas pattern favors ileus with evolving partial small bowel obstruction not excluded. No free air is demonstrated. CTAP 9/22  Small bowel obstruction, with a transition point near the jejunoileal junction adjacent/medial to the ileostomy site. Large pelvic fluid collection with varying heme densities, some of which is recent, extending from the cystectomy bed, as above. Ureteral stents, extending out the conduit and ileostomy site with similar moderate-severe right hydroureteronephrosis, and new mild-moderate left hydroureteronephrosis. Postsurgical changes with scattered pneumoperitoneum/ascites, extensive subcutaneous air/soft tissue changes. Enteric tube in satisfactory position with its tip in the stomach. Segmental/subsegmental lung base changes posteriorly. Multiple additional findings, either unchanged or incidental, as detailed in the body of the report above. I have personally reviewed the past medical history, past surgical history, medications, social history, and family history, and I haveupdated the database accordingly. Allergies: Other     Review of Systems:     Review of Systems   Constitutional:  Positive for appetite change. Negative for chills, diaphoresis and fatigue. HENT:  Negative for sinus pressure. Eyes:  Negative for visual disturbance. Respiratory:  Negative for shortness of breath. Cardiovascular:  Negative for chest pain. Gastrointestinal:  Negative for abdominal distention. Endocrine: Negative for cold intolerance and heat intolerance. Genitourinary:  Negative for difficulty urinating. Musculoskeletal:  Negative for arthralgias. Skin:  Negative for color change. Allergic/Immunologic: Negative for immunocompromised state. Neurological:  Negative for dizziness. Psychiatric/Behavioral:  Negative for agitation. Physical Examination :       Physical Exam  Constitutional:       Appearance: He is obese. He is not ill-appearing or toxic-appearing. HENT:      Head: Normocephalic and atraumatic. Right Ear: External ear normal.      Left Ear: External ear normal.      Nose: Nose normal. No congestion. Mouth/Throat:      Mouth: Mucous membranes are moist.      Pharynx: Oropharynx is clear. No oropharyngeal exudate. Eyes:      General: No scleral icterus. Extraocular Movements: Extraocular movements intact. Pupils: Pupils are equal, round, and reactive to light. Cardiovascular:      Rate and Rhythm: Normal rate and regular rhythm. Pulses: Normal pulses. Heart sounds: No murmur heard. No gallop. Pulmonary:      Effort: Pulmonary effort is normal. No respiratory distress. Breath sounds: No wheezing. Abdominal:      General: Bowel sounds are normal.      Palpations: Abdomen is soft. Tenderness: There is no right CVA tenderness or left CVA tenderness. Musculoskeletal:         General: No swelling or deformity. Normal range of motion. Cervical back: No rigidity. Lymphadenopathy:      Cervical: No cervical adenopathy. Skin:     General: Skin is warm. Capillary Refill: Capillary refill takes less than 2 seconds. Coloration: Skin is not jaundiced. Findings: No bruising. Neurological:      General: No focal deficit present. Mental Status: He is alert and oriented to person, place, and time.    Psychiatric:         Mood and Affect: Mood normal.         Behavior: Behavior normal.       Past Medical History:     Past Medical History:   Diagnosis Date    [facility] 05/26/2022    Likely ischemic ATN/prerenal acidemia from intractable nausea vomiting as result of chemotherapy, baseline 1.3-1.4 peaked up to 2.3 in May 2022    Arthritis     BPH with obstruction/lower urinary tract symptoms     CAD (coronary artery disease) 06/2022    nonobstructive on cath    Caffeine use     3 cans soda/pop    Cancer (HCC)     bladder cancer. Dr. 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   Baseline 1.3-1.4    COVID-19 08/16/2021    SOB, fatigue, fever, chills  x 3 weeks    Depression     Dilated cardiomyopathy ([facility] 05/26/2022    Ejection fraction 40 to 45%, does have symptoms of dyspnea and decreased effort tolerance    GERD (gastroesophageal reflux disease)     High cholesterol     Kidney calculi     Sleep apnea     does not use cpap    Under care of team     Dr. Esteban Perez Nephrology    Under care of team     Dr. Gordon Hart. last visit approx 9/2021    Under care of team     Dr. Alina Erazo Cardiology [facility] last visit 8/03/2022    Under care of team     Dr. Ventura Jama    Under care of team     Dr. Yasmani Farias       Past Surgical  History:     Past Surgical History:   Procedure Laterality Date    BLADDER REMOVAL      BLADDER REMOVAL N/A 9/16/2022    XI ROBOTIC LAPARASCOPIC ASSISTED RADICAL CYSTOPROSTATECTOMY, BILATERAL PELVIC LYMPHADENECTOMY AND ILEAL CONDUIT FORMATION performed by Lila Barnes MD at [facility] Right 9/16/2022    CYSTOSCOPY STENT REMOVAL performed by Lila Barnes MD at [address]  06/16/2022    nonobstructive CAD    COLONOSCOPY      IR NEPHROSTOMY PERCUTANEOUS LEFT  9/23/2022    IR NEPHROSTOMY PERCUTANEOUS LEFT 9/23/2022 MD [facility]    IR NEPHROSTOMY PERCUTANEOUS LEFT  10/5/2022    IR NEPHROSTOMY PERCUTANEOUS LEFT 10/5/2022 [facility]    IR NEPHROSTOMY PERCUTANEOUS RIGHT  9/23/2022    IR NEPHROSTOMY PERCUTANEOUS RIGHT 9/23/2022 MD MARSHALL [facility]    IR NEPHROSTOMY PERCUTANEOUS RIGHT  10/5/2022    IR NEPHROSTOMY PERCUTANEOUS RIGHT 10/5/2022 [facility]    IR PORT PLACEMENT EQUAL OR GREATER THAN 5 YEARS  02/25/2022    IR PORT PLACEMENT EQUAL OR GREATER THAN 5 YEARS 2/25/2022 [facility]    KNEE ARTHROSCOPY      left    LAPAROTOMY N/A 9/22/2022    LAPAROTOMY EXPLORATORY, LYSIS OF ADHESIONS, REPAIR OF BILATERAL URETERAL ANASTOMOSES, ABDOMINAL WASHOUT, PLACEMENT OF ABTHERA WOUND VAC performed by Carlyle Hurst DO at [address] N/A 9/23/2022    2ND LOOK LAPAROTOMY,  ABDOMINAL WASHOUT, ABDOMINAL CLOSURE, WOUND VAC PLACEMENT [facility]  09/16/2022    Cystoprostatectomy, Bilateral Pelvic Lymphadenectomy, ileo conduit formation, cystectomy stent removal (right)       Medications:      carvedilol  3.125 mg Oral BID WC    rivaroxaban  20 mg Oral Daily    fluconazole  200 mg Oral Q24H    chlorhexidine  15 mL Mouth/Throat BID    bisacodyl  10 mg Rectal Daily    pantoprazole (PROTONIX) 40 mg injection  40 mg IntraVENous Q12H    polyethylene glycol  17 g Oral Daily    buPROPion  300 mg Oral QAM    simvastatin  40 mg Oral Nightly    sodium chloride flush  5-40 mL IntraVENous 2 times per day    acetaminophen  650 mg Oral Q6H       Social History:     Social History     Socioeconomic History    Marital status:      Spouse name: Not on file    Number of children: Not on file    Years of education: Not on file    Highest education level: Not on file   Occupational History    Not on file   Tobacco Use    Smoking status: Former     Packs/day: 0.25 Years: 9.00     Pack years: 2.25     Types: Cigarettes     Start date: 1983     Quit date: 2/10/1992     Years since quittin.6    Smokeless tobacco: Never   Vaping Use    Vaping Use: Never used   Substance and Sexual Activity    Alcohol use: Not Currently    Drug use: No    Sexual activity: Yes     Partners: Female   Other Topics Concern    Not on file   Social History Narrative    Not on file     Social Determinants of Health     Financial Resource Strain: Not on file   Food Insecurity: Not on file   Transportation Needs: Not on file   Physical Activity: Not on file   Stress: Not on file   Social Connections: Not on file   Intimate Partner Violence: Not on file   Housing Stability: Not on file       Family History:     Family History   Problem Relation Age of Onset    Cancer Father         bladder cancer    Urolithiasis Father       Medical Decision Making:   I have independently reviewed/ordered the following labs:    CBC with Differential:   Recent Labs     10/06/22  0433 10/07/22  0644   WBC 14.3* 12.4*   HGB 8.2* 7.5*   HCT 25.6* 23.6*    317       BMP:  Recent Labs     10/06/22  0433 10/07/22  0644   * 134*   K 3.7 3.5*    101   CO2 23 26   BUN 7* 6*   CREATININE 1.12 1.02   MG 1.6 1.6       Hepatic Function Panel:   Recent Labs     10/05/22  0536   PROT 5.6*   LABALBU 1.7*   BILIDIR 0.2   IBILI 0.2   BILITOT 0.4   ALKPHOS 65   ALT 8   AST 19         No results for input(s): RPR in the last 72 hours. No results for input(s): HIV in the last 72 hours. No results for input(s): BC in the last 72 hours. Lab Results   Component Value Date/Time    CREATININE 1.02 10/07/2022 06:44 AM    GLUCOSE 82 10/07/2022 06:44 AM       Detailed results: Thank you for allowing us to participate in the care of this patient. Please call with questions.     This note is created with the assistance of a speech recognition program.  While intending to generate adocument that actually reflects the content of the visit, the document can still have some errors including those of syntax and sound a like substitutions which may escape proof reading. It such instances, actual meaningcan be extrapolated by contextual diversion. Office: [phone]  Perfect serve / office [phone]      TRACY Bailey      I have discussed the care of the patient, including pertinent history and exam findings,  with the resident. I have seen and examined the patient and the key elements of all parts of the encounter have been performed by me. I agree with the assessment, plan and orders as documented by the resident.     Sindi Raymond, Infectious Diseases

## 2022-10-07 NOTE — PROGRESS NOTES
Physical Therapy  Facility/Department: [facility] RENAL//MED SURG  Daily Treatment Note  NAME: Abhijit Priest  : 1955  MRN: [id number]    Date of Service: 10/7/2022    Discharge Recommendations:  Patient would benefit from continued therapy after discharge, Continue to assess pending progress   PT Equipment Recommendations  Equipment Needed: No  Other: Pt report owning walker, cane, shower seat and with grab bars, denies DME needs, therapist recomends 24 use of RW at this time    Patient Diagnosis(es): The primary encounter diagnosis was Malignant neoplasm of overlapping sites of bladder ([facility] Diagnoses of Malignant neoplasm of lateral wall of urinary bladder ([facility] Bandemia, and Complicated UTI (urinary tract infection) were also pertinent to this visit. Assessment   Pt reluctantly agreeable to get OOB/up to chair \"they changed my wound vac and my belly hurts\". Pt told PT \"where's your 2nd person? It's going to take 2 of you to get me up\". Pt able to mobilize with just SBA, CG for all mobility. He tends to be impulsive, but this date moved slowly enough for PT to safely move his tubes/lines. Pt tends to be self limiting, not wanting to move when he's in pain  and not feeling well. Educated pt and wife re: importance of being OOB and mobilizing. Pt states his goal is to get walking and be able to [facility] out of here\". Discussed having pt up to chair more with RN. Activity Tolerance: Patient limited by fatigue;Patient limited by pain  Equipment Needed: No  Other: Pt report owning walker, cane, shower seat and with grab bars, denies DME needs, therapist recomends 247 use of RW at this time     [address]:  (5-6 visits weekly)  Current Treatment Recommendations: Therapeutic activities;Strengthening;Balance training;Functional mobility training;Transfer training; Endurance training;Gait training;Stair training;ROM;Pain management;Home exercise program;Safety education & training;Patient/Caregiver education & training;Positioning  PT Plan of Care: Daily     Restrictions  Restrictions/Precautions  Restrictions/Precautions: Surgical Protocols, Fall Risk, Up as Tolerated  Required Braces or Orthoses?: No      Subjective    Subjective  Pain: 8/10, abd incision/wound vac placement  Orientation  Overall Orientation Status: Within Normal Limits  Cognition  Overall Cognitive Status: WFL     Objective   Bed Mobility Training  Bed Mobility Training: Yes  Overall Level of Assistance: Modified independent  Rolling: Modified independent  Supine to Sit: Modified independent  Scooting: Supervision  Balance  Sitting: Intact  Standing: With support  Transfer Training  Transfer Training: Yes  Overall Level of Assistance: Contact-guard assistance;Assist X1  Interventions: Verbal cues; Safety awareness training  Sit to Stand: Contact-guard assistance;Assist X1  Stand to Sit: Contact-guard assistance;Assist X1  Stand Pivot Transfers: Contact-guard assistance;Assist X1  Bed to Chair: Contact-guard assistance;Assist X1 (use of rwalker)  Gait Training  Gait Training: Yes  Gait  Overall Level of Assistance: Contact-guard assistance  Interventions: Safety awareness training;Verbal cues  Base of Support: Widened  Speed/Mignon: Accelerated  Distance (ft): 2 Feet  Assistive Device: Walker, rolling  Stairs - Level of Assistance: Contact-guard assistance (mildly flexed posture)     PT Exercises  Exercise Treatment: UE blue t-band x 15 reps: biceps, triceps, shoulder press out, horizontal abduction, rest between ea ex; AROM BLEs: LAQs x 15 reps; KTC 2x5 reps  Static Sitting Balance Exercises: pt dangled EOB ~3 minutes prior to mobilizing to chair; pt stood a 2nd time for buttock hygiene d/t smear on the lift on his bed; able to stand ~45 seconds with rwalker, mildly flexed posture, max A for hygiene     Safety Devices  Type of Devices: Call light within reach; Patient at risk for falls; Left in chair;Nurse notified  Restraints  Restraints Initially in Place: No       Goals  Short Term Goals  Time Frame for Short Term Goals: 5 visits  Short Term Goal 1: Pt to complete supine <> sit mod I  Short Term Goal 2: Sit <> stand Mod I with AD  Short Term Goal 3: pt to ambulate 50 ft with RW CGA  Long Term Goals  Time Frame for Long Term Goals : 14 visits  Long Term Goal 1: Pt to ambulate 100 ft Mod I RW  Long Term Goal 2: Ascend /descend 4 steps mod I  Patient Goals   Patient Goals : to get stronger    Education  Patient Education  Education Given To: Patient; Family  Education Provided: Role of Therapy;Plan of Care; Fall Prevention Strategies;Transfer Training;Home Exercise Program  Education Provided Comments: Pt educated on the importance of functional mobility and areas to improve safety when mobilizing.   Education Method: Verbal  Barriers to Learning: None (pt self limiting d/t pain and not feeling well; reluctant to get OOB d/t pain, but easily persuaded to)  Education Outcome: Verbalized understanding;Continued education needed    Therapy Time   Individual Concurrent Group Co-treatment   Time In 1341         Time Out 1419         Minutes 38         Timed Code Treatment Minutes: 729 Markie Stein Daily, PT

## 2022-10-07 NOTE — PROGRESS NOTES
[facility] Wound Ostomy  Nurse  Follow up  Note       NAME:  Kameron Bailon RECORD NUMBER:  [id number]  AGE: 74 y.o. GENDER: male  : 1955  TODAY'S DATE:  10/7/2022    Subjective   Reason for [address] Nurse Evaluation and Assessment: Assessment: NPWT dressing changes to mid abdominal surgical wound using white foam to base under black granufoam at -125 mHg vacuum. Urostomy care and education.         Objective    /65   Pulse 81   Temp 98.1 °F (36.7 °C) (Oral)   Resp 16   Ht 6' 2.02\" (1.88 m)   Wt 262 lb (118.8 kg)   SpO2 95%   BMI 33.62 kg/m²     LABS:  WBC:    Lab Results   Component Value Date/Time    WBC 12.4 10/07/2022 06:44 AM     H/H:    Lab Results   Component Value Date/Time    HGB 7.5 10/07/2022 06:44 AM    HCT 23.6 10/07/2022 06:44 AM       Assessment   Camacho Risk Score: Camacho Scale Score: 19    Patient Active Problem List   Diagnosis Code    Kidney stones N20.0    Nocturia R35.1    Hypertrophy of prostate with urinary obstruction N40.1, N13.8    Malignant neoplasm of urinary bladder (HCC) C67.9    Urinary retention R33.9    Cancer of lateral wall of urinary bladder (HCC) C67.2    Acute respiratory failure with hypoxia (HCC) J96.01    Other hyperlipidemia E78.49    Microcytic anemia D50.9    Mild protein-calorie malnutrition (HCC) E44.1    Pulmonary embolism without acute cor pulmonale (HCC) I26.99    Hypoxia R09.02    Dyspnea V65.85    Acute systolic heart failure (HCC) I50.21    Acute renal failure with tubular necrosis (HCC) N17.0    CKD (chronic kidney disease), stage III (HCC) N18.30    Dilated cardiomyopathy (HCC) I42.0    Bladder cancer (HCC) C67.9    PAXTON (acute kidney injury) ([address] N17.9    Bandemia O29.592    Complicated UTI (urinary tract infection) N39.0    Hypokalemia E87.6    Hypophosphatemia E83.39    Small bowel obstruction (HCC) K56.609    Acute pyelonephritis N10    Nephrostomy complication (HCC) B31.353    Candida albicans infection B37.9    Enterococcus infection in shunt (HCC) T85.79XA, B95.2    Hypomagnesemia E83.42       Measurements:     10/07/22 1046   Urostomy Ileal conduit RLQ   Placement Date/Time: 09/16/22 1413   Present on Admission/Arrival: No  Inserted by: Dr. El Cifuentes Type: Ileal conduit  Location: RLQ   Stomal Appliance 1 piece; Intact   Stoma  Assessment   (catheter in os; sutured to outside of uostomy pouch maintained; suction cath had been removed and cath plugged)   Collection Container   (extra 2 piece appliance at bedside)   Negative Pressure Wound Therapy Abdomen Mid   Placement Date/Time: 09/23/22 1310   Location: Abdomen  Wound Location Orientation: Mid   Wound Type Surgical   Unit Type VAC Ulta   Dressing Type White Foam;Black Foam   Number of pieces used 3  (2 white 1 black)   Number of pieces removed 3   Cycle Continuous; On   Target Pressure (mmHg) 125   Canister changed? No   Dressing Status New dressing applied   Dressing Changed Changed/New   Drainage Amount Small   Drainage Description Serosanguinous   Dressing Change Due 10/10/22   Wound 09/22/22 Abdomen Mid SURGICAL INCISION   Date First Assessed: 09/22/22   Present on Hospital Admission: No  Primary Wound Type: Surgical Type  Location: Abdomen  Wound Location Orientation: Mid  Wound Description (Comments): SURGICAL INCISION   Wound Image    Wound Etiology Surgical   Dressing Status New dressing applied   Wound Cleansed Cleansed with saline   Dressing/Treatment Negative pressure wound therapy   Dressing Change Due 10/10/22   Wound Length (cm) 12.9 cm   Wound Width (cm) 3.7 cm   Wound Depth (cm) 3 cm   Wound Surface Area (cm^2) 47.73 cm^2   Change in Wound Size % (l*w) 23.51   Wound Volume (cm^3) 143.19 cm^3   Wound Healing % 24   Wound Assessment Subcutaneous;Granulation tissue   Drainage Amount Small   Drainage Description Serosanguinous   Verito-wound Assessment Intact  (bordered with drape)             Response to treatment:  With complaints of pain.    [place] up in anticipation of pain;  provided distraction during procedure to alleviate anxiety. Pain Assessment:  Premedicated: Yes  Experienced nausea after Dilaudid; had been medicated with Zofran at same time. Will try to use oral analgesics Monday one hour prior to dressing change. Plan   Plan of Care:   ABDOMINAL WOUND:  NPWT with white foam to base of wound under black granufoam at -125 mmHG vacuum. Change sponge M-W-F. Change canister when full or weekly. UROSTOMY:  pouch is intact with drain protruding from the os. Maintain. Turn every 2 hours  Float heels off of bed with pillows under calves     Use lift sling to reposition patient to minimize potential for shear injury. Foam sacrum dressing to sacrococcygeal area. Peel back dressing, inspect skin beneath, re-secure. Change every 72 hours and prn wrinkles, soilage. Discontinue Sacral dressing if repeatedly soiled by incontinence. Moisture wicking under pads     Current Diet: ADULT ORAL NUTRITION SUPPLEMENT; Breakfast, Lunch, Dinner, PM Snack; Clear Liquid Oral Supplement  ADULT DIET;  Regular      Discharge Plan:  Placement for patient upon discharge: skilled nursing    Patient appropriate for Outpatient [address]: N/A      Patient/Caregiver Teaching:  Level of patient/caregiver understanding able to:   [] Indicates understanding       [] Needs reinforcement  [] Unsuccessful      [] Verbal Understanding  [] Demonstrated understanding       [] No evidence of learning  [] Refused teaching         [] N/A     Johanna Ahumada, RN [facility]

## 2022-10-07 NOTE — PROGRESS NOTES
Tomas Waite MD FACS   Urology Progress Note     Subjective:  No acute events overnight  No fevers or chills, vital stable  No nausea or vomiting, tolerating liquids  Passing flatus and having bowel movements  Walking in the room, not in hallways yet    Suction device inserted into stoma 10/4/2022    Right nephrostomy tube 0 cc clear yellow urine  Left nephrostomy tube 3.5L / 24 hours jared  Urostomy 225 cc / 24 hours clear yellow urine  Upper abdominal drain 20cc / 24 hours serous  Lower abdominal drain 30cc / 24 hours serous    Am labs pending    Patient Vitals for the past 24 hrs:   BP Temp Temp src Pulse Resp SpO2 Weight   10/07/22 0525 -- -- -- -- -- -- 262 lb (118.8 kg)   10/07/22 0005 137/61 98.8 °F (37.1 °C) Oral 82 16 96 % --   10/06/22 2041 128/65 98.1 °F (36.7 °C) Oral 85 16 94 % --   10/06/22 1623 132/72 98.1 °F (36.7 °C) -- 85 18 95 % --   10/06/22 1100 123/85 98.4 °F (36.9 °C) Oral 88 16 94 % --   10/06/22 0715 125/63 98.4 °F (36.9 °C) Oral 89 16 95 % --       Intake/Output Summary (Last 24 hours) at 10/7/2022 0618  Last data filed at 10/7/2022 0557  Gross per 24 hour   Intake --   Output 3850 ml   Net -3850 ml       Recent Labs     10/05/22  0536 10/06/22  0433   WBC 10.3 14.3*   HGB 7.5* 8.2*   HCT 23.2* 25.6*   MCV 88.2 88.3    329     Recent Labs     10/05/22  0536 10/06/22  0433    133*   K 3.5* 3.7    100   CO2 24 23   PHOS 2.5 2.8   BUN 9 7*   CREATININE 1.25* 1.12       No results for input(s): COLORU, PHUR, LABCAST, WBCUA, RBCUA, MUCUS, TRICHOMONAS, YEAST, BACTERIA, CLARITYU, SPECGRAV, LEUKOCYTESUR, UROBILINOGEN, BILIRUBINUR, BLOODU in the last 72 hours.     Invalid input(s): NITRATE, GLUCOSEUKETONESUAMORPHOUS      Additional Lab/culture results:    Physical Exam:   General: A/O x 3, no acute distress  HEENT: moist mucous membranes  Neck: Supple   Chest: bilateral symmetrical chest rise   Circulatory: Peripheries warm , well perfused   P/A: soft, clean, dry and findings and imaging reports/films. I agree with the plan as noted above.     Electronically signed by Ching Tolliver MD on 10/7/2022 at 9:43 AM

## 2022-10-07 NOTE — CARE COORDINATION
Transitional Planning  Spoke with patient at bedside plan to go [place]. Has wound vac, 2 neph tubes and 2 MERLIN drains.

## 2022-10-07 NOTE — PLAN OF CARE
Problem: Discharge Planning  Goal: Discharge to home or other facility with appropriate resources  10/7/2022 0536 by Suha Jaffe RN  Outcome: Progressing  10/6/2022 1730 by Glenn Covarrubias RN  Outcome: Progressing     Problem: Pain  Goal: Verbalizes/displays adequate comfort level or baseline comfort level  10/7/2022 0536 by Suha Jaffe RN  Outcome: Progressing  10/6/2022 1730 by Glenn Covarrubias RN  Outcome: Progressing     Problem: Safety - Adult  Goal: Free from fall injury  10/7/2022 0536 by Suha Jaffe RN  Outcome: Progressing  10/6/2022 1730 by Glenn Covarrubias RN  Outcome: Progressing     Problem: ABCDS Injury Assessment  Goal: Absence of physical injury  10/7/2022 0536 by Suha Jaffe RN  Outcome: Progressing  10/6/2022 1730 by Glenn Covarrubias RN  Outcome: Progressing     Problem: Nutrition Deficit:  Goal: Optimize nutritional status  10/7/2022 0536 by Suha Jaffe RN  Outcome: Progressing  10/6/2022 1730 by Glenn Covarrubias RN  Outcome: Progressing     Problem: Skin/Tissue Integrity  Goal: Absence of new skin breakdown  Description: 1. Monitor for areas of redness and/or skin breakdown  2. Assess vascular access sites hourly  3. Every 4-6 hours minimum:  Change oxygen saturation probe site  4. Every 4-6 hours:  If on nasal continuous positive airway pressure, respiratory therapy assess nares and determine need for appliance change or resting period. 10/7/2022 0536 by Suha Jaffe RN  Outcome: Progressing  10/6/2022 1730 by Glenn Covarrubias RN  Outcome: Progressing     Problem: Safety - Medical Restraint  Goal: Remains free of injury from restraints (Restraint for Interference with Medical Device)  Description: INTERVENTIONS:  1. Determine that other, less restrictive measures have been tried or would not be effective before applying the restraint  2. Evaluate the patient's condition at the time of restraint application  3.  Inform patient/family regarding the reason for restraint  4. Q2H: Monitor safety, psychosocial status, comfort, nutrition and hydration  10/7/2022 0536 by John Torres RN  Outcome: Progressing  10/6/2022 1730 by Nate Patel RN  Outcome: Progressing     Problem: Confusion  Goal: Confusion, delirium, dementia, or psychosis is improved or at baseline  Description: INTERVENTIONS:  1. Assess for possible contributors to thought disturbance, including medications, impaired vision or hearing, underlying metabolic abnormalities, dehydration, psychiatric diagnoses, and notify attending LIP  2. [place] high risk fall precautions, as indicated  3. Provide frequent short contacts to provide reality reorientation, refocusing and direction  4. Decrease environmental stimuli, including noise as appropriate  5. Monitor and intervene to maintain adequate nutrition, hydration, elimination, sleep and activity  6. If unable to ensure safety without constant attention obtain sitter and review sitter guidelines with assigned personnel  7.  Initiate Psychosocial CNS and Spiritual Care consult, as indicated  10/7/2022 0536 by John Torres RN  Outcome: Progressing  10/6/2022 1730 by Nate Patel RN  Outcome: Progressing     Problem: Gastrointestinal - Adult  Goal: Minimal or absence of nausea and vomiting  10/7/2022 0536 by John Torres RN  Outcome: Progressing  10/6/2022 1730 by Nate Patel RN  Outcome: Progressing  Goal: Maintains or returns to baseline bowel function  10/7/2022 0536 by John Torres RN  Outcome: Progressing  10/6/2022 1730 by Nate Patel RN  Outcome: Progressing  Goal: Maintains adequate nutritional intake  10/7/2022 0536 by John Torres RN  Outcome: Progressing  10/6/2022 1730 by Nate Patel RN  Outcome: Progressing  Goal: Establish and maintain optimal ostomy function  10/7/2022 0536 by John Torres RN  Outcome: Progressing  10/6/2022 1730 by Nate Patel RN  Outcome: Progressing     Problem: Genitourinary - Adult  Goal: Urinary catheter remains patent  10/7/2022 0536 by Adilia Yap RN  Outcome: Progressing  10/6/2022 1730 by Shakira Servin RN  Outcome: Progressing

## 2022-10-08 LAB
ANION GAP SERPL CALCULATED.3IONS-SCNC: 7 MMOL/L (ref 9–17)
BUN BLDV-MCNC: 7 MG/DL (ref 8–23)
CALCIUM SERPL-MCNC: 7.7 MG/DL (ref 8.6–10.4)
CHLORIDE BLD-SCNC: 102 MMOL/L (ref 98–107)
CO2: 25 MMOL/L (ref 20–31)
CREAT SERPL-MCNC: 1.12 MG/DL (ref 0.7–1.2)
GFR SERPL CREATININE-BSD FRML MDRD: >60 ML/MIN/1.73M2
GLUCOSE BLD-MCNC: 91 MG/DL (ref 70–99)
HCT VFR BLD CALC: 23.8 % (ref 40.7–50.3)
HEMOGLOBIN: 7.7 G/DL (ref 13–17)
MCH RBC QN AUTO: 28 PG (ref 25.2–33.5)
MCHC RBC AUTO-ENTMCNC: 32.4 G/DL (ref 28.4–34.8)
MCV RBC AUTO: 86.5 FL (ref 82.6–102.9)
NRBC AUTOMATED: 0 PER 100 WBC
PDW BLD-RTO: 16.1 % (ref 11.8–14.4)
PLATELET # BLD: 330 K/UL (ref 138–453)
PMV BLD AUTO: 9.7 FL (ref 8.1–13.5)
POTASSIUM SERPL-SCNC: 3.5 MMOL/L (ref 3.7–5.3)
RBC # BLD: 2.75 M/UL (ref 4.21–5.77)
SODIUM BLD-SCNC: 134 MMOL/L (ref 135–144)
WBC # BLD: 13.4 K/UL (ref 3.5–11.3)

## 2022-10-08 PROCEDURE — 99232 SBSQ HOSP IP/OBS MODERATE 35: CPT | Performed by: INTERNAL MEDICINE

## 2022-10-08 PROCEDURE — 85027 COMPLETE CBC AUTOMATED: CPT

## 2022-10-08 PROCEDURE — 2580000003 HC RX 258: Performed by: STUDENT IN AN ORGANIZED HEALTH CARE EDUCATION/TRAINING PROGRAM

## 2022-10-08 PROCEDURE — 6370000000 HC RX 637 (ALT 250 FOR IP): Performed by: INTERNAL MEDICINE

## 2022-10-08 PROCEDURE — 1200000000 HC SEMI PRIVATE

## 2022-10-08 PROCEDURE — C9113 INJ PANTOPRAZOLE SODIUM, VIA: HCPCS | Performed by: STUDENT IN AN ORGANIZED HEALTH CARE EDUCATION/TRAINING PROGRAM

## 2022-10-08 PROCEDURE — 6360000002 HC RX W HCPCS: Performed by: STUDENT IN AN ORGANIZED HEALTH CARE EDUCATION/TRAINING PROGRAM

## 2022-10-08 PROCEDURE — 80048 BASIC METABOLIC PNL TOTAL CA: CPT

## 2022-10-08 PROCEDURE — 6370000000 HC RX 637 (ALT 250 FOR IP): Performed by: STUDENT IN AN ORGANIZED HEALTH CARE EDUCATION/TRAINING PROGRAM

## 2022-10-08 PROCEDURE — 36415 COLL VENOUS BLD VENIPUNCTURE: CPT

## 2022-10-08 RX ORDER — FLUCONAZOLE 100 MG/1
200 TABLET ORAL DAILY
Qty: 14 TABLET | Refills: 0 | Status: SHIPPED | OUTPATIENT
Start: 2022-10-08 | End: 2022-10-10 | Stop reason: HOSPADM

## 2022-10-08 RX ADMIN — ONDANSETRON 8 MG: 4 TABLET, ORALLY DISINTEGRATING ORAL at 10:12

## 2022-10-08 RX ADMIN — SODIUM CHLORIDE, PRESERVATIVE FREE 40 MG: 5 INJECTION INTRAVENOUS at 20:12

## 2022-10-08 RX ADMIN — BUPROPION HYDROCHLORIDE 300 MG: 150 TABLET, EXTENDED RELEASE ORAL at 10:04

## 2022-10-08 RX ADMIN — RIVAROXABAN 20 MG: 20 TABLET, FILM COATED ORAL at 18:47

## 2022-10-08 RX ADMIN — FLUCONAZOLE 200 MG: 40 POWDER, FOR SUSPENSION ORAL at 20:12

## 2022-10-08 RX ADMIN — CHLORHEXIDINE GLUCONATE 0.12% ORAL RINSE 15 ML: 1.2 LIQUID ORAL at 10:04

## 2022-10-08 RX ADMIN — CARVEDILOL 3.12 MG: 3.12 TABLET, FILM COATED ORAL at 18:47

## 2022-10-08 RX ADMIN — CHLORHEXIDINE GLUCONATE 0.12% ORAL RINSE 15 ML: 1.2 LIQUID ORAL at 20:12

## 2022-10-08 RX ADMIN — CARVEDILOL 3.12 MG: 3.12 TABLET, FILM COATED ORAL at 10:04

## 2022-10-08 RX ADMIN — SIMVASTATIN 40 MG: 40 TABLET, FILM COATED ORAL at 20:12

## 2022-10-08 ASSESSMENT — PAIN SCALES - WONG BAKER

## 2022-10-08 ASSESSMENT — ENCOUNTER SYMPTOMS
CHEST TIGHTNESS: 0
ABDOMINAL DISTENTION: 0
COLOR CHANGE: 0
SINUS PRESSURE: 0

## 2022-10-08 ASSESSMENT — PAIN SCALES - GENERAL: PAINLEVEL_OUTOF10: 0

## 2022-10-08 NOTE — CARE COORDINATION
Transitional planning. BRYCE Benites with [facility] admissions. [phone] to call CM back concerning pt admission. Pt will be dc'd with wound vac, may be discharged with neph tubes or MERLIN drain.

## 2022-10-08 NOTE — PROGRESS NOTES
Ivet Gabriel MD FACS   Urology Progress Note     Subjective:  No acute events overnight  No fevers or chills, vital stable  Tolerating regular diet without nausea or vomiting  Had a bowel movement yesterday  Walking with the help of a walker  Suction device inserted into stoma 10/4/2022 /currently off suction    Bilateral drains with minimal output  Ileal conduit - 1400 cc   Left nephrostomy 450 cc     Am labs pending    Patient Vitals for the past 24 hrs:   BP Temp Temp src Pulse Resp SpO2 Weight   10/08/22 0520 -- -- -- -- -- -- 262 lb (118.8 kg)   10/07/22 2056 (!) 142/73 98.1 °F (36.7 °C) Oral 85 16 97 % --   10/07/22 1644 128/74 98.2 °F (36.8 °C) Oral 88 16 98 % --   10/07/22 1159 110/65 97.9 °F (36.6 °C) Oral 82 17 96 % --   10/07/22 1011 -- -- -- -- 16 -- --   10/07/22 0941 -- -- -- -- 16 -- --       Intake/Output Summary (Last 24 hours) at 10/8/2022 0850  Last data filed at 10/8/2022 2110  Gross per 24 hour   Intake --   Output 1850 ml   Net -1850 ml       Recent Labs     10/06/22  0433 10/07/22  0644   WBC 14.3* 12.4*   HGB 8.2* 7.5*   HCT 25.6* 23.6*   MCV 88.3 87.1    317     Recent Labs     10/06/22  0433 10/07/22  0644   * 134*   K 3.7 3.5*    101   CO2 23 26   PHOS 2.8 2.9   BUN 7* 6*   CREATININE 1.12 1.02       No results for input(s): COLORU, PHUR, LABCAST, WBCUA, RBCUA, MUCUS, TRICHOMONAS, YEAST, BACTERIA, CLARITYU, SPECGRAV, LEUKOCYTESUR, UROBILINOGEN, BILIRUBINUR, BLOODU in the last 72 hours.     Invalid input(s): NITRATE, GLUCOSEUKETONESUAMORPHOUS      Additional Lab/culture results:    Physical Exam:   General: A/O x 3, no acute distress  HEENT: moist mucous membranes  Neck: Supple   Chest: bilateral symmetrical chest rise   Circulatory: Peripheries warm , well perfused   P/A: soft, clean, dry and intact with skin glue, ostomy pink with suction device in place, bilateral ureteral catheters barely visible, wound vac in place, MERLIN x 2 in place with serous drainage  Extremities: Bilateral LE pitting edema   : left nephrostomy tube with clear jared urine, right nephrostomy tube with scant urine    Interval Imaging Findings: none    Impression:  none    Rodolfo Modi is a 61-year-old male   Active  problem list  Hx of bladder cancer  Robotic assisted laparoscopic cystoprostatectomy with ileal conduit creation on 9/16/2022   Ex lap ROLANDO repair of BL ureteral anastamosis and abd washout with abthera due to ureteral anastamotic leaks and SBO and second look on 9/22 and 9/23      Plan:  Maintain bilateral nephroureteral catheters  Bilateral nephrostograms negative for leak but continues to demonstrate significantly dilated right collecting system  Drain output minimal despite suction device disconnected from the ostomy. We will remove both drains and catheter from the ileal conduit after discussion with attending. Continue regular diet. Having bowel movements  Maintain bilateral ureteral stents  Urine culture growing pseudomonas, enterococcus and and second pseudomonas colony type, pansusceptible only gentamicin resistance s/p 7 days cefepime.   Appreciate ID recommendations  Discontinue IV fluids  DVT PPX: Subq heparin 5000 units TID  Continue to monitor  Plan for discharge to Sal Masterson MD, PGY-5  8:50 AM 10/8/2022

## 2022-10-08 NOTE — PROGRESS NOTES
[facility] -   Infectious diseases evaluation  admission date 9/16/2022    reason for consultation:   bandemia    Impression :   Current:  9/16 bladder cancer involving wall and lymph nodes, post radical cystectomy, prostatectomy, groin lymph node dissection  9/22 SBO, had lysis of adhesions  9/22 bilateral ureteral anastomotic leak, with large abdominal collection, post repair 9/22 9/23 bilat nephrostomy tube placed  10/4 B/l nephrostograms to assess nephrostomy tube positioning and degrees of distal ureteral intestinal anastomotic leak - NEGATIVE for leak  10/5 Urology exchanged b/l nephrostomy drains to b/l nephroureteral stent catheters placed to bag drainage  9/23 closure of the fascia, abdominal wound open with VAC  Ongoing bandemia  Pseudomonas/Enterococcus UTI, 9/16-  Cefepime 9/19 until 9/25  Right nephrostomy urine infection with Candida albicans 9/23  Noted at the time of right percutaneous nephrostomy placement 9/23  Ongoing bandemia -resolving  Hypernatremia -on fluids    Other:    Discussion / summary of stay / plan of care     Recommendations   The cause of the bandemia could be multifactorial, including a persistent urinoma  Zosyn for Enterococcus till 10/4 - 7 days  Diflucan, keep till urine clears, another week  Reconsiled for DC    Patient clinically better  -continues to have leukocytosis -   Has wound vac, 2 nephrostomy tubes, and 2 MERLIN drains         Infection Control Recommendations   [place] Precautions    Antimicrobial Stewardship Recommendations   Simplification of therapy  Targeted therapy      History of Present Illness:   Initial history:  Rodolfo Modi is a 74y.o.-year-old male with history of bladder cancer involving the wall of the bladder as well as the groin lymph nodes.   Came in for radical cystectomy done on 9/16 with ileal conduit placement, removal of the groin lymph nodes, but complicated with a small bowel obstruction and bilateral ureteral anastomotic leak. CT scan of the abdomen  9/22 which had showed at the time a large anterior pelvic fluid collection 15 x 13 x 6 cmWith variable densities extending from the cystectomy bed    Taken back to surgery on 9/22, lysis of adhesion that was thought to be causing the SBO, as well as repair of the anastomotic leak, and placement of 2 bilateral nephrostomy tubes. 9/23 patient went back to surgery for closure of abdominal fascia and subcutaneous tissue that stayed open with a VAC placement over it -    Due to hydronephrosis, bilat PCNT placed by IR 9/23, A repeat urine culture from 9/23 done due to cloudy R urine, by IR,  is showing Candida albicans 9/26. The patient has been started on Diflucan. Along the way on 9/16 the urine grew Pseudomonas and Enterococcus, resistant to Cipro and patient has received a course of cefepime from 9/19 and 2 until 9/25. His white count remains elevated actually after a feeling that it was improving, he started going worse, today it is up to 20. Infectious is consulted for the concern of a ongoing infection. The right nephro drain was giving very little urine and had a urogram  9/26 that showed good positioning and no leak. Since then right nephrostomy line has been giving a better input. Today the patient is alert appropriate he has an NG tube, was trying to have some liquids p.o. and today was able to tolerate 2 popsicles without vomiting. He does have gurgling over the abdomen, and has no abdominal tenderness otherwise. He has only abdominal pain on the surgical site. He has 2 MERLIN drains giving serosanguineous fluid, and he has 2 percutaneous nephrostomy drains that are giving clear urine. Interval changes  10/8/2022   Patient Vitals for the past 8 hrs:   Weight   10/08/22 0520 262 lb (118.8 kg)       10/4  Afebrile, hypertensive  Patient returning from anterograde pyelogram   +abdominal tenderness, +discomfort from Wound VAC.  No signs of discharge or dehiscence   Continues to have drainage out clear yellow drainage out MERLIN drains and dark yellow out nephrostomy tubes  No new complaint of diarrhea  Leukocytosis resolving     10/5   Afebrile  Continues to have abdominal pain and tenderness  Drainage out MERLIN drains and nephrostomy tubes  R nephrostomy collecting yellow fluid with cloudy chunks, L nephrostomy jared and clear  No diarrhea  Leukocytosis continues to resolve    10/6   Afebrile - on po clears  Patient continues to have drainage  No diarrhea   Leukocytosis spiked  Tolerating diflucan solution  R PCNT and denis shows less turbid urine    10/7   Afebrile  S/p cath placement  Tolerating diflucan solution  Clear liquid in nephrostomy bags  Flakes within denis, likely yeast     10/8  Afebrile  Tolerating diflucan solution  Eating and drinking well  Passing gas  MERLIN drains present - clear fluid in one, thick tan discharge in another  2 nephrostomy bags in place now   [facility] still present    Summary of relevant labs:  Labs:  WBC  19.1 - 14.3 - 18.1 -15.1 - 11.8 -10.3 - 14.3 - 12.4  Creatinine1.39 -1.41 - 1.39 - 1.36 - 1.44 -1.49 - 1.28 - 1.02  Micro:  UA 9/18 - large leukocyte esterase and positive nitrates  9/16 UC Pseudomonas aeruginosa 2 strains, 1 sensitive to Cipro the other resistant,  and Enterococcus faecalis sensitive to Cipro  9/23 UC with Candida    Urine culture from  R nephrostomy due to cloudiness, 9/23 showing Candida albicans/W   Imaging:  10/4 IR antegrade pyelogram Migration of the left double-J stent mostly into the ileal loop with the proximal portion of the double-J stent in the distal left ureter. Distal left ureter is patent and empties promptly into the ileal loop. Moderate hydroureter and hydronephrosis identified. There is prominent right-sided hydronephrosis and hydroureter proximally. There is a kink in the ureter proximally that appears to create a functional obstruction in the proximal ureter.   Under higher pressure injection contrast than extends distally into the ureter and empties into the ileal loop. It is also noted that the distal ureter on the right is considerably dilated. Right-sided double-J stent may not be working. KUB 10/1   Presence of bilateral nephrostomy tubes, stable. There is been interval placement of an apparent right ureteral stent with distal end likely in an ileal loop pouch. Bowel gas pattern favors ileus with evolving partial small bowel obstruction not excluded. No free air is demonstrated. CTAP 9/22  Small bowel obstruction, with a transition point near the jejunoileal junction adjacent/medial to the ileostomy site. Large pelvic fluid collection with varying heme densities, some of which is recent, extending from the cystectomy bed, as above. Ureteral stents, extending out the conduit and ileostomy site with similar moderate-severe right hydroureteronephrosis, and new mild-moderate left hydroureteronephrosis. Postsurgical changes with scattered pneumoperitoneum/ascites, extensive subcutaneous air/soft tissue changes. Enteric tube in satisfactory position with its tip in the stomach. Segmental/subsegmental lung base changes posteriorly. Multiple additional findings, either unchanged or incidental, as detailed in the body of the report above. I have personally reviewed the past medical history, past surgical history, medications, social history, and family history, and I haveupdated the database accordingly. Allergies: Other     Review of Systems:     Review of Systems   Constitutional:  Negative for appetite change and chills. HENT:  Negative for sinus pressure. Eyes:  Negative for visual disturbance. Respiratory:  Negative for chest tightness. Gastrointestinal:  Negative for abdominal distention. Tender abdomen     Endocrine: Negative for polydipsia and polyphagia. Genitourinary:  Negative for difficulty urinating.    Musculoskeletal: Negative for arthralgias. Skin:  Negative for color change. Allergic/Immunologic: Negative for immunocompromised state. Neurological:  Negative for light-headedness and headaches. Psychiatric/Behavioral:  Negative for behavioral problems. Physical Examination :       Physical Exam  Constitutional:       General: He is not in acute distress. Appearance: He is normal weight. He is not toxic-appearing. HENT:      Head: Normocephalic and atraumatic. Right Ear: External ear normal.      Left Ear: External ear normal.      Nose: Nose normal. No congestion. Mouth/Throat:      Mouth: Mucous membranes are moist.      Pharynx: Oropharynx is clear. Eyes:      Extraocular Movements: Extraocular movements intact. Pupils: Pupils are equal, round, and reactive to light. Cardiovascular:      Heart sounds: No murmur heard. No gallop. Pulmonary:      Effort: Pulmonary effort is normal. No respiratory distress. Abdominal:      General: Abdomen is flat. There is no distension. Tenderness: There is no abdominal tenderness. Musculoskeletal:         General: No swelling or deformity. Cervical back: No rigidity. Lymphadenopathy:      Cervical: No cervical adenopathy. Skin:     Capillary Refill: Capillary refill takes less than 2 seconds. Coloration: Skin is not jaundiced. Findings: No bruising. Neurological:      General: No focal deficit present. Mental Status: He is oriented to person, place, and time.    Psychiatric:         Mood and Affect: Mood normal.         Behavior: Behavior normal.       Past Medical History:     Past Medical History:   Diagnosis Date    Acute renal failure with tubular necrosis ([facility] [address] 05/26/2022    Likely ischemic ATN/prerenal acidemia from intractable nausea vomiting as result of chemotherapy, baseline 1.3-1.4 peaked up to 2.3 in May 2022    Arthritis     BPH with obstruction/lower urinary tract symptoms     CAD (coronary artery disease) 06/2022    nonobstructive on cath    Caffeine use     3 cans soda/pop    Cancer (HCC)     bladder cancer. Dr. 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   Baseline 1.3-1.4    COVID-19 08/16/2021    SOB, fatigue, fever, chills  x 3 weeks    Depression     Dilated cardiomyopathy ([facility] [address] 05/26/2022    Ejection fraction 40 to 45%, does have symptoms of dyspnea and decreased effort tolerance    GERD (gastroesophageal reflux disease)     High cholesterol     Kidney calculi     Sleep apnea     does not use cpap    Under care of team     Dr. Geoffrey Cooper Nephrology    Under care of team     Dr. Meka Whitehead. last visit approx 9/2021    Under care of team     Dr. Marguerite Gaytan [facility] last visit 8/03/2022    Under care of team     Dr. Ventura Meeks    Under care of team     Dr. Varsha Wong       Past Surgical  History:     Past Surgical History:   Procedure Laterality Date    BLADDER REMOVAL      BLADDER REMOVAL N/A 9/16/2022    XI ROBOTIC LAPARASCOPIC ASSISTED RADICAL CYSTOPROSTATECTOMY, BILATERAL PELVIC LYMPHADENECTOMY AND ILEAL CONDUIT FORMATION performed by Feliciano Carroll MD at [facility] 9/16/2022    CYSTOSCOPY STENT REMOVAL performed by Feliciano Carroll MD at [facility]  06/16/2022    nonobstructive CAD    COLONOSCOPY      IR NEPHROSTOMY PERCUTANEOUS LEFT  9/23/2022    IR NEPHROSTOMY PERCUTANEOUS LEFT 9/23/2022 Rusty Nielson MD [facility]    IR NEPHROSTOMY PERCUTANEOUS LEFT  10/5/2022    IR NEPHROSTOMY PERCUTANEOUS LEFT 10/5/2022 [facility]    IR NEPHROSTOMY PERCUTANEOUS RIGHT  2022    IR NEPHROSTOMY PERCUTANEOUS RIGHT 2022 MD [facility]    IR NEPHROSTOMY PERCUTANEOUS RIGHT  10/5/2022    IR NEPHROSTOMY PERCUTANEOUS RIGHT 10/5/2022 [facility]    IR PORT PLACEMENT EQUAL OR GREATER THAN 5 YEARS  2022    IR PORT PLACEMENT EQUAL OR GREATER THAN 5 YEARS 2022 [facility]    KNEE ARTHROSCOPY      left    LAPAROTOMY N/A 2022    LAPAROTOMY EXPLORATORY, LYSIS OF ADHESIONS, REPAIR OF BILATERAL URETERAL ANASTOMOSES, ABDOMINAL WASHOUT, PLACEMENT OF ABTHERA WOUND VAC performed by Erica Baird DO at [address] N/A 2022    2ND LOOK LAPAROTOMY,  ABDOMINAL WASHOUT, ABDOMINAL CLOSURE, WOUND VAC PLACEMENT [facility]  2022    Cystoprostatectomy, Bilateral Pelvic Lymphadenectomy, ileo conduit formation, cystectomy stent removal (right)       Medications:      carvedilol  3.125 mg Oral BID WC    rivaroxaban  20 mg Oral Daily    fluconazole  200 mg Oral Q24H    chlorhexidine  15 mL Mouth/Throat BID    bisacodyl  10 mg Rectal Daily    pantoprazole (PROTONIX) 40 mg injection  40 mg IntraVENous Q12H    polyethylene glycol  17 g Oral Daily    buPROPion  300 mg Oral QAM    simvastatin  40 mg Oral Nightly    sodium chloride flush  5-40 mL IntraVENous 2 times per day    acetaminophen  650 mg Oral Q6H       Social History:     Social History     Socioeconomic History    Marital status:      Spouse name: Not on file    Number of children: Not on file    Years of education: Not on file    Highest education level: Not on file   Occupational History    Not on file   Tobacco Use    Smoking status: Former     Packs/day: 0.25     Years: 9.00     Pack years: 2.25     Types: Cigarettes     Start date: 1983     Quit date: 2/10/1992     Years since quittin.6    Smokeless tobacco: Never   Vaping Use    Vaping Use: Never used   Substance and Sexual Activity    Alcohol use: Not Currently    Drug use: No    Sexual activity: Yes     Partners: Female   Other Topics Concern    Not on file   Social History Narrative    Not on file     Social Determinants of Health     Financial Resource Strain: Not on file   Food Insecurity: Not on file   Transportation Needs: Not on file   Physical Activity: Not on file   Stress: Not on file   Social Connections: Not on file   Intimate Partner Violence: Not on file   Housing Stability: Not on file       Family History:     Family History   Problem Relation Age of Onset    Cancer Father         bladder cancer    Urolithiasis Father       Medical Decision Making:   I have independently reviewed/ordered the following labs:    CBC with Differential:   Recent Labs     10/06/22  0433 10/07/22  0644   WBC 14.3* 12.4*   HGB 8.2* 7.5*   HCT 25.6* 23.6*    317       BMP:  Recent Labs     10/06/22  0433 10/07/22  0644   * 134*   K 3.7 3.5*    101   CO2 23 26   BUN 7* 6*   CREATININE 1.12 1.02   MG 1.6 1.6       Hepatic Function Panel:   No results for input(s): PROT, LABALBU, BILIDIR, IBILI, BILITOT, ALKPHOS, ALT, AST in the last 72 hours. No results for input(s): RPR in the last 72 hours. No results for input(s): HIV in the last 72 hours. No results for input(s): BC in the last 72 hours. Lab Results   Component Value Date/Time    CREATININE 1.02 10/07/2022 06:44 AM    GLUCOSE 82 10/07/2022 06:44 AM       Detailed results: Thank you for allowing us to participate in the care of this patient. Please call with questions. This note is created with the assistance of a speech recognition program.  While intending to generate adocument that actually reflects the content of the visit, the document can still have some errors including those of syntax and sound a like substitutions which may escape proof reading. It such instances, actual meaningcan be extrapolated by contextual diversion.     Office: [phone]  [facility] serve / office [phone]      Valerie Palumbo, OMS3      I have discussed the care of the patient, including pertinent history and exam findings,  with the resident. I have seen and examined the patient and the key elements of all parts of the encounter have been performed by me. I agree with the assessment, plan and orders as documented by the resident.     Sindi Raymond, Infectious Diseases

## 2022-10-08 NOTE — PLAN OF CARE
Problem: Safety - Adult  Goal: Free from fall injury  10/8/2022 0426 by Alban Irwin RN  Outcome: Progressing  10/7/2022 1838 by Monica Us RN  Outcome: Progressing     Problem: ABCDS Injury Assessment  Goal: Absence of physical injury  10/8/2022 0426 by Alban Irwin RN  Outcome: Progressing  10/7/2022 1838 by Monica Us RN  Outcome: Progressing     Problem: Discharge Planning  Goal: Discharge to home or other facility with appropriate resources  10/8/2022 0426 by Alban Irwin RN  Outcome: Progressing  10/7/2022 1838 by Monica Us RN  Outcome: Progressing     Problem: Nutrition Deficit:  Goal: Optimize nutritional status  10/8/2022 0426 by Alban Irwin RN  Outcome: Progressing  10/7/2022 1838 by Monica Us RN  Outcome: Progressing

## 2022-10-08 NOTE — PROGRESS NOTES
Comprehensive Nutrition Assessment    Type and Reason for Visit:  Reassess    Nutrition Recommendations/Plan:   Continue current diet and ONS  Monitor weight, labs and intake     Malnutrition Assessment:  Malnutrition Status:  Severe malnutrition (09/17/22 1345)    Context:  Chronic Illness     Findings of the 6 clinical characteristics of malnutrition:  Energy Intake:  75% or less estimated energy requirements for 1 month or longer  Weight Loss:  Greater than 10% over 6 months     Body Fat Loss:  Unable to assess     Muscle Mass Loss:  Unable to assess    Fluid Accumulation:  No significant fluid accumulation     Strength:  Not Performed    Nutrition Assessment:    Chart reviewed. Patient reports a good appetite, but a dislike of the food provided. States he sips on the ONS throughout the day. Ca 7.7    Nutrition Related Findings:    labs/meds reviewed Wound Type: Surgical Incision       Current Nutrition Intake & Therapies:    Average Meal Intake: 51-75%, 26-50%  Average Supplements Intake: 26-50%  ADULT DIET; Regular  ADULT ORAL NUTRITION SUPPLEMENT; Breakfast, Lunch, Dinner; Standard High Calorie/High Protein Oral Supplement    Anthropometric Measures:  Height: 6' 2.02\" (188 cm)  Ideal Body Weight (IBW): 190 lbs (86 kg)       Current Body Weight: 262 lb 2 oz (118.9 kg), 133.7 % IBW. Weight Source: Bed Scale  Current BMI (kg/m2): 33.6  Usual Body Weight: 254 lb 13 oz (115.6 kg) (7/14/22 per wt hx review)  % Weight Change (Calculated): -0.3                    BMI Categories: Obese Class 1 (BMI 30.0-34. 9)    Estimated Daily Nutrient Needs:  Energy Requirements Based On: Kcal/kg  Weight Used for Energy Requirements: Current  Energy (kcal/day): 6411-3085 kcal/d  Weight Used for Protein Requirements: Ideal  Protein (g/day): 100-120 gm pro/d  Method Used for Fluid Requirements: ml/Kg  Fluid (ml/day): 2800ml/d    Nutrition Diagnosis:   No nutrition diagnosis at this time     Nutrition Interventions:   Food and/or Nutrient Delivery: Continue Current Diet, Modify Oral Nutrition Supplement  Nutrition Education/Counseling: No recommendation at this time  Coordination of Nutrition Care: Continue to monitor while inpatient  Plan of Care discussed with: Patient    Goals:  Previous Goal Met: Progressing toward Goal(s)  Goals: Meet at least 75% of estimated needs, prior to discharge       Nutrition Monitoring and Evaluation:   Behavioral-Environmental Outcomes: None Identified  Food/Nutrient Intake Outcomes: Food and Nutrient Intake, Supplement Intake  Physical Signs/Symptoms Outcomes: Biochemical Data, Nutrition Focused Physical Findings, Skin, Weight, GI Status, Meal Time Behavior    Discharge Planning:     Too soon to determine     Amara Gutierrez, [address]  Contact: [phone]

## 2022-10-09 ENCOUNTER — APPOINTMENT (OUTPATIENT)
Dept: GENERAL RADIOLOGY | Age: 67
DRG: 653 | End: 2022-10-09
Attending: SPECIALIST
Payer: MEDICARE

## 2022-10-09 PROBLEM — K59.1 FUNCTIONAL DIARRHEA: Status: ACTIVE | Noted: 2022-10-09

## 2022-10-09 LAB
ANION GAP SERPL CALCULATED.3IONS-SCNC: 12 MMOL/L (ref 9–17)
BUN BLDV-MCNC: 7 MG/DL (ref 8–23)
CALCIUM SERPL-MCNC: 7.5 MG/DL (ref 8.6–10.4)
CHLORIDE BLD-SCNC: 103 MMOL/L (ref 98–107)
CO2: 22 MMOL/L (ref 20–31)
CREAT SERPL-MCNC: 1.2 MG/DL (ref 0.7–1.2)
GFR SERPL CREATININE-BSD FRML MDRD: >60 ML/MIN/1.73M2
GLUCOSE BLD-MCNC: 90 MG/DL (ref 70–99)
HCT VFR BLD CALC: 24.8 % (ref 40.7–50.3)
HEMOGLOBIN: 8.2 G/DL (ref 13–17)
MCH RBC QN AUTO: 28.6 PG (ref 25.2–33.5)
MCHC RBC AUTO-ENTMCNC: 33.1 G/DL (ref 28.4–34.8)
MCV RBC AUTO: 86.4 FL (ref 82.6–102.9)
NRBC AUTOMATED: 0 PER 100 WBC
PDW BLD-RTO: 16.1 % (ref 11.8–14.4)
PLATELET # BLD: 444 K/UL (ref 138–453)
PMV BLD AUTO: 10.2 FL (ref 8.1–13.5)
POTASSIUM SERPL-SCNC: 3.7 MMOL/L (ref 3.7–5.3)
RBC # BLD: 2.87 M/UL (ref 4.21–5.77)
REASON FOR REJECTION: NORMAL
SODIUM BLD-SCNC: 137 MMOL/L (ref 135–144)
TROPONIN, HIGH SENSITIVITY: 20 NG/L (ref 0–22)
TROPONIN, HIGH SENSITIVITY: 21 NG/L (ref 0–22)
WBC # BLD: 13.6 K/UL (ref 3.5–11.3)
ZZ NTE CLEAN UP: ORDERED TEST: NORMAL
ZZ NTE WITH NAME CLEAN UP: SPECIMEN SOURCE: NORMAL

## 2022-10-09 PROCEDURE — 85027 COMPLETE CBC AUTOMATED: CPT

## 2022-10-09 PROCEDURE — 6370000000 HC RX 637 (ALT 250 FOR IP): Performed by: INTERNAL MEDICINE

## 2022-10-09 PROCEDURE — 6360000002 HC RX W HCPCS: Performed by: STUDENT IN AN ORGANIZED HEALTH CARE EDUCATION/TRAINING PROGRAM

## 2022-10-09 PROCEDURE — 6370000000 HC RX 637 (ALT 250 FOR IP): Performed by: STUDENT IN AN ORGANIZED HEALTH CARE EDUCATION/TRAINING PROGRAM

## 2022-10-09 PROCEDURE — 2580000003 HC RX 258: Performed by: INTERNAL MEDICINE

## 2022-10-09 PROCEDURE — 36415 COLL VENOUS BLD VENIPUNCTURE: CPT

## 2022-10-09 PROCEDURE — 93005 ELECTROCARDIOGRAM TRACING: CPT | Performed by: STUDENT IN AN ORGANIZED HEALTH CARE EDUCATION/TRAINING PROGRAM

## 2022-10-09 PROCEDURE — 84484 ASSAY OF TROPONIN QUANT: CPT

## 2022-10-09 PROCEDURE — 71045 X-RAY EXAM CHEST 1 VIEW: CPT

## 2022-10-09 PROCEDURE — 6360000002 HC RX W HCPCS: Performed by: INTERNAL MEDICINE

## 2022-10-09 PROCEDURE — 1200000000 HC SEMI PRIVATE

## 2022-10-09 PROCEDURE — 2580000003 HC RX 258: Performed by: PHYSICIAN ASSISTANT

## 2022-10-09 PROCEDURE — 99232 SBSQ HOSP IP/OBS MODERATE 35: CPT | Performed by: INTERNAL MEDICINE

## 2022-10-09 PROCEDURE — 2580000003 HC RX 258: Performed by: STUDENT IN AN ORGANIZED HEALTH CARE EDUCATION/TRAINING PROGRAM

## 2022-10-09 PROCEDURE — C9113 INJ PANTOPRAZOLE SODIUM, VIA: HCPCS | Performed by: STUDENT IN AN ORGANIZED HEALTH CARE EDUCATION/TRAINING PROGRAM

## 2022-10-09 PROCEDURE — 80048 BASIC METABOLIC PNL TOTAL CA: CPT

## 2022-10-09 RX ORDER — MAGNESIUM SULFATE IN WATER 40 MG/ML
2000 INJECTION, SOLUTION INTRAVENOUS ONCE
Status: COMPLETED | OUTPATIENT
Start: 2022-10-09 | End: 2022-10-09

## 2022-10-09 RX ORDER — POTASSIUM CHLORIDE 29.8 MG/ML
20 INJECTION INTRAVENOUS ONCE
Status: COMPLETED | OUTPATIENT
Start: 2022-10-09 | End: 2022-10-09

## 2022-10-09 RX ORDER — NITROGLYCERIN 0.4 MG/1
0.4 TABLET SUBLINGUAL EVERY 5 MIN PRN
Status: DISCONTINUED | OUTPATIENT
Start: 2022-10-09 | End: 2022-10-11 | Stop reason: HOSPADM

## 2022-10-09 RX ADMIN — SIMVASTATIN 40 MG: 40 TABLET, FILM COATED ORAL at 21:12

## 2022-10-09 RX ADMIN — ACETAMINOPHEN 650 MG: 325 TABLET ORAL at 12:34

## 2022-10-09 RX ADMIN — SODIUM CHLORIDE: 4.5 INJECTION, SOLUTION INTRAVENOUS at 03:28

## 2022-10-09 RX ADMIN — CARVEDILOL 3.12 MG: 3.12 TABLET, FILM COATED ORAL at 09:24

## 2022-10-09 RX ADMIN — MAGNESIUM SULFATE HEPTAHYDRATE 2000 MG: 40 INJECTION, SOLUTION INTRAVENOUS at 03:55

## 2022-10-09 RX ADMIN — FLUCONAZOLE 200 MG: 40 POWDER, FOR SUSPENSION ORAL at 22:58

## 2022-10-09 RX ADMIN — ONDANSETRON 8 MG: 4 TABLET, ORALLY DISINTEGRATING ORAL at 02:41

## 2022-10-09 RX ADMIN — CHLORHEXIDINE GLUCONATE 0.12% ORAL RINSE 15 ML: 1.2 LIQUID ORAL at 22:06

## 2022-10-09 RX ADMIN — NITROGLYCERIN 0.4 MG: 0.4 TABLET SUBLINGUAL at 04:02

## 2022-10-09 RX ADMIN — CHLORHEXIDINE GLUCONATE 0.12% ORAL RINSE 15 ML: 1.2 LIQUID ORAL at 09:25

## 2022-10-09 RX ADMIN — BUPROPION HYDROCHLORIDE 300 MG: 150 TABLET, EXTENDED RELEASE ORAL at 09:23

## 2022-10-09 RX ADMIN — SODIUM CHLORIDE, PRESERVATIVE FREE 40 MG: 5 INJECTION INTRAVENOUS at 22:06

## 2022-10-09 RX ADMIN — RIVAROXABAN 20 MG: 20 TABLET, FILM COATED ORAL at 21:12

## 2022-10-09 RX ADMIN — Medication 125 MG: at 21:12

## 2022-10-09 RX ADMIN — Medication 125 MG: at 12:34

## 2022-10-09 RX ADMIN — POTASSIUM CHLORIDE 20 MEQ: 29.8 INJECTION, SOLUTION INTRAVENOUS at 05:28

## 2022-10-09 RX ADMIN — CARVEDILOL 3.12 MG: 3.12 TABLET, FILM COATED ORAL at 21:12

## 2022-10-09 ASSESSMENT — PAIN DESCRIPTION - LOCATION
LOCATION: INCISION;ABDOMEN
LOCATION: CHEST
LOCATION: CHEST

## 2022-10-09 ASSESSMENT — PAIN DESCRIPTION - DESCRIPTORS
DESCRIPTORS: SHARP
DESCRIPTORS: ACHING;TIGHTNESS
DESCRIPTORS: SHARP

## 2022-10-09 ASSESSMENT — PAIN SCALES - WONG BAKER

## 2022-10-09 ASSESSMENT — PAIN DESCRIPTION - ORIENTATION
ORIENTATION: MID
ORIENTATION: MID

## 2022-10-09 ASSESSMENT — PAIN SCALES - GENERAL
PAINLEVEL_OUTOF10: 2
PAINLEVEL_OUTOF10: 6
PAINLEVEL_OUTOF10: 0
PAINLEVEL_OUTOF10: 5
PAINLEVEL_OUTOF10: 0
PAINLEVEL_OUTOF10: 2
PAINLEVEL_OUTOF10: 0

## 2022-10-09 ASSESSMENT — ENCOUNTER SYMPTOMS
DIARRHEA: 1
ABDOMINAL PAIN: 1
BACK PAIN: 1
COLOR CHANGE: 0

## 2022-10-09 ASSESSMENT — PAIN DESCRIPTION - FREQUENCY: FREQUENCY: INTERMITTENT

## 2022-10-09 ASSESSMENT — PAIN DESCRIPTION - DIRECTION: RADIATING_TOWARDS: NON RADIATING

## 2022-10-09 ASSESSMENT — PAIN - FUNCTIONAL ASSESSMENT
PAIN_FUNCTIONAL_ASSESSMENT: PREVENTS OR INTERFERES SOME ACTIVE ACTIVITIES AND ADLS
PAIN_FUNCTIONAL_ASSESSMENT: PREVENTS OR INTERFERES SOME ACTIVE ACTIVITIES AND ADLS

## 2022-10-09 ASSESSMENT — PAIN DESCRIPTION - PAIN TYPE: TYPE: ACUTE PAIN

## 2022-10-09 ASSESSMENT — PAIN DESCRIPTION - ONSET: ONSET: SUDDEN

## 2022-10-09 NOTE — PROGRESS NOTES
SPIRITUAL CARE DEPARTMENT - [address]  PROGRESS NOTE    Shift date: 10/9/2022    Shift day: Saturday   Shift # 3    Room # 8074/0739-17   Name: Ruby Espinoza                Jainism:    Place of Yazdanism:     Referral: Rapid Response    Admit Date & Time: 9/16/2022  5:26 AM    Assessment:  Ruby Espinoza is a 74 y.o. male in the hospital because of bladder cancer. Herman Harris Upon entering the room writer observes multi-disciplinary team at bedside. Intervention:  Writer introduced self and title as  .  prepared to contact patient's family per instructions from nurse.  responded to RRT. Patient stated he was anxious. Outcome:      Plan:  Chaplains will remain available to offer spiritual and emotional support as needed.       Electronically signed by Briseida Salgado on 10/9/2022 at 4:56 AM.  [address] [facility]  [phone]

## 2022-10-09 NOTE — PROGRESS NOTES
[facility] -   Infectious diseases evaluation  admission date 9/16/2022    reason for consultation:   bandemia    Impression :   Current:  9/16 bladder cancer involving wall and lymph nodes, post radical cystectomy, prostatectomy, groin lymph node dissection  9/22 SBO, had lysis of adhesions  9/22 bilateral ureteral anastomotic leak, with large abdominal collection, post repair 9/22 9/23 bilat nephrostomy tube placed  10/4 B/l nephrostograms to assess nephrostomy tube positioning and degrees of distal ureteral intestinal anastomotic leak - NEGATIVE for leak  10/5 Urology exchanged b/l nephrostomy drains to b/l nephroureteral stent catheters placed to bag drainage  10/8 removed MERLIN drains, suction device, and stents inserted into ostomy  9/23 closure of the fascia, abdominal wound open with VAC  Ongoing bandemia  Pseudomonas/Enterococcus UTI, 9/16-  Cefepime 9/19 until 9/25  Right nephrostomy urine infection with Candida albicans 9/23  Noted at the time of right percutaneous nephrostomy placement 9/23  Ongoing bandemia -resolving  Hypernatremia -resolved  Chest pain - new   Provided nitro, checking troponins, CXR  LBBB on EKG  Consulting cardiology  Diarrhea 10/8  3 episodes of runny stools last night without C.diff smell  Starting Vancomycin PO    Other:    Discussion / summary of stay / plan of care     Recommendations   The cause of the bandemia could be multifactorial, including a persistent urinoma  Zosyn for Enterococcus till 10/4 - 7 days - completed  Diflucan, keep till urine clears, another week - till 10/11 -reconciled   3 episodes of loose stools yesterday - starting Vancomycin PO - watch response    Infection Control Recommendations   [place] Precautions    Antimicrobial Stewardship Recommendations   Simplification of therapy  Targeted therapy      History of Present Illness:   Initial history:  Beatris Joe is a 74y.o.-year-old male with history of bladder cancer involving the wall of the bladder as well as the groin lymph nodes. Came in for radical cystectomy done on 9/16 with ileal conduit placement, removal of the groin lymph nodes, but complicated with a small bowel obstruction and bilateral ureteral anastomotic leak. CT scan of the abdomen  9/22 which had showed at the time a large anterior pelvic fluid collection 15 x 13 x 6 cmWith variable densities extending from the cystectomy bed    Taken back to surgery on 9/22, lysis of adhesion that was thought to be causing the SBO, as well as repair of the anastomotic leak, and placement of 2 bilateral nephrostomy tubes. 9/23 patient went back to surgery for closure of abdominal fascia and subcutaneous tissue that stayed open with a VAC placement over it -    Due to hydronephrosis, bilat PCNT placed by IR 9/23, A repeat urine culture from 9/23 done due to cloudy R urine, by IR,  is showing Candida albicans 9/26. The patient has been started on Diflucan. Along the way on 9/16 the urine grew Pseudomonas and Enterococcus, resistant to Cipro and patient has received a course of cefepime from 9/19 and 2 until 9/25. His white count remains elevated actually after a feeling that it was improving, he started going worse, today it is up to 20. Infectious is consulted for the concern of a ongoing infection. The right nephro drain was giving very little urine and had a urogram  9/26 that showed good positioning and no leak. Since then right nephrostomy line has been giving a better input. Today the patient is alert appropriate he has an NG tube, was trying to have some liquids p.o. and today was able to tolerate 2 popsicles without vomiting. He does have gurgling over the abdomen, and has no abdominal tenderness otherwise. He has only abdominal pain on the surgical site.   He has 2 MERLIN drains giving serosanguineous fluid, and he has 2 percutaneous nephrostomy drains that are giving clear urine.      Interval changes  10/9/2022   Patient Vitals for the past 8 hrs:   BP Temp Temp src Pulse Resp SpO2 Weight   10/09/22 0452 -- -- -- -- -- -- 252 lb (114.3 kg)   10/09/22 0353 137/76 -- -- 87 17 96 % --   10/09/22 0245 (!) 141/78 98 °F (36.7 °C) Oral 86 18 93 % --       10/4  Afebrile, hypertensive  Patient returning from anterograde pyelogram   +abdominal tenderness, +discomfort from Wound VAC.  No signs of discharge or dehiscence   Continues to have drainage out clear yellow drainage out MERLIN drains and dark yellow out nephrostomy tubes  No new complaint of diarrhea  Leukocytosis resolving     10/5   Afebrile  Continues to have abdominal pain and tenderness  Drainage out MERLIN drains and nephrostomy tubes  R nephrostomy collecting yellow fluid with cloudy chunks, L nephrostomy jared and clear  No diarrhea  Leukocytosis continues to resolve    10/6   Afebrile - on po clears  Patient continues to have drainage  No diarrhea   Leukocytosis spiked  Tolerating diflucan solution  R PCNT and denis shows less turbid urine    10/7   Afebrile  S/p cath placement  Tolerating diflucan solution  Clear liquid in nephrostomy bags  Flakes within denis, likely yeast     10/8  Afebrile  Tolerating diflucan solution  Eating and drinking well  Passing gas  MERLIN drains present - clear fluid in one, thick tan discharge in another  2 nephrostomy bags in place now   VAC still present    10/9  Afebrile  3 episodes of runny stools last night without C.diff smell   MERLIN drains removed yesterday   Full nephroureteral catheters without flakes, CLEAR  Urostomy draining clear liquid  Patient clinically better  -continues to have leukocytosis -   Has wound vac, 2 nephrostomy tubes, and 2 MERLIN drains     Continues to have leukocytosis  New chest pain - cardiology consulted  Removed MERLIN drains, suction device and stents yesterday  Has b/l nephroureteral cath and urostomy - clear yellow drainage without flakes   Continue diflucan solution for a couple more days, monitor LFTs  Summary of relevant labs:  Labs:  WBC  19.1 - 14.3 - 18.1 -15.1 - 11.8 -10.3 - 14.3 - 12.4 - 13.6  Creatinine1.39 -1.41 - 1.39 - 1.36 - 1.44 -1.49 - 1.28 - 1.02- 1.2   Micro:  UA 9/18 - large leukocyte esterase and positive nitrates  9/16 UC Pseudomonas aeruginosa 2 strains, 1 sensitive to Cipro the other resistant,  and Enterococcus faecalis sensitive to Cipro  9/23 UC with Candida    Urine culture from  R nephrostomy due to cloudiness, 9/23 showing Candida albicans/W   Imaging:  10/4 IR antegrade pyelogram Migration of the left double-J stent mostly into the ileal loop with the proximal portion of the double-J stent in the distal left ureter. Distal left ureter is patent and empties promptly into the ileal loop. Moderate hydroureter and hydronephrosis identified. There is prominent right-sided hydronephrosis and hydroureter proximally. There is a kink in the ureter proximally that appears to create a functional obstruction in the proximal ureter. Under higher pressure injection contrast than extends distally into the ureter and empties into the ileal loop. It is also noted that the distal ureter on the right is considerably dilated. Right-sided double-J stent may not be working. KUB 10/1   Presence of bilateral nephrostomy tubes, stable. There is been interval placement of an apparent right ureteral stent with distal end likely in an ileal loop pouch. Bowel gas pattern favors ileus with evolving partial small bowel obstruction not excluded. No free air is demonstrated. CTAP 9/22  Small bowel obstruction, with a transition point near the jejunoileal junction adjacent/medial to the ileostomy site. Large pelvic fluid collection with varying heme densities, some of which is recent, extending from the cystectomy bed, as above.    Ureteral stents, extending out the conduit and ileostomy site with similar moderate-severe right hydroureteronephrosis, and new mild-moderate left hydroureteronephrosis. Postsurgical changes with scattered pneumoperitoneum/ascites, extensive subcutaneous air/soft tissue changes. Enteric tube in satisfactory position with its tip in the stomach. Segmental/subsegmental lung base changes posteriorly. Multiple additional findings, either unchanged or incidental, as detailed in the body of the report above. I have personally reviewed the past medical history, past surgical history, medications, social history, and family history, and I haveupdated the database accordingly. Allergies: Other     Review of Systems:     Review of Systems   Constitutional:  Positive for appetite change. Negative for chills and diaphoresis. HENT:  Negative for nosebleeds. Eyes:  Negative for visual disturbance. Cardiovascular:  Negative for chest pain. Gastrointestinal:  Positive for abdominal pain and diarrhea (loose stools for last 3 days). Endocrine: Negative for cold intolerance and heat intolerance. Genitourinary:  Negative for difficulty urinating and enuresis. Musculoskeletal:  Positive for back pain. Negative for arthralgias. Skin:  Negative for color change. Allergic/Immunologic: Negative for immunocompromised state. Neurological:  Negative for dizziness. Hematological:  Negative for adenopathy. Psychiatric/Behavioral:  Negative for agitation. Physical Examination :       Physical Exam  Constitutional:       General: He is not in acute distress. Appearance: Normal appearance. He is not toxic-appearing. HENT:      Head: Normocephalic and atraumatic. Right Ear: External ear normal.      Left Ear: External ear normal.      Nose: Nose normal. No congestion. Mouth/Throat:      Mouth: Mucous membranes are moist.      Pharynx: Oropharynx is clear. No oropharyngeal exudate. Eyes:      Extraocular Movements: Extraocular movements intact. Pupils: Pupils are equal, round, and reactive to light.    Cardiovascular: Rate and Rhythm: Normal rate and regular rhythm. Pulses: Normal pulses. Heart sounds: Normal heart sounds. No murmur heard. No gallop. Pulmonary:      Effort: No respiratory distress. Breath sounds: No wheezing. Abdominal:      General: Bowel sounds are normal.      Tenderness: There is abdominal tenderness. Musculoskeletal:         General: No swelling or deformity. Cervical back: Normal range of motion. No rigidity. Lymphadenopathy:      Cervical: No cervical adenopathy. Skin:     General: Skin is warm. Capillary Refill: Capillary refill takes less than 2 seconds. Coloration: Skin is not jaundiced. Findings: No bruising. Neurological:      General: No focal deficit present. Mental Status: He is alert. Cranial Nerves: No cranial nerve deficit. Motor: No weakness. Psychiatric:         Mood and Affect: Mood normal.         Behavior: Behavior normal.       Past Medical History:     Past Medical History:   Diagnosis Date    Acute renal failure with tubular necrosis ([address] 05/26/2022    Likely ischemic ATN/prerenal acidemia from intractable nausea vomiting as result of chemotherapy, baseline 1.3-1.4 peaked up to 2.3 in May 2022    Arthritis     BPH with obstruction/lower urinary tract symptoms     CAD (coronary artery disease) 06/2022    nonobstructive on cath    Caffeine use     3 cans soda/pop    Cancer ([address]     bladder cancer. Dr. 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   Baseline 1.3-1.4    COVID-19 08/16/2021    SOB, fatigue, fever, chills  x 3 weeks    Depression     Dilated cardiomyopathy ([address] 05/26/2022    Ejection fraction 40 to 45%, does have symptoms of dyspnea and decreased effort tolerance    GERD (gastroesophageal reflux disease)     High cholesterol     Kidney calculi     Sleep apnea     does not use cpap    Under care of team     Dr. Esteban Perez Nephrology    Under care of team     Dr. Gordon Hart. last visit approx 9/2021    Under care of team     Dr. Alina Erazo [facility] last visit 8/03/2022    Under care of team     Dr. Ventura Jama    Under care of team     Dr. Yasmani Farias       Past Surgical  History:     Past Surgical History:   Procedure Laterality Date    BLADDER REMOVAL      BLADDER REMOVAL N/A 9/16/2022    XI ROBOTIC LAPARASCOPIC ASSISTED RADICAL CYSTOPROSTATECTOMY, BILATERAL PELVIC LYMPHADENECTOMY AND ILEAL CONDUIT FORMATION performed by Lila Barnes MD at [facility] Right 9/16/2022    CYSTOSCOPY STENT REMOVAL performed by Lila Barnes MD at [address]  06/16/2022    nonobstructive CAD    COLONOSCOPY      IR NEPHROSTOMY PERCUTANEOUS LEFT  9/23/2022    IR NEPHROSTOMY PERCUTANEOUS LEFT 9/23/2022 Sandrita Anderson MD [facility]    IR NEPHROSTOMY PERCUTANEOUS LEFT  10/5/2022    IR NEPHROSTOMY PERCUTANEOUS LEFT 10/5/2022 [facility]    IR NEPHROSTOMY PERCUTANEOUS RIGHT  9/23/2022    IR NEPHROSTOMY PERCUTANEOUS RIGHT 9/23/2022 Sandrita Anderson MD [facility]    IR NEPHROSTOMY PERCUTANEOUS RIGHT  10/5/2022    IR NEPHROSTOMY PERCUTANEOUS RIGHT 10/5/2022 [facility]    IR PORT PLACEMENT EQUAL OR GREATER THAN 5 YEARS  02/25/2022    IR PORT PLACEMENT EQUAL OR GREATER THAN 5 YEARS 2/25/2022 [facility]    KNEE ARTHROSCOPY      left    LAPAROTOMY N/A 9/22/2022    LAPAROTOMY EXPLORATORY, LYSIS OF ADHESIONS, REPAIR OF BILATERAL URETERAL ANASTOMOSES, ABDOMINAL WASHOUT, PLACEMENT OF ABTHERA WOUND VAC performed by Mike Moreno DO at [address] N/A 9/23/2022 [facility] LAPAROTOMY,  ABDOMINAL WASHOUT, ABDOMINAL CLOSURE, WOUND VAC PLACEMENT performed by Duncan Ricardo DO at [address]  2022    Cystoprostatectomy, Bilateral Pelvic Lymphadenectomy, ileo conduit formation, cystectomy stent removal (right)       Medications:      carvedilol  3.125 mg Oral BID WC    rivaroxaban  20 mg Oral Daily    fluconazole  200 mg Oral Q24H    chlorhexidine  15 mL Mouth/Throat BID    bisacodyl  10 mg Rectal Daily    pantoprazole (PROTONIX) 40 mg injection  40 mg IntraVENous Q12H    polyethylene glycol  17 g Oral Daily    buPROPion  300 mg Oral QAM    simvastatin  40 mg Oral Nightly    sodium chloride flush  5-40 mL IntraVENous 2 times per day    acetaminophen  650 mg Oral Q6H       Social History:     Social History     Socioeconomic History    Marital status:      Spouse name: Not on file    Number of children: Not on file    Years of education: Not on file    Highest education level: Not on file   Occupational History    Not on file   Tobacco Use    Smoking status: Former     Packs/day: 0.25     Years: 9.00     Pack years: 2.25     Types: Cigarettes     Start date: 1983     Quit date: 2/10/1992     Years since quittin.6    Smokeless tobacco: Never   Vaping Use    Vaping Use: Never used   Substance and Sexual Activity    Alcohol use: Not Currently    Drug use: No    Sexual activity: Yes     Partners: Female   Other Topics Concern    Not on file   Social History Narrative    Not on file     Social Determinants of Health     Financial Resource Strain: Not on file   Food Insecurity: Not on file   Transportation Needs: Not on file   Physical Activity: Not on file   Stress: Not on file   Social Connections: Not on file   Intimate Partner Violence: Not on file   Housing Stability: Not on file       Family History:     Family History   Problem Relation Age of Onset    Cancer Father         bladder cancer    Urolithiasis Father       Medical Decision Making: I have independently reviewed/ordered the following labs:    CBC with Differential:   Recent Labs     10/08/22  0902 10/09/22  0357   WBC 13.4* 13.6*   HGB 7.7* 8.2*   HCT 23.8* 24.8*    444       BMP:  Recent Labs     10/07/22  0644 10/08/22  0902 10/09/22  0501   * 134* 137   K 3.5* 3.5* 3.7    102 103   CO2 26 25 22   BUN 6* 7* 7*   CREATININE 1.02 1.12 1.20   MG 1.6  --   --        Hepatic Function Panel:   No results for input(s): PROT, LABALBU, BILIDIR, IBILI, BILITOT, ALKPHOS, ALT, AST in the last 72 hours. No results for input(s): RPR in the last 72 hours. No results for input(s): HIV in the last 72 hours. No results for input(s): BC in the last 72 hours. Lab Results   Component Value Date/Time    CREATININE 1.20 10/09/2022 05:01 AM    GLUCOSE 90 10/09/2022 05:01 AM       Detailed results: Thank you for allowing us to participate in the care of this patient. Please call with questions. This note is created with the assistance of a speech recognition program.  While intending to generate adocument that actually reflects the content of the visit, the document can still have some errors including those of syntax and sound a like substitutions which may escape proof reading. It such instances, actual meaningcan be extrapolated by contextual diversion. Office: [phone]  Perfect serve / office [phone]      [place] [address]      I have discussed the care of the patient, including pertinent history and exam findings,  with the resident. I have seen and examined the patient and the key elements of all parts of the encounter have been performed by me. I agree with the assessment, plan and orders as documented by the resident.     Sindi Raymond, Infectious Diseases

## 2022-10-09 NOTE — PLAN OF CARE
Rapid response was called on this patient for complaints of chest pain    On assessment at bedside patient is alert oriented x3, hemodynamically stable blood pressure, pulse not in respiratory distress saturating at 96% on room air      Patient indicates chest pain needs midsternal, nonradiating 2 out of 10. Feeling nauseated. He denies any previous CAD history. Patient did receive nitro glycerin sublingual 0.4 mg along with potassium chloride and magnesium by primary    EKG ordered by primary is independently reviewed by me and is mild T wave inversions in lateral leads when compared to previous EKG available to me on epic. Troponins ordered by primary along with chest x-ray  Cardiology is consulted by primary    Plan  :    -Critical care will recommend follow-up on troponin, chest x-ray by primary and follow-up on cardiology recommendation based on lab work-up  -Updated RN at bedside the primary to follow-up on the lab work-up ordered by them and to update cardiology in case if patient complains of worsening chest pain. Luan Tsai MD  Internal Medicine Resident, [facility];  [place], [place]  10/9/2022, 4:22 AM

## 2022-10-09 NOTE — PLAN OF CARE
Problem: Discharge Planning  Goal: Discharge to home or other facility with appropriate resources  Outcome: Progressing     Problem: Pain  Goal: Verbalizes/displays adequate comfort level or baseline comfort level  Outcome: Progressing     Problem: Safety - Adult  Goal: Free from fall injury  Outcome: Progressing     Problem: ABCDS Injury Assessment  Goal: Absence of physical injury  Outcome: Progressing     Problem: Nutrition Deficit:  Goal: Optimize nutritional status  Outcome: Progressing     Problem: Skin/Tissue Integrity  Goal: Absence of new skin breakdown  Description: 1. Monitor for areas of redness and/or skin breakdown  2. Assess vascular access sites hourly  3. Every 4-6 hours minimum:  Change oxygen saturation probe site  4. Every 4-6 hours:  If on nasal continuous positive airway pressure, respiratory therapy assess nares and determine need for appliance change or resting period.   Outcome: Progressing     Problem: Gastrointestinal - Adult  Goal: Minimal or absence of nausea and vomiting  Outcome: Progressing  Goal: Maintains or returns to baseline bowel function  Outcome: Progressing  Goal: Maintains adequate nutritional intake  Outcome: Progressing  Goal: Establish and maintain optimal ostomy function  Outcome: Progressing

## 2022-10-09 NOTE — PROGRESS NOTES
General: A/O x 3, no acute distress  HEENT: moist mucous membranes  Neck: Supple   Chest: bilateral symmetrical chest rise   Circulatory: Peripheries warm , well perfused   P/A: soft, clean, dry and intact with skin glue, ostomy pink and healthy, drain site dressing-no soakage   extremities: Bilateral LE pitting edema   : left nephrostomy tube with clear jared urine, right nephrostomy tube with clear urine    Interval Imaging Findings: none    Impression:  none    Lexi Cordoba is a 62-year-old male   Active  problem list  Hx of bladder cancer  Robotic assisted laparoscopic cystoprostatectomy with ileal conduit creation on 9/16/2022   Ex lap ROLANDO repair of BL ureteral anastamosis and abd washout with abthera due to ureteral anastamotic leaks and SBO and second look on 9/22 and 9/23      Plan:  Continue regular diet for now, can back up to clear liquids if he continues to have nausea and emesis  Will consult cardiology for chest pain and fusion complexes on lateral leads  Maintain bilateral nephroureteral catheters  Off antibiotics. Having loose bowel movements, will check for C. difficile if he continues to have loose stools today.   Discontinue IV fluids  DVT PPX: Subq heparin 5000 units TID  Continue to monitor  Plan for discharge to Killian Rodriguez MD, PGY-5  7:43 AM 10/9/2022

## 2022-10-10 LAB
ANION GAP SERPL CALCULATED.3IONS-SCNC: 11 MMOL/L (ref 9–17)
ANION GAP SERPL CALCULATED.3IONS-SCNC: 9 MMOL/L (ref 9–17)
BUN BLDV-MCNC: 7 MG/DL (ref 8–23)
BUN BLDV-MCNC: 7 MG/DL (ref 8–23)
CALCIUM SERPL-MCNC: 7.3 MG/DL (ref 8.6–10.4)
CALCIUM SERPL-MCNC: 7.5 MG/DL (ref 8.6–10.4)
CHLORIDE BLD-SCNC: 100 MMOL/L (ref 98–107)
CHLORIDE BLD-SCNC: 101 MMOL/L (ref 98–107)
CO2: 21 MMOL/L (ref 20–31)
CO2: 22 MMOL/L (ref 20–31)
CREAT SERPL-MCNC: 1.1 MG/DL (ref 0.7–1.2)
CREAT SERPL-MCNC: 1.14 MG/DL (ref 0.7–1.2)
GFR SERPL CREATININE-BSD FRML MDRD: >60 ML/MIN/1.73M2
GFR SERPL CREATININE-BSD FRML MDRD: >60 ML/MIN/1.73M2
GLUCOSE BLD-MCNC: 82 MG/DL (ref 70–99)
GLUCOSE BLD-MCNC: 82 MG/DL (ref 70–99)
HCT VFR BLD CALC: 23.7 % (ref 40.7–50.3)
HCT VFR BLD CALC: 23.9 % (ref 40.7–50.3)
HEMOGLOBIN: 7.6 G/DL (ref 13–17)
HEMOGLOBIN: 7.8 G/DL (ref 13–17)
MCH RBC QN AUTO: 27.8 PG (ref 25.2–33.5)
MCH RBC QN AUTO: 28.4 PG (ref 25.2–33.5)
MCHC RBC AUTO-ENTMCNC: 32.1 G/DL (ref 28.4–34.8)
MCHC RBC AUTO-ENTMCNC: 32.6 G/DL (ref 28.4–34.8)
MCV RBC AUTO: 86.8 FL (ref 82.6–102.9)
MCV RBC AUTO: 86.9 FL (ref 82.6–102.9)
NRBC AUTOMATED: 0 PER 100 WBC
NRBC AUTOMATED: 0 PER 100 WBC
PDW BLD-RTO: 16.2 % (ref 11.8–14.4)
PDW BLD-RTO: 16.2 % (ref 11.8–14.4)
PLATELET # BLD: 271 K/UL (ref 138–453)
PLATELET # BLD: 328 K/UL (ref 138–453)
PMV BLD AUTO: 9.3 FL (ref 8.1–13.5)
PMV BLD AUTO: 9.6 FL (ref 8.1–13.5)
POTASSIUM SERPL-SCNC: 3.3 MMOL/L (ref 3.7–5.3)
POTASSIUM SERPL-SCNC: 3.4 MMOL/L (ref 3.7–5.3)
RBC # BLD: 2.73 M/UL (ref 4.21–5.77)
RBC # BLD: 2.75 M/UL (ref 4.21–5.77)
SODIUM BLD-SCNC: 132 MMOL/L (ref 135–144)
SODIUM BLD-SCNC: 132 MMOL/L (ref 135–144)
WBC # BLD: 9.5 K/UL (ref 3.5–11.3)
WBC # BLD: 9.6 K/UL (ref 3.5–11.3)

## 2022-10-10 PROCEDURE — 6370000000 HC RX 637 (ALT 250 FOR IP): Performed by: STUDENT IN AN ORGANIZED HEALTH CARE EDUCATION/TRAINING PROGRAM

## 2022-10-10 PROCEDURE — 6360000002 HC RX W HCPCS: Performed by: STUDENT IN AN ORGANIZED HEALTH CARE EDUCATION/TRAINING PROGRAM

## 2022-10-10 PROCEDURE — 87186 SC STD MICRODIL/AGAR DIL: CPT

## 2022-10-10 PROCEDURE — 1200000000 HC SEMI PRIVATE

## 2022-10-10 PROCEDURE — 6370000000 HC RX 637 (ALT 250 FOR IP): Performed by: INTERNAL MEDICINE

## 2022-10-10 PROCEDURE — 2580000003 HC RX 258: Performed by: STUDENT IN AN ORGANIZED HEALTH CARE EDUCATION/TRAINING PROGRAM

## 2022-10-10 PROCEDURE — C9113 INJ PANTOPRAZOLE SODIUM, VIA: HCPCS | Performed by: STUDENT IN AN ORGANIZED HEALTH CARE EDUCATION/TRAINING PROGRAM

## 2022-10-10 PROCEDURE — 2580000003 HC RX 258: Performed by: PHYSICIAN ASSISTANT

## 2022-10-10 PROCEDURE — 87086 URINE CULTURE/COLONY COUNT: CPT

## 2022-10-10 PROCEDURE — 87184 SC STD DISK METHOD PER PLATE: CPT

## 2022-10-10 PROCEDURE — 99212 OFFICE O/P EST SF 10 MIN: CPT

## 2022-10-10 PROCEDURE — 2580000003 HC RX 258: Performed by: INTERNAL MEDICINE

## 2022-10-10 PROCEDURE — 6370000000 HC RX 637 (ALT 250 FOR IP)

## 2022-10-10 PROCEDURE — 6360000002 HC RX W HCPCS: Performed by: INTERNAL MEDICINE

## 2022-10-10 PROCEDURE — 36415 COLL VENOUS BLD VENIPUNCTURE: CPT

## 2022-10-10 PROCEDURE — 99232 SBSQ HOSP IP/OBS MODERATE 35: CPT | Performed by: INTERNAL MEDICINE

## 2022-10-10 PROCEDURE — 87077 CULTURE AEROBIC IDENTIFY: CPT

## 2022-10-10 PROCEDURE — 87040 BLOOD CULTURE FOR BACTERIA: CPT

## 2022-10-10 PROCEDURE — 85027 COMPLETE CBC AUTOMATED: CPT

## 2022-10-10 PROCEDURE — 80048 BASIC METABOLIC PNL TOTAL CA: CPT

## 2022-10-10 RX ORDER — OXYCODONE HYDROCHLORIDE AND ACETAMINOPHEN 5; 325 MG/1; MG/1
1 TABLET ORAL EVERY 6 HOURS PRN
Qty: 20 TABLET | Refills: 0 | Status: SHIPPED | OUTPATIENT
Start: 2022-10-10 | End: 2022-10-10 | Stop reason: SDUPTHER

## 2022-10-10 RX ORDER — OXYCODONE HYDROCHLORIDE AND ACETAMINOPHEN 5; 325 MG/1; MG/1
1 TABLET ORAL EVERY 6 HOURS PRN
Qty: 20 TABLET | Refills: 0 | Status: SHIPPED | OUTPATIENT
Start: 2022-10-10 | End: 2022-10-15

## 2022-10-10 RX ORDER — POTASSIUM CHLORIDE 20 MEQ/1
40 TABLET, EXTENDED RELEASE ORAL ONCE
Status: DISCONTINUED | OUTPATIENT
Start: 2022-10-10 | End: 2022-10-10

## 2022-10-10 RX ORDER — HEPARIN SODIUM (PORCINE) LOCK FLUSH IV SOLN 100 UNIT/ML 100 UNIT/ML
300 SOLUTION INTRAVENOUS PRN
Status: DISCONTINUED | OUTPATIENT
Start: 2022-10-10 | End: 2022-10-11 | Stop reason: HOSPADM

## 2022-10-10 RX ADMIN — POTASSIUM BICARBONATE 40 MEQ: 782 TABLET, EFFERVESCENT ORAL at 21:33

## 2022-10-10 RX ADMIN — CARVEDILOL 3.12 MG: 3.12 TABLET, FILM COATED ORAL at 09:33

## 2022-10-10 RX ADMIN — Medication 125 MG: at 14:46

## 2022-10-10 RX ADMIN — Medication 125 MG: at 09:33

## 2022-10-10 RX ADMIN — RIVAROXABAN 20 MG: 20 TABLET, FILM COATED ORAL at 18:19

## 2022-10-10 RX ADMIN — ONDANSETRON 8 MG: 4 TABLET, ORALLY DISINTEGRATING ORAL at 18:20

## 2022-10-10 RX ADMIN — PROMETHAZINE HYDROCHLORIDE 6.25 MG: 25 INJECTION INTRAMUSCULAR; INTRAVENOUS at 22:30

## 2022-10-10 RX ADMIN — Medication 125 MG: at 21:33

## 2022-10-10 RX ADMIN — BUPROPION HYDROCHLORIDE 300 MG: 150 TABLET, EXTENDED RELEASE ORAL at 09:33

## 2022-10-10 RX ADMIN — Medication 125 MG: at 02:10

## 2022-10-10 RX ADMIN — SODIUM CHLORIDE: 4.5 INJECTION, SOLUTION INTRAVENOUS at 01:08

## 2022-10-10 RX ADMIN — SODIUM CHLORIDE, PRESERVATIVE FREE 40 MG: 5 INJECTION INTRAVENOUS at 21:33

## 2022-10-10 RX ADMIN — ACETAMINOPHEN 650 MG: 325 TABLET ORAL at 12:33

## 2022-10-10 RX ADMIN — FLUCONAZOLE 200 MG: 40 POWDER, FOR SUSPENSION ORAL at 18:50

## 2022-10-10 RX ADMIN — ACETAMINOPHEN 650 MG: 325 TABLET ORAL at 18:19

## 2022-10-10 RX ADMIN — SODIUM CHLORIDE: 4.5 INJECTION, SOLUTION INTRAVENOUS at 21:32

## 2022-10-10 RX ADMIN — SIMVASTATIN 40 MG: 40 TABLET, FILM COATED ORAL at 21:33

## 2022-10-10 RX ADMIN — SODIUM CHLORIDE, PRESERVATIVE FREE 40 MG: 5 INJECTION INTRAVENOUS at 09:34

## 2022-10-10 RX ADMIN — POTASSIUM BICARBONATE 40 MEQ: 782 TABLET, EFFERVESCENT ORAL at 11:07

## 2022-10-10 RX ADMIN — CARVEDILOL 3.12 MG: 3.12 TABLET, FILM COATED ORAL at 18:19

## 2022-10-10 ASSESSMENT — ENCOUNTER SYMPTOMS
SHORTNESS OF BREATH: 0
DIARRHEA: 1
COLOR CHANGE: 0
ABDOMINAL PAIN: 1
FACIAL SWELLING: 0
ABDOMINAL DISTENTION: 0

## 2022-10-10 ASSESSMENT — PAIN SCALES - WONG BAKER
WONGBAKER_NUMERICALRESPONSE: 0

## 2022-10-10 ASSESSMENT — PAIN SCALES - GENERAL: PAINLEVEL_OUTOF10: 3

## 2022-10-10 ASSESSMENT — PAIN DESCRIPTION - LOCATION: LOCATION: ABDOMEN

## 2022-10-10 NOTE — PROGRESS NOTES
Willow Sacks Jannelle Dubonnet, MD FACS   Urology Progress Note     Subjective:  No acute events overnight  Denies fever, chills, nausea/vomiting, chest pain, shortness of breath  Ate ramen noodles  Ambulated yesterday  Reports BM x3    Left nephroureteral cath 2 L clear urine  Right nephroureteral cath 600 cc milky urine  Urostomy 2300 cc  Hemoglobin 7.6 from 8.2  Creatinine pending from 1.2    Patient Vitals for the past 24 hrs:   BP Temp Temp src Pulse Resp SpO2 Weight   10/10/22 0600 -- -- -- -- -- -- 250 lb (113.4 kg)   10/10/22 0405 (!) 153/85 97.7 °F (36.5 °C) Oral 80 16 98 % --   10/10/22 0005 138/79 98.2 °F (36.8 °C) Oral 82 16 97 % --   10/09/22 2112 (!) 141/71 98 °F (36.7 °C) Oral 80 14 97 % --   10/09/22 1718 (!) 141/72 97.7 °F (36.5 °C) Oral 79 16 100 % --   10/09/22 1233 130/69 97.7 °F (36.5 °C) Oral 84 16 97 % --   10/09/22 0931 127/67 98.1 °F (36.7 °C) Oral 76 16 95 % --       Intake/Output Summary (Last 24 hours) at 10/10/2022 0714  Last data filed at 10/10/2022 0006  Gross per 24 hour   Intake --   Output 1775 ml   Net -1775 ml       Recent Labs     10/08/22  0902 10/09/22  0357 10/10/22  0637   WBC 13.4* 13.6* 9.6   HGB 7.7* 8.2* 7.6*   HCT 23.8* 24.8* 23.7*   MCV 86.5 86.4 86.8    444 328     Recent Labs     10/08/22  0902 10/09/22  0501   * 137   K 3.5* 3.7    103   CO2 25 22   BUN 7* 7*   CREATININE 1.12 1.20       No results for input(s): COLORU, PHUR, LABCAST, WBCUA, RBCUA, MUCUS, TRICHOMONAS, YEAST, BACTERIA, CLARITYU, SPECGRAV, LEUKOCYTESUR, UROBILINOGEN, BILIRUBINUR, BLOODU in the last 72 hours.     Invalid input(s): NITRATE, GLUCOSEUKETONESUAMORPHOUS      Additional Lab/culture results:    Physical Exam:   General: A/O x 3, no acute distress  HEENT: moist mucous membranes  Neck: Supple   Chest: bilateral symmetrical chest rise   Circulatory: Peripheries warm , well perfused   P/A: soft, clean, dry and intact with skin glue, ostomy pink and healthy, drain site dressing-no soakage   extremities: Bilateral LE pitting edema   : left nephrostomy tube with clear jared urine, right nephrostomy tube with clear urine    Interval Imaging Findings: none    Impression:  none    Bertha Candelaria is a 51-year-old male   Active  problem list  Hx of bladder cancer  Robotic assisted laparoscopic cystoprostatectomy with ileal conduit creation on 9/16/2022   Ex lap ROLANDO repair of BL ureteral anastamosis and abd washout with abthera due to ureteral anastamotic leaks and SBO and second look on 9/22 and 9/23      Plan:  Continue regular diet for now  Will consult cardiology for chest pain and fusion complexes on lateral leads yesterday  Maintain bilateral nephroureteral catheters  Discontinue IV fluids  DVT PPX: Subq heparin 5000 units TID  Continue to monitor  Plan for discharge to SNF, pending cardiology recs      Peggy Gupta MD, PGY-2  7:14 AM 10/10/2022

## 2022-10-10 NOTE — PROGRESS NOTES
[facility] -   Infectious diseases evaluation  admission date 9/16/2022    reason for consultation:   bandemia    Impression :   Current:  9/16 bladder cancer involving wall and lymph nodes, post radical cystectomy, prostatectomy, groin lymph node dissection  9/22 SBO, had lysis of adhesions  9/22 bilateral ureteral anastomotic leak, with large abdominal collection, post repair 9/22 9/23 bilat nephrostomy tube placed  10/4 B/l nephrostograms to assess nephrostomy tube positioning and degrees of distal ureteral intestinal anastomotic leak - NEGATIVE for leak  10/5 Urology exchanged b/l nephrostomy drains to b/l nephroureteral stent catheters placed to bag drainage  10/8 removed MERLIN drains, suction device, and stents inserted into ostomy  9/23 closure of the fascia, abdominal wound open with VAC  Ongoing bandemia  Pseudomonas/Enterococcus UTI, 9/16-  Cefepime 9/19 until 9/25  Right nephrostomy urine infection with Candida albicans 9/23  Noted at the time of right percutaneous nephrostomy placement 9/23  Ongoing bandemia -resolving  Hypernatremia -resolved  Chest pain - resolved  Provided nitro, checking troponins, CXR  Fusion complexes on lateral leads   10/10 cardio consulted.  No further cardiac workup indicated  Diarrhea 10/8  3 episodes of runny stools last night without C.diff smell  Starting Vancomycin PO  Nausea vomiting at any small meal  10/10/22    Other:    Discussion / summary of stay / plan of care     Recommendations   The cause of the bandemia could be multifactorial, including a persistent urinoma  Zosyn for Enterococcus till 10/4 - 7 days - completed  Diflucan, keep till urine clears, another week - till 10/11 -reconciled   3 episodes of loose stools -10/9  starting Vancomycin PO -diarrhea seems better 10/10, till 10/24  Hortencia Burger for LTAC    Infection Control Recommendations   [place] Precautions    Antimicrobial Stewardship Recommendations   Simplification of therapy  Targeted therapy      History of Present Illness:   Initial history:  Lexi Cordoba is a 74y.o.-year-old male with history of bladder cancer involving the wall of the bladder as well as the groin lymph nodes. Came in for radical cystectomy done on 9/16 with ileal conduit placement, removal of the groin lymph nodes, but complicated with a small bowel obstruction and bilateral ureteral anastomotic leak. CT scan of the abdomen  9/22 which had showed at the time a large anterior pelvic fluid collection 15 x 13 x 6 cmWith variable densities extending from the cystectomy bed    Taken back to surgery on 9/22, lysis of adhesion that was thought to be causing the SBO, as well as repair of the anastomotic leak, and placement of 2 bilateral nephrostomy tubes. 9/23 patient went back to surgery for closure of abdominal fascia and subcutaneous tissue that stayed open with a VAC placement over it -    Due to hydronephrosis, bilat PCNT placed by IR 9/23, A repeat urine culture from 9/23 done due to cloudy R urine, by IR,  is showing Candida albicans 9/26. The patient has been started on Diflucan. Along the way on 9/16 the urine grew Pseudomonas and Enterococcus, resistant to Cipro and patient has received a course of cefepime from 9/19 and 2 until 9/25. His white count remains elevated actually after a feeling that it was improving, he started going worse, today it is up to 20. Infectious is consulted for the concern of a ongoing infection. The right nephro drain was giving very little urine and had a urogram  9/26 that showed good positioning and no leak. Since then right nephrostomy line has been giving a better input. Today the patient is alert appropriate he has an NG tube, was trying to have some liquids p.o. and today was able to tolerate 2 popsicles without vomiting. He does have gurgling over the abdomen, and has no abdominal tenderness otherwise.   He has only abdominal pain on the surgical site. He has 2 MERLIN drains giving serosanguineous fluid, and he has 2 percutaneous nephrostomy drains that are giving clear urine. Interval changes  10/10/2022   Patient Vitals for the past 8 hrs:   BP Temp Temp src Pulse Resp SpO2   10/10/22 0803 (!) 141/73 98.4 °F (36.9 °C) Oral 76 16 95 %       10/4  Afebrile, hypertensive  Patient returning from anterograde pyelogram   +abdominal tenderness, +discomfort from Wound VAC.  No signs of discharge or dehiscence   Continues to have drainage out clear yellow drainage out MERLIN drains and dark yellow out nephrostomy tubes  No new complaint of diarrhea  Leukocytosis resolving     10/5   Afebrile  Continues to have abdominal pain and tenderness  Drainage out MERLIN drains and nephrostomy tubes  R nephrostomy collecting yellow fluid with cloudy chunks, L nephrostomy jared and clear  No diarrhea  Leukocytosis continues to resolve    10/6   Afebrile - on po clears  Patient continues to have drainage  No diarrhea   Leukocytosis spiked  Tolerating diflucan solution  R PCNT and denis shows less turbid urine    10/7   Afebrile  S/p cath placement  Tolerating diflucan solution  Clear liquid in nephrostomy bags  Flakes within denis, likely yeast     10/8  Afebrile  Tolerating diflucan solution  Eating and drinking well  Passing gas  MERLIN drains present - clear fluid in one, thick tan discharge in another  2 nephrostomy bags in place now   VAC still present    10/9  Afebrile  3 episodes of runny stools last night without C.diff smell   MERLIN drains removed yesterday   Full nephroureteral catheters without flakes, CLEAR  Urostomy draining clear liquid  Patient clinically better  -continues to have leukocytosis -     10/10  Leukocytosis resolved  Tolerating medications OK  3 episodes of runny bowel movements last night  1 today  Wound VAC removed today  R nephrostomy bag with small white particles  L nephrostomy clear  Urostomy clear  Eating more solid foods        Summary of relevant labs:  Labs:  WBC  19.1 - 14.3 - 18.1 -15.1 - 11.8 -10.3 - 14.3 - 12.4 - 13.6 - 9.6  Creatinine1.39 -1.41 - 1.39 - 1.36 - 1.44 -1.49 - 1.28 - 1.02- 1.2- 1.14   Micro:  UA 9/18 - large leukocyte esterase and positive nitrates  9/16 UC Pseudomonas aeruginosa 2 strains, 1 sensitive to Cipro the other resistant,  and Enterococcus faecalis sensitive to Cipro  9/23 UC with Candida    Urine culture from  R nephrostomy due to cloudiness, 9/23 showing Candida albicans/W   Imaging:  10/4 IR antegrade pyelogram Migration of the left double-J stent mostly into the ileal loop with the proximal portion of the double-J stent in the distal left ureter. Distal left ureter is patent and empties promptly into the ileal loop. Moderate hydroureter and hydronephrosis identified. There is prominent right-sided hydronephrosis and hydroureter proximally. There is a kink in the ureter proximally that appears to create a functional obstruction in the proximal ureter. Under higher pressure injection contrast than extends distally into the ureter and empties into the ileal loop. It is also noted that the distal ureter on the right is considerably dilated. Right-sided double-J stent may not be working. KUB 10/1   Presence of bilateral nephrostomy tubes, stable. There is been interval placement of an apparent right ureteral stent with distal end likely in an ileal loop pouch. Bowel gas pattern favors ileus with evolving partial small bowel obstruction not excluded. No free air is demonstrated. CTAP 9/22  Small bowel obstruction, with a transition point near the jejunoileal junction adjacent/medial to the ileostomy site. Large pelvic fluid collection with varying heme densities, some of which is recent, extending from the cystectomy bed, as above. Ureteral stents, extending out the conduit and ileostomy site with similar moderate-severe right hydroureteronephrosis, and new mild-moderate left hydroureteronephrosis.    Postsurgical changes with scattered pneumoperitoneum/ascites, extensive subcutaneous air/soft tissue changes. Enteric tube in satisfactory position with its tip in the stomach. Segmental/subsegmental lung base changes posteriorly. Multiple additional findings, either unchanged or incidental, as detailed in the body of the report above. I have personally reviewed the past medical history, past surgical history, medications, social history, and family history, and I haveupdated the database accordingly. Allergies: Other     Review of Systems:     Review of Systems   Constitutional:  Positive for appetite change. Negative for diaphoresis and fatigue. HENT:  Negative for facial swelling. Eyes:  Negative for visual disturbance. Respiratory:  Negative for shortness of breath. Cardiovascular:  Negative for chest pain. Gastrointestinal:  Positive for abdominal pain (tender) and diarrhea. Negative for abdominal distention. Endocrine: Negative for polyuria. Genitourinary:  Negative for difficulty urinating. Musculoskeletal:  Negative for arthralgias. Skin:  Negative for color change. Allergic/Immunologic: Negative for immunocompromised state. Neurological:  Negative for light-headedness. Hematological:  Negative for adenopathy. Psychiatric/Behavioral:  Negative for agitation. Physical Examination :       Physical Exam  Constitutional:       Appearance: Normal appearance. He is obese. HENT:      Head: Normocephalic and atraumatic. Right Ear: External ear normal.      Left Ear: External ear normal.      Nose: Nose normal. No congestion. Mouth/Throat:      Mouth: Mucous membranes are moist.      Pharynx: Oropharynx is clear. No oropharyngeal exudate. Eyes:      Extraocular Movements: Extraocular movements intact. Pupils: Pupils are equal, round, and reactive to light. Cardiovascular:      Rate and Rhythm: Normal rate and regular rhythm.       Heart sounds: No murmur heard.    No gallop. Pulmonary:      Effort: Pulmonary effort is normal. No respiratory distress. Breath sounds: No wheezing. Abdominal:      General: Bowel sounds are normal.      Tenderness: There is abdominal tenderness. Musculoskeletal:         General: No swelling or deformity. Normal range of motion. Cervical back: No rigidity. Lymphadenopathy:      Cervical: No cervical adenopathy. Skin:     General: Skin is warm. Capillary Refill: Capillary refill takes less than 2 seconds. Coloration: Skin is not jaundiced. Findings: No bruising. Neurological:      General: No focal deficit present. Mental Status: He is oriented to person, place, and time. Psychiatric:         Mood and Affect: Mood normal.         Behavior: Behavior normal.       Past Medical History:     Past Medical History:   Diagnosis Date    Acute renal failure with tubular necrosis ([address] 05/26/2022    Likely ischemic ATN/prerenal acidemia from intractable nausea vomiting as result of chemotherapy, baseline 1.3-1.4 peaked up to 2.3 in May 2022    Arthritis     BPH with obstruction/lower urinary tract symptoms     CAD (coronary artery disease) 06/2022    nonobstructive on cath    Caffeine use     3 cans soda/pop    Cancer ([address]     bladder cancer. Dr. 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   Baseline 1.3-1.4    COVID-19 08/16/2021    SOB, fatigue, fever, chills  x 3 weeks    Depression     Dilated cardiomyopathy ([address] 05/26/2022    Ejection fraction 40 to 45%, does have symptoms of dyspnea and decreased effort tolerance    GERD (gastroesophageal reflux disease)     High cholesterol     Kidney calculi     Sleep apnea     does not use cpap Under care of team     Dr. Zaki Villela [facility]    Under care of team     Dr. Zahra Grande. last visit approx 9/2021    Under care of team     Dr. Fredrick Balderas [facility] last visit 8/03/2022    Under care of team     Dr. Boo Celis    Under care of team     Dr. Dana Lainez       Past Surgical  History:     Past Surgical History:   Procedure Laterality Date    BLADDER REMOVAL      BLADDER REMOVAL N/A 9/16/2022    XI ROBOTIC LAPARASCOPIC ASSISTED RADICAL CYSTOPROSTATECTOMY, BILATERAL PELVIC LYMPHADENECTOMY AND ILEAL CONDUIT FORMATION performed by Jj Lowry MD at [facility] Right 9/16/2022    CYSTOSCOPY STENT REMOVAL performed by Jj Lowry MD at [facility]  06/16/2022    nonobstructive CAD    COLONOSCOPY      IR NEPHROSTOMY PERCUTANEOUS LEFT  9/23/2022    IR NEPHROSTOMY PERCUTANEOUS LEFT 9/23/2022 Keven Allen MD [facility] SPECIAL PROCEDURES    IR NEPHROSTOMY PERCUTANEOUS LEFT  10/5/2022    IR NEPHROSTOMY PERCUTANEOUS LEFT 10/5/2022 [facility] SPECIAL PROCEDURES    IR NEPHROSTOMY PERCUTANEOUS RIGHT  9/23/2022    IR NEPHROSTOMY PERCUTANEOUS RIGHT 9/23/2022 Keven Allen MD [facility] SPECIAL PROCEDURES    IR NEPHROSTOMY PERCUTANEOUS RIGHT  10/5/2022    IR NEPHROSTOMY PERCUTANEOUS RIGHT 10/5/2022 [facility] SPECIAL PROCEDURES    IR PORT PLACEMENT EQUAL OR GREATER THAN 5 YEARS  02/25/2022    IR PORT PLACEMENT EQUAL OR GREATER THAN 5 YEARS 2/25/2022 [facility] SPECIAL PROCEDURES    KNEE ARTHROSCOPY      left    LAPAROTOMY N/A 9/22/2022    LAPAROTOMY EXPLORATORY, LYSIS OF ADHESIONS, REPAIR OF BILATERAL URETERAL ANASTOMOSES, ABDOMINAL WASHOUT, PLACEMENT OF ABTHERA WOUND VAC performed by Lisbet Chavira DO at [facility] 9/23/2022    2ND LOOK LAPAROTOMY,  ABDOMINAL WASHOUT, ABDOMINAL CLOSURE, WOUND VAC PLACEMENT performed by Lisbet Chavira DO at [place] [address] 2022    Cystoprostatectomy, Bilateral Pelvic Lymphadenectomy, ileo conduit formation, cystectomy stent removal (right)       Medications:      potassium bicarb-citric acid  40 mEq Oral BID    vancomycin  125 mg Oral 4 times per day    carvedilol  3.125 mg Oral BID WC    rivaroxaban  20 mg Oral Daily    fluconazole  200 mg Oral Q24H    chlorhexidine  15 mL Mouth/Throat BID    bisacodyl  10 mg Rectal Daily    pantoprazole (PROTONIX) 40 mg injection  40 mg IntraVENous Q12H    polyethylene glycol  17 g Oral Daily    buPROPion  300 mg Oral QAM    simvastatin  40 mg Oral Nightly    sodium chloride flush  5-40 mL IntraVENous 2 times per day    acetaminophen  650 mg Oral Q6H       Social History:     Social History     Socioeconomic History    Marital status:      Spouse name: Not on file    Number of children: Not on file    Years of education: Not on file    Highest education level: Not on file   Occupational History    Not on file   Tobacco Use    Smoking status: Former     Packs/day: 0.25     Years: 9.00     Pack years: 2.25     Types: Cigarettes     Start date: 1983     Quit date: 2/10/1992     Years since quittin.6    Smokeless tobacco: Never   Vaping Use    Vaping Use: Never used   Substance and Sexual Activity    Alcohol use: Not Currently    Drug use: No    Sexual activity: Yes     Partners: Female   Other Topics Concern    Not on file   Social History Narrative    Not on file     Social Determinants of Health     Financial Resource Strain: Not on file   Food Insecurity: Not on file   Transportation Needs: Not on file   Physical Activity: Not on file   Stress: Not on file   Social Connections: Not on file   Intimate Partner Violence: Not on file   Housing Stability: Not on file       Family History:     Family History   Problem Relation Age of Onset    Cancer Father         bladder cancer    Urolithiasis Father       Medical Decision Making:   I have independently reviewed/ordered the following labs:    CBC with Differential:   Recent Labs     10/09/22  0357 10/10/22  0637   WBC 13.6* 9.6   HGB 8.2* 7.6*   HCT 24.8* 23.7*    328       BMP:  Recent Labs     10/09/22  0501 10/10/22  0637    132*   K 3.7 3.3*    101   CO2 22 22   BUN 7* 7*   CREATININE 1.20 1.14       Hepatic Function Panel:   No results for input(s): PROT, LABALBU, BILIDIR, IBILI, BILITOT, ALKPHOS, ALT, AST in the last 72 hours. No results for input(s): RPR in the last 72 hours. No results for input(s): HIV in the last 72 hours. No results for input(s): BC in the last 72 hours. Lab Results   Component Value Date/Time    CREATININE 1.14 10/10/2022 06:37 AM    GLUCOSE 82 10/10/2022 06:37 AM       Detailed results: Thank you for allowing us to participate in the care of this patient. Please call with questions. This note is created with the assistance of a speech recognition program.  While intending to generate adocument that actually reflects the content of the visit, the document can still have some errors including those of syntax and sound a like substitutions which may escape proof reading. It such instances, actual meaningcan be extrapolated by contextual diversion. Office: [phone]  Perfect serve / office [phone]      Elizabeth Esparza, OMS3    I have discussed the care of the patient, including pertinent history and exam findings,  with the resident. I have seen and examined the patient and the key elements of all parts of the encounter have been performed by me. I agree with the assessment, plan and orders as documented by the resident.     Sindi Raymond, Infectious Diseases

## 2022-10-10 NOTE — CARE COORDINATION
Transitional Planning  Called [facility] at [phone], message left for [facility] in admissions. Requested a call back. Patient planning on being discharged once cleared by cardiology. 945 am Call from April in outpatient pharmacy inquiring if patient  plans on going to snf. Informed April that was the transitional plan. 1110 am Call from [facility] at [facility]. She will review updated clinicals and return call. 1155 am Call from [facility] at [facility], clinicals reviewed, able to to accept patient has bed available. 445 pm D/C order noted. Transport arranged for 8 pm via [address] [employer], spoke with [facility]. Spouse and patient updated. Called [facility] at [phone] and [phone] messages left. Called [phone], spoke with Becky Laguerre requested to speak with someone regarding transfer at 8 pm. Phone was transferred and number just rang and rang. No answer. Called [phone] pressed spoke with someone in assisted living at [facility], she is unable to confirm if patient is able to return to facility tonight and no one is available to confirm. Transportation cancelled, and rescheduled for 1 pm. RN, patient and spouse notified. 531 pm Call back from [facility] at [facility]. Unable to accept patient tonight need wound vac. Notified [facility], transportation set up for 1 pm.  Faxed AVS, script and HENS to [phone].

## 2022-10-10 NOTE — PROGRESS NOTES
[facility] Wound Ostomy  Nurse  Follow up  Note       NAME:  Kameron Bailon RECORD NUMBER:  [id number]  AGE: 74 y.o. GENDER: male  : 1955  TODAY'S DATE:  10/10/2022    Subjective   Reason for [id number] Nurse Evaluation and Assessment:   Assessment: NPWT dressing changes to mid abdominal surgical wound using white foam to base under black granufoam at -125 mHg vacuum. Urostomy care and education. Discharging to SNF today. NPWT dressing was removed and saline moistened gauze applied to mid abdominal wound  Urostomy pouch changed.          Objective    BP (!) 141/73   Pulse 76   Temp 98.4 °F (36.9 °C) (Oral)   Resp 16   Ht 6' 2.02\" (1.88 m)   Wt 250 lb (113.4 kg)   SpO2 95%   BMI 32.08 kg/m²     LABS:  WBC:    Lab Results   Component Value Date/Time    WBC 9.6 10/10/2022 06:37 AM     H/H:    Lab Results   Component Value Date/Time    HGB 7.6 10/10/2022 06:37 AM    HCT 23.7 10/10/2022 06:37 AM     PTT:  No results found for: APTT, PTT[APTT}  PT/INR:    Lab Results   Component Value Date/Time    PROTIME 13.6 2022 12:26 PM    INR 1.1 2022 12:26 PM     HgBA1c:  No results found for: LABA1C    Assessment   Camacho Risk Score: Camacho Scale Score: 17    Patient Active Problem List   Diagnosis Code    Kidney stones N20.0    Nocturia R35.1    Hypertrophy of prostate with urinary obstruction N40.1, N13.8    Malignant neoplasm of urinary bladder (HCC) C67.9    Urinary retention R33.9    Cancer of lateral wall of urinary bladder (HCC) C67.2    Acute respiratory failure with hypoxia (HCC) J96.01    Other hyperlipidemia E78.49    Microcytic anemia D50.9    Mild protein-calorie malnutrition (HCC) E44.1    Pulmonary embolism without acute cor pulmonale (HCC) I26.99    Hypoxia R09.02    Dyspnea Y68.08    Acute systolic heart failure (HCC) I50.21    Acute renal failure with tubular necrosis (HCC) N17.0    CKD (chronic kidney disease), stage III (HCC) N18.30    Dilated cardiomyopathy (HCC) I42.0 Bladder cancer ([facility]) C67.9    PAXTON (acute kidney injury) ([facility] N17.9    Bandemia G02.833    Complicated UTI (urinary tract infection) N39.0    Hypokalemia E87.6    Hypophosphatemia E83.39    Small bowel obstruction ([facility]) K56.609    Acute pyelonephritis N10    Nephrostomy complication ([facility]) J38.744    Candida infection B37.9    Enterococcus infection in shunt ([facility]) T85.79XA, B95.2    Hypomagnesemia E83.42    Functional diarrhea K59.1       Measurements:     10/10/22 1347   Ureteral Drain/Stent 10/05/22 Left Ureter   Placement Date/Time: 10/05/22 1435   Description (optional): Nephroureteral- [employer] percuflex  Inserted by: Dr. Anjel German  Urine Returned: Yes  Catheter/Stent Size: (c) 8 FR  Location: Left Ureter   Urine Output (mL) 400 ml   Urostomy Ileal conduit RLQ   Placement Date/Time: 09/16/22 1413   Present on Admission/Arrival: No  Inserted by: Dr. Nydia Ferreira Type: Ileal conduit  Location: RLQ   Stomal Appliance 1 piece; Changed   Stoma  Assessment Pink;Moist;Protrudes  (1 inch circular)   Peristomal Assessment Clean, dry & intact   Collection Container Standard   Treatment Bag change;Site care; Liquid skin barrier  (Brava seal)   Urine Color Yellow   Output (ml) 50 ml   Negative Pressure Wound Therapy Abdomen Mid   Placement Date/Time: 09/23/22 1310   Location: Abdomen  Wound Location Orientation: Mid   Wound Type Surgical   Dressing Type   (removed)   Cycle Off   Dressing Status New dressing applied  (saline moistened gauze)   Dressing Changed Changed/New   Wound 09/22/22 Abdomen Mid SURGICAL INCISION   Date First Assessed: 09/22/22   Present on Hospital Admission: No  Primary Wound Type: Surgical Type  Location: Abdomen  Wound Location Orientation: Mid  Wound Description (Comments): SURGICAL INCISION   Wound Image    Wound Etiology Surgical   Dressing Status New dressing applied   Wound Cleansed Irrigated with saline   Dressing/Treatment Moist to moist;Moisten with saline;ABD   Wound Assessment Granulation tissue   Drainage Amount Scant   Drainage Description Serosanguinous           Response to treatment:  Well tolerated by patient. Tolerable discomfort with dressing removal; took Tylenol prior to visit. Plan   Plan of Care: Wound 09/22/22 Abdomen Mid SURGICAL INCISION-Dressing/Treatment: (P) Moist to moist, Moisten with saline, ABD    Resume NPWT at SNF using black granufoam at -125 mmHg Vacuum. Routine urostomy pouch changes 2 times/week. Extra 1\" pre-cut convex light urostomy pouches provided with brava seals. SNF will need to re-measure stoma to ensure 1\" remains appropriate pouching option in the future. Current Diet: ADULT DIET;  Regular  ADULT ORAL NUTRITION SUPPLEMENT; Breakfast, Lunch, Dinner; Standard High Calorie/High Protein Oral Supplement      Discharge Plan:  Placement for patient upon discharge: skilled nursing    Patient appropriate for Outpatient [address]: N/A      Patient/Caregiver Teaching:  Level of patient/caregiver understanding able to:   [] Indicates understanding       [x] Needs reinforcement  [] Unsuccessful      [] Verbal Understanding  [] Demonstrated understanding       [] No evidence of learning  [] Refused teaching         [] Aguilar Humphrey RN BSN, [employer]

## 2022-10-10 NOTE — CONSULTS
[facility] Cardiology    Consult / H&P               Today's Date: 10/10/2022  Patient Name: Paty Sims  Date of admission: 9/16/2022  5:26 AM  Patient's age: 74 y. 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 He is status post robotic assisted laparoscopic cystoprostatectomy with ileal conduit creation on 9/16 followed by ex lap with repair of bilateral ureteral anastomosis and abdominal washout with ABThera due to urethral anastomotic leak and small bowel obstruction and second look on 9/22 and 9/23. Cardiology was consulted because patient had rapid response called on 10/9 for chest pain. It was 2/10 intensity, associated with nausea. EKG obtained showed normal sinus rhythm and nonspecific IVCD which was same to the previous EKG. Troponin were normal.    Past Medical History:   has a past medical history of Acute renal failure with tubular necrosis ([address] Arthritis, BPH with obstruction/lower urinary tract symptoms, CAD (coronary artery disease), Caffeine use, Cancer ([address] CKD (chronic kidney disease), stage III ([address] COVID-19, Depression, Dilated cardiomyopathy ([address] GERD (gastroesophageal reflux disease), High cholesterol, Kidney calculi, Sleep apnea, Under care of team, Under care of team, Under care of team, Under care of team, and Under care of team.    Past Surgical History:   has a past surgical history that includes Knee arthroscopy; Cardiac catheterization; Colonoscopy; IR PORT PLACEMENT > 5 YEARS (02/25/2022); Cardiac catheterization (06/16/2022); Bladder removal; Prostatectomy (09/16/2022);  Bladder removal (N/A, 9/16/2022); Bladder surgery (Right, 9/16/2022); laparotomy (N/A, 9/22/2022); IR GUIDED NEPHROSTOMY CATH PLACEMENT LEFT (9/23/2022); IR GUIDED NEPHROSTOMY CATH PLACEMENT RIGHT (9/23/2022); laparotomy (N/A, 9/23/2022); IR GUIDED NEPHROSTOMY CATH PLACEMENT LEFT (10/5/2022); and IR GUIDED NEPHROSTOMY CATH PLACEMENT RIGHT (10/5/2022). Home Medications:    Prior to Admission medications    Medication Sig Start Date End Date Taking? Authorizing Provider   oxyCODONE-acetaminophen (PERCOCET) 5-325 MG per tablet Take 1 tablet by mouth every 6 hours as needed for Pain for up to 5 days. Intended supply: 3 days. Take lowest dose possible to manage pain 10/10/22 10/15/22 Yes Paramjit Bennett DO   fluconazole (DIFLUCAN) 100 MG tablet Take 2 tablets by mouth daily for 7 days 10/8/22 10/15/22 Yes Nneka Sanchez MD   docusate sodium (COLACE) 100 MG capsule Take 1 capsule by mouth 2 times daily for 15 days 9/29/22 10/14/22 Yes Carmen Khalil MD   ondansetron (ZOFRAN-ODT) 8 MG TBDP disintegrating tablet DISSOLVE 1 TABLET IN MOUTH EVERY 8 HOURS AS NEEDED FOR NAUSEA FOR VOMITING 9/14/22   Sarah Grayson MD   omeprazole (PRILOSEC) 20 MG delayed release capsule Take 1 capsule by mouth daily 7/8/22   Abimbola Connelly MD   carvedilol (COREG) 3.125 MG tablet TAKE 1 TABLET BY MOUTH TWICE DAILY 5/18/22   Historical Provider, MD   finasteride (PROSCAR) 5 MG tablet Take 5 mg by mouth daily    Historical Provider, MD   tamsulosin (FLOMAX) 0.4 MG capsule Take 0.4 mg by mouth daily    Historical Provider, MD   simvastatin (ZOCOR) 40 MG tablet Take 40 mg by mouth nightly    Historical Provider, MD   buPROPion (WELLBUTRIN XL) 300 MG extended release tablet Take 300 mg by mouth every morning    Historical Provider, MD     Allergies: Other    Social History:   reports that he quit smoking about 30 years ago. His smoking use included cigarettes. He started smoking about 39 years ago. He has a 2.25 pack-year smoking history.  He has never used smokeless tobacco. He reports that he does not currently use alcohol. He reports that he does not use drugs. Family History: family history includes Cancer in his father; Urolithiasis in his father. REVIEW OF SYSTEMS:    Constitutional: there has been no unanticipated weight loss. There's been No change in energy level, No change in activity level. Eyes: No visual changes or diplopia. No scleral icterus. ENT: No Headaches  Cardiovascular: as above  Respiratory: No previous pulmonary problems, No cough  Gastrointestinal: No abdominal pain. No change in bowel or bladder habits. Genitourinary: No dysuria, trouble voiding, or hematuria. Musculoskeletal:  No gait disturbance, No weakness or joint complaints. Integumentary: No rash or pruritis. Neurological: No headache, diplopia, change in muscle strength, numbness or tingling. No change in gait, balance, coordination, mood, affect, memory, mentation, behavior. Psychiatric: No anxiety, or depression. Endocrine: No temperature intolerance. No excessive thirst, fluid intake, or urination. No tremor. Hematologic/Lymphatic: No abnormal bruising or bleeding, blood clots or swollen lymph nodes. Allergic/Immunologic: No nasal congestion or hives. PHYSICAL EXAM:      BP (!) 141/73   Pulse 76   Temp 98.4 °F (36.9 °C) (Oral)   Resp 16   Ht 6' 2.02\" (1.88 m)   Wt 250 lb (113.4 kg)   SpO2 95%   BMI 32.08 kg/m²    Constitutional and General Appearance: alert, cooperative, no distress and appears stated age  HEENT: PERRL, no cervical lymphadenopathy. No masses palpable. Normal oral mucosa  Respiratory:  Clear to auscultation on anterior chest.  Cardiovascular:  S1 plus S2. No murmur. Abdomen:   Soft, bowel sound positive. Ostomy present.   Extremities:  Bilateral lower extremity pitting edema    DATA:    Diagnostics:      EKG:   NSR, non specific IVCD   ECHO: 4/1/2022  Technically difficult study  Normal LV size , mild to moderately increased wall thickness. Difficult to assess wall motion abnormalities  Moderately reduced LV systolic function with LVEF 40-45 %. Normal RV size and function. LA and RA appears normal in size. No obvious significant structural valvular abnormality noted. No significant valvular stenosis or regurgitation noted. Normal aortic root dimension. No significant pericardial effusion noted. IVC not well visualized    Cardiac Angiography: 6/16/2022   Cardiac Arteries and Lesion Findings       LMCA: Normal 0% stenosis. LAD: Diffuse irregularities 30-40%. LCx: Diffuse irregularities 30-40%. RCA: Diffuse irregularities 30-40%. Coronary Tree        Dominance: Right        Procedure Summary        Minimal non-obstructive CAD. LV was not done. Labs:     CBC:   Recent Labs     10/09/22  0357 10/10/22  0637   WBC 13.6* 9.6   HGB 8.2* 7.6*   HCT 24.8* 23.7*    328     BMP:   Recent Labs     10/09/22  0501 10/10/22  0637    132*   K 3.7 3.3*   CO2 22 22   BUN 7* 7*   CREATININE 1.20 1.14   LABGLOM >60 >60   GLUCOSE 90 82     BNP: No results for input(s): BNP in the last 72 hours. PT/INR: No results for input(s): PROTIME, INR in the last 72 hours. APTT:No results for input(s): APTT in the last 72 hours. CARDIAC ENZYMES:No results for input(s): CKTOTAL, CKMB, CKMBINDEX, TROPONINI in the last 72 hours. FASTING LIPID PANEL:  Lab Results   Component Value Date/Time    HDL 48 02/08/2019 02:51 PM     LIVER PROFILE:No results for input(s): AST, ALT, LABALBU in the last 72 hours. IMPRESSION:    Atypical chest pain. Currently chest pain-free. Troponin negative. EKG without ischemic changes.   Cardiac cath 6/2022 with minimal CAD  Moderate Lv dysfunction with EF 45% on last echo   Bladder cancer s/p laparoscopic cystoprostatectomy with ileal conduit creation on 9/16  HTN  HLD  Bilateral PE, 03/2022      RECOMMENDATIONS:  Patient clinical data reviewed including EKG and echo and last cath   Patient does not need any more cardiac work up at this time. Ok to discharge from cardiac standpoint. Will sign off. Plan to be discussed with rounding attending      Geo Pride MD. PGY- 4  Cardiology Fellow  [place], [place]  10/10/2022, 9:35 AM        Attending Physician Statement:    I have discussed the care of  Rodolfo Modi , including pertinent history and exam findings, with the Cardiology fellow/resident. I have seen and examined the patient and the key elements of all parts of the encounter have been performed by me. I agree with the assessment, plan and orders as documented by the fellow/resident, after I modified exam findings and plan of treatments, and the final version is my approved version of the assessment. Additional Comments: s/p urologic surgery. Had mild chest pain last night. Resolved. Had cardiac cath recently showing mild CAD. HS trop negative. No further cardiac work up indicated. Replace K. Xarelto was started couple of days ago.  Has h/o PE in 3/2022    Chrissy Syed MD

## 2022-10-10 NOTE — PLAN OF CARE
Problem: Discharge Planning  Goal: Discharge to home or other facility with appropriate resources  Outcome: Progressing     Problem: Pain  Goal: Verbalizes/displays adequate comfort level or baseline comfort level  Outcome: Progressing     Problem: Safety - Adult  Goal: Free from fall injury  Outcome: Progressing     Problem: ABCDS Injury Assessment  Goal: Absence of physical injury  Outcome: Progressing     Problem: Nutrition Deficit:  Goal: Optimize nutritional status  Outcome: Progressing     Problem: Skin/Tissue Integrity  Goal: Absence of new skin breakdown  Description: 1. Monitor for areas of redness and/or skin breakdown  2. Assess vascular access sites hourly  3. Every 4-6 hours minimum:  Change oxygen saturation probe site  4. Every 4-6 hours:  If on nasal continuous positive airway pressure, respiratory therapy assess nares and determine need for appliance change or resting period.   Outcome: Progressing     Problem: Gastrointestinal - Adult  Goal: Minimal or absence of nausea and vomiting  Outcome: Progressing  Goal: Maintains or returns to baseline bowel function  Outcome: Progressing  Goal: Maintains adequate nutritional intake  Outcome: Progressing  Goal: Establish and maintain optimal ostomy function  Outcome: Progressing     Problem: Genitourinary - Adult  Goal: Urinary catheter remains patent  Outcome: Progressing

## 2022-10-10 NOTE — PLAN OF CARE
Problem: Discharge Planning  Goal: Discharge to home or other facility with appropriate resources  10/10/2022 1803 by Tra Fung RN  Outcome: Progressing  10/10/2022 0644 by Errol Steele RN  Outcome: Progressing     Problem: Pain  Goal: Verbalizes/displays adequate comfort level or baseline comfort level  10/10/2022 1803 by Tra Fung RN  Outcome: Progressing  10/10/2022 0644 by Errol Steele RN  Outcome: Progressing     Problem: Safety - Adult  Goal: Free from fall injury  10/10/2022 1803 by Tra Fung RN  Outcome: Progressing  10/10/2022 0644 by Errol Steele RN  Outcome: Progressing     Problem: ABCDS Injury Assessment  Goal: Absence of physical injury  10/10/2022 1803 by Tra Fung RN  Outcome: Progressing  10/10/2022 0644 by Errol Steele RN  Outcome: Progressing     Problem: Nutrition Deficit:  Goal: Optimize nutritional status  10/10/2022 1803 by Tra Fung RN  Outcome: Progressing  10/10/2022 0644 by Errol Steele RN  Outcome: Progressing     Problem: Skin/Tissue Integrity  Goal: Absence of new skin breakdown  Description: 1. Monitor for areas of redness and/or skin breakdown  2. Assess vascular access sites hourly  3. Every 4-6 hours minimum:  Change oxygen saturation probe site  4. Every 4-6 hours:  If on nasal continuous positive airway pressure, respiratory therapy assess nares and determine need for appliance change or resting period. 10/10/2022 1803 by Tra Fung RN  Outcome: Progressing  10/10/2022 0644 by Errol Steele RN  Outcome: Progressing     Problem: Safety - Medical Restraint  Goal: Remains free of injury from restraints (Restraint for Interference with Medical Device)  Description: INTERVENTIONS:  1. Determine that other, less restrictive measures have been tried or would not be effective before applying the restraint  2. Evaluate the patient's condition at the time of restraint application  3.  Inform patient/family regarding the reason for restraint  4. Q2H: Monitor safety, psychosocial status, comfort, nutrition and hydration  10/10/2022 1803 by Kennedi Qureshi RN  Outcome: Progressing  10/10/2022 0644 by Lizz Bush RN  Outcome: Progressing     Problem: Confusion  Goal: Confusion, delirium, dementia, or psychosis is improved or at baseline  Description: INTERVENTIONS:  1. Assess for possible contributors to thought disturbance, including medications, impaired vision or hearing, underlying metabolic abnormalities, dehydration, psychiatric diagnoses, and notify attending LIP  2. [place] high risk fall precautions, as indicated  3. Provide frequent short contacts to provide reality reorientation, refocusing and direction  4. Decrease environmental stimuli, including noise as appropriate  5. Monitor and intervene to maintain adequate nutrition, hydration, elimination, sleep and activity  6. If unable to ensure safety without constant attention obtain sitter and review sitter guidelines with assigned personnel  7.  Initiate Psychosocial CNS and Spiritual Care consult, as indicated  10/10/2022 1803 by Kennedi Qureshi RN  Outcome: Progressing  10/10/2022 0644 by Lizz Bush RN  Outcome: Progressing     Problem: Gastrointestinal - Adult  Goal: Minimal or absence of nausea and vomiting  10/10/2022 1803 by Kennedi Qureshi RN  Outcome: Progressing  10/10/2022 0644 by Lizz Bush RN  Outcome: Progressing  Goal: Maintains or returns to baseline bowel function  10/10/2022 1803 by Kennedi Qureshi RN  Outcome: Progressing  10/10/2022 0644 by Lizz Bush RN  Outcome: Progressing  Goal: Maintains adequate nutritional intake  10/10/2022 1803 by Kennedi Qureshi RN  Outcome: Progressing  10/10/2022 0644 by Lizz Bush RN  Outcome: Progressing  Goal: Establish and maintain optimal ostomy function  10/10/2022 1803 by Kennedi Qureshi RN  Outcome: Progressing  10/10/2022 0644 by Lizz Bush RN  Outcome: Progressing     Problem: Genitourinary - Adult  Goal: Urinary catheter remains patent  10/10/2022 1803 by Cecilia Lainez RN  Outcome: Progressing  10/10/2022 0644 by Jono Munoz RN  Outcome: Progressing

## 2022-10-11 VITALS
OXYGEN SATURATION: 96 % | TEMPERATURE: 98.5 F | HEART RATE: 81 BPM | RESPIRATION RATE: 16 BRPM | HEIGHT: 74 IN | WEIGHT: 251.06 LBS | BODY MASS INDEX: 32.22 KG/M2 | DIASTOLIC BLOOD PRESSURE: 74 MMHG | SYSTOLIC BLOOD PRESSURE: 137 MMHG

## 2022-10-11 LAB
EKG ATRIAL RATE: 81 BPM
EKG P AXIS: 29 DEGREES
EKG P-R INTERVAL: 184 MS
EKG Q-T INTERVAL: 390 MS
EKG QRS DURATION: 136 MS
EKG QTC CALCULATION (BAZETT): 453 MS
EKG R AXIS: -12 DEGREES
EKG T AXIS: 38 DEGREES
EKG VENTRICULAR RATE: 81 BPM

## 2022-10-11 PROCEDURE — 2709999900 HC NON-CHARGEABLE SUPPLY

## 2022-10-11 PROCEDURE — 6370000000 HC RX 637 (ALT 250 FOR IP): Performed by: INTERNAL MEDICINE

## 2022-10-11 PROCEDURE — 6360000002 HC RX W HCPCS

## 2022-10-11 PROCEDURE — 93010 ELECTROCARDIOGRAM REPORT: CPT | Performed by: INTERNAL MEDICINE

## 2022-10-11 PROCEDURE — C9113 INJ PANTOPRAZOLE SODIUM, VIA: HCPCS | Performed by: STUDENT IN AN ORGANIZED HEALTH CARE EDUCATION/TRAINING PROGRAM

## 2022-10-11 PROCEDURE — 6360000002 HC RX W HCPCS: Performed by: STUDENT IN AN ORGANIZED HEALTH CARE EDUCATION/TRAINING PROGRAM

## 2022-10-11 PROCEDURE — 2580000003 HC RX 258: Performed by: STUDENT IN AN ORGANIZED HEALTH CARE EDUCATION/TRAINING PROGRAM

## 2022-10-11 PROCEDURE — 2580000003 HC RX 258: Performed by: INTERNAL MEDICINE

## 2022-10-11 PROCEDURE — 6370000000 HC RX 637 (ALT 250 FOR IP): Performed by: STUDENT IN AN ORGANIZED HEALTH CARE EDUCATION/TRAINING PROGRAM

## 2022-10-11 PROCEDURE — 6360000002 HC RX W HCPCS: Performed by: INTERNAL MEDICINE

## 2022-10-11 RX ADMIN — CARVEDILOL 3.12 MG: 3.12 TABLET, FILM COATED ORAL at 09:02

## 2022-10-11 RX ADMIN — Medication 125 MG: at 09:01

## 2022-10-11 RX ADMIN — ACETAMINOPHEN 650 MG: 325 TABLET ORAL at 01:14

## 2022-10-11 RX ADMIN — SODIUM CHLORIDE, PRESERVATIVE FREE 40 MG: 5 INJECTION INTRAVENOUS at 09:02

## 2022-10-11 RX ADMIN — ONDANSETRON 8 MG: 4 TABLET, ORALLY DISINTEGRATING ORAL at 09:08

## 2022-10-11 RX ADMIN — Medication 125 MG: at 04:46

## 2022-10-11 RX ADMIN — SODIUM CHLORIDE, PRESERVATIVE FREE 10 ML: 5 INJECTION INTRAVENOUS at 12:59

## 2022-10-11 RX ADMIN — BUPROPION HYDROCHLORIDE 300 MG: 150 TABLET, EXTENDED RELEASE ORAL at 09:02

## 2022-10-11 RX ADMIN — Medication 300 UNITS: at 12:59

## 2022-10-11 ASSESSMENT — PAIN SCALES - GENERAL: PAINLEVEL_OUTOF10: 0

## 2022-10-11 NOTE — CARE COORDINATION
Discharge [address] Case Management Department  Written by: Evita Padilla RN    Patient Name: Zuleika Bishop  Attending Provider: No att. providers found  Admit Date: 2022  5:26 AM  MRN: [id number]  Account: [de-identified]                     : 1955  Discharge Date: 10/11/2022      Disposition: [facility]

## 2022-10-11 NOTE — PLAN OF CARE
Problem: Discharge Planning  Goal: Discharge to home or other facility with appropriate resources  10/11/2022 0434 by Sydnee Briceño  Outcome: Progressing  10/10/2022 1803 by Alba Moreno RN  Outcome: Progressing     Problem: Pain  Goal: Verbalizes/displays adequate comfort level or baseline comfort level  10/11/2022 0434 by Sydnee Briceño  Outcome: Progressing  10/10/2022 1803 by Alba Moreno RN  Outcome: Progressing     Problem: Safety - Adult  Goal: Free from fall injury  10/11/2022 0434 by Sydnee Briceño  Outcome: Progressing  10/10/2022 1803 by Alba Moreno RN  Outcome: Progressing     Problem: ABCDS Injury Assessment  Goal: Absence of physical injury  10/11/2022 0434 by Sydnee Briceño  Outcome: Progressing  10/10/2022 1803 by Alba Moreno RN  Outcome: Progressing     Problem: Nutrition Deficit:  Goal: Optimize nutritional status  10/11/2022 0434 by Sydnee Briceño  Outcome: Progressing  10/10/2022 1803 by Alba Moreno RN  Outcome: Progressing     Problem: Skin/Tissue Integrity  Goal: Absence of new skin breakdown  Description: 1. Monitor for areas of redness and/or skin breakdown  2. Assess vascular access sites hourly  3. Every 4-6 hours minimum:  Change oxygen saturation probe site  4. Every 4-6 hours:  If on nasal continuous positive airway pressure, respiratory therapy assess nares and determine need for appliance change or resting period. 10/11/2022 0434 by Sydnee Briceño  Outcome: Progressing  10/10/2022 1803 by Alba Moreno RN  Outcome: Progressing     Problem: Safety - Medical Restraint  Goal: Remains free of injury from restraints (Restraint for Interference with Medical Device)  Description: INTERVENTIONS:  1. Determine that other, less restrictive measures have been tried or would not be effective before applying the restraint  2. Evaluate the patient's condition at the time of restraint application  3. Inform patient/family regarding the reason for restraint  4.  Q2H: Monitor safety, psychosocial status, comfort, nutrition and hydration  10/11/2022 0434 by Yamini Alvarado  Outcome: Progressing  10/10/2022 1803 by Debi Carlos RN  Outcome: Progressing     Problem: Confusion  Goal: Confusion, delirium, dementia, or psychosis is improved or at baseline  Description: INTERVENTIONS:  1. Assess for possible contributors to thought disturbance, including medications, impaired vision or hearing, underlying metabolic abnormalities, dehydration, psychiatric diagnoses, and notify attending LIP  2. [place] high risk fall precautions, as indicated  3. Provide frequent short contacts to provide reality reorientation, refocusing and direction  4. Decrease environmental stimuli, including noise as appropriate  5. Monitor and intervene to maintain adequate nutrition, hydration, elimination, sleep and activity  6. If unable to ensure safety without constant attention obtain sitter and review sitter guidelines with assigned personnel  7.  Initiate Psychosocial CNS and Spiritual Care consult, as indicated  10/11/2022 0434 by Yamini Alvarado  Outcome: Progressing  10/10/2022 1803 by Debi Carlos RN  Outcome: Progressing     Problem: Gastrointestinal - Adult  Goal: Minimal or absence of nausea and vomiting  10/11/2022 0434 by Yamini Alvarado  Outcome: Progressing  10/10/2022 1803 by Debi Carlos RN  Outcome: Progressing  Goal: Maintains or returns to baseline bowel function  10/11/2022 0434 by Yamini Alvarado  Outcome: Progressing  10/10/2022 1803 by Debi Carlos RN  Outcome: Progressing  Goal: Maintains adequate nutritional intake  10/11/2022 0434 by Yamini Alvarado  Outcome: Progressing  10/10/2022 1803 by Debi Carlos RN  Outcome: Progressing  Goal: Establish and maintain optimal ostomy function  10/11/2022 0434 by Yamini Alvarado  Outcome: Progressing  10/10/2022 1803 by Debi Carlos RN  Outcome: Progressing     Problem: Genitourinary - Adult  Goal: Urinary catheter remains patent  10/11/2022 0434 by Yamini Alvarado  Outcome: Progressing  10/10/2022 1803 by Breana Valenzuela RN  Outcome: Progressing

## 2022-10-11 NOTE — CARE COORDINATION
Transitional Planning  Call from [address], they do have an 11 am transportation time available. Called Jerica York, message left for Zander at [phone].    1000 am Called [facility] spoke with Marci, notified her no transportation needed at 11 am to keep transportation at 1 pm.      130 pm [facility] not here. Called [facility],  communication with transportation company.  at 315 pm.  Notified patient, family and RN. Voicemail left for Zander at [facility].

## 2022-10-11 NOTE — PROGRESS NOTES
[facility] Erendira Jaquez MD FACS   Urology Progress Note     Subjective:  No acute events overnight  Denies fever, chills, chest pain, shortness of breath  Had some nausea with PO vancomycin  Ambulated yesterday  Having bowel movements  Left nephroureteral cath 1.5 L clear urine  Right nephroureteral cath 375 cc milky urine  Urostomy 150 cc  Hemoglobin 7.8 from 7.6  Creatinine 1.1 from 1.14    Patient Vitals for the past 24 hrs:   BP Temp Temp src Pulse Resp SpO2 Weight   10/11/22 0548 -- -- -- -- -- -- 251 lb 1 oz (113.9 kg)   10/10/22 2001 (!) 148/72 97.9 °F (36.6 °C) Oral 80 16 97 % --   10/10/22 0803 (!) 141/73 98.4 °F (36.9 °C) Oral 76 16 95 % --       Intake/Output Summary (Last 24 hours) at 10/11/2022 0651  Last data filed at 10/11/2022 0644  Gross per 24 hour   Intake --   Output 3175 ml   Net -3175 ml       Recent Labs     10/09/22  0357 10/10/22  0637 10/10/22  2232   WBC 13.6* 9.6 9.5   HGB 8.2* 7.6* 7.8*   HCT 24.8* 23.7* 23.9*   MCV 86.4 86.8 86.9    328 271     Recent Labs     10/09/22  0501 10/10/22  0637 10/10/22  2232    132* 132*   K 3.7 3.3* 3.4*    101 100   CO2 22 22 21   BUN 7* 7* 7*   CREATININE 1.20 1.14 1.10       No results for input(s): COLORU, PHUR, LABCAST, WBCUA, RBCUA, MUCUS, TRICHOMONAS, YEAST, BACTERIA, CLARITYU, SPECGRAV, LEUKOCYTESUR, UROBILINOGEN, BILIRUBINUR, BLOODU in the last 72 hours.     Invalid input(s): NITRATE, GLUCOSEUKETONESUAMORPHOUS      Additional Lab/culture results:    Physical Exam:   General: A/O x 3, no acute distress  HEENT: moist mucous membranes  Neck: Supple   Chest: bilateral symmetrical chest rise   Circulatory: Peripheries warm , well perfused   P/A: soft, clean, dry and intact with skin glue, ostomy pink and healthy, drain site dressing-no soakage   extremities: Bilateral LE pitting edema   : left nephrostomy tube with clear jared urine, right nephrostomy tube with clear urine    Interval Imaging Findings: none    Impression: none    Myrna Hdez is a 62-year-old male   Active  problem list  Hx of bladder cancer  Robotic assisted laparoscopic cystoprostatectomy with ileal conduit creation on 9/16/2022   Ex lap ROLANDO repair of BL ureteral anastamosis and abd washout with abthera due to ureteral anastamotic leaks and SBO and second look on 9/22 and 9/23      Plan:  Continue regular diet for now  Will consult cardiology for chest pain- ok for discharge  Maintain bilateral nephroureteral catheters  Discontinue IV fluids  DVT PPX: Subq heparin 5000 units TID  Continue to monitor  Culture pending right nephrostomy  Plan for discharge to [facility] MD Stacy, PGY-2  6:51 AM 10/11/2022    I have reviewed the history above and agree. I have repeated the key portions of the physical exam and concur with the resident's findings. I have reviewed all laboratory findings and imaging reports/films. I agree with the plan as noted above.     Electronically signed by Alex Taylor MD on 10/11/2022 at 8:31 AM

## 2022-10-12 NOTE — DISCHARGE SUMMARY
DISCHARGE SUMMARY NOTE:      Patient Identification  PATIENT: Paulie Guillermo is a 74 y.o. male.   MRN: [id number]  :  1955  Admit Date:  2022  Discharge date:   10/11/22                                  Disposition: SNF  Discharged Condition:  good  Discharge Diagnoses:   Bladder cancer  S/p robotic cystoprostatectomy with ileal conduit creation 2022  S/p ex lap ROLANDO repair of bilateral ureteral anastomosis and abdominal washout with abthera due to ureteral anastomotic leaks and SBO 2022 with second look on 2022    Patient Active Problem List   Diagnosis    Kidney stones    Nocturia    Hypertrophy of prostate with urinary obstruction    Malignant neoplasm of urinary bladder (HCC)    Urinary retention    Cancer of lateral wall of urinary bladder (HCC)    Acute respiratory failure with hypoxia (HCC)    Other hyperlipidemia    Microcytic anemia    Mild protein-calorie malnutrition (HCC)    Pulmonary embolism without acute cor pulmonale (HCC)    Hypoxia    Dyspnea    Acute systolic heart failure (HCC)    Acute renal failure with tubular necrosis (HCC)    CKD (chronic kidney disease), stage III (HCC)    Dilated cardiomyopathy (HCC)    Bladder cancer (HCC)    PAXTON (acute kidney injury) ([address]    Bandemia    Complicated UTI (urinary tract infection)    Hypokalemia    Hypophosphatemia    Small bowel obstruction (HCC)    Acute pyelonephritis    Nephrostomy complication (HCC)    Candida infection    Enterococcus infection in shunt (HCC)    Hypomagnesemia    Functional diarrhea       Consults: general surgery, infectious disease, cardiology, nephrology    Surgery:  22: XI ROBOTIC LAPARASCOPIC ASSISTED RADICAL CYSTOPROSTATECTOMY  BILATERAL PELVIC LYMPHADENECTOMY AND ILEAL CONDUIT FORMATION    CYSTOSCOPY RT STENT REMOVAL  22: LAPAROTOMY EXPLORATORY, LYSIS OF ADHESIONS, REPAIR OF BILATERAL URETERAL ANASTOMOSES, ABDOMINAL WASHOUT, PLACEMENT OF ABTHERA WOUND VAC    22: 2ND LOOK LAPAROTOMY, release tablet  Commonly known as: Roxicodone  Take 1 tablet by mouth every 6 hours as needed for Pain for up to 5 days. Intended supply: 5 days. Take lowest dose possible to manage pain  Ask about: Should I take this medication? Where to Get Your Medications        These medications were sent to [facility] [address] [facility], [address] [facility]  [address], [address] [address]      Phone: [phone]   docusate sodium 100 MG capsule  oxyCODONE 5 MG immediate release tablet       These medications were sent to [address]  [id number] [place] [address], [address] [place] [address]      Phone: [phone]   rivaroxaban 20 MG Tabs tablet       You can get these medications from any pharmacy    Bring a paper prescription for each of these medications  oxyCODONE-acetaminophen 5-325 MG per tablet  vancomycin 50 mg/mL oral solution         Hospital course:   Patient is a 79-year-old male with bladder cancer. He underwent robotic cystoprostatectomy with ileal conduit creation 09/16/2022. His post operative course was complicated by abdominal pain and vomiting. General surgery was consulted and patient had NGT placed. SBFT showed concern for distal bowel obstruction. MERLIN drain output increased, concern for urine leak. He then underwent ex lap ROLANDO repair of bilateral ureteral anastomosis and abdominal washout with abthera due to ureteral anastomotic leaks and SBO 09/22/2022 with second look on 09/23/2022. Patient remained intubated post operatively and was transferred to ICU. He was extubated on 09/24/2022. Patient had wound vac placed over abdominal incision. He underwent bilateral nephrostomy tube placement to divert urine. The patient continued to have vomiting and NGT was replaced. After a few days, he began to pass flatus and started having bowel movements. NGT was removed.   His diet was advanced. He had some chest pain, cardiology was consulted and they signed off. Patient met all criteria for discharge on 10/11/2022 and was discharged to a SNF in good condition.

## 2022-10-13 LAB
CULTURE: ABNORMAL
CULTURE: ABNORMAL
SPECIMEN DESCRIPTION: ABNORMAL

## 2022-10-15 LAB
CULTURE: NORMAL
Lab: NORMAL
SPECIMEN DESCRIPTION: NORMAL

## 2022-10-18 ENCOUNTER — OFFICE VISIT (OUTPATIENT)
Dept: SURGERY | Age: 67
End: 2022-10-18

## 2022-10-18 VITALS
HEART RATE: 100 BPM | BODY MASS INDEX: 32.21 KG/M2 | SYSTOLIC BLOOD PRESSURE: 150 MMHG | WEIGHT: 251 LBS | HEIGHT: 74 IN | DIASTOLIC BLOOD PRESSURE: 84 MMHG

## 2022-10-18 DIAGNOSIS — T14.8XXA SURGICAL WOUND PRESENT: Primary | ICD-10-CM

## 2022-10-18 PROCEDURE — 99024 POSTOP FOLLOW-UP VISIT: CPT | Performed by: SPECIALIST

## 2022-10-18 RX ORDER — SUCRALFATE ORAL 1 G/10ML
1 SUSPENSION ORAL 4 TIMES DAILY
Qty: 1200 ML | Refills: 1 | Status: SHIPPED | OUTPATIENT
Start: 2022-10-18 | End: 2022-11-17

## 2022-10-18 NOTE — PROGRESS NOTES
[facility] Progress Note    TODAY'S DATE: 10/18/2022, 2:35 PM    SUBJECTIVE       Alysa Teran, 61-year-old male who presented for robotic laparoscopic assisted radical cystoprostatectomy, bilateral pelvic lymphadenectomy and ileocolonic formation with urology on 9/16/2022. Patient's hospital course was complicated by small bowel obstruction where he was taken to the operating room on 9/22/2022 for exploratory laparotomy lysis of adhesions pair of bilateral ureteral anastomoses and reduction of small bowel obstruction. No bowel was resected at this time. The patient was monitored in the hospital and discharged tolerating a diet. Patient was sent to a nursing home. Overall patient states he has increased fatigue and feels like his energy is not coming back. He is having bowel movements. Patient did recently recover from COVID-pneumonia, however he states he does not like the taste of the protein drinks and feels like he does not have an appetite. Patient has been receiving Monday Wednesday Friday midline wound VAC changes. Wound appears to be healing well with good granulation tissue at base. He denies any fevers or chills. OBJECTIVE    VITALS:  BP (!) 150/84   Pulse 100   Ht 6' 2\" (1.88 m)   Wt 251 lb (113.9 kg)   BMI 32.23 kg/m²   CONSTITUTIONAL: Awake, fatigued, alert  LUNGS: Equal rise and fall of chest, no chest wall tenderness  ABDOMEN:   S soft, nondistended, nontender to palpation, midline and surgical wound healing well with good granulation tissue at base, 2 tunnels with white foam fascia visualized. Right lower quadrant ileal conduit with urine output. Bilateral nephrostomy tubes with urine output. EXT: Warm, well perfused    ASSESMENT       Diagnosis Orders   1.  Surgical wound present          Alysa Teran, 61-year-old male with history of invasive bladder cancer status post cystectomy with ileal conduit creation complicated by small bowel obstruction status post exploratory laparotomy with reduction of bowel obstruction presents for wound evaluation. PLAN      Continue Monday Wednesday Friday wound VAC changes at the facility. Wound appears to be healing well with good granulation tissue minimize use of white foam  Patient prescribed Carafate liquid due to difficulty swallowing and persistent GERD symptoms. We will see patient in 2 weeks for wound reevaluation.     Electronically signed by Kennedi Otero MD  on 10/18/2022 at 2:35 PM

## 2022-10-20 PROBLEM — N10 ACUTE PYELONEPHRITIS: Status: RESOLVED | Noted: 2022-10-01 | Resolved: 2022-10-20

## 2022-10-20 PROBLEM — N40.1 HYPERTROPHY OF PROSTATE WITH URINARY OBSTRUCTION: Status: RESOLVED | Noted: 2020-11-02 | Resolved: 2022-10-20

## 2022-10-20 PROBLEM — N13.8 HYPERTROPHY OF PROSTATE WITH URINARY OBSTRUCTION: Status: RESOLVED | Noted: 2020-11-02 | Resolved: 2022-10-20

## 2022-10-20 PROBLEM — R33.9 URINARY RETENTION: Status: RESOLVED | Noted: 2022-02-10 | Resolved: 2022-10-20

## 2022-10-27 ENCOUNTER — TELEPHONE (OUTPATIENT)
Dept: ONCOLOGY | Age: 67
End: 2022-10-27

## 2022-10-27 NOTE — TELEPHONE ENCOUNTER
[facility] Nutrition Follow Up       Lexi Cordoba is a 74 y.o.  male     NUTRITION RECOMMENDATIONS / Kelley Danielson / EVALUATION  Strongly encouraged high melanie/high protein, small frequent meals. 2. Pt known to dislike oral nutrition supplements, so unlikely pt will utilize. 3. Encouraged adequate hydration  4. Encouraged multivitamin to aid in meeting estimated micronutrient needs  5. Will continue to follow. Subjective/Current Data:  Called pt on listed phone number r/t nutrition follow up. Pt s/p DaVinci cystoprostatectomy, PLND, ileal conduit complicated by SBO, exploratory lap, bilat nephrouretal stents and was at an [facility] for post surgical rehab. Pt reports that he was d/c'd home yesterday. States taste changes continue to impact PO intakes. Pt reports he \"has a wound,\" though did not indicate wound location. RD provided verbal recommendations for high calorie, high protein foods given hx significant wt loss, documented severe malnutrition, complicated surgical process with wounds present, and likely reduced strength. Pt stated, \"I'll be fine\" and ended phone call. Nutritional Requirements:  Estimated Energy Needs:  Weight Used: 113.9kg   Method Used: Janus Pump  Estimated kcal Needs: 0794-5372 kcal per day  Protein Needs:  Weight used: 113.9kg  1.2-1.4 g/kg = 137 -160g per day    Nutrition-focused Physical Findings  GI Symptoms: poor appetite, taste changes  Skin: surgical wounds present  Intake vs. Estimated Needs: less than needs    Nutrition Diagnosis and Goal  Problem:  increased estimated needs  Etiology/related to: s/p surgery with wounds present; likely debility  Symptoms/Signs/as evidenced by: hx unintentional wt loss >100lb x 1 yr, s/p chemotherapy tx, s/p cystoprostatectomy with complications       Goal: Adequate intake to aide in wt maintenaince, signs and symptoms management, and overall wellbeing.     Progress towards goal: gradually worsening    Saroj Fairly, MHA, RD, RADHIKA  Registered Dietitian   [place]  [phone]

## 2022-11-10 ENCOUNTER — TELEPHONE (OUTPATIENT)
Dept: ONCOLOGY | Age: 67
End: 2022-11-10

## 2022-11-10 NOTE — TELEPHONE ENCOUNTER
[facility] Nutrition Follow Up       Abhijit Priest is a 74 y.o.  male     NUTRITION RECOMMENDATIONS / MONITORING / EVALUATION  Continue high calorie/high protein meals and snacks. 2. Recommend consider multivitamin and vit D3 to aid in micronutrient intake  3. Will monitor wts, labs, po intakes, s/s management    Subjective/Current Data:  Called pt on listed phone number r/t nutrition follow up. Pt reports PO intakes still impacted by altered taste, but is eating multiple times per day. Pt states this morning he has consumed 2 tall glasses of whole milk and a donut. Pt states he typically will eat at least 2 meals. Pt states taste changes are biggest issue. Pt has nephrostomy tube removal on Monday. Pt with flat affect. RD encouraged continued high calorie/high protein meals and snacks as tolerated. RD recommended use of multivitamin to aid in meeting estimated micronutrient needs given altered PO intakes and suspected low micronutrient intake. Recent Weights: Wt Readings from Last 3 Encounters:   10/18/22 251 lb (113.9 kg)   10/11/22 251 lb 1 oz (113.9 kg)   09/08/22 259 lb 9.6 oz (117.8 kg)         Goal: Adequate intake to aide in wt maintenaince, signs and symptoms management, and overall wellbeing.     Progress towards goal: unchanged    Blondell KENNY Grayson, RD, LD  Registered Dietitian   [place]  [phone]

## 2022-11-14 ENCOUNTER — HOSPITAL ENCOUNTER (OUTPATIENT)
Dept: INTERVENTIONAL RADIOLOGY/VASCULAR | Age: 67
Discharge: HOME | End: 2022-11-16
Payer: MEDICARE

## 2022-11-14 VITALS
BODY MASS INDEX: 26.95 KG/M2 | TEMPERATURE: 98.4 F | OXYGEN SATURATION: 98 % | RESPIRATION RATE: 20 BRPM | HEIGHT: 74 IN | HEART RATE: 89 BPM | DIASTOLIC BLOOD PRESSURE: 90 MMHG | WEIGHT: 210 LBS | SYSTOLIC BLOOD PRESSURE: 134 MMHG

## 2022-11-14 DIAGNOSIS — N17.0 ACUTE KIDNEY FAILURE WITH LESION OF TUBULAR NECROSIS (HCC): ICD-10-CM

## 2022-11-14 DIAGNOSIS — C67.2 MALIGNANT NEOPLASM OF LATERAL WALL OF URINARY BLADDER (HCC): ICD-10-CM

## 2022-11-14 LAB
INR BLD: 1.1
PARTIAL THROMBOPLASTIN TIME: 23.5 SEC (ref 20.5–30.5)
PLATELET # BLD: 323 K/UL (ref 138–453)
PROTHROMBIN TIME: 11.3 SEC (ref 9.1–12.3)

## 2022-11-14 PROCEDURE — 2580000003 HC RX 258: Performed by: PHYSICIAN ASSISTANT

## 2022-11-14 PROCEDURE — 50693 PLMT URETERAL STENT PRQ: CPT

## 2022-11-14 PROCEDURE — C1894 INTRO/SHEATH, NON-LASER: HCPCS

## 2022-11-14 PROCEDURE — 6360000002 HC RX W HCPCS: Performed by: RADIOLOGY

## 2022-11-14 PROCEDURE — 85610 PROTHROMBIN TIME: CPT

## 2022-11-14 PROCEDURE — C1769 GUIDE WIRE: HCPCS

## 2022-11-14 PROCEDURE — 2709999900 HC NON-CHARGEABLE SUPPLY

## 2022-11-14 PROCEDURE — 85730 THROMBOPLASTIN TIME PARTIAL: CPT

## 2022-11-14 PROCEDURE — 85049 AUTOMATED PLATELET COUNT: CPT

## 2022-11-14 PROCEDURE — C2617 STENT, NON-COR, TEM W/O DEL: HCPCS

## 2022-11-14 PROCEDURE — 6360000004 HC RX CONTRAST MEDICATION: Performed by: SPECIALIST

## 2022-11-14 RX ORDER — FENTANYL CITRATE 50 UG/ML
INJECTION, SOLUTION INTRAMUSCULAR; INTRAVENOUS
Status: COMPLETED | OUTPATIENT
Start: 2022-11-14 | End: 2022-11-14

## 2022-11-14 RX ORDER — ACETAMINOPHEN 325 MG/1
650 TABLET ORAL EVERY 4 HOURS PRN
Status: DISCONTINUED | OUTPATIENT
Start: 2022-11-14 | End: 2022-11-17 | Stop reason: HOSPADM

## 2022-11-14 RX ORDER — SODIUM CHLORIDE 9 MG/ML
INJECTION, SOLUTION INTRAVENOUS CONTINUOUS
Status: DISCONTINUED | OUTPATIENT
Start: 2022-11-14 | End: 2022-11-17 | Stop reason: HOSPADM

## 2022-11-14 RX ADMIN — Medication 2000 MG: at 14:45

## 2022-11-14 RX ADMIN — IOPAMIDOL 10 ML: 755 INJECTION, SOLUTION INTRAVENOUS at 15:04

## 2022-11-14 RX ADMIN — SODIUM CHLORIDE: 9 INJECTION, SOLUTION INTRAVENOUS at 13:51

## 2022-11-14 RX ADMIN — FENTANYL CITRATE 25 MCG: 50 INJECTION, SOLUTION INTRAMUSCULAR; INTRAVENOUS at 14:50

## 2022-11-14 ASSESSMENT — PAIN DESCRIPTION - FREQUENCY: FREQUENCY: CONTINUOUS

## 2022-11-14 ASSESSMENT — PAIN DESCRIPTION - DESCRIPTORS
DESCRIPTORS: OTHER (COMMENT)
DESCRIPTORS: SHARP
DESCRIPTORS: SHARP

## 2022-11-14 ASSESSMENT — PAIN DESCRIPTION - ONSET
ONSET: ON-GOING
ONSET: ON-GOING

## 2022-11-14 ASSESSMENT — PAIN - FUNCTIONAL ASSESSMENT
PAIN_FUNCTIONAL_ASSESSMENT: 0-10
PAIN_FUNCTIONAL_ASSESSMENT: 0-10

## 2022-11-14 ASSESSMENT — PAIN DESCRIPTION - LOCATION: LOCATION: BACK;ABDOMEN

## 2022-11-14 ASSESSMENT — PAIN SCALES - GENERAL
PAINLEVEL_OUTOF10: 5
PAINLEVEL_OUTOF10: 5

## 2022-11-14 ASSESSMENT — PAIN DESCRIPTION - PAIN TYPE: TYPE: CHRONIC PAIN

## 2022-11-14 NOTE — OP NOTE
Brief Postoperative Note    Jenn Angulo  YOB: 1955  [id number]    Pre-operative Diagnosis: bladder ca    Post-operative Diagnosis: Same    Procedure: ureteral stent L, cath removal R    Anesthesia: Local   Surgeons/Assistants: Shawna Mohr MD     Estimated Blood Loss: minimal    Complications: none immediate    Specimens: were not obtained      Electronically signed by Shawna Mohr MD on 11/14/2022 at 3:02 PM

## 2022-11-14 NOTE — H&P
History and Physical    Pt Name: Esha Roche  MRN: [id number]  YOB: 1955  Date of evaluation: 11/14/2022  Primary Care Physician: Gema Short MD    SUBJECTIVE:   History of Chief Complaint:    Esha Roche is a 74 y.o. male who is scheduled today for IR remove right nephroureteral cath, remove let nephroureteral cath, place stent- left. Patient with history of bladder cancer, s/p robotic cystoprostatectomy with ileal conduit creation 09/16/2022  S/p ex lap ROLANDO repair of bilateral ureteral anastomosis and abdominal washout with abthera due to ureteral anastomotic leaks and SBO 09/22/2022 with second look on 09/23/2022. He is looking forward to have NT tubes removed today. Patient now at his home, out of ECF. Allergies  is allergic to other. Medications  Prior to Admission medications    Medication Sig Start Date End Date Taking?  Authorizing Provider   sucralfate (CARAFATE) 1 GM/10ML suspension Take 10 mLs by mouth 4 times daily 10/18/22 11/17/22  Naseem Quezada MD   rivaroxaban (XARELTO) 20 MG TABS tablet Take 1 tablet by mouth daily 10/10/22   Bren Bowman MD   ondansetron (ZOFRAN-ODT) 8 MG TBDP disintegrating tablet DISSOLVE 1 TABLET IN MOUTH EVERY 8 HOURS AS NEEDED FOR NAUSEA FOR VOMITING 9/14/22   Sarah Grayson MD   omeprazole (PRILOSEC) 20 MG delayed release capsule Take 1 capsule by mouth daily 7/8/22   Dolly Bowers MD   carvedilol (COREG) 3.125 MG tablet TAKE 1 TABLET BY MOUTH TWICE DAILY 5/18/22   Historical Provider, MD   finasteride (PROSCAR) 5 MG tablet Take 5 mg by mouth daily    Historical Provider, MD   tamsulosin (FLOMAX) 0.4 MG capsule Take 0.4 mg by mouth daily    Historical Provider, MD   simvastatin (ZOCOR) 40 MG tablet Take 40 mg by mouth nightly    Historical Provider, MD   buPROPion (WELLBUTRIN XL) 300 MG extended release tablet Take 300 mg by mouth every morning    Historical Provider, MD     Past Medical History    has a past medical history of Acute renal failure with tubular necrosis ([facility] Arthritis, BPH with obstruction/lower urinary tract symptoms, CAD (coronary artery disease), Caffeine use, Cancer ([facility] CKD (chronic kidney disease), stage III ([facility] COVID-19, Depression, Dilated cardiomyopathy ([facility] GERD (gastroesophageal reflux disease), High cholesterol, Kidney calculi, Sleep apnea, Under care of team, Under care of team, Under care of team, Under care of team, and Under care of team.  Past Surgical History   has a past surgical history that includes Knee arthroscopy; Cardiac catheterization; Colonoscopy; IR PORT PLACEMENT > 5 YEARS (2022); Cardiac catheterization (2022); Bladder removal; Prostatectomy (2022); Bladder removal (N/A, 2022); Bladder surgery (Right, 2022); laparotomy (N/A, 2022); IR GUIDED NEPHROSTOMY CATH PLACEMENT LEFT (2022); IR GUIDED NEPHROSTOMY CATH PLACEMENT RIGHT (2022); laparotomy (N/A, 2022); IR GUIDED NEPHROSTOMY CATH PLACEMENT LEFT (10/5/2022); and IR GUIDED NEPHROSTOMY CATH PLACEMENT RIGHT (10/5/2022). Social History   reports that he quit smoking about 30 years ago. His smoking use included cigarettes. He started smoking about 39 years ago. He has a 2.25 pack-year smoking history. He has never used smokeless tobacco.   reports that he does not currently use alcohol. reports no history of drug use. Marital Status    Children one  Occupation retired factory   Family History  Family Status   Relation Name Status    Father      Mother       family history includes Cancer in his father; Urolithiasis in his father.     Review of Systems:  CONSTITUTIONAL:   negative for fevers, chills, fatigue and malaise +weight loss   EYES:   negative for double vision, blurred vision and photophobia +glasses   HEENT:   negative for tinnitus, epistaxis and sore throat     RESPIRATORY:   negative for cough, shortness of breath, wheezing     CARDIOVASCULAR:   negative for chest pain, palpitations, syncope, edema     GASTROINTESTINAL:   negative for nausea, vomiting     GENITOURINARY:   +see HPI   MUSCULOSKELETAL:   negative for neck or back pain     NEUROLOGICAL:   Negative for weakness and tingling  negative for headaches and dizziness     PSYCHIATRIC:   negative for anxiety       OBJECTIVE:   VITALS:  height is 6' 2\" (1.88 m) and weight is 210 lb (95.3 kg). His infrared temperature is 95.5 °F (35.3 °C) (abnormal). His blood pressure is 129/78 and his pulse is 115 (abnormal). His respiration is 20 and oxygen saturation is 97%. CONSTITUTIONAL:alert & oriented x 3, no acute distress. Very pleasant, good sense of humor. SKIN:  Warm and dry, no rashes on exposed areas of skin, b/l neph tubes intact  HEAD:  Normocephalic, atraumatic   EYES: PERRL. EOMs intact. Wearing glasses. EARS:  Equal bilaterally, no edema or thickening, skin is intact without lumps or lesions. No discharge. Hearing grossly WNL. NOSE:  Nares patent. No rhinorrhea   MOUTH/THROAT:  Mucous membranes moist, tongue is pink, uvula midline, teeth appear to be intact  NECK:Full ROM  LUNGS: Respirations even and non-labored. Clear to auscultation bilaterally, no wheezes, rales, or rhonchi. CARDIOVASCULAR: Regular rate and rhythm, no murmurs/rubs/gallops   ABDOMEN: soft, non-tender, non-distended, bowel sounds active x 4, ileal conduit  EXTREMITIES: No edema bilateral lower extremities. No varicosities bilateral lower extremities. NEUROLOGIC: CN II-XII are grossly intact. Gait not assessed.   IMPRESSIONS:   Malignant neoplasm of lateral wall of urinary bladder  Acute kidney failure with lesion of tubular necrosis  PLANS:   Bilateral nephrostograms and nephroureteral stent exchangeIR     LINWOOD REA CNP   Electronically signed 11/14/2022 at 1:29 PM

## 2022-11-17 ENCOUNTER — TELEPHONE (OUTPATIENT)
Dept: ONCOLOGY | Age: 67
End: 2022-11-17

## 2022-11-17 NOTE — TELEPHONE ENCOUNTER
[facility] called and LVM about pt having a port draw and labs taken for Dr. Cecily Crane. Tried to reach pt at number they left [phone] and pt's VM is full.     Electronically signed by Burt Mccall on 11/17/2022 at 2:58 PM

## 2022-11-22 ENCOUNTER — OFFICE VISIT (OUTPATIENT)
Dept: ONCOLOGY | Age: 67
End: 2022-11-22
Payer: MEDICARE

## 2022-11-22 ENCOUNTER — HOSPITAL ENCOUNTER (OUTPATIENT)
Dept: INFUSION THERAPY | Age: 67
Discharge: HOME | End: 2022-11-22
Payer: MEDICARE

## 2022-11-22 ENCOUNTER — TELEPHONE (OUTPATIENT)
Dept: ONCOLOGY | Age: 67
End: 2022-11-22

## 2022-11-22 VITALS
WEIGHT: 207 LBS | BODY MASS INDEX: 26.58 KG/M2 | DIASTOLIC BLOOD PRESSURE: 73 MMHG | HEART RATE: 111 BPM | TEMPERATURE: 97.6 F | SYSTOLIC BLOOD PRESSURE: 110 MMHG | OXYGEN SATURATION: 98 %

## 2022-11-22 DIAGNOSIS — C67.9 MALIGNANT NEOPLASM OF URINARY BLADDER, UNSPECIFIED SITE (HCC): Primary | ICD-10-CM

## 2022-11-22 DIAGNOSIS — R31.0 GROSS HEMATURIA: ICD-10-CM

## 2022-11-22 DIAGNOSIS — G89.18 ACUTE POST-OPERATIVE PAIN: ICD-10-CM

## 2022-11-22 DIAGNOSIS — C67.2 MALIGNANT NEOPLASM OF LATERAL WALL OF URINARY BLADDER (HCC): ICD-10-CM

## 2022-11-22 DIAGNOSIS — C67.8 MALIGNANT NEOPLASM OF OVERLAPPING SITES OF BLADDER (HCC): ICD-10-CM

## 2022-11-22 DIAGNOSIS — F41.9 ANXIETY: ICD-10-CM

## 2022-11-22 DIAGNOSIS — C67.2 CANCER OF LATERAL WALL OF URINARY BLADDER (HCC): ICD-10-CM

## 2022-11-22 LAB
ABSOLUTE EOS #: 0.3 K/UL (ref 0–0.4)
ABSOLUTE LYMPH #: 3.3 K/UL (ref 1–4.8)
ABSOLUTE MONO #: 0.7 K/UL (ref 0.1–1.2)
ALBUMIN SERPL-MCNC: 3.2 G/DL (ref 3.5–5.2)
ALBUMIN/GLOBULIN RATIO: 0.8 (ref 1–2.5)
ALP BLD-CCNC: 113 U/L (ref 40–129)
ALT SERPL-CCNC: 8 U/L (ref 5–41)
ANION GAP SERPL CALCULATED.3IONS-SCNC: 11 MMOL/L (ref 9–17)
AST SERPL-CCNC: 10 U/L
BASOPHILS # BLD: 1 % (ref 0–2)
BASOPHILS ABSOLUTE: 0.1 K/UL (ref 0–0.2)
BILIRUB SERPL-MCNC: 0.3 MG/DL (ref 0.3–1.2)
BUN BLDV-MCNC: 22 MG/DL (ref 8–23)
CALCIUM SERPL-MCNC: 9.6 MG/DL (ref 8.6–10.4)
CHLORIDE BLD-SCNC: 100 MMOL/L (ref 98–107)
CO2: 24 MMOL/L (ref 20–31)
CREAT SERPL-MCNC: 1.76 MG/DL (ref 0.7–1.2)
EOSINOPHILS RELATIVE PERCENT: 2 % (ref 1–4)
GFR SERPL CREATININE-BSD FRML MDRD: 42 ML/MIN/1.73M2
GLUCOSE BLD-MCNC: 127 MG/DL (ref 70–99)
HCT VFR BLD CALC: 32.4 % (ref 41–53)
HEMOGLOBIN: 10.3 G/DL (ref 13.5–17.5)
LYMPHOCYTES # BLD: 31 % (ref 24–44)
MCH RBC QN AUTO: 26 PG (ref 26–34)
MCHC RBC AUTO-ENTMCNC: 31.7 G/DL (ref 31–37)
MCV RBC AUTO: 82.2 FL (ref 80–100)
MONOCYTES # BLD: 7 % (ref 2–11)
PDW BLD-RTO: 18.6 % (ref 12.5–15.4)
PLATELET # BLD: 439 K/UL (ref 140–450)
PMV BLD AUTO: 7.1 FL (ref 6–12)
POTASSIUM SERPL-SCNC: 3.9 MMOL/L (ref 3.7–5.3)
RBC # BLD: 3.95 M/UL (ref 4.5–5.9)
SEG NEUTROPHILS: 59 % (ref 36–66)
SEGMENTED NEUTROPHILS ABSOLUTE COUNT: 6.3 K/UL (ref 1.8–7.7)
SODIUM BLD-SCNC: 135 MMOL/L (ref 135–144)
TOTAL PROTEIN: 7.3 G/DL (ref 6.4–8.3)
WBC # BLD: 10.6 K/UL (ref 3.5–11)

## 2022-11-22 PROCEDURE — 36591 DRAW BLOOD OFF VENOUS DEVICE: CPT

## 2022-11-22 PROCEDURE — 96360 HYDRATION IV INFUSION INIT: CPT

## 2022-11-22 PROCEDURE — 3017F COLORECTAL CA SCREEN DOC REV: CPT | Performed by: INTERNAL MEDICINE

## 2022-11-22 PROCEDURE — 96372 THER/PROPH/DIAG INJ SC/IM: CPT

## 2022-11-22 PROCEDURE — 2580000003 HC RX 258: Performed by: INTERNAL MEDICINE

## 2022-11-22 PROCEDURE — G8417 CALC BMI ABV UP PARAM F/U: HCPCS | Performed by: INTERNAL MEDICINE

## 2022-11-22 PROCEDURE — 85025 COMPLETE CBC W/AUTO DIFF WBC: CPT

## 2022-11-22 PROCEDURE — 80053 COMPREHEN METABOLIC PANEL: CPT

## 2022-11-22 PROCEDURE — 1036F TOBACCO NON-USER: CPT | Performed by: INTERNAL MEDICINE

## 2022-11-22 PROCEDURE — G8484 FLU IMMUNIZE NO ADMIN: HCPCS | Performed by: INTERNAL MEDICINE

## 2022-11-22 PROCEDURE — G8427 DOCREV CUR MEDS BY ELIG CLIN: HCPCS | Performed by: INTERNAL MEDICINE

## 2022-11-22 PROCEDURE — 1123F ACP DISCUSS/DSCN MKR DOCD: CPT | Performed by: INTERNAL MEDICINE

## 2022-11-22 PROCEDURE — 6360000002 HC RX W HCPCS: Performed by: INTERNAL MEDICINE

## 2022-11-22 PROCEDURE — 99211 OFF/OP EST MAY X REQ PHY/QHP: CPT | Performed by: INTERNAL MEDICINE

## 2022-11-22 PROCEDURE — 96361 HYDRATE IV INFUSION ADD-ON: CPT

## 2022-11-22 PROCEDURE — 99215 OFFICE O/P EST HI 40 MIN: CPT | Performed by: INTERNAL MEDICINE

## 2022-11-22 RX ORDER — SODIUM CHLORIDE 0.9 % (FLUSH) 0.9 %
5-40 SYRINGE (ML) INJECTION PRN
OUTPATIENT
Start: 2022-11-22

## 2022-11-22 RX ORDER — SODIUM CHLORIDE 0.9 % (FLUSH) 0.9 %
5-40 SYRINGE (ML) INJECTION PRN
Status: DISCONTINUED | OUTPATIENT
Start: 2022-11-22 | End: 2022-11-23 | Stop reason: HOSPADM

## 2022-11-22 RX ORDER — HEPARIN SODIUM (PORCINE) LOCK FLUSH IV SOLN 100 UNIT/ML 100 UNIT/ML
500 SOLUTION INTRAVENOUS PRN
Status: DISCONTINUED | OUTPATIENT
Start: 2022-11-22 | End: 2022-11-23 | Stop reason: HOSPADM

## 2022-11-22 RX ORDER — HEPARIN SODIUM (PORCINE) LOCK FLUSH IV SOLN 100 UNIT/ML 100 UNIT/ML
500 SOLUTION INTRAVENOUS PRN
OUTPATIENT
Start: 2022-11-22

## 2022-11-22 RX ORDER — 0.9 % SODIUM CHLORIDE 0.9 %
1000 INTRAVENOUS SOLUTION INTRAVENOUS ONCE
Status: CANCELLED | OUTPATIENT
Start: 2022-11-22 | End: 2022-11-22

## 2022-11-22 RX ORDER — SODIUM CHLORIDE 9 MG/ML
5-250 INJECTION, SOLUTION INTRAVENOUS PRN
OUTPATIENT
Start: 2022-11-22

## 2022-11-22 RX ORDER — 0.9 % SODIUM CHLORIDE 0.9 %
1000 INTRAVENOUS SOLUTION INTRAVENOUS ONCE
Status: COMPLETED | OUTPATIENT
Start: 2022-11-22 | End: 2022-11-22

## 2022-11-22 RX ORDER — LORAZEPAM 1 MG/1
1 TABLET ORAL EVERY 6 HOURS PRN
Qty: 120 TABLET | Refills: 0 | Status: SHIPPED | OUTPATIENT
Start: 2022-11-22 | End: 2022-12-22

## 2022-11-22 RX ORDER — ONDANSETRON 8 MG/1
TABLET, ORALLY DISINTEGRATING ORAL
Qty: 30 TABLET | Refills: 0 | Status: SHIPPED | OUTPATIENT
Start: 2022-11-22

## 2022-11-22 RX ORDER — PROCHLORPERAZINE EDISYLATE 5 MG/ML
10 INJECTION INTRAMUSCULAR; INTRAVENOUS ONCE
Status: COMPLETED | OUTPATIENT
Start: 2022-11-22 | End: 2022-11-22

## 2022-11-22 RX ORDER — OXYCODONE HYDROCHLORIDE 5 MG/1
5 TABLET ORAL EVERY 6 HOURS PRN
Qty: 20 TABLET | Refills: 0 | Status: SHIPPED | OUTPATIENT
Start: 2022-11-22 | End: 2022-11-27

## 2022-11-22 RX ADMIN — SODIUM CHLORIDE 1000 ML: 9 INJECTION, SOLUTION INTRAVENOUS at 10:34

## 2022-11-22 RX ADMIN — PROCHLORPERAZINE EDISYLATE 10 MG: 5 INJECTION INTRAMUSCULAR; INTRAVENOUS at 10:43

## 2022-11-22 RX ADMIN — Medication 10 ML: at 12:10

## 2022-11-22 RX ADMIN — Medication 10 ML: at 10:33

## 2022-11-22 RX ADMIN — HEPARIN 500 UNITS: 100 SYRINGE at 12:11

## 2022-11-22 NOTE — PROGRESS NOTES
Patient seen by Dr Rosenda Rodrigues prior   1 L bolus ordered   Pt c/o nausea recently took zofran, compazine  given IV   Tolerated w/o incident   Return 1/3  and tx  Ciara Huffman, RN

## 2022-11-22 NOTE — PROGRESS NOTES
DIAGNOSIS:   High grade urothelial carcinoma, 12/2021, T3 N1 M0   Bilateral PE, 03/2022    CURRENT THERAPY:  Neoadjuvant chemo followed by radical cystectomy (complete pathologic response)   Eliquis     BRIEF CASE HISTORY:   Araceli Borges is a very pleasant 74 y.o. male who is referred to us for bladder cancer. Patient presented with hematuria 02/2021 and was seen by urology, it was felt to be renal calculi and he was started on Flomax but continued to pass clots. He was later in [facility] and had urinary blockage, passed very large clot and then able to urinate and presented again to ER with continued hematuria, CT showed severe right hydronephrosis with tissue thickening/mass seen in right side of bladder, he underwent nephrostomy placement, stent was put in place and cystoscopy done showing fairly large papillary tumor occupying right side posterior wall and even the dome of the bladder. Pathology showed high grade urothelial carcinoma invading muscularis propria. Over the next month he needed 2 repeat cystoscopies and extensive fulguration of the tumor, again pathology showed high grade urothelial carcinoma. CT of the chest abdomen pelvis did not show metastatic disease. He has intermittent bladder pain and continues to have hematuria in Muñoz catheter. His appetite is poor. His father had bladder cancer, he did not have smoking history. The patient has smoking history but quit 30 years ago. He had COVID infection in 09/2021, he lost significant weight and continues to recover. He has arthralgias in the knees and uses cane. Staging PET scan was done and showed the bladder tumor and right pelvic sidewall lymph node metastases 4 of clinical staging of T3 N1 M0. We discussed neoadjuvant chemotherapy to be followed by surgery if he has a good response. Shortly after starting chemotherapy, the patient presented with bilateral pulmonary embolism and he was started on Xarelto.   He had significant respiratory difficulty after that. We had to delay his chemotherapy and finally we were able to resume his treatment and finished 4 cycles. Chemotherapy was generally well-tolerated but most of his problems where respiratory. After completion of chemotherapy, he was sent to cardiology and pulmonary for evaluation. Cardiac catheterization was done and showed minimal coronary artery disease. Pulmonary evaluation showed no evidence of significant obstructive component and his diffusion capacity was moderately reduced. CT scan showed groundglass opacity in the lung that was not FDG avid. It was determined that most of his respiratory issues could be related to his pulmonary embolism and also anemia from chemotherapy. With time, condition improved, he was weaned off oxygen, hemoglobin continues to improve and he felt much better. The plan is to proceed with surgery, cystectomy was done 09/16/2022, pathology showed no residual disease. INTERIM HISTORY: The patient presents for follow up with bladder cancer management. He underwent cystectomy 09/16/2022 laparoscopically. He had bowel obstruction with adhesion with leaking at site of bladder and underwent surgery 6 days later, bowels were released, incision left to drain with secondary intention and wound vac put in place. He was in house for 4 weeks, discharged to rehab for 2 weeks and is now home. His bowels are functioning. He had nephrostomy tubes in place until 1 week ago. He has some blood in urostomy. He remains on anticoagulation. His shortness of breath has worsened, he has not had pulmonology or cardiology follow up since surgery. He is not maintaining nutrition or hydration due to dysgeusia and nausea, some vomiting with bowel movements.  He has improved over the past month but has been minimal.     PAST MEDICAL HISTORY: has a past medical history of Acute renal failure with tubular necrosis ([address] Arthritis, BPH with obstruction/lower urinary tract symptoms, CAD (coronary artery disease), Caffeine use, Cancer ([facility] [address] CKD (chronic kidney disease), stage III ([facility] [address] COVID-19, Depression, Dilated cardiomyopathy ([facility] [address] GERD (gastroesophageal reflux disease), High cholesterol, Kidney calculi, Sleep apnea, Under care of team, Under care of team, Under care of team, Under care of team, and Under care of team.    PAST SURGICAL HISTORY: has a past surgical history that includes Knee arthroscopy; Cardiac catheterization; Colonoscopy; IR PORT PLACEMENT > 5 YEARS (02/25/2022); Cardiac catheterization (06/16/2022); Bladder removal; Prostatectomy (09/16/2022); Bladder removal (N/A, 9/16/2022); Bladder surgery (Right, 9/16/2022); laparotomy (N/A, 9/22/2022); IR GUIDED NEPHROSTOMY CATH PLACEMENT LEFT (9/23/2022); IR GUIDED NEPHROSTOMY CATH PLACEMENT RIGHT (9/23/2022); laparotomy (N/A, 9/23/2022); IR GUIDED NEPHROSTOMY CATH PLACEMENT LEFT (10/5/2022); and IR GUIDED NEPHROSTOMY CATH PLACEMENT RIGHT (10/5/2022). CURRENT MEDICATIONS:  has a current medication list which includes the following prescription(s): rivaroxaban, ondansetron, omeprazole, carvedilol, simvastatin, bupropion, and sucralfate. ALLERGIES:  is allergic to other. FAMILY HISTORY: Father had bladder cancer     SOCIAL HISTORY:  reports that he quit smoking about 30 years ago. His smoking use included cigarettes. He started smoking about 39 years ago. He has a 2.25 pack-year smoking history. He has never used smokeless tobacco. He reports that he does not currently use alcohol. He reports that he does not use drugs. REVIEW OF SYSTEMS:   General: No fever or night sweats. Weight regain. +fatigue and generalized weakness -significantly improved +dysgeusia   ENT: No double or blurred vision, no hearing problem, no dysphagia or sore throat +smell affected - improved + tinnitus   Respiratory: No chest pain, no cough or hemoptysis. +shortness of breath - worse   Cardiovascular: Denies chest pain, PND or orthopnea.  No L E swelling or palpitations   Gastrointestinal: No diarrhea or constipation, abd pain; + vomiting and nausea    Genitourinary: Denies frequency, hematuria, urgency, dysuria, or incontinence   Neurological: Denies headaches, decreased LOC, no sensory or motor focal deficits. +cold feet   Musculoskeletal: No arthralgia no back pain or joint swelling. +knee pain  Skin: There are no rashes or bleeding. Psychiatric: + anxiety and depression. Endocrine: No diabetes or thyroid disease. Hematologic: No bleeding, no adenopathy. PHYSICAL EXAM: Shows a well appearing 74y.o.-year-old male who is not in pain or distress. Vital Signs: Blood pressure 110/73, pulse (!) 111, temperature 97.6 °F (36.4 °C), temperature source Temporal, weight 207 lb (93.9 kg), SpO2 98 %. HEENT: Normocephalic and atraumatic. Pupils are equal, round, reactive to light and accommodation. Extraocular muscles are intact. Neck: Showed no JVD, no carotid bruit. Lungs: Clear to auscultation bilaterally. Heart: Regular without any murmur. Abdomen: Urostomy in place, hematuria present. Soft, nontender. No hepatosplenomegaly. Extremities: Lower extremities show no edema, clubbing, or cyanosis. Breasts: Examination not done today.  Neuro exam: intact cranial nerves bilaterally no motor or sensory deficit, gait is normal. Lymphatic: no adenopathy appreciated in the supraclavicular, axillary, cervical or inguinal area      REVIEW OF LABORATORY DATA:   Lab Results   Component Value Date    WBC 10.6 11/22/2022    HGB 10.3 (L) 11/22/2022    HCT 32.4 (L) 11/22/2022    MCV 82.2 11/22/2022     11/22/2022       Chemistry        Component Value Date/Time     (L) 10/10/2022 2232    K 3.4 (L) 10/10/2022 2232     10/10/2022 2232    CO2 21 10/10/2022 2232    BUN 7 (L) 10/10/2022 2232    CREATININE 1.10 10/10/2022 2232        Component Value Date/Time    CALCIUM 7.3 (L) 10/10/2022 2232    ALKPHOS 65 10/05/2022 0536    AST 19 10/05/2022 0536    ALT 8 10/05/2022 0536    BILITOT 0.4 10/05/2022 0536            PATHOLOGY:   09/16/2022:  A. LEFT DISTAL URETER MARGIN, BIOPSY:   -MOSTLY DENUDED UROTHELIAL TISSUE, NEGATIVE FOR CARCINOMA. B.  RIGHT DISTAL URETER MARGIN, BIOPSY:   -BENIGN URETER TISSUE WITH ACUTE AND CHRONIC INFLAMMATION AND REACTIVE   FEATURES. C.  BLADDER, PROSTATE AND SEMINAL VESICLES, RADICAL   CYSTOPROSTATECTOMY:   -NO RESIDUAL CARCINOMA PRESENT.   -TREATMENT RELATED CHANGES OF BLADDER. -BENIGN PROSTATE WITH CHRONIC PROSTATITIS. -BLADDER CALCULI. -INFLAMED BLADDER DIVERTICULUM NEAR RIGHT URETERAL ORIFICE. -BENIGN INFLAMED AND ULCERATED UROTHELIAL PAPILLOMA OF RIGHT LATERAL   WALL. -ONE (1) LYMPH NODE NEGATIVE FOR CARCINOMA. D.  BILATERAL PELVIC LYMPH NODES, EXCISION:   -FOUR (4) LYMPH NODES NEGATIVE FOR CARCINOMA. REVIEW OF RADIOLOGICAL RESULTS:         IMPRESSION:   High grade urothelial carcinoma   Neoadjuvant chemo to be followed by cystectomy   Bilateral PE, Eliquis   Hypoxemia with minimal activity with severe tachycardia    PLAN:   We reviewed his surgical pathology which was negative for residual disease. We discussed surgery and complications, expectations for recovery were discussed. His current lab work shows improved HGB with other counts recovered, but Cr is up, I think some of that is dehydration. We will give him some fluid today and hope for improvement. I explained hematuria following removal of nephrostomy tubes is not unexpected. We will plan on hydration today with regular hydration as he continues to recover. I am refilling his Roxicodone and Zofran   We will see him back in 2 months. I give him some support. I offered to send home physical therapy but he wants to do it on his own. Scribe Attestation   This note was created by Raciel Jackson acting as scribe for the physician signing this note  Electronically Signed  Raciel Jackson, 11/22/2022  Christy, [employer].      Attending Attestation   Note was reviewed and edited.   I am in agreement with the note as entered    Hunter Grayson MD  Hematologist/Medical [id number] [facility] hematology oncology physicians

## 2022-11-25 ENCOUNTER — HOSPITAL ENCOUNTER (OUTPATIENT)
Dept: GENERAL RADIOLOGY | Age: 67
Discharge: HOME | End: 2022-11-27
Payer: MEDICARE

## 2022-11-25 ENCOUNTER — HOSPITAL ENCOUNTER (OUTPATIENT)
Dept: ULTRASOUND IMAGING | Age: 67
Discharge: HOME | End: 2022-11-27
Payer: MEDICARE

## 2022-11-25 ENCOUNTER — HOSPITAL ENCOUNTER (OUTPATIENT)
Age: 67
Discharge: HOME | End: 2022-11-27
Payer: MEDICARE

## 2022-11-25 DIAGNOSIS — N17.0 ACUTE KIDNEY FAILURE WITH LESION OF TUBULAR NECROSIS (HCC): ICD-10-CM

## 2022-11-25 DIAGNOSIS — C67.2 MALIGNANT NEOPLASM OF LATERAL WALL OF URINARY BLADDER (HCC): ICD-10-CM

## 2022-11-25 PROCEDURE — 74018 RADEX ABDOMEN 1 VIEW: CPT

## 2022-11-25 PROCEDURE — 76770 US EXAM ABDO BACK WALL COMP: CPT

## 2022-11-28 ENCOUNTER — TELEPHONE (OUTPATIENT)
Dept: ONCOLOGY | Age: 67
End: 2022-11-28

## 2022-11-28 NOTE — TELEPHONE ENCOUNTER
[facility] Nutrition Follow Up       Janeth Cons is a 74 y.o.  male     NUTRITION RECOMMENDATIONS / MONITORING / EVALUATION  Continue high calorie/high protein meals and snacks. 2. Recommend consider multivitamin, zinc, and vit D3 to aid in micronutrient intake  3. Recommend consideration of alternate route of nutrition it pt continues to be unable to meet estimated needs. 4. Will monitor wts, labs, po intakes, s/s management    Subjective/Current Data:  Called pt on listed phone number. Pt with significant wt loss, which pt is not concerned about because \"they keep telling me I'm obese. \" Pt states he is post op, has wound vac and reports the wound nurse stated the wound is healing great. Pt can tolerate very few foods d/t taste changes. Pt states he is not taking a multivitamin because his doctor told him \"not to take any pills he doesn't give him. \" Pt with c/o of dysgeusia and nausea, some vomiting with bowel movements. Concern for malnutrition impacting quality of life. Pt states SOB and weakness have limited his mobility significantly. Pt rejects nutrition recommendations. Recent Weights: Wt Readings from Last 3 Encounters:   11/22/22 207 lb (93.9 kg)   11/14/22 210 lb (95.3 kg)   10/18/22 251 lb (113.9 kg)         Goal: Adequate intake to aide in wt maintenaince, signs and symptoms management, and overall wellbeing.     Progress towards goal: gradually worsening    Sarita Blank, [facility], RD, LD  Registered Dietitian   [place]  [phone]

## 2022-12-08 ENCOUNTER — HOSPITAL ENCOUNTER (OUTPATIENT)
Dept: INFUSION THERAPY | Age: 67
Discharge: HOME | End: 2022-12-08
Payer: MEDICARE

## 2022-12-08 VITALS
HEART RATE: 83 BPM | DIASTOLIC BLOOD PRESSURE: 88 MMHG | TEMPERATURE: 97.6 F | SYSTOLIC BLOOD PRESSURE: 134 MMHG | RESPIRATION RATE: 20 BRPM

## 2022-12-08 DIAGNOSIS — C67.2 CANCER OF LATERAL WALL OF URINARY BLADDER (HCC): ICD-10-CM

## 2022-12-08 DIAGNOSIS — C67.2 MALIGNANT NEOPLASM OF LATERAL WALL OF URINARY BLADDER (HCC): Primary | ICD-10-CM

## 2022-12-08 LAB
ANION GAP SERPL CALCULATED.3IONS-SCNC: 10 MMOL/L (ref 9–17)
BUN BLDV-MCNC: 16 MG/DL (ref 8–23)
CALCIUM SERPL-MCNC: 8.9 MG/DL (ref 8.6–10.4)
CHLORIDE BLD-SCNC: 101 MMOL/L (ref 98–107)
CO2: 23 MMOL/L (ref 20–31)
CREAT SERPL-MCNC: 1.67 MG/DL (ref 0.7–1.2)
GFR SERPL CREATININE-BSD FRML MDRD: 45 ML/MIN/1.73M2
GLUCOSE BLD-MCNC: 98 MG/DL (ref 70–99)
POTASSIUM SERPL-SCNC: 3.9 MMOL/L (ref 3.7–5.3)
SODIUM BLD-SCNC: 134 MMOL/L (ref 135–144)

## 2022-12-08 PROCEDURE — 96360 HYDRATION IV INFUSION INIT: CPT

## 2022-12-08 PROCEDURE — 96361 HYDRATE IV INFUSION ADD-ON: CPT

## 2022-12-08 PROCEDURE — 6360000002 HC RX W HCPCS: Performed by: INTERNAL MEDICINE

## 2022-12-08 PROCEDURE — 36591 DRAW BLOOD OFF VENOUS DEVICE: CPT

## 2022-12-08 PROCEDURE — 80048 BASIC METABOLIC PNL TOTAL CA: CPT

## 2022-12-08 PROCEDURE — 2580000003 HC RX 258: Performed by: INTERNAL MEDICINE

## 2022-12-08 RX ORDER — HEPARIN SODIUM (PORCINE) LOCK FLUSH IV SOLN 100 UNIT/ML 100 UNIT/ML
500 SOLUTION INTRAVENOUS PRN
OUTPATIENT
Start: 2022-12-08

## 2022-12-08 RX ORDER — 0.9 % SODIUM CHLORIDE 0.9 %
1000 INTRAVENOUS SOLUTION INTRAVENOUS PRN
Start: 2022-12-08

## 2022-12-08 RX ORDER — SODIUM CHLORIDE 0.9 % (FLUSH) 0.9 %
5-40 SYRINGE (ML) INJECTION PRN
Status: DISCONTINUED | OUTPATIENT
Start: 2022-12-08 | End: 2022-12-09 | Stop reason: HOSPADM

## 2022-12-08 RX ORDER — HEPARIN SODIUM (PORCINE) LOCK FLUSH IV SOLN 100 UNIT/ML 100 UNIT/ML
500 SOLUTION INTRAVENOUS PRN
Status: DISCONTINUED | OUTPATIENT
Start: 2022-12-08 | End: 2022-12-09 | Stop reason: HOSPADM

## 2022-12-08 RX ORDER — SODIUM CHLORIDE 0.9 % (FLUSH) 0.9 %
5-40 SYRINGE (ML) INJECTION PRN
OUTPATIENT
Start: 2022-12-08

## 2022-12-08 RX ORDER — SODIUM CHLORIDE 9 MG/ML
5-250 INJECTION, SOLUTION INTRAVENOUS PRN
OUTPATIENT
Start: 2022-12-08

## 2022-12-08 RX ORDER — 0.9 % SODIUM CHLORIDE 0.9 %
1000 INTRAVENOUS SOLUTION INTRAVENOUS PRN
Status: DISCONTINUED | OUTPATIENT
Start: 2022-12-08 | End: 2022-12-09 | Stop reason: HOSPADM

## 2022-12-08 RX ORDER — ONDANSETRON 2 MG/ML
8 INJECTION INTRAMUSCULAR; INTRAVENOUS ONCE
Status: COMPLETED | OUTPATIENT
Start: 2022-12-08 | End: 2022-12-08

## 2022-12-08 RX ADMIN — Medication 500 UNITS: at 16:07

## 2022-12-08 RX ADMIN — Medication 10 ML: at 16:07

## 2022-12-08 RX ADMIN — SODIUM CHLORIDE 1000 ML: 9 INJECTION, SOLUTION INTRAVENOUS at 14:10

## 2022-12-08 RX ADMIN — ONDANSETRON 8 MG: 2 INJECTION INTRAMUSCULAR; INTRAVENOUS at 14:18

## 2022-12-14 ENCOUNTER — TELEPHONE (OUTPATIENT)
Dept: INFUSION THERAPY | Age: 67
End: 2022-12-14

## 2022-12-19 NOTE — H&P
Ivet Gabriel MD [facility]  Urology H&P      Patient:  Juan Carlos Carbone  MRN: [id number]  YOB: 1955    CHIEF COMPLAINT:      HISTORY OF PRESENT ILLNESS:   The patient is a 74 y.o. male who presents with       9/22/22 [facility] cystoprostatectomy, PLND, ileal conduit (complicated postop courseL SBO, reexploration, bilateral nephroureteral stents for leak-10/5/22)    Patient's old records, notes and chart reviewed and summarized above. Past Medical History:    Past Medical History:   Diagnosis Date    Acute renal failure with tubular necrosis ([address] 05/26/2022    Likely ischemic ATN/prerenal acidemia from intractable nausea vomiting as result of chemotherapy, baseline 1.3-1.4 peaked up to 2.3 in May 2022    Arthritis     BPH with obstruction/lower urinary tract symptoms     CAD (coronary artery disease) 06/2022    nonobstructive on cath    Caffeine use     3 cans soda/pop    Cancer ([facility] [address]     bladder cancer. Dr. Deena [facility]    CKD (chronic kidney disease), stage III ([address] 05/26/2022    From 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   Baseline 1.3-1.4    COVID-19 08/16/2021    SOB, fatigue, fever, chills  x 3 weeks    Depression     Dilated cardiomyopathy ([address] 05/26/2022    Ejection fraction 40 to 45%, does have symptoms of dyspnea and decreased effort tolerance    GERD (gastroesophageal reflux disease)     High cholesterol     Kidney calculi     Sleep apnea     does not use cpap    Under care of team     Dr. James Kapadia Nephrology    Under care of team     Dr. Francesco Montes. last visit Oct 2022    Under care of team     Dr. Americo Baker [facility] last visit 8/03/2022    Under care of team     Dr. Paula Ellington    Under care of team     Dr. Vika Gonsalves Wears glasses        Past Surgical History:    Past Surgical History:   Procedure Laterality Date    BLADDER REMOVAL      BLADDER REMOVAL N/A 9/16/2022    XI ROBOTIC LAPARASCOPIC ASSISTED RADICAL CYSTOPROSTATECTOMY, BILATERAL PELVIC LYMPHADENECTOMY AND ILEAL CONDUIT FORMATION performed by Mabel Sanchez MD at [facility] Right 9/16/2022    CYSTOSCOPY STENT REMOVAL performed by Mabel Sanchez MD at [address]  06/16/2022    nonobstructive CAD    COLONOSCOPY      IR NEPHROSTOMY PERCUTANEOUS LEFT  9/23/2022    IR NEPHROSTOMY PERCUTANEOUS LEFT 9/23/2022 Larwence Halsted, MD [facility]    IR NEPHROSTOMY PERCUTANEOUS LEFT  10/5/2022    IR NEPHROSTOMY PERCUTANEOUS LEFT 10/5/2022 [facility]    IR NEPHROSTOMY PERCUTANEOUS RIGHT  9/23/2022    IR NEPHROSTOMY PERCUTANEOUS RIGHT 9/23/2022 Larwence Halsted, MD [facility]    IR NEPHROSTOMY PERCUTANEOUS RIGHT  10/5/2022    IR NEPHROSTOMY PERCUTANEOUS RIGHT 10/5/2022 [facility]    IR PORT PLACEMENT EQUAL OR GREATER THAN 5 YEARS  02/25/2022    IR PORT PLACEMENT EQUAL OR GREATER THAN 5 YEARS 2/25/2022 [facility]    KNEE ARTHROSCOPY      left    LAPAROTOMY N/A 9/22/2022    LAPAROTOMY EXPLORATORY, LYSIS OF ADHESIONS, REPAIR OF BILATERAL URETERAL ANASTOMOSES, ABDOMINAL WASHOUT, PLACEMENT OF ABTHERA WOUND VAC performed by Sue Richardson DO at [place] [address] N/A 9/23/2022    2ND LOOK LAPAROTOMY,  ABDOMINAL WASHOUT, ABDOMINAL CLOSURE, WOUND VAC PLACEMENT performed by Sue Richardson DO at [address] [place] [address]  09/16/2022    Cystoprostatectomy, Bilateral Pelvic Lymphadenectomy, ileo conduit formation, cystectomy stent removal (right)       Medications:    No current facility-administered medications for this encounter.     Current Outpatient Medications:     ondansetron (ZOFRAN-ODT) 8 MG TBDP disintegrating tablet, DISSOLVE 1 TABLET IN MOUTH EVERY 8 HOURS AS NEEDED FOR NAUSEA FOR VOMITING, Disp: 30 tablet, Rfl: 0    LORazepam (ATIVAN) 1 MG tablet, Take 1 tablet by mouth every 6 hours as needed for Anxiety (Take 1 pill at betime as needed) for up to 120 doses. , Disp: 120 tablet, Rfl: 0    sucralfate (CARAFATE) 1 GM/10ML suspension, Take 10 mLs by mouth 4 times daily, Disp: 1200 mL, Rfl: 1    rivaroxaban (XARELTO) 20 MG TABS tablet, Take 1 tablet by mouth daily, Disp: 30 tablet, Rfl: 1    omeprazole (PRILOSEC) 20 MG delayed release capsule, Take 1 capsule by mouth daily, Disp: 30 capsule, Rfl: 3    carvedilol (COREG) 3.125 MG tablet, TAKE 1 TABLET BY MOUTH TWICE DAILY, Disp: , Rfl:     simvastatin (ZOCOR) 40 MG tablet, Take 40 mg by mouth nightly, Disp: , Rfl:     buPROPion (WELLBUTRIN XL) 300 MG extended release tablet, Take 300 mg by mouth every morning, Disp: , Rfl:     Allergies:  No Known Allergies    Social History:   Social History     Socioeconomic History    Marital status:      Spouse name: Not on file    Number of children: Not on file    Years of education: Not on file    Highest education level: Not on file   Occupational History    Not on file   Tobacco Use    Smoking status: Former     Packs/day: 0.25     Years: 9.00     Pack years: 2.25     Types: Cigarettes     Start date: 1983     Quit date: 2/10/1992     Years since quittin.8    Smokeless tobacco: Never   Vaping Use    Vaping Use: Never used   Substance and Sexual Activity    Alcohol use: Not Currently    Drug use: No    Sexual activity: Yes     Partners: Female   Other Topics Concern    Not on file   Social History Narrative    Not on file     Social Determinants of Health     Financial Resource Strain: Not on file   Food Insecurity: Not on file   Transportation Needs: Not on file   Physical Activity: Not on file   Stress: Not on file   Social Connections: Not on file   Intimate Partner Violence: Not on file   Housing Stability: Not on file       Family History:    Family History   Problem Relation Age of Onset    Cancer Father         bladder cancer    Urolithiasis Father        REVIEW OF SYSTEMS:  A comprehensive 14 point review of systems was obtained. Constitutional: No fatigue  Eyes: No blurry vision  Ears, nose, mouth, throat, face: No ringing in the ears; no facial droop. Respiratory: No cough or cold. Cardiovascular: No palpitations  Gastrointestinal: No diarrhea or constipation. Genitourinary: No burning with urination  Integument/Skin: No rashes  Hematologic/Lymphatic: No easy bruising  Musculoskeletal: No muscle pains  Neurologic: No weakness in the extremities. Psychiatric: No depression or suicidal thoughts. Endocrine: No heat or cold intolerances. Allergic/Immunologic: No current seasonal allergies; no skin hives. Physical Exam:      This a 74 y.o. male   There were no vitals filed for this visit. Constitutional: Patient in no acute distress. Neuro: alert and oriented to person place and time. Head: Atraumatic and normocephalic. Neck: Trachea midline. Ext: 2+ radial pulses bilaterally. Psych: Mood and affect normal.  Skin: No rashes or bruising present. Lungs: Respiratory effort normal.  Cardiovascular:  Regular rhythm. Abdomen: Soft, non-tender, non-distended. Neither side has CVA tenderness on exam.  Bladder non-tender and not distended. Lymphatics: no palpable lymphadenopathy  Pelvic exam: deferred. Rectal exam not indicated. Labs:  No results for input(s): WBC, HGB, HCT, MCV, PLT in the last 72 hours. No results for input(s): NA, K, CL, CO2, PHOS, BUN, CREATININE, CA in the last 72 hours. No results for input(s): COLORU, PHUR, LABCAST, WBCUA, RBCUA, MUCUS, TRICHOMONAS, YEAST, BACTERIA, CLARITYU, SPECGRAV, LEUKOCYTESUR, UROBILINOGEN, Marchia Liang in the last 72 hours.     Invalid input(s): NITRATE, GLUCOSEUKETONESUAMORPHOUS        -----------------------------------------------------------------  Imaging Results:  No results found. Assessment and Plan   Impression:   problem list:  History of high-grade muscle invasive bladder cancer status post robotic cystoprostatectomy with ileal conduit complicated by small bowel obstruction requiring reexploration, bilateral nephroureteral stent.     Plan:   OR for looposcopy, stent removal        Courtney Sevilla MD  2:31 PM 12/19/2022

## 2022-12-20 ENCOUNTER — ANESTHESIA EVENT (OUTPATIENT)
Dept: OPERATING ROOM | Age: 67
End: 2022-12-20
Payer: MEDICARE

## 2022-12-20 ENCOUNTER — HOSPITAL ENCOUNTER (OUTPATIENT)
Age: 67
Setting detail: OUTPATIENT SURGERY
Discharge: HOME | End: 2022-12-20
Attending: SPECIALIST | Admitting: SPECIALIST
Payer: MEDICARE

## 2022-12-20 ENCOUNTER — ANESTHESIA (OUTPATIENT)
Dept: OPERATING ROOM | Age: 67
End: 2022-12-20
Payer: MEDICARE

## 2022-12-20 VITALS
HEIGHT: 74 IN | BODY MASS INDEX: 26.31 KG/M2 | TEMPERATURE: 97.1 F | RESPIRATION RATE: 16 BRPM | DIASTOLIC BLOOD PRESSURE: 79 MMHG | SYSTOLIC BLOOD PRESSURE: 123 MMHG | WEIGHT: 205 LBS | HEART RATE: 72 BPM | OXYGEN SATURATION: 100 %

## 2022-12-20 PROCEDURE — 2580000003 HC RX 258: Performed by: SPECIALIST

## 2022-12-20 PROCEDURE — 3600000012 HC SURGERY LEVEL 2 ADDTL 15MIN: Performed by: SPECIALIST

## 2022-12-20 PROCEDURE — 7100000011 HC PHASE II RECOVERY - ADDTL 15 MIN: Performed by: SPECIALIST

## 2022-12-20 PROCEDURE — 3600000002 HC SURGERY LEVEL 2 BASE: Performed by: SPECIALIST

## 2022-12-20 PROCEDURE — 7100000010 HC PHASE II RECOVERY - FIRST 15 MIN: Performed by: SPECIALIST

## 2022-12-20 PROCEDURE — 6360000002 HC RX W HCPCS

## 2022-12-20 PROCEDURE — 3700000000 HC ANESTHESIA ATTENDED CARE: Performed by: SPECIALIST

## 2022-12-20 PROCEDURE — 2709999900 HC NON-CHARGEABLE SUPPLY: Performed by: SPECIALIST

## 2022-12-20 PROCEDURE — 3700000001 HC ADD 15 MINUTES (ANESTHESIA): Performed by: SPECIALIST

## 2022-12-20 PROCEDURE — 2500000003 HC RX 250 WO HCPCS

## 2022-12-20 PROCEDURE — 2720000010 HC SURG SUPPLY STERILE: Performed by: SPECIALIST

## 2022-12-20 PROCEDURE — 6360000002 HC RX W HCPCS: Performed by: SPECIALIST

## 2022-12-20 RX ORDER — MAGNESIUM HYDROXIDE 1200 MG/15ML
LIQUID ORAL PRN
Status: DISCONTINUED | OUTPATIENT
Start: 2022-12-20 | End: 2022-12-20 | Stop reason: ALTCHOICE

## 2022-12-20 RX ORDER — SODIUM CHLORIDE, SODIUM LACTATE, POTASSIUM CHLORIDE, CALCIUM CHLORIDE 600; 310; 30; 20 MG/100ML; MG/100ML; MG/100ML; MG/100ML
INJECTION, SOLUTION INTRAVENOUS CONTINUOUS
Status: DISCONTINUED | OUTPATIENT
Start: 2022-12-20 | End: 2022-12-20 | Stop reason: HOSPADM

## 2022-12-20 RX ORDER — ONDANSETRON 2 MG/ML
4 INJECTION INTRAMUSCULAR; INTRAVENOUS
Status: DISCONTINUED | OUTPATIENT
Start: 2022-12-20 | End: 2022-12-20 | Stop reason: HOSPADM

## 2022-12-20 RX ORDER — MIDAZOLAM HYDROCHLORIDE 1 MG/ML
INJECTION INTRAMUSCULAR; INTRAVENOUS PRN
Status: DISCONTINUED | OUTPATIENT
Start: 2022-12-20 | End: 2022-12-20 | Stop reason: SDUPTHER

## 2022-12-20 RX ORDER — SULFAMETHOXAZOLE AND TRIMETHOPRIM 800; 160 MG/1; MG/1
1 TABLET ORAL 2 TIMES DAILY
Qty: 10 TABLET | Refills: 0 | Status: SHIPPED | OUTPATIENT
Start: 2022-12-20 | End: 2022-12-25

## 2022-12-20 RX ORDER — PROPOFOL 10 MG/ML
INJECTION, EMULSION INTRAVENOUS CONTINUOUS PRN
Status: DISCONTINUED | OUTPATIENT
Start: 2022-12-20 | End: 2022-12-20 | Stop reason: SDUPTHER

## 2022-12-20 RX ORDER — SODIUM CHLORIDE 0.9 % (FLUSH) 0.9 %
5-40 SYRINGE (ML) INJECTION PRN
Status: DISCONTINUED | OUTPATIENT
Start: 2022-12-20 | End: 2022-12-20 | Stop reason: HOSPADM

## 2022-12-20 RX ORDER — SODIUM CHLORIDE 9 MG/ML
INJECTION, SOLUTION INTRAVENOUS PRN
Status: DISCONTINUED | OUTPATIENT
Start: 2022-12-20 | End: 2022-12-20 | Stop reason: HOSPADM

## 2022-12-20 RX ORDER — FENTANYL CITRATE 50 UG/ML
INJECTION, SOLUTION INTRAMUSCULAR; INTRAVENOUS PRN
Status: DISCONTINUED | OUTPATIENT
Start: 2022-12-20 | End: 2022-12-20 | Stop reason: SDUPTHER

## 2022-12-20 RX ORDER — LABETALOL HYDROCHLORIDE 5 MG/ML
10 INJECTION, SOLUTION INTRAVENOUS
Status: DISCONTINUED | OUTPATIENT
Start: 2022-12-20 | End: 2022-12-20 | Stop reason: HOSPADM

## 2022-12-20 RX ORDER — SODIUM CHLORIDE 0.9 % (FLUSH) 0.9 %
5-40 SYRINGE (ML) INJECTION EVERY 12 HOURS SCHEDULED
Status: DISCONTINUED | OUTPATIENT
Start: 2022-12-20 | End: 2022-12-20 | Stop reason: HOSPADM

## 2022-12-20 RX ORDER — LIDOCAINE HYDROCHLORIDE 10 MG/ML
INJECTION, SOLUTION EPIDURAL; INFILTRATION; INTRACAUDAL; PERINEURAL PRN
Status: DISCONTINUED | OUTPATIENT
Start: 2022-12-20 | End: 2022-12-20 | Stop reason: SDUPTHER

## 2022-12-20 RX ORDER — AMOXICILLIN 500 MG/1
500 CAPSULE ORAL 3 TIMES DAILY
COMMUNITY
End: 2022-12-31

## 2022-12-20 RX ORDER — HYDRALAZINE HYDROCHLORIDE 20 MG/ML
10 INJECTION INTRAMUSCULAR; INTRAVENOUS
Status: DISCONTINUED | OUTPATIENT
Start: 2022-12-20 | End: 2022-12-20 | Stop reason: HOSPADM

## 2022-12-20 RX ADMIN — MIDAZOLAM 2 MG: 1 INJECTION INTRAMUSCULAR; INTRAVENOUS at 08:12

## 2022-12-20 RX ADMIN — FENTANYL CITRATE 25 MCG: 50 INJECTION, SOLUTION INTRAMUSCULAR; INTRAVENOUS at 08:28

## 2022-12-20 RX ADMIN — FENTANYL CITRATE 25 MCG: 50 INJECTION, SOLUTION INTRAMUSCULAR; INTRAVENOUS at 08:22

## 2022-12-20 RX ADMIN — SODIUM CHLORIDE, POTASSIUM CHLORIDE, SODIUM LACTATE AND CALCIUM CHLORIDE: 600; 310; 30; 20 INJECTION, SOLUTION INTRAVENOUS at 07:39

## 2022-12-20 RX ADMIN — CEFTRIAXONE SODIUM 1000 MG: 1 INJECTION, POWDER, FOR SOLUTION INTRAMUSCULAR; INTRAVENOUS at 08:20

## 2022-12-20 RX ADMIN — LIDOCAINE HYDROCHLORIDE 50 MG: 10 INJECTION, SOLUTION EPIDURAL; INFILTRATION; INTRACAUDAL at 08:12

## 2022-12-20 RX ADMIN — FENTANYL CITRATE 50 MCG: 50 INJECTION, SOLUTION INTRAMUSCULAR; INTRAVENOUS at 08:12

## 2022-12-20 RX ADMIN — PROPOFOL 100 MCG/KG/MIN: 10 INJECTION, EMULSION INTRAVENOUS at 08:40

## 2022-12-20 RX ADMIN — PROPOFOL 150 MCG/KG/MIN: 10 INJECTION, EMULSION INTRAVENOUS at 08:12

## 2022-12-20 ASSESSMENT — ENCOUNTER SYMPTOMS: SHORTNESS OF BREATH: 1

## 2022-12-20 ASSESSMENT — PAIN - FUNCTIONAL ASSESSMENT: PAIN_FUNCTIONAL_ASSESSMENT: 0-10

## 2022-12-20 NOTE — ANESTHESIA PRE PROCEDURE
Department of Anesthesiology  Preprocedure Note       Name:  Li Gonzalez   Age:  74 y.o.  :  1955                                          MRN:  [id number]         Date:  2022      Surgeon: Edyta Florence):  Quoc Prado MD    Procedure: Procedure(s):  LOOPOSCOPY, STENT REMOVAL    Medications prior to admission:   Prior to Admission medications    Medication Sig Start Date End Date Taking? Authorizing Provider   amoxicillin (AMOXIL) 500 MG capsule Take 500 mg by mouth 3 times daily    Historical Provider, MD   ondansetron (ZOFRAN-ODT) 8 MG TBDP disintegrating tablet DISSOLVE 1 TABLET IN MOUTH EVERY 8 HOURS AS NEEDED FOR NAUSEA FOR VOMITING 22   Sarah Grayson MD   LORazepam (ATIVAN) 1 MG tablet Take 1 tablet by mouth every 6 hours as needed for Anxiety (Take 1 pill at betime as needed) for up to 120 doses. 22  Sarah Grayson MD   sucralfate (CARAFATE) 1 GM/10ML suspension Take 10 mLs by mouth 4 times daily 10/18/22 12/20/22  Alina Mclean MD   rivaroxaban (XARELTO) 20 MG TABS tablet Take 1 tablet by mouth daily 10/10/22   Pastor Ulises MD   omeprazole (PRILOSEC) 20 MG delayed release capsule Take 1 capsule by mouth daily 22   Luna Mathias MD   carvedilol (COREG) 3.125 MG tablet TAKE 1 TABLET BY MOUTH TWICE DAILY 22   Historical Provider, MD   simvastatin (ZOCOR) 40 MG tablet Take 40 mg by mouth nightly    Historical Provider, MD   buPROPion (WELLBUTRIN XL) 300 MG extended release tablet Take 300 mg by mouth every morning    Historical Provider, MD       Current medications:    No current facility-administered medications for this visit. No current outpatient medications on file.      Facility-Administered Medications Ordered in Other Visits   Medication Dose Route Frequency Provider Last Rate Last Admin    cefTRIAXone (ROCEPHIN) 1,000 mg in sterile water 10 mL IV syringe  1,000 mg IntraVENous Once Quoc Prado MD        lactated ringers infusion   IntraVENous Continuous Elías Shaikh  mL/hr at 12/20/22 0739 New Bag at 12/20/22 0739       Allergies:    No Known Allergies    Problem List:    Patient Active Problem List   Diagnosis Code    Kidney stones N20.0    Nocturia R35.1    Malignant neoplasm of urinary bladder (HCC) C67.9    Cancer of lateral wall of urinary bladder (HCC) C67.2    Acute respiratory failure with hypoxia ([facility]) J96.01    Other hyperlipidemia E78.49    Microcytic anemia D50.9    Mild protein-calorie malnutrition ([facility]) E44.1    Pulmonary embolism without acute cor pulmonale ([facility]) I26.99    Hypoxia R09.02    Dyspnea M39.53    Acute systolic heart failure ([facility]) I50.21    Acute renal failure with tubular necrosis ([facility]) N17.0    CKD (chronic kidney disease), stage III ([facility]) N18.30    Dilated cardiomyopathy ([facility]) I42.0    PAXTON (acute kidney injury) ([facility]) N17.9    Bandemia I85.085    Complicated UTI (urinary tract infection) N39.0    Hypokalemia E87.6    Hypophosphatemia E83.39    Small bowel obstruction ([facility]) K56.609    Nephrostomy complication ([facility]) V59.686    Candida infection B37.9    Enterococcus infection in shunt ([facility]) T85.79XA, B95.2    Hypomagnesemia E83.42    Functional diarrhea K59.1       Past Medical History:        Diagnosis Date    Acute renal failure with tubular necrosis ([facility] 05/26/2022    Likely ischemic ATN/prerenal acidemia from intractable nausea vomiting as result of chemotherapy, baseline 1.3-1.4 peaked up to 2.3 in May 2022    Arthritis     BPH with obstruction/lower urinary tract symptoms     CAD (coronary artery disease) 06/2022    nonobstructive on cath    Caffeine use     3 cans soda/pop    Cancer ([facility]     bladder cancer.  DrSteven N[facility]    CKD (chronic kidney disease), stage III ([facility] 05/26/2022    From 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   Baseline 1.3-1.4    COVID-19 08/16/2021    SOB, fatigue, fever, chills  x 3 weeks    Depression     Dilated cardiomyopathy (HCC) 05/26/2022    Ejection fraction 40 to 45%, does have symptoms of dyspnea and decreased effort tolerance    GERD (gastroesophageal reflux disease)     High cholesterol     Kidney calculi     Sleep apnea     does not use cpap    Under care of team     Dr. Dilcia Tilley Nephrology    Under care of team     Dr. Nj Jones. last visit Oct 2022    Under care of team     Dr. Hillary Mueller [facility] last visit 8/03/2022    Under care of team     Dr. Delvis Johnson Under care of team     Dr. Ambika Clemente Wears glasses        Past Surgical History:        Procedure Laterality Date    BLADDER REMOVAL      BLADDER REMOVAL N/A 9/16/2022    XI ROBOTIC LAPARASCOPIC ASSISTED RADICAL CYSTOPROSTATECTOMY, BILATERAL PELVIC LYMPHADENECTOMY AND ILEAL CONDUIT FORMATION performed by Tracy Matta MD at [address] Right 9/16/2022    CYSTOSCOPY STENT REMOVAL performed by Tracy Matta MD at [place] [address]  06/16/2022    nonobstructive CAD    COLONOSCOPY      IR NEPHROSTOMY PERCUTANEOUS LEFT  9/23/2022    IR NEPHROSTOMY PERCUTANEOUS LEFT 9/23/2022 [facility]    IR NEPHROSTOMY PERCUTANEOUS LEFT  10/5/2022    IR NEPHROSTOMY PERCUTANEOUS LEFT 10/5/2022 [facility]    IR NEPHROSTOMY PERCUTANEOUS RIGHT  9/23/2022    IR NEPHROSTOMY PERCUTANEOUS RIGHT 9/23/2022 MD MARSHALL [facility]    IR NEPHROSTOMY PERCUTANEOUS RIGHT  10/5/2022    IR NEPHROSTOMY PERCUTANEOUS RIGHT 10/5/2022 [facility]    IR PORT PLACEMENT EQUAL OR GREATER THAN 5 YEARS  02/25/2022    IR PORT PLACEMENT EQUAL OR GREATER THAN 5 YEARS 2022 [facility]    KNEE ARTHROSCOPY      left    LAPAROTOMY N/A 2022    LAPAROTOMY EXPLORATORY, LYSIS OF ADHESIONS, REPAIR OF BILATERAL URETERAL ANASTOMOSES, ABDOMINAL WASHOUT, PLACEMENT OF ABTHERA WOUND VAC performed by Gt Lerma DO at [facility] N/A 2022    2ND LOOK LAPAROTOMY,  ABDOMINAL WASHOUT, ABDOMINAL CLOSURE, WOUND VAC PLACEMENT performed by Gt Lerma DO at [address] [place] [address]  2022    Cystoprostatectomy, Bilateral Pelvic Lymphadenectomy, ileo conduit formation, cystectomy stent removal (right)       Social History:    Social History     Tobacco Use    Smoking status: Former     Packs/day: 0.25     Years: 9.00     Pack years: 2.25     Types: Cigarettes     Start date: 1983     Quit date: 2/10/1992     Years since quittin.8    Smokeless tobacco: Never   Substance Use Topics    Alcohol use: Not Currently                                Counseling given: Not Answered      Vital Signs (Current): There were no vitals filed for this visit.                                            BP Readings from Last 3 Encounters:   22 (!) 137/94   22 134/88   22 110/73       NPO Status:                                                                                 BMI:   Wt Readings from Last 3 Encounters:   22 205 lb (93 kg)   22 207 lb (93.9 kg)   22 207 lb (93.9 kg)     There is no height or weight on file to calculate BMI.    CBC:   Lab Results   Component Value Date/Time    WBC 10.6 2022 09:46 AM    RBC 3.95 2022 09:46 AM    HGB 10.3 2022 09:46 AM    HCT 32.4 2022 09:46 AM    MCV 82.2 2022 09:46 AM    RDW 18.6 2022 09:46 AM     2022 09:46 AM       CMP:   Lab Results   Component Value Date/Time     2022 02:03 PM    K 3.9 2022 02:03 PM     2022 02:03 PM    CO2 23 2022 02:03 PM    BUN 16 2022 02:03 PM    CREATININE 1.67 12/08/2022 02:03 PM    GFRAA 57 10/03/2022 05:33 AM    LABGLOM 45 12/08/2022 02:03 PM    GLUCOSE 98 12/08/2022 02:03 PM    PROT 7.3 11/22/2022 09:46 AM    CALCIUM 8.9 12/08/2022 02:03 PM    BILITOT 0.3 11/22/2022 09:46 AM    ALKPHOS 113 11/22/2022 09:46 AM    AST 10 11/22/2022 09:46 AM    ALT 8 11/22/2022 09:46 AM       POC Tests:   No results for input(s): POCGLU, POCNA, POCK, POCCL, POCBUN, POCHEMO, POCHCT in the last 72 hours. Coags:   Lab Results   Component Value Date/Time    PROTIME 11.3 11/14/2022 01:15 PM    INR 1.1 11/14/2022 01:15 PM    APTT 23.5 11/14/2022 01:15 PM       HCG (If Applicable): No results found for: PREGTESTUR, PREGSERUM, HCG, HCGQUANT     ABGs:   Lab Results   Component Value Date/Time    PHART 7.338 09/23/2022 12:25 PM    PO2ART 142.0 09/23/2022 12:25 PM    FEA2OXG 38.9 09/23/2022 12:25 PM    MXT5VLI 20.3 09/23/2022 12:25 PM    K9ZFSQZX 98.6 09/23/2022 12:25 PM        Type & Screen (If Applicable):  No results found for: LABABO, LABRH    Drug/Infectious Status (If Applicable):  No results found for: HIV, HEPCAB    COVID-19 Screening (If Applicable):   Lab Results   Component Value Date/Time    COVID19 Not Detected 03/31/2022 02:08 PM           Anesthesia Evaluation  Patient summary reviewed no history of anesthetic complications:   Airway: Mallampati: III         Intubated Dental: normal exam         Pulmonary:normal exam    (+) shortness of breath: no interval change,  sleep apnea: on CPAP,                            ROS comment: B/L PE March 2022   Cardiovascular:  Exercise tolerance: good (>4 METS),   (+) CAD: no interval change,       ECG reviewed  Rhythm: regular  Rate: normal  Echocardiogram reviewed    Cleared by cardiology              Neuro/Psych:   (+) psychiatric history:            GI/Hepatic/Renal:   (+) GERD: no interval change, renal disease: ARF,          ROS comment: DaVinci cystoprostatectomy c/b ileal conduit leak and ex-lap.    Endo/Other: Negative Endo/Other ROS (+) blood dyscrasia: anemia:., malignancy/cancer. Abdominal:   (+) obese,           Vascular: negative vascular ROS. Other Findings: BP (!) 137/94   Pulse 84   Temp 97.5 °F (36.4 °C) (Temporal)   Resp 16   Ht 6' 2\" (1.88 m)   Wt 205 lb (93 kg)   SpO2 97%   BMI 26.32 kg/m²               Anesthesia Plan      MAC     ASA 3       Induction: intravenous. Anesthetic plan and risks discussed with patient. Use of blood products discussed with patient whom consented to blood products. Plan discussed with CRNA.                     Elia Ding MD   12/20/2022

## 2022-12-20 NOTE — ANESTHESIA POSTPROCEDURE EVALUATION
Department of Anesthesiology  Postprocedure Note    Patient: Srinivasa Huertas  MRN: [id number]  YOB: 1955  Date of evaluation: 12/20/2022      Procedure Summary     Date: 12/20/22 Room / Location: [address]    Anesthesia Start: 2077 Anesthesia Stop: 2160    Procedure: David Driscoll (Left) Diagnosis:       Malignant neoplasm of urinary bladder, unspecified site [facility])      (BLADDER CANCER)    Surgeons: Nydia Kee MD Responsible Provider: Robbin Colon MD    Anesthesia Type: MAC ASA Status: 3          Anesthesia Type: No value filed.     Madhu Phase I:      Madhu Phase II: Madhu Score: 10      Anesthesia Post Evaluation    Patient location during evaluation: PACU  Patient participation: complete - patient participated  Level of consciousness: awake  Airway patency: patent  Nausea & Vomiting: no nausea and no vomiting  Complications: no  Cardiovascular status: blood pressure returned to baseline  Respiratory status: acceptable  Hydration status: euvolemic  Comments: /79   Pulse 72   Temp 97.1 °F (36.2 °C)   Resp 16   Ht 6' 2\" (1.88 m)   Wt 205 lb (93 kg)   SpO2 100%   BMI 26.32 kg/m²

## 2022-12-20 NOTE — DISCHARGE INSTRUCTIONS
Discharge instructions: Cystoscopy  You may experience pain and/or burning with urination and see blood in the urine after your procedure. This should resolve over the next few days. Ok to discharge home in good condition  No heavy lifting, >10 lbs for today  Patient should avoid strenuous activity for today  Patient should walk moderately at home   [id number] [address]   Patient may resume diet as tolerated  Please call attending physician or hospital  with questions  Call or go to ED if fever (> 101F), intractable nausea vomiting or pain, inability to urinate  Please take prescriptions as directed if prescribed      Patient should follow up with Dr. Magali Carey, in 4 weeks, call to confirm appointment    No alcoholic beverages, no driving or operating machinery, no making important decisions for 24 hours. You may have a normal diet but should eat lightly day of surgery. Drink plenty of fluids.   Urinate within 8 hours after surgery, if unable to urinate call your doctor     Call your doctor for the following:   Chills   Temperature greater than 101   Pain that is not tolerable despite taking pain medicine as ordered   There is increased swelling, redness or warmth at surgical site   There is increased drainage or bleeding from surgical site   Do not remove surgical dressing unless instructed to do so by your surgeon

## 2022-12-20 NOTE — BRIEF OP NOTE
Brief Postoperative Note      Patient: Sav Velasquez  YOB: 1955  MRN: [id number]    Date of Procedure: 12/20/2022    Pre-Op Diagnosis: BLADDER CANCER    Post-Op Diagnosis: Same       Procedure(s):  LOOPOSCOPY, STENT REMOVAL    Surgeon(s):  Mirian Leon MD    Assistant:  * No surgical staff found *    Anesthesia: Monitor Anesthesia Care    Estimated Blood Loss (mL): Minimal    Complications: None    Specimens:   * No specimens in log *    Implants:  * No implants in log *      Drains:   Negative Pressure Wound Therapy Abdomen Mid (Active)       Urostomy Ileal conduit RLQ (Active)       Findings: Healthy conduit, removal of stent    Electronically signed by Levi Barros MD on 12/20/2022 at 8:58 AM

## 2022-12-20 NOTE — LETTER
Tomas Caballero MD [address] [facility], [address]  [facility], [address] [facility]  P: [phone] / F: [phone]    Brief Postoperative Note  Surgical Facility: [facility]   12/20/22    Rodolfo Modi  [id number]  1955    Dear Gloria Ramírez MD,    I've enclosed a Brief Op note on a procedure performed on your patient, Rodolfo Modi today. Pre-operative Diagnosis: History of bladder cancer  Post-operative Diagnosis: Same    Procedure: Ileal conduit looposcopy and left ureteral stent removal    Anesthesia: MAC    Surgeon: Norberto Waite; Resident: Haylee Mccray MD     Plan: Follow up in 3 weeks for a BMP and renal US. Thank you for allowing me to participate in the care of this patient. I will keep you updated on this patient's follow up and I look forward to serving you and your patients again in the future.         Electronically signed by Byron Bowen MD, FACS

## 2022-12-20 NOTE — OP NOTE
Operative Note      Patient: Sam Griffith  YOB: 1955  MRN: [id number]    Date of Procedure: 12/20/2022    Pre-Op Diagnosis: BLADDER CANCER and left ureteral obstruction    Post-Op Diagnosis: Same       Procedure(s):  LOOPOSCOPY, STENT REMOVAL    Surgeon(s):  Yadira Greene MD    Assistant:   * No surgical staff found *    Anesthesia: Monitor Anesthesia Care    Estimated Blood Loss (mL): Minimal    Complications: None    Specimens:   * No specimens in log *    Implants:  * No implants in log *      Drains:   Negative Pressure Wound Therapy Abdomen Mid (Active)       Urostomy Ileal conduit RLQ (Active)       Findings: see below     Detailed Description of Procedure: Indications:  Sam Griffith is a 74 y.o. male with history of bladder cancer s/p davinci cystectomy and ileal conduit formation. He has a left Percutaneous Nephrolithotomy and antegrade stent placed to facilitate healing of the left ureteral ileal conduit anastomosis. He is here for looposcopy and left ureteral stent removal.  Risks benefits alternatives goals and possible complications of the procedure were explained to the patient and informed consent was obtained he elected to proceed. Patient given Rocephin 1 gm IV on call to OR. Details: The patient was brought back to the operating room. He was laid in the supine position. His ileal conduit stoma and surrounding skin were prepped and draped in usual sterile surgical fashion. Flexible cystoscope was assembled and passed per the ileal conduit stoma. The ureteral stent was seen and grasped using flexible grasping forceps and the left ureteral stent was removed with some difficulty due to the tortuosity of the ileal conduit. The patient tolerated the procedure well. The scope was removed intact and without any issues. This concluded the case. He was taken to recovery period and discharged home in stable condition.       Plan  He will follow up in 3 weeks with a BMP and renal US prior.     Electronically signed by Brandon Collet, MD on 12/20/2022 at 8:13 AM

## 2022-12-27 ENCOUNTER — TELEPHONE (OUTPATIENT)
Dept: ONCOLOGY | Age: 67
End: 2022-12-27

## 2022-12-27 RX ORDER — ONDANSETRON 8 MG/1
TABLET, ORALLY DISINTEGRATING ORAL
Qty: 30 TABLET | Refills: 0 | Status: ON HOLD | OUTPATIENT
Start: 2022-12-27

## 2022-12-27 NOTE — TELEPHONE ENCOUNTER
[facility] Nutrition Follow Up       Abhijit Priest is a 74 y.o.  male     NUTRITION RECOMMENDATIONS / MONITORING / EVALUATION  Continue high calorie/high protein meals and snacks. 2. Recommend consider multivitamin, zinc, and vit D3 to aid in micronutrient intake  3. Recommend consideration of alternate route of nutrition it pt continues to be unable to meet estimated needs. 4. Will monitor wts, labs, po intakes, s/s management    Subjective/Current Data:  Called pt on listed phone number. No answer. Chart reviewed, weight has stabilized. Pt s/p uretal stent removal.     Recent Weights: Wt Readings from Last 3 Encounters:   12/20/22 205 lb (93 kg)   11/30/22 207 lb (93.9 kg)   11/22/22 207 lb (93.9 kg)       Goal: Adequate intake to aide in wt maintenaince, signs and symptoms management, and overall wellbeing.     Progress towards goal: KENNY Fernandez, RD, LD  Registered Dietitian   [facility]  [phone]

## 2022-12-29 ENCOUNTER — HOSPITAL ENCOUNTER (OUTPATIENT)
Age: 67
Discharge: HOME | End: 2022-12-29
Payer: MEDICARE

## 2022-12-29 ENCOUNTER — APPOINTMENT (OUTPATIENT)
Dept: ULTRASOUND IMAGING | Age: 67
DRG: 246 | End: 2022-12-29
Payer: MEDICARE

## 2022-12-29 ENCOUNTER — HOSPITAL ENCOUNTER (EMERGENCY)
Age: 67
Discharge: HOME | DRG: 246 | End: 2022-12-29
Attending: EMERGENCY MEDICINE
Payer: MEDICARE

## 2022-12-29 VITALS
BODY MASS INDEX: 26.31 KG/M2 | DIASTOLIC BLOOD PRESSURE: 79 MMHG | TEMPERATURE: 98.5 F | HEART RATE: 91 BPM | WEIGHT: 205 LBS | HEIGHT: 74 IN | RESPIRATION RATE: 14 BRPM | OXYGEN SATURATION: 94 % | SYSTOLIC BLOOD PRESSURE: 118 MMHG

## 2022-12-29 DIAGNOSIS — R31.0 GROSS HEMATURIA: Primary | ICD-10-CM

## 2022-12-29 DIAGNOSIS — R31.9 URINARY TRACT INFECTION WITH HEMATURIA, SITE UNSPECIFIED: ICD-10-CM

## 2022-12-29 DIAGNOSIS — R31.0 GROSS HEMATURIA: ICD-10-CM

## 2022-12-29 DIAGNOSIS — N39.0 URINARY TRACT INFECTION WITH HEMATURIA, SITE UNSPECIFIED: ICD-10-CM

## 2022-12-29 LAB
ABSOLUTE EOS #: 0.2 K/UL (ref 0–0.4)
ABSOLUTE LYMPH #: 2.5 K/UL (ref 1–4.8)
ABSOLUTE MONO #: 0.6 K/UL (ref 0.1–1.2)
ALBUMIN SERPL-MCNC: 2.9 G/DL (ref 3.5–5.2)
ALBUMIN/GLOBULIN RATIO: 0.7 (ref 1–2.5)
ALP BLD-CCNC: 92 U/L (ref 40–129)
ALT SERPL-CCNC: 16 U/L (ref 5–41)
ANION GAP SERPL CALCULATED.3IONS-SCNC: 10 MMOL/L (ref 9–17)
AST SERPL-CCNC: 15 U/L
BACTERIA: ABNORMAL
BASOPHILS # BLD: 1 % (ref 0–2)
BASOPHILS ABSOLUTE: 0.1 K/UL (ref 0–0.2)
BILIRUB SERPL-MCNC: 0.3 MG/DL (ref 0.3–1.2)
BILIRUBIN URINE: NEGATIVE
BUN BLDV-MCNC: 15 MG/DL (ref 8–23)
CALCIUM SERPL-MCNC: 8.9 MG/DL (ref 8.6–10.4)
CHLORIDE BLD-SCNC: 99 MMOL/L (ref 98–107)
CO2: 23 MMOL/L (ref 20–31)
COLOR: ABNORMAL
CREAT SERPL-MCNC: 1.75 MG/DL (ref 0.7–1.2)
EOSINOPHILS RELATIVE PERCENT: 2 % (ref 1–4)
EPITHELIAL CELLS UA: ABNORMAL /HPF (ref 0–5)
GFR SERPL CREATININE-BSD FRML MDRD: 43 ML/MIN/1.73M2
GLUCOSE BLD-MCNC: 90 MG/DL (ref 70–99)
GLUCOSE URINE: NEGATIVE
HCT VFR BLD CALC: 32.4 % (ref 41–53)
HCT VFR BLD CALC: 35.2 % (ref 40.7–50.3)
HEMOGLOBIN: 10.4 G/DL (ref 13.5–17.5)
HEMOGLOBIN: 10.9 G/DL (ref 13–17)
KETONES, URINE: NEGATIVE
LEUKOCYTE ESTERASE, URINE: ABNORMAL
LYMPHOCYTES # BLD: 23 % (ref 24–44)
MCH RBC QN AUTO: 25 PG (ref 25.2–33.5)
MCH RBC QN AUTO: 25.1 PG (ref 26–34)
MCHC RBC AUTO-ENTMCNC: 31 G/DL (ref 28.4–34.8)
MCHC RBC AUTO-ENTMCNC: 31.9 G/DL (ref 31–37)
MCV RBC AUTO: 78.5 FL (ref 80–100)
MCV RBC AUTO: 80.7 FL (ref 82.6–102.9)
MONOCYTES # BLD: 5 % (ref 2–11)
NITRITE, URINE: POSITIVE
NRBC AUTOMATED: 0 PER 100 WBC
OTHER OBSERVATIONS UA: ABNORMAL
PDW BLD-RTO: 16.7 % (ref 11.8–14.4)
PDW BLD-RTO: 18.5 % (ref 12.5–15.4)
PH UA: 7 (ref 5–8)
PLATELET # BLD: 353 K/UL (ref 140–450)
PLATELET # BLD: 378 K/UL (ref 138–453)
PMV BLD AUTO: 10.1 FL (ref 8.1–13.5)
PMV BLD AUTO: 7.3 FL (ref 6–12)
POTASSIUM SERPL-SCNC: 4 MMOL/L (ref 3.7–5.3)
PROTEIN UA: ABNORMAL
RBC # BLD: 4.13 M/UL (ref 4.5–5.9)
RBC # BLD: 4.36 M/UL (ref 4.21–5.77)
RBC UA: ABNORMAL /HPF (ref 0–2)
SEG NEUTROPHILS: 69 % (ref 36–66)
SEGMENTED NEUTROPHILS ABSOLUTE COUNT: 7.3 K/UL (ref 1.8–7.7)
SODIUM BLD-SCNC: 132 MMOL/L (ref 135–144)
SPECIFIC GRAVITY UA: 1.02 (ref 1–1.03)
TOTAL PROTEIN: 6.8 G/DL (ref 6.4–8.3)
TURBIDITY: ABNORMAL
URINE HGB: ABNORMAL
UROBILINOGEN, URINE: NORMAL
WBC # BLD: 10.6 K/UL (ref 3.5–11)
WBC # BLD: 11.5 K/UL (ref 3.5–11.3)
WBC UA: ABNORMAL /HPF (ref 0–5)

## 2022-12-29 PROCEDURE — 81001 URINALYSIS AUTO W/SCOPE: CPT

## 2022-12-29 PROCEDURE — 87086 URINE CULTURE/COLONY COUNT: CPT

## 2022-12-29 PROCEDURE — 85025 COMPLETE CBC W/AUTO DIFF WBC: CPT

## 2022-12-29 PROCEDURE — 6370000000 HC RX 637 (ALT 250 FOR IP): Performed by: EMERGENCY MEDICINE

## 2022-12-29 PROCEDURE — 2580000003 HC RX 258: Performed by: EMERGENCY MEDICINE

## 2022-12-29 PROCEDURE — 36415 COLL VENOUS BLD VENIPUNCTURE: CPT

## 2022-12-29 PROCEDURE — 99284 EMERGENCY DEPT VISIT MOD MDM: CPT

## 2022-12-29 PROCEDURE — 87186 SC STD MICRODIL/AGAR DIL: CPT

## 2022-12-29 PROCEDURE — 87077 CULTURE AEROBIC IDENTIFY: CPT

## 2022-12-29 PROCEDURE — 85027 COMPLETE CBC AUTOMATED: CPT

## 2022-12-29 PROCEDURE — 80053 COMPREHEN METABOLIC PANEL: CPT

## 2022-12-29 PROCEDURE — 76770 US EXAM ABDO BACK WALL COMP: CPT

## 2022-12-29 RX ORDER — 0.9 % SODIUM CHLORIDE 0.9 %
1000 INTRAVENOUS SOLUTION INTRAVENOUS ONCE
Status: COMPLETED | OUTPATIENT
Start: 2022-12-29 | End: 2022-12-29

## 2022-12-29 RX ORDER — CIPROFLOXACIN 250 MG/1
250 TABLET, FILM COATED ORAL 2 TIMES DAILY
Qty: 14 TABLET | Refills: 0 | Status: ON HOLD | OUTPATIENT
Start: 2022-12-29 | End: 2023-01-02 | Stop reason: HOSPADM

## 2022-12-29 RX ORDER — CIPROFLOXACIN 250 MG/1
250 TABLET, FILM COATED ORAL ONCE
Status: COMPLETED | OUTPATIENT
Start: 2022-12-29 | End: 2022-12-29

## 2022-12-29 RX ADMIN — SODIUM CHLORIDE 1000 ML: 9 INJECTION, SOLUTION INTRAVENOUS at 16:51

## 2022-12-29 RX ADMIN — CIPROFLOXACIN 250 MG: 250 TABLET, FILM COATED ORAL at 17:52

## 2022-12-29 ASSESSMENT — ENCOUNTER SYMPTOMS
ABDOMINAL PAIN: 0
NAUSEA: 1

## 2022-12-29 ASSESSMENT — PAIN SCALES - GENERAL: PAINLEVEL_OUTOF10: 5

## 2022-12-29 ASSESSMENT — PAIN DESCRIPTION - LOCATION: LOCATION: ABDOMEN

## 2022-12-29 NOTE — ED PROVIDER NOTES
[address] Emergency Department  [address] [facility] [address] [facility] [address]  Phone: [phone]  Fax: [phone] Rashi Guzman      Pt Name: Christophe Munoz  MRN: [id number]  [place] 1955  Date of evaluation: 12/29/2022  Provider: Herman Medina, [facility]       Chief Complaint   Patient presents with    Hematuria         HISTORY OF PRESENT ILLNESS   (Location/Symptom, Timing/Onset,Context/Setting, Quality, Duration, Modifying Factors, Severity)  Note limiting factors. Christophe Munoz is a 74 y.o. male who presents to the emergency department for the evaluation of hematuria. Patient has a fairly complicated history of a prostate cancer and he is status post prostatectomy with removal of surrounding lymph nodes and bladder and he has a urostomy bag. Apparently he has intermittent blood in the urostomy bag over the past couple of weeks, however, for some reason today when the nurse was present it was more concerning than any other time and they contacted the urologist who the patient states normally does not seem to mind if he has hematuria but today told him to come to the ER. Patient states he has had chronic shortness of breath for months since he had COVID, patient states he has not felt well since he finished chemotherapy in March. Patient has nausea without vomiting. No diarrhea. No constipation. Patient no longer has any ureteral or kidney stents. He still has the urostomy bag    Nursing Notes were reviewed. REVIEW OF SYSTEMS    (2-9systems for level 4, 10 or more for level 5)     Review of Systems   Constitutional:  Negative for fever. Gastrointestinal:  Positive for nausea. Negative for abdominal pain. Genitourinary:  Positive for hematuria. Except asnoted above the remainder of the review of systems was reviewed and negative.        PAST MEDICAL HISTORY     Past Medical History:   Diagnosis Date    Acute renal failure with tubular necrosis ([facility] 05/26/2022    Likely ischemic ATN/prerenal acidemia from intractable nausea vomiting as result of chemotherapy, baseline 1.3-1.4 peaked up to 2.3 in May 2022    Arthritis     BPH with obstruction/lower urinary tract symptoms     CAD (coronary artery disease) 06/2022    nonobstructive on cath    Caffeine use     3 cans soda/pop    Cancer ([facility]     bladder cancer. Dr. 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   Baseline 1.3-1.4    COVID-19 08/16/2021    SOB, fatigue, fever, chills  x 3 weeks    Depression     D[facility] 05/26/2022    Ejection fraction 40 to 45%, does have symptoms of dyspnea and decreased effort tolerance    GERD (gastroesophageal reflux disease)     High cholesterol     Kidney calculi     Sleep apnea     does not use cpap    Under care of team     Dr. Amrik Muñoz Nephrology    Under care of team     Dr. Stephanie Lloyd. last visit Oct 2022    Under care of team     Dr. Helio Erazo [facility] last visit 8/03/2022    Under care of team     Dr. Massiel Love    Under care of team     Dr. Poli Mei    Wears glasses          SURGICAL HISTORY       Past Surgical History:   Procedure Laterality Date    BLADDER REMOVAL      BLADDER REMOVAL N/A 09/16/2022    XI ROBOTIC LAPARASCOPIC ASSISTED RADICAL CYSTOPROSTATECTOMY, BILATERAL PELVIC LYMPHADENECTOMY AND ILEAL CONDUIT FORMATION performed by Darron Garay MD at [facility] 09/16/2022    CYSTOSCOPY STENT REMOVAL performed by Darron Garay MD at [address] [place] [address] 06/16/2022    nonobstructive CAD    COLONOSCOPY      CYSTOSCOPY  12/20/2022    LOOPOSCOPY, STENT REMOVAL    CYSTOSCOPY Left 12/20/2022    LOOPOSCOPY, STENT REMOVAL performed by Khushi Turner MD at [facility] [address]    IR NEPHROSTOMY PERCUTANEOUS LEFT  09/23/2022    IR NEPHROSTOMY PERCUTANEOUS LEFT 9/23/2022 Jackelyn Chavis MD [facility] SPECIAL PROCEDURES    IR NEPHROSTOMY PERCUTANEOUS LEFT  10/05/2022    IR NEPHROSTOMY PERCUTANEOUS LEFT 10/5/2022 [facility] SPECIAL PROCEDURES    IR NEPHROSTOMY PERCUTANEOUS RIGHT  09/23/2022    IR NEPHROSTOMY PERCUTANEOUS RIGHT 9/23/2022 Jackelyn Chavis MD [facility] SPECIAL PROCEDURES    IR NEPHROSTOMY PERCUTANEOUS RIGHT  10/05/2022    IR NEPHROSTOMY PERCUTANEOUS RIGHT 10/5/2022 [facility] SPECIAL PROCEDURES    IR PORT PLACEMENT EQUAL OR GREATER THAN 5 YEARS  02/25/2022    IR PORT PLACEMENT EQUAL OR GREATER THAN 5 YEARS 2/25/2022 [facility] PROCEDURES    KNEE ARTHROSCOPY      left    LAPAROTOMY N/A 09/22/2022    LAPAROTOMY EXPLORATORY, LYSIS OF ADHESIONS, REPAIR OF BILATERAL URETERAL ANASTOMOSES, ABDOMINAL WASHOUT, PLACEMENT OF ABTHERA WOUND VAC performed by Hola Herrera DO at [address] [place] [address] N/A 09/23/2022    2ND LOOK LAPAROTOMY,  ABDOMINAL WASHOUT, ABDOMINAL CLOSURE, WOUND VAC PLACEMENT performed by Hola Herrera DO at [place] [address]  09/16/2022    Cystoprostatectomy, Bilateral Pelvic Lymphadenectomy, ileo conduit formation, cystectomy stent removal (right)         CURRENT MEDICATIONS     Previous Medications    AMOXICILLIN (AMOXIL) 500 MG CAPSULE    Take 500 mg by mouth 3 times daily    BUPROPION (WELLBUTRIN XL) 300 MG EXTENDED RELEASE TABLET    Take 300 mg by mouth every morning    CARVEDILOL (COREG) 3.125 MG TABLET    TAKE 1 TABLET BY MOUTH TWICE DAILY    OMEPRAZOLE (PRILOSEC) 20 MG DELAYED RELEASE CAPSULE    Take 1 capsule by mouth daily    ONDANSETRON (ZOFRAN-ODT) 8 MG TBDP DISINTEGRATING TABLET    DISSOLVE 1 TABLET IN MOUTH EVERY 8 HOURS AS NEEDED FOR NAUSEA FOR VOMITING    RIVAROXABAN (XARELTO) 20 MG TABS TABLET    Take 1 tablet by mouth daily    SIMVASTATIN (ZOCOR) 40 MG TABLET    Take 40 mg by mouth nightly    SUCRALFATE (CARAFATE) 1 GM/10ML SUSPENSION    Take 10 mLs by mouth 4 times daily       ALLERGIES     Patient has no known allergies. FAMILY HISTORY       Family History   Problem Relation Age of Onset    Cancer Father         bladder cancer    Urolithiasis Father           SOCIAL HISTORY       Social History     Socioeconomic History    Marital status:      Spouse name: None    Number of children: None    Years of education: None    Highest education level: None   Tobacco Use    Smoking status: Former     Packs/day: 0.25     Years: 9.00     Pack years: 2.25     Types: Cigarettes     Start date: 1983     Quit date: 2/10/1992     Years since quittin.9    Smokeless tobacco: Never   Vaping Use    Vaping Use: Never used   Substance and Sexual Activity    Alcohol use: Not Currently    Drug use: No    Sexual activity: Yes     Partners: Female       SCREENINGS             PHYSICAL EXAM    (up to 7 for level 4, 8 or more for level 5)     ED Triage Vitals [22 1615]   BP Temp Temp src Heart Rate Resp SpO2 Height Weight   118/79 98.5 °F (36.9 °C) -- 91 14 94 % 6' 2\" (1.88 m) 205 lb (93 kg)       Physical Exam  Vitals and nursing note reviewed. Constitutional:       General: He is not in acute distress. Appearance: Normal appearance. He is not ill-appearing or toxic-appearing. HENT:      Head: Normocephalic and atraumatic. Nose: Nose normal. No congestion. Mouth/Throat:      Mouth: Mucous membranes are moist.   Eyes:      General:         Right eye: No discharge. Left eye: No discharge. Conjunctiva/sclera: Conjunctivae normal.   Cardiovascular:      Rate and Rhythm: Normal rate and regular rhythm. Pulses: Normal pulses. Heart sounds: Normal heart sounds. No murmur heard.   Pulmonary:      Effort: Pulmonary effort is normal. No respiratory distress. Breath sounds: Normal breath sounds. No wheezing. Abdominal:      General: Abdomen is flat. There is no distension. Palpations: Abdomen is soft. There is no pulsatile mass. Tenderness: There is no abdominal tenderness. There is no guarding or rebound. Comments: No pulsatile mass   Genitourinary:     Comments: Urostomy bag in place with bloody urine  Musculoskeletal:         General: No deformity or signs of injury. Normal range of motion. Cervical back: Normal range of motion. Skin:     General: Skin is warm and dry. Capillary Refill: Capillary refill takes less than 2 seconds. Findings: No rash. Neurological:      General: No focal deficit present. Mental Status: He is alert. Mental status is at baseline. Motor: No weakness. Comments: Speaking normally. No facial asymmetry. Moving all 4 extremities. Psychiatric:         Mood and Affect: Mood normal.       EMERGENCY DEPARTMENT COURSE and DIFFERENTIAL DIAGNOSIS/MDM:   Vitals:    Vitals:    12/29/22 1615   BP: 118/79   Pulse: 91   Resp: 14   Temp: 98.5 °F (36.9 °C)   SpO2: 94%   Weight: 93 kg (205 lb)   Height: 6' 2\" (1.88 m)       Patient presents to the emergency department with the complaint described above. Vital signs are grossly normal, patient is nontoxic, resting comfortably, no distress. At this time, I am going to obtain blood work, urinalysis and patient was supposed to have a renal ultrasound done on January 9 but we will go ahead and get that done today.   We will discussed the case with his urologist and reevaluate      DIAGNOSTIC RESULTS     LABS:  Labs Reviewed   CBC WITH AUTO DIFFERENTIAL - Abnormal; Notable for the following components:       Result Value    RBC 4.13 (*)     Hemoglobin 10.4 (*)     Hematocrit 32.4 (*)     MCV 78.5 (*)     MCH 25.1 (*)     RDW 18.5 (*)     Seg Neutrophils 69 (*)     Lymphocytes 23 (*)     All other components within normal limits   COMPREHENSIVE METABOLIC PANEL - Abnormal; Notable for the following components:    Creatinine 1.75 (*)     Est, Glom Filt Rate 43 (*)     Sodium 132 (*)     Albumin 2.9 (*)     Albumin/Globulin Ratio 0.7 (*)     All other components within normal limits   URINALYSIS WITH REFLEX TO CULTURE - Abnormal; Notable for the following components:    Color, UA Red (*)     Turbidity UA SLIGHTLY CLOUDY (*)     Urine Hgb LARGE (*)     Protein, UA 3+ (*)     Nitrite, Urine POSITIVE (*)     Leukocyte Esterase, Urine LARGE (*)     All other components within normal limits   MICROSCOPIC URINALYSIS - Abnormal; Notable for the following components:    Bacteria, UA MODERATE (*)     Other Observations UA Culture ordered based on defined criteria. (*)     All other components within normal limits       All other labs were within normal range or not returned as of this dictation. RADIOLOGY:  US RENAL COMPLETE   Final Result   1. Bilateral nonobstructing nephrolithiasis. No evidence of hydronephrosis. 2. 4.2 cm cyst within the mid left kidney. 3. Patient status post cystectomy. No mass or fluid collection within the   pelvis. ED Course as of 12/29/22 1738   Thu Dec 29, 2022   1725 CBC shows no leukocytosis, stable chronic anemia noted. Chronic kidney disease noted but creatinine does seem to be slightly trending up. Urinalysis shows 20-50 WBCs which is actually less than before, does have a lot of blood though he has had blood in the urine before as well. His urine is likely colonized with bacteria. Will discuss with urology [TS]   1726 Ultrasound shows bilateral nonobstructing nephrolithiasis. No hydronephrosis [TS]   1736 I spoke with Dr. Deandre Dolan who was able to look at the work-up from today. Recommend starting the patient on an antibiotic presumptively until culture results come back. If single species we can treat, if multiple species is likely secondary to the conduit and fecalization.   The antibiotic could then be stopped. Looking at his last single species culture that had Pseudomonas, it was intermediately susceptible to ciprofloxacin so I will start him on that with first dose in ER. At this time the patient is without objective evidence of an acute process requiring hospitalization or inpatient management. They have remained hemodynamically stable and are stable for discharge with outpatient follow-up. Standard anticipatory guidance given to patient upon discharge. Have given them a specific time frame in which to follow-up and who to follow-up with. I have also advised them that they should return to the emergency department if they get worse, or not getting better or develop any new or concerning symptoms. Patient demonstrates understanding.   [TS]      ED Course User Index  [TS] Marlon German DO         PROCEDURES:  Unless otherwise noted below, none     Procedures    FINAL IMPRESSION      1. Gross hematuria    2.  Urinary tract infection with hematuria, site unspecified          DISPOSITION/PLAN   DISPOSITION Decision To Discharge 12/29/2022 05:37:23 PM      PATIENT REFERRED TO:  Elroy Tolliver MD  [facility], [address] [place] [address]  [phone]    Call in 3 days      DISCHARGE MEDICATIONS:  New Prescriptions    CIPROFLOXACIN (CIPRO) 250 MG TABLET    Take 1 tablet by mouth 2 times daily for 7 days          (Please note that portions of this note were completed with a voice recognition program.  Efforts were made to edit the dictations but occasionally words are mis-transcribed.)    Marlon German DO,(electronically signed)  Board Certified Emergency Physician          Marlon German DO  12/29/22 7814

## 2022-12-31 ENCOUNTER — HOSPITAL ENCOUNTER (INPATIENT)
Age: 67
LOS: 1 days | Discharge: ANOTHER HOSPITAL | DRG: 246 | End: 2022-12-31
Attending: EMERGENCY MEDICINE | Admitting: STUDENT IN AN ORGANIZED HEALTH CARE EDUCATION/TRAINING PROGRAM
Payer: MEDICARE

## 2022-12-31 ENCOUNTER — HOSPITAL ENCOUNTER (INPATIENT)
Age: 67
LOS: 4 days | Discharge: HOME | DRG: 246 | End: 2023-01-04
Attending: STUDENT IN AN ORGANIZED HEALTH CARE EDUCATION/TRAINING PROGRAM
Payer: MEDICARE

## 2022-12-31 ENCOUNTER — APPOINTMENT (OUTPATIENT)
Dept: GENERAL RADIOLOGY | Age: 67
DRG: 246 | End: 2022-12-31
Payer: MEDICARE

## 2022-12-31 VITALS
OXYGEN SATURATION: 97 % | HEART RATE: 84 BPM | DIASTOLIC BLOOD PRESSURE: 89 MMHG | TEMPERATURE: 97.4 F | SYSTOLIC BLOOD PRESSURE: 131 MMHG | RESPIRATION RATE: 16 BRPM

## 2022-12-31 DIAGNOSIS — I21.4 NSTEMI (NON-ST ELEVATED MYOCARDIAL INFARCTION) (HCC): Primary | ICD-10-CM

## 2022-12-31 DIAGNOSIS — E87.6 HYPOKALEMIA: Primary | ICD-10-CM

## 2022-12-31 LAB
ABSOLUTE EOS #: 0.1 K/UL (ref 0–0.4)
ABSOLUTE LYMPH #: 2 K/UL (ref 1–4.8)
ABSOLUTE MONO #: 0.7 K/UL (ref 0.1–1.2)
ANION GAP SERPL CALCULATED.3IONS-SCNC: 11 MMOL/L (ref 9–17)
BASOPHILS # BLD: 1 % (ref 0–2)
BASOPHILS ABSOLUTE: 0.1 K/UL (ref 0–0.2)
BUN BLDV-MCNC: 13 MG/DL (ref 8–23)
CALCIUM SERPL-MCNC: 8.7 MG/DL (ref 8.6–10.4)
CHLORIDE BLD-SCNC: 102 MMOL/L (ref 98–107)
CO2: 20 MMOL/L (ref 20–31)
CREAT SERPL-MCNC: 1.71 MG/DL (ref 0.7–1.2)
CULTURE: ABNORMAL
CULTURE: ABNORMAL
D-DIMER QUANTITATIVE: 1.08 MG/L FEU
EKG ATRIAL RATE: 76 BPM
EKG ATRIAL RATE: 97 BPM
EKG P AXIS: 33 DEGREES
EKG P AXIS: 40 DEGREES
EKG P-R INTERVAL: 204 MS
EKG P-R INTERVAL: 204 MS
EKG Q-T INTERVAL: 376 MS
EKG Q-T INTERVAL: 410 MS
EKG QRS DURATION: 128 MS
EKG QRS DURATION: 130 MS
EKG QTC CALCULATION (BAZETT): 461 MS
EKG QTC CALCULATION (BAZETT): 477 MS
EKG R AXIS: -21 DEGREES
EKG R AXIS: -51 DEGREES
EKG T AXIS: 51 DEGREES
EKG T AXIS: 56 DEGREES
EKG VENTRICULAR RATE: 76 BPM
EKG VENTRICULAR RATE: 97 BPM
EOSINOPHILS RELATIVE PERCENT: 1 % (ref 1–4)
GFR SERPL CREATININE-BSD FRML MDRD: 44 ML/MIN/1.73M2
GLUCOSE BLD-MCNC: 88 MG/DL (ref 70–99)
HCT VFR BLD CALC: 30.4 % (ref 41–53)
HEMOGLOBIN: 9.8 G/DL (ref 13.5–17.5)
LYMPHOCYTES # BLD: 15 % (ref 24–44)
MCH RBC QN AUTO: 25.1 PG (ref 26–34)
MCHC RBC AUTO-ENTMCNC: 32.3 G/DL (ref 31–37)
MCV RBC AUTO: 77.6 FL (ref 80–100)
MONOCYTES # BLD: 5 % (ref 2–11)
PARTIAL THROMBOPLASTIN TIME: 27.2 SEC (ref 21.3–31.3)
PDW BLD-RTO: 18.9 % (ref 12.5–15.4)
PLATELET # BLD: 366 K/UL (ref 140–450)
PMV BLD AUTO: 7.1 FL (ref 6–12)
POTASSIUM SERPL-SCNC: 3.9 MMOL/L (ref 3.7–5.3)
PRO-BNP: 6582 PG/ML
RBC # BLD: 3.91 M/UL (ref 4.5–5.9)
SEG NEUTROPHILS: 78 % (ref 36–66)
SEGMENTED NEUTROPHILS ABSOLUTE COUNT: 10.8 K/UL (ref 1.8–7.7)
SODIUM BLD-SCNC: 133 MMOL/L (ref 135–144)
SPECIMEN DESCRIPTION: ABNORMAL
TROPONIN, HIGH SENSITIVITY: 1769 NG/L (ref 0–22)
TROPONIN, HIGH SENSITIVITY: 2950 NG/L (ref 0–22)
WBC # BLD: 13.7 K/UL (ref 3.5–11)

## 2022-12-31 PROCEDURE — 99285 EMERGENCY DEPT VISIT HI MDM: CPT

## 2022-12-31 PROCEDURE — 6360000002 HC RX W HCPCS: Performed by: EMERGENCY MEDICINE

## 2022-12-31 PROCEDURE — 93005 ELECTROCARDIOGRAM TRACING: CPT | Performed by: INTERNAL MEDICINE

## 2022-12-31 PROCEDURE — 80048 BASIC METABOLIC PNL TOTAL CA: CPT

## 2022-12-31 PROCEDURE — 6370000000 HC RX 637 (ALT 250 FOR IP): Performed by: EMERGENCY MEDICINE

## 2022-12-31 PROCEDURE — 2500000003 HC RX 250 WO HCPCS: Performed by: EMERGENCY MEDICINE

## 2022-12-31 PROCEDURE — 85730 THROMBOPLASTIN TIME PARTIAL: CPT

## 2022-12-31 PROCEDURE — 93005 ELECTROCARDIOGRAM TRACING: CPT | Performed by: EMERGENCY MEDICINE

## 2022-12-31 PROCEDURE — 85379 FIBRIN DEGRADATION QUANT: CPT

## 2022-12-31 PROCEDURE — 36415 COLL VENOUS BLD VENIPUNCTURE: CPT

## 2022-12-31 PROCEDURE — 2500000003 HC RX 250 WO HCPCS: Performed by: STUDENT IN AN ORGANIZED HEALTH CARE EDUCATION/TRAINING PROGRAM

## 2022-12-31 PROCEDURE — 6360000002 HC RX W HCPCS: Performed by: STUDENT IN AN ORGANIZED HEALTH CARE EDUCATION/TRAINING PROGRAM

## 2022-12-31 PROCEDURE — 1200000000 HC SEMI PRIVATE

## 2022-12-31 PROCEDURE — 2580000003 HC RX 258: Performed by: STUDENT IN AN ORGANIZED HEALTH CARE EDUCATION/TRAINING PROGRAM

## 2022-12-31 PROCEDURE — 84484 ASSAY OF TROPONIN QUANT: CPT

## 2022-12-31 PROCEDURE — 83880 ASSAY OF NATRIURETIC PEPTIDE: CPT

## 2022-12-31 PROCEDURE — 6370000000 HC RX 637 (ALT 250 FOR IP): Performed by: INTERNAL MEDICINE

## 2022-12-31 PROCEDURE — 2580000003 HC RX 258: Performed by: INTERNAL MEDICINE

## 2022-12-31 PROCEDURE — 99223 1ST HOSP IP/OBS HIGH 75: CPT | Performed by: INTERNAL MEDICINE

## 2022-12-31 PROCEDURE — 85025 COMPLETE CBC W/AUTO DIFF WBC: CPT

## 2022-12-31 PROCEDURE — 71045 X-RAY EXAM CHEST 1 VIEW: CPT

## 2022-12-31 PROCEDURE — 2580000003 HC RX 258: Performed by: EMERGENCY MEDICINE

## 2022-12-31 PROCEDURE — 2060000000 HC ICU INTERMEDIATE R&B

## 2022-12-31 RX ORDER — SODIUM CHLORIDE 9 MG/ML
INJECTION, SOLUTION INTRAVENOUS PRN
Status: DISCONTINUED | OUTPATIENT
Start: 2022-12-31 | End: 2023-01-04 | Stop reason: HOSPADM

## 2022-12-31 RX ORDER — ACETAMINOPHEN 650 MG/1
650 SUPPOSITORY RECTAL EVERY 6 HOURS PRN
Status: DISCONTINUED | OUTPATIENT
Start: 2022-12-31 | End: 2023-01-04 | Stop reason: HOSPADM

## 2022-12-31 RX ORDER — HEPARIN SODIUM 10000 [USP'U]/100ML
5-30 INJECTION, SOLUTION INTRAVENOUS CONTINUOUS
Status: CANCELLED | OUTPATIENT
Start: 2022-12-31

## 2022-12-31 RX ORDER — SODIUM CHLORIDE 0.9 % (FLUSH) 0.9 %
5-40 SYRINGE (ML) INJECTION PRN
Status: DISCONTINUED | OUTPATIENT
Start: 2022-12-31 | End: 2023-01-04 | Stop reason: HOSPADM

## 2022-12-31 RX ORDER — SODIUM CHLORIDE 0.9 % (FLUSH) 0.9 %
5-40 SYRINGE (ML) INJECTION EVERY 12 HOURS SCHEDULED
Status: CANCELLED | OUTPATIENT
Start: 2022-12-31

## 2022-12-31 RX ORDER — HEPARIN SODIUM 1000 [USP'U]/ML
2000 INJECTION, SOLUTION INTRAVENOUS; SUBCUTANEOUS PRN
Status: DISCONTINUED | OUTPATIENT
Start: 2022-12-31 | End: 2023-01-01

## 2022-12-31 RX ORDER — HEPARIN SODIUM 1000 [USP'U]/ML
4000 INJECTION, SOLUTION INTRAVENOUS; SUBCUTANEOUS ONCE
Status: COMPLETED | OUTPATIENT
Start: 2022-12-31 | End: 2022-12-31

## 2022-12-31 RX ORDER — SODIUM CHLORIDE 0.9 % (FLUSH) 0.9 %
5-40 SYRINGE (ML) INJECTION EVERY 12 HOURS SCHEDULED
Status: DISCONTINUED | OUTPATIENT
Start: 2022-12-31 | End: 2023-01-04 | Stop reason: HOSPADM

## 2022-12-31 RX ORDER — CIPROFLOXACIN 250 MG/1
250 TABLET, FILM COATED ORAL 2 TIMES DAILY
Status: CANCELLED | OUTPATIENT
Start: 2022-12-31

## 2022-12-31 RX ORDER — ACETAMINOPHEN 650 MG/1
650 SUPPOSITORY RECTAL EVERY 6 HOURS PRN
Status: CANCELLED | OUTPATIENT
Start: 2022-12-31

## 2022-12-31 RX ORDER — CIPROFLOXACIN 500 MG/1
250 TABLET, FILM COATED ORAL 2 TIMES DAILY
Status: DISCONTINUED | OUTPATIENT
Start: 2022-12-31 | End: 2022-12-31

## 2022-12-31 RX ORDER — NITROGLYCERIN 20 MG/100ML
5-200 INJECTION INTRAVENOUS CONTINUOUS
Status: DISCONTINUED | OUTPATIENT
Start: 2022-12-31 | End: 2023-01-02

## 2022-12-31 RX ORDER — SODIUM CHLORIDE 9 MG/ML
INJECTION, SOLUTION INTRAVENOUS PRN
Status: CANCELLED | OUTPATIENT
Start: 2022-12-31

## 2022-12-31 RX ORDER — ACETAMINOPHEN 325 MG/1
650 TABLET ORAL EVERY 6 HOURS PRN
Status: DISCONTINUED | OUTPATIENT
Start: 2022-12-31 | End: 2023-01-04 | Stop reason: HOSPADM

## 2022-12-31 RX ORDER — SODIUM CHLORIDE 0.9 % (FLUSH) 0.9 %
5-40 SYRINGE (ML) INJECTION PRN
Status: CANCELLED | OUTPATIENT
Start: 2022-12-31

## 2022-12-31 RX ORDER — BUPROPION HYDROCHLORIDE 150 MG/1
300 TABLET ORAL EVERY MORNING
Status: DISCONTINUED | OUTPATIENT
Start: 2023-01-01 | End: 2023-01-04 | Stop reason: HOSPADM

## 2022-12-31 RX ORDER — HEPARIN SODIUM 1000 [USP'U]/ML
60 INJECTION, SOLUTION INTRAVENOUS; SUBCUTANEOUS ONCE
Status: CANCELLED | OUTPATIENT
Start: 2022-12-31 | End: 2022-12-31

## 2022-12-31 RX ORDER — ASPIRIN 81 MG/1
81 TABLET ORAL DAILY
Status: DISCONTINUED | OUTPATIENT
Start: 2023-01-01 | End: 2023-01-01

## 2022-12-31 RX ORDER — ONDANSETRON 2 MG/ML
4 INJECTION INTRAMUSCULAR; INTRAVENOUS EVERY 6 HOURS PRN
Status: CANCELLED | OUTPATIENT
Start: 2022-12-31

## 2022-12-31 RX ORDER — ATORVASTATIN CALCIUM 40 MG/1
40 TABLET, FILM COATED ORAL DAILY
Status: CANCELLED | OUTPATIENT
Start: 2022-12-31

## 2022-12-31 RX ORDER — ONDANSETRON 2 MG/ML
4 INJECTION INTRAMUSCULAR; INTRAVENOUS ONCE
Status: COMPLETED | OUTPATIENT
Start: 2022-12-31 | End: 2022-12-31

## 2022-12-31 RX ORDER — MORPHINE SULFATE 4 MG/ML
4 INJECTION, SOLUTION INTRAMUSCULAR; INTRAVENOUS ONCE
Status: COMPLETED | OUTPATIENT
Start: 2022-12-31 | End: 2022-12-31

## 2022-12-31 RX ORDER — CARVEDILOL 3.12 MG/1
3.12 TABLET ORAL 2 TIMES DAILY
Status: DISCONTINUED | OUTPATIENT
Start: 2022-12-31 | End: 2023-01-04

## 2022-12-31 RX ORDER — ACETAMINOPHEN 325 MG/1
650 TABLET ORAL EVERY 6 HOURS PRN
Status: CANCELLED | OUTPATIENT
Start: 2022-12-31

## 2022-12-31 RX ORDER — HEPARIN SODIUM 10000 [USP'U]/100ML
5-30 INJECTION, SOLUTION INTRAVENOUS CONTINUOUS
Status: DISCONTINUED | OUTPATIENT
Start: 2022-12-31 | End: 2022-12-31 | Stop reason: HOSPADM

## 2022-12-31 RX ORDER — HEPARIN SODIUM 1000 [USP'U]/ML
2000 INJECTION, SOLUTION INTRAVENOUS; SUBCUTANEOUS PRN
Status: DISCONTINUED | OUTPATIENT
Start: 2022-12-31 | End: 2022-12-31 | Stop reason: HOSPADM

## 2022-12-31 RX ORDER — ONDANSETRON 4 MG/1
4 TABLET, ORALLY DISINTEGRATING ORAL EVERY 8 HOURS PRN
Status: CANCELLED | OUTPATIENT
Start: 2022-12-31

## 2022-12-31 RX ORDER — BUPROPION HYDROCHLORIDE 150 MG/1
150 TABLET ORAL EVERY MORNING
Status: CANCELLED | OUTPATIENT
Start: 2023-01-01

## 2022-12-31 RX ORDER — HEPARIN SODIUM AND DEXTROSE 10000; 5 [USP'U]/100ML; G/100ML
5-30 INJECTION INTRAVENOUS CONTINUOUS
Status: DISCONTINUED | OUTPATIENT
Start: 2022-12-31 | End: 2023-01-01

## 2022-12-31 RX ORDER — ONDANSETRON 2 MG/ML
4 INJECTION INTRAMUSCULAR; INTRAVENOUS EVERY 6 HOURS PRN
Status: DISCONTINUED | OUTPATIENT
Start: 2022-12-31 | End: 2023-01-04 | Stop reason: HOSPADM

## 2022-12-31 RX ORDER — NITROGLYCERIN 20 MG/100ML
5 INJECTION INTRAVENOUS CONTINUOUS
Status: DISCONTINUED | OUTPATIENT
Start: 2022-12-31 | End: 2022-12-31 | Stop reason: HOSPADM

## 2022-12-31 RX ORDER — MAGNESIUM SULFATE IN WATER 40 MG/ML
2000 INJECTION, SOLUTION INTRAVENOUS PRN
Status: CANCELLED | OUTPATIENT
Start: 2022-12-31

## 2022-12-31 RX ORDER — NITROFURANTOIN 25; 75 MG/1; MG/1
100 CAPSULE ORAL EVERY 12 HOURS SCHEDULED
Status: DISCONTINUED | OUTPATIENT
Start: 2022-12-31 | End: 2023-01-01

## 2022-12-31 RX ORDER — HEPARIN SODIUM 1000 [USP'U]/ML
30 INJECTION, SOLUTION INTRAVENOUS; SUBCUTANEOUS PRN
Status: CANCELLED | OUTPATIENT
Start: 2022-12-31

## 2022-12-31 RX ORDER — ATORVASTATIN CALCIUM 40 MG/1
40 TABLET, FILM COATED ORAL DAILY
Status: DISCONTINUED | OUTPATIENT
Start: 2023-01-01 | End: 2023-01-01

## 2022-12-31 RX ORDER — PANTOPRAZOLE SODIUM 40 MG/1
40 TABLET, DELAYED RELEASE ORAL
Status: DISCONTINUED | OUTPATIENT
Start: 2023-01-01 | End: 2023-01-04 | Stop reason: HOSPADM

## 2022-12-31 RX ORDER — ASPIRIN 81 MG/1
324 TABLET, CHEWABLE ORAL ONCE
Status: COMPLETED | OUTPATIENT
Start: 2022-12-31 | End: 2022-12-31

## 2022-12-31 RX ORDER — SODIUM CHLORIDE 9 MG/ML
INJECTION, SOLUTION INTRAVENOUS CONTINUOUS
Status: DISCONTINUED | OUTPATIENT
Start: 2022-12-31 | End: 2022-12-31 | Stop reason: HOSPADM

## 2022-12-31 RX ORDER — ONDANSETRON 4 MG/1
4 TABLET, ORALLY DISINTEGRATING ORAL EVERY 8 HOURS PRN
Status: DISCONTINUED | OUTPATIENT
Start: 2022-12-31 | End: 2023-01-04 | Stop reason: HOSPADM

## 2022-12-31 RX ORDER — ATORVASTATIN CALCIUM 20 MG/1
20 TABLET, FILM COATED ORAL DAILY
Status: DISCONTINUED | OUTPATIENT
Start: 2022-12-31 | End: 2022-12-31

## 2022-12-31 RX ORDER — HEPARIN SODIUM 1000 [USP'U]/ML
4000 INJECTION, SOLUTION INTRAVENOUS; SUBCUTANEOUS PRN
Status: DISCONTINUED | OUTPATIENT
Start: 2022-12-31 | End: 2022-12-31 | Stop reason: HOSPADM

## 2022-12-31 RX ORDER — POTASSIUM CHLORIDE 7.45 MG/ML
10 INJECTION INTRAVENOUS PRN
Status: DISCONTINUED | OUTPATIENT
Start: 2022-12-31 | End: 2023-01-04 | Stop reason: HOSPADM

## 2022-12-31 RX ORDER — HEPARIN SODIUM 1000 [USP'U]/ML
4000 INJECTION, SOLUTION INTRAVENOUS; SUBCUTANEOUS PRN
Status: DISCONTINUED | OUTPATIENT
Start: 2022-12-31 | End: 2023-01-01

## 2022-12-31 RX ORDER — SODIUM CHLORIDE 9 MG/ML
INJECTION, SOLUTION INTRAVENOUS CONTINUOUS
Status: ACTIVE | OUTPATIENT
Start: 2022-12-31 | End: 2023-01-01

## 2022-12-31 RX ORDER — POTASSIUM CHLORIDE 20 MEQ/1
40 TABLET, EXTENDED RELEASE ORAL PRN
Status: CANCELLED | OUTPATIENT
Start: 2022-12-31

## 2022-12-31 RX ORDER — POTASSIUM CHLORIDE 20 MEQ/1
40 TABLET, EXTENDED RELEASE ORAL PRN
Status: DISCONTINUED | OUTPATIENT
Start: 2022-12-31 | End: 2023-01-04 | Stop reason: HOSPADM

## 2022-12-31 RX ORDER — POTASSIUM CHLORIDE 7.45 MG/ML
10 INJECTION INTRAVENOUS PRN
Status: CANCELLED | OUTPATIENT
Start: 2022-12-31

## 2022-12-31 RX ORDER — HEPARIN SODIUM 1000 [USP'U]/ML
60 INJECTION, SOLUTION INTRAVENOUS; SUBCUTANEOUS PRN
Status: CANCELLED | OUTPATIENT
Start: 2022-12-31

## 2022-12-31 RX ADMIN — ONDANSETRON 4 MG: 2 INJECTION INTRAMUSCULAR; INTRAVENOUS at 17:17

## 2022-12-31 RX ADMIN — HEPARIN SODIUM 4000 UNITS: 1000 INJECTION INTRAVENOUS; SUBCUTANEOUS at 22:04

## 2022-12-31 RX ADMIN — NITROGLYCERIN 5 MCG/MIN: 20 INJECTION INTRAVENOUS at 14:33

## 2022-12-31 RX ADMIN — NITROGLYCERIN 10 MCG/MIN: 20 INJECTION INTRAVENOUS at 18:31

## 2022-12-31 RX ADMIN — HEPARIN SODIUM AND DEXTROSE 10 UNITS/KG/HR: 10000; 5 INJECTION INTRAVENOUS at 15:38

## 2022-12-31 RX ADMIN — SODIUM CHLORIDE, PRESERVATIVE FREE 10 ML: 5 INJECTION INTRAVENOUS at 20:41

## 2022-12-31 RX ADMIN — ONDANSETRON 4 MG: 2 INJECTION INTRAMUSCULAR; INTRAVENOUS at 20:29

## 2022-12-31 RX ADMIN — MORPHINE SULFATE 4 MG: 4 INJECTION INTRAVENOUS at 12:25

## 2022-12-31 RX ADMIN — CARVEDILOL 3.12 MG: 3.12 TABLET, FILM COATED ORAL at 20:40

## 2022-12-31 RX ADMIN — MORPHINE SULFATE 4 MG: 4 INJECTION INTRAVENOUS at 14:25

## 2022-12-31 RX ADMIN — HEPARIN SODIUM AND DEXTROSE 10 UNITS/KG/HR: 10000; 5 INJECTION INTRAVENOUS at 18:32

## 2022-12-31 RX ADMIN — SODIUM CHLORIDE: 9 INJECTION, SOLUTION INTRAVENOUS at 12:38

## 2022-12-31 RX ADMIN — SODIUM CHLORIDE: 9 INJECTION, SOLUTION INTRAVENOUS at 20:40

## 2022-12-31 RX ADMIN — HEPARIN SODIUM 4000 UNITS: 1000 INJECTION INTRAVENOUS; SUBCUTANEOUS at 15:28

## 2022-12-31 RX ADMIN — ASPIRIN 324 MG: 81 TABLET, CHEWABLE ORAL at 12:37

## 2022-12-31 RX ADMIN — NITROFURANTOIN (MONOHYDRATE/MACROCRYSTALS) 100 MG: 75; 25 CAPSULE ORAL at 20:40

## 2022-12-31 ASSESSMENT — PAIN DESCRIPTION - LOCATION
LOCATION: CHEST
LOCATION: CHEST

## 2022-12-31 ASSESSMENT — PAIN SCALES - GENERAL
PAINLEVEL_OUTOF10: 10
PAINLEVEL_OUTOF10: 6
PAINLEVEL_OUTOF10: 3
PAINLEVEL_OUTOF10: 4

## 2022-12-31 ASSESSMENT — PAIN - FUNCTIONAL ASSESSMENT
PAIN_FUNCTIONAL_ASSESSMENT: 0-10
PAIN_FUNCTIONAL_ASSESSMENT: 0-10

## 2022-12-31 ASSESSMENT — PAIN DESCRIPTION - ORIENTATION: ORIENTATION: RIGHT;LEFT

## 2022-12-31 ASSESSMENT — PAIN DESCRIPTION - DESCRIPTORS: DESCRIPTORS: ACHING

## 2022-12-31 NOTE — H&P
[facility]  Office: [address], DO, Prabha Álvarez, DO, Santa Cur[place], DO, Lotus Meka Blood, DO, Alexia Mcneil MD, Sue Camarillo MD, Yair Oneal MD, Danie Barrios MD,  Maty Park MD, Blue Fitzgerald MD, Radha Feliciano, DO, Alicia Rosario MD,  Sandi Neal MD, Flora Estrada MD, Perez Venegas, DO, Deya Carranza MD, Severiano Border, MD, Oskar Taylor DO, Rai Marshall MD, Virginia May MD, Kristopher Sandhu MD, Garrett Bland MD, Shani Bangura, DO, Elif Monique MD, Josephine Liriano MD, Nahum Su, CNP,  Mattie Sharma, CNP, Anayeli Lyn, CNP, Bernie Burt, CNP,  Jimenez Mtz, [facility], Love Olivo, CNP, Jina Ardon, CNP, Perico Flores, CNP, Tj Oconnor, CNP, Uriah Nye, CNP, Vianey Laws PAAdamC, Richie Thomson, CNS, Claudell Belfast, CNP, Catrina Núñez, CNP         [facility]    HISTORY AND PHYSICAL EXAMINATION            Date:   12/31/2022  Patient name:  Esteban Baldwin  Date of admission:  12/31/2022  5:54 PM  MRN:   [id number]  Account:  [de-identified]  YOB: 1955  PCP:    Valdemar Leiva MD  Room:   [facility]  Code Status:    Full Code    Chief Complaint:     Chief Complaint   Patient presents with    Chest Pain     NSTEMI, [facility] transfer, direct admit no bed          History Obtained From:     patient, spouse    History of Present Illness:     Esteban Baldwin is a 74 y.o. Non- / non  male who presents with Chest Pain (NSTEMI, [facility] transfer, direct admit no bed /)   and is admitted to the hospital for the management of NSTEMI (non-ST elevated myocardial infarction) ([facility] [address] Patient admitted to [facility] from [facility] ER for NSTEMI. Patient started chest pain last night around 5 PM which was crushing and over the anterior chest.  It was accompanied by dry heaves . He has chronic hiccups since his chemotherapy. Patient rates his pain as 8-9/10. This morning he could not bear the pain anymore and he went to the emergency room where he was diagnosed with NSTEMI. Troponins were elevated at 1769 on the last troponin is 2950. Patient was started on a nitro drip and heparin drip and transferred to [facility].  Cardiology was informed. EKGs from earlier today are not available. EKG done in the emergency room now shows sinus rhythm and ST elevations in the anterolateral waves with inferior wall depression. T waves inverted anteriorly. Patient has a history of coronary angiogram done in June prior to his bladder cancer surgery. In June his coronaries showed minimal nonobstructive CAD. LV gram was not done. Echo in April had shown mild to moderate LV size and wall thickness with LV function of 40 to 45%. Patient has no history of smoking. He was on a beta-blocker and statin at home. He was also on Xarelto for history of PE after chemotherapy and surgery. Patient had a recent nephrostomy tubes removed. He was also diagnosed with a UTI and started on ciprofloxacin but has not received it yet. His urine culture is growing Enterococcus which resistant to ciprofloxacin    Patient also noted to have CKD. His creatinine has been elevated since November with a creatinine of 1.75 baseline now    Currently patient rates his chest pain as 2/10. He is comfortable. He is hiccuping which is chronic. No nausea or abdominal pain. No dysuria or hematuria which stopped today. Echo 04/2022  CONCLUSIONS     Summary  Technically difficult study  Normal LV size , mild to moderately increased wall thickness. Difficult to assess wall motion abnormalities  Moderately reduced LV systolic function with LVEF 40-45 %. Normal RV size and function. LA and RA appears normal in size. No obvious significant structural valvular abnormality noted. No significant valvular stenosis or regurgitation noted. Normal aortic root dimension.   No significant pericardial effusion noted. IVC not well visualized     Coronary angiogram 06/2022  Conclusions      Procedure Summary      Minimal non-obstructive CAD. LV was not done. Recommendations      Medical therapy as needed. Risk factor modification. Past Medical History:     Past Medical History:   Diagnosis Date    Acute renal failure with tubular necrosis ([facility] [address] 05/26/2022    Likely ischemic ATN/prerenal acidemia from intractable nausea vomiting as result of chemotherapy, baseline 1.3-1.4 peaked up to 2.3 in May 2022    Arthritis     BPH with obstruction/lower urinary tract symptoms     CAD (coronary artery disease) 06/2022    nonobstructive on cath    Caffeine use     3 cans soda/pop    Cancer ([address]     bladder cancer. Dr. Amador Mobley [facility]    CKD (chronic kidney disease), stage III ([facility] [address] 05/26/2022    From 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   Baseline 1.3-1.4    COVID-19 08/16/2021    SOB, fatigue, fever, chills  x 3 weeks    Depression     Dilated cardiomyopathy ([address] 05/26/2022    Ejection fraction 40 to 45%, does have symptoms of dyspnea and decreased effort tolerance    GERD (gastroesophageal reflux disease)     High cholesterol     Kidney calculi     Sleep apnea     does not use cpap    Under care of team     Dr. Marina Reilly Nephrology    Under care of team     Dr. Barbie Avitia. last visit Oct 2022    Under care of team     Dr. Kathy Fulton [facility] last visit 8/03/2022    Under care of team     Dr. Chichi Hills    Under care of team     Dr. Lasha Bacon    Wears glasses         Past Surgical History:     Past Surgical History:   Procedure Laterality Date    BLADDER REMOVAL      BLADDER REMOVAL N/A 09/16/2022    XI ROBOTIC LAPARASCOPIC ASSISTED RADICAL CYSTOPROSTATECTOMY, BILATERAL PELVIC LYMPHADENECTOMY AND ILEAL CONDUIT FORMATION performed by Alok Nunez MD at [facility] Right 09/16/2022    CYSTOSCOPY STENT REMOVAL performed by Alok Nunez MD at [facility]  06/16/2022    nonobstructive CAD    COLONOSCOPY      CYSTOSCOPY  12/20/2022    LOOPOSCOPY, STENT REMOVAL    CYSTOSCOPY Left 12/20/2022    LOOPOSCOPY, STENT REMOVAL performed by Alok Nunez MD at [address] NEPHROSTOMY PERCUTANEOUS LEFT  09/23/2022    IR NEPHROSTOMY PERCUTANEOUS LEFT 9/23/2022 MD [facility]    IR NEPHROSTOMY PERCUTANEOUS LEFT  10/05/2022    IR NEPHROSTOMY PERCUTANEOUS LEFT 10/5/2022 [facility]    IR NEPHROSTOMY PERCUTANEOUS RIGHT  09/23/2022    IR NEPHROSTOMY PERCUTANEOUS RIGHT 9/23/2022 MD [facility]    IR NEPHROSTOMY PERCUTANEOUS RIGHT  10/05/2022    IR NEPHROSTOMY PERCUTANEOUS RIGHT 10/5/2022 [facility]    IR PORT PLACEMENT EQUAL OR GREATER THAN 5 YEARS  02/25/2022    IR PORT PLACEMENT EQUAL OR GREATER THAN 5 YEARS 2/25/2022 [facility]    KNEE ARTHROSCOPY      left    LAPAROTOMY N/A 09/22/2022    LAPAROTOMY EXPLORATORY, LYSIS OF ADHESIONS, REPAIR OF BILATERAL URETERAL ANASTOMOSES, ABDOMINAL WASHOUT, PLACEMENT OF ABTHERA WOUND VAC performed by Latasha Jaramillo DO at [address] N/A 09/23/2022    2ND LOOK LAPAROTOMY,  ABDOMINAL WASHOUT, ABDOMINAL CLOSURE, WOUND VAC PLACEMENT performed by Latasha Jaramillo DO at [place] [id number]  09/16/2022    Cystoprostatectomy, Bilateral Pelvic Lymphadenectomy, ileo conduit formation, cystectomy stent removal (right)        Medications Prior to Admission:     Prior to Admission medications    Medication Sig Start Date End Date Taking?  Authorizing Provider   ciprofloxacin (CIPRO) 250 MG tablet Take 1 tablet by mouth 2 times daily for 7 days 12/29/22 1/5/23  Hermilo Higuera DO   ondansetron (ZOFRAN-ODT) 8 MG TBDP disintegrating tablet DISSOLVE 1 TABLET IN MOUTH EVERY 8 HOURS AS NEEDED FOR NAUSEA FOR VOMITING 12/27/22   Sarah Grayson MD   sucralfate (CARAFATE) 1 GM/10ML suspension Take 10 mLs by mouth 4 times daily 10/18/22 12/20/22  Bushra Rodríguez MD   rivaroxaban (XARELTO) 20 MG TABS tablet Take 1 tablet by mouth daily 10/10/22   Rikki Mak MD   omeprazole (PRILOSEC) 20 MG delayed release capsule Take 1 capsule by mouth daily  Patient not taking: Reported on 12/31/2022 7/8/22   Jelani Islas MD   carvedilol (COREG) 3.125 MG tablet TAKE 1 TABLET BY MOUTH TWICE DAILY 5/18/22   Historical Provider, MD   simvastatin (ZOCOR) 40 MG tablet Take 40 mg by mouth nightly    Historical Provider, MD   buPROPion (WELLBUTRIN XL) 300 MG extended release tablet Take 300 mg by mouth every morning    Historical Provider, MD        Allergies:     Patient has no known allergies. Social History:     Tobacco:    reports that he quit smoking about 30 years ago. His smoking use included cigarettes. He started smoking about 40 years ago. He has a 2.25 pack-year smoking history. He has never used smokeless tobacco.  Alcohol:      reports that he does not currently use alcohol. Drug Use:  reports no history of drug use. Family History:     Family History   Problem Relation Age of Onset    Cancer Father         bladder cancer    Urolithiasis Father        Review of Systems:     Positive and Negative as described in HPI. Review of Systems   Constitutional:  Positive for appetite change and unexpected weight change. Negative for chills, fatigue and fever. Respiratory:  Positive for shortness of breath. Negative for cough and wheezing. Cardiovascular:  Positive for chest pain. Negative for palpitations and leg swelling. Gastrointestinal:  Positive for nausea. Negative for abdominal pain and diarrhea.         Dry heaves and hiccups   Endocrine: Negative for polydipsia and polyuria. Genitourinary:  Positive for hematuria. Negative for flank pain, frequency and testicular pain. Neurological:  Negative for dizziness, weakness and headaches. Hematological:  Negative for adenopathy. Bruises/bleeds easily. Physical Exam:   BP (!) 135/96   Pulse 86   Temp 97.9 °F (36.6 °C) (Oral)   Resp 10   Wt 205 lb 0.4 oz (93 kg)   SpO2 97%   BMI 26.32 kg/m²   Temp (24hrs), Av.7 °F (36.5 °C), Min:97.4 °F (36.3 °C), Max:97.9 °F (36.6 °C)    No results for input(s): POCGLU in the last 72 hours. No intake or output data in the 24 hours ending 22 1834    Physical Exam  Vitals and nursing note reviewed. Constitutional:       General: He is not in acute distress. Appearance: Normal appearance. He is not toxic-appearing or diaphoretic. HENT:      Head: Normocephalic and atraumatic. Eyes:      General: No scleral icterus. Extraocular Movements: Extraocular movements intact. Conjunctiva/sclera: Conjunctivae normal.      Pupils: Pupils are equal, round, and reactive to light. Cardiovascular:      Rate and Rhythm: Normal rate and regular rhythm. Heart sounds: No murmur heard. Comments: Port on right chest wall  Pulmonary:      Effort: Pulmonary effort is normal.      Breath sounds: Normal breath sounds. No wheezing or rales. Abdominal:      General: Bowel sounds are normal.      Palpations: Abdomen is soft. Musculoskeletal:      Right lower leg: No edema. Left lower leg: No edema. Skin:     General: Skin is warm and dry. Neurological:      General: No focal deficit present. Mental Status: He is alert and oriented to person, place, and time.        Investigations:      Laboratory Testing:  Recent Results (from the past 24 hour(s))   Troponin    Collection Time: 22  1:38 PM   Result Value Ref Range    Troponin, High Sensitivity 1,769 (HH) 0 - 22 ng/L   CBC with Auto Differential    Collection Time: 22  1:38 PM Result Value Ref Range    WBC 13.7 (H) 3.5 - 11.0 k/uL    RBC 3.91 (L) 4.5 - 5.9 m/uL    Hemoglobin 9.8 (L) 13.5 - 17.5 g/dL    Hematocrit 30.4 (L) 41 - 53 %    MCV 77.6 (L) 80 - 100 fL    MCH 25.1 (L) 26 - 34 pg    MCHC 32.3 31 - 37 g/dL    RDW 18.9 (H) 12.5 - 15.4 %    Platelets 329 956 - 995 k/uL    MPV 7.1 6.0 - 12.0 fL    Seg Neutrophils 78 (H) 36 - 66 %    Lymphocytes 15 (L) 24 - 44 %    Monocytes 5 2 - 11 %    Eosinophils % 1 1 - 4 %    Basophils 1 0 - 2 %    Segs Absolute 10.80 (H) 1.8 - 7.7 k/uL    Absolute Lymph # 2.00 1.0 - 4.8 k/uL    Absolute Mono # 0.70 0.1 - 1.2 k/uL    Absolute Eos # 0.10 0.0 - 0.4 k/uL    Basophils Absolute 0.10 0.0 - 0.2 k/uL   Basic Metabolic Panel    Collection Time: 12/31/22  1:38 PM   Result Value Ref Range    Glucose 88 70 - 99 mg/dL    BUN 13 8 - 23 mg/dL    Creatinine 1.71 (H) 0.70 - 1.20 mg/dL    Est, Glom Filt Rate 44 (L) >60 mL/min/1.73m2    Calcium 8.7 8.6 - 10.4 mg/dL    Sodium 133 (L) 135 - 144 mmol/L    Potassium 3.9 3.7 - 5.3 mmol/L    Chloride 102 98 - 107 mmol/L    CO2 20 20 - 31 mmol/L    Anion Gap 11 9 - 17 mmol/L   D-Dimer, Quantitative    Collection Time: 12/31/22  1:38 PM   Result Value Ref Range    D-Dimer, Quant 1.08 mg/L FEU   Brain Natriuretic Peptide    Collection Time: 12/31/22  1:38 PM   Result Value Ref Range    Pro-BNP 6,582 (H) <300 pg/mL   APTT    Collection Time: 12/31/22  1:38 PM   Result Value Ref Range    PTT 27.2 21.3 - 31.3 sec   Troponin    Collection Time: 12/31/22  4:06 PM   Result Value Ref Range    Troponin, High Sensitivity 2,950 (HH) 0 - 22 ng/L       Imaging/Diagnostics:  US RENAL COMPLETE    Result Date: 12/29/2022  1. Bilateral nonobstructing nephrolithiasis. No evidence of hydronephrosis. 2. 4.2 cm cyst within the mid left kidney. 3. Patient status post cystectomy. No mass or fluid collection within the pelvis. XR CHEST PORTABLE    Result Date: 12/31/2022  COPD. No acute cardiopulmonary process. Indwelling infusion port. Assessment :      Hospital Problems             Last Modified POA    * (Principal) NSTEMI (non-ST elevated myocardial infarction) ([facility] [address] 89/17/6772 Yes    Complicated UTI (urinary tract infection) 12/31/2022 Yes    Malignant neoplasm of urinary bladder ([facility] [address] 12/31/2022 Yes    Cancer of lateral wall of urinary bladder ([facility] [address] 12/31/2022 Yes    Other hyperlipidemia 12/31/2022 Yes    CKD (chronic kidney disease), stage III ([facility] [address] 12/31/2022 Yes    Overview Signed 5/26/2022  3:00 PM by Robert Cotter MD     From 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 Baseline 1.3-1.4         Dilated cardiomyopathy ([facility] [address] 12/31/2022 Yes    Overview Signed 5/26/2022  3:01 PM by Robert Cotter MD     Ejection fraction 40 to 45%, does have symptoms of dyspnea and decreased effort tolerance            Plan:     Patient status inpatient in the Med/Surge    NSTEMI- cardiology consulted. EKG reviewed. Continued on BB, statin, iv heparin and iv nitro drip. Keep NPO for now. Echo. Monitor EKG. Telemetry. CKD stage 3- renal US done recently. Nephrology consulted for possible cath. Gentle hydration with close monitoring of I/O's BMP in am  Enterococcal UTI- switch to Macrobid. DVT prophylaxis- on iv heparin    Consultations:   IP CONSULT TO SOCIAL WORK  IP CONSULT TO CARDIOLOGY  IP CONSULT TO NEPHROLOGY     Patient is admitted as inpatient status because of co-morbidities listed above, severity of signs and symptoms as outlined, requirement for current medical therapies and most importantly because of direct risk to patient if care not provided in a hospital setting. Expected length of stay > 48 hours.     Guy Lugo MD  12/31/2022  6:34 PM    Copy sent to Dr. Simone West MD

## 2022-12-31 NOTE — ED NOTES
Pt. Arrives to ED via [facility] from [facility] as a direct admit to [facility] with no bed available. Pt. Diverted to ED with heparin running at 10u/kg/hr and nitro drip at 10mcg/min. Pt. States pain is currently 3/10 but would like to stay where he is at as he is currently comfortable. Pt. Has port for bladder cancer and urostomy in place. Writer perfect serves admitting team to advise orders needed at this time.        Jacquelin Rivera RN  12/31/22 [id number]

## 2022-12-31 NOTE — Clinical Note
Discharge Plan[de-identified] Other/Uknown (Specify)   Bed request comments: inpt/stepdown with tele/from [place] ed/on heparin and nitro drips

## 2022-12-31 NOTE — ED NOTES
Attempt made to call report to [facility], placed on hold for several minutes without answer. Transportation here, pt feeling much better.      Ishmael Tam RN  12/31/22 2010

## 2022-12-31 NOTE — ED PROVIDER NOTES
[address] [facility] Emergency Department  [id number] [facility] [address] [facility] [address]  Phone: [phone]  Fax: Juliana Álvarez [phone]      Pt Name: Christophe Munoz  MRN: [id number]  [place] 1955  Date of evaluation: 12/31/2022    CHIEF COMPLAINT       Chief Complaint   Patient presents with    Chest Pain    Shortness of Breath    Emesis     Pt reports chest pain and sob started last night, then dry heaves all night. HISTORY OF PRESENT ILLNESS    Christophe Munoz is a 74 y.o. male who presents to the emergency department complaining of retrosternal chest pain with associated shortness of breath since last night. Pain causes dry heaving. He says the symptoms started around 10:00. He has exertional shortness of breath he says for about a year but this chest pain started last night. He had a heart cath back in June which showed multivessel disease 30 to 40% stenosis. He denies cough or congestion fevers or chills. He rates his pain 10 out of 10 sharp has not taken anything for it. He did take an aspirin last night. Seen here couple days ago for hematuria he has a urostomy secondary to prostate issues. No other symptoms.     REVIEW OF SYSTEMS       Constitutional: No fevers or chills   HEENT: No sore throat, rhinorrhea, or earache   Eyes: No blurry vision or double vision no drainage   Cardiovascular: Positive chest pain no tachycardia  Respiratory: No wheezing or shortness of breath no cough   Gastrointestinal: No nausea, vomiting, diarrhea, constipation, or abdominal pain   : N no hematuria no dysuria  Musculoskeletal: No swelling or pain   Skin: No rash   Neurological: No focal neurologic complaints, paresthesias, weakness, or headache     PAST MEDICAL HISTORY    has a past medical history of Acute renal failure with tubular necrosis ([address] Arthritis, BPH with obstruction/lower urinary tract symptoms, CAD (coronary artery disease), Caffeine use, Cancer ([address] CKD (chronic kidney disease), stage III ([facility] [address] [address] COVID-19, Depression, Dilated cardiomyopathy ([facility] [address] GERD (gastroesophageal reflux disease), High cholesterol, Kidney calculi, Sleep apnea, Under care of team, Under care of team, Under care of team, Under care of team, Under care of team, and Wears glasses. SURGICAL HISTORY      has a past surgical history that includes Knee arthroscopy; Cardiac catheterization; Colonoscopy; IR PORT PLACEMENT > 5 YEARS (2022); Cardiac catheterization (2022); Bladder removal; Prostatectomy (2022); Bladder removal (N/A, 2022); Bladder surgery (Right, 2022); laparotomy (N/A, 2022); IR GUIDED NEPHROSTOMY CATH PLACEMENT LEFT (2022); IR GUIDED NEPHROSTOMY CATH PLACEMENT RIGHT (2022); laparotomy (N/A, 2022); IR GUIDED NEPHROSTOMY CATH PLACEMENT LEFT (10/05/2022); IR GUIDED NEPHROSTOMY CATH PLACEMENT RIGHT (10/05/2022); Cystoscopy (2022); and Cystoscopy (Left, 2022). CURRENT MEDICATIONS       Previous Medications    BUPROPION (WELLBUTRIN XL) 300 MG EXTENDED RELEASE TABLET    Take 300 mg by mouth every morning    CARVEDILOL (COREG) 3.125 MG TABLET    TAKE 1 TABLET BY MOUTH TWICE DAILY    CIPROFLOXACIN (CIPRO) 250 MG TABLET    Take 1 tablet by mouth 2 times daily for 7 days    OMEPRAZOLE (PRILOSEC) 20 MG DELAYED RELEASE CAPSULE    Take 1 capsule by mouth daily    ONDANSETRON (ZOFRAN-ODT) 8 MG TBDP DISINTEGRATING TABLET    DISSOLVE 1 TABLET IN MOUTH EVERY 8 HOURS AS NEEDED FOR NAUSEA FOR VOMITING    RIVAROXABAN (XARELTO) 20 MG TABS TABLET    Take 1 tablet by mouth daily    SIMVASTATIN (ZOCOR) 40 MG TABLET    Take 40 mg by mouth nightly    SUCRALFATE (CARAFATE) 1 GM/10ML SUSPENSION    Take 10 mLs by mouth 4 times daily       ALLERGIES     has No Known Allergies. FAMILY HISTORY     He indicated that his mother is . He indicated that his father is .      family history includes Cancer in his father; Urolithiasis in his father. SOCIAL HISTORY      reports that he quit smoking about 30 years ago. His smoking use included cigarettes. He started smoking about 40 years ago. He has a 2.25 pack-year smoking history. He has never used smokeless tobacco. He reports that he does not currently use alcohol. He reports that he does not use drugs. PHYSICAL EXAM       BP (!) 115/92   Pulse 91   Temp 97.4 °F (36.3 °C) (Oral)   Resp 16   SpO2 96%       Constitutional: Alert, oriented x3, nontoxic, answering questions appropriately, acting properly for age, in no acute distress appears uncomfortable  HEENT: Extraocular muscles intact,   Neck: Trachea midline   Cardiovascular: Regular rhythm and rate no murmurs   Respiratory: Diminished to auscultation bilaterally no wheezes, rhonchi, rales, no respiratory distress no tachypnea no retractions no hypoxia  Gastrointestinal: Soft, nontender, nondistended, diminished bowel sounds. No rebound, rigidity, or guarding. Urostomy intact yellow urine with some sediment in the bag. No abdominal bruit no pulsatile mass  Musculoskeletal: No extremity pain or swelling no calf tenderness or asymmetry  Neurologic: Moving all 4 extremities without difficulty there are no gross focal neurologic deficits   Skin: Warm and dry       DIFFERENTIAL DIAGNOSIS/ MDM:     Chest pain rule out cardiac etiology versus other. IV and labs. EKG. Aspirin and morphine.     Differential diagnosis considered: Chest pain rule out MI versus PE versus pneumonia versus other    Chronic Conditions affecting care (DM,HTN,CA, etc): Coronary artery disease    Social Determinants of Health affecting care (unable to care for self, lives alone, unemployed, homeless,etc): Not applicable    History source(s) (patient,spouse,parent,family,friend,EMS,etc): Patient and previous records    Review of external sources (ECF,Hospital records,EMS report, radiology reports, etc): Hospital records    Tests considered but not ordered: Not applicable    Independent interpretation of tests (eg.  X-ray, CAT scan, Doppler studies, EKG): See below    Discussion of x-ray results with radiology: See below    Consults: See below    Consideration for admission/observation (even if discharged):  Will consider admission for observation based on test results and patient improvement    Prescription considerations: See below    Sepsis considered: Considered, unlikely    Critical Care note written: See below        HEART Risk Score:   2 = History   Highly Suspicious   2    Moderately Suspicious   1    Slight Suspicious  0  1 = EKG  Significant ST Depression 2    Nonspecific   1    Normal    0  1 = Age > or = 65   2    > 45 to < 65   1    < or = 45   0  1 = Risk Factors > or = 3   2    1 or 2    1    0    0  2 = Troponin > or = 3 times normal limit 2    > 1 and < 3 times limit  1    Normal    0  7 = Score  0 - 3    Low Risk     4 - 6    Moderate risk     7 - 10    High Risk  * Risk Factors include: Diabetes, Tobacco use, Hypertension, Hyperlipidemia, Family History of CAD and Obesity    PERC Criteria     y = Age      > or = 50  n = Heart Rate     > or = 100  n = Pulse Oximetry     < or = 95%  n = Previous History of DVT   Yes / No  n = Trauma or Surgery within 4 Weeks Yes / No  n = Hemoptysis    Yes / No  n = Exogenous Estrogen use  Yes / No  n = Unilateral Leg Swelling   Yes / No    Risk Criteria for Thoracic Aortic Dissection:     n = Sudden Onset, Maximal at Onset, with radiation to back  n = Neurologic Deficits with Chest Pain  n = Connective Tissue Disorder such as Marfan's or Elmer-Danlos  n = History of Aortic Valvular Disease  n = Pregnancy  n = Family History of Dissection  Elevated D-Dimer with any of the above    DIAGNOSTIC RESULTS     EKG: All EKG's are interpreted by the Emergency Department Physician who either signs or Co-signs this chart in the absence of a cardiologist.    1208 sinus rhythm rate 93    subtle ST elevations V3 and V4.  Q waves V2  Compared to EKGs from 10/9/2022 and 9/1/2022 are different in the precordial leads will repeat EKG possible lead difference. 1233 sinus rhythm rate 76    subtle ST elevations V3 and V4 with Q waves in V2. Nonspecific interventricular block that was not present on previous EKGs except for the one done 30 minutes ago. 1455 sinus rhythm rate 97    Q waves precordial leads.      Not indicated unless otherwise documented above    LABS:  Results for orders placed or performed during the hospital encounter of 12/31/22   Troponin   Result Value Ref Range    Troponin, High Sensitivity 1,769 (HH) 0 - 22 ng/L   Troponin   Result Value Ref Range    Troponin, High Sensitivity 2,950 (HH) 0 - 22 ng/L   CBC with Auto Differential   Result Value Ref Range    WBC 13.7 (H) 3.5 - 11.0 k/uL    RBC 3.91 (L) 4.5 - 5.9 m/uL    Hemoglobin 9.8 (L) 13.5 - 17.5 g/dL    Hematocrit 30.4 (L) 41 - 53 %    MCV 77.6 (L) 80 - 100 fL    MCH 25.1 (L) 26 - 34 pg    MCHC 32.3 31 - 37 g/dL    RDW 18.9 (H) 12.5 - 15.4 %    Platelets 072 080 - 630 k/uL    MPV 7.1 6.0 - 12.0 fL    Seg Neutrophils 78 (H) 36 - 66 %    Lymphocytes 15 (L) 24 - 44 %    Monocytes 5 2 - 11 %    Eosinophils % 1 1 - 4 %    Basophils 1 0 - 2 %    Segs Absolute 10.80 (H) 1.8 - 7.7 k/uL    Absolute Lymph # 2.00 1.0 - 4.8 k/uL    Absolute Mono # 0.70 0.1 - 1.2 k/uL    Absolute Eos # 0.10 0.0 - 0.4 k/uL    Basophils Absolute 0.10 0.0 - 0.2 k/uL   Basic Metabolic Panel   Result Value Ref Range    Glucose 88 70 - 99 mg/dL    BUN 13 8 - 23 mg/dL    Creatinine 1.71 (H) 0.70 - 1.20 mg/dL    Est, Glom Filt Rate 44 (L) >60 mL/min/1.73m2    Calcium 8.7 8.6 - 10.4 mg/dL    Sodium 133 (L) 135 - 144 mmol/L    Potassium 3.9 3.7 - 5.3 mmol/L    Chloride 102 98 - 107 mmol/L    CO2 20 20 - 31 mmol/L    Anion Gap 11 9 - 17 mmol/L   D-Dimer, Quantitative   Result Value Ref Range    D-Dimer, Quant 1.08 mg/L FEU   Brain Natriuretic Peptide Result Value Ref Range    Pro-BNP 6,582 (H) <300 pg/mL   APTT   Result Value Ref Range    PTT 27.2 21.3 - 31.3 sec       Not indicated unless otherwise documented above    RADIOLOGY:   I reviewed the radiologist interpretations:    XR CHEST PORTABLE   Final Result   COPD. No acute cardiopulmonary process. Indwelling infusion port. Not indicated unless otherwise documented above    EMERGENCY DEPARTMENT COURSE:     The patient was given the following medications:  Orders Placed This Encounter   Medications    0.9 % sodium chloride infusion    aspirin chewable tablet 324 mg    morphine injection 4 mg    morphine injection 4 mg    nitroGLYCERIN 50 mg in dextrose 5% 250 mL infusion     Order Specific Question:   Titrate Infusion? Answer:   Yes     Order Specific Question:   Initial Infusion Dose: Answer:   5 mcg/min     Order Specific Question:   Goal of Therapy is: Answer:   Chest pain symptom relief     Order Specific Question:   Contact Provider if:     Answer:   SBP less than 90 mmHg    heparin (porcine) injection 4,000 Units    heparin (porcine) injection 4,000 Units    heparin (porcine) injection 2,000 Units    heparin 25,000 units in dextrose 5% 250 mL (premix) infusion        Vitals:   -------------------------  BP (!) 112/98   Pulse 93   Temp 97.4 °F (36.3 °C) (Oral)   Resp 16   SpO2 98%     2:15 PM there was some delay in getting patient's labs. On reevaluation pain 5-6 out of 10 will repeat morphine and start nitroglycerin drip. Troponin significantly elevated at 1700. His D-dimer is elevated however he is on Xarelto. BNP of 6500. White blood cell count 13.7 with an anemia hemoglobin 9.8. Will discuss with interventional cardiology at [facility]. María Elena. 2:25 PM discussed with  recommends transfer to [facility]. María Elena. EKG sent for comparison. Recommends heparin he has not had his Xarelto since yesterday morning.   Awaiting callback from hospitalist.    3 PM discussed with  who agrees to admission. Awaiting bed assignment and transport. 4:35 PM repeat troponin continues to elevate 2900. Room not available pain improved to 3 out of 10 discussed with Dr. Jaden Bowens. [facility] who is willing to take the patient ER to ER. ETA for transport 15 minutes. CRITICAL CARE:  Excluding procedures 30 minutes    Critical Care: The high probability of sudden, clinically significant deterioration in the patient's condition required the highest level of my preparedness to intervene urgently. The services I provided to this patient were to treat and/or prevent clinically significant deterioration. Services included the following: chart data review, reviewing nursing notes and/or old charts, documentation time, consultant collaboration regarding findings and treatment options, medication orders and management, direct patient care, vital sign assessments and ordering, interpreting and reviewing diagnostic studies/lab tests. Aggregate critical care time includes only time during which I was engaged in work directly related to the patient's care, as described       CONSULTS:    Cardiology [facility]    PROCEDURES:    None      OARRS Report if indicated             FINAL IMPRESSION      1. NSTEMI (non-ST elevated myocardial infarction) ([facility] [address]          DISPOSITION/PLAN   DISPOSITION Admitted 12/31/2022 03:09:02 PM        CONDITION ON DISPOSITION: STABLE       PATIENT REFERRED TO:  No follow-up provider specified.     DISCHARGE MEDICATIONS:  New Prescriptions    No medications on file       (Please note that portions of this note were completed with a voice recognition program.  Efforts were made to edit the dictations but occasionally words are mis-transcribed.)    Shayy Wiley DO   Attending Emergency Physician     Shayy Wiley DO  12/31/22 1700

## 2023-01-01 LAB
ABSOLUTE EOS #: 0.11 K/UL (ref 0–0.44)
ABSOLUTE IMMATURE GRANULOCYTE: 0.1 K/UL (ref 0–0.3)
ABSOLUTE LYMPH #: 2.39 K/UL (ref 1.1–3.7)
ABSOLUTE MONO #: 0.67 K/UL (ref 0.1–1.2)
ANION GAP SERPL CALCULATED.3IONS-SCNC: 13 MMOL/L (ref 9–17)
BASOPHILS # BLD: 0 % (ref 0–2)
BASOPHILS ABSOLUTE: 0.04 K/UL (ref 0–0.2)
BUN BLDV-MCNC: 13 MG/DL (ref 8–23)
CALCIUM SERPL-MCNC: 8.4 MG/DL (ref 8.6–10.4)
CHLORIDE BLD-SCNC: 102 MMOL/L (ref 98–107)
CO2: 19 MMOL/L (ref 20–31)
CREAT SERPL-MCNC: 1.52 MG/DL (ref 0.7–1.2)
EOSINOPHILS RELATIVE PERCENT: 1 % (ref 1–4)
GFR SERPL CREATININE-BSD FRML MDRD: 51 ML/MIN/1.73M2
GLUCOSE BLD-MCNC: 92 MG/DL (ref 70–99)
HCT VFR BLD CALC: 34.3 % (ref 40.7–50.3)
HEMOGLOBIN: 10.4 G/DL (ref 13–17)
IMMATURE GRANULOCYTES: 1 %
LYMPHOCYTES # BLD: 16 % (ref 24–43)
MCH RBC QN AUTO: 25.2 PG (ref 25.2–33.5)
MCHC RBC AUTO-ENTMCNC: 30.3 G/DL (ref 28.4–34.8)
MCV RBC AUTO: 83.1 FL (ref 82.6–102.9)
MONOCYTES # BLD: 5 % (ref 3–12)
NRBC AUTOMATED: 0 PER 100 WBC
PARTIAL THROMBOPLASTIN TIME: 27.9 SEC (ref 20.5–30.5)
PARTIAL THROMBOPLASTIN TIME: >120 SEC (ref 20.5–30.5)
PDW BLD-RTO: 16.9 % (ref 11.8–14.4)
PLATELET # BLD: 371 K/UL (ref 138–453)
PMV BLD AUTO: 9 FL (ref 8.1–13.5)
POTASSIUM SERPL-SCNC: 4.1 MMOL/L (ref 3.7–5.3)
RBC # BLD: 4.13 M/UL (ref 4.21–5.77)
RBC # BLD: ABNORMAL 10*6/UL
SEG NEUTROPHILS: 77 % (ref 36–65)
SEGMENTED NEUTROPHILS ABSOLUTE COUNT: 11.34 K/UL (ref 1.5–8.1)
SODIUM BLD-SCNC: 134 MMOL/L (ref 135–144)
TROPONIN, HIGH SENSITIVITY: 3700 NG/L (ref 0–22)
TROPONIN, HIGH SENSITIVITY: 3991 NG/L (ref 0–22)
TROPONIN, HIGH SENSITIVITY: 9518 NG/L (ref 0–22)
WBC # BLD: 14.7 K/UL (ref 3.5–11.3)

## 2023-01-01 PROCEDURE — C1894 INTRO/SHEATH, NON-LASER: HCPCS

## 2023-01-01 PROCEDURE — 99222 1ST HOSP IP/OBS MODERATE 55: CPT | Performed by: INTERNAL MEDICINE

## 2023-01-01 PROCEDURE — 87040 BLOOD CULTURE FOR BACTERIA: CPT

## 2023-01-01 PROCEDURE — 6370000000 HC RX 637 (ALT 250 FOR IP): Performed by: INTERNAL MEDICINE

## 2023-01-01 PROCEDURE — 80048 BASIC METABOLIC PNL TOTAL CA: CPT

## 2023-01-01 PROCEDURE — 2060000000 HC ICU INTERMEDIATE R&B

## 2023-01-01 PROCEDURE — 6360000004 HC RX CONTRAST MEDICATION

## 2023-01-01 PROCEDURE — B2111ZZ FLUOROSCOPY OF MULTIPLE CORONARY ARTERIES USING LOW OSMOLAR CONTRAST: ICD-10-PCS | Performed by: STUDENT IN AN ORGANIZED HEALTH CARE EDUCATION/TRAINING PROGRAM

## 2023-01-01 PROCEDURE — 02C03ZZ EXTIRPATION OF MATTER FROM CORONARY ARTERY, ONE ARTERY, PERCUTANEOUS APPROACH: ICD-10-PCS | Performed by: STUDENT IN AN ORGANIZED HEALTH CARE EDUCATION/TRAINING PROGRAM

## 2023-01-01 PROCEDURE — C1757 CATH, THROMBECTOMY/EMBOLECT: HCPCS

## 2023-01-01 PROCEDURE — 6370000000 HC RX 637 (ALT 250 FOR IP): Performed by: STUDENT IN AN ORGANIZED HEALTH CARE EDUCATION/TRAINING PROGRAM

## 2023-01-01 PROCEDURE — C1887 CATHETER, GUIDING: HCPCS

## 2023-01-01 PROCEDURE — 6370000000 HC RX 637 (ALT 250 FOR IP)

## 2023-01-01 PROCEDURE — 2709999900 HC NON-CHARGEABLE SUPPLY

## 2023-01-01 PROCEDURE — C1769 GUIDE WIRE: HCPCS

## 2023-01-01 PROCEDURE — 36415 COLL VENOUS BLD VENIPUNCTURE: CPT

## 2023-01-01 PROCEDURE — 84484 ASSAY OF TROPONIN QUANT: CPT

## 2023-01-01 PROCEDURE — C1725 CATH, TRANSLUMIN NON-LASER: HCPCS

## 2023-01-01 PROCEDURE — 6360000002 HC RX W HCPCS: Performed by: STUDENT IN AN ORGANIZED HEALTH CARE EDUCATION/TRAINING PROGRAM

## 2023-01-01 PROCEDURE — C1874 STENT, COATED/COV W/DEL SYS: HCPCS

## 2023-01-01 PROCEDURE — 93454 CORONARY ARTERY ANGIO S&I: CPT

## 2023-01-01 PROCEDURE — 85730 THROMBOPLASTIN TIME PARTIAL: CPT

## 2023-01-01 PROCEDURE — 85025 COMPLETE CBC W/AUTO DIFF WBC: CPT

## 2023-01-01 PROCEDURE — 93005 ELECTROCARDIOGRAM TRACING: CPT | Performed by: STUDENT IN AN ORGANIZED HEALTH CARE EDUCATION/TRAINING PROGRAM

## 2023-01-01 PROCEDURE — 027034Z DILATION OF CORONARY ARTERY, ONE ARTERY WITH DRUG-ELUTING INTRALUMINAL DEVICE, PERCUTANEOUS APPROACH: ICD-10-PCS | Performed by: STUDENT IN AN ORGANIZED HEALTH CARE EDUCATION/TRAINING PROGRAM

## 2023-01-01 PROCEDURE — 4A023N7 MEASUREMENT OF CARDIAC SAMPLING AND PRESSURE, LEFT HEART, PERCUTANEOUS APPROACH: ICD-10-PCS | Performed by: STUDENT IN AN ORGANIZED HEALTH CARE EDUCATION/TRAINING PROGRAM

## 2023-01-01 PROCEDURE — C1892 INTRO/SHEATH,FIXED,PEEL-AWAY: HCPCS

## 2023-01-01 PROCEDURE — 99233 SBSQ HOSP IP/OBS HIGH 50: CPT | Performed by: INTERNAL MEDICINE

## 2023-01-01 PROCEDURE — 6360000002 HC RX W HCPCS

## 2023-01-01 PROCEDURE — B2151ZZ FLUOROSCOPY OF LEFT HEART USING LOW OSMOLAR CONTRAST: ICD-10-PCS | Performed by: STUDENT IN AN ORGANIZED HEALTH CARE EDUCATION/TRAINING PROGRAM

## 2023-01-01 PROCEDURE — 92928 PRQ TCAT PLMT NTRAC ST 1 LES: CPT

## 2023-01-01 PROCEDURE — 2580000003 HC RX 258: Performed by: STUDENT IN AN ORGANIZED HEALTH CARE EDUCATION/TRAINING PROGRAM

## 2023-01-01 RX ORDER — SODIUM CHLORIDE 9 MG/ML
INJECTION, SOLUTION INTRAVENOUS PRN
Status: DISCONTINUED | OUTPATIENT
Start: 2023-01-01 | End: 2023-01-04 | Stop reason: HOSPADM

## 2023-01-01 RX ORDER — MORPHINE SULFATE 2 MG/ML
2 INJECTION, SOLUTION INTRAMUSCULAR; INTRAVENOUS EVERY 4 HOURS PRN
Status: DISCONTINUED | OUTPATIENT
Start: 2023-01-01 | End: 2023-01-04 | Stop reason: HOSPADM

## 2023-01-01 RX ORDER — ATORVASTATIN CALCIUM 80 MG/1
80 TABLET, FILM COATED ORAL DAILY
Status: DISCONTINUED | OUTPATIENT
Start: 2023-01-01 | End: 2023-01-04 | Stop reason: HOSPADM

## 2023-01-01 RX ORDER — ASPIRIN 81 MG/1
81 TABLET, CHEWABLE ORAL DAILY
Status: DISCONTINUED | OUTPATIENT
Start: 2023-01-01 | End: 2023-01-04 | Stop reason: HOSPADM

## 2023-01-01 RX ORDER — ACETAMINOPHEN 325 MG/1
650 TABLET ORAL EVERY 4 HOURS PRN
Status: DISCONTINUED | OUTPATIENT
Start: 2023-01-01 | End: 2023-01-01 | Stop reason: SDUPTHER

## 2023-01-01 RX ORDER — ONDANSETRON 2 MG/ML
4 INJECTION INTRAMUSCULAR; INTRAVENOUS EVERY 6 HOURS PRN
Status: DISCONTINUED | OUTPATIENT
Start: 2023-01-01 | End: 2023-01-01 | Stop reason: SDUPTHER

## 2023-01-01 RX ORDER — TIZANIDINE 4 MG/1
4 TABLET ORAL EVERY 8 HOURS PRN
Status: DISCONTINUED | OUTPATIENT
Start: 2023-01-01 | End: 2023-01-04 | Stop reason: HOSPADM

## 2023-01-01 RX ORDER — AMOXICILLIN 500 MG/1
500 CAPSULE ORAL EVERY 8 HOURS SCHEDULED
Status: DISCONTINUED | OUTPATIENT
Start: 2023-01-01 | End: 2023-01-04 | Stop reason: HOSPADM

## 2023-01-01 RX ORDER — SODIUM CHLORIDE 0.9 % (FLUSH) 0.9 %
5-40 SYRINGE (ML) INJECTION EVERY 12 HOURS SCHEDULED
Status: DISCONTINUED | OUTPATIENT
Start: 2023-01-01 | End: 2023-01-04 | Stop reason: HOSPADM

## 2023-01-01 RX ORDER — SODIUM CHLORIDE 0.9 % (FLUSH) 0.9 %
5-40 SYRINGE (ML) INJECTION PRN
Status: DISCONTINUED | OUTPATIENT
Start: 2023-01-01 | End: 2023-01-04 | Stop reason: HOSPADM

## 2023-01-01 RX ADMIN — TICAGRELOR 90 MG: 90 TABLET ORAL at 20:16

## 2023-01-01 RX ADMIN — Medication 81 MG: at 08:08

## 2023-01-01 RX ADMIN — CARVEDILOL 3.12 MG: 3.12 TABLET, FILM COATED ORAL at 20:16

## 2023-01-01 RX ADMIN — ONDANSETRON 4 MG: 2 INJECTION INTRAMUSCULAR; INTRAVENOUS at 18:53

## 2023-01-01 RX ADMIN — NITROFURANTOIN (MONOHYDRATE/MACROCRYSTALS) 100 MG: 75; 25 CAPSULE ORAL at 08:08

## 2023-01-01 RX ADMIN — SODIUM CHLORIDE, PRESERVATIVE FREE 10 ML: 5 INJECTION INTRAVENOUS at 20:16

## 2023-01-01 RX ADMIN — BUPROPION HYDROCHLORIDE 300 MG: 150 TABLET, EXTENDED RELEASE ORAL at 08:08

## 2023-01-01 RX ADMIN — AMOXICILLIN 500 MG: 500 CAPSULE ORAL at 14:14

## 2023-01-01 RX ADMIN — HEPARIN SODIUM 4000 UNITS: 1000 INJECTION, SOLUTION INTRAVENOUS; SUBCUTANEOUS at 06:34

## 2023-01-01 RX ADMIN — ONDANSETRON 4 MG: 2 INJECTION INTRAMUSCULAR; INTRAVENOUS at 05:40

## 2023-01-01 RX ADMIN — SODIUM CHLORIDE, PRESERVATIVE FREE 10 ML: 5 INJECTION INTRAVENOUS at 12:47

## 2023-01-01 RX ADMIN — ASPIRIN 81 MG CHEWABLE TABLET 81 MG: 81 TABLET CHEWABLE at 12:43

## 2023-01-01 RX ADMIN — AMOXICILLIN 500 MG: 500 CAPSULE ORAL at 21:53

## 2023-01-01 RX ADMIN — ATORVASTATIN CALCIUM 80 MG: 80 TABLET, FILM COATED ORAL at 12:43

## 2023-01-01 RX ADMIN — CARVEDILOL 3.12 MG: 3.12 TABLET, FILM COATED ORAL at 08:08

## 2023-01-01 RX ADMIN — TICAGRELOR 180 MG: 90 TABLET ORAL at 12:43

## 2023-01-01 ASSESSMENT — ENCOUNTER SYMPTOMS
SHORTNESS OF BREATH: 1
WHEEZING: 0
COUGH: 0

## 2023-01-01 NOTE — PROGRESS NOTES
Comprehensive Nutrition Assessment    Type and Reason for Visit:  Initial, Positive Nutrition Screen (Weight Loss)    Nutrition Recommendations/Plan:   Continue current diet. Will provide Ensure oral supplements with meals. Encourage/monitor PO intakes as tolerated. Monitor labs, weights, and plan of care. Malnutrition Assessment:  Malnutrition Status: Moderate malnutrition (at least) (01/01/23 1157)    Context:  Acute Illness (on Chronic)     Findings of the 6 clinical characteristics of malnutrition:  Energy Intake:  75% or less of estimated energy requirements for 7 or more days  Weight Loss:  Greater than 7.5% over 3 months     Body Fat Loss:  Unable to assess   Muscle Mass Loss:  Unable to assess  Fluid Accumulation:  No significant fluid accumulation   Strength:  Not Performed    Nutrition Assessment:    Pt admitted with c/o chest pain, SOB, and dry heaving. PMH: bladder CA with history of robotic cystoprostatectomy with ileal conduit creation in September 2022; CAD, CKD. Pt reports history of being unable to tolerate many foods d/t taste changes. Pt recently started on an oral diet. Weight loss reported - significant weight loss of 23% x 3-4 months noted. Will provide oral supplements with meals and monitor PO intakes. Labs reviewed: Na 134 mmol/L. Meds reviewed. Nutrition Related Findings:    Labs/Meds reviewed. C/o nausea/vomiting. Last BM 12/30. Wound Type:  (wound to abdomen)       Current Nutrition Intake & Therapies:    Average Meal Intake: NPO  Average Supplements Intake: NPO  ADULT DIET; Regular; Low Fat/Low Chol/High Fiber/2 gm Na    Anthropometric Measures:  Height: 6' 2\" (188 cm)  Ideal Body Weight (IBW): 190 lbs (86 kg)    Admission Body Weight: 205 lb 0.4 oz (93 kg)  Current Body Weight: 205 lb (93 kg), 107.9 % IBW.  Weight Source: Stated  Current BMI (kg/m2): 26.3  Usual Body Weight: 267 lb 3 oz (121.2 kg) (10/1/22 bed scale per chart review)  % Weight Change (Calculated): -23.3                    BMI Categories: Overweight (BMI 25.0-29. 9)    Estimated Daily Nutrient Needs:  Energy Requirements Based On: Kcal/kg  Weight Used for Energy Requirements: Admission  Energy (kcal/day): 1912-3001 kcals/day  Weight Used for Protein Requirements: Ideal  Protein (g/day): 100-120 gm pro/day  Method Used for Fluid Requirements: Other (Comment)  Fluid (ml/day): per MD    Nutrition Diagnosis:   Inadequate oral intake related to catabolic illness (appetite; current condition) as evidenced by poor intake prior to admission, weight loss (need for oral supplements)    Nutrition Interventions:   Food and/or Nutrient Delivery: Continue Current Diet, Start Oral Nutrition Supplement  Nutrition Education/Counseling: No recommendation at this time, Education not indicated  Coordination of Nutrition Care: Continue to monitor while inpatient       Goals:  Previous Goal Met:  (Goal Set)  Goals: Meet at least 75% of estimated needs, prior to discharge       Nutrition Monitoring and Evaluation:   Behavioral-Environmental Outcomes: None Identified  Food/Nutrient Intake Outcomes: Food and Nutrient Intake, Supplement Intake  Physical Signs/Symptoms Outcomes: Biochemical Data, GI Status, Nausea or Vomiting, Fluid Status or Edema, Nutrition Focused Physical Findings, Skin, Weight    Discharge Planning:     Too soon to determine     Jeremiah Acosta, [address] [place] [address],   Contact: [phone]

## 2023-01-01 NOTE — CONSULTS
Renal Consult Note    Patient :  Boubacar Loredo; 74 y.o. MRN# [id number]  Location:  2003/2003-01  Attending:  Freda Beltran MD  Admit Date:  12/31/2022   Hospital Day: 1    Reason for Consult:     Asked by Dr Freda Beltran MD to see for PAXTON/Elevated Creatinine. History Obtained From:     patient, electronic medical record    History of Present Illness:     Boubacar Loredo; 74 y.o. male with past medical history HTN, HL, PE, bladder cancer, s/p robotic cystoprostatectomy with ileal conduit creation 9/2022, CKD, who was transferred from [facility] yesterday after presenting there with chest pain. Troponin 4000, so he was transferred to [facility] early this morning. Due to troponin rise and continued chest pain, he was taken to the cath lab this morning and  thrombectomy  along with a stent placement to his LAD. Blood pressures stable. Urine output 550. Nephrology is consulted due to history of CKD and elevated creatinine on admission of 1.71. Prior to cardiac cath he was started on IV fluids NS at 30/hr, which is now off. This morning creatinine 1.52. It appears Baseline in November and December has been 1.7, however, prior to his bladder cancer surgery earlier in October, creatinine was 1.1-1.3. His bladder surgery was complicated by a bowel obstruction requiring reexploration and bilateral nephroureteral stent, secondary to left ureteral obstruction,  which stent was removed on December 20. Positive History for:  ++  recent contrast exposure/cardiac cath 1/1/23  No h/o prolonged NSAIDs use in the past,   No h/o nephrolithiasis, No recent skin rashes or arthralgias   +hematuria or pyuria noticed in the recent past.   Doesn't report any reduction in the urine output recently. ++ obstructive urinary symptoms   ++ h/o recurrent UTIs in the past.    Past History/Allergies? Social History:     Past Medical History:   Diagnosis Date    Acute renal failure with tubular necrosis ([address] 05/26/2022    Likely ischemic ATN/prerenal acidemia from intractable nausea vomiting as result of chemotherapy, baseline 1.3-1.4 peaked up to 2.3 in May 2022    Arthritis     BPH with obstruction/lower urinary tract symptoms     CAD (coronary artery disease) 06/2022    nonobstructive on cath    Caffeine use     3 cans soda/pop    Cancer (HCC)     bladder cancer. Dr. Mike Mcneill [facility]    CKD (chronic kidney disease), stage III ([facility] 05/26/2022    From 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   Baseline 1.3-1.4    COVID-19 08/16/2021    SOB, fatigue, fever, chills  x 3 weeks    Depression     Dilated cardiomyopathy ([facility] 05/26/2022    Ejection fraction 40 to 45%, does have symptoms of dyspnea and decreased effort tolerance    GERD (gastroesophageal reflux disease)     High cholesterol     Kidney calculi     Sleep apnea     does not use cpap    Under care of team     Dr. Sofiya Levy Nephrology    Under care of team     Dr. Jamaal Siddiqui. last visit Oct 2022    Under care of team     Dr. Tushar Morales [facility] last visit 8/03/2022    Under care of team     Dr. Hector Diez    Under care of team     Dr. Juliette Mei    Wears glasses        Past Surgical History:   Procedure Laterality Date    BLADDER REMOVAL      BLADDER REMOVAL N/A 09/16/2022    XI ROBOTIC LAPARASCOPIC ASSISTED RADICAL CYSTOPROSTATECTOMY, BILATERAL PELVIC LYMPHADENECTOMY AND ILEAL CONDUIT FORMATION performed by Genevieve Hernandez MD at [facility] 09/16/2022    CYSTOSCOPY STENT REMOVAL performed by Genevieve Hernandez MD at [address] [place] [address]  06/16/2022    nonobstructive CAD    COLONOSCOPY      CYSTOSCOPY  12/20/2022    LOOPOSCOPY, STENT REMOVAL CYSTOSCOPY Left 2022    LOOPOSCOPY, STENT REMOVAL performed by Ramos Alcala MD at [address] NEPHROSTOMY PERCUTANEOUS LEFT  2022    IR NEPHROSTOMY PERCUTANEOUS LEFT 2022 Ailyn Plaza MD [facility] PROCEDURES    IR NEPHROSTOMY PERCUTANEOUS LEFT  10/05/2022    IR NEPHROSTOMY PERCUTANEOUS LEFT 10/5/2022 [facility] PROC[facility]    IR NEPHROSTOMY PERCUTANEOUS RIGHT  2022    IR NEPHROSTOMY PERCUTANEOUS RIGHT 2022 Ailyn Plaza MD [facility] PROCEDURES    IR NEPHROSTOMY PERCUTANEOUS RIGHT  10/05/2022    IR NEPHROSTOMY PERCUTANEOUS RIGHT 10/5/2022 [facility] PROC[facility]    IR PORT PLACEMENT EQUAL OR GREATER THAN 5 YEARS  2022    IR PORT PLACEMENT EQUAL OR GREATER THAN 5 YEARS 2022 [facility] SPECIAL PROCEDURES    KNEE ARTHROSCOPY      left    LAPAROTOMY N/A 2022    LAPAROTOMY EXPLORATORY, LYSIS OF ADHESIONS, REPAIR OF BILATERAL URETERAL ANASTOMOSES, ABDOMINAL WASHOUT, PLACEMENT OF ABTHERA WOUND VAC performed by Mikael Godinez DO at [address] [facility] N/A 2022    2ND LOOK LAPAROTOMY,  ABDOMINAL WASHOUT, ABDOMINAL CLOSURE, WOUND VAC PLACEMENT performed by Mikael Godinez DO at [place] [address]  2022    Cystoprostatectomy, Bilateral Pelvic Lymphadenectomy, ileo conduit formation, cystectomy stent removal (right)       No Known Allergies    Social History     Socioeconomic History    Marital status:      Spouse name: Not on file    Number of children: Not on file    Years of education: Not on file    Highest education level: Not on file   Occupational History    Not on file   Tobacco Use    Smoking status: Former     Packs/day: 0.25     Years: 9.00     Pack years: 2.25     Types: Cigarettes     Start date: 1983     Quit date: 2/10/1992     Years since quittin.9    Smokeless tobacco: Never   Vaping Use    Vaping Use: Never used   Substance and Sexual Activity    Alcohol use: Not Currently    Drug use:  No Sexual activity: Yes     Partners: Female   Other Topics Concern    Not on file   Social History Narrative    Not on file     Social Determinants of Health     Financial Resource Strain: Not on file   Food Insecurity: Not on file   Transportation Needs: Not on file   Physical Activity: Not on file   Stress: Not on file   Social Connections: Not on file   Intimate Partner Violence: Not on file   Housing Stability: Not on file       Family History:        Family History   Problem Relation Age of Onset    Cancer Father         bladder cancer    Urolithiasis Father        Outpatient Medications:     Medications Prior to Admission: ciprofloxacin (CIPRO) 250 MG tablet, Take 1 tablet by mouth 2 times daily for 7 days  ondansetron (ZOFRAN-ODT) 8 MG TBDP disintegrating tablet, DISSOLVE 1 TABLET IN MOUTH EVERY 8 HOURS AS NEEDED FOR NAUSEA FOR VOMITING  sucralfate (CARAFATE) 1 GM/10ML suspension, Take 10 mLs by mouth 4 times daily  rivaroxaban (XARELTO) 20 MG TABS tablet, Take 1 tablet by mouth daily  omeprazole (PRILOSEC) 20 MG delayed release capsule, Take 1 capsule by mouth daily (Patient not taking: Reported on 12/31/2022)  carvedilol (COREG) 3.125 MG tablet, TAKE 1 TABLET BY MOUTH TWICE DAILY  simvastatin (ZOCOR) 40 MG tablet, Take 40 mg by mouth nightly  buPROPion (WELLBUTRIN XL) 300 MG extended release tablet, Take 300 mg by mouth every morning    Current Medications:     Scheduled Meds:    atorvastatin  80 mg Oral Daily    ticagrelor  180 mg Oral Once    ticagrelor  90 mg Oral BID    aspirin  81 mg Oral Daily    sodium chloride flush  5-40 mL IntraVENous 2 times per day    sodium chloride flush  5-40 mL IntraVENous 2 times per day    pantoprazole  40 mg Oral QAM AC    carvedilol  3.125 mg Oral BID    buPROPion  300 mg Oral QAM    nitrofurantoin (macrocrystal-monohydrate)  100 mg Oral 2 times per day     Continuous Infusions:    sodium chloride      sodium chloride      nitroGLYCERIN 10 mcg/min (12/31/22 1981)     PRN Meds:  morphine, sodium chloride flush, sodium chloride, sodium chloride flush, sodium chloride, ondansetron **OR** ondansetron, magnesium hydroxide, acetaminophen **OR** acetaminophen, potassium chloride **OR** potassium alternative oral replacement **OR** potassium chloride    Review of Systems:     Constitutional: No fever, no chills, no lethargy, no weakness. HEENT:  No headache, otalgia, itchy eyes, nasal discharge or sore throat. Cardiac:  No chest pain, dyspnea, orthopnea or PND. Chest:              No cough, phlegm or wheezing. Abdomen:  No abdominal pain, nausea or vomiting. Neuro:              No focal weakness, abnormal movements or seizure like activity. Skin:   No rashes, no itching. :   No hematuria, no pyuria, no dysuria, no flank pain. Extremities:  No swelling or joint pains. ROS was otherwise negative except as mentioned in the [address] Input/Output:       I/O last 3 completed shifts:  In: -   Out: 550 [Urine:550]    Vital Signs:   Temperature:  Temp: 97.8 °F (36.6 °C)  TMax:   Temp (24hrs), Av.8 °F (36.6 °C), Min:97.4 °F (36.3 °C), Max:98.1 °F (36.7 °C)    Respirations:  Resp: 22  Pulse:   Heart Rate: 84  BP:    BP: 125/87  BP Range: Systolic (62UZP), HHS:904 , Min:112 , MRD:943       Diastolic (73EHJ), TRW:62, Min:83, Max:98      Physical Examination:     General:  AAO x 3, speaking in full sentences, no accessory muscle use. HEENT: Atraumatic, normocephalic, no throat congestion, moist mucosa. Eyes:   Pupils equal, round and reactive to light, EOMI. Neck:   No JVD, no thyromegaly, no lymphadenopathy. Chest:   Bilateral vesicular breath sounds, no rales or wheezes. Cardiac:  S1 S2 RR, no murmurs, gallops or rubs, JVP not raised. Abdomen: Soft, non-tender, non distended, BS audible. :   No suprapubic or flank tenderness. Neuro:             AAO x 3, No FND. SKIN:  No rashes, good skin turgor.   Extremities:  No edema, No clubbing, No cyanosis    Labs:       Recent Labs 12/29/22  1655 12/31/22  1338 01/01/23  0541   WBC 10.6 13.7* 14.7*   RBC 4.13* 3.91* 4.13*   HGB 10.4* 9.8* 10.4*   HCT 32.4* 30.4* 34.3*   MCV 78.5* 77.6* 83.1   MCH 25.1* 25.1* 25.2   MCHC 31.9 32.3 30.3   RDW 18.5* 18.9* 16.9*    366 371   MPV 7.3 7.1 9.0      BMP:   Recent Labs     12/29/22  1655 12/31/22  1338 01/01/23  0541   * 133* 134*   K 4.0 3.9 4.1   CL 99 102 102   CO2 23 20 19*   BUN 15 13 13   CREATININE 1.75* 1.71* 1.52*   GLUCOSE 90 88 92   CALCIUM 8.9 8.7 8.4*      Recent Labs     12/29/22 1655   LABALBU 2.9*     LISS:      Lab Results   Component Value Date/Time    LISS NEGATIVE 05/31/2022 10:45 AM     SPEP:  Lab Results   Component Value Date/Time    PROT 6.8 12/29/2022 04:55 PM     C3:     Lab Results   Component Value Date/Time    C3 119 05/31/2022 10:45 AM     C4:     Lab Results   Component Value Date/Time    C4 25 05/31/2022 10:45 AM       Urinalysis/Chemistries:      Lab Results   Component Value Date/Time    NITRU POSITIVE 12/29/2022 03:54 PM    COLORU Red 12/29/2022 03:54 PM    PHUR 7.0 12/29/2022 03:54 PM    WBCUA 20 TO 50 12/29/2022 03:54 PM    RBCUA TOO NUMEROUS TO COUNT 12/29/2022 03:54 PM    MUCUS 3+ 04/14/2022 09:43 AM    YEAST FEW 03/31/2022 01:01 PM    BACTERIA MODERATE 12/29/2022 03:54 PM    CLARITYU cloudy 08/04/2022 09:05 AM    SPECGRAV 1.020 12/29/2022 03:54 PM    LEUKOCYTESUR LARGE 12/29/2022 03:54 PM    UROBILINOGEN Normal 12/29/2022 03:54 PM    BILIRUBINUR NEGATIVE 12/29/2022 03:54 PM    BLOODU large 08/04/2022 09:05 AM    GLUCOSEU NEGATIVE 12/29/2022 03:54 PM    KETUA NEGATIVE 12/29/2022 03:54 PM     Urine Creatinine:     Lab Results   Component Value Date/Time    LABCREA 121.3 09/18/2022 05:17 PM       Radiology:     CXR:   COPD. No acute cardiopulmonary process. Indwelling infusion port. Renal US 12/29/22  FINDINGS:       Kidneys: The right kidney measures 11.2 x 4.9 x 4.2 cm in size and the left kidney   measures 13.3 x 5.1 x 4.8 cm in size. Kidneys demonstrate normal cortical echogenicity. There are multiple   bilateral renal calculi, the largest measuring approximately 10 mm within the   mid right kidney. However, there is no evidence of hydronephrosis affecting   either kidney. There is a benign 3.7 x 4.2 x 3.5 cm cyst within the mid left   kidney. Bladder:       Sonographic imaging of the pelvis demonstrates that the urinary bladder is   surgically absent. There is no pelvic mass or fluid collection identified. 1. Bilateral nonobstructing nephrolithiasis. No evidence of hydronephrosis. 2. 4.2 cm cyst within the mid left kidney. 3. Patient status post cystectomy. No mass or fluid collection within the   pelvis. Assessment:     1. CKD III secondary to chronic interstitial nephritis both from obstructive uropathy related to bladder tumor and chemotherapy managed by Dr Luz Conde with most recent new baseline 1.7-1.8. Previous baseline 1.3-1.4 earlier in October  Creatinine stable at this time. 2. NSTEMI s/p cardiac cath 1/1/23  3. CAD s/p thrombectomy and stent to LAD  4. Malignant ca of bladder- s/p ileal conduit  5. Enterococcus UTI- on Macrobid  6. Left ureteral obstruction with history of percutaneous nephrolithotomy and stent placement which was removed 12/20   7. CMP with previous EF 40-45% in March 2022    Plan:   1. Avoid nephrotoxic medications  2. Consider changing Macrobid  3. Daily Labs  4. Will continue to follow    Nutrition   Please ensure that patient is on a renal diet/TF. Avoid nephrotoxic drugs/contrast exposure. Thank you for the consultation. Please do not hesitate to contact us for any further questions/concerns. We will continue to follow along with you. Kristen Cramer [facility]  [facility]    Attending Physician Statement  I have discussed the care of Laina Rossi, including pertinent history and exam findings with the CNP.  I have reviewed the key elements of all parts of the encounter with the CNP. I have seen and examined the patient. I agree with the assessment and plan and status of the problem list as documented. Addiitionally I recommend daily basic metabolic panel to monitor renal function as the patient does have significant risk for radiocontrast injury following cardiac catheterization that was performed earlier today.     Nick Sanchez MD   Nephrology Attending Physician  [facility]  1/1/2023

## 2023-01-01 NOTE — OP NOTE
[facility]    CARDIAC CATHETERIZATION    Date:   1/1/2023  Patient name:  Moisés Garcia  Date of admission:  12/31/2022  5:54 PM  MRN:   [id number]  YOB: 1955    Operators:  Primary:   Tushar Melvin MD (Attending Physician)      Procedure performed:     [x] Left Heart Catheterization. [] Graft Angiography. [x] Left Ventriculography. [] Right Heart Catheterization. [x] Coronary Angiography. [] Aortic Valve Studies. [] PCI:      [] Other:       Pre Procedure Conscious Sedation Data:  ASA Class:    [] I [x] II [] III [] IV    Mallampati Class:  [] I [x] II [] III [] IV      Indication:  [] STEMI      [] + Stress test  [x] ACS      [x] + EKG Changes  [] Non Q MI       [x] Significant Risk Factors  [] Recurrent Angina             [] Diabetes Mellitus    [] New LBBB      [] Other.  [] CHF / Low EF changes     [] Abnormal CTA / Ca Score      Procedure:  Access:  [x] Femoral  [] Radial  artery       [x] Right  [] Left    Procedure Type:  Diagnostic procedure: Coronary angiography, Ultrasound used for access - Femoral  PCI procedure: PTCA / Drug Eluting Stent:, LAD and / or branches, Thrombectomy:, LAD and / or  branches  Complications:   No complication    Procedure: After informed consent was obtained with explanation of the risks and benefits, patient was brought to the cath lab. The access area was prepped and draped in sterile fashion. 1% lidocaine was used for local block. The artery was cannulated with 6  Fr sheath with brisk arterial blood return. The side port was frequently flushed and aspirated with normal saline. Estimated Blood Loss:  [x] Minimal < 25 cc [] Moderate 25-50 cc  [] >50 cc    Findings: The LV gram was performed in the GARCIA 30 position. LVEF: 40%. Conclusions:    % mid stenosis. Extensive clot. Reduced to 0% using multiple times aspiration thrombectomy and PTCA followed by 2.75x33 mm Resolute JUDITH PD using 3 mm NC balloon.  Distal embolization, multiple aspiration thrombectomy done, IC Adenocard and nitro given. LAVELLE 3 flow regained, the very distal apical LAD still have 100% stenosis with clot, will be treated medically, Integrilin was not given due to history of bladder cancer and recent surgery. The patient remained is chest pain free and hemodynamically stable. Nonobstructive disease of other arteries. Recommendations:  DAPT and risk factor modifications      ____________________________________________________________________    History and Risk Factors    [x] Hypertension     [] Family history of CAD  [x] Hyperlipidemia     [] Cerebrovascular Disease   [] Prior MI       [] Peripheral Vascular disease   [] Prior PCI              [] Diabetes Mellitus    [] Left Main PCI. [] Currently on Dialysis. [] Prior CABG. [] Currently smoker. [] Cardiac Arrest outside of healthcare facility. [] Yes    [x] No        Witnessed     [] Yes   [] No     Arrest after arrival of EMS  [] Yes   [] No     [] Cardiac Arrest at other Facility. [] Yes   [] No    Pre-Procedure Information. Heart Failure       [x] Yes    [] No        Class  [] I      [] II  [x] III    [] IV. New Diagnosis    [] Yes  [x] No    HF Type      [x] Systolic   [] Diastolic          [] Unknown. Diagnostic Test:   EKG       [] Normal   [x] Abnormal    New antiarrhythmia medications:    [] Yes   [] No   New onset atrial fibrillation / Flutter     [] Yes   [] No   ECG Abnormalities:      [] V. Fib   [] Sola V. Tach           [] NS V. T   [] New LBBB           [] T.  Inv  []  ST dev > 0.5 mm         [] PVC's freq  [] PVC's infrequent    Stress Test Performed:      [] Yes    [x] No     Type:     [] Stress Echo   [] Exercise Stress Test (no imaging)      [] Stress Nuclear  [] Stress Imaging     Results   [] Negative   [] Positive        [] Indeterminate  [] Unavailable     If Positive/ Risk / Extent of Ischemia:       [] Low  [] Intermediate         [] High  [] Unavailable      Cardiac CTA Performed:     [] Yes    [x] No      Results   [] CAD   [] Non obstructive CAD      [] No CAD   [] Uncertain      [] Unknown   [] Structural Disease. Pre Procedure Medications:   [] Yes    [] No         [x] ASA   [x] Beta Blockers      [x] Nitrate   [] Ca Channel Blockers      [] Ranolazine   [x] Statin       [] Plavix/Others antiplatelets      Electronically signed on 1/1/2023 at 9:40 AM by:    Amaris Navas MD  Fellow,  [address]  [facility]

## 2023-01-01 NOTE — PLAN OF CARE
Problem: Discharge Planning  Goal: Discharge to home or other facility with appropriate resources  1/1/2023 1730 by Ginette López RN  Outcome: Progressing  1/1/2023 0408 by Olivia Crowley RN  Outcome: Progressing     Problem: Pain  Goal: Verbalizes/displays adequate comfort level or baseline comfort level  1/1/2023 1730 by Ginette López RN  Outcome: Progressing  1/1/2023 0408 by Olivia Crowley RN  Outcome: Progressing     Problem: Safety - Adult  Goal: Free from fall injury  1/1/2023 1730 by Ginette López RN  Outcome: Progressing  1/1/2023 0408 by Olivia Crowley RN  Outcome: Progressing     Problem: ABCDS Injury Assessment  Goal: Absence of physical injury  1/1/2023 1730 by Ginette López RN  Outcome: Progressing  1/1/2023 0408 by Olivia Crowley RN  Outcome: Progressing     Problem: Nutrition Deficit:  Goal: Optimize nutritional status  Outcome: Progressing

## 2023-01-01 NOTE — ED NOTES
Report given to Alveria Alpers on [place]. All questions answered. Pt to be transported to floor on portable monitor by RN. Pt belongings on stretcher for transport.       Markos Le RN  12/31/22 [id number]

## 2023-01-01 NOTE — ED NOTES
Pt resting on stretcher, alert. Pt A&Ox4. RR even and unlabored on RA. VSS. Call light within reach.       Nani Teran RN  12/31/22 0990

## 2023-01-01 NOTE — PROGRESS NOTES
[facility]  Office: [address], DO, Carey Raymundo, DO, Amador Espinal, DO, Mickie Jordan Etta, DO, Denise Smart MD, Miguel Mckinney MD, Jewell Grant MD, Madi Elmore MD,  Makenna Munson MD, Mary White MD, Carlita Sommer, DO, Vanessa Jade MD,  Miguel Ángel Marcus MD, Pauly Steel MD, Johnathan Conner DO, Ti Bui MD, Jessica Méndez MD, Marysol Joel DO, Shauna Garnica MD, Severo Londono MD, Darron Allen MD, Maikel Sidhu MD, Trell Frausto DO, Eduar Cunningham MD, Cuca Burton MD, Rebekah Hobson, Gisela Robins, CNP, Yuly Anaya, CNP, Addy Yen, CNP,  Maricarmen Cooper, [facility], Karolyn Delgado, CNP, Savita Garza, CNP, Nelda Mehta, CNP, Vicky Sanches, CNP, Mable Franklin, CNP, LORETTA OrlandoC, Bry Stuart, CNS, Rusty Malave, CNP, [facility], [address] [place] [address]    Progress Note    1/1/2023    9:10 AM    Name:   Mary Inman  MRN:     [id number]     Acct:      [de-identified]   Room:   2003/2003-01   Day:  1  Admit Date:  12/31/2022  5:54 PM    PCP:   Minda Bonilla MD  Code Status:  Full Code    Subjective:     C/C:   Chief Complaint   Patient presents with    Chest Pain     NSTEMI, PB transfer, direct admit no bed        Interval History Status: improved. Patient had angiogram this morning. Results reviewed. He had 100% thrombosis in mid LAD . He has an A-line in his groin. His troponins were worsening and he was taken to Cath Lab early this morning. Creatinine slightly better since admission. He received cautious hydration last night. Nephrology consulted yesterday. Patient feeling a little better today with his chest pain and breathing. Brief History:     Patient was admitted from [facility] ER last night for an acute MI. He had chest pain starting the evening before associated with dry heaves.   He had ST-T wave elevations anterolateral leads with inferior depressions. Troponins peaked at 3991 this morning. He was on IV heparin and nitro drip overnight. He has a history of locally advanced bladder CA with history of robotic cystoprostatectomy with ileal conduit creation in September 2022. He also has a history of nephrolithiasis. Patient recently had his percutaneous lithotomy and stent removed. He was also diagnosed with Enterococcus UTI recently. It is resistant to Cipro. He has been switched to [address] of Systems:     Review of Systems   Constitutional:  Negative for chills and fever. Respiratory:  Positive for shortness of breath. Negative for cough and wheezing. Cardiovascular:  Negative for chest pain, palpitations and leg swelling. Genitourinary:  Negative for frequency and hematuria. Neurological:  Negative for dizziness, weakness and headaches. Medications:      Allergies:  No Known Allergies    Current Meds:   Scheduled Meds:    atorvastatin  80 mg Oral Daily    sodium chloride flush  5-40 mL IntraVENous 2 times per day    pantoprazole  40 mg Oral QAM AC    carvedilol  3.125 mg Oral BID    buPROPion  300 mg Oral QAM    nitrofurantoin (macrocrystal-monohydrate)  100 mg Oral 2 times per day    aspirin  81 mg Oral Daily     Continuous Infusions:    sodium chloride      heparin (PORCINE) Infusion 10 Units/kg/hr (01/01/23 9710)    nitroGLYCERIN 10 mcg/min (12/31/22 1831)     PRN Meds: morphine, sodium chloride flush, sodium chloride, ondansetron **OR** ondansetron, magnesium hydroxide, acetaminophen **OR** acetaminophen, potassium chloride **OR** potassium alternative oral replacement **OR** potassium chloride, heparin (porcine), heparin (porcine)    Data:     Past Medical History:   has a past medical history of Acute renal failure with tubular necrosis (HCC), Arthritis, BPH with obstruction/lower urinary tract symptoms, CAD (coronary artery disease), Caffeine use, Cancer ([facility] [address] CKD (chronic kidney disease), stage III ([facility] COVID-19, Depression, Dilated cardiomyopathy ([facility] GERD (gastroesophageal reflux disease), High cholesterol, Kidney calculi, Sleep apnea, Under care of team, Under care of team, Under care of team, Under care of team, Under care of team, and Wears glasses. Social History:   reports that he quit smoking about 30 years ago. His smoking use included cigarettes. He started smoking about 40 years ago. He has a 2.25 pack-year smoking history. He has never used smokeless tobacco. He reports that he does not currently use alcohol. He reports that he does not use drugs. Family History:   Family History   Problem Relation Age of Onset    Cancer Father         bladder cancer    Urolithiasis Father        Vitals:  /86   Pulse 84   Temp 97.8 °F (36.6 °C) (Oral)   Resp 18   Ht 6' 2\" (1.88 m)   Wt 205 lb (93 kg)   SpO2 97%   BMI 26.32 kg/m²   Temp (24hrs), Av.8 °F (36.6 °C), Min:97.4 °F (36.3 °C), Max:98.1 °F (36.7 °C)    No results for input(s): POCGLU in the last 72 hours. I/O (24Hr):     Intake/Output Summary (Last 24 hours) at 2023 0910  Last data filed at 2023 0000  Gross per 24 hour   Intake --   Output 550 ml   Net -550 ml       Labs:  Hematology:  Recent Labs     22  1655 22  1338 23  0541   WBC 10.6 13.7* 14.7*   RBC 4.13* 3.91* 4.13*   HGB 10.4* 9.8* 10.4*   HCT 32.4* 30.4* 34.3*   MCV 78.5* 77.6* 83.1   MCH 25.1* 25.1* 25.2   MCHC 31.9 32.3 30.3   RDW 18.5* 18.9* 16.9*    366 371   MPV 7.3 7.1 9.0   DDIMER  --  1.08  --      Chemistry:  Recent Labs     22  1655 22  1338 22  1338 22  1606 22  2321 23  0541   * 133*  --   --   --  134*   K 4.0 3.9  --   --   --  4.1   CL 99 102  --   --   --  102   CO2 23 20  --   --   --  19*   GLUCOSE 90 88  --   --   --  92   BUN 15 13  --   --   --  13   CREATININE 1.75* 1.71*  --   --   --  1.52*   ANIONGAP 10 11  --   --   --  13   LABGLOM 43* 44*  --   --   --  51*   CALCIUM 8.9 8.7  --   --   --  8.4*   PROBNP  --  6,582*  --   --   --   --    TROPHS  --  1,769*   < > 2,950* 3,700* 3,991*    < > = values in this interval not displayed. Recent Labs     12/29/22  1655   PROT 6.8   LABALBU 2.9*   AST 15   ALT 16   ALKPHOS 92   BILITOT 0.3     ABG:  Lab Results   Component Value Date/Time    POCPH 7.400 09/24/2022 05:22 AM    PHART 7.338 09/23/2022 12:25 PM    POCPCO2 33.0 09/24/2022 05:22 AM    HOD8DZO 38.9 09/23/2022 12:25 PM    POCPO2 151.9 09/24/2022 05:22 AM    PO2ART 142.0 09/23/2022 12:25 PM    POCHCO3 20.4 09/24/2022 05:22 AM    IQD4PJK 20.3 09/23/2022 12:25 PM    NBEA 4 09/24/2022 05:22 AM    PBEA 0 09/22/2022 03:54 PM    ETIG6YQJ 99 09/24/2022 05:22 AM    X0KLABZZ 98.6 09/23/2022 12:25 PM    FIO2 40.0 09/24/2022 05:22 AM     Lab Results   Component Value Date/Time    SPECIAL RIGHT HAND 1ML 01/01/2023 12:20 AM     Lab Results   Component Value Date/Time    CULTURE NO GROWTH <24 HRS 01/01/2023 12:20 AM       Radiology:  US RENAL COMPLETE    Result Date: 12/29/2022  1. Bilateral nonobstructing nephrolithiasis. No evidence of hydronephrosis. 2. 4.2 cm cyst within the mid left kidney. 3. Patient status post cystectomy. No mass or fluid collection within the pelvis. XR CHEST PORTABLE    Result Date: 12/31/2022  COPD. No acute cardiopulmonary process. Indwelling infusion port. NSTEMI-        Physical Exam  Vitals and nursing note reviewed. Constitutional:       Appearance: Normal appearance. HENT:      Head: Normocephalic and atraumatic. Eyes:      Extraocular Movements: Extraocular movements intact. Conjunctiva/sclera: Conjunctivae normal.      Pupils: Pupils are equal, round, and reactive to light. Cardiovascular:      Rate and Rhythm: Normal rate and regular rhythm. Comments: A Line in right groin. Pulmonary:      Effort: Pulmonary effort is normal.      Breath sounds: Normal breath sounds.       Comments: Port right chest wall  Musculoskeletal: Right lower leg: No edema. Left lower leg: No edema. Neurological:      General: No focal deficit present. Mental Status: He is alert and oriented to person, place, and time. Assessment:        Hospital Problems             Last Modified POA    * (Principal) NSTEMI (non-ST elevated myocardial infarction) ([facility] [address] 58/24/0887 Yes    Complicated UTI (urinary tract infection) 12/31/2022 Yes    Malignant neoplasm of urinary bladder ([facility] [address] 12/31/2022 Yes    Cancer of lateral wall of urinary bladder ([facility] [address] 12/31/2022 Yes    Other hyperlipidemia 12/31/2022 Yes    CKD (chronic kidney disease), stage III ([facility] [address] 12/31/2022 Yes    Overview Signed 5/26/2022  3:00 PM by Sam Christianson MD     From 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 Baseline 1.3-1.4         D[facility] 12/31/2022 Yes    Overview Signed 5/26/2022  3:01 PM by Sam Christianson MD     Ejection fraction 40 to 45%, does have symptoms of dyspnea and decreased effort tolerance            Plan:        NSTEMI- s/p thrombectomy of mid LAD with PTCA. On DAPT. Off heparin. Start SQ heparin after 12 hours for DVT prophylaxis if stable. CKD 3- Creatinine trended down today. Repeat BMP tomorrow. Expect a bump. Enterococcus UTI- will stop Macrobid due to CKD not improving with fluids. Switch to Amoxicillin. Resistant to Cipro  H/o PE- Eliquis on hold. Restart when stable  DVT prophylaxis - EPC cuffs.  Heparin on hold  Malignant ca of bladder- s/p ileal conduit    Fabricio Langford MD  1/1/2023  9:10 AM

## 2023-01-01 NOTE — CONSULTS
[facility]   Consult Note                 Date:   12/31/2022  Patient name: Jack Otero  Date of admission:  12/31/2022  5:54 PM  MRN:   [id number]  YOB: 1955    Reason for Consult:  nstemi    CHIEF COMPLAINT:  chest pain    History Obtained From:  Patient     HISTORY OF PRESENT ILLNESS:    The patient is a 74 y.o. male with significant medical history of  bladder cancer s/p     Patient started having chest pain one night prior to admission, crushing in nature over the anterior chest, 8/10. On evaluation, he was noted to have troponin of 1769 trending up to 2950. He was started on nitro gtt and heparin with symptomatic improvement of pain down to 2/10. He was seen by cardiology on 10/9 due to having chest pain during an admission for surgical management bladder cancer by urology. At the time, chest pain was mild, no EKG changes and troponin levels were normal.       Past Medical History:   has a past medical history of Acute renal failure with tubular necrosis ([address] Arthritis, BPH with obstruction/lower urinary tract symptoms, CAD (coronary artery disease), Caffeine use, Cancer ([address] CKD (chronic kidney disease), stage III ([address] COVID-19, Depression, Dilated cardiomyopathy ([address] GERD (gastroesophageal reflux disease), High cholesterol, Kidney calculi, Sleep apnea, Under care of team, Under care of team, Under care of team, Under care of team, Under care of team, and Wears glasses. Past Surgical History:   has a past surgical history that includes Knee arthroscopy; Cardiac catheterization; Colonoscopy; IR PORT PLACEMENT > 5 YEARS (02/25/2022); Cardiac catheterization (06/16/2022); Bladder removal; Prostatectomy (09/16/2022); Bladder removal (N/A, 09/16/2022); Bladder surgery (Right, 09/16/2022); laparotomy (N/A, 09/22/2022); IR GUIDED NEPHROSTOMY CATH PLACEMENT LEFT (09/23/2022); IR GUIDED NEPHROSTOMY CATH PLACEMENT RIGHT (09/23/2022); laparotomy (N/A, 09/23/2022);  IR GUIDED NEPHROSTOMY CATH PLACEMENT LEFT (10/05/2022); IR GUIDED NEPHROSTOMY CATH PLACEMENT RIGHT (10/05/2022); Cystoscopy (12/20/2022); and Cystoscopy (Left, 12/20/2022). Home Medications:    Prior to Admission medications    Medication Sig Start Date End Date Taking? Authorizing Provider   ciprofloxacin (CIPRO) 250 MG tablet Take 1 tablet by mouth 2 times daily for 7 days 12/29/22 1/5/23  Dori Higuera DO   ondansetron (ZOFRAN-ODT) 8 MG TBDP disintegrating tablet DISSOLVE 1 TABLET IN MOUTH EVERY 8 HOURS AS NEEDED FOR NAUSEA FOR VOMITING 12/27/22   Sarah Grayson MD   sucralfate (CARAFATE) 1 GM/10ML suspension Take 10 mLs by mouth 4 times daily 10/18/22 12/20/22  Ernesto Rojas MD   rivaroxaban (XARELTO) 20 MG TABS tablet Take 1 tablet by mouth daily 10/10/22   Vikram Lombardi MD   omeprazole (PRILOSEC) 20 MG delayed release capsule Take 1 capsule by mouth daily  Patient not taking: Reported on 12/31/2022 7/8/22   Ranjana Benjamin MD   carvedilol (COREG) 3.125 MG tablet TAKE 1 TABLET BY MOUTH TWICE DAILY 5/18/22   Historical Provider, MD   simvastatin (ZOCOR) 40 MG tablet Take 40 mg by mouth nightly    Historical Provider, MD   buPROPion (WELLBUTRIN XL) 300 MG extended release tablet Take 300 mg by mouth every morning    Historical Provider, MD       Allergies:  Patient has no known allergies. Social History:   reports that he quit smoking about 30 years ago. His smoking use included cigarettes. He started smoking about 40 years ago. He has a 2.25 pack-year smoking history. He has never used smokeless tobacco. He reports that he does not currently use alcohol. He reports that he does not use drugs. Family History: family history includes Cancer in his father; Urolithiasis in his father. No for premature CAD. No for h/o sudden cardiac death    REVIEW OF SYSTEMS:    Constitutional: there has been no unanticipated weight loss. There's been No change in activity level.      Eyes: No visual changes or diplopia. No scleral icterus. ENT: No Headaches, hearing loss or vertigo. Cardiovascular: Yes chest pain, No dyspnea on exertion, No palpitations or No loss of consciousness. Respiratory: No cough or wheezing, no sputum production. No hematemesis. No pleuritic chest pain   Gastrointestinal: No abdominal pain, blood in stools. Genitourinary: No dysuria, trouble voiding, or hematuria. Musculoskeletal:  No gait disturbance, No weakness or joint complaints. Integumentary: No rash or pruritis. Neurological: No headache, diplopia, change in muscle strength, numbness or tingling. Psychiatric: No anxiety, or depression. Endocrine: No temperature intolerance. No excessive thirst, fluid intake, or urination. Hematologic/Lymphatic: No abnormal bruising or bleeding, blood clots or swollen lymph nodes. Allergic/Immunologic: No nasal congestion or hives. PHYSICAL EXAM:    Physical Examination:    /86   Pulse 85   Temp 97.9 °F (36.6 °C) (Oral)   Resp 15   Wt 205 lb 0.4 oz (93 kg)   SpO2 97%   BMI 26.32 kg/m²    Constitutional and General Appearance: alert, cooperative, in no apparent resp distress HEENT: PERRL, no cervical lymphadenopathy  Respiratory:  Good respiratory effort. No for increased work of breathing. On auscultation: clear to auscultation bilaterally, no basilar rales, no wheezing   Cardiovascular:  regular S1 and S2. No murmur audible   No Jugular venous distention, no hepatojugular reflex  Peripheral pulses are symmetrical and full   Abdomen:  No masses or tenderness  Bowel sounds present  Extremities:   No Cyanosis or Clubbing   Lower extremity edema: No   Skin: Warm and dry  Neurological:  Not done       DATA:    Diagnostics:      EKG    ECHO  4/22  Summary  Technically difficult study  Normal LV size , mild to moderately increased wall thickness. Difficult to assess wall motion abnormalities  Moderately reduced LV systolic function with LVEF 40-45 %.   Normal RV size and function. LA and RA appears normal in size. No obvious significant structural valvular abnormality noted. No significant valvular stenosis or regurgitation noted. Normal aortic root dimension. No significant pericardial effusion noted. IVC not well visualized    Cardiac Angiography: 6/16/2022   Cardiac Arteries and Lesion Findings       LMCA: Normal 0% stenosis. LAD: Diffuse irregularities 30-40%. LCx: Diffuse irregularities 30-40%. RCA: Diffuse irregularities 30-40%. Coronary Tree        Dominance: Right        Procedure Summary        Minimal non-obstructive CAD. LV was not done. Labs:     CBC:   Recent Labs     12/29/22  1655 12/31/22  1338   WBC 10.6 13.7*   HGB 10.4* 9.8*   HCT 32.4* 30.4*    366     BMP:   Recent Labs     12/29/22  1655 12/31/22  1338   * 133*   K 4.0 3.9   CO2 23 20   BUN 15 13   CREATININE 1.75* 1.71*   LABGLOM 43* 44*   GLUCOSE 90 88     BNP: No results for input(s): BNP in the last 72 hours. PT/INR: No results for input(s): PROTIME, INR in the last 72 hours. APTT:  Recent Labs     12/31/22  1338   APTT 27.2     CARDIAC ENZYMES:No results for input(s): CKTOTAL, CKMB, CKMBINDEX, TROPONINI in the last 72 hours. FASTING LIPID PANEL:  Lab Results   Component Value Date/Time    HDL 48 02/08/2019 02:51 PM     LIVER PROFILE:  Recent Labs     12/29/22  1655   AST 15   ALT 16   LABALBU 2.9*         IMPRESSION:    NSTEMI, highly elevated Troponin, trending up. EKG without specific ischemic ST-T changes  Cardiac cath 6/2022 with minimal CAD  Moderate LV systolic dysfunction with EF 45% on last echo   Bladder cancer s/p laparoscopic cystoprostatectomy with ileal conduit creation on 9/16  HTN  HLD  Bilateral PE, 03/2022  CKD      RECOMMENDATIONS:  Patient has history of chronic systolic HF, managed on low dose lasix, low dose coreg at home. Will hold lasix on admission and continue on coreg.    Continue on heparin infusion and nitro infusion titrated to chest pain  Give morphine for pain management  Continue on ASA 81 mg po qd, Lipitor 80 mg  Follow up repeat TTE  Will likely need to be taken for LHC  Continue on telemonitoring  Continue to trend troponin level  Rest of care as per primary      Discussed with patient and nursing. Mya Lima MD MD  Fellow, [facility]   Pager - [phone]    Attending note. Of evaluation and plan. Presented with chest pain yesterday evening to [facility] ER. It was discussed with Dr. Omar Ortiz the on-call interventional cardiologist and the ER physician. The patient was transferred to [facility]. María Elena and he was started on IV heparin and nitro and treated conservatively overnight. I did evaluate the patient today and he was having on and off chest pain with shortness of breath. Due to patient's symptoms EKG changes and significant rise in troponins. We will proceed with urgent coronary angiography and possible PCI. Benefits risks and alternatives were explained to the patient including the risk of bleeding stroke heart attack contrast allergy or nephropathy and death and he does agree to proceed. Consult nephrology due to chronic kidney disease.

## 2023-01-02 PROBLEM — I50.22 CHRONIC SYSTOLIC HEART FAILURE (HCC): Status: ACTIVE | Noted: 2023-01-02

## 2023-01-02 PROBLEM — I25.110 CORONARY ARTERY DISEASE INVOLVING NATIVE CORONARY ARTERY OF NATIVE HEART WITH UNSTABLE ANGINA PECTORIS (HCC): Status: ACTIVE | Noted: 2023-01-02

## 2023-01-02 PROBLEM — J96.01 ACUTE HYPOXEMIC RESPIRATORY FAILURE (HCC): Status: ACTIVE | Noted: 2023-01-02

## 2023-01-02 PROBLEM — Z95.820 S/P ANGIOPLASTY WITH STENT: Status: ACTIVE | Noted: 2023-01-02

## 2023-01-02 LAB
ABSOLUTE EOS #: 0.08 K/UL (ref 0–0.44)
ABSOLUTE IMMATURE GRANULOCYTE: 0.14 K/UL (ref 0–0.3)
ABSOLUTE LYMPH #: 2.18 K/UL (ref 1.1–3.7)
ABSOLUTE MONO #: 0.82 K/UL (ref 0.1–1.2)
ANION GAP SERPL CALCULATED.3IONS-SCNC: 9 MMOL/L (ref 9–17)
BASOPHILS # BLD: 1 % (ref 0–2)
BASOPHILS ABSOLUTE: 0.07 K/UL (ref 0–0.2)
BUN BLDV-MCNC: 15 MG/DL (ref 8–23)
CALCIUM SERPL-MCNC: 8.8 MG/DL (ref 8.6–10.4)
CHLORIDE BLD-SCNC: 99 MMOL/L (ref 98–107)
CO2: 21 MMOL/L (ref 20–31)
CREAT SERPL-MCNC: 1.51 MG/DL (ref 0.7–1.2)
EKG ATRIAL RATE: 76 BPM
EKG ATRIAL RATE: 79 BPM
EKG ATRIAL RATE: 82 BPM
EKG ATRIAL RATE: 85 BPM
EKG ATRIAL RATE: 97 BPM
EKG P AXIS: -4 DEGREES
EKG P AXIS: 18 DEGREES
EKG P AXIS: 33 DEGREES
EKG P AXIS: 35 DEGREES
EKG P AXIS: 40 DEGREES
EKG P-R INTERVAL: 198 MS
EKG P-R INTERVAL: 204 MS
EKG P-R INTERVAL: 204 MS
EKG P-R INTERVAL: 208 MS
EKG P-R INTERVAL: 208 MS
EKG Q-T INTERVAL: 376 MS
EKG Q-T INTERVAL: 382 MS
EKG Q-T INTERVAL: 396 MS
EKG Q-T INTERVAL: 410 MS
EKG Q-T INTERVAL: 416 MS
EKG QRS DURATION: 128 MS
EKG QRS DURATION: 130 MS
EKG QRS DURATION: 130 MS
EKG QTC CALCULATION (BAZETT): 446 MS
EKG QTC CALCULATION (BAZETT): 461 MS
EKG QTC CALCULATION (BAZETT): 471 MS
EKG QTC CALCULATION (BAZETT): 477 MS
EKG QTC CALCULATION (BAZETT): 517 MS
EKG R AXIS: -20 DEGREES
EKG R AXIS: -21 DEGREES
EKG R AXIS: -39 DEGREES
EKG R AXIS: -43 DEGREES
EKG R AXIS: -51 DEGREES
EKG T AXIS: 101 DEGREES
EKG T AXIS: 41 DEGREES
EKG T AXIS: 51 DEGREES
EKG T AXIS: 56 DEGREES
EKG T AXIS: 99 DEGREES
EKG VENTRICULAR RATE: 76 BPM
EKG VENTRICULAR RATE: 82 BPM
EKG VENTRICULAR RATE: 85 BPM
EKG VENTRICULAR RATE: 93 BPM
EKG VENTRICULAR RATE: 97 BPM
EOSINOPHILS RELATIVE PERCENT: 1 % (ref 1–4)
FERRITIN: 131 NG/ML (ref 30–400)
GFR SERPL CREATININE-BSD FRML MDRD: 51 ML/MIN/1.73M2
GLUCOSE BLD-MCNC: 79 MG/DL (ref 70–99)
HCT VFR BLD CALC: 36 % (ref 40.7–50.3)
HEMOGLOBIN: 10.5 G/DL (ref 13–17)
IMMATURE GRANULOCYTES: 1 %
IRON SATURATION: 10 % (ref 20–55)
IRON: 19 UG/DL (ref 59–158)
LYMPHOCYTES # BLD: 14 % (ref 24–43)
MCH RBC QN AUTO: 25.1 PG (ref 25.2–33.5)
MCHC RBC AUTO-ENTMCNC: 29.2 G/DL (ref 28.4–34.8)
MCV RBC AUTO: 86.1 FL (ref 82.6–102.9)
MONOCYTES # BLD: 5 % (ref 3–12)
NRBC AUTOMATED: 0 PER 100 WBC
PDW BLD-RTO: 17.4 % (ref 11.8–14.4)
PLATELET # BLD: 397 K/UL (ref 138–453)
PMV BLD AUTO: 9.6 FL (ref 8.1–13.5)
POTASSIUM SERPL-SCNC: 3.8 MMOL/L (ref 3.7–5.3)
RBC # BLD: 4.18 M/UL (ref 4.21–5.77)
RBC # BLD: ABNORMAL 10*6/UL
SEG NEUTROPHILS: 78 % (ref 36–65)
SEGMENTED NEUTROPHILS ABSOLUTE COUNT: 12.22 K/UL (ref 1.5–8.1)
SODIUM BLD-SCNC: 129 MMOL/L (ref 135–144)
TOTAL IRON BINDING CAPACITY: 194 UG/DL (ref 250–450)
TROPONIN, HIGH SENSITIVITY: 4120 NG/L (ref 0–22)
UNSATURATED IRON BINDING CAPACITY: 175 UG/DL (ref 112–347)
WBC # BLD: 15.5 K/UL (ref 3.5–11.3)

## 2023-01-02 PROCEDURE — 6370000000 HC RX 637 (ALT 250 FOR IP): Performed by: STUDENT IN AN ORGANIZED HEALTH CARE EDUCATION/TRAINING PROGRAM

## 2023-01-02 PROCEDURE — 82728 ASSAY OF FERRITIN: CPT

## 2023-01-02 PROCEDURE — 6360000002 HC RX W HCPCS: Performed by: STUDENT IN AN ORGANIZED HEALTH CARE EDUCATION/TRAINING PROGRAM

## 2023-01-02 PROCEDURE — 2580000003 HC RX 258: Performed by: STUDENT IN AN ORGANIZED HEALTH CARE EDUCATION/TRAINING PROGRAM

## 2023-01-02 PROCEDURE — 97163 PT EVAL HIGH COMPLEX 45 MIN: CPT

## 2023-01-02 PROCEDURE — 6370000000 HC RX 637 (ALT 250 FOR IP): Performed by: INTERNAL MEDICINE

## 2023-01-02 PROCEDURE — 83550 IRON BINDING TEST: CPT

## 2023-01-02 PROCEDURE — 84484 ASSAY OF TROPONIN QUANT: CPT

## 2023-01-02 PROCEDURE — 85025 COMPLETE CBC W/AUTO DIFF WBC: CPT

## 2023-01-02 PROCEDURE — 2060000000 HC ICU INTERMEDIATE R&B

## 2023-01-02 PROCEDURE — 99232 SBSQ HOSP IP/OBS MODERATE 35: CPT | Performed by: STUDENT IN AN ORGANIZED HEALTH CARE EDUCATION/TRAINING PROGRAM

## 2023-01-02 PROCEDURE — 99232 SBSQ HOSP IP/OBS MODERATE 35: CPT | Performed by: INTERNAL MEDICINE

## 2023-01-02 PROCEDURE — 80048 BASIC METABOLIC PNL TOTAL CA: CPT

## 2023-01-02 PROCEDURE — 83540 ASSAY OF IRON: CPT

## 2023-01-02 PROCEDURE — 99222 1ST HOSP IP/OBS MODERATE 55: CPT | Performed by: INTERNAL MEDICINE

## 2023-01-02 PROCEDURE — 36415 COLL VENOUS BLD VENIPUNCTURE: CPT

## 2023-01-02 PROCEDURE — 97530 THERAPEUTIC ACTIVITIES: CPT

## 2023-01-02 PROCEDURE — 97112 NEUROMUSCULAR REEDUCATION: CPT

## 2023-01-02 RX ORDER — ASPIRIN 81 MG/1
81 TABLET, CHEWABLE ORAL DAILY
Qty: 30 TABLET | Refills: 3 | Status: SHIPPED | OUTPATIENT
Start: 2023-01-03 | End: 2023-01-03 | Stop reason: SDUPTHER

## 2023-01-02 RX ORDER — LORAZEPAM 1 MG/1
1 TABLET ORAL EVERY 6 HOURS PRN
COMMUNITY

## 2023-01-02 RX ORDER — AMOXICILLIN 500 MG/1
500 CAPSULE ORAL EVERY 8 HOURS SCHEDULED
Qty: 18 CAPSULE | Refills: 0 | Status: SHIPPED | OUTPATIENT
Start: 2023-01-02 | End: 2023-01-04 | Stop reason: SDUPTHER

## 2023-01-02 RX ORDER — FERROUS SULFATE 325(65) MG
325 TABLET ORAL 2 TIMES DAILY
Qty: 60 TABLET | Refills: 5 | Status: SHIPPED | OUTPATIENT
Start: 2023-01-02

## 2023-01-02 RX ORDER — ATORVASTATIN CALCIUM 80 MG/1
80 TABLET, FILM COATED ORAL DAILY
Qty: 30 TABLET | Refills: 3 | Status: SHIPPED | OUTPATIENT
Start: 2023-01-03

## 2023-01-02 RX ORDER — LORAZEPAM 1 MG/1
1 TABLET ORAL EVERY 6 HOURS PRN
Status: DISCONTINUED | OUTPATIENT
Start: 2023-01-02 | End: 2023-01-04 | Stop reason: HOSPADM

## 2023-01-02 RX ADMIN — SODIUM CHLORIDE: 9 INJECTION, SOLUTION INTRAVENOUS at 16:56

## 2023-01-02 RX ADMIN — ASPIRIN 81 MG CHEWABLE TABLET 81 MG: 81 TABLET CHEWABLE at 08:16

## 2023-01-02 RX ADMIN — IRON SUCROSE 400 MG: 20 INJECTION, SOLUTION INTRAVENOUS at 16:56

## 2023-01-02 RX ADMIN — AMOXICILLIN 500 MG: 500 CAPSULE ORAL at 21:41

## 2023-01-02 RX ADMIN — ONDANSETRON 4 MG: 2 INJECTION INTRAMUSCULAR; INTRAVENOUS at 08:21

## 2023-01-02 RX ADMIN — TICAGRELOR 90 MG: 90 TABLET ORAL at 08:17

## 2023-01-02 RX ADMIN — CARVEDILOL 3.12 MG: 3.12 TABLET, FILM COATED ORAL at 20:36

## 2023-01-02 RX ADMIN — BUPROPION HYDROCHLORIDE 300 MG: 150 TABLET, EXTENDED RELEASE ORAL at 08:16

## 2023-01-02 RX ADMIN — AMOXICILLIN 500 MG: 500 CAPSULE ORAL at 05:40

## 2023-01-02 RX ADMIN — ATORVASTATIN CALCIUM 80 MG: 80 TABLET, FILM COATED ORAL at 08:16

## 2023-01-02 RX ADMIN — AMOXICILLIN 500 MG: 500 CAPSULE ORAL at 14:02

## 2023-01-02 RX ADMIN — PANTOPRAZOLE SODIUM 40 MG: 40 TABLET, DELAYED RELEASE ORAL at 08:18

## 2023-01-02 RX ADMIN — SODIUM CHLORIDE, PRESERVATIVE FREE 10 ML: 5 INJECTION INTRAVENOUS at 20:36

## 2023-01-02 RX ADMIN — RIVAROXABAN 20 MG: 20 TABLET, FILM COATED ORAL at 18:17

## 2023-01-02 RX ADMIN — SODIUM CHLORIDE, PRESERVATIVE FREE 10 ML: 5 INJECTION INTRAVENOUS at 08:18

## 2023-01-02 RX ADMIN — CARVEDILOL 3.12 MG: 3.12 TABLET, FILM COATED ORAL at 08:16

## 2023-01-02 RX ADMIN — TICAGRELOR 90 MG: 90 TABLET ORAL at 20:36

## 2023-01-02 ASSESSMENT — ENCOUNTER SYMPTOMS
VOICE CHANGE: 0
BACK PAIN: 0
ABDOMINAL PAIN: 0
NAUSEA: 0
SHORTNESS OF BREATH: 1
VOMITING: 0

## 2023-01-02 NOTE — PROGRESS NOTES
[facility]  Office: [address], DO, Prabha Álvarez, DO, Santa Curiel, DO, Lotus Browns Blood, DO, Alexia Mcneil MD, Sue Camarillo MD, Yair Oneal MD, Danie Barrios MD,  Maty Park MD, Blue Fitzgerald MD, Radha Feliciano, DO, Alicia Rosario MD,  Unknown MD Val, Flora Estrada MD, Perez Venegas, DO, Deya Carranza MD, Severiano Border, MD, Oskar Taylor DO, Rai Marshall MD, Virginia May MD, Kristopher Sandhu MD, Garrett Bland MD, Shani Bangura, DO, Elif Monique MD, Josephine Liriano MD, Nahum Su, Franklin Crouch, CNP, Anayeli Lyn, CNP, Bernie Burt, CNP,  Jimenez Mtz, [facility], Love Olivo, CNP, Jina Ardon, CNP, Perico Flores, CNP, Tj Oconnor, CNP, Uriah Nye, CNP, Vianey Laws, PA-C, Richie Thomson, CNS, Claudell Belfast, CNP, Catrina Núñez, CNP         [address]    Progress Note    1/2/2023    2:28 PM    Name:   Esteban Baldwin  MRN:     [id number]     [place]:      [de-identified]   Room:   2003/2003-01  IP Day:  2  Admit Date:  12/31/2022  5:54 PM    PCP:   Valdemar Leiva MD  Code Status:  Full Code    Subjective:     C/C:   Chief Complaint   Patient presents with    Chest Pain     NSTEMI, PB transfer, direct admit no bed        Interval History Status: improved. Patient does not complain of any chest pain or shortness of breath. Overall feels better. Patient is concerned about his shortness of breath and states that he had COVID last year and after that his shortness of breath has been worsening. Patient has more trouble when he walks. Patient quit smoking 30 years ago. Vital stable, patient saturating well on room air  Sodium of 129, creatinine 1.5, hemoglobin 10.5    Brief History:     60-year-old male transferred from [facility] ER presented with acute chest pain, dry heaves.   There was concern for nonspecific changes on EKG, initial concern for STEMI, ruled out by cardiology. Troponin peaked at 4000. Patient was on IV heparin and nitro drip. Patient was taken for cardiac cath and showed 100% LAD mid stenosis which was reduced by aspiration thrombectomy and drug-eluting stent. Patient was also seen to have UTI with Enterococcus E. coli. Patient is resistant to ciprofloxacin, switch to Macrobid. Patient has history of locally advanced bladder carcinoma with history of robotic cystoprostatectomy with ileal conduit creation in September 2022. Patient had history of nephrolithiasis and recently had percutaneous lithotomy and stent removal.    Review of Systems:     Constitutional:  negative for chills, fevers, sweats  Respiratory:  negative for cough, positive for shortness of breath cardiovascular:  negative for chest pain, chest pressure/discomfort, lower extremity edema, palpitations  Gastrointestinal:  negative for abdominal pain, constipation, diarrhea, nausea, vomiting  Neurological:  negative for dizziness, headache    Medications:      Allergies:  No Known Allergies    Current Meds:   Scheduled Meds:    rivaroxaban  20 mg Oral Daily    iron sucrose  400 mg IntraVENous Once    atorvastatin  80 mg Oral Daily    ticagrelor  90 mg Oral BID    aspirin  81 mg Oral Daily    sodium chloride flush  5-40 mL IntraVENous 2 times per day    amoxicillin  500 mg Oral 3 times per day    sodium chloride flush  5-40 mL IntraVENous 2 times per day    pantoprazole  40 mg Oral QAM AC    carvedilol  3.125 mg Oral BID    buPROPion  300 mg Oral QAM     Continuous Infusions:    sodium chloride      sodium chloride       PRN Meds: LORazepam, morphine, sodium chloride flush, sodium chloride, tiZANidine, sodium chloride flush, sodium chloride, ondansetron **OR** ondansetron, magnesium hydroxide, acetaminophen **OR** acetaminophen, potassium chloride **OR** potassium alternative oral replacement **OR** potassium chloride    Data:     Past Medical History:   has a past medical history of Acute renal failure with tubular necrosis (HCC), Arthritis, BPH with obstruction/lower urinary tract symptoms, CAD (coronary artery disease), Caffeine use, Cancer ([facility] CKD (chronic kidney disease), stage III ([facility] COVID-19, Depression, Dilated cardiomyopathy ([facility] GERD (gastroesophageal reflux disease), High cholesterol, Kidney calculi, Sleep apnea, Under care of team, Under care of team, Under care of team, Under care of team, Under care of team, and Wears glasses. Social History:   reports that he quit smoking about 30 years ago. His smoking use included cigarettes. He started smoking about 40 years ago. He has a 2.25 pack-year smoking history. He has never used smokeless tobacco. He reports that he does not currently use alcohol. He reports that he does not use drugs. Family History:   Family History   Problem Relation Age of Onset    Cancer Father         bladder cancer    Urolithiasis Father        Vitals:  /76   Pulse 99   Temp 98 °F (36.7 °C) (Oral)   Resp 21   Ht 6' 2\" (1.88 m)   Wt 205 lb (93 kg)   SpO2 96%   BMI 26.32 kg/m²   Temp (24hrs), Av.8 °F (36.6 °C), Min:97.3 °F (36.3 °C), Max:98.2 °F (36.8 °C)    No results for input(s): POCGLU in the last 72 hours. I/O (24Hr):     Intake/Output Summary (Last 24 hours) at 2023 1428  Last data filed at 2023 0400  Gross per 24 hour   Intake --   Output 1610 ml   Net -1610 ml       Labs:  Hematology:  Recent Labs     22  1338 23  0541 23  0610   WBC 13.7* 14.7* 15.5*   RBC 3.91* 4.13* 4.18*   HGB 9.8* 10.4* 10.5*   HCT 30.4* 34.3* 36.0*   MCV 77.6* 83.1 86.1   MCH 25.1* 25.2 25.1*   MCHC 32.3 30.3 29.2   RDW 18.9* 16.9* 17.4*    371 397   MPV 7.1 9.0 9.6   DDIMER 1.08  --   --      Chemistry:  Recent Labs     22  1338 22  1606 23  0541 23  1245 23  0610   *  --  134*  --  129*   K 3.9  --  4.1  --  3.8     --  102  --  99   CO2 20  --  19*  --  21   GLUCOSE 88  -- 92  --  79   BUN 13  --  13  --  15   CREATININE 1.71*  --  1.52*  --  1.51*   ANIONGAP 11  --  13  --  9   LABGLOM 44*  --  51*  --  51*   CALCIUM 8.7  --  8.4*  --  8.8   PROBNP 6,582*  --   --   --   --    TROPHS 1,769*   < > 3,991* 9,518* 4,120*    < > = values in this interval not displayed. No results for input(s): PROT, LABALBU, LABA1C, N0UXALV, U1ARGID, FT4, TSH, AST, ALT, LDH, GGT, ALKPHOS, LABGGT, BILITOT, BILIDIR, AMMONIA, AMYLASE, LIPASE, LACTATE, CHOL, HDL, LDLCHOLESTEROL, CHOLHDLRATIO, TRIG, VLDL, EOZ92DV, PHENYTOIN, PHENYF, URICACID, POCGLU in the last 72 hours. ABG:  Lab Results   Component Value Date/Time    POCPH 7.400 09/24/2022 05:22 AM    PHART 7.338 09/23/2022 12:25 PM    POCPCO2 33.0 09/24/2022 05:22 AM    YFU9NDS 38.9 09/23/2022 12:25 PM    POCPO2 151.9 09/24/2022 05:22 AM    PO2ART 142.0 09/23/2022 12:25 PM    POCHCO3 20.4 09/24/2022 05:22 AM    JJO1YCP 20.3 09/23/2022 12:25 PM    NBEA 4 09/24/2022 05:22 AM    PBEA 0 09/22/2022 03:54 PM    LJOF6QYL 99 09/24/2022 05:22 AM    Q3LRMUSA 98.6 09/23/2022 12:25 PM    FIO2 40.0 09/24/2022 05:22 AM     Lab Results   Component Value Date/Time    SPECIAL RIGHT HAND 1ML 01/01/2023 12:20 AM     Lab Results   Component Value Date/Time    CULTURE NO GROWTH 1 DAY 01/01/2023 12:20 AM       Radiology:  US RENAL COMPLETE    Result Date: 12/29/2022  1. Bilateral nonobstructing nephrolithiasis. No evidence of hydronephrosis. 2. 4.2 cm cyst within the mid left kidney. 3. Patient status post cystectomy. No mass or fluid collection within the pelvis. XR CHEST PORTABLE    Result Date: 12/31/2022  COPD. No acute cardiopulmonary process. Indwelling infusion port.        Physical Examination:        General appearance:  alert, cooperative and no distress  Mental Status:  oriented to person, place and time and normal affect  Lungs:  clear to auscultation bilaterally, normal effort  Heart:  regular rate and rhythm, no murmur  Abdomen:  soft, nontender, ileal conduit in place, normal bowel sounds, no masses, hepatomegaly, splenomegaly  Extremities:  no edema, redness, tenderness in the calves  Skin:  no gross lesions, rashes, induration  Groin site examined, no hematoma or bruise  Chemo-Port present in the chest  Assessment:        Hospital Problems             Last Modified POA    * (Principal) NSTEMI (non-ST elevated myocardial infarction) ([facility] 73/84/2039 Yes    Complicated UTI (urinary tract infection) 12/31/2022 Yes    Chronic systolic heart failure ([address] 1/2/2023 Yes    Malignant neoplasm of urinary bladder ([address] 12/31/2022 Yes    Cancer of lateral wall of urinary bladder ([address] 12/31/2022 Yes    Other hyperlipidemia 12/31/2022 Yes    CKD (chronic kidney disease), stage III ([address] 12/31/2022 Yes    Overview Signed 5/26/2022  3:00 PM by Evon Stevenson MD     From 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 Baseline 1.3-1.4         Dilated cardiomyopathy ([address] 12/31/2022 Yes    Overview Signed 5/26/2022  3:01 PM by Evon Stevenson MD     Ejection fraction 40 to 45%, does have symptoms of dyspnea and decreased effort tolerance            Plan:        NSTEMI s/p thrombectomy of mid LAD with stent, on dual antiplatelet agent, aspirin and Brilinta. Patient counseled regarding high bleeding risk on triple therapy with antiplatelet agents and anticoagulant. We will have to follow-up with cardiology outpatient for further adjustment of antiplatelets. Dyspnea on exertion-patient quit smoking 30 years ago, nephrology consulted pulmonology, will need PFTs outpatient to rule out COPD. Dyspnea can also be 2/2 cardiac disease    CKD stage III-creatinine stable at 1.5.     Enterococcus UTI-on amoxicillin, cultures grew E. coli, patient has been on Cipro    Microcytic anemia- give 1 dose venofer, start oral iron supplementation outpatient, will need follow up with pcp    Systolic heart failure, chronic- currently not in overload, nephro recommends monitoring off diuretic, start fluid restriction to 1500ml    History of PE-discussed with patient, resume home Xarelto    History of bladder cancer s/p cystoprostatectomy and ileal conduit- monitor output    DVT prophylaxis-resume home Xarelto  Continue fluid restriction 1500 mL.     Start discharge planning, discussed with     Melinda Davidson MD  1/2/2023  2:28 PM

## 2023-01-02 NOTE — PROGRESS NOTES
Physical Therapy  Facility/Department: [facility] CAR 2- STEPDOWN  Physical Therapy Initial Assessment    Name: Christophe Munoz  : 1955  MRN: [id number]  Date of Service: 2023  Chief Complaint   Patient presents with    Chest Pain     NSTEMI, PB transfer, direct admit no bed         Discharge Recommendations:  Patient would benefit from continued therapy after discharge   PT Equipment Recommendations  Equipment Needed: No      Patient Diagnosis(es): There were no encounter diagnoses. Past Medical History:  has a past medical history of Acute renal failure with tubular necrosis ([address] Arthritis, BPH with obstruction/lower urinary tract symptoms, CAD (coronary artery disease), Caffeine use, Cancer ([facility] CKD (chronic kidney disease), stage III ([address] COVID-19, Depression, Dilated cardiomyopathy ([facility] GERD (gastroesophageal reflux disease), High cholesterol, Kidney calculi, Sleep apnea, Under care of team, Under care of team, Under care of team, Under care of team, Under care of team, and Wears glasses. Past Surgical History:  has a past surgical history that includes Knee arthroscopy; Cardiac catheterization; Colonoscopy; IR PORT PLACEMENT > 5 YEARS (2022); Cardiac catheterization (2022); Bladder removal; Prostatectomy (2022); Bladder removal (N/A, 2022); Bladder surgery (Right, 2022); laparotomy (N/A, 2022); IR GUIDED NEPHROSTOMY CATH PLACEMENT LEFT (2022); IR GUIDED NEPHROSTOMY CATH PLACEMENT RIGHT (2022); laparotomy (N/A, 2022); IR GUIDED NEPHROSTOMY CATH PLACEMENT LEFT (10/05/2022); IR GUIDED NEPHROSTOMY CATH PLACEMENT RIGHT (10/05/2022); Cystoscopy (2022); and Cystoscopy (Left, 2022). Assessment   Assessment: Pt presents with generalized weakness, dyspnea with minimal activity and inabilty to ambulate today due to abrupt needed to return supine due to reportred SOB.  Pt demonstrates poor sitting tolerance x 3 attempts with noted tachycardia and significant dyspnea with abrupt need to sit, poor tolerance. Vitals monitored with HR at rest in the 90's increasing to low to mid 110s' SPO2 96-98%, HR recover with rest and relaxation strategies. Pt educated on pacing, breathing and relaxation techniques during seated trials. Unable to tolerate sitting long enough to stand. Pt will continue to benefit from PT intervention to progress activity and promote functional independence needed to return home with spouse support. Therapy Prognosis: Guarded  Decision Making: High Complexity  Clinical Presentation: unpredictable  Requires PT Follow-Up: Yes  Activity Tolerance  Activity Tolerance: Patient limited by endurance;Treatment limited secondary to medical complications  Activity Tolerance Comments: Incrased HR and significant dyspnea with minimal exertion     Plan   Physcial Therapy Plan  General Plan:  (5-6 x / wk)  Current Treatment Recommendations: Strengthening, Balance training, Functional mobility training, Transfer training, Neuromuscular re-education, Stair training, Gait training, Endurance training, Home exercise program, Therapeutic activities, Safety education & training  Safety Devices  Type of Devices: All fall risk precautions in place, Patient at risk for falls, Left in bed, Left in chair, Gait belt, Nurse notified, Chair alarm in place, Bed alarm in place  Restraints  Restraints Initially in Place: No     Restrictions  Restrictions/Precautions  Restrictions/Precautions: Isolation, Contact Precautions, General Precautions, Up as Tolerated, Bed Alarm, Cardiac, Other (comment) (Up with assist)  Required Braces or Orthoses?: No  Position Activity Restriction  Other position/activity restrictions: Ambulate patient     Subjective   General  Chart Reviewed: Yes  Additional Pertinent Hx: Cardiac catheterization done yesterday 1/1/2022 LAD stent placed, some residual disease distal LAD noted.   Response To Previous Treatment: Not applicable  Family / Caregiver Present: Yes (Spouse present throughout assessment)  Follows Commands: Within Functional Limits  Other (Comment): Pt awake and alert in agreement with PT intervention, Okay per RN to see  General Comment  Comments: Pt report recent inactivity being bed bound x 3 days, in agreement with getting out of bed. Subjective  Subjective: Pt report progressive decline in functional ability as well as significant weight loss.  Pt report dyspnea with minimal activity limiting allf functional activity         Social/Functional History  Social/Functional History  Lives With: Spouse (spouse home retired and able to assist with all care)  Type of Home: House  Home Layout: Multi-level, Laundry in basement  Home Access: Stairs to enter with rails  Entrance Stairs - Number of Steps: 2STE,  4 steps to bed and bath  Entrance Stairs - Rails: Right  Bathroom Shower/Tub: Tub/Shower unit  Bathroom Toilet: Standard  Bathroom Equipment: Grab bars in shower (unable to get LE into shower , sponge bathe)  Bathroom Accessibility: Accessible  Home Equipment: [employer], Electric scooter, Wheelchair-electric, Cane, quad, Walker, rolling  Has the patient had two or more falls in the past year or any fall with injury in the past year?: No  Receives Help From: Family  ADL Assistance: Needs assistance  Bath: Minimal assistance  Dressing: Minimal assistance  Grooming: Minimal assistance  Toileting: Needs assistance  Homemaking Responsibilities: No  Ambulation Assistance: Needs assistance  Transfer Assistance: Needs assistance  Active : Yes  Mode of Transportation: Car  Occupation: Retired  Type of Occupation: .  [address]: spending time with family working as a   Additional Comments: Pt report progressive worsening in ability, slight relief after stent placement  Vision/Hearing  Vision  Vision: Impaired  Vision Exceptions: Wears glasses at all times  Tracking: Able to track stimulus in all quads w/o difficulty  Hearing  Hearing: Within functional limits    Cognition   Orientation  Overall Orientation Status: Within Normal Limits  Orientation Level: Oriented X4  Cognition  Overall Cognitive Status: WNL  Cognition Comment: Pt able to make needs known, pt feel that previous attempts to progress gait caused heart attack, pt limit activity due to dyspnea. Pt is pleasent and cooperative     Objective   Heart Rate: 99  Heart Rate Source: Monitor  BP: 114/76  MAP (Calculated): 89  Resp: 21  SpO2: 96 %  O2 Device: None (Room air)     Observation/Palpation  Posture: Good  Observation: Pt able to sit with erect posture, with poor sitting tolerance  Gross Assessment  AROM: Within functional limits  PROM: Within functional limits  Strength: Generally decreased, functional  Coordination: Within functional limits  Tone: Normal  Sensation: Intact     AROM RLE (degrees)  RLE AROM: WFL  AROM LLE (degrees)  LLE AROM : WFL  AROM RUE (degrees)  RUE AROM : WFL  AROM LUE (degrees)  LUE AROM : WFL  Strength RLE  Strength RLE: WFL  Strength LLE  Strength LLE: WFL  Strength RUE  Strength RUE: WFL  Strength LUE  Strength LUE: WFL  Strength Other  Other: Pt presents generalized weakness and deconditioning, easily fatigued with dyspnea on exertion           Bed mobility  Bridging: Stand by assistance  Rolling to Left: Minimal assistance  Rolling to Right: Minimal assistance  Supine to Sit: Minimal assistance  Sit to Supine: Minimal assistance  Scooting: Minimal assistance  Bed Mobility Comments: Pt able to initiate movement limited due to fatigue and dyspnea  Transfers  Sit to Stand: Unable to assess  Comment: Pt demonstrates poor sitting tolerance x 3 attempts with noted tachycardia and significant dyspnea with abrupt need to sit, poor tolerance. Vitals monitored with HR at rest in the 90's increasing to low to mid 110s' SPO2 96-98%, HR recover with rest and relaxation strategies.  Pt educated on pacing, breathing and relaxation techniques during seated trials. Unable to tolerate sitting long enough to stand. Ambulation  Assistance: Unable to assess  Comments: Pt demonstrates poor sitting tolerance x 3 attempts with noted tachycardia and significant dyspnea with abrupt need to sit, poor tolerance. Vitals monitored with HR at rest in the 90's increasing to low to mid 110s' SPO2 96-98%, HR recover with rest and relaxation strategies. Pt educated on pacing, breathing and relaxation techniques during seated trials. Unable to tolerate sitting long enough to stand. Pt refuse trial to stand due to dyspnea     Balance  Posture: Good  Sitting - Static: Good  Sitting - Dynamic: Good  Exercise Treatment: Pt tolerate sitting EOB x 3 attempts less thand 1 min each, vitals monitored due to increased HR and SOB, Pt provided verbal, written and practical instruction in bilatera LE isometric and arom exercises to prevent furhter decline in strength                                                      AM-PAC Score  -PAC Inpatient Mobility Raw Score : 12 (01/02/23 1404)  AM-PAC Inpatient T-Scale Score : 35.33 (01/02/23 1404)  Mobility Inpatient CMS 0-100% Score: 68.66 (01/02/23 1404)  Mobility Inpatient CMS G-Code Modifier : CL (01/02/23 1404)             Goals  Short Term Goals  Time Frame for Short Term Goals: 14 visits  Short Term Goal 1: Pt to ambulate 15 ft RW CGA  Short Term Goal 2: ascend/descend 4 steps CGA  Short Term Goal 3: pt to transfer from alternate height surfaces with [place] and RW  Short Term Goal 4: Pt to tolerate 30 min ther ex/act to improve general strength and endurance  Patient Goals   Patient Goals : to return home and to gait strength       Education  Patient Education  Education Given To: Patient  Education Provided: Role of Therapy;Transfer Training;Energy Conservation;Home Exercise Program;Precautions; Fall Prevention Strategies; Equipment;Plan of Care  Education Method: Demonstration;Verbal  Barriers to Learning: None  Education Outcome: Verbalized understanding; Unable to demonstrate understanding      Therapy Time   Individual Concurrent Group Co-treatment   Time In 1010         Time Out 1107         Minutes 57         Timed Code Treatment Minutes: 729 Markie Valdez, PT, DPT

## 2023-01-02 NOTE — PROGRESS NOTES
Renal Progress Note    Patient :  Cely Johnson; 74 y.o. MRN# [id number]  Location:  2003/2003-01  Attending:  Minda Dumont MD  Admit Date:  12/31/2022   Hospital Day: 2      Subjective:     Cardiac catheterization done yesterday 1/1/2022 LAD stent placed, some residual disease distal LAD noted. Continues to have dyspnea on exertion although saturation 100%. Chest x-ray showing evidence of COPD. Does have previous history of PE although currently not tachycardic no chest pains at present. Heparin continues. Creatinine stable at 1.5 no evidence of contrast nephropathy. Urine output continues to be good. Labs today show sodium 129 potassium 3.8 chloride 99 bicarb 21 creatinine 1.5 calcium 8.8 hemoglobin 10.5 wbc 15.5    History reviewed  Known history of chronic kidney disease stage IIIb from chronic interstitial nephritis Baseline 1.3-1.5 follows up with me in the office. Known history of bladder cancer failed local therapy status post cystoprostatectomy and ileal conduit creation in September 2022. Developed bowel obstruction subsequently required ureteral stent placements. These have not been removed. Previously had chemotherapy through a port in his right chest.  Presented to the hospital with chest pain found to have high troponin. Underwent cardiac catheterization as described above. Has had previous history of PE in March also has had COPD. Ejection fraction 40 to 45% CHF appears to be compensated.     Outpatient Medications:     Medications Prior to Admission: ciprofloxacin (CIPRO) 250 MG tablet, Take 1 tablet by mouth 2 times daily for 7 days  ondansetron (ZOFRAN-ODT) 8 MG TBDP disintegrating tablet, DISSOLVE 1 TABLET IN MOUTH EVERY 8 HOURS AS NEEDED FOR NAUSEA FOR VOMITING  sucralfate (CARAFATE) 1 GM/10ML suspension, Take 10 mLs by mouth 4 times daily  rivaroxaban (XARELTO) 20 MG TABS tablet, Take 1 tablet by mouth daily  omeprazole (PRILOSEC) 20 MG delayed release capsule, Take 1 capsule by mouth daily (Patient not taking: Reported on 2022)  carvedilol (COREG) 3.125 MG tablet, TAKE 1 TABLET BY MOUTH TWICE DAILY  simvastatin (ZOCOR) 40 MG tablet, Take 40 mg by mouth nightly  buPROPion (WELLBUTRIN XL) 300 MG extended release tablet, Take 300 mg by mouth every morning    Current Medications:     Scheduled Meds:    atorvastatin  80 mg Oral Daily    ticagrelor  90 mg Oral BID    aspirin  81 mg Oral Daily    sodium chloride flush  5-40 mL IntraVENous 2 times per day    amoxicillin  500 mg Oral 3 times per day    sodium chloride flush  5-40 mL IntraVENous 2 times per day    pantoprazole  40 mg Oral QAM AC    carvedilol  3.125 mg Oral BID    buPROPion  300 mg Oral QAM     Continuous Infusions:    sodium chloride      sodium chloride      nitroGLYCERIN 10 mcg/min (22 1831)     PRN Meds:  morphine, sodium chloride flush, sodium chloride, tiZANidine, sodium chloride flush, sodium chloride, ondansetron **OR** ondansetron, magnesium hydroxide, acetaminophen **OR** acetaminophen, potassium chloride **OR** potassium alternative oral replacement **OR** potassium chloride    Input/Output:       I/O last 3 completed shifts:  In: -   Out: 2160 [Urine:2160]. Patient Vitals for the past 96 hrs (Last 3 readings):   Weight   22 2216 205 lb (93 kg)   22 1808 205 lb 0.4 oz (93 kg)       Vital Signs:   Temperature:  Temp: 97.3 °F (36.3 °C)  TMax:   Temp (24hrs), Av.7 °F (36.5 °C), Min:97.3 °F (36.3 °C), Max:98.2 °F (36.8 °C)    Respirations:  Resp: 17  Pulse:   Heart Rate: 93  BP:    BP: 110/78  BP Range: Systolic (39LCK), CASSIE:765 , Min:101 , XMB:876       Diastolic (63HLZ), EOX:35, Min:66, Max:94      Physical Examination:     General:  AAO x 3, speaking in full sentences, no accessory muscle use. HEENT: Atraumatic, normocephalic, no throat congestion, moist mucosa. Eyes:   Pupils equal, round and reactive to light, EOMI. Neck:   No JVD, no thyromegaly, no lymphadenopathy.   Chest:  Bilateral vesicular breath sounds, no rales or wheezes. Cardiac:  S1 S2 RR, no murmurs, gallops or rubs, JVP not raised. Abdomen: Soft, non-tender, no masses or organomegaly, BS audible. :   No suprapubic or flank tenderness. Neuro:  AAO x 3, No FND. SKIN:  No rashes, good skin turgor. Extremities:  No edema, palpable peripheral pulses, no calf tenderness. Labs:       Recent Labs     12/31/22  1338 01/01/23  0541 01/02/23  0610   WBC 13.7* 14.7* 15.5*   RBC 3.91* 4.13* 4.18*   HGB 9.8* 10.4* 10.5*   HCT 30.4* 34.3* 36.0*   MCV 77.6* 83.1 86.1   MCH 25.1* 25.2 25.1*   MCHC 32.3 30.3 29.2   RDW 18.9* 16.9* 17.4*    371 397   MPV 7.1 9.0 9.6      BMP:   Recent Labs     12/31/22  1338 01/01/23  0541 01/02/23  0610   * 134* 129*   K 3.9 4.1 3.8    102 99   CO2 20 19* 21   BUN 13 13 15   CREATININE 1.71* 1.52* 1.51*   GLUCOSE 88 92 79   CALCIUM 8.7 8.4* 8.8      Phosphorus:   No results for input(s): PHOS in the last 72 hours. Magnesium:  No results for input(s): MG in the last 72 hours. Albumin:  No results for input(s): LABALBU in the last 72 hours.   BNP:    No results found for: BNP  LISS:      Lab Results   Component Value Date/Time    LISS NEGATIVE 05/31/2022 10:45 AM     SPEP:  Lab Results   Component Value Date/Time    PROT 6.8 12/29/2022 04:55 PM     UPEP:   No results found for: LABPE  C3:     Lab Results   Component Value Date/Time    C3 119 05/31/2022 10:45 AM     C4:     Lab Results   Component Value Date/Time    C4 25 05/31/2022 10:45 AM     MPO ANCA:   No results found for: MPO  PR3 ANCA:   No results found for: PR3  Anti-GBM:   No results found for: GBMABIGG  Hep BsAg:       No results found for: HEPBSAG  Hep C AB:        No results found for: HEPCAB    Urinalysis/Chemistries:      Lab Results   Component Value Date/Time    NITRU POSITIVE 12/29/2022 03:54 PM    COLORU Red 12/29/2022 03:54 PM    PHUR 7.0 12/29/2022 03:54 PM    WBCUA 20 TO 50 12/29/2022 03:54 PM    RBCUA TOO NUMEROUS TO COUNT 12/29/2022 03:54 PM    MUCUS 3+ 04/14/2022 09:43 AM    YEAST FEW 03/31/2022 01:01 PM    BACTERIA MODERATE 12/29/2022 03:54 PM    CLARITYU cloudy 08/04/2022 09:05 AM    SPECGRAV 1.020 12/29/2022 03:54 PM    LEUKOCYTESUR LARGE 12/29/2022 03:54 PM    UROBILINOGEN Normal 12/29/2022 03:54 PM    BILIRUBINUR NEGATIVE 12/29/2022 03:54 PM    BLOODU large 08/04/2022 09:05 AM    GLUCOSEU NEGATIVE 12/29/2022 03:54 PM    KETUA NEGATIVE 12/29/2022 03:54 PM     Urine Sodium:   No results found for: ИРИНА  Urine Potassium:  No results found for: KUR  Urine Chloride:  No results found for: CLUR  Urine Osmolarity: No results found for: OSMOU  Urine Protein:   No components found for: TOTALPROTEIN, URINE   Urine Creatinine:     Lab Results   Component Value Date/Time    LABCREA 121.3 09/18/2022 05:17 PM     Urine Eosinophils:  No components found for: UEOS    Radiology:     CXR:     Assessment:     1. Chronic kidney disease stage IIIb secondary to chronic interstitial nephritis Baseline 1.3-1.5 renal function stable, follows up with me in the office, ultrasound shows no hydronephrosis. No evidence of contrast nephropathy. 2.  Persistent dyspnea on exertion, history of COPD. CHF appears to be compensated. We will obtain pulmonary medicine consultation  3. Acute non-ST elevation MI status postcardiac catheterization and stent placement  4. Bladder cancer failed local therapy status post prostatectomy and ileal conduit creation in September 2022  5. Enterococcal UTI currently on amoxicillin    Plan:   1. Discontinue all IV fluids  2. Obtain pulmonary consultation to see if any further work-up needs to be done for dyspnea on exertion  3. Fluid restriction 1.5 L a day  4. No objection for discharge from nephrology standpoint  5. No need for loop diuretics  6. Check iron studies  7. Outpatient follow-up with me as previously arranged if discharged    Nutrition   Please ensure that patient is on a renal diet/TF.  Avoid nephrotoxic drugs/contrast exposure. We will continue to follow along with you.

## 2023-01-02 NOTE — CARE COORDINATION
Case Management Assessment  Initial Evaluation    Date/Time of Evaluation: 1/2/2023 9:41 AM  Assessment Completed by: Sariah Gay RN    If patient is discharged prior to next notation, then this note serves as note for discharge by case management. Patient Name: Christophe Munoz                   YOB: 1955  Diagnosis: NSTEMI (non-ST elevated myocardial infarction) [facility]) [I21.4]                   Date / Time: 12/31/2022  5:54 PM    Patient Admission Status: Inpatient   Readmission Risk (Low < 19, Mod (19-27), High > 27): Readmission Risk Score: 19.2    Current PCP: Ellie Mistry MD  PCP verified by CM? (P) Yes    Chart Reviewed: Yes      History Provided by: (P) Patient  Patient Orientation: (P) Alert and Oriented    Patient Cognition: (P) Alert    Hospitalization in the last 30 days (Readmission):  No    If yes, Readmission Assessment in CM Navigator will be completed. Advance Directives:      Code Status: Full Code   Patient's Primary Decision Maker is:      Primary Decision Maker: Shadia Vaughn - [facility] - [phone]    Discharge Planning:    Patient lives with: (P) Spouse/Significant Other Type of Home: (P) House  Primary Care Giver: (P) Self  Patient Support Systems include: (P) Spouse/Significant Other   Current Financial resources: (P) Medicare  Current community resources:    Current services prior to admission: (P) [facility]            Current DME: (P) [facility] [place], Wheelchair            Type of Home Care services:       ADLS  Prior functional level: (P) Independent in ADLs/IADLs  Current functional level: (P) Independent in ADLs/IADLs    PT AM-PAC:   /24  OT AM-PAC:   /24    Family can provide assistance at DC: (P) Yes  Would you like Case Management to discuss the discharge plan with any other family members/significant others, and if so, who?     Plans to Return to Present Housing: (P) Yes  Other Identified Issues/Barriers to RETURNING to current housing: none  Potential Assistance needed at discharge: (P) Home Care            Potential DME:    Patient expects to discharge to: (P) [facility] for transportation at discharge:      Financial    Payor: Kelly Grew / Plan: MEDICARE PART A AND B / Product Type: *No Product type* /     Does insurance require precert for SNF: No    Potential assistance Purchasing Medications:    Meds-to-Beds request:  yes      [address] [facility] [facility] [phone]  Suzierick JesusMary Anne Lor [address] [address]  Phone: [phone] Fax: [phone]      Notes:    Factors facilitating achievement of predicted outcomes: Family support, Motivated, Cooperative, Pleasant, and Has needed Durable Medical Equipment at home    Barriers to discharge: Medical complications    Additional Case Management Notes: patient returning home with wife and [facility] Care. He denies needs and has transportation. [facility]erral [facility] to [facility]    The Plan for Transition of Care is related to the following treatment goals of NSTEMI (non-ST elevated myocardial infarction) ([facility] [address] [N21.1]    IF APPLICABLE: The Patient and/or patient representative Mari Ruvalcaba and his family were provided with a choice of provider and agrees with the discharge plan. Freedom of choice list with basic dialogue that supports the patient's individualized plan of care/goals and shares the quality data associated with the providers was provided to: (P) Patient   Patient Representative Name:       The Patient and/or Patient Representative Agree with the Discharge Plan? (P) Yes    Pastor Morrow RN  Case Management Department  Ph: [phone] Fax: [phone] spoke to Julieta from [facility]. They are not current with patient. Met with patient. Home care agency is [facility]. Referral sent    [phone] spoke to Marci from [facility]. They are current and can resume care at discharge.  Notified her of discharge today    1800 patient awaiting echo and new pulmonology consult

## 2023-01-02 NOTE — PROGRESS NOTES
[facility]  Progress Note                   Date:   1/2/2023  Patient name: Essie Julian  Date of admission:  12/31/2022  5:54 PM  MRN:   [id number]  YOB: 1955  PCP: Edson Landau, MD    Reason for Admission: NSTEMI (non-ST elevated myocardial infarction) [facility]) [I21.4]    Subjective:       Clinical Changes /Abnormalities:Patient seen and examined. Denies chest pain or shortness of breath. Tele/vitals/labs reviewed . Discussed case with RN. Review of Systems    Medications:   Scheduled Meds:   rivaroxaban  20 mg Oral Daily    iron sucrose  400 mg IntraVENous Once    atorvastatin  80 mg Oral Daily    ticagrelor  90 mg Oral BID    aspirin  81 mg Oral Daily    sodium chloride flush  5-40 mL IntraVENous 2 times per day    amoxicillin  500 mg Oral 3 times per day    sodium chloride flush  5-40 mL IntraVENous 2 times per day    pantoprazole  40 mg Oral QAM AC    carvedilol  3.125 mg Oral BID    buPROPion  300 mg Oral QAM     Continuous Infusions:   sodium chloride      sodium chloride       CBC:   Recent Labs     12/31/22  1338 01/01/23  0541 01/02/23  0610   WBC 13.7* 14.7* 15.5*   HGB 9.8* 10.4* 10.5*    371 397     BMP:    Recent Labs     12/31/22  1338 01/01/23  0541 01/02/23  0610   * 134* 129*   K 3.9 4.1 3.8    102 99   CO2 20 19* 21   BUN 13 13 15   CREATININE 1.71* 1.52* 1.51*   GLUCOSE 88 92 79     Hepatic:No results for input(s): AST, ALT, ALB, BILITOT, ALKPHOS in the last 72 hours. Troponin:   Recent Labs     01/01/23  0541 01/01/23  1245 01/02/23  0610   TROPHS 3,991* 9,518* 4,120*     BNP: No results for input(s): BNP in the last 72 hours. Lipids: No results for input(s): CHOL, HDL in the last 72 hours. Invalid input(s): LDLCALCU  INR: No results for input(s): INR in the last 72 hours. DATA:    Diagnostics:       EKG     ECHO  4/22  Summary  Technically difficult study  Normal LV size , mild to moderately increased wall thickness.   Difficult to assess wall motion abnormalities  Moderately reduced LV systolic function with LVEF 40-45 %. Normal RV size and function. LA and RA appears normal in size. No obvious significant structural valvular abnormality noted. No significant valvular stenosis or regurgitation noted. Normal aortic root dimension. No significant pericardial effusion noted. IVC not well visualized     Cardiac Angiography: 6/16/2022   Cardiac Arteries and Lesion Findings       LMCA: Normal 0% stenosis. LAD: Diffuse irregularities 30-40%. LCx: Diffuse irregularities 30-40%. RCA: Diffuse irregularities 30-40%. Coronary Tree        Dominance: Right        Procedure Summary        Minimal non-obstructive CAD. LV was not done. CATH Findings: 1/1/2023         The LV gram was performed in the GARCIA 30 position. LVEF: 40%. Conclusions:     % mid stenosis. Extensive clot. Reduced to 0% using multiple times aspiration thrombectomy and PTCA followed by 2.75x33 mm Resolute JUDITH PD using 3 mm NC balloon. Distal embolization, multiple aspiration thrombectomy done, IC Adenocard and nitro given. LAVELLE 3 flow regained, the very distal apical LAD still have 100% stenosis with clot, will be treated medically, Integrilin was not given due to history of bladder cancer and recent surgery. The patient remained is chest pain free and hemodynamically stable. Nonobstructive disease of other arteries. Recommendations:  DAPT and risk factor modifications       Objective:   Vitals: /76   Pulse 99   Temp 98 °F (36.7 °C) (Oral)   Resp 21   Ht 6' 2\" (1.88 m)   Wt 205 lb (93 kg)   SpO2 96%   BMI 26.32 kg/m²   General appearance: alert and cooperative with exam  HEENT: Head: Normocephalic, no lesions, without obvious abnormality. Neck:no JVD, trachea midline, no adenopathy  Lungs: Clear to auscultation  Heart: Regular rate and rhythm, s1/s2 auscultated, no murmurs.   Right groin cath site no hematoma or bleeding noted  Abdomen: soft, non-tender, bowel sounds active, with ileal conduit noted   Extremities: no edema  Neurologic: not done        Assessment / Acute Cardiac Problems:   NSTEMI, highly elevated Troponin, trending up. EKG without specific ischemic ST-T changes  Cardiac cath 6/2022 with minimal CAD  Moderate LV systolic dysfunction with EF 45% on last echo   Bladder cancer s/p laparoscopic cystoprostatectomy with ileal conduit creation on 9/16  HTN  HLD  Bilateral PE, 03/2022  CKD    Patient Active Problem List:     Kidney stones     Nocturia     Malignant neoplasm of urinary bladder (HCC)     Cancer of lateral wall of urinary bladder (HCC)     Acute respiratory failure with hypoxia (HCC)     Other hyperlipidemia     Microcytic anemia     Mild protein-calorie malnutrition (HCC)     Pulmonary embolism without acute cor pulmonale (HCC)     Hypoxia     Dyspnea     Acute systolic heart failure (HCC)     Acute renal failure with tubular necrosis (HCC)     CKD (chronic kidney disease), stage III (HCC)     Dilated cardiomyopathy (HCC)     PAXTON (acute kidney injury) ([address]     Bandemia     Complicated UTI (urinary tract infection)     Hypokalemia     Hypophosphatemia     Small bowel obstruction (HCC)     Nephrostomy complication (HCC)     Candida infection     Enterococcus infection in shunt ([address]     Hypomagnesemia     Functional diarrhea     NSTEMI (non-ST elevated myocardial infarction) ([address]     Chronic systolic heart failure (HCC)     Coronary artery disease involving native coronary artery of native heart with unstable angina pectoris (HCC)     S/P angioplasty with stent      Plan of Treatment:   S/p cardiac with stenting to mid [id number] aspirin for 2 weeks then stop .   Continue statin Lipitor beta-blocker and Brilinta   PAXTON/CKD -diuretic per nephrology  History of PE-on Xarelto  Follow-up repeat echo  Keep K>4, Mg >2     Electronically signed by LINWOOD Hinton - NP on 1/2/2023 at 4:25 [phone] [facility].  [phone]

## 2023-01-03 ENCOUNTER — HOSPITAL ENCOUNTER (OUTPATIENT)
Dept: INFUSION THERAPY | Age: 68
End: 2023-01-03

## 2023-01-03 PROBLEM — Z86.711 HISTORY OF PULMONARY EMBOLISM: Status: ACTIVE | Noted: 2023-01-03

## 2023-01-03 PROBLEM — R06.02 SHORTNESS OF BREATH: Status: ACTIVE | Noted: 2023-01-03

## 2023-01-03 LAB
ABSOLUTE EOS #: 0.06 K/UL (ref 0–0.44)
ABSOLUTE IMMATURE GRANULOCYTE: 0.09 K/UL (ref 0–0.3)
ABSOLUTE LYMPH #: 1.94 K/UL (ref 1.1–3.7)
ABSOLUTE MONO #: 0.84 K/UL (ref 0.1–1.2)
ANION GAP SERPL CALCULATED.3IONS-SCNC: 11 MMOL/L (ref 9–17)
BASOPHILS # BLD: 1 % (ref 0–2)
BASOPHILS ABSOLUTE: 0.05 K/UL (ref 0–0.2)
BUN BLDV-MCNC: 18 MG/DL (ref 8–23)
CALCIUM SERPL-MCNC: 8.5 MG/DL (ref 8.6–10.4)
CHLORIDE BLD-SCNC: 103 MMOL/L (ref 98–107)
CO2: 21 MMOL/L (ref 20–31)
CREAT SERPL-MCNC: 1.69 MG/DL (ref 0.7–1.2)
EOSINOPHILS RELATIVE PERCENT: 1 % (ref 1–4)
GFR SERPL CREATININE-BSD FRML MDRD: 44 ML/MIN/1.73M2
GLUCOSE BLD-MCNC: 78 MG/DL (ref 70–99)
HCT VFR BLD CALC: 31.1 % (ref 40.7–50.3)
HEMOGLOBIN: 9.5 G/DL (ref 13–17)
IMMATURE GRANULOCYTES: 1 %
LYMPHOCYTES # BLD: 18 % (ref 24–43)
MCH RBC QN AUTO: 25.4 PG (ref 25.2–33.5)
MCHC RBC AUTO-ENTMCNC: 30.5 G/DL (ref 28.4–34.8)
MCV RBC AUTO: 83.2 FL (ref 82.6–102.9)
MONOCYTES # BLD: 8 % (ref 3–12)
NRBC AUTOMATED: 0 PER 100 WBC
PDW BLD-RTO: 17.6 % (ref 11.8–14.4)
PLATELET # BLD: 326 K/UL (ref 138–453)
PMV BLD AUTO: 9.4 FL (ref 8.1–13.5)
POTASSIUM SERPL-SCNC: 3.9 MMOL/L (ref 3.7–5.3)
RBC # BLD: 3.74 M/UL (ref 4.21–5.77)
RBC # BLD: ABNORMAL 10*6/UL
SEG NEUTROPHILS: 71 % (ref 36–65)
SEGMENTED NEUTROPHILS ABSOLUTE COUNT: 7.65 K/UL (ref 1.5–8.1)
SODIUM BLD-SCNC: 135 MMOL/L (ref 135–144)
WBC # BLD: 10.6 K/UL (ref 3.5–11.3)

## 2023-01-03 PROCEDURE — 94761 N-INVAS EAR/PLS OXIMETRY MLT: CPT

## 2023-01-03 PROCEDURE — 2060000000 HC ICU INTERMEDIATE R&B

## 2023-01-03 PROCEDURE — 2580000003 HC RX 258: Performed by: STUDENT IN AN ORGANIZED HEALTH CARE EDUCATION/TRAINING PROGRAM

## 2023-01-03 PROCEDURE — 6370000000 HC RX 637 (ALT 250 FOR IP): Performed by: STUDENT IN AN ORGANIZED HEALTH CARE EDUCATION/TRAINING PROGRAM

## 2023-01-03 PROCEDURE — 80048 BASIC METABOLIC PNL TOTAL CA: CPT

## 2023-01-03 PROCEDURE — 94640 AIRWAY INHALATION TREATMENT: CPT

## 2023-01-03 PROCEDURE — 97166 OT EVAL MOD COMPLEX 45 MIN: CPT

## 2023-01-03 PROCEDURE — 97535 SELF CARE MNGMENT TRAINING: CPT

## 2023-01-03 PROCEDURE — C1887 CATHETER, GUIDING: HCPCS

## 2023-01-03 PROCEDURE — 6360000002 HC RX W HCPCS: Performed by: STUDENT IN AN ORGANIZED HEALTH CARE EDUCATION/TRAINING PROGRAM

## 2023-01-03 PROCEDURE — 85025 COMPLETE CBC W/AUTO DIFF WBC: CPT

## 2023-01-03 PROCEDURE — 6370000000 HC RX 637 (ALT 250 FOR IP): Performed by: SURGERY

## 2023-01-03 PROCEDURE — 36415 COLL VENOUS BLD VENIPUNCTURE: CPT

## 2023-01-03 PROCEDURE — 99232 SBSQ HOSP IP/OBS MODERATE 35: CPT | Performed by: INTERNAL MEDICINE

## 2023-01-03 PROCEDURE — 93306 TTE W/DOPPLER COMPLETE: CPT

## 2023-01-03 PROCEDURE — 99232 SBSQ HOSP IP/OBS MODERATE 35: CPT | Performed by: STUDENT IN AN ORGANIZED HEALTH CARE EDUCATION/TRAINING PROGRAM

## 2023-01-03 PROCEDURE — 6370000000 HC RX 637 (ALT 250 FOR IP): Performed by: INTERNAL MEDICINE

## 2023-01-03 PROCEDURE — 99233 SBSQ HOSP IP/OBS HIGH 50: CPT | Performed by: INTERNAL MEDICINE

## 2023-01-03 PROCEDURE — 97116 GAIT TRAINING THERAPY: CPT

## 2023-01-03 PROCEDURE — 97110 THERAPEUTIC EXERCISES: CPT

## 2023-01-03 RX ORDER — FUROSEMIDE 10 MG/ML
20 INJECTION INTRAMUSCULAR; INTRAVENOUS ONCE
Status: COMPLETED | OUTPATIENT
Start: 2023-01-03 | End: 2023-01-03

## 2023-01-03 RX ORDER — ALBUTEROL SULFATE 90 UG/1
2 AEROSOL, METERED RESPIRATORY (INHALATION) EVERY 6 HOURS PRN
Status: DISCONTINUED | OUTPATIENT
Start: 2023-01-03 | End: 2023-01-04 | Stop reason: HOSPADM

## 2023-01-03 RX ORDER — BUDESONIDE AND FORMOTEROL FUMARATE DIHYDRATE 80; 4.5 UG/1; UG/1
1 AEROSOL RESPIRATORY (INHALATION) 2 TIMES DAILY
Status: DISCONTINUED | OUTPATIENT
Start: 2023-01-03 | End: 2023-01-04 | Stop reason: HOSPADM

## 2023-01-03 RX ORDER — BUDESONIDE AND FORMOTEROL FUMARATE DIHYDRATE 80; 4.5 UG/1; UG/1
1 AEROSOL RESPIRATORY (INHALATION) 2 TIMES DAILY
Qty: 10.2 G | Refills: 3 | Status: SHIPPED | OUTPATIENT
Start: 2023-01-03

## 2023-01-03 RX ORDER — ASPIRIN 81 MG/1
81 TABLET, CHEWABLE ORAL DAILY
Qty: 14 TABLET | Refills: 0 | Status: SHIPPED | OUTPATIENT
Start: 2023-01-03 | End: 2023-01-17

## 2023-01-03 RX ORDER — FUROSEMIDE 20 MG/1
20 TABLET ORAL DAILY
Status: DISCONTINUED | OUTPATIENT
Start: 2023-01-04 | End: 2023-01-04 | Stop reason: HOSPADM

## 2023-01-03 RX ORDER — FUROSEMIDE 20 MG/1
20 TABLET ORAL DAILY
Status: DISCONTINUED | OUTPATIENT
Start: 2023-01-03 | End: 2023-01-03

## 2023-01-03 RX ADMIN — BUPROPION HYDROCHLORIDE 300 MG: 150 TABLET, EXTENDED RELEASE ORAL at 09:30

## 2023-01-03 RX ADMIN — IRON SUCROSE 400 MG: 20 INJECTION, SOLUTION INTRAVENOUS at 13:42

## 2023-01-03 RX ADMIN — SODIUM CHLORIDE, PRESERVATIVE FREE 10 ML: 5 INJECTION INTRAVENOUS at 19:57

## 2023-01-03 RX ADMIN — ATORVASTATIN CALCIUM 80 MG: 80 TABLET, FILM COATED ORAL at 09:30

## 2023-01-03 RX ADMIN — BUDESONIDE AND FORMOTEROL FUMARATE DIHYDRATE 1 PUFF: 80; 4.5 AEROSOL RESPIRATORY (INHALATION) at 10:51

## 2023-01-03 RX ADMIN — AMOXICILLIN 500 MG: 500 CAPSULE ORAL at 13:43

## 2023-01-03 RX ADMIN — ASPIRIN 81 MG CHEWABLE TABLET 81 MG: 81 TABLET CHEWABLE at 09:30

## 2023-01-03 RX ADMIN — CARVEDILOL 3.12 MG: 3.12 TABLET, FILM COATED ORAL at 09:30

## 2023-01-03 RX ADMIN — PANTOPRAZOLE SODIUM 40 MG: 40 TABLET, DELAYED RELEASE ORAL at 09:30

## 2023-01-03 RX ADMIN — RIVAROXABAN 20 MG: 20 TABLET, FILM COATED ORAL at 19:16

## 2023-01-03 RX ADMIN — CARVEDILOL 3.12 MG: 3.12 TABLET, FILM COATED ORAL at 19:57

## 2023-01-03 RX ADMIN — AMOXICILLIN 500 MG: 500 CAPSULE ORAL at 21:52

## 2023-01-03 RX ADMIN — AMOXICILLIN 500 MG: 500 CAPSULE ORAL at 06:06

## 2023-01-03 RX ADMIN — SACUBITRIL AND VALSARTAN 1 TABLET: 24; 26 TABLET, FILM COATED ORAL at 19:57

## 2023-01-03 RX ADMIN — BUDESONIDE AND FORMOTEROL FUMARATE DIHYDRATE 1 PUFF: 80; 4.5 AEROSOL RESPIRATORY (INHALATION) at 21:57

## 2023-01-03 RX ADMIN — FUROSEMIDE 20 MG: 10 INJECTION, SOLUTION INTRAMUSCULAR; INTRAVENOUS at 14:35

## 2023-01-03 RX ADMIN — SODIUM CHLORIDE: 9 INJECTION, SOLUTION INTRAVENOUS at 13:42

## 2023-01-03 RX ADMIN — SODIUM CHLORIDE, PRESERVATIVE FREE 10 ML: 5 INJECTION INTRAVENOUS at 09:31

## 2023-01-03 RX ADMIN — TICAGRELOR 90 MG: 90 TABLET ORAL at 09:30

## 2023-01-03 RX ADMIN — TICAGRELOR 90 MG: 90 TABLET ORAL at 19:57

## 2023-01-03 ASSESSMENT — ENCOUNTER SYMPTOMS
DIARRHEA: 0
VOICE CHANGE: 0
SHORTNESS OF BREATH: 1
EYE REDNESS: 0
VOMITING: 0
ABDOMINAL PAIN: 0
SORE THROAT: 0
PHOTOPHOBIA: 0
ALLERGIC/IMMUNOLOGIC NEGATIVE: 1
COUGH: 0
TROUBLE SWALLOWING: 0
WHEEZING: 0

## 2023-01-03 NOTE — PROGRESS NOTES
Physical Therapy  Facility/Department: [facility] CAR 2- STEPDOWN  Physical Therapy daily treatment note    Name: Xander Shabazz  : 1955  MRN: [id number]  Date of Service: 1/3/2023    Discharge Recommendations:  Patient would benefit from continued therapy after discharge   PT Equipment Recommendations  Equipment Needed: No      Patient Diagnosis(es): There were no encounter diagnoses. Past Medical History:  has a past medical history of Acute renal failure with tubular necrosis ([facility] [address] Arthritis, BPH with obstruction/lower urinary tract symptoms, CAD (coronary artery disease), Caffeine use, Cancer ([facility] [address] CKD (chronic kidney disease), stage III ([facility] [address] COVID-19, Depression, Dilated cardiomyopathy ([facility] [address] GERD (gastroesophageal reflux disease), High cholesterol, Kidney calculi, Sleep apnea, Under care of team, Under care of team, Under care of team, Under care of team, Under care of team, and Wears glasses. Past Surgical History:  has a past surgical history that includes Knee arthroscopy; Cardiac catheterization; Colonoscopy; IR PORT PLACEMENT > 5 YEARS (2022); Cardiac catheterization (2022); Bladder removal; Prostatectomy (2022); Bladder removal (N/A, 2022); Bladder surgery (Right, 2022); laparotomy (N/A, 2022); IR GUIDED NEPHROSTOMY CATH PLACEMENT LEFT (2022); IR GUIDED NEPHROSTOMY CATH PLACEMENT RIGHT (2022); laparotomy (N/A, 2022); IR GUIDED NEPHROSTOMY CATH PLACEMENT LEFT (10/05/2022); IR GUIDED NEPHROSTOMY CATH PLACEMENT RIGHT (10/05/2022); Cystoscopy (2022); and Cystoscopy (Left, 2022). Assessment   Body Structures, Functions, Activity Limitations Requiring Skilled Therapeutic Intervention: Decreased functional mobility ; Decreased ROM; Decreased strength;Decreased balance  Assessment: Pt amb 5 ft bed to chair with RW and MIN A x2. Limted by decreased strength, balance, and pt very easily SOB.  Pt is high fall risk and would benefit from 24 hour sup at d/c. Recommend contiuned PT after d/c to adderss deficits and increase independence with functional mobility  Therapy Prognosis: Good  Activity Tolerance  Activity Tolerance: Patient limited by endurance;Treatment limited secondary to medical complications (pt easily SOB)     Plan   Physcial Therapy Plan  General Plan:  (5-6x)  Current Treatment Recommendations: Strengthening, Balance training, Functional mobility training, Transfer training, Neuromuscular re-education, Stair training, Gait training, Endurance training, Home exercise program, Therapeutic activities, Safety education & training  Safety Devices  Type of Devices: Call light within reach, Nurse notified, Left in chair  Restraints  Restraints Initially in Place: No     Restrictions  Restrictions/Precautions  Restrictions/Precautions: Fall Risk, Up as Tolerated, Contact Precautions  Required Braces or Orthoses?: No  Position Activity Restriction  Other position/activity restrictions: Ambulate patient. Up with assistance. Subjective   General  Patient assessed for rehabilitation services?: Yes  Response To Previous Treatment: Patient with no complaints from previous session. Family / Caregiver Present: Yes (wife)  Follows Commands: Within Functional Limits  Subjective  Subjective: Pt resting in bed upon arrival, agreeable to PT, states \"i'll try, but whenever i move i can't breath\".  Reports having this issue for ~6 months now        Cognition   Orientation  Overall Orientation Status: Within Functional Limits     Objective    Bed mobility  Rolling to Right: Stand by assistance  Supine to Sit: Stand by assistance  Scooting: Stand by assistance  Bed Mobility Comments: HOB raised, cues for breathing tech throughout  Transfers  Sit to Stand: Minimal Assistance;2 Person Assistance  Stand to Sit: Minimal Assistance  Bed to Chair: Minimal assistance;2 Person Assistance  Comment: sit to stand x 2 trials, MIN A x2 due to weakness, decreased balance, increased anxiety wtih SOB. Contiuned cues for proper breathing tech  Ambulation  Surface: Level tile  Device: Rolling Walker  Assistance: Minimal assistance;2 Person assistance  Quality of Gait: flexed posture, shuffles, easily fatigues  Distance: 5 ft bed to chair  Comments: Pt SOB after, requiring cues to slow breathing  More Ambulation?: No   Exercise  Seated LE exercise program: Long Arc Quads, hip abduction/adduction, heel/toe raises, and marches.  Reps: 10x     AM-PAC Score  AM-PAC Inpatient Mobility Raw Score : 16 (01/03/23 1550)  AM-PAC Inpatient T-Scale Score : 40.78 (01/03/23 1550)  Mobility Inpatient CMS 0-100% Score: 54.16 (01/03/23 1550)  Mobility Inpatient CMS G-Code Modifier : CK (01/03/23 1550)        Goals  Short Term Goals  Time Frame for Short Term Goals: 14 visits  Short Term Goal 1: Pt to ambulate 15 ft RW CGA  Short Term Goal 2: ascend/descend 4 steps CGA  Short Term Goal 3: pt to transfer from alternate height surfaces with [place] and RW  Short Term Goal 4: Pt to tolerate 30 min ther ex/act to improve general strength and endurance  Patient Goals   Patient Goals : to return home and to gait strength       Education         Therapy Time   Individual Concurrent Group Co-treatment   Time In 1139         Time Out 1216         Minutes 37         Timed Code Treatment Minutes: 213 [facility]

## 2023-01-03 NOTE — CARE COORDINATION
Transitional planning note: discharge order was removed. Plan remains home with spouse and [facility]. Patient has ride home.  Patient also has new orders for life vest. Spoke with Cameron Soto from [place] and he states that he has sent in the order and necessary documentation to obtain the life vest.

## 2023-01-03 NOTE — PROGRESS NOTES
Occupational Therapy  Facility/Department: [facility] CAR 2- STEPDOWN  Occupational Therapy Initial Assessment    Name: Yoni Kingston  : 1955  MRN: [id number]  Date of Service: 1/3/2023    Chief Complaint   Patient presents with    Chest Pain     NSTEMI, PB transfer, direct admit no bed        Discharge Recommendations:  Patient would benefit from continued therapy after discharge       Patient Diagnosis(es): There were no encounter diagnoses. Past Medical History:  has a past medical history of Acute renal failure with tubular necrosis ([address] Arthritis, BPH with obstruction/lower urinary tract symptoms, CAD (coronary artery disease), Caffeine use, Cancer ([address] CKD (chronic kidney disease), stage III ([address] COVID-19, Depression, Dilated cardiomyopathy ([facility] [address] GERD (gastroesophageal reflux disease), High cholesterol, Kidney calculi, Sleep apnea, Under care of team, Under care of team, Under care of team, Under care of team, Under care of team, and Wears glasses. Past Surgical History:  has a past surgical history that includes Knee arthroscopy; Cardiac catheterization; Colonoscopy; IR PORT PLACEMENT > 5 YEARS (2022); Cardiac catheterization (2022); Bladder removal; Prostatectomy (2022); Bladder removal (N/A, 2022); Bladder surgery (Right, 2022); laparotomy (N/A, 2022); IR GUIDED NEPHROSTOMY CATH PLACEMENT LEFT (2022); IR GUIDED NEPHROSTOMY CATH PLACEMENT RIGHT (2022); laparotomy (N/A, 2022); IR GUIDED NEPHROSTOMY CATH PLACEMENT LEFT (10/05/2022); IR GUIDED NEPHROSTOMY CATH PLACEMENT RIGHT (10/05/2022); Cystoscopy (2022); and Cystoscopy (Left, 2022). Assessment   Performance deficits / Impairments: Decreased functional mobility ; Decreased strength;Decreased ADL status; Decreased safe awareness;Decreased cognition;Decreased balance;Decreased endurance;Decreased high-level IADLs  Assessment: Pt currently limited in performing ADL tasks and functional transfers/functional mobility due to above noted deficits, most significantly decreased activity tolerance as pt reporting significant SOB following very minimal exertion. Pt to benefit from continued therapy services while hospitalized to maximize pt's safety and independence in performing functional tasks. Pt unsafe to return to prior living arrangements at this time based on pt's current functional ability as pt unable to demo activity tolerance required for daily self cares/mobility. Prognosis: Fair  Decision Making: Medium Complexity  REQUIRES OT FOLLOW-UP: Yes  Activity Tolerance  Activity Tolerance: Patient limited by fatigue;Patient limited by pain      Safety Devices  Type of Devices: Call light within reach;Nurse notified; Left in chair  Restraints  Restraints Initially in Place: No    Plan   Occupational Therapy Plan  Times Per Week: 3-5x/wk  Current Treatment Recommendations: Strengthening, Balance training, Functional mobility training, Endurance training, Safety education & training, Patient/Caregiver education & training, Equipment evaluation, education, & procurement, Self-Care / ADL, Home management training     Restrictions  Restrictions/Precautions  Restrictions/Precautions: Fall Risk, Up as Tolerated, Contact Precautions  Required Braces or Orthoses?: No  Position Activity Restriction  Other position/activity restrictions: Ambulate patient. Up with assistance. Subjective   General  Patient assessed for rehabilitation services?: Yes  Family / Caregiver Present: Yes (wife)  General Comment  Comments: RN ok'd for therapy this AM. Pt agreeable to participate in session and cooperative with encouragement required throughout. Pt reports 4/10 pain to abdomen.        Social/Functional History  Social/Functional History  Lives With: Spouse  Type of Home: House  Home Layout: Two level, Laundry in basement, Bed/Bath upstairs (with a basement; 4 steps up to second level bed/bath)  Home Access: Stairs to enter with rails  Entrance Stairs - Number of Steps: 2 steps  Entrance Stairs - Rails: Right  Bathroom Shower/Tub: Tub/Shower unit (pt reports spongebathing for the last ~3 months)  Bathroom Toilet: Standard  Bathroom Equipment: Grab bars in shower  Bathroom Accessibility: Accessible  Home Equipment: [employer], [address], Wheelchair-electric, [employer], quad, Walker, rolling, Walker, 4 wheeled, Walker, standard  Has the patient had two or more falls in the past year or any fall with injury in the past year?: No  Receives Help From: Family  ADL Assistance: Needs assistance  Bath: Moderate assistance  Dressing:  Moderate assistance  Grooming: Minimal assistance  Feeding: Modified independent  (setup)  Toileting: Needs assistance  Homemaking Assistance: Needs assistance  Homemaking Responsibilities: No (all performed by spouse)  Ambulation Assistance: Needs assistance  Transfer Assistance: Needs assistance  Active : Yes  Mode of Transportation: Car  Occupation: Self employed  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 [employer]  Leisure & Hobbies: spending time with family working as a   Additional Comments: Pt report progressive worsening in ability, slight relief after stent placement       Objective   Balance  Sitting:  (SBA (seated EOB ~15 minutes total with VCs for breathing techniques required throughout as pt with frequent complaints of SOB))  Standing:  (CGA; 2x bouts of standing/mobility, ~30 seconds at first bout, ~60 seconds at second bout; pt reporting SOB immediately upon reaching an upright position and requires max encouragement/education for breathing techniques to maintain standing balance)    Functional Mobility   Overall Level of Assistance: Contact-guard assistance (Pt performed functional mobility from bed > chair with use of RW; mildly unsteady however no true LOB; increased effort to perform with noted fatigue and reports of SOB following minimal exertion)  Interventions: Safety awareness training;Verbal cues  Assistive Device: Walker, rolling       AROM: Within functional limits  Strength: Generally decreased, functional  Coordination: Within functional limits  Sensation: Impaired (Pt reports chronic numbness/tingling to bilateral feet)    ADL  Feeding: Setup;Verbal cueing; Increased time to complete  Grooming: Increased time to complete;Verbal cueing;Minimal assistance  UE Bathing: Increased time to complete;Maximum assistance;Verbal cueing  LE Bathing: Increased time to complete;Maximum assistance;Verbal cueing  UE Dressing: Increased time to complete;Maximum assistance;Verbal cueing  LE Dressing: Increased time to complete;Maximum assistance;Verbal cueing  Toileting: Increased time to complete;Maximum assistance    Bed mobility  Supine to Sit: Contact guard assistance (HOB raised  15* and use of bed rails)  Sit to Supine:  (pt retired up to chair at end of session)  Scooting: Contact guard assistance  Bed Mobility Comments: Min VCs for sequencing/problem solving/breathing techniques; increased time/effort to perform    Transfers  Sit to stand: Minimal assistance  Stand to sit: Minimal assistance  Transfer Comments: Min VCs for proper hand placement/sequencing/safety awareness with use of RW during functional transfers; increased time/effort to perform    Vision  Vision: Impaired  Vision Exceptions: Wears glasses at all times  Hearing  Hearing: Within functional limits\    Cognition  Overall Cognitive Status: Exceptions  Attention Span: Attends with cues to redirect  Safety Judgement: Decreased awareness of need for assistance  Problem Solving: Assistance required to generate solutions;Assistance required to identify errors made;Assistance required to implement solutions  Insights: Decreased awareness of deficits  Initiation: Requires cues for some  Sequencing: Requires cues for some  Orientation  Overall Orientation Status: Within Functional Limits        Education Given To: Patient  Education Provided: Role of Therapy;Plan of Care (Activity Promotion, Bed Mobility Techniques, Safety with Transfers/RW Mngt, Energy Conservation/Activity Pacing/Pursed Lip Breathing Techniques)  Education Method: Verbal  Education Outcome: Verbalized understanding;Continued education needed       AM-PAC Score  AM-PAC Inpatient Daily Activity Raw Score: 15 (01/03/23 1433)  AM-PAC Inpatient ADL T-Scale Score : 34.69 (01/03/23 1433)  ADL Inpatient CMS 0-100% Score: 56.46 (01/03/23 1433)  ADL Inpatient CMS G-Code Modifier : CK (01/03/23 1433)       Goals  Short Term Goals  Time Frame for Short Term Goals: Pt will, by discharge:  Short Term Goal 1: Perform ADL tasks with mod IND using AE/DME PRN  Short Term Goal 2: Perform functional transfers/functional mobility with mod IND using least restrictive AD  Short Term Goal 3: Independently demo good safety awareness during engagement in all ADLs and functional transfers/functional mobility  Short Term Goal 4: Demo 5+ minutes standing tolerance with use of least restrictive AD for increased participation in ADL tasks  Short Term Goal 5:  Independently demo ability to incorporate energy conservation techniques as needed during engagement in all functional tasks       Therapy Time   Individual Concurrent Group Co-treatment   Time In 1137         Time Out 1216         Minutes 39         Timed Code Treatment Minutes: [address], OTR/L

## 2023-01-03 NOTE — PLAN OF CARE
Summary  Left ventricle is normal in size. Global left ventricular systolic function  is severely reduced. Calculated ejection fraction 26% by Peralta's method. Severe Hypokinesis of Lane and all apical segments, along with mid  Anteroseptal, inferoseptal, and anterior walls. Redundant chordae tendinae is noted. Evidence of diastolic dysfunction. Normal tricuspid valve structure. Trivial tricuspid regurgitation. Signature  ----------------------------------------------------------------------------   Electronically signed by Odilia Aguilar(Sonographer) on 01/03/2023   09:07 AM  ----------------------------------------------------------------------------     ----------------------------------------------------------------------------   Electronically signed by Henry Briseno(Interpreting physician) on 01/03/2023   11:53 AM      ICMP with LVEF 26% on echo. Reviewed in detail with patient medical management, med compliance, diet, exercise, and recommendations for Life[employer]. Questions/concern addressed.        Electronically signed by LINWOOD Martinez NP on 1/3/23 at 2:40 PM EST

## 2023-01-03 NOTE — CARE COORDINATION
Discharge [facility] Case Management Department  Written by: Nico Boles RN    Patient Name: Esteban Baldwin  Attending Provider: Tammi Curiel MD  Admit Date: 2022  5:54 PM  MRN: [id number]  Account: [de-identified]                     : 1955  Discharge Date: 23      Disposition: home    Met with patient to discuss transitional planning. Plan is home with spouse and [facility]. Patient's spouse to provide ride home. Patient agreeable to plan. Notified Cara with [facility] that patient is expected to discharge home today.      Nico Boles RN

## 2023-01-03 NOTE — PLAN OF CARE
Problem: Respiratory - Adult  Goal: Achieves optimal ventilation and oxygenation  Outcome: Progressing  Flowsheets (Taken 1/3/2023 1053)  Achieves optimal ventilation and oxygenation: Assess for changes in respiratory status

## 2023-01-03 NOTE — PROGRESS NOTES
CLINICAL PHARMACY NOTE: MEDS TO BEDS    Total # of Prescriptions Filled: 2   The following medications were delivered to the patient:  Symbicort  Chewable aspirin    Additional Documentation:

## 2023-01-03 NOTE — PROGRESS NOTES
[facility]  Office: [address], DO, Andres Patels, DO, Shaheen Zheng, DO, Felicity Hook Blood, DO, Albert Cutler MD, Pari Betancourt MD, Sylvia Mclaughlin MD, Flora Peralta MD,  Katia Carranza MD, Hosea Balderas MD, Dhara Munoz, DO, Gaby Mckeon MD,  Cara Real MD, Adrian Echols MD, Coye Meigs, DO, Frank Davis MD, Romeo Matute MD, Toni Langley, DO, Clearance MD Honey, Shane Leyva MD, Suellen Desai MD, Alma Cavazos MD, Lico Sorensen DO, Cassie Brito MD, Suhail Giles MD, Elvira Leone, Randy Seip, CNP, Freeman, CNP, Jazz Medeiros, CNP,  Paris Balderas, [facility], Roxi Landa, CNP, Ember Villeda, CNP, Miya Edward, CNP, Fredo Kauffman, CNP, uTyet Finley, CNP, LORETTA BanuelosC, Malgorzata Bloom, CNS, Drea Hazel, CNP, Azra Shin, [address] [place] [address]    Progress Note    1/3/2023    2:10 PM    Name:   Kimberly Jade  MRN:     [id number]     [place]:      [de-identified]   Room:   2003/2003-01   Day:  3  Admit Date:  12/31/2022  5:54 PM    PCP:   Sandoval Elizalde MD  Code Status:  Full Code    Subjective:     C/C:   Chief Complaint   Patient presents with    Chest Pain     NSTEMI, PB transfer, direct admit no bed        Interval History Status: Slightly worse    Patient still complains of shortness of breath. Patient states that he feels slightly better after Venofer yesterday however he had a hard time going for echocardiogram today and even on minimal moving patient feels short of breath and takes a minute to catch his breath. Chart review revealed patient had PFTs and HRCT previously which were within normal limits. Likely dyspnea due to cardiac issues. Brief History:     72-year-old male transferred from [facility] ER presented with acute chest pain, dry heaves.   There was concern for nonspecific changes on EKG, initial concern for STEMI, ruled out by cardiology. Troponin peaked at 4000. Patient was on IV heparin and nitro drip. Patient was taken for cardiac cath and showed 100% LAD mid stenosis which was reduced by aspiration thrombectomy and drug-eluting stent. Patient was also seen to have UTI with Enterococcus E. coli. Patient is resistant to ciprofloxacin, switch to Macrobid. Patient has history of locally advanced bladder carcinoma with history of robotic cystoprostatectomy with ileal conduit creation in September 2022. Patient had history of nephrolithiasis and recently had percutaneous lithotomy and stent removal.    Review of Systems:     Constitutional:  negative for chills, fevers, sweats  Respiratory:  negative for cough, positive for shortness of breath cardiovascular:  negative for chest pain, chest pressure/discomfort, lower extremity edema, palpitations  Gastrointestinal:  negative for abdominal pain, constipation, diarrhea, nausea, vomiting  Neurological:  negative for dizziness, headache    Medications:      Allergies:  No Known Allergies    Current Meds:   Scheduled Meds:    iron sucrose  400 mg IntraVENous Once    budesonide-formoterol  1 puff Inhalation BID    furosemide  20 mg Oral Daily    rivaroxaban  20 mg Oral Daily    atorvastatin  80 mg Oral Daily    ticagrelor  90 mg Oral BID    aspirin  81 mg Oral Daily    sodium chloride flush  5-40 mL IntraVENous 2 times per day    amoxicillin  500 mg Oral 3 times per day    sodium chloride flush  5-40 mL IntraVENous 2 times per day    pantoprazole  40 mg Oral QAM AC    carvedilol  3.125 mg Oral BID    buPROPion  300 mg Oral QAM     Continuous Infusions:    sodium chloride 25 mL/hr at 01/03/23 1342    sodium chloride       PRN Meds: LORazepam, morphine, sodium chloride flush, sodium chloride, tiZANidine, sodium chloride flush, sodium chloride, ondansetron **OR** ondansetron, magnesium hydroxide, acetaminophen **OR** acetaminophen, potassium chloride **OR** potassium alternative oral replacement **OR** potassium chloride    Data:     Past Medical History:   has a past medical history of Acute renal failure with tubular necrosis ([address] Arthritis, BPH with obstruction/lower urinary tract symptoms, CAD (coronary artery disease), Caffeine use, Cancer ([address] CKD (chronic kidney disease), stage III ([address] COVID-19, Depression, Dilated cardiomyopathy ([address] GERD (gastroesophageal reflux disease), High cholesterol, Kidney calculi, Sleep apnea, Under care of team, Under care of team, Under care of team, Under care of team, Under care of team, and Wears glasses. Social History:   reports that he quit smoking about 30 years ago. His smoking use included cigarettes. He started smoking about 40 years ago. He has a 2.25 pack-year smoking history. He has never used smokeless tobacco. He reports that he does not currently use alcohol. He reports that he does not use drugs. Family History:   Family History   Problem Relation Age of Onset    Cancer Father         bladder cancer    Urolithiasis Father        Vitals:  BP (!) 110/98   Pulse 76   Temp 97.9 °F (36.6 °C) (Oral)   Resp 25   Ht 6' 2\" (1.88 m)   Wt 205 lb (93 kg)   SpO2 98%   BMI 26.32 kg/m²   Temp (24hrs), Av.8 °F (36.6 °C), Min:97.6 °F (36.4 °C), Max:98.1 °F (36.7 °C)    No results for input(s): POCGLU in the last 72 hours. I/O (24Hr):   No intake or output data in the 24 hours ending 23 1410      Labs:  Hematology:  Recent Labs     23  0541 23  0610 23  0657   WBC 14.7* 15.5* 10.6   RBC 4.13* 4.18* 3.74*   HGB 10.4* 10.5* 9.5*   HCT 34.3* 36.0* 31.1*   MCV 83.1 86.1 83.2   MCH 25.2 25.1* 25.4   MCHC 30.3 29.2 30.5   RDW 16.9* 17.4* 17.6*    397 326   MPV 9.0 9.6 9.4       Chemistry:  Recent Labs     23  0541 23  1245 23  0610 23  0657   *  --  129* 135   K 4.1  --  3.8 3.9     --  99 103   CO2 19*  --  21 21   GLUCOSE 92  --  79 78   BUN 13  --  15 18   CREATININE 1.52*  --  1.51* 1.69*   ANIONGAP 13  --  9 11   LABGLOM 51*  --  51* 44*   CALCIUM 8.4*  --  8.8 8.5*   TROPHS 3,991* 9,518* 4,120*  --      No results for input(s): PROT, LABALBU, LABA1C, X2WSRFR, L4WSCAR, FT4, TSH, AST, ALT, LDH, GGT, ALKPHOS, LABGGT, BILITOT, BILIDIR, AMMONIA, AMYLASE, LIPASE, LACTATE, CHOL, HDL, LDLCHOLESTEROL, CHOLHDLRATIO, TRIG, VLDL, URK46OB, PHENYTOIN, PHENYF, URICACID, POCGLU in the last 72 hours. ABG:  Lab Results   Component Value Date/Time    POCPH 7.400 09/24/2022 05:22 AM    PHART 7.338 09/23/2022 12:25 PM    POCPCO2 33.0 09/24/2022 05:22 AM    JPK6LQQ 38.9 09/23/2022 12:25 PM    POCPO2 151.9 09/24/2022 05:22 AM    PO2ART 142.0 09/23/2022 12:25 PM    POCHCO3 20.4 09/24/2022 05:22 AM    JAF2TPG 20.3 09/23/2022 12:25 PM    NBEA 4 09/24/2022 05:22 AM    PBEA 0 09/22/2022 03:54 PM    KBSW4DFS 99 09/24/2022 05:22 AM    G1NBIYRU 98.6 09/23/2022 12:25 PM    FIO2 40.0 09/24/2022 05:22 AM     Lab Results   Component Value Date/Time    SPECIAL RIGHT HAND 1ML 01/01/2023 12:20 AM     Lab Results   Component Value Date/Time    CULTURE NO GROWTH 2 DAYS 01/01/2023 12:20 AM       Radiology:  US RENAL COMPLETE    Result Date: 12/29/2022  1. Bilateral nonobstructing nephrolithiasis. No evidence of hydronephrosis. 2. 4.2 cm cyst within the mid left kidney. 3. Patient status post cystectomy. No mass or fluid collection within the pelvis. XR CHEST PORTABLE    Result Date: 12/31/2022  COPD. No acute cardiopulmonary process. Indwelling infusion port.        Physical Examination:        General appearance:  alert, cooperative and no distress  Mental Status:  oriented to person, place and time and normal affect  Lungs:  clear to auscultation bilaterally, normal effort  Heart:  regular rate and rhythm, no murmur  Abdomen:  soft, nontender, ileal conduit in place, normal bowel sounds, no masses, hepatomegaly, splenomegaly  Extremities:  no edema, redness, tenderness in the calves  Skin:  no gross lesions, rashes, induration  Chemo-Port present in the chest  Assessment:        Hospital Problems             Last Modified POA    * (Principal) NSTEMI (non-ST elevated myocardial infarction) ([address] 38/33/0450 Yes    Complicated UTI (urinary tract infection) 12/31/2022 Yes    Chronic systolic heart failure ([address] 1/2/2023 Yes    Coronary artery disease involving native coronary artery of native heart with unstable angina pectoris ([address] 1/2/2023 Yes    S/P angioplasty with stent 1/2/2023 Yes    Acute hypoxemic respiratory failure ([address] 1/2/2023 Yes    Malignant neoplasm of urinary bladder ([address] 12/31/2022 Yes    Cancer of lateral wall of urinary bladder ([address] 12/31/2022 Yes    Other hyperlipidemia 12/31/2022 Yes    Dyspnea on exertion 1/3/2023 Yes    Acute systolic heart failure ([address] 1/3/2023 Yes    CKD (chronic kidney disease), stage III ([address] 12/31/2022 Yes    Overview Signed 5/26/2022  3:00 PM by Dori Lawson MD     From 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 Baseline 1.3-1.4         Dilated cardiomyopathy ([address] 12/31/2022 Yes    Overview Signed 5/26/2022  3:01 PM by oDri Lawson MD     Ejection fraction 40 to 45%, does have symptoms of dyspnea and decreased effort tolerance          Plan:        NSTEMI s/p thrombectomy of mid LAD with stent, on dual antiplatelet agent, aspirin and Brilinta. Patient counseled regarding high bleeding risk on triple therapy with antiplatelet agents and anticoagulant. Plan is to continue aspirin for 2 weeks and then stop. We will continue Brilinta and Xarelto thereafter. Acute systolic heart failure-patient's EF dropped from 45% to 26%, likely heart failure cause of patient's worsening dyspnea, discussed with nephrology will give 20 mg of Lasix.   Patient will need LifeVest due to drop in ejection fraction. Message sent to cardiology. Dyspnea on exertion-patient quit smoking 30 years ago, previous PFTs and HRCT within normal limits, pulmonology following. start Symbicort and albuterol. CKD stage III-creatinine stable at 1.5. Nephrology following    Enterococcus UTI-on amoxicillin, cultures grew E. Coli    Microcytic anemia-give second dose of Venofer, start oral iron supplementation outpatient, will need follow up with pcp    History of PE-discussed with patient, resume home Xarelto    History of bladder cancer s/p cystoprostatectomy and ileal conduit- monitor output    DVT prophylaxis-resume home Xarelto  Continue fluid restriction 1500 mL. Start discharge planning once patient feels better  PT OT evaluation  Patient is not willing to go to nursing home and wants to return home with home care but he continues to feel weak.   Will hold discharge for 1 day and check response to Juan Carlos Acuña MD  1/3/2023  2:10 PM

## 2023-01-03 NOTE — PROGRESS NOTES
Renal Progress Note    Patient :  Janiece Runner; 74 y.o. MRN# [id number]  Location:  2003/2003-01  Attending:  Nikita Lemos MD  Admit Date:  12/31/2022   Hospital Day: 3      Subjective: The patient is seen and examined. He still has some shortness of breath. Appetite is picking up. He does state the taste of food improved dramatically after receiving IV iron infusion. He is scheduled for further infusion today of 400 mg.primary service is decided they want to give the patient dose of Lasix to see if that will help with his edema. Cardiac catheterization done on 1/1/2022 LAD stent placed, some residual disease distal LAD noted. Continues to have dyspnea on exertion although saturation 100%. Chest x-ray showing evidence of COPD. Does have previous history of PE although currently not tachycardic no chest pains at present. Heparin continues. Serum creatinine did bump up today to 1.69 from 1.51 yesterday, possibly some late effects of radiocontrast.  Other electrolytes show sodium 135 with potassium 3.9 chloride 103 and CO2 21. White blood cells are 10.6 with hemoglobin 9.5 and platelets 679. Creatinine is a bit above baseline which is typically 1.3-1.5 MG/DL. Urine output continues to be good. History is reviewed. Known history of chronic kidney disease stage IIIb from chronic interstitial nephritis Baseline 1.3-1.5 follows up with me in the office. Known history of bladder cancer failed local therapy status post cystoprostatectomy and ileal conduit creation in September 2022. Developed bowel obstruction subsequently required ureteral stent placements. These have not been removed. Previously had chemotherapy through a port in his right chest.  Presented to the hospital with chest pain found to have high troponin. Underwent cardiac catheterization as described above. Has had previous history of PE in March also has had COPD.   Ejection fraction 40 to 45% CHF appears to be compensated. Outpatient Medications:     Medications Prior to Admission: LORazepam (ATIVAN) 1 MG tablet, Take 1 mg by mouth every 6 hours as needed for Anxiety.   [DISCONTINUED] ciprofloxacin (CIPRO) 250 MG tablet, Take 1 tablet by mouth 2 times daily for 7 days  ondansetron (ZOFRAN-ODT) 8 MG TBDP disintegrating tablet, DISSOLVE 1 TABLET IN MOUTH EVERY 8 HOURS AS NEEDED FOR NAUSEA FOR VOMITING  sucralfate (CARAFATE) 1 GM/10ML suspension, Take 10 mLs by mouth 4 times daily  rivaroxaban (XARELTO) 20 MG TABS tablet, Take 1 tablet by mouth daily  [DISCONTINUED] omeprazole (PRILOSEC) 20 MG delayed release capsule, Take 1 capsule by mouth daily (Patient not taking: Reported on 12/31/2022)  carvedilol (COREG) 3.125 MG tablet, TAKE 1 TABLET BY MOUTH TWICE DAILY  buPROPion (WELLBUTRIN XL) 300 MG extended release tablet, Take 300 mg by mouth every morning  [DISCONTINUED] simvastatin (ZOCOR) 40 MG tablet, Take 40 mg by mouth nightly    Current Medications:     Scheduled Meds:    iron sucrose  400 mg IntraVENous Once    budesonide-formoterol  1 puff Inhalation BID    rivaroxaban  20 mg Oral Daily    atorvastatin  80 mg Oral Daily    ticagrelor  90 mg Oral BID    aspirin  81 mg Oral Daily    sodium chloride flush  5-40 mL IntraVENous 2 times per day    amoxicillin  500 mg Oral 3 times per day    sodium chloride flush  5-40 mL IntraVENous 2 times per day    pantoprazole  40 mg Oral QAM AC    carvedilol  3.125 mg Oral BID    buPROPion  300 mg Oral QAM     Continuous Infusions:    sodium chloride 25 mL/hr at 01/03/23 1342    sodium chloride       PRN Meds:  LORazepam, morphine, sodium chloride flush, sodium chloride, tiZANidine, sodium chloride flush, sodium chloride, ondansetron **OR** ondansetron, magnesium hydroxide, acetaminophen **OR** acetaminophen, potassium chloride **OR** potassium alternative oral replacement **OR** potassium chloride    Input/Output:       I/O last 3 completed shifts:  In: -   Out: 760 [Urine:760]. Patient Vitals for the past 96 hrs (Last 3 readings):   Weight   22 2216 205 lb (93 kg)   22 1808 205 lb 0.4 oz (93 kg)       Vital Signs:   Temperature:  Temp: 97.9 °F (36.6 °C)  TMax:   Temp (24hrs), Av.8 °F (36.6 °C), Min:97.6 °F (36.4 °C), Max:98.1 °F (36.7 °C)    Respirations:  Resp: 25  Pulse:   Heart Rate: 76  BP:    BP: (!) 110/98  BP Range: Systolic (97GWM), QZN:496 , Min:93 , XIB:645       Diastolic (40TEC), JUM:97, Min:56, Max:98      Physical Examination:     General:  AAO x 3, speaking in full sentences, no accessory muscle use but does appear somewhat short of breath. HEENT: Atraumatic, normocephalic, no throat congestion, moist mucosa. Eyes:   Pale conjunctiva, no icterus, EOMI  Neck:   No JVD, some accessory muscle use  Chest:  Bilateral air entry with prolonged expiratory phase and no rales or rhonchi appreciated  Cardiac:  S1 S2 RR, no murmurs, gallops or rubs, JVP not raised. Abdomen: Soft, non-tender, not distended, active bowel sounds  :   No suprapubic or flank tenderness. Neuro:  AAO x 3, No FND. SKIN:  No rashes, good skin turgor. Extremities:  No edema, palpable peripheral pulses, no calf tenderness. Labs:       Recent Labs     23  0541 23  0610 23  0657   WBC 14.7* 15.5* 10.6   RBC 4.13* 4.18* 3.74*   HGB 10.4* 10.5* 9.5*   HCT 34.3* 36.0* 31.1*   MCV 83.1 86.1 83.2   MCH 25.2 25.1* 25.4   MCHC 30.3 29.2 30.5   RDW 16.9* 17.4* 17.6*    397 326   MPV 9.0 9.6 9.4      BMP:   Recent Labs     23  0541 23  0610 23  0657   * 129* 135   K 4.1 3.8 3.9    99 103   CO2 19* 21 21   BUN 13 15 18   CREATININE 1.52* 1.51* 1.69*   GLUCOSE 92 79 78   CALCIUM 8.4* 8.8 8.5*      Phosphorus:   No results for input(s): PHOS in the last 72 hours. Magnesium:  No results for input(s): MG in the last 72 hours. Albumin:  No results for input(s): LABALBU in the last 72 hours.   BNP:    No results found for: BNP  LISS: Lab Results   Component Value Date/Time    LISS NEGATIVE 05/31/2022 10:45 AM     SPEP:  Lab Results   Component Value Date/Time    PROT 6.8 12/29/2022 04:55 PM     UPEP:   No results found for: LABPE  C3:     Lab Results   Component Value Date/Time    C3 119 05/31/2022 10:45 AM     C4:     Lab Results   Component Value Date/Time    C4 25 05/31/2022 10:45 AM     MPO ANCA:   No results found for: MPO  PR3 ANCA:   No results found for: PR3  Anti-GBM:   No results found for: GBMABIGG  Hep BsAg:       No results found for: HEPBSAG  Hep C AB:        No results found for: HEPCAB    Urinalysis/Chemistries:      Lab Results   Component Value Date/Time    NITRU POSITIVE 12/29/2022 03:54 PM    COLORU Red 12/29/2022 03:54 PM    PHUR 7.0 12/29/2022 03:54 PM    WBCUA 20 TO 50 12/29/2022 03:54 PM    RBCUA TOO NUMEROUS TO COUNT 12/29/2022 03:54 PM    MUCUS 3+ 04/14/2022 09:43 AM    YEAST FEW 03/31/2022 01:01 PM    BACTERIA MODERATE 12/29/2022 03:54 PM    CLARITYU cloudy 08/04/2022 09:05 AM    SPECGRAV 1.020 12/29/2022 03:54 PM    LEUKOCYTESUR LARGE 12/29/2022 03:54 PM    UROBILINOGEN Normal 12/29/2022 03:54 PM    BILIRUBINUR NEGATIVE 12/29/2022 03:54 PM    BLOODU large 08/04/2022 09:05 AM    GLUCOSEU NEGATIVE 12/29/2022 03:54 PM    KETUA NEGATIVE 12/29/2022 03:54 PM     Urine Sodium:   No results found for: ИРИНА  Urine Potassium:  No results found for: KUR  Urine Chloride:  No results found for: CLUR  Urine Osmolarity: No results found for: OSMOU  Urine Protein:   No components found for: TOTALPROTEIN, URINE   Urine Creatinine:     Lab Results   Component Value Date/Time    LABCREA 121.3 09/18/2022 05:17 PM     Urine Eosinophils:  No components found for: UEOS    Radiology:     CXR:     Assessment:     1. Chronic kidney disease stage IIIb secondary to chronic interstitial nephritis Baseline 1.3-1.5 renal function stable, follows up with Dr. Baudilio Almonte in the office, ultrasound shows no hydronephrosis.      2.  Persistent dyspnea on exertion, history of COPD. CHF appears to be compensated. We will obtain pulmonary medicine consultation  3. Acute non-ST elevation MI status postcardiac catheterization and stent placement  4. Bladder cancer failed local therapy status post prostatectomy and ileal conduit creation in September 2022  5. Enterococcal UTI currently on amoxicillin  6. Some elevation in creatinine today at 1.69 and compared to 1.5 yesterday. This may be evidence of some late radiocontrast injury  7. Anemia of chronic disease with a normal iron saturation, although the ferritin was a bit high at greater than 1,400, likely secondary to inflammation. Plan:   1. Watch labs  2. Follow intake and output  3. Fluid restriction 1.5 L a day  4. Hopefully, creatinine N to baseline over the next couple of days  5. Lasix prescribed by primary team  6. Outpatient follow-up with Dr. Amrik Muñoz as previously arranged once discharged    Nutrition     Avoid further nephrotoxic drugs/contrast exposure. We will continue to follow along with you. Meka Thompson M.D.   [facility]  1/3/2023

## 2023-01-03 NOTE — PLAN OF CARE
Problem: Discharge Planning  Goal: Discharge to home or other facility with appropriate resources  Outcome: Progressing     Problem: Pain  Goal: Verbalizes/displays adequate comfort level or baseline comfort level  Outcome: Progressing     Problem: Safety - Adult  Goal: Free from fall injury  Outcome: Progressing     Problem: ABCDS Injury Assessment  Goal: Absence of physical injury  Outcome: Progressing     Problem: Nutrition Deficit:  Goal: Optimize nutritional status  Outcome: Progressing     Problem: Respiratory - Adult  Goal: Achieves optimal ventilation and oxygenation  1/3/2023 1827 by Grace Gonzalez RN  Outcome: Progressing  1/3/2023 1053 by Harry Banda RCP  Outcome: Progressing  Flowsheets (Taken 1/3/2023 1053)  Achieves optimal ventilation and oxygenation: Assess for changes in respiratory status

## 2023-01-03 NOTE — PROGRESS NOTES
[facility]  Progress Note                   Date:   1/3/2023  Patient name: Mary Inman  Date of admission:  12/31/2022  5:54 PM  MRN:   [id number]  YOB: 1955  PCP: Minda Bonilla MD    Reason for Admission: NSTEMI (non-ST elevated [facility] [I21.4]    Subjective:       Clinical Changes /Abnormalities: Patient seen and examined. Denies chest pain. Complaints of SOB when sitting up and with exertion. Tele/vitals/labs reviewed. Discussed case with RN. ECHO completed this am.     Review of Systems    Medications:   Scheduled Meds:   iron sucrose  400 mg IntraVENous Once    budesonide-formoterol  1 puff Inhalation BID    rivaroxaban  20 mg Oral Daily    atorvastatin  80 mg Oral Daily    ticagrelor  90 mg Oral BID    aspirin  81 mg Oral Daily    sodium chloride flush  5-40 mL IntraVENous 2 times per day    amoxicillin  500 mg Oral 3 times per day    sodium chloride flush  5-40 mL IntraVENous 2 times per day    pantoprazole  40 mg Oral QAM AC    carvedilol  3.125 mg Oral BID    buPROPion  300 mg Oral QAM     Continuous Infusions:   sodium chloride 20 mL/hr at 01/02/23 1656    sodium chloride       CBC:   Recent Labs     01/01/23  0541 01/02/23  0610 01/03/23  0657   WBC 14.7* 15.5* 10.6   HGB 10.4* 10.5* 9.5*    397 326       BMP:    Recent Labs     01/01/23  0541 01/02/23  0610 01/03/23  0657   * 129* 135   K 4.1 3.8 3.9    99 103   CO2 19* 21 21   BUN 13 15 18   CREATININE 1.52* 1.51* 1.69*   GLUCOSE 92 79 78       Hepatic:No results for input(s): AST, ALT, ALB, BILITOT, ALKPHOS in the last 72 hours. Troponin:   Recent Labs     01/01/23  0541 01/01/23  1245 01/02/23  0610   TROPHS 3,991* 9,518* 4,120*       BNP: No results for input(s): BNP in the last 72 hours. Lipids: No results for input(s): CHOL, HDL in the last 72 hours. Invalid input(s): LDLCALCU  INR: No results for input(s): INR in the last 72 hours.   DATA:    Diagnostics: EKG     ECHO  4/22  Summary  Technically difficult study  Normal LV size , mild to moderately increased wall thickness. Difficult to assess wall motion abnormalities  Moderately reduced LV systolic function with LVEF 40-45 %. Normal RV size and function. LA and RA appears normal in size. No obvious significant structural valvular abnormality noted. No significant valvular stenosis or regurgitation noted. Normal aortic root dimension. No significant pericardial effusion noted. IVC not well visualized     Cardiac Angiography: 6/16/2022   Cardiac Arteries and Lesion Findings       LMCA: Normal 0% stenosis. LAD: Diffuse irregularities 30-40%. LCx: Diffuse irregularities 30-40%. RCA: Diffuse irregularities 30-40%. Coronary Tree        Dominance: Right        Procedure Summary        Minimal non-obstructive CAD. LV was not done. CATH Findings: 1/1/2023         The LV gram was performed in the GARCIA 30 position. LVEF: 40%. Conclusions:     % mid stenosis. Extensive clot. Reduced to 0% using multiple times aspiration thrombectomy and PTCA followed by 2.75x33 mm Resolute JUDITH PD using 3 mm NC balloon. Distal embolization, multiple aspiration thrombectomy done, IC Adenocard and nitro given. LAVELLE 3 flow regained, the very distal apical LAD still have 100% stenosis with clot, will be treated medically, Integrilin was not given due to history of bladder cancer and recent surgery. The patient remained is chest pain free and hemodynamically stable. Nonobstructive disease of other arteries. Recommendations:  DAPT and risk factor modifications       Objective:   Vitals: /71   Pulse 79   Temp 97.6 °F (36.4 °C) (Oral)   Resp 16   Ht 6' 2\" (1.88 m)   Wt 205 lb (93 kg)   SpO2 99%   BMI 26.32 kg/m²   General appearance: alert and cooperative with exam  HEENT: Head: Normocephalic, no lesions, without obvious abnormality.   Neck:no JVD, trachea midline, no adenopathy  Lungs: Clear to auscultation  Heart: Regular rate and rhythm, s1/s2 auscultated, no murmurs. Right groin cath site no hematoma or bleeding noted  Abdomen: soft, non-tender, bowel sounds active, with ileal conduit noted   Extremities: no edema  Neurologic: not done        Assessment / Acute Cardiac Problems:   NSTEMI, highly elevated Troponin, trending up. EKG without specific ischemic ST-T changes  Cardiac cath 6/2022 with minimal CAD  Moderate LV systolic dysfunction with EF 45% on last echo   Bladder cancer s/p laparoscopic cystoprostatectomy with ileal conduit creation on 9/16  HTN  HLD  Bilateral PE, 03/2022  CKD    Patient Active Problem List:     Kidney stones     Nocturia     Malignant neoplasm of urinary bladder (HCC)     Cancer of lateral wall of urinary bladder (HCC)     Acute respiratory failure with hypoxia (HCC)     Other hyperlipidemia     Microcytic anemia     Mild protein-calorie malnutrition (HCC)     Pulmonary embolism without acute cor pulmonale (HCC)     Hypoxia     Dyspnea     Acute systolic heart failure (HCC)     Acute renal failure with tubular necrosis (HCC)     CKD (chronic kidney disease), stage III (HCC)     Dilated cardiomyopathy (HCC)     PAXTON (acute kidney injury) ([address]     Bandemia     Complicated UTI (urinary tract infection)     Hypokalemia     Hypophosphatemia     Small bowel obstruction (HCC)     Nephrostomy complication (HCC)     Candida infection     Enterococcus infection in shunt ([address]     Hypomagnesemia     Functional diarrhea     NSTEMI (non-ST elevated myocardial infarction) ([address]     Chronic systolic heart failure (HCC)     Coronary artery disease involving native coronary artery of native heart with unstable angina pectoris (HCC)     S/P angioplasty with stent      Plan of Treatment:   ECHO completed this am. Results pending. S/p cardiac with stenting to mid [id number] aspirin for 2 weeks then stop.  Continue statin, BB and Brilinta   PAXTON/CKD -diuretic per nephrology  History of PE-on Xarelto  Keep K>4, Mg >2     Electronically signed by LINWOOD Joy - CNP on 1/3/2023 at 10:02 [facility].  [phone]

## 2023-01-03 NOTE — CONSULTS
PULMONARY & CRITICAL CARE MEDICINE CONSULT NOTE     Patient:  Moisés Garcia  MRN: [id number]  [facility] date: 12/31/2022  Primary Care Physician: Paris Gonzalez MD  CODE Status: Full Code  LOS: 2  Inpatient consult to Pulmonology  Consult performed by: Antoinette Benton MD  Consult ordered by: Leila Goins MD       SUBJECTIVE     CHIEF COMPLAINT/REASON FOR CONSULT: Chest Pain (NSTEMI, PB transfer, direct admit no bed /)    HISTORY OF PRESENT ILLNESS:  The patient is a 74 y.o. male who follows with Dr. Noe Wilkes in pulmonary clinic. He has history of bladder cancer, CKD, COVID-19 (August 2021), dilated cardiomyopathy with EF 40 to 45% has been admitted to the hospital with chest pain patient had elevated troponins on presentation and was started on heparin infusion. He was taken up for emergent cath due to persistent chest pain and rising troponin. During cath patient was found to have 100% stenosis of mid LAD and underwent PTCA with JUDITH on 1/1/2023. Patient is reporting significant dyspnea on minimal exertion. While I was in the room patient had another brief episode of extreme shortness of breath lasting for less than a minute. He was maintaining a saturation of 100% during this episode. He denies any chest pain. He is a former smoker, quit more than 25 years ago, 5 PPD smoking history in total.  He does have history of sleep apnea, however has not used most more than 10 years due to intolerance. His pulmonary function test (June 2022) showed FEV1 of 86% with refusion/FVC ratio 98% consistent with normal spirometry, DLCO was mildly reduced at 61%.     PAST MEDICAL HISTORY:        Diagnosis Date    Acute renal failure with tubular necrosis ([address] 05/26/2022    Likely ischemic ATN/prerenal acidemia from intractable nausea vomiting as result of chemotherapy, baseline 1.3-1.4 peaked up to 2.3 in May 2022    Arthritis     BPH with obstruction/lower urinary tract symptoms     CAD (coronary artery disease) 06/2022 nonobstructive on cath    Caffeine use     3 cans soda/pop    Cancer (HCC)     bladder cancer. Dr. Joy [facility]    CKD (chronic kidney disease), stage III ([facility] 05/26/2022    From 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   Baseline 1.3-1.4    COVID-19 08/16/2021    SOB, fatigue, fever, chills  x 3 weeks    Depression     Dilated cardiomyopathy ([facility] 05/26/2022    Ejection fraction 40 to 45%, does have symptoms of dyspnea and decreased effort tolerance    GERD (gastroesophageal reflux disease)     High cholesterol     Kidney calculi     Sleep apnea     does not use cpap    Under care of team     Dr. Greta Cruz Nephrology    Under care of team     Dr. Antwon Mcginnis. last visit Oct 2022    Under care of team     Dr. Zbigniew Baer [facility] last visit 8/03/2022    Under care of team     Dr. Cintia Hernandez    Under care of team     Dr. Betzy Santoss glasses      PAST SURGICAL HISTORY:        Procedure Laterality Date    BLADDER REMOVAL      BLADDER REMOVAL N/A 09/16/2022    XI ROBOTIC LAPARASCOPIC ASSISTED RADICAL CYSTOPROSTATECTOMY, BILATERAL PELVIC LYMPHADENECTOMY AND ILEAL CONDUIT FORMATION performed by Bin Christopher MD at [address] [facility] Right 09/16/2022    CYSTOSCOPY STENT REMOVAL performed by Bin Christopher MD at [address] [facility]  06/16/2022    nonobstructive CAD    COLONOSCOPY      CYSTOSCOPY  12/20/2022    LOOPOSCOPY, STENT REMOVAL    CYSTOSCOPY Left 12/20/2022    LOOPOSCOPY, STENT REMOVAL performed by Bin Christopher MD at [address] NEPHROSTOMY PERCUTANEOUS LEFT  09/23/2022    IR NEPHROSTOMY PERCUTANEOUS LEFT 9/23/2022 Abhijit Chase MD [facility] PROCED[facility]    IR NEPHROSTOMY PERCUTANEOUS LEFT  10/05/2022    IR NEPHROSTOMY PERCUTANEOUS LEFT 10/5/2022 [facility] SPECIAL PROCEDURES    IR NEPHROSTOMY PERCUTANEOUS RIGHT  09/23/2022    IR NEPHROSTOMY PERCUTANEOUS RIGHT 9/23/2022 Ailyn Plaza MD [facility] SPECIAL PROCEDURES    IR NEPHROSTOMY PERCUTANEOUS RIGHT  10/05/2022    IR NEPHROSTOMY PERCUTANEOUS RIGHT 10/5/2022 [facility] SPECIAL PROCEDURES    IR PORT PLACEMENT EQUAL OR GREATER THAN 5 YEARS  02/25/2022    IR PORT PLACEMENT EQUAL OR GREATER THAN 5 YEARS 2/25/2022 [facility] SPECIAL PROCEDURES    KNEE ARTHROSCOPY      left    LAPAROTOMY N/A 09/22/2022    LAPAROTOMY EXPLORATORY, LYSIS OF ADHESIONS, REPAIR OF BILATERAL URETERAL ANASTOMOSES, ABDOMINAL WASHOUT, PLACEMENT OF ABTHERA WOUND VAC performed by Mikael Godinez DO at [address] N/A 09/23/2022    2ND LOOK LAPAROTOMY,  ABDOMINAL WASHOUT, ABDOMINAL CLOSURE, WOUND VAC PLACEMENT performed by Mikael Godinez DO at [facility]  09/16/2022    Cystoprostatectomy, Bilateral Pelvic Lymphadenectomy, ileo conduit formation, cystectomy stent removal (right)     FAMILY HISTORY:       Problem Relation Age of Onset    Cancer Father         bladder cancer    Urolithiasis Father      SOCIAL HISTORY:   TOBACCO: reports that he quit smoking about 30 years ago. His smoking use included cigarettes. He started smoking about 40 years ago. He has a 2.25 pack-year smoking history. He has never used smokeless tobacco.  ETOH:  reports that he does not currently use alcohol. DRUGS: reports no history of drug use. ALLERGIES:    No Known Allergies      HOME MEDICATIONS:  Prior to Admission medications    Medication Sig Start Date End Date Taking? Authorizing Provider   LORazepam (ATIVAN) 1 MG tablet Take 1 mg by mouth every 6 hours as needed for Anxiety.    Yes Historical Provider, MD   aspirin 81 MG chewable tablet Take 1 tablet by mouth daily 1/3/23  Yes Kanchan Barlow MD   atorvastatin (LIPITOR) 80 MG tablet Take 1 tablet by mouth daily 1/3/23  Yes Nikita Lemos MD   amoxicillin (AMOXIL) 500 MG capsule Take 1 capsule by mouth every 8 hours for 18 doses 1/2/23 1/8/23 Yes Nikita Lemos MD   ticagrelor (BRILINTA) 90 MG TABS tablet Take 1 tablet by mouth 2 times daily 1/2/23  Yes Nikita Lemos MD   ferrous sulfate (IRON 325) 325 (65 Fe) MG tablet Take 1 tablet by mouth 2 times daily 1/2/23  Yes Nikita Lemos MD   ondansetron (ZOFRAN-ODT) 8 MG TBDP disintegrating tablet DISSOLVE 1 TABLET IN MOUTH EVERY 8 HOURS AS NEEDED FOR NAUSEA FOR VOMITING 12/27/22   Sarah Grayson MD   sucralfate (CARAFATE) 1 GM/10ML suspension Take 10 mLs by mouth 4 times daily 10/18/22 12/20/22  Ernesto Rojas MD   rivaroxaban (XARELTO) 20 MG TABS tablet Take 1 tablet by mouth daily 10/10/22   Vikram Lombardi MD   carvedilol (COREG) 3.125 MG tablet TAKE 1 TABLET BY MOUTH TWICE DAILY 5/18/22   Historical Provider, MD   buPROPion (WELLBUTRIN XL) 300 MG extended release tablet Take 300 mg by mouth every morning    Historical Provider, MD     IMMUNIZATIONS:  Most Recent Immunizations   Administered Date(s) Administered    COVID-19, PFIZER PURPLE top, DILUTE for use, (age 15 y+), 30mcg/0.3mL 12/02/2021     REVIEW OF SYSTEMS:  Review of Systems   Constitutional:  Positive for fatigue. Negative for fever. HENT:  Negative for congestion and voice change. Eyes:  Negative for visual disturbance. Respiratory:  Positive for shortness of breath. Cardiovascular:  Negative for chest pain and leg swelling. Gastrointestinal:  Negative for abdominal pain, nausea and vomiting. Genitourinary:  Negative for dysuria and urgency. Musculoskeletal:  Negative for back pain and joint swelling. Skin:  Negative for rash. Neurological:  Positive for weakness. Hematological:  Does not bruise/bleed easily. Psychiatric/Behavioral:  Negative for agitation and confusion.       OBJECTIVE     VITAL SIGNS:   LAST:  BP 93/72   Pulse 83   Temp 97.6 °F (36.4 °C) (Oral)   Resp 18   Ht 6' 2\" (1.88 m)   Wt 205 lb (93 kg)   SpO2 97%   BMI 26.32 kg/m²   8-24 HR RANGE:  TEMP Temp  Av.8 °F (36.6 °C)  Min: 97.3 °F (36.3 °C)  Max: 98.2 °F (36.6 °C)   BP Systolic (81GLQ), XKC:525 , Min:93 , HKK:546      Diastolic (24FXC), DRS:28, Min:71, Max:91     PULSE Pulse  Av.8  Min: 80  Max: 99   RR Resp  Av  Min: 18  Max: 18   O2 SAT SpO2  Av %  Min: 97 %  Max: 97 %   OXYGEN DELIVERY No data recorded        PHYSICAL EXAM:  Physical Exam  Constitutional:       General: He is awake. He is not in acute distress. Appearance: He is ill-appearing. Interventions: Nasal cannula in place. HENT:      Head: Normocephalic and atraumatic. Mouth/Throat:      Mouth: Mucous membranes are moist.   Eyes:      General: No scleral icterus. Conjunctiva/sclera: Conjunctivae normal.   Cardiovascular:      Rate and Rhythm: Normal rate and regular rhythm. Pulses: Normal pulses. Heart sounds: S1 normal and S2 normal. No murmur heard. Pulmonary:      Effort: No accessory muscle usage. Breath sounds: Normal breath sounds and air entry. No decreased air movement. Abdominal:      General: Bowel sounds are normal. There is no distension. Palpations: Abdomen is soft. Tenderness: There is no abdominal tenderness. Musculoskeletal:      Cervical back: Neck supple. Right lower leg: No edema. Left lower leg: No edema. Skin:     General: Skin is warm. Coloration: Skin is not pale. Neurological:      General: No focal deficit present. Mental Status: He is alert and oriented to person, place, and time.        DATA REVIEW     CURRENT MEDICATIONS:  Scheduled Meds:   rivaroxaban  20 mg Oral Daily    atorvastatin  80 mg Oral Daily    ticagrelor  90 mg Oral BID    aspirin  81 mg Oral Daily    sodium chloride flush  5-40 mL IntraVENous 2 times per day    amoxicillin  500 mg Oral 3 times per day    sodium chloride flush  5-40 mL IntraVENous 2 times per day    pantoprazole 40 mg Oral QAM AC    carvedilol  3.125 mg Oral BID    buPROPion  300 mg Oral QAM     Continuous Infusions:   sodium chloride 20 mL/hr at 01/02/23 1656    sodium chloride         INPUT/OUTPUT:  In: -   Out: 760 [Urine:760]  Date 01/02/23 0000 - 01/02/23 2359   Shift 4558-3370 8684-9660 1731-5580 24 Hour Total   INTAKE   Shift Total(mL/kg)       OUTPUT   Urine(mL/kg/hr) 760(1)   760   Shift Total(mL/kg) 760(8.2)   760(8.2)   Weight (kg) 93 93 93 93        LABORATORY RESULTS:  BLOOD GASES:   No results for input(s): POCPH, POCPCO2, POCPO2, POCHCO3, OQCD0FWI in the last 72 hours. COMPLETE BLOOD COUNTS:   Recent Labs     12/31/22  1338 01/01/23  0541 01/02/23  0610   WBC 13.7* 14.7* 15.5*   HGB 9.8* 10.4* 10.5*   HCT 30.4* 34.3* 36.0*   MCV 77.6* 83.1 86.1    371 397   LYMPHOPCT 15* 16* 14*   RBC 3.91* 4.13* 4.18*   MCH 25.1* 25.2 25.1*   MCHC 32.3 30.3 29.2   RDW 18.9* 16.9* 17.4*     C-REACTIVE PROTEIN:   No results for input(s): CRP in the last 72 hours. LACTATE DEHYDROGENASE:   No results for input(s): LDH in the last 72 hours. BASIC METABOLIC PROFILE:   Recent Labs     12/31/22  1338 01/01/23  0541 01/02/23  0610   * 134* 129*   K 3.9 4.1 3.8    102 99   CO2 20 19* 21   BUN 13 13 15   CREATININE 1.71* 1.52* 1.51*   GLUCOSE 88 92 79     LIVER FUNCTION TESTS:   No results for input(s): PROT, LABALBU, ALT, AST, GGT, ALKPHOS, BILITOT in the last 72 hours. COAGULATION PROFILE:   Recent Labs     12/31/22  1338 12/31/22  2320 01/01/23  0541   APTT 27.2 >120.0* 27.9     D-DIMER:  Recent Labs     12/31/22  1338   DDIMER 1.08     LACTIC ACID:  No results for input(s): LACTA in the last 72 hours. CARDIAC ENZYMES:  No results for input(s): CKTOTAL, CKMB, CKMBINDEX, TROPONINI in the last 72 hours.     Invalid input(s): TROPONIN, HSTROP  BRAIN NATRIURETIC PEPTIDE/PRO-BRAIN NATRURETIC PEPTIDE:   Recent Labs     12/31/22  1338   PROBNP 6,582*     TRIGLYCERIDES:  No results for input(s): TRIG in the last 72 hours.     MICROBIOLOGY RESULTS:  URINE CULTURE: No components found for: CURINE  BLOOD CULTURE: No components found for: CBLOOD, CFUNGUSBL  SPUTUM CULTURE: No components found for: CSPUTUM    Recent Labs     01/01/23  0019 01/01/23  0020   SPECDESC . BLOOD . BLOOD   SPECIAL RIGHT FOREARM 1ML RIGHT HAND 1ML   CULTURE NO GROWTH 1 DAY NO GROWTH 1 DAY        PATHOLOGY RESULTS:    RADIOLOGY REPORTS:  No orders to display        ECHOCARDIOGRAM:   No results found for this or any previous visit.        ASSESSMENT AND PLAN     PROBLEM LIST:    Patient Active Problem List   Diagnosis    Kidney stones    Nocturia    Malignant neoplasm of urinary bladder (HCC)    Cancer of lateral wall of urinary bladder (HCC)    Acute respiratory failure with hypoxia (HCC)    Other hyperlipidemia    Microcytic anemia    Mild protein-calorie malnutrition (HCC)    Pulmonary embolism without acute cor pulmonale (HCC)    Hypoxia    Dyspnea    Acute systolic heart failure (HCC)    Acute renal failure with tubular necrosis (HCC)    CKD (chronic kidney disease), stage III (HCC)    Dilated cardiomyopathy (HCC)    PAXTON (acute kidney injury) ([address]    Bandemia    Complicated UTI (urinary tract infection)    Hypokalemia    Hypophosphatemia    Small bowel obstruction (HCC)    Nephrostomy complication (HCC)    Candida infection    Enterococcus infection in shunt (HCC)    Hypomagnesemia    Functional diarrhea    NSTEMI (non-ST elevated myocardial infarction) ([address]    Chronic systolic heart failure (HCC)    Coronary artery disease involving native coronary artery of native heart with unstable angina pectoris ([address]    S/P angioplasty with stent       ASSESSMENT:    Acute hypoxic respiratory failure  Non-STEMI  S/p PCI with JUDITH to LAD  Cardiomyopathy with moderately reduced LV function, EF 40 to 45%  History of bladder cancer; s/p robotic cystoprostatectomy (September 2022)  History of pulmonary embolism (March 2022)  Stage III CKD  Urine tract infection    PLAN:    I personally interviewed/examined the patient; reviewed interval history, interpreted all available radiographic and laboratory data at the time of service. Patient is hemodynamically stable  Currently saturating well on supplemental oxygen with nasal cannula  Dyspnea likely related to LV dysfunction  Reviewed pulm function test done in June, no evidence of any obstruction, diffusion capacity was mildly reduced  Continue supplemental oxygen to keep oxygen saturation >90%  Continue nocturnal and as needed BiPAP\  Anticoagulation; continue Xarelto  Encourage incentive spirometry/Acapella  Maintain bronchopulmonary hygiene  Continue aspiration precautions  Continue bronchodilators  Antimicrobials reviewed; on amoxicillin  Follow-up culture results  Monitor I/O, electrolytes with a goal of even/negative fluid balance  DVT and stress ulcer prophylaxis  Physical/occupational therapy      It was my pleasure to evaluate Yoni Kingston today. I would like to thank you for allowing me to participate in the care of this patient. Please feel free to call with any further questions or concerns. We will continue to follow. Star Anna MD  Pulmonary and Critical Care Medicine           1/2/2023, 10:04 PM    This note is created with the assistance of a speech recognition program.  While intending to generate a document that actually reflects the content of the visit, the document can still have some errors including those of syntax and sound-alike substitutions which may escape proof reading. It such instances, actual meaning can be extrapolated by contextual diversion.

## 2023-01-03 NOTE — PROGRESS NOTES
PULMONARY & CRITICAL CARE MEDICINE PROGRESS  NOTE     Patient:  Corey Parks  MRN: [id number]  [facility] date: 12/31/2022  Primary Care Physician: Angelito Arndt MD  Consulting Physician: Maureen Coleman MD  CODE Status: Full Code  LOS: 3    SUBJECTIVE     I personally interviewed/examined the patient, reviewed interval history and interpreted all available radiographic, laboratory data at the time of service. Chief Compliant/Reason for Initial Consult:   Chest Pain (NSTEMI, PB transfer, direct admit no bed /)    Brief Hospital Course: The patient is a 74 y.o. male male who follows with Dr. Jimenez Chavis in pulmonary clinic. He has history of bladder cancer, CKD, COVID-19 (August 2021), dilated cardiomyopathy with EF 40 to 45% has been admitted to the hospital with chest pain patient had elevated troponins on presentation and was started on heparin infusion. He was taken up for emergent cath due to persistent chest pain and rising troponin. During cath patient was found to have 100% stenosis of mid LAD and underwent PTCA with JUDITH on 1/1/2023. Patient is reporting significant dyspnea on minimal exertion. While I was in the room patient had another brief episode of extreme shortness of breath lasting for less than a minute. He was maintaining a saturation of 100% during this episode. He denies any chest pain. He is a former smoker, quit more than 25 years ago, 5 PPD smoking history in total.  He does have history of sleep apnea, however has not used most more than 10 years due to intolerance. His pulmonary function test (June 2022) showed FEV1 of 86% with FEV1/FVC ratio 98% consistent with normal spirometry, DLCO was mildly reduced at 61%. Interval History:  01/03/23  Patient is afebrile last 24 hours he is hemodynamically stable systolic blood pressure above 100 he is on room air maintaining saturation 97% to 98%.   Patient does not complain of cough wheezing or sputum production. He does complain of shortness of breath and mild activity. According patient his shortness of breath is better since he started on iron therapy. Review of Systems:  Review of Systems   Constitutional:  Positive for activity change and fatigue. Negative for fever. HENT:  Negative for postnasal drip, sore throat, trouble swallowing and voice change. Eyes:  Negative for photophobia, redness and visual disturbance. Respiratory:  Positive for shortness of breath. Negative for cough and wheezing. Cardiovascular:  Negative for chest pain, palpitations and leg swelling. Gastrointestinal:  Negative for abdominal pain, diarrhea and vomiting. Endocrine: Negative. Genitourinary:  Negative for difficulty urinating, dysuria, frequency and hematuria. Musculoskeletal: Negative. Skin: Negative. Allergic/Immunologic: Negative. Neurological:  Negative for dizziness, syncope, speech difficulty and headaches. Hematological:  Negative for adenopathy. Does not bruise/bleed easily. Psychiatric/Behavioral: Negative.        OBJECTIVE     VITAL SIGNS:   LAST-  BP (!) 110/98   Pulse 76   Temp 97.9 °F (36.6 °C) (Oral)   Resp 25   Ht 6' 2\" (1.88 m)   Wt 205 lb (93 kg)   SpO2 98%   BMI 26.32 kg/m²   8-24 HR RANGE-  TEMP Temp  Av.8 °F (36.6 °C)  Min: 97.6 °F (36.4 °C)  Max: 98.1 °F (35.1 °C)   BP Systolic (97NKI), VKH:957 , Min:93 , PIW:553      Diastolic (66ZIB), ARW:41, Min:56, Max:98     PULSE Pulse  Av.6  Min: 74  Max: 83   RR Resp  Av.3  Min: 14  Max: 25   O2 SAT SpO2  Av.5 %  Min: 98 %  Max: 99 %   OXYGEN DELIVERY No data recorded     Systemic Examination:   Physical Exam  General appearance - looks comfortable and in no acute distress  Mental status - alert, oriented to person, place, and time  Eyes - pupils equal and reactive, extraocular eye movements intact  Mouth - mucous membranes moist, pharynx normal without lesions  Neck - supple, no significant adenopathy  Chest - Chest was symmetrical without dullness to percussion. Breath sounds bilaterally were clear to auscultation. There were no wheezes, rhonchi or rales. There is no intercostal recession or use of accessory muscles  Heart - normal rate, regular rhythm, normal S1, S2, no murmurs, rubs, clicks or gallops  Abdomen - soft, nontender, nondistended, no masses or organomegaly  Neurological - alert, oriented, normal speech, no focal findings or movement disorder noted  Extremities - peripheral pulses normal, no pedal edema, no clubbing or cyanosis  Skin - normal coloration and turgor, no rashes, no suspicious skin lesions noted     DATA REVIEW     Medications:  Scheduled Meds:   iron sucrose  400 mg IntraVENous Once    budesonide-formoterol  1 puff Inhalation BID    [START ON 1/4/2023] furosemide  20 mg Oral Daily    furosemide  20 mg IntraVENous Once    rivaroxaban  20 mg Oral Daily    atorvastatin  80 mg Oral Daily    ticagrelor  90 mg Oral BID    aspirin  81 mg Oral Daily    sodium chloride flush  5-40 mL IntraVENous 2 times per day    amoxicillin  500 mg Oral 3 times per day    sodium chloride flush  5-40 mL IntraVENous 2 times per day    pantoprazole  40 mg Oral QAM AC    carvedilol  3.125 mg Oral BID    buPROPion  300 mg Oral QAM     Continuous Infusions:   sodium chloride 25 mL/hr at 01/03/23 1342    sodium chloride       LABS:-  ABG:   No results for input(s): POCPH, POCPCO2, POCPO2, POCHCO3, BYHO3DTO in the last 72 hours.   CBC:   Recent Labs     01/01/23  0541 01/02/23  0610 01/03/23  0657   WBC 14.7* 15.5* 10.6   HGB 10.4* 10.5* 9.5*   HCT 34.3* 36.0* 31.1*   MCV 83.1 86.1 83.2    397 326   LYMPHOPCT 16* 14* 18*   RBC 4.13* 4.18* 3.74*   MCH 25.2 25.1* 25.4   MCHC 30.3 29.2 30.5   RDW 16.9* 17.4* 17.6*     BMP:   Recent Labs     01/01/23  0541 01/02/23  0610 01/03/23  0657   * 129* 135   K 4.1 3.8 3.9    99 103   CO2 19* 21 21   BUN 13 15 18   CREATININE 1.52* 1.51* 1.69*   GLUCOSE 92 79 78     Liver Function Test:   No results for input(s): PROT, LABALBU, ALT, AST, GGT, ALKPHOS, BILITOT in the last 72 hours. Amylase/Lipase:  No results for input(s): AMYLASE, LIPASE in the last 72 hours. Coagulation Profile:   Recent Labs     12/31/22  2320 01/01/23  0541   APTT >120.0* 27.9     Cardiac Enzymes:  No results for input(s): CKTOTAL, CKMB, CKMBINDEX, TROPONINI in the last 72 hours. Lactic Acid:  No results found for: LACTA  BNP:   No results found for: BNP  D-Dimer:  Lab Results   Component Value Date    DDIMER 1.08 12/31/2022     Others:   Lab Results   Component Value Date    TSH 3.69 03/16/2022     Lab Results   Component Value Date    LISS NEGATIVE 05/31/2022     Lab Results   Component Value Date    URICACID 6.1 12/10/2011     Lab Results   Component Value Date    IRON 19 (L) 01/02/2023    TIBC 194 (L) 01/02/2023    FERRITIN 131 01/02/2023     No results found for: SPEP, UPEP  Lab Results   Component Value Date/Time    PSA 2.54 02/08/2019 02:51 PM       Input/Output:  No intake or output data in the 24 hours ending 01/03/23 1424    Microbiology:  Recent Labs     01/01/23  0020   SPECDESC . BLOOD   SPECIAL RIGHT HAND 1ML   CULTURE NO GROWTH 2 DAYS       Pathology:    Radiology reports:  No orders to display       Echocardiogram:   No results found for this or any previous visit. Cardiac Catheterization:   No results found for this or any previous visit. ASSESSMENT AND PLAN     Assessment:      Non-STEMI  S/p PCI with JUDITH to LAD  Cardiomyopathy with moderately reduced LV function, EF 40 to 45%  Chronic dyspnea on activities  History of bladder cancer; s/p robotic cystoprostatectomy (September 2022)  History of pulmonary embolism (March 2022)  Stage III CKD  Urine tract infection   Anemia iron deficiency  CKD.       Plan:    Patient remains hemodynamically stable and is currently saturating well on room air  Patient does have history of chronic dyspnea which is multifactorial also associated with anemia, congestive heart failure, coronary artery disease  Said [place] O2 if needed to keep oxygen saturation greater than 92%  Pulmonary function test in June showed no evidence of obstruction at that time with mild to moderate reduction diffusion capacity  Continue with diuretic monitor intake and output optimization of CHF medicine  Echocardiogram is pending  Anticoagulation on Xarelto  Continue incentive spirometry, pulmonary toilet, aspiration precautions and bronchodilators  Continue to monitor I/O with a goal of even/negative fluid balance  Antimicrobials reviewed; on amoxicillin  DVT prophylaxis: On therapeutic Xarelto  Physical/occupational/speech therapy; increase activity as tolerated  Will need outpatient sleep study  I updated the patient regarding the current clinical condition, provisional diagnosis and management plan. I addressed concerns and answered all questions to the best of my abilities.    It was my pleasure to evaluate Silver Roman today. We will continue to follow. I would like to thank you for allowing me to participate in the care of this patient.  Please feel free to call with any further questions or concerns.    Chirag Dawson MD, M.D.   Pulmonary and Critical Care Medicine           1/3/2023, 2:24 PM    Please note that this chart was generated using voice recognition Dragon dictation software.  Although every effort was made to ensure the accuracy of this automated transcription, some errors in transcription may have occurred.

## 2023-01-04 VITALS
RESPIRATION RATE: 24 BRPM | HEIGHT: 74 IN | BODY MASS INDEX: 26.31 KG/M2 | SYSTOLIC BLOOD PRESSURE: 102 MMHG | WEIGHT: 205 LBS | HEART RATE: 79 BPM | OXYGEN SATURATION: 96 % | TEMPERATURE: 98.1 F | DIASTOLIC BLOOD PRESSURE: 71 MMHG

## 2023-01-04 LAB
ABSOLUTE EOS #: 0.09 K/UL (ref 0–0.44)
ABSOLUTE IMMATURE GRANULOCYTE: 0.07 K/UL (ref 0–0.3)
ABSOLUTE LYMPH #: 1.91 K/UL (ref 1.1–3.7)
ABSOLUTE MONO #: 0.63 K/UL (ref 0.1–1.2)
ANION GAP SERPL CALCULATED.3IONS-SCNC: 7 MMOL/L (ref 9–17)
BASOPHILS # BLD: 1 % (ref 0–2)
BASOPHILS ABSOLUTE: 0.05 K/UL (ref 0–0.2)
BUN BLDV-MCNC: 18 MG/DL (ref 8–23)
CALCIUM SERPL-MCNC: 8.4 MG/DL (ref 8.6–10.4)
CHLORIDE BLD-SCNC: 102 MMOL/L (ref 98–107)
CO2: 21 MMOL/L (ref 20–31)
CREAT SERPL-MCNC: 1.57 MG/DL (ref 0.7–1.2)
EOSINOPHILS RELATIVE PERCENT: 1 % (ref 1–4)
GFR SERPL CREATININE-BSD FRML MDRD: 49 ML/MIN/1.73M2
GLUCOSE BLD-MCNC: 98 MG/DL (ref 70–99)
HCT VFR BLD CALC: 31.3 % (ref 40.7–50.3)
HEMOGLOBIN: 9.3 G/DL (ref 13–17)
IMMATURE GRANULOCYTES: 1 %
LYMPHOCYTES # BLD: 20 % (ref 24–43)
MAGNESIUM: 1.9 MG/DL (ref 1.6–2.6)
MCH RBC QN AUTO: 25.1 PG (ref 25.2–33.5)
MCHC RBC AUTO-ENTMCNC: 29.7 G/DL (ref 28.4–34.8)
MCV RBC AUTO: 84.6 FL (ref 82.6–102.9)
MONOCYTES # BLD: 7 % (ref 3–12)
NRBC AUTOMATED: 0 PER 100 WBC
PDW BLD-RTO: 17.7 % (ref 11.8–14.4)
PLATELET # BLD: 304 K/UL (ref 138–453)
PMV BLD AUTO: 9.4 FL (ref 8.1–13.5)
POTASSIUM SERPL-SCNC: 3.4 MMOL/L (ref 3.7–5.3)
RBC # BLD: 3.7 M/UL (ref 4.21–5.77)
RBC # BLD: ABNORMAL 10*6/UL
SEG NEUTROPHILS: 70 % (ref 36–65)
SEGMENTED NEUTROPHILS ABSOLUTE COUNT: 6.66 K/UL (ref 1.5–8.1)
SODIUM BLD-SCNC: 130 MMOL/L (ref 135–144)
WBC # BLD: 9.4 K/UL (ref 3.5–11.3)

## 2023-01-04 PROCEDURE — 99239 HOSP IP/OBS DSCHRG MGMT >30: CPT | Performed by: STUDENT IN AN ORGANIZED HEALTH CARE EDUCATION/TRAINING PROGRAM

## 2023-01-04 PROCEDURE — 6370000000 HC RX 637 (ALT 250 FOR IP): Performed by: STUDENT IN AN ORGANIZED HEALTH CARE EDUCATION/TRAINING PROGRAM

## 2023-01-04 PROCEDURE — 6370000000 HC RX 637 (ALT 250 FOR IP): Performed by: INTERNAL MEDICINE

## 2023-01-04 PROCEDURE — 94640 AIRWAY INHALATION TREATMENT: CPT

## 2023-01-04 PROCEDURE — 6360000002 HC RX W HCPCS: Performed by: STUDENT IN AN ORGANIZED HEALTH CARE EDUCATION/TRAINING PROGRAM

## 2023-01-04 PROCEDURE — 85025 COMPLETE CBC W/AUTO DIFF WBC: CPT

## 2023-01-04 PROCEDURE — 2580000003 HC RX 258: Performed by: STUDENT IN AN ORGANIZED HEALTH CARE EDUCATION/TRAINING PROGRAM

## 2023-01-04 PROCEDURE — 6370000000 HC RX 637 (ALT 250 FOR IP): Performed by: SURGERY

## 2023-01-04 PROCEDURE — 83735 ASSAY OF MAGNESIUM: CPT

## 2023-01-04 PROCEDURE — 36415 COLL VENOUS BLD VENIPUNCTURE: CPT

## 2023-01-04 PROCEDURE — 80048 BASIC METABOLIC PNL TOTAL CA: CPT

## 2023-01-04 PROCEDURE — 99231 SBSQ HOSP IP/OBS SF/LOW 25: CPT | Performed by: INTERNAL MEDICINE

## 2023-01-04 RX ORDER — MAGNESIUM SULFATE 1 G/100ML
1000 INJECTION INTRAVENOUS ONCE
Status: COMPLETED | OUTPATIENT
Start: 2023-01-04 | End: 2023-01-04

## 2023-01-04 RX ORDER — METOPROLOL SUCCINATE 25 MG/1
25 TABLET, EXTENDED RELEASE ORAL DAILY
Status: DISCONTINUED | OUTPATIENT
Start: 2023-01-04 | End: 2023-01-04 | Stop reason: HOSPADM

## 2023-01-04 RX ORDER — FUROSEMIDE 20 MG/1
20 TABLET ORAL DAILY
Qty: 60 TABLET | Refills: 3 | Status: SHIPPED | OUTPATIENT
Start: 2023-01-05

## 2023-01-04 RX ORDER — POTASSIUM CHLORIDE 20 MEQ/1
40 TABLET, EXTENDED RELEASE ORAL ONCE
Status: COMPLETED | OUTPATIENT
Start: 2023-01-04 | End: 2023-01-04

## 2023-01-04 RX ORDER — POTASSIUM CHLORIDE 20 MEQ/1
20 TABLET, EXTENDED RELEASE ORAL DAILY
Qty: 180 TABLET | Refills: 1 | Status: SHIPPED | OUTPATIENT
Start: 2023-01-04

## 2023-01-04 RX ORDER — AMOXICILLIN 500 MG/1
500 CAPSULE ORAL EVERY 8 HOURS SCHEDULED
Qty: 12 CAPSULE | Refills: 0 | Status: SHIPPED | OUTPATIENT
Start: 2023-01-04 | End: 2023-01-08

## 2023-01-04 RX ORDER — METOPROLOL SUCCINATE 25 MG/1
25 TABLET, EXTENDED RELEASE ORAL DAILY
Qty: 30 TABLET | Refills: 3 | Status: SHIPPED | OUTPATIENT
Start: 2023-01-05

## 2023-01-04 RX ADMIN — POTASSIUM CHLORIDE 40 MEQ: 1500 TABLET, EXTENDED RELEASE ORAL at 10:12

## 2023-01-04 RX ADMIN — AMOXICILLIN 500 MG: 500 CAPSULE ORAL at 14:27

## 2023-01-04 RX ADMIN — ATORVASTATIN CALCIUM 80 MG: 80 TABLET, FILM COATED ORAL at 10:04

## 2023-01-04 RX ADMIN — MAGNESIUM SULFATE HEPTAHYDRATE 1000 MG: 1 INJECTION, SOLUTION INTRAVENOUS at 10:17

## 2023-01-04 RX ADMIN — SACUBITRIL AND VALSARTAN 1 TABLET: 24; 26 TABLET, FILM COATED ORAL at 10:04

## 2023-01-04 RX ADMIN — BUPROPION HYDROCHLORIDE 300 MG: 150 TABLET, EXTENDED RELEASE ORAL at 10:02

## 2023-01-04 RX ADMIN — METOPROLOL SUCCINATE 25 MG: 25 TABLET, FILM COATED, EXTENDED RELEASE ORAL at 10:05

## 2023-01-04 RX ADMIN — SODIUM CHLORIDE, PRESERVATIVE FREE 10 ML: 5 INJECTION INTRAVENOUS at 10:05

## 2023-01-04 RX ADMIN — FUROSEMIDE 20 MG: 20 TABLET ORAL at 10:01

## 2023-01-04 RX ADMIN — ASPIRIN 81 MG CHEWABLE TABLET 81 MG: 81 TABLET CHEWABLE at 09:15

## 2023-01-04 RX ADMIN — AMOXICILLIN 500 MG: 500 CAPSULE ORAL at 06:06

## 2023-01-04 RX ADMIN — TICAGRELOR 90 MG: 90 TABLET ORAL at 10:03

## 2023-01-04 RX ADMIN — BUDESONIDE AND FORMOTEROL FUMARATE DIHYDRATE 1 PUFF: 80; 4.5 AEROSOL RESPIRATORY (INHALATION) at 08:01

## 2023-01-04 RX ADMIN — SODIUM CHLORIDE, PRESERVATIVE FREE 10 ML: 5 INJECTION INTRAVENOUS at 10:02

## 2023-01-04 RX ADMIN — PANTOPRAZOLE SODIUM 40 MG: 40 TABLET, DELAYED RELEASE ORAL at 08:00

## 2023-01-04 NOTE — PLAN OF CARE
Problem: Discharge Planning  Goal: Discharge to home or other facility with appropriate resources  1/4/2023 0439 by Adrian Cheng RN  Outcome: Progressing  1/3/2023 1827 by Rodger Allen RN  Outcome: Progressing     Problem: Pain  Goal: Verbalizes/displays adequate comfort level or baseline comfort level  1/4/2023 0439 by Adrian Cheng RN  Outcome: Progressing  1/3/2023 1827 by Rodger Allen RN  Outcome: Progressing     Problem: Safety - Adult  Goal: Free from fall injury  1/4/2023 0439 by Adrian Cheng RN  Outcome: Progressing  1/3/2023 1827 by Rodger Allen RN  Outcome: Progressing     Problem: ABCDS Injury Assessment  Goal: Absence of physical injury  1/4/2023 0439 by Adrian Cheng RN  Outcome: Progressing  1/3/2023 1827 by Rodger Allen RN  Outcome: Progressing     Problem: Nutrition Deficit:  Goal: Optimize nutritional status  1/4/2023 0439 by Adrian Cheng RN  Outcome: Progressing  1/3/2023 1827 by Rodger Allen RN  Outcome: Progressing

## 2023-01-04 NOTE — PROGRESS NOTES
[facility]  Progress Note                   Date:   1/4/2023  Patient name: Esteban Baldwin  Date of admission:  12/31/2022  5:54 PM  MRN:   [id number]  YOB: 1955  PCP: Valdemar Leiva MD    Reason for Admission: NSTEMI (non-ST elevated myocardial infarction) [facility]) [I21.4]    Subjective:       Clinical Changes /Abnormalities: Patient seen and examined in room with family. Denies chest pain. Complaints of SOB when sitting up and with exertion. Has multiple questions/concerns regarding [employer], addressed. Requesting to speak with [employer] Rep again. Tele/vitals/labs reviewed. Review of Systems    Medications:   Scheduled Meds:   metoprolol succinate  25 mg Oral Daily    budesonide-formoterol  1 puff Inhalation BID    furosemide  20 mg Oral Daily    sacubitril-valsartan  1 tablet Oral BID    rivaroxaban  20 mg Oral Daily    atorvastatin  80 mg Oral Daily    ticagrelor  90 mg Oral BID    aspirin  81 mg Oral Daily    sodium chloride flush  5-40 mL IntraVENous 2 times per day    amoxicillin  500 mg Oral 3 times per day    sodium chloride flush  5-40 mL IntraVENous 2 times per day    pantoprazole  40 mg Oral QAM AC    buPROPion  300 mg Oral QAM     Continuous Infusions:   sodium chloride 25 mL/hr at 01/03/23 1342    sodium chloride       CBC:   Recent Labs     01/02/23  0610 01/03/23  0657 01/04/23  0546   WBC 15.5* 10.6 9.4   HGB 10.5* 9.5* 9.3*    326 304       BMP:    Recent Labs     01/02/23  0610 01/03/23  0657 01/04/23  0546   * 135 130*   K 3.8 3.9 3.4*   CL 99 103 102   CO2 21 21 21   BUN 15 18 18   CREATININE 1.51* 1.69* 1.57*   GLUCOSE 79 78 98       Hepatic:No results for input(s): AST, ALT, ALB, BILITOT, ALKPHOS in the last 72 hours. Troponin:   Recent Labs     01/01/23  1245 01/02/23  0610   TROPHS 9,518* 4,120*       BNP: No results for input(s): BNP in the last 72 hours. Lipids: No results for input(s): CHOL, HDL in the last 72 hours.     Invalid input(s): LDLCALCU  INR: No results for input(s): INR in the last 72 hours. DATA:    Diagnostics:       EKG    ECHO 1/3/23  Summary  Left ventricle is normal in size. Global left ventricular systolic function  is severely reduced. Calculated ejection fraction 26% by Peralta's method. Severe Hypokinesis of [place] and all apical segments, along with mid  Anteroseptal, inferoseptal, and anterior walls. Redundant chordae tendinae is noted. Evidence of diastolic dysfunction. Normal tricuspid valve structure. Trivial tricuspid regurgitation. Signature  ----------------------------------------------------------------------------   Electronically signed by Odilia Ma(Sonographer) on 01/03/2023   09:07 AM  ----------------------------------------------------------------------------     ----------------------------------------------------------------------------   Electronically signed by Henry Briseno(Interpreting physician) on 01/03/2023   11:53 AM    ECHO  4/22  Summary  Technically difficult study  Normal LV size , mild to moderately increased wall thickness. Difficult to assess wall motion abnormalities  Moderately reduced LV systolic function with LVEF 40-45 %. Normal RV size and function. LA and RA appears normal in size. No obvious significant structural valvular abnormality noted. No significant valvular stenosis or regurgitation noted. Normal aortic root dimension. No significant pericardial effusion noted. IVC not well visualized     Cardiac Angiography: 6/16/2022   Cardiac Arteries and Lesion Findings       LMCA: Normal 0% stenosis. LAD: Diffuse irregularities 30-40%. LCx: Diffuse irregularities 30-40%. RCA: Diffuse irregularities 30-40%. Coronary Tree        Dominance: Right        Procedure Summary        Minimal non-obstructive CAD. LV was not done. CATH Findings: 1/1/2023         The LV gram was performed in the GARCIA 30 position. LVEF: 40%.          Conclusions:     % mid stenosis. Extensive clot. Reduced to 0% using multiple times aspiration thrombectomy and PTCA followed by 2.75x33 mm Resolute JUDITH PD using 3 mm NC balloon. Distal embolization, multiple aspiration thrombectomy done, IC Adenocard and nitro given. LAVELLE 3 flow regained, the very distal apical LAD still have 100% stenosis with clot, will be treated medically, Integrilin was not given due to history of bladder cancer and recent surgery. The patient remained is chest pain free and hemodynamically stable. Nonobstructive disease of other arteries. Recommendations:  DAPT and risk factor modifications       Objective:   Vitals: BP 96/64   Pulse 76   Temp 98.1 °F (36.7 °C) (Oral)   Resp 18   Ht 6' 2\" (1.88 m)   Wt 205 lb (93 kg)   SpO2 96%   BMI 26.32 kg/m²   General appearance: alert and cooperative with exam  HEENT: Head: Normocephalic, no lesions, without obvious abnormality. Neck:no JVD, trachea midline, no adenopathy  Lungs: Clear to auscultation  Heart: Regular rate and rhythm, s1/s2 auscultated, no murmurs. Right groin cath site no hematoma or bleeding noted  Abdomen: soft, non-tender, bowel sounds active, with ileal conduit noted   Extremities: no edema  Neurologic: not done        Assessment / Acute Cardiac Problems:   NSTEMI, highly elevated Troponin, trending up.  EKG without specific ischemic ST-T changes  Cardiac cath 6/2022 with minimal CAD  Moderate LV systolic dysfunction with EF 45% on last echo   Bladder cancer s/p laparoscopic cystoprostatectomy with ileal conduit creation on 9/16  HTN  HLD  Bilateral PE, 03/2022  CKD    Patient Active Problem List:     Kidney stones     Nocturia     Malignant neoplasm of urinary bladder (HCC)     Cancer of lateral wall of urinary bladder (HCC)     Acute respiratory failure with hypoxia (HCC)     Other hyperlipidemia     Microcytic anemia     Mild protein-calorie malnutrition ([address]     Pulmonary embolism without acute cor pulmonale ([address]     Hypoxia Dyspnea     Acute systolic heart failure (HCC)     Acute renal failure with tubular necrosis (HCC)     CKD (chronic kidney disease), stage III (HCC)     Dilated cardiomyopathy (HCC)     PAXTON (acute kidney injury) ([facility]     Bandemia     Complicated UTI (urinary tract infection)     Hypokalemia     Hypophosphatemia     Small bowel obstruction (HCC)     Nephrostomy complication (HCC)     Candida infection     Enterococcus infection in shunt (HCC)     Hypomagnesemia     Functional diarrhea     NSTEMI (non-ST elevated myocardial infarction) (HCC)     Chronic systolic heart failure (HCC)     Coronary artery disease involving native coronary artery of native heart with unstable angina pectoris (HCC)     S/P angioplasty with stent      Plan of Treatment:   ICMP with LVEF 26% on ECHO. Patient has Life vest on and requesting to speak with Rep from [employer] again. Writer called and left message with Khari Ellington from [employer], awaiting call back. S/p cardiac with stenting to mid [id number] aspirin for 2 weeks then stop.  Continue statin, BB and Brilinta   PAXTON/CKD -diuretic per nephrology  History of PE-on Xarelto  Keep K>4, Mg >2. K replaced this am.     Electronically signed by LINWOOD Vargas CNP on 1/4/2023 at [address] [place] [address],[address].  [phone]

## 2023-01-04 NOTE — PROGRESS NOTES
PULMONARY & CRITICAL CARE MEDICINE PROGRESS NOTE     Patient:  Shbanam Martin  MRN: [id number]  Admit date: 2022  Primary Care Physician: Gabino Covarrubias MD  Consulting Physician: Noel Nugent MD  CODE Status: Full Code  LOS: 4     SUBJECTIVE     CHIEF COMPLAINT/REASON FOR INITIAL CONSULT:Chest Pain (NSTEMI, PB transfer, direct admit no bed /)    BRIEF HOSPITAL COURSE:  The patient is a 74 y.o. male     INTERVAL HISTORY:  23  Patient is currently on supplemental oxygen with nasal [email or web address] data recorded  T-max is ***  White count is   Recent Labs     23  0546   WBC 9.4     In: -   Out:  [Urine:]  Patient is currently on    REVIEW OF SYSTEMS:  Review of Systems    OBJECTIVE     VITAL SIGNS:   LAST:  BP 96/64   Pulse 76   Temp 98.1 °F (36.7 °C) (Oral)   Resp 18   Ht 6' 2\" (1.88 m)   Wt 205 lb (93 kg)   SpO2 96%   BMI 26.32 kg/m²   8-24 HR RANGE:  TEMP Temp  Av °F (36.7 °C)  Min: 97.6 °F (36.4 °C)  Max: 98.2 °F (19.7 °C)   BP Systolic (49UII), MWD:216 , Min:94 , MKN:755      Diastolic (48YDQ), WOZ:20, Min:51, Max:95     PULSE Pulse  Av.7  Min: 73  Max: 92   RR Resp  Av  Min: 18  Max: 18   O2 SAT SpO2  Av %  Min: 96 %  Max: 96 %   OXYGEN DELIVERY No data recorded         PHYSICAL EXAM:  Physical Exam    DATA REVIEW     CURRENT MEDICATIONS:  Scheduled Meds:   metoprolol succinate  25 mg Oral Daily    budesonide-formoterol  1 puff Inhalation BID    furosemide  20 mg Oral Daily    sacubitril-valsartan  1 tablet Oral BID    rivaroxaban  20 mg Oral Daily    atorvastatin  80 mg Oral Daily    ticagrelor  90 mg Oral BID    aspirin  81 mg Oral Daily    sodium chloride flush  5-40 mL IntraVENous 2 times per day    amoxicillin  500 mg Oral 3 times per day    sodium chloride flush  5-40 mL IntraVENous 2 times per day    pantoprazole  40 mg Oral QAM AC    buPROPion  300 mg Oral QAM     Continuous Infusions:   sodium chloride 25 mL/hr at 23 1342    sodium chloride INPUT/OUTPUT:  In: -   Out: 2050 [Urine:2050]  Date 01/04/23 0000 - 01/04/23 2359   Shift 2238-5819 3499-3931 4066-6190 24 Hour Total   INTAKE   Shift Total(mL/kg)       OUTPUT   Urine(mL/kg/hr) 1000(1.3) 175  1175   Shift Total(mL/kg) 1000(10.8) 175(1.9)  1175(12.6)   Weight (kg) 93 93 93 93        LABORATORY RESULTS:  BLOOD GASES:   No results for input(s): POCPH, POCPCO2, POCPO2, POCHCO3, AAZH0XFD in the last 72 hours. COMPLETE BLOOD COUNTS:   Recent Labs     01/02/23  0610 01/03/23  0657 01/04/23  0546   WBC 15.5* 10.6 9.4   HGB 10.5* 9.5* 9.3*   HCT 36.0* 31.1* 31.3*   MCV 86.1 83.2 84.6    326 304   LYMPHOPCT 14* 18* 20*   RBC 4.18* 3.74* 3.70*   MCH 25.1* 25.4 25.1*   MCHC 29.2 30.5 29.7   RDW 17.4* 17.6* 17.7*     C-REACTIVE PROTEIN:   No results for input(s): CRP in the last 72 hours. LACTATE DEHYDROGENASE:   No results for input(s): LDH in the last 72 hours. BASIC METABOLIC PROFILE:   Recent Labs     01/02/23  0610 01/03/23  0657 01/04/23  0546   * 135 130*   K 3.8 3.9 3.4*   CL 99 103 102   CO2 21 21 21   BUN 15 18 18   CREATININE 1.51* 1.69* 1.57*   GLUCOSE 79 78 98     LIVER FUNCTION TESTS:   No results for input(s): PROT, LABALBU, ALT, AST, GGT, ALKPHOS, BILITOT in the last 72 hours. COAGULATION PROFILE:   No results for input(s): INR, PROTIME, APTT in the last 72 hours. D-DIMER:  No results for input(s): DDIMER in the last 72 hours. LACTIC ACID:  No results for input(s): LACTA in the last 72 hours. CARDIAC ENZYMES:  No results for input(s): CKTOTAL, CKMB, CKMBINDEX, TROPONINI in the last 72 hours. Invalid input(s): TROPONIN, HSTROP  BRAIN NATRIURETIC PEPTIDE/PRO-BRAIN NATRURETIC PEPTIDE:   No results for input(s): BNP, PROBNP in the last 72 hours. TRIGLYCERIDES:  No results for input(s): TRIG in the last 72 hours.      MICROBIOLOGY RESULTS:  URINE CULTURE: No components found for: CURINE  BLOOD CULTURE: No components found for: CBLOOD, CFUNGUSBL  SPUTUM CULTURE: No components found for: CSPUTUM    No results for input(s): SPUTUM, SPECDESC, SPECIAL, CULTURE, STATUS, ORG, CDIFFTOXPCR, MPNEUM, MPNEUG, CAMPYLOBPCR, SALMONELLAPC, SHIGAPCR, SHIGELLAPCR, LACTOQL in the last 72 hours. Invalid input(s): Peggy Cruz, CFUNGUSBL     PATHOLOGY RESULTS:    RADIOLOGY REPORTS:  No orders to display        ECHOCARDIOGRAM:   No results found for this or any previous visit. ASSESSMENT AND PLAN     PROBLEM LIST:    Patient Active Problem List   Diagnosis    Kidney stones    Nocturia    Malignant neoplasm of urinary bladder (HCC)    Cancer of lateral wall of urinary bladder (HCC)    Acute respiratory failure with hypoxia (HCC)    Other hyperlipidemia    Microcytic anemia    Mild protein-calorie malnutrition (HCC)    Pulmonary embolism without acute cor pulmonale (HCC)    Hypoxia    Dyspnea on exertion    Acute systolic heart failure (HCC)    Acute renal failure with tubular necrosis (HCC)    CKD (chronic kidney disease), stage III (HCC)    Dilated cardiomyopathy (HCC)    PAXTON (acute kidney injury) ([facility] [address]    Bandemia    Complicated UTI (urinary tract infection)    Hypokalemia    Hypophosphatemia    Small bowel obstruction (HCC)    Nephrostomy complication (HCC)    Candida infection    Enterococcus infection in shunt (HCC)    Hypomagnesemia    Functional diarrhea    NSTEMI (non-ST elevated myocardial infarction) (HCC)    Chronic systolic heart failure (HCC)    Coronary artery disease involving native coronary artery of native heart with unstable angina pectoris (HCC)    S/P angioplasty with stent    Acute hypoxemic respiratory failure (HCC)    History of pulmonary embolism    Shortness of breath       ASSESSMENT:    Acute hypoxic respiratory failure      PLAN:    I personally interviewed/examined the patient; reviewed interval history, interpreted all available radiographic and laboratory data at the time of service.     Patient is hemodynamically stable  Currently saturating well on supplemental oxygen with nasal cannula @No data recorded  Continue supplemental oxygen to keep oxygen saturation >90%  Continue nocturnal and as needed BiPAP  Chest tube management ***  Pleural fluid ***  Anticoagulation ***  Encourage incentive spirometry/Acapella  Maintain bronchopulmonary hygiene  Continue aspiration precautions  Continue bronchodilators  Antimicrobials reviewed; continue   Follow-up culture results  Continue IV Decadron/prednisone taper/stress dose steroids  Continue diuresis/hemodialysis   Monitor I/O, electrolytes with a goal of even/negative fluid balance  DVT and stress ulcer prophylaxis  Physical/occupational therapy  Patient counseled regarding smoking cessation      I will sign off on the case as this point   Needs home O2 evaluation at time of discharge  D/c instructions provided as appropriate    It was my pleasure to evaluate Kimberly Jade today. I would like to thank you for allowing me to participate in the care of this patient. Please feel free to call with any further questions or concerns. We will continue to follow. Carmelo Wolfe MD  Pulmonary and Critical Care Medicine           1/4/2023, 11:59 AM    This note is created with the assistance of a speech recognition program.  While intending to generate a document that actually reflects the content of the visit, the document can still have some errors including those of syntax and sound-alike substitutions which may escape proof reading. It such instances, actual meaning can be extrapolated by contextual diversion.

## 2023-01-04 NOTE — PROGRESS NOTES
CLINICAL PHARMACY NOTE: MEDS TO BEDS    Total # of Prescriptions Filled: 4   The following medications were delivered to the patient:  METOPROLOL SUCC ER 25   ENTRESTO 24-26   LASIX 20   POTASSIUM ER 20     Additional Documentation:

## 2023-01-04 NOTE — PROGRESS NOTES
Comprehensive Nutrition Assessment    Type and Reason for Visit:  Reassess    Nutrition Recommendations/Plan:   Continue current diet. Encourage/monitor PO intakes as tolerated. Will modify ONS to provide clear liquid Ensure supplements with meals. Monitor labs, weights, and plan of care. Obtain current bed scale weight as able. Malnutrition Assessment:  Malnutrition Status: Moderate malnutrition (at least) (01/01/23 1157)    Context:  Acute Illness (on Chronic)     Findings of the 6 clinical characteristics of malnutrition:  Energy Intake:  75% or less of estimated energy requirements for 7 or more days  Weight Loss:  Greater than 7.5% over 3 months     Body Fat Loss:  Unable to assess   Muscle Mass Loss:  Unable to assess  Fluid Accumulation:  No significant fluid accumulation   Strength:  Not Performed    Nutrition Assessment:    Pt reports appetite improving and his sense of taste has been improving. Pt consuming about 50% of meals. Noted several unopened Ensure supplements at bedside. Last recorded BM 12/30. Labs reviewed: Na 130 mmol/L, K 3.4 mmol/L, CR 1.57 mg/dL. Meds include: Lasix. Nutrition Related Findings:    Labs/Meds reviewed. Improving appetite and sense of taste. Last BM 12/30. Wound Type:  (wound to abdomen)       Current Nutrition Intake & Therapies:    Average Meal Intake: 26-50%, 51-75%  Average Supplements Intake: 0%, 1-25%, 26-50%  ADULT ORAL NUTRITION SUPPLEMENT; Breakfast, Lunch, Dinner; Standard High Calorie/High Protein Oral Supplement  ADULT DIET; Regular; Low Fat/Low Chol/High Fiber/2 gm Na; 1500 ml    Anthropometric Measures:  Height: 6' 2\" (188 cm)  Ideal Body Weight (IBW): 190 lbs (86 kg)    Admission Body Weight: 205 lb 0.4 oz (93 kg)  Current Body Weight: 205 lb (93 kg), 107.9 % IBW.  Weight Source: Stated  Current BMI (kg/m2): 26.3  Usual Body Weight: 267 lb 3 oz (121.2 kg) (10/1/22 bed scale per chart review)  % Weight Change (Calculated): -23.3 BMI Categories: Overweight (BMI 25.0-29. 9)    Estimated Daily Nutrient Needs:  Energy Requirements Based On: Kcal/kg  Weight Used for Energy Requirements: Admission  Energy (kcal/day): 4236-2229 kcals/day  Weight Used for Protein Requirements: Ideal  Protein (g/day): 100-120 gm pro/day  Method Used for Fluid Requirements: Other (Comment)  Fluid (ml/day): per MD    Nutrition Diagnosis:   Inadequate oral intake related to catabolic illness (appetite; current condition) as evidenced by poor intake prior to admission, weight loss (need for oral supplements)    Nutrition Interventions:   Food and/or Nutrient Delivery: Continue Current Diet, Modify Oral Nutrition Supplement  Nutrition Education/Counseling: No recommendation at this time, Education not indicated  Coordination of Nutrition Care: Continue to monitor while inpatient  Plan of Care discussed with: Patient    Goals:  Previous Goal Met: Progressing toward Goal(s)  Goals: Meet at least 75% of estimated needs, prior to discharge       Nutrition Monitoring and Evaluation:   Behavioral-Environmental Outcomes: None Identified  Food/Nutrient Intake Outcomes: Food and Nutrient Intake, Supplement Intake  Physical Signs/Symptoms Outcomes: Biochemical Data, GI Status, Constipation, Nausea or Vomiting, Fluid Status or Edema, Nutrition Focused Physical Findings, Skin, Weight    Discharge Planning:     Too soon to determine     Farhana Martinez RD, RADHIKA  Contact: [phone]

## 2023-01-04 NOTE — PROGRESS NOTES
Renal Progress Note    Patient :  Juice Maria; 74 y.o. MRN# [id number]  Location:  2003/2003-01  Attending:  Erika Galarza MD  Admit Date:  12/31/2022   Hospital Day: 4      Subjective: The patient is seen and examined. He has a life vest in place. Creatinine is back to about his baseline at 1.57. Electrolytes show sodium 1:30 potassium 3.4 chloride 102 and CO2 21. He is much less short of breath. He may be discharged later today. He was told if he is discharged to follow up with Dr. Sofiya Levy, my partner. He was supposed to follow up with him in the office today, but he was in the hospital so could not make that visit. Appetite is picking up. He does state the taste of food improved dramatically after receiving IV iron infusion. Cardiac catheterization done on 1/1/2022 LAD stent placed, some residual disease in the distal LAD noted. Chest x-ray showing evidence of COPD. Does have previous history of PE although currently not tachycardic no chest pains at present. Heparin continues. The patient's serum creatinine typically is typically 1.3-1.5 MG/DL. Urine output continues to be good. History is reviewed. Known history of chronic kidney disease stage IIIb from chronic interstitial nephritis Baseline 1.3-1.5 follows up with me in the office. Known history of bladder cancer failed local therapy status post cystoprostatectomy and ileal conduit creation in September 2022. Developed bowel obstruction subsequently required ureteral stent placements. These have not been removed. Previously had chemotherapy through a port in his right chest.  Presented to the hospital with chest pain found to have high troponin. Underwent cardiac catheterization as described above. Has had previous history of PE in March also has had COPD. Ejection fraction 40 to 45%, and his CHF appears to be compensated.     Outpatient Medications:     Medications Prior to Admission: LORazepam (ATIVAN) 1 MG tablet, Take 1 mg by mouth every 6 hours as needed for Anxiety.   [DISCONTINUED] ciprofloxacin (CIPRO) 250 MG tablet, Take 1 tablet by mouth 2 times daily for 7 days  ondansetron (ZOFRAN-ODT) 8 MG TBDP disintegrating tablet, DISSOLVE 1 TABLET IN MOUTH EVERY 8 HOURS AS NEEDED FOR NAUSEA FOR VOMITING  [DISCONTINUED] sucralfate (CARAFATE) 1 GM/10ML suspension, Take 10 mLs by mouth 4 times daily  rivaroxaban (XARELTO) 20 MG TABS tablet, Take 1 tablet by mouth daily  [DISCONTINUED] omeprazole (PRILOSEC) 20 MG delayed release capsule, Take 1 capsule by mouth daily (Patient not taking: Reported on 12/31/2022)  [DISCONTINUED] carvedilol (COREG) 3.125 MG tablet, TAKE 1 TABLET BY MOUTH TWICE DAILY  buPROPion (WELLBUTRIN XL) 300 MG extended release tablet, Take 300 mg by mouth every morning  [DISCONTINUED] simvastatin (ZOCOR) 40 MG tablet, Take 40 mg by mouth nightly    Current Medications:     Scheduled Meds:    metoprolol succinate  25 mg Oral Daily    budesonide-formoterol  1 puff Inhalation BID    furosemide  20 mg Oral Daily    sacubitril-valsartan  1 tablet Oral BID    rivaroxaban  20 mg Oral Daily    atorvastatin  80 mg Oral Daily    ticagrelor  90 mg Oral BID    aspirin  81 mg Oral Daily    sodium chloride flush  5-40 mL IntraVENous 2 times per day    amoxicillin  500 mg Oral 3 times per day    sodium chloride flush  5-40 mL IntraVENous 2 times per day    pantoprazole  40 mg Oral QAM AC    buPROPion  300 mg Oral QAM     Continuous Infusions:    sodium chloride 25 mL/hr at 01/03/23 1342    sodium chloride       PRN Meds:  albuterol sulfate HFA, LORazepam, morphine, sodium chloride flush, sodium chloride, tiZANidine, sodium chloride flush, sodium chloride, ondansetron **OR** ondansetron, magnesium hydroxide, acetaminophen **OR** acetaminophen, potassium chloride **OR** potassium alternative oral replacement **OR** potassium chloride    Input/Output:       I/O last 3 completed shifts:  In: -   Out: 153 Izzy Will. Patient Vitals for the past 96 hrs (Last 3 readings):   Weight   22 2216 205 lb (93 kg)   22 1808 205 lb 0.4 oz (93 kg)       Vital Signs:   Temperature:  Temp: 98.1 °F (36.7 °C)  TMax:   Temp (24hrs), Av °F (36.7 °C), Min:97.6 °F (36.4 °C), Max:98.2 °F (36.8 °C)    Respirations:  Resp: 18  Pulse:   Heart Rate: 76  BP:    BP: 96/64  BP Range: Systolic (75HIQ), QNT:172 , Min:94 , VSI:758       Diastolic (53KSS), SKA:18, Min:51, Max:95      Physical Examination:     General:  AAO x 3, speaking in full sentences, no accessory muscle use but does appear somewhat short of breath. HEENT: Atraumatic, normocephalic, no throat congestion, moist mucosa. Eyes:   Pale conjunctiva, no icterus, EOMI  Neck:   No JVD, midline trachea and no accessory muscle use  Chest:  Bilateral air entry with prolonged expiratory phase and no rales or rhonchi appreciated  Cardiac:  Regular rate and rhythm positive S1 and S2 and no rubs  Abdomen: Soft, non-tender, not distended, active bowel sounds  :   No suprapubic or flank tenderness. Neuro:   AAO x 3, No FND. SKIN:  No rashes, good skin turgor. Extremities:  No edema, palpable peripheral pulses, no calf tenderness. Labs:       Recent Labs     23  0610 23  0657 23  0546   WBC 15.5* 10.6 9.4   RBC 4.18* 3.74* 3.70*   HGB 10.5* 9.5* 9.3*   HCT 36.0* 31.1* 31.3*   MCV 86.1 83.2 84.6   MCH 25.1* 25.4 25.1*   MCHC 29.2 30.5 29.7   RDW 17.4* 17.6* 17.7*    326 304   MPV 9.6 9.4 9.4      BMP:   Recent Labs     23  0610 23  0657 23  0546   * 135 130*   K 3.8 3.9 3.4*   CL 99 103 102   CO2 21 21 21   BUN 15 18 18   CREATININE 1.51* 1.69* 1.57*   GLUCOSE 79 78 98   CALCIUM 8.8 8.5* 8.4*      Phosphorus:   No results for input(s): PHOS in the last 72 hours. Magnesium:    Recent Labs     23  0546   MG 1.9     Albumin:  No results for input(s): LABALBU in the last 72 hours.   BNP:    No results found for: BNP  LISS:      Lab Results   Component Value Date/Time    LISS NEGATIVE 05/31/2022 10:45 AM     SPEP:  Lab Results   Component Value Date/Time    PROT 6.8 12/29/2022 04:55 PM     UPEP:   No results found for: LABPE  C3:     Lab Results   Component Value Date/Time    C3 119 05/31/2022 10:45 AM     C4:     Lab Results   Component Value Date/Time    C4 25 05/31/2022 10:45 AM     MPO ANCA:   No results found for: MPO  PR3 ANCA:   No results found for: PR3  Anti-GBM:   No results found for: GBMABIGG  Hep BsAg:       No results found for: HEPBSAG  Hep C AB:        No results found for: HEPCAB    Urinalysis/Chemistries:      Lab Results   Component Value Date/Time    NITRU POSITIVE 12/29/2022 03:54 PM    COLORU Red 12/29/2022 03:54 PM    PHUR 7.0 12/29/2022 03:54 PM    WBCUA 20 TO 50 12/29/2022 03:54 PM    RBCUA TOO NUMEROUS TO COUNT 12/29/2022 03:54 PM    MUCUS 3+ 04/14/2022 09:43 AM    YEAST FEW 03/31/2022 01:01 PM    BACTERIA MODERATE 12/29/2022 03:54 PM    CLARITYU cloudy 08/04/2022 09:05 AM    SPECGRAV 1.020 12/29/2022 03:54 PM    LEUKOCYTESUR LARGE 12/29/2022 03:54 PM    UROBILINOGEN Normal 12/29/2022 03:54 PM    BILIRUBINUR NEGATIVE 12/29/2022 03:54 PM    BLOODU large 08/04/2022 09:05 AM    GLUCOSEU NEGATIVE 12/29/2022 03:54 PM    KETUA NEGATIVE 12/29/2022 03:54 PM     Urine Sodium:   No results found for: ИРИНА  Urine Potassium:  No results found for: KUR  Urine Chloride:  No results found for: CLUR  Urine Osmolarity: No results found for: OSMOU  Urine Protein:   No components found for: TOTALPROTEIN, URINE   Urine Creatinine:     Lab Results   Component Value Date/Time    LABCREA 121.3 09/18/2022 05:17 PM     Urine Eosinophils:  No components found for: UEOS    Radiology:     CXR:     Assessment:     1. Chronic kidney disease stage IIIb secondary to chronic interstitial nephritis Baseline 1.3-1.5 renal function stable, follows up with Dr. Marina Reilly in the office, ultrasound shows no hydronephrosis.      2.  Improving shortness of breath with exertion, history of COPD. CHF appears to be compensated. We will obtain pulmonary medicine consultation  3. Acute non-ST elevation MI status postcardiac catheterization and stent placement  4. Bladder cancer failed local therapy status post prostatectomy and ileal conduit creation in September 2022  5. Enterococcal UTI currently on amoxicillin  6. Some elevation in creatinine today at 1.69 and compared to 1.5 yesterday. This may be evidence of some late radiocontrast injury  7. Anemia of chronic disease with a normal iron saturation, although the ferritin was a bit high at greater than 1,400, likely secondary to inflammation. Plan:   1. Watch labs  2. Follow intake and output  3. Fluid restriction 1.5 L a day  4. With creatinine back to about baseline, patient is stable for discharge from nephrology standpoint  5. Lasix prescribed by primary team  6. Outpatient follow-up with Dr. Shawna Brewer  to be arranged once discharged    Nutrition     Avoid further nephrotoxic drugs/contrast exposure. We will continue to follow along with you. Meka Liang M.D.   [facility]  1/4/2023

## 2023-01-04 NOTE — CARE COORDINATION
Notified Cara from [facility] of discharge today.  Patient was fitted for life vest    Discharge [facility] Case Management Department  Written by: Ema Sargent RN    Patient Name: Alyson Gibbs  Attending Provider: Fela Wheeler MD  Admit Date: 2022  5:54 PM  MRN: [id number]  Account: [de-identified]                     : 1955  Discharge Date: 2023      Disposition: home    Ema Sargent RN

## 2023-01-04 NOTE — DISCHARGE SUMMARY
[facility]  Office: [address], DO, Sarireymundo Flores, DO, Sherwood Mail, DO, Dipesh Has Blood, DO, Dharmesh De La Torre MD, Quoc Pina MD, Jack Partida MD, Stacey Rosa MD,  Marie Geronimo MD, Tammy Gonzales MD, Meng Caputo, DO, Edilberto Bonds MD,  Shree Hart MD, Victorino Albrecht MD, Richard Villa, DO, Diana Erickson MD, Annita Salamanca MD, Rachel Memory, DO, Fabricio Langford MD, Madhavi Patel MD, Jameel Chilel MD, Christian Claros MD, Nii Hayes, DO, Asael Vargas MD, Supriya Lantigua MD, Faustina Singh, CNP,  Tracy Acevedo, CNP, Sue Fernandez, CNP, Leana Ag, CNP,  Felicity Caballero, [facility], John Conway, CNP, Angie Veloz, CNP, Alejandro Newton, CNP, Pedro Farrell, CNP, Derek Greenwood, CNP, Bryn Mendoza PA-C, Hu Goodrich, CNS, Coleen Flood, [facility], Allen Chilel, [facility]    Discharge Summary     Patient ID: Ranjana Frost  :  1955   MRN: [id number]     ACCOUNT:  [de-identified]   Patient's PCP: Uma Neri MD  Admit Date: 2022   Discharge Date: 2023     Length of Stay: 4  Code Status:  Prior  Admitting Physician: No admitting provider for patient encounter.   Discharge Physician: Ayah Cervantes MD     Active Discharge Diagnoses:     Hospital Problem Lists:  Principal Problem:    NSTEMI (non-ST elevated myocardial infarction) [facility])  Active Problems:    Complicated UTI (urinary tract infection)    Chronic systolic heart failure (HCC)    Coronary artery disease involving native coronary artery of native heart with unstable angina pectoris (HCC)    S/P angioplasty with stent    Acute hypoxemic respiratory failure ([facility] [address]    History of pulmonary embolism    Shortness of breath    Malignant neoplasm of urinary bladder (HCC)    Cancer of lateral wall of urinary bladder (HCC)    Other hyperlipidemia    Dyspnea on exertion    Acute systolic heart failure (HCC)    CKD (chronic kidney disease), stage III ([facility] [address]    Dilated cardiomyopathy ([facility] [address]  Resolved Problems:    * No resolved hospital problems. *      Admission Condition:  poor     Discharged Condition: good    Hospital Stay:     Hospital Course:  Corey Parks is a 74 y.o. male who was admitted for the management of   NSTEMI (non-ST elevated myocardial infarction) ([facility] [address] , presented to ER with Chest Pain (NSTEMI, PB transfer, direct admit no bed /)      60-year-old male transferred from [facility] ER presented with acute chest pain, dry heaves. There was concern for nonspecific changes on EKG, initial concern for STEMI, ruled out by cardiology. Troponin peaked at 4000. Patient was on IV heparin and nitro drip. Patient was taken for cardiac cath and showed 100% LAD mid stenosis which was reduced by aspiration thrombectomy and drug-eluting stent. Patient was also seen to have UTI with Enterococcus E. coli. Patient is resistant to ciprofloxacin, switch to Macrobid. Patient has history of locally advanced bladder carcinoma with history of robotic cystoprostatectomy with ileal conduit creation in September 2022. Patient had history of nephrolithiasis and recently had percutaneous lithotomy and stent removal.       Significant therapeutic interventions:   NSTEMI s/p thrombectomy of mid LAD with stent, on dual antiplatelet agent, aspirin and Brilinta. Patient counseled regarding high bleeding risk on triple therapy with antiplatelet agents and anticoagulant. Plan is to continue aspirin for 2 weeks and then stop. We will continue Brilinta and Xarelto thereafter. Acute systolic heart failure-patient's EF dropped from 45% to 26%, started on Lasix 20 mg daily, electrolytes replaced, start 20 mEq potassium daily.   Patient will go home on LifeVest.  Change Coreg to Lopressor due to low blood pressure  Continue Entresto     Dyspnea on exertion-patient quit smoking 30 years ago, previous PFTs and HRCT within normal limits, pulmonology following. continue Symbicort and albuterol. CKD stage III-creatinine stable at 1.5. Nephrology following     Enterococcus UTI-on amoxicillin, cultures grew E. Coli     Microcytic anemia- start oral iron supplementation outpatient, will need follow up with pcp     History of PE-discussed with patient, resume home Xarelto     History of bladder cancer s/p cystoprostatectomy and ileal conduit- monitor output    Hypokalemia-potassium replaced, recheck potassium outpatient     DVT prophylaxis-resume home Xarelto  Continue fluid restriction 1500 mL. Patient is a to be discharged  No events overnight  Patient feels well  No chest pain, some shortness of breath present on exertion. Exam on discharge  Patient is alert and oriented  Clear to auscultation bilaterally  Regular rate and rhythm  No abdominal tenderness, ileal conduit in place no organomegaly  No pedal edema   Chemo-Port in chest,  Significant Diagnostic Studies:   Labs / Micro:  CBC:   Lab Results   Component Value Date/Time    WBC 9.4 01/04/2023 05:46 AM    RBC 3.70 01/04/2023 05:46 AM    HGB 9.3 01/04/2023 05:46 AM    HCT 31.3 01/04/2023 05:46 AM    MCV 84.6 01/04/2023 05:46 AM    MCH 25.1 01/04/2023 05:46 AM    MCHC 29.7 01/04/2023 05:46 AM    RDW 17.7 01/04/2023 05:46 AM     01/04/2023 05:46 AM     BMP:    Lab Results   Component Value Date/Time    GLUCOSE 98 01/04/2023 05:46 AM     01/04/2023 05:46 AM    K 3.4 01/04/2023 05:46 AM     01/04/2023 05:46 AM    CO2 21 01/04/2023 05:46 AM    ANIONGAP 7 01/04/2023 05:46 AM    BUN 18 01/04/2023 05:46 AM    CREATININE 1.57 01/04/2023 05:46 AM    BUNCRER NOT REPORTED 02/08/2019 02:51 PM    CALCIUM 8.4 01/04/2023 05:46 AM    LABGLOM 49 01/04/2023 05:46 AM    GFRAA 57 10/03/2022 05:33 AM    GFR      10/03/2022 05:33 AM     HFP:    Lab Results   Component Value Date/Time    PROT 6.8 12/29/2022 04:55 PM        Radiology:  US RENAL COMPLETE    Result Date: 12/29/2022  1. Bilateral nonobstructing nephrolithiasis. No evidence of hydronephrosis. 2. 4.2 cm cyst within the mid left kidney. 3. Patient status post cystectomy. No mass or fluid collection within the pelvis. XR CHEST PORTABLE    Result Date: 12/31/2022  COPD. No acute cardiopulmonary process. Indwelling infusion port. Consultations:    Consults:     Final Specialist Recommendations/Findings:   IP CONSULT TO SOCIAL WORK  IP CONSULT TO CARDIOLOGY  IP CONSULT TO NEPHROLOGY  IP CONSULT TO CARDIAC REHAB  IP CONSULT TO CARDIAC REHAB  IP CONSULT TO NEPHROLOGY  IP CONSULT TO PULMONOLOGY  IP CONSULT TO HOME CARE NEEDS      The patient was seen and examined on day of discharge and this discharge summary is in conjunction with any daily progress note from day of discharge.     Discharge plan:     Disposition: Home    Physician Follow Up:     Lacie Byrd MD  [address]  [phone]    Schedule an appointment as soon as possible for a visit in 1 week(s)      Gabrielle Florence MD  [place] [address], [address]  [address] [place] [address] [phone]    Schedule an appointment as soon as possible for a visit today      [facility]  [address] [place] [address]  [phone]  Schedule an appointment as soon as possible for a visit in 1 week(s)       Requiring Further Evaluation/Follow Up POST HOSPITALIZATION/Incidental Findings: follow up cardio    Diet: regular diet    Activity: As tolerated    Instructions to Patient: take meds as ordered    Discharge Medications:      Medication List        START taking these medications      amoxicillin 500 MG capsule  Commonly known as: AMOXIL  Take 1 capsule by mouth every 8 hours for 4 days     aspirin 81 MG chewable tablet  Take 1 tablet by mouth daily for 14 days     atorvastatin 80 MG tablet  Commonly known as: LIPITOR  Take 1 tablet by mouth daily  Replaces: simvastatin 40 MG tablet     budesonide-formoterol 80-4.5 MCG/ACT Aero  Commonly known as: SYMBICORT  Inhale 1 puff into the lungs in the morning and 1 puff in the evening.      ferrous sulfate 325 (65 Fe) MG tablet  Commonly known as: IRON 325  Take 1 tablet by mouth 2 times daily     furosemide 20 MG tablet  Commonly known as: LASIX  Take 1 tablet by mouth daily  Start taking on: January 5, 2023     metoprolol succinate 25 MG extended release tablet  Commonly known as: TOPROL XL  Take 1 tablet by mouth daily  Start taking on: January 5, 2023     potassium chloride 20 MEQ extended release tablet  Commonly known as: KLOR-CON M  Take 1 tablet by mouth daily     sacubitril-valsartan 24-26 MG per tablet  Commonly known as: ENTRESTO  Take 1 tablet by mouth 2 times daily     ticagrelor 90 MG Tabs tablet  Commonly known as: BRILINTA  Take 1 tablet by mouth 2 times daily            CONTINUE taking these medications      buPROPion 300 MG extended release tablet  Commonly known as: WELLBUTRIN XL     LORazepam 1 MG tablet  Commonly known as: ATIVAN     ondansetron 8 MG Tbdp disintegrating tablet  Commonly known as: ZOFRAN-ODT  DISSOLVE 1 TABLET IN MOUTH EVERY 8 HOURS AS NEEDED FOR NAUSEA FOR VOMITING     rivaroxaban 20 MG Tabs tablet  Commonly known as: XARELTO  Take 1 tablet by mouth daily            STOP taking these medications      carvedilol 3.125 MG tablet  Commonly known as: COREG     ciprofloxacin 250 MG tablet  Commonly known as: CIPRO     omeprazole 20 MG delayed release capsule  Commonly known as: PriLOSEC     simvastatin 40 MG tablet  Commonly known as: ZOCOR  Replaced by: atorvastatin 80 MG tablet     sucralfate 1 GM/10ML suspension  Commonly known as: Carafate               Where to Get Your Medications        These medications were sent to [address] [address]      Phone: [phone]   amoxicillin 500 MG capsule  aspirin 81 MG chewable tablet  budesonide-formoterol 80-4.5 MCG/ACT Aero  furosemide 20 MG tablet  metoprolol succinate 25 MG extended release tablet  potassium chloride 20 MEQ extended release tablet  sacubitril-valsartan 24-26 MG per tablet       These medications were sent to [address]      Phone: [phone]   atorvastatin 80 MG tablet  ferrous sulfate 325 (65 Fe) MG tablet  ticagrelor 90 MG Tabs tablet         Discharge Procedure Orders   Basic Metabolic Panel   Standing Status: Future Standing Exp. Date: 01/04/24   Order Comments: Check on potassium levels   Follow up results with pcp       Time Spent on discharge is  35 mins in patient examination, evaluation, counseling as well as medication reconciliation, prescriptions for required medications, discharge plan and follow up. Electronically signed by   Mary Vences MD  1/4/2023  6:56 PM      Thank you Dr. Jerome Archuleta MD for the opportunity to be involved in this patient's care.

## 2023-01-06 LAB
CULTURE: NORMAL
CULTURE: NORMAL
Lab: NORMAL
Lab: NORMAL
SPECIMEN DESCRIPTION: NORMAL
SPECIMEN DESCRIPTION: NORMAL

## 2023-01-13 ENCOUNTER — TELEPHONE (OUTPATIENT)
Dept: PULMONOLOGY | Age: 68
End: 2023-01-13

## 2023-01-13 NOTE — TELEPHONE ENCOUNTER
Patients wife called and stated that patient can't get out of bed has a possible infection but they are not sure as to where. Possibly bladder. You saw him in the hospital last 10/10/2022 for bandemia. Patient was supposed to see you in November for hospital follow up but canceled due to not being able to get out of bed. Patient was wondering if you would be willing to see him soon but as a Virtual visit.  Please advise

## 2023-01-24 NOTE — PROGRESS NOTES
DIAGNOSIS:   1. High grade urothelial carcinoma, 12/2021, T3 N1 M0   2. Bilateral PE, 03/2022    CURRENT THERAPY:  Neoadjuvant chemo followed by radical cystectomy  Eliquis     BRIEF CASE HISTORY:   Myron Glynn is a very pleasant 74 y.o. male who is referred to us for bladder cancer. Patient presented with hematuria 02/2021 and was seen by urology, it was felt to be renal calculi and he was started on Flomax but continued to pass clots. He was later in [facility] and had urinary blockage, passed very large clot and then able to urinate and presented again to ER with continued hematuria, CT showed severe right hydronephrosis with tissue thickening/mass seen in right side of bladder, he underwent nephrostomy placement, stent was put in place and cystoscopy done showing fairly large papillary tumor occupying right side posterior wall and even the dome of the bladder. Pathology showed high grade urothelial carcinoma invading muscularis propria. Over the next month he needed 2 repeat cystoscopies and extensive fulguration of the tumor, again pathology showed high grade urothelial carcinoma. CT of the chest abdomen pelvis did not show metastatic disease. He has intermittent bladder pain and continues to have hematuria in Muñoz catheter. His appetite is poor. His father had bladder cancer, he did not have smoking history. The patient has smoking history but quit 30 years ago. He had COVID infection in 09/2021, he lost significant weight and continues to recover. He has arthralgias in the knees and uses cane. Staging PET scan was done and showed the bladder tumor and right pelvic sidewall lymph node metastases 4 of clinical staging of T3 N1 M0. We discussed neoadjuvant chemotherapy to be followed by surgery if he has a good response. Shortly after starting chemotherapy, the patient presented with bilateral pulmonary embolism and he was started on Xarelto. He had significant respiratory difficulty after that.   We Total Knee Arthroplasty PLACEMENT > 5 YEARS (2/25/2022); Cardiac catheterization (06/16/2022); and Cardiac catheterization (06/16/2022). CURRENT MEDICATIONS:  has a current medication list which includes the following prescription(s): hydrocodone-acetaminophen, dexamethasone, omeprazole, xarelto, xarelto, ondansetron, carvedilol, polyethylene glycol, albuterol sulfate hfa, lidocaine-prilocaine, finasteride, tamsulosin, simvastatin, bupropion, and phenazopyridine, and the following Facility-Administered Medications: sodium chloride flush and heparin flush. ALLERGIES:  has No Known Allergies. FAMILY HISTORY: Father had bladder cancer     SOCIAL HISTORY:  reports that he quit smoking about 30 years ago. He started smoking about 39 years ago. He has a 2.25 pack-year smoking history. He has never used smokeless tobacco. He reports that he does not drink alcohol and does not use drugs. REVIEW OF SYSTEMS:   General: No fever or night sweats. Weight regain. +fatigue and generalized weakness -significantly improved +dysgeusia - improved   ENT: No double or blurred vision, no hearing problem, no dysphagia or sore throat +smell affected - improved + tinnitus   Respiratory: No chest pain, no cough or hemoptysis. +shortness of breath - improved   Cardiovascular: Denies chest pain, PND or orthopnea. No L E swelling or palpitations   Gastrointestinal: No vomiting, diarrhea or constipation, abd pain, nausea    Genitourinary: Denies frequency, hematuria, urgency, dysuria, or incontinence   Neurological: Denies headaches, decreased LOC, no sensory or motor focal deficits. Musculoskeletal: No arthralgia no back pain or joint swelling. +knee pain  Skin: There are no rashes or bleeding. Psychiatric: + anxiety and depression. Endocrine: No diabetes or thyroid disease. Hematologic: No bleeding, no adenopathy. PHYSICAL EXAM: Shows a well appearing 74y.o.-year-old male who is not in pain or distress.  Vital Signs: Blood pressure (!) 154/66, pulse 96, temperature 96.9 °F (36.1 °C), temperature source Temporal, weight 254 lb 12.8 oz (115.6 kg). HEENT: Normocephalic and atraumatic. Pupils are equal, round, reactive to light and accommodation. Extraocular muscles are intact. Neck: Showed no JVD, no carotid bruit. Lungs: Clear to auscultation bilaterally. Heart: Regular without any murmur. Abdomen: Soft, nontender. No hepatosplenomegaly. Extremities: Lower extremities show no edema, clubbing, or cyanosis. Breasts: Examination not done today. Neuro exam: intact cranial nerves bilaterally no motor or sensory deficit, gait is normal. Lymphatic: no adenopathy appreciated in the supraclavicular, axillary, cervical or inguinal area      REVIEW OF LABORATORY DATA:   Lab Results   Component Value Date    WBC 10.7 07/14/2022    HGB 11.1 (L) 07/14/2022    HCT 33.0 (L) 07/14/2022    MCV 91.5 07/14/2022     07/14/2022       Chemistry        Component Value Date/Time     06/16/2022 0858    K 3.8 06/16/2022 0858     06/16/2022 0858    CO2 25 06/16/2022 0858    BUN 17 06/16/2022 0858    CREATININE 1.63 (H) 06/16/2022 0858    CREATININE 2.15 (H) 06/16/2022 0838        Component Value Date/Time    CALCIUM 9.0 06/16/2022 0858    ALKPHOS 85 05/26/2022 1149    AST 11 05/26/2022 1149    ALT 12 05/26/2022 1149    BILITOT 0.28 (L) 05/26/2022 1149        PATHOLOGY:     REVIEW OF RADIOLOGICAL RESULTS:         IMPRESSION:   1. High grade urothelial carcinoma   2. Neoadjuvant chemo to be followed by cystectomy   3. Bilateral PE, Eliquis   4. Hypoxemia with minimal activity with severe tachycardia    PLAN:   1. We discussed recent evaluations by cardiology and pulmonology, it was felt that his respiratory difficulty is related to the pulmonary embolism, scarring from previous COVID and anemia related to chemotherapy. He is improving significantly and he is off oxygen, he is able to walk with a cane without any difficulty.   He is definitely less dyspneic today than I have ever seen him. 2. I explained his dyspnea was likely due to bilateral PE and we will continue anticoagulation until he after surgery. We will determine discontinuation of anticoagulation depends on surgical outcome and course  3. Clinically he has improved significantly in the interim, he discontinued oxygen and is no longer using wheelchair. 4. His current lab work shows improvement. 5. His dysgeusia has improved and he has regained weight, his tinnitus persists and we discussed that this is a known side effect of cisplatin. We are hoping that will improve over the next few months but some of that might be permanent  6. We reviewed his current medications,  and we discussed taper down of steroids with 1 mg every other day for 2 weeks, then 1 mg twice a week for 2 weeks, he will need to discuss ongoing usage of Flomax and Proscar with urology. 7. We will obtain a PET CT scan for restaging but if he has no evidence of progressive or metastatic disease, plan to proceed with surgical intervention. We will evaluate the lymph nodes especially before proceeding with surgery. 8. I asked him to make an appointment with the urologist  9. We will plan for port removal based on imaging to be done after surgery. 10. Return depending on PET CT scan . Scribe Attestation   This note was created by Uriah Santos acting as scribe for the physician signing this note  Electronically Signed  Uriah Santos, 7/14/2022  Scribe, [employer]. Attending Attestation   Note was reviewed and edited.   I am in agreement with the note as entered    Doris Grayson MD  Hematologist/Medical [id number] [facility]

## 2024-07-07 NOTE — TELEPHONE ENCOUNTER
I called him about the pet results
Pt called into triage to discuss PET results. Pt educated that Dr Randee Shipman needs to review scans and will discuss results at f/u on 2/22/22. Pt aware of chemo teach scheduled for tomorrow 2/18/22 but unaware of starting chemo infusions. He states that he does not know if he wants to do chemotherapy and wants to talk with Dr Randee Shipman first. Also states that he does not have port yet. Pt updated that message was left for him to contact procedure office [phone] to schedule. Pt requesting to cancel chemo teach and infusions until he makes decision after f/u apptSteven Jones aware and updated pt schedule.
Will route to Dr. Delaney Jimenez so he is aware. Pt has f/u 2/22.
IV intact

## (undated) DEVICE — GLOVE ORANGE PI 8   MSG9080

## (undated) DEVICE — CANNULA SEAL

## (undated) DEVICE — TUBING, SUCTION, 9/32" X 20', STRAIGHT: Brand: MEDLINE INDUSTRIES, INC.

## (undated) DEVICE — SUTURE V-LOC 180 SZ 3-0 L6IN ABSRB GRN L17MM CV-23 1/2 CIR VLOCL0804

## (undated) DEVICE — TROCAR: Brand: KII® SLEEVE

## (undated) DEVICE — DISSECTOR LAP DIA5MM BLNT TIP ENDOPATH

## (undated) DEVICE — SUTURE PERMAHAND SZ 3-0 L30IN NONABSORBABLE BLK SH L26MM K832H

## (undated) DEVICE — SYRINGE, LUER LOCK, 10ML: Brand: MEDLINE

## (undated) DEVICE — SOLUTION SCRB 4OZ 10% POVIDONE IOD ANTIMIC BTL

## (undated) DEVICE — Device: Brand: SENSURA MIO

## (undated) DEVICE — GLOVE ORANGE PI 7 1/2   MSG9075

## (undated) DEVICE — SUTURE MCRYL 3-0 L27IN ABSRB VLT SH L26MM 1/2 CIR Y316H

## (undated) DEVICE — METER,URINE,400ML,DRAIN BAG,L/F,LL: Brand: MEDLINE

## (undated) DEVICE — SPONGE LAP W18XL18IN WHT COT 4 PLY FLD STRUNG RADPQ DISP ST

## (undated) DEVICE — DRAPE,REIN 53X77,STERILE: Brand: MEDLINE

## (undated) DEVICE — DRESSING NEG PRSS L W15XL10CM D1CM POLYVI ALC WHT FOAM VAC

## (undated) DEVICE — APPLICATOR MEDICATED 26 CC SOLUTION HI LT ORNG CHLORAPREP

## (undated) DEVICE — KIT DSG ABTHERA SENSATRAC

## (undated) DEVICE — SOLUTION SCRB 4OZ 4% CHG H2O AIDED FOR PREOPERATIVE SKIN

## (undated) DEVICE — ELECTRO LUBE IS A SINGLE PATIENT USE DEVICE THAT IS INTENDED TO BE USED ON ELECTROSURGICAL ELECTRODES TO REDUCE STICKING.: Brand: KEY SURGICAL ELECTRO LUBE

## (undated) DEVICE — SYRINGE IRRIG 60ML SFT PLIABLE BLB EZ TO GRP 1 HND USE W/

## (undated) DEVICE — TISSUE RETRIEVAL SYSTEM: Brand: INZII RETRIEVAL SYSTEM

## (undated) DEVICE — ADHESIVE SKIN CLOSURE TOP 36 CC HI VISC DERMBND MINI

## (undated) DEVICE — INSTRUMENT REPROC SEAL/DIVIDE OPEN LIGASURE IMPACT CRV LG JAW NANO-COAT 18CM

## (undated) DEVICE — TOTAL TRAY, 16FR 10ML SIL FOLEY, URN: Brand: MEDLINE

## (undated) DEVICE — CATHETER URETH 20FR BLLN 30CC 2 W F INF CTRL BARDX

## (undated) DEVICE — STAPLER INT L44CM 12 FIRING B FRM PWR + W/ GRIPPING SURF

## (undated) DEVICE — DRAIN WND SIL FLAT RADIOPAQUE 10MM FULL FLUTED

## (undated) DEVICE — WOUND RETRACTOR AND PROTECTOR: Brand: ALEXIS O WOUND PROTECTOR-RETRACTOR

## (undated) DEVICE — CANISTER NEG PRSS 1000ML W/ GEL INFOVAC

## (undated) DEVICE — PROTECTOR ULN NRV PUR FOAM HK LOOP STRP ANATOMICALLY

## (undated) DEVICE — GLOVE EXAM M L95IN FNGR THK35MIL PALM THK24MIL OFF WHT

## (undated) DEVICE — SUTURE PROL SZ 0 L30IN NONABSORBABLE BLU L36MM CT-1 1/2 CIR 8424H

## (undated) DEVICE — ADAPTER URO SCP UROLOK LL

## (undated) DEVICE — SUTURE V-LOC 180 SZ 0 L9IN ABSRB GRN GS-21 L37MM 1/2 CIR VLOCL0346

## (undated) DEVICE — CONTAINER,SPECIMEN,4OZ,OR STRL: Brand: MEDLINE

## (undated) DEVICE — YANKAUER,POOLE TIP,STERILE,50/CS: Brand: MEDLINE

## (undated) DEVICE — GLOVE SURG SZ 65 THK91MIL LTX FREE SYN POLYISOPRENE

## (undated) DEVICE — TROCAR: Brand: KII FIOS FIRST ENTRY

## (undated) DEVICE — SUTURE PERMAHAND SZ 3-0 L30IN NONABSORBABLE BLK SILK BRAID A304H

## (undated) DEVICE — PACK PROCEDURE SURG CYSTO SVMMC LF

## (undated) DEVICE — SUTURE VCRL + SZ 3-0 L27IN ABSRB UD L26MM SH 1/2 CIR VCP416H

## (undated) DEVICE — DISPOSABLE LAPAROSCOPIC CORDS, 1 PER POUCH: Brand: A&E MEDICAL / DISPOSABLE LAPAROSCOPIC CORDS

## (undated) DEVICE — CATHETER F BLLN 5CC 20FR INF CTRL 2 W SIL ALLY AND HYDRGEL

## (undated) DEVICE — SUTURE PDS II SZ 0 L60IN ABSRB VLT L65MM TP-1 1/2 CIR Z991G

## (undated) DEVICE — SOLUTION PREP POVIDONE IOD FOR SKIN MUCOUS MEM PRIOR TO

## (undated) DEVICE — CYSTO/BLADDER IRRIGATION SET, REGULATING CLAMP

## (undated) DEVICE — TRI-LUMEN FILTERED TUBE SET WITH ACTIVATED CHARCOAL FILTER: Brand: AIRSEAL

## (undated) DEVICE — CLIP INT XL YEL POLYMER HEM-O-LOK WECK

## (undated) DEVICE — GARMENT COMPR STD FOR 17IN CALF UNIF THER FLOTRN

## (undated) DEVICE — BLADELESS OBTURATOR: Brand: WECK VISTA

## (undated) DEVICE — SYRINGE CATH TIP 50ML

## (undated) DEVICE — Device

## (undated) DEVICE — RESERVOIR,SUCTION,100CC,SILICONE: Brand: MEDLINE

## (undated) DEVICE — SUTURE MCRYL SZ 4-0 L18IN ABSRB UD L16MM PC-3 3/8 CIR PRIM Y845G

## (undated) DEVICE — SOLUTION ANTIFOG VIS SYS CLEARIFY LAPSCP

## (undated) DEVICE — NITINOL STONE RETRIEVAL BASKET: Brand: ZERO TIP

## (undated) DEVICE — STAPLE REMOVER TRAY: Brand: MEDLINE INDUSTRIES, INC.

## (undated) DEVICE — ACETONE 4 LITER CONTAINER

## (undated) DEVICE — COUNTER NDL 40 COUNT HLD 70 FOAM BLK ADH W/ MAG

## (undated) DEVICE — DRAPE, SLUSH XL, 44X66, STERILE: Brand: MEDLINE

## (undated) DEVICE — BLADE CLIPPER GEN PURP NS

## (undated) DEVICE — TIP COVER ACCESSORY

## (undated) DEVICE — GLOVE EXAM SM L9.5IN FNGR THK3.6MIL PALM THK2.8MIL OFF WHT

## (undated) DEVICE — YANKAUER,FLEXIBLE HANDLE,REGLR CAPACITY: Brand: MEDLINE INDUSTRIES, INC.

## (undated) DEVICE — INTENDED FOR TISSUE SEPARATION, AND OTHER PROCEDURES THAT REQUIRE A SHARP SURGICAL BLADE TO PUNCTURE OR CUT.: Brand: BARD-PARKER ® CARBON RIB-BACK BLADES

## (undated) DEVICE — TOWEL,OR,DSP,ST,NATURAL,DLX,4/PK,20PK/CS: Brand: MEDLINE

## (undated) DEVICE — DRAIN,WOUND,15FR,3/16,FULL-FLUTED: Brand: MEDLINE

## (undated) DEVICE — SURGICEL ENDOSCP APPL

## (undated) DEVICE — GLOVE SURG SZ 6 THK91MIL LTX FREE SYN POLYISOPRENE ANTI

## (undated) DEVICE — GAUZE,SPONGE,FLUFF,6"X6.75",STRL,5/TRAY: Brand: MEDLINE

## (undated) DEVICE — SCISSOR SURG METZ CRV TIP

## (undated) DEVICE — SUTURE CHROMIC GUT SZ 2-0 L27IN ABSRB BRN L26MM SH 1/2 CIR G123H

## (undated) DEVICE — GOWN,AURORA,NONREINFORCED,LARGE: Brand: MEDLINE

## (undated) DEVICE — SUTURE VCRL SZ 3-0 L27IN ABSRB UD L17MM RB-1 1/2 CIR J215H

## (undated) DEVICE — CATHETER URETH 18FR BLLN 30CC 2 W F INF CTRL BARDX

## (undated) DEVICE — SUTURE VCRL + SZ 2-0 L27IN ABSRB WHT SH 1/2 CIR TAPERCUT VCP417H

## (undated) DEVICE — 3M™ STERI-DRAPE™ ISOLATION BAG, 10 PER CARTON / 4 CARTONS PER CASE, 1003: Brand: 3M™ STERI-DRAPE™

## (undated) DEVICE — PACK SURG ABD SVMMC

## (undated) DEVICE — SUTURE PERMAHAND SZ 3-0 L18IN NONABSORBABLE BLK L26MM SH C013D

## (undated) DEVICE — SUTURE VCRL SZ 4-0 L27IN ABSRB UD L17MM RB-1 1/2 CIR J214H

## (undated) DEVICE — LOOP VES W25MM THK1MM MAXI RED SIL FLD REPELLENT 100 PER

## (undated) DEVICE — BAG SPEC LAP 9X7.5 IN 12 MM 1500 CC MEM WIRE CANN NYL

## (undated) DEVICE — AGENT HEMSTAT 3GM OXIDIZED REGENERATED CELOS ABSRB FOR CONT (ORDER MULTIPLES OF 5EA)

## (undated) DEVICE — PAD PT POS 36 IN SURGYPAD DISP

## (undated) DEVICE — DRESSING NEG PRESSURE WND VAC

## (undated) DEVICE — PACK PROCEDURE SURG FACILITY SPEC GI BWL SPT SVMMC

## (undated) DEVICE — VESSEL SEALER EXTEND: Brand: ENDOWRIST

## (undated) DEVICE — NEEDLE HYPO 25GA L1.5IN BLU POLYPR HUB S STL REG BVL STR

## (undated) DEVICE — DRESSING NEG PRSS M 18X12.5X3.3CM POLYUR FOR WND THER VAC

## (undated) DEVICE — STAPLER INT L75MM CUT LN L73MM STPL LN L77MM BLU B FRM 8

## (undated) DEVICE — ARM DRAPE

## (undated) DEVICE — AGENT HEMSTAT W2XL14IN OXIDIZED REGENERATED CELOS ABSRB FOR

## (undated) DEVICE — GLOVE ORANGE PI 8 1/2   MSG9085

## (undated) DEVICE — 3M™ STERI-STRIP™ COMPOUND BENZOIN TINCTURE 40 BAGS/CARTON 4 CARTONS/CASE C1544: Brand: 3M™ STERI-STRIP™

## (undated) DEVICE — APPLIER CLP M L L11.4IN DIA10MM ENDOSCP ROT MULT FOR LIG

## (undated) DEVICE — 3M™ IOBAN™ 2 ANTIMICROBIAL INCISE DRAPE 6650EZ: Brand: IOBAN™ 2

## (undated) DEVICE — PREMIUM DRY TRAY LF: Brand: MEDLINE INDUSTRIES, INC.

## (undated) DEVICE — BLADE ES ELASTOMERIC COAT INSUL DURABLE BEND UPTO 90DEG

## (undated) DEVICE — SUTURE NONABSORBABLE MONOFILAMENT 3-0 PS-1 18 IN BLK ETHILON 1663H

## (undated) DEVICE — TROCAR: Brand: KII SHIELDED BLADED ACCESS SYSTEM

## (undated) DEVICE — CONNECTOR,TUBING,5-IN-1,NON-STERILE: Brand: MEDLINE INDUSTRIES, INC.

## (undated) DEVICE — INSUFFLATION NEEDLE TO ESTABLISH PNEUMOPERITONEUM.: Brand: INSUFFLATION NEEDLE

## (undated) DEVICE — SUTURE ETHLN SZ 3-0 L18IN NONABSORBABLE BLK L24MM PS-1 3/8 1663G

## (undated) DEVICE — AIRSEAL 12 MM ACCESS PORT AND PALM GRIP OBTURATOR WITH BLADELESS OPTICAL TIP, 120 MM LENGTH: Brand: AIRSEAL

## (undated) DEVICE — SUTURE VCRL SZ 1 L27IN ABSRB UD CT-1 L36MM 1/2 CIR J261H

## (undated) DEVICE — STAPLER INT L60MM REG TISS BLU B FRM 8 FIRING 2 ROW AUTO

## (undated) DEVICE — RELOAD STPL L75MM OPN H3.8MM CLS 1.5MM WIRE DIA0.2MM REG

## (undated) DEVICE — GOWN,SIRUS,NONRNF,SETINSLV,XL,20/CS: Brand: MEDLINE

## (undated) DEVICE — GARMENT,MEDLINE,DVT,INT,CALF,MED, GEN2: Brand: MEDLINE

## (undated) DEVICE — DRESSING TRNSPAR W5XL4.5IN FLM SHT SEMIPERMEABLE WIND

## (undated) DEVICE — ELECTRODE PT RET AD L9FT HI MOIST COND ADH HYDRGEL CORDED

## (undated) DEVICE — GLOVE ORANGE PI 7   MSG9070

## (undated) DEVICE — SUTURE VCRL SZ 4-0 L27IN ABSRB UD L26MM SH 1/2 CIR J415H

## (undated) DEVICE — COVER,LIGHT HANDLE,FLX,2/PK: Brand: MEDLINE INDUSTRIES, INC.